# Patient Record
Sex: FEMALE | Race: BLACK OR AFRICAN AMERICAN | NOT HISPANIC OR LATINO | URBAN - METROPOLITAN AREA
[De-identification: names, ages, dates, MRNs, and addresses within clinical notes are randomized per-mention and may not be internally consistent; named-entity substitution may affect disease eponyms.]

---

## 2017-04-12 ENCOUNTER — INPATIENT (INPATIENT)
Facility: HOSPITAL | Age: 37
LOS: 5 days | Discharge: HOME | DRG: 310 | End: 2017-04-18
Attending: HOSPITALIST | Admitting: HOSPITALIST
Payer: MEDICAID

## 2017-04-12 VITALS
TEMPERATURE: 98 F | WEIGHT: 235.01 LBS | HEART RATE: 68 BPM | SYSTOLIC BLOOD PRESSURE: 108 MMHG | DIASTOLIC BLOOD PRESSURE: 63 MMHG | HEIGHT: 66 IN | OXYGEN SATURATION: 98 %

## 2017-04-12 LAB
ALBUMIN SERPL ELPH-MCNC: 3.5 G/DL — SIGNIFICANT CHANGE UP (ref 3.3–5)
ALP SERPL-CCNC: 90 U/L — SIGNIFICANT CHANGE UP (ref 30–120)
ALT FLD-CCNC: 36 U/L DA — SIGNIFICANT CHANGE UP (ref 10–60)
ANION GAP SERPL CALC-SCNC: 17 MMOL/L — SIGNIFICANT CHANGE UP (ref 5–17)
AST SERPL-CCNC: 27 U/L — SIGNIFICANT CHANGE UP (ref 10–40)
BASOPHILS # BLD AUTO: 0.1 K/UL — SIGNIFICANT CHANGE UP (ref 0–0.2)
BASOPHILS NFR BLD AUTO: 0.9 % — SIGNIFICANT CHANGE UP (ref 0–2)
BILIRUB SERPL-MCNC: 0.3 MG/DL — SIGNIFICANT CHANGE UP (ref 0.2–1.2)
BUN SERPL-MCNC: 14 MG/DL — SIGNIFICANT CHANGE UP (ref 7–23)
CALCIUM SERPL-MCNC: 9 MG/DL — SIGNIFICANT CHANGE UP (ref 8.4–10.5)
CHLORIDE SERPL-SCNC: 106 MMOL/L — SIGNIFICANT CHANGE UP (ref 96–108)
CO2 SERPL-SCNC: 20 MMOL/L — LOW (ref 22–31)
CREAT SERPL-MCNC: 0.83 MG/DL — SIGNIFICANT CHANGE UP (ref 0.5–1.3)
EOSINOPHIL # BLD AUTO: 0 K/UL — SIGNIFICANT CHANGE UP (ref 0–0.5)
EOSINOPHIL NFR BLD AUTO: 0 % — SIGNIFICANT CHANGE UP (ref 0–6)
GLUCOSE SERPL-MCNC: 87 MG/DL — SIGNIFICANT CHANGE UP (ref 70–99)
HCG SERPL-ACNC: <2 MIU/ML — SIGNIFICANT CHANGE UP
HCT VFR BLD CALC: 31 % — LOW (ref 34.5–45)
HGB BLD-MCNC: 8.3 G/DL — LOW (ref 11.5–15.5)
LYMPHOCYTES # BLD AUTO: 2.5 K/UL — SIGNIFICANT CHANGE UP (ref 1–3.3)
LYMPHOCYTES # BLD AUTO: 37.8 % — SIGNIFICANT CHANGE UP (ref 13–44)
MCHC RBC-ENTMCNC: 15.4 PG — LOW (ref 27–34)
MCHC RBC-ENTMCNC: 26.6 GM/DL — LOW (ref 32–36)
MCV RBC AUTO: 57.8 FL — LOW (ref 80–100)
MONOCYTES # BLD AUTO: 0.5 K/UL — SIGNIFICANT CHANGE UP (ref 0–0.9)
MONOCYTES NFR BLD AUTO: 8.3 % — SIGNIFICANT CHANGE UP (ref 2–14)
NEUTROPHILS # BLD AUTO: 3.5 K/UL — SIGNIFICANT CHANGE UP (ref 1.8–7.4)
NEUTROPHILS NFR BLD AUTO: 53 % — SIGNIFICANT CHANGE UP (ref 43–77)
NT-PROBNP SERPL-SCNC: 1949 PG/ML — HIGH (ref 0–125)
PLATELET # BLD AUTO: 337 K/UL — SIGNIFICANT CHANGE UP (ref 150–400)
POTASSIUM SERPL-MCNC: 3.3 MMOL/L — LOW (ref 3.5–5.3)
POTASSIUM SERPL-SCNC: 3.3 MMOL/L — LOW (ref 3.5–5.3)
PROT SERPL-MCNC: 7.5 G/DL — SIGNIFICANT CHANGE UP (ref 6–8.3)
RBC # BLD: 5.36 M/UL — HIGH (ref 3.8–5.2)
RBC # FLD: 18.1 % — HIGH (ref 10.3–14.5)
SODIUM SERPL-SCNC: 143 MMOL/L — SIGNIFICANT CHANGE UP (ref 135–145)
TROPONIN I SERPL-MCNC: 0 NG/ML — LOW (ref 0.02–0.06)
WBC # BLD: 6.5 K/UL — SIGNIFICANT CHANGE UP (ref 3.8–10.5)
WBC # FLD AUTO: 6.5 K/UL — SIGNIFICANT CHANGE UP (ref 3.8–10.5)

## 2017-04-12 PROCEDURE — 71010: CPT | Mod: 26

## 2017-04-12 RX ORDER — METOPROLOL TARTRATE 50 MG
5 TABLET ORAL
Qty: 0 | Refills: 0 | Status: COMPLETED | OUTPATIENT
Start: 2017-04-12 | End: 2017-04-13

## 2017-04-12 RX ORDER — SODIUM CHLORIDE 9 MG/ML
1000 INJECTION INTRAMUSCULAR; INTRAVENOUS; SUBCUTANEOUS ONCE
Qty: 0 | Refills: 0 | Status: COMPLETED | OUTPATIENT
Start: 2017-04-12 | End: 2017-04-12

## 2017-04-12 RX ORDER — SODIUM CHLORIDE 9 MG/ML
3 INJECTION INTRAMUSCULAR; INTRAVENOUS; SUBCUTANEOUS ONCE
Qty: 0 | Refills: 0 | Status: COMPLETED | OUTPATIENT
Start: 2017-04-12 | End: 2017-04-12

## 2017-04-12 RX ORDER — SODIUM CHLORIDE 9 MG/ML
1000 INJECTION INTRAMUSCULAR; INTRAVENOUS; SUBCUTANEOUS
Qty: 0 | Refills: 0 | Status: DISCONTINUED | OUTPATIENT
Start: 2017-04-12 | End: 2017-04-14

## 2017-04-12 RX ADMIN — SODIUM CHLORIDE 3 MILLILITER(S): 9 INJECTION INTRAMUSCULAR; INTRAVENOUS; SUBCUTANEOUS at 22:03

## 2017-04-12 RX ADMIN — SODIUM CHLORIDE 1000 MILLILITER(S): 9 INJECTION INTRAMUSCULAR; INTRAVENOUS; SUBCUTANEOUS at 22:04

## 2017-04-12 RX ADMIN — SODIUM CHLORIDE 125 MILLILITER(S): 9 INJECTION INTRAMUSCULAR; INTRAVENOUS; SUBCUTANEOUS at 22:04

## 2017-04-12 NOTE — ED PROVIDER NOTE - ATTENDING CONTRIBUTION TO CARE
I, Jose Phillips DO,  performed the initial face to face bedside interview with this patient regarding history of present illness, review of symptoms and relevant past medical, social and family history.  I completed an independent physical examination.  I was the initial provider who evaluated this patient.   The history, relevant review of systems, past medical and surgical history, medical decision making, and physical examination was documented by the scribe in my presence and I attest to the accuracy of the documentation.

## 2017-04-12 NOTE — ED PROVIDER NOTE - NS ED MD SCRIBE ATTENDING SCRIBE SECTIONS
PAST MEDICAL/SURGICAL/SOCIAL HISTORY/HIV/DISPOSITION/REVIEW OF SYSTEMS/VITAL SIGNS( Pullset)/HISTORY OF PRESENT ILLNESS

## 2017-04-12 NOTE — ED ADULT NURSE NOTE - CHPI ED SYMPTOMS NEG
no shortness of breath/no nausea/no vomiting/no diaphoresis/no back pain/no chills/no dizziness/no fever

## 2017-04-12 NOTE — ED PROVIDER NOTE - OBJECTIVE STATEMENT
37 y/o F pt with PMHx of hyperthyroidism and A-Fib presents to the ED c/o palpitations and near syncope today. Pt reports that the palpitations onset at 11:00AM Pt reports she has not been taking her prescribed Xarelto and began taking diet pills. Pt states she is currently on Metoprolol, takes it 2x a day, 50 mg in total and Cardizem, if needed. Denies any other complaints at this time. 35 y/o F pt with PMHx of hyperthyroidism and A-Fib presents to the ED c/o palpitations and near syncope today. Pt reports that the palpitations onset at 11:00AM Pt reports she has not been taking her prescribed Xarelto and began taking diet pills, in addition to the stress of managin a divorce. Pt states she is currently on Metoprolol, takes it 2x a day, 50 mg in total and Cardizem, if needed (but ran out of cardizem. Denies any other complaints at this time but knows that her heart is racing.  Denies CP, SOB, n/v/weakness or numbness.

## 2017-04-12 NOTE — ED PROVIDER NOTE - MEDICAL DECISION MAKING DETAILS
36 y o f with afib, poorly controlled, 36 y o f with afib, poorly controlled, presents with rapid heart rate and near syncope.  A: uncontrolled afib, likely worsened by stress of life and diet pills that likely have a stimulant component.  Plan: labs, ekg, xray, rate control, re-eval

## 2017-04-12 NOTE — ED PROVIDER NOTE - PROGRESS NOTE DETAILS
Pt without improvement after 2 doses of lopressor, improved after cardizem and given PO cardizem with decent HR control Pt stable, HR stable with intermit tachy, will continue to re-eval Pt stable, but with Afib with RVR, and not controlled, discussed case with Dr. Chairez, who understands the pts case and will admit for rate control and medication titration.

## 2017-04-12 NOTE — ED PROVIDER NOTE - PMH
Asthma    Atrial fibrillation    History of anemia    History of Hyperthyroidism Asthma    Atrial fibrillation    Depression, unspecified depression type    History of anemia    History of Hyperthyroidism

## 2017-04-13 DIAGNOSIS — I48.91 UNSPECIFIED ATRIAL FIBRILLATION: ICD-10-CM

## 2017-04-13 DIAGNOSIS — E05.90 THYROTOXICOSIS, UNSPECIFIED WITHOUT THYROTOXIC CRISIS OR STORM: ICD-10-CM

## 2017-04-13 DIAGNOSIS — I48.0 PAROXYSMAL ATRIAL FIBRILLATION: ICD-10-CM

## 2017-04-13 DIAGNOSIS — F32.9 MAJOR DEPRESSIVE DISORDER, SINGLE EPISODE, UNSPECIFIED: ICD-10-CM

## 2017-04-13 DIAGNOSIS — J45.909 UNSPECIFIED ASTHMA, UNCOMPLICATED: ICD-10-CM

## 2017-04-13 DIAGNOSIS — I05.9 RHEUMATIC MITRAL VALVE DISEASE, UNSPECIFIED: ICD-10-CM

## 2017-04-13 LAB
ALBUMIN SERPL ELPH-MCNC: 2.9 G/DL — LOW (ref 3.3–5)
ALP SERPL-CCNC: 73 U/L — SIGNIFICANT CHANGE UP (ref 30–120)
ALT FLD-CCNC: 24 U/L DA — SIGNIFICANT CHANGE UP (ref 10–60)
ANION GAP SERPL CALC-SCNC: 9 MMOL/L — SIGNIFICANT CHANGE UP (ref 5–17)
APTT BLD: 39.6 SEC — HIGH (ref 27.5–37.4)
AST SERPL-CCNC: 25 U/L — SIGNIFICANT CHANGE UP (ref 10–40)
BASOPHILS # BLD AUTO: 0.1 K/UL — SIGNIFICANT CHANGE UP (ref 0–0.2)
BASOPHILS NFR BLD AUTO: 0.9 % — SIGNIFICANT CHANGE UP (ref 0–2)
BILIRUB SERPL-MCNC: 0.4 MG/DL — SIGNIFICANT CHANGE UP (ref 0.2–1.2)
BUN SERPL-MCNC: 14 MG/DL — SIGNIFICANT CHANGE UP (ref 7–23)
CALCIUM SERPL-MCNC: 8 MG/DL — LOW (ref 8.4–10.5)
CHLORIDE SERPL-SCNC: 110 MMOL/L — HIGH (ref 96–108)
CO2 SERPL-SCNC: 23 MMOL/L — SIGNIFICANT CHANGE UP (ref 22–31)
CREAT SERPL-MCNC: 0.66 MG/DL — SIGNIFICANT CHANGE UP (ref 0.5–1.3)
EOSINOPHIL # BLD AUTO: 0 K/UL — SIGNIFICANT CHANGE UP (ref 0–0.5)
EOSINOPHIL NFR BLD AUTO: 0 % — SIGNIFICANT CHANGE UP (ref 0–6)
GLUCOSE SERPL-MCNC: 93 MG/DL — SIGNIFICANT CHANGE UP (ref 70–99)
HCT VFR BLD CALC: 27 % — LOW (ref 34.5–45)
HGB BLD-MCNC: 7.5 G/DL — LOW (ref 11.5–15.5)
INR BLD: 1.47 RATIO — HIGH (ref 0.88–1.16)
LYMPHOCYTES # BLD AUTO: 2.2 K/UL — SIGNIFICANT CHANGE UP (ref 1–3.3)
LYMPHOCYTES # BLD AUTO: 34.6 % — SIGNIFICANT CHANGE UP (ref 13–44)
MAGNESIUM SERPL-MCNC: 1.5 MG/DL — LOW (ref 1.6–2.6)
MCHC RBC-ENTMCNC: 15.8 PG — LOW (ref 27–34)
MCHC RBC-ENTMCNC: 27.9 GM/DL — LOW (ref 32–36)
MCV RBC AUTO: 56.4 FL — LOW (ref 80–100)
MONOCYTES # BLD AUTO: 0.5 K/UL — SIGNIFICANT CHANGE UP (ref 0–0.9)
MONOCYTES NFR BLD AUTO: 7.5 % — SIGNIFICANT CHANGE UP (ref 2–14)
NEUTROPHILS # BLD AUTO: 3.5 K/UL — SIGNIFICANT CHANGE UP (ref 1.8–7.4)
NEUTROPHILS NFR BLD AUTO: 57 % — SIGNIFICANT CHANGE UP (ref 43–77)
PHOSPHATE SERPL-MCNC: 2.8 MG/DL — SIGNIFICANT CHANGE UP (ref 2.5–4.5)
PLATELET # BLD AUTO: 246 K/UL — SIGNIFICANT CHANGE UP (ref 150–400)
POTASSIUM SERPL-MCNC: 3.2 MMOL/L — LOW (ref 3.5–5.3)
POTASSIUM SERPL-SCNC: 3.2 MMOL/L — LOW (ref 3.5–5.3)
PROT SERPL-MCNC: 6.3 G/DL — SIGNIFICANT CHANGE UP (ref 6–8.3)
PROTHROM AB SERPL-ACNC: 16.1 SEC — HIGH (ref 9.8–12.7)
RBC # BLD: 4.78 M/UL — SIGNIFICANT CHANGE UP (ref 3.8–5.2)
RBC # FLD: 18.3 % — HIGH (ref 10.3–14.5)
SODIUM SERPL-SCNC: 142 MMOL/L — SIGNIFICANT CHANGE UP (ref 135–145)
TROPONIN I SERPL-MCNC: 0 NG/ML — LOW (ref 0.02–0.06)
TSH SERPL-MCNC: <0.01 UIU/ML — LOW (ref 0.27–4.2)
WBC # BLD: 6.2 K/UL — SIGNIFICANT CHANGE UP (ref 3.8–10.5)
WBC # FLD AUTO: 6.2 K/UL — SIGNIFICANT CHANGE UP (ref 3.8–10.5)

## 2017-04-13 PROCEDURE — 12345: CPT | Mod: NC

## 2017-04-13 PROCEDURE — 99222 1ST HOSP IP/OBS MODERATE 55: CPT | Mod: AI

## 2017-04-13 PROCEDURE — 99285 EMERGENCY DEPT VISIT HI MDM: CPT

## 2017-04-13 PROCEDURE — 93010 ELECTROCARDIOGRAM REPORT: CPT

## 2017-04-13 PROCEDURE — 99223 1ST HOSP IP/OBS HIGH 75: CPT

## 2017-04-13 RX ORDER — POTASSIUM CHLORIDE 20 MEQ
40 PACKET (EA) ORAL EVERY 4 HOURS
Qty: 0 | Refills: 0 | Status: COMPLETED | OUTPATIENT
Start: 2017-04-13 | End: 2017-04-13

## 2017-04-13 RX ORDER — METOPROLOL TARTRATE 50 MG
25 TABLET ORAL EVERY 6 HOURS
Qty: 0 | Refills: 0 | Status: DISCONTINUED | OUTPATIENT
Start: 2017-04-13 | End: 2017-04-14

## 2017-04-13 RX ORDER — IPRATROPIUM BROMIDE 0.2 MG/ML
500 SOLUTION, NON-ORAL INHALATION ONCE
Qty: 0 | Refills: 0 | Status: COMPLETED | OUTPATIENT
Start: 2017-04-13 | End: 2017-04-13

## 2017-04-13 RX ORDER — POTASSIUM CHLORIDE 20 MEQ
40 PACKET (EA) ORAL ONCE
Qty: 0 | Refills: 0 | Status: COMPLETED | OUTPATIENT
Start: 2017-04-13 | End: 2017-04-13

## 2017-04-13 RX ORDER — LEVALBUTEROL 1.25 MG/.5ML
1.25 SOLUTION, CONCENTRATE RESPIRATORY (INHALATION) EVERY 4 HOURS
Qty: 0 | Refills: 0 | Status: DISCONTINUED | OUTPATIENT
Start: 2017-04-13 | End: 2017-04-18

## 2017-04-13 RX ORDER — METOPROLOL TARTRATE 50 MG
2.5 TABLET ORAL ONCE
Qty: 0 | Refills: 0 | Status: COMPLETED | OUTPATIENT
Start: 2017-04-13 | End: 2017-04-13

## 2017-04-13 RX ORDER — METOPROLOL TARTRATE 50 MG
75 TABLET ORAL
Qty: 0 | Refills: 0 | Status: DISCONTINUED | OUTPATIENT
Start: 2017-04-13 | End: 2017-04-13

## 2017-04-13 RX ORDER — RIVAROXABAN 15 MG-20MG
20 KIT ORAL DAILY
Qty: 0 | Refills: 0 | Status: DISCONTINUED | OUTPATIENT
Start: 2017-04-13 | End: 2017-04-18

## 2017-04-13 RX ORDER — POTASSIUM CHLORIDE 20 MEQ
40 PACKET (EA) ORAL ONCE
Qty: 0 | Refills: 0 | Status: DISCONTINUED | OUTPATIENT
Start: 2017-04-13 | End: 2017-04-13

## 2017-04-13 RX ORDER — NICOTINE POLACRILEX 2 MG
1 GUM BUCCAL DAILY
Qty: 0 | Refills: 0 | Status: DISCONTINUED | OUTPATIENT
Start: 2017-04-13 | End: 2017-04-18

## 2017-04-13 RX ORDER — METOPROLOL TARTRATE 50 MG
5 TABLET ORAL ONCE
Qty: 0 | Refills: 0 | Status: COMPLETED | OUTPATIENT
Start: 2017-04-13 | End: 2017-04-13

## 2017-04-13 RX ORDER — TIOTROPIUM BROMIDE 18 UG/1
1 CAPSULE ORAL; RESPIRATORY (INHALATION) DAILY
Qty: 0 | Refills: 0 | Status: DISCONTINUED | OUTPATIENT
Start: 2017-04-13 | End: 2017-04-18

## 2017-04-13 RX ADMIN — SODIUM CHLORIDE 125 MILLILITER(S): 9 INJECTION INTRAMUSCULAR; INTRAVENOUS; SUBCUTANEOUS at 00:55

## 2017-04-13 RX ADMIN — Medication 5 MILLIGRAM(S): at 18:49

## 2017-04-13 RX ADMIN — Medication 1 MILLIGRAM(S): at 03:25

## 2017-04-13 RX ADMIN — Medication 1 MILLIGRAM(S): at 00:55

## 2017-04-13 RX ADMIN — Medication 25 MILLIGRAM(S): at 17:15

## 2017-04-13 RX ADMIN — Medication 40 MILLIEQUIVALENT(S): at 06:10

## 2017-04-13 RX ADMIN — Medication 500 MICROGRAM(S): at 12:02

## 2017-04-13 RX ADMIN — Medication 5 MILLIGRAM(S): at 00:32

## 2017-04-13 RX ADMIN — Medication 25 MILLIGRAM(S): at 11:36

## 2017-04-13 RX ADMIN — Medication 500 MICROGRAM(S): at 20:20

## 2017-04-13 RX ADMIN — Medication 0.5 MILLIGRAM(S): at 21:01

## 2017-04-13 RX ADMIN — Medication 40 MILLIEQUIVALENT(S): at 09:28

## 2017-04-13 RX ADMIN — TIOTROPIUM BROMIDE 1 CAPSULE(S): 18 CAPSULE ORAL; RESPIRATORY (INHALATION) at 22:05

## 2017-04-13 RX ADMIN — Medication 1 MILLIGRAM(S): at 06:12

## 2017-04-13 RX ADMIN — Medication 2.5 MILLIGRAM(S): at 08:45

## 2017-04-13 RX ADMIN — Medication 1 PATCH: at 19:00

## 2017-04-13 RX ADMIN — Medication 1 MILLIGRAM(S): at 19:01

## 2017-04-13 RX ADMIN — Medication 75 MILLIGRAM(S): at 06:10

## 2017-04-13 RX ADMIN — Medication 40 MILLIEQUIVALENT(S): at 13:33

## 2017-04-13 RX ADMIN — Medication 1 MILLIGRAM(S): at 13:38

## 2017-04-13 RX ADMIN — Medication 5 MILLIGRAM(S): at 03:24

## 2017-04-13 RX ADMIN — RIVAROXABAN 20 MILLIGRAM(S): KIT at 11:36

## 2017-04-13 RX ADMIN — Medication 5 MILLIGRAM(S): at 03:21

## 2017-04-13 NOTE — H&P ADULT - PROBLEM SELECTOR PLAN 1
- admit to telemetry  - continue with cardiac enzymes  - cardiology consult  - continue outpatient regiment of metoprolol 75mg TID  - continue with diltiazem IV 10mg q6h PRN HR >110s, eventual conversion to PO once stable dose is determined  - continue with xarelo

## 2017-04-13 NOTE — H&P ADULT - PMH
Asthma    Atrial fibrillation    Depression, unspecified depression type    History of anemia    History of Hyperthyroidism

## 2017-04-13 NOTE — ED ADULT NURSE REASSESSMENT NOTE - NS ED NURSE REASSESS COMMENT FT1
Patient resting quietly on stretcher.  Patient remains in afib with a mod vent response, 100 to 125 per minute.  BP stable.

## 2017-04-13 NOTE — H&P ADULT - ASSESSMENT
36F with afib (with hx of hospitalizations for RVR) on xarelto, hyperthyroidism (c/b hx of thyroid storms), asthma, HTN, and depression who presents heart palpitations for 2 days, consistent with afib with RVR.

## 2017-04-13 NOTE — CONSULT NOTE ADULT - PROBLEM SELECTOR RECOMMENDATION 3
MVP with mod-sev MR on echo in 2013; repeat imaging (ideally when in sinus rhythm or when HR is slower)

## 2017-04-13 NOTE — H&P ADULT - NSHPLABSRESULTS_GEN_ALL_CORE
LABS:                        8.3<L>  6.5   )-----------( 337      ( 12 Apr 2017 21:18 )             31.0<L>    12 Apr 2017 21:18    143    |  106    |  14     ----------------------------<  87     3.3<L>   |  20<L>  |  0.83       Ca    9.0        12 Apr 2017 21:18    TPro  7.5    /  Alb  3.5    /  TBili  0.3    /  DBili  x      /  AST  27     /  ALT  36     /  AlkPhos  90     12 Apr 2017 21:18    LIVER FUNCTIONS - ( 12 Apr 2017 21:18 )  Alb: 3.5 g/dL / Pro: 7.5 g/dL / ALK PHOS: 90 U/L / ALT: 36 U/L DA / AST: 27 U/L / GGT: x    EKG - afib with RVR ()

## 2017-04-13 NOTE — CONSULT NOTE ADULT - PROBLEM SELECTOR RECOMMENDATION 9
Recurrence of atrial fibrillation associated with symptoms and rapid ventricular response in the setting of nonadherence to recommended/prescribed medications and likely contributed to by hyperthyroidism - will try to control rate with metoprolol; continue Xarelto.

## 2017-04-13 NOTE — H&P ADULT - HISTORY OF PRESENT ILLNESS
36F with afib (with hx of hospitalizations for RVR) on xarelto, hyperthyroidism (c/b hx of thyroid storms), asthma, HTN, and depression who presents heart palpitations for 2 days.  States it feels very similar to her previous afib with RVR episodes.  Also complained of some chest tightness today though has resolved already.  Associated with diaphoresis and some light-headedness with activity.  Patient reports not taking any of her meds for the past 6 months because of stress (going through a divorce) and depression (no active SI).  However patient has been taking diet pills recently.  Denies any fevers, nausea/vomiting, recent travel, or diarrhea.  In the ED, patient was found to be in afib with RVR.  Multiple attempts to control her HR with metoprolol IV and diltiazem IV was only able to lower her HR to 110s but her HR would then rebound to the high 130s.  Patient, however, remained asymptomatic.

## 2017-04-13 NOTE — PROGRESS NOTE ADULT - SUBJECTIVE AND OBJECTIVE BOX
Patient is a 36y old  Female who presents with a chief complaint of palpitations (13 Apr 2017 03:06)        HPI:  36F with afib (with hx of hospitalizations for RVR) on xarelto, hyperthyroidism (c/b hx of thyroid storms), asthma, HTN, and depression who presents heart palpitations for 2 days.  States it feels very similar to her previous afib with RVR episodes.  Also complained of some chest tightness today though has resolved already.  Associated with diaphoresis and some light-headedness with activity.  Patient reports not taking any of her meds for the past 6 months because of stress (going through a divorce) and depression (no active SI).  However patient has been taking diet pills recently.  Denies any fevers, nausea/vomiting, recent travel, or diarrhea.  In the ED, patient was found to be in afib with RVR.  Multiple attempts to control her HR with metoprolol IV and diltiazem IV was only able to lower her HR to 110s but her HR would then rebound to the high 130s.  Patient, however, remained asymptomatic. (13 Apr 2017 03:06)      SUBJECTIVE & OBJECTIVE: Pt seen and examined at bedside.     PHYSICAL EXAM:  T(C): 36.8, Max: 36.8 (04-13 @ 05:28)  HR: 120 (68 - 124)  BP: 93/58 (93/58 - 110/69)  RR: 18 (18 - 21)  SpO2: 97% (97% - 98%)  Wt(kg): -- Height (cm): 167.6 (04-12 @ 20:07)  Weight (kg): 106.6 (04-12 @ 20:07)  BMI (kg/m2): 37.9 (04-12 @ 20:07)  BSA (m2): 2.14 (04-12 @ 20:07)  GENERAL: NAD, well-groomed, well-developed  HEAD:  Atraumatic, Normocephalic  EYES: EOMI, PERRLA, conjunctiva and sclera clear  ENMT: Moist mucous membranes  NECK: Supple, No JVD  NERVOUS SYSTEM:  Alert & Oriented X3, Motor Strength 5/5 B/L upper and lower extremities; DTRs 2+ intact and symmetric  CHEST/LUNG: Clear to auscultation bilaterally; No rales, rhonchi, wheezing, or rubs  HEART: Regular rate and rhythm; No murmurs, rubs, or gallops  ABDOMEN: Soft, Nontender, Nondistended; Bowel sounds present  EXTREMITIES:  2+ Peripheral Pulses, No clubbing, cyanosis, or edema        MEDICATIONS  (STANDING):  sodium chloride 0.9%. 1000milliLiter(s) IV Continuous <Continuous>  rivaroxaban 20milliGRAM(s) Oral daily  metoprolol 25milliGRAM(s) Oral every 6 hours  potassium chloride    Tablet ER 40milliEquivalent(s) Oral every 4 hours  ipratropium  for Nebulization. 500MICROGram(s) Nebulizer once  methimazole 10milliGRAM(s) Oral three times a day    MEDICATIONS  (PRN):  LORazepam     Tablet 1milliGRAM(s) Oral three times a day PRN Anxiety      LABS:                        7.5    6.2   )-----------( 246      ( 13 Apr 2017 06:32 )             27.0     04-13    142  |  110<H>  |  14  ----------------------------<  93  3.2<L>   |  23  |  0.66    Ca    8.0<L>      13 Apr 2017 06:32  Phos  2.8     04-13  Mg     1.5     04-13    TPro  6.3  /  Alb  2.9<L>  /  TBili  0.4  /  DBili  x   /  AST  25  /  ALT  24  /  AlkPhos  73  04-13    PT/INR - ( 13 Apr 2017 06:32 )   PT: 16.1 sec;   INR: 1.47 ratio         PTT - ( 13 Apr 2017 06:32 )  PTT:39.6 sec    Magnesium, Serum: 1.5 mg/dL (04-13 @ 06:32)    CAPILLARY BLOOD GLUCOSE      CAPILLARY BLOOD GLUCOSE    CAPILLARY BLOOD GLUCOSE      CARDIAC MARKERS ( 13 Apr 2017 06:32 )  .001 ng/mL / x     / x     / x     / x      CARDIAC MARKERS ( 12 Apr 2017 21:18 )  .001 ng/mL / x     / x     / x     / x            RECENT CULTURES:      RADIOLOGY & ADDITIONAL TESTS:                        DVT/GI ppx  Discussed with pt @ bedside

## 2017-04-13 NOTE — H&P ADULT - NSHPPHYSICALEXAM_GEN_ALL_CORE
PHYSICAL EXAM:  Vital Signs Last 24 Hrs  T(C): 36.6, Max: 36.6 (04-12 @ 20:07)  T(F): 97.9, Max: 97.9 (04-12 @ 20:07)  HR: 100 (68 - 124)  BP: 99/65 (99/65 - 110/69)  BP(mean): --  RR: 18 (18 - 21)  SpO2: 98% (98% - 98%)    GENERAL: obese female in NAD, well-groomed, well-developed  HEAD:  atraumatic, normocephalic  EYES: EOMI, conjunctiva and sclera clear  ENMT: No tonsillar erythema, exudates, or enlargement; moist mucous membranes, good dentition, No lesions  NECK: supple, no JVD  NERVOUS SYSTEM:  alert & oriented X3, good concentration; motor Strength 5/5 B/L upper and lower extremities  CHEST/LUNG: clear to auscultation bilaterally; No rales, rhonchi, wheezing, or rubs  HEART: irregular irregular; no murmurs, rubs, or gallops  ABDOMEN: soft, nontender, nondistended; bowel sounds present  EXTREMITIES:  2+ peripheral pulses, no clubbing, cyanosis, or edema

## 2017-04-13 NOTE — H&P ADULT - NSHPSOCIALHISTORY_GEN_ALL_CORE
- current every day smoker (1-1.5 ppd for the past 14 years)  - occasional etoh  - denies illegal drug use

## 2017-04-13 NOTE — CONSULT NOTE ADULT - SUBJECTIVE AND OBJECTIVE BOX
REQUESTING PHYSICIAN: Dr. Chairez    REASON FOR CONSULT: Atrial fibrillation    CHIEF COMPLAINT: Palpitations and dyspnea on exertion    HPI:  36 year old woman with a history or paroxysmal atrial fibrillation (normally in sinus rhythm, last recurrence of AF was ~ 6 months ago), MVP with MR, hyperthyroidism, mild asthma, and depression/anxiety who presented to the ER with complaints of palpitations x 2 days associated with dyspnea on exertion.  Symptoms are similar to previous episodes of AF; unable to identify exacerbating or alleviating factors.  She describes recent increase in psychosocial stressors and admits to not taking her medications x approximately 6 months.  Denies: angina; + lightheadedness and near-syncope.    PAST MEDICAL & SURGICAL HISTORY:  Depression, unspecified depression type  History of anemia  Asthma  History of Hyperthyroidism  Atrial fibrillation  S/P  Section: ,,    SOCIAL HISTORY:   Alcohol: Denied  Smoking: Nonsmoker  Drug Use: Denied  Marital Status:     FAMILY HISTORY: FAMILY HISTORY:  Family history of stomach cancer (Grandparent)  Family history of diabetes mellitus (Sibling, Grandparent)    MEDICATIONS  (STANDING):  sodium chloride 0.9%. 1000milliLiter(s) IV Continuous <Continuous>  rivaroxaban 20milliGRAM(s) Oral daily  metoprolol Injectable 2.5milliGRAM(s) IV Push once  metoprolol 25milliGRAM(s) Oral every 6 hours    MEDICATIONS  (PRN):  LORazepam     Tablet 1milliGRAM(s) Oral three times a day PRN Anxiety    Allergies: Motrin (Hives)    REVIEW OF SYSTEMS:  CONSTITUTIONAL: No weakness, fevers or chills  EYES/ENT: No visual changes;  No vertigo or throat pain   NECK: No pain or stiffness  RESPIRATORY: No cough, wheezing, hemoptysis; + shortness of breath with exertion  CARDIOVASCULAR: No chest pain; + palpitations, + lightheadedness  GASTROINTESTINAL: No abdominal pain. No nausea, vomiting, or hematemesis; No diarrhea or constipation. No melena or hematochezia.  GENITOURINARY: No dysuria, frequency or hematuria  NEUROLOGICAL: No numbness or weakness  SKIN: No itching or rash  All other review of systems is negative unless indicated above    VITAL SIGNS:   Vital Signs Last 24 Hrs  T(C): 36.8, Max: 36.8 ( @ 05:28)  T(F): 98.3, Max: 98.3 (04-13 @ 05:28)  HR: 120 (68 - 124)  BP: 93/58 (93/58 - 110/69)  BP(mean): --  RR: 18 (18 - 21)  SpO2: 97% (97% - 98%)    PHYSICAL EXAM:  Constitutional: NAD, awake and alert, well-developed  Eyes:  EOMI,  Pupils round, No oral cyanosis.  Pulmonary: Non-labored, mild scattered wheeze but non-labored  Cardiovascular: Irregularly irregular, tachycardic  Gastrointestinal: Bowel Sounds present, abdomen is soft, nontender.   Lymph: No cervical lymphadenopathy.  Neurological: Alert, no focal deficits  Skin: No rashes.  Psych:  Mood & affect appropriate    LABS:                        7.5    6.2   )-----------( 246      ( 2017 06:32 )             27.0                   142    |  110    |  14     ----------------------------<  93     3.2     |  23     |  0.66     Ca    8.0        2017 06:32    TPro  6.3    /  Alb  2.9    /  TBili  0.4    /  DBili  x      /  AST  25     /  ALT  24     /  AlkPhos  73     2017 06:32  PT/INR - ( 2017 06:32 )   PT: 16.1 sec;   INR: 1.47 ratio       PTT - ( 2017 06:32 )  PTT:39.6 sec  CARDIAC MARKERS ( 2017 06:32 )  .001 ng/mL / x     / x     / x     / x      CARDIAC MARKERS ( 2017 21:18 )  .001 ng/mL / x     / x     / x     / x        - @ 21:18  Pro Bnp 1949    RADIOLOGY:  CXR: No sign of lobar consolidation vascular   congestion or effusion. Heart size within normal limits. Hilar regions,   mediastinal contours, osseous structures intact.    Echo (2013):  1. Technically difficult study.  2. Endocardium not well visualized.  Grossly normal left ventricular size   and overall systolic function. LVEF estimated at 55%.  3. The right ventricle appears mildly enlarged with normal function.  4. Biatrial enlargement.  5. Prolapse of the anterior mitral leaflet with moderate to severe mitral   regurgitation.  6. Mild to moderate pulmonic insufficiency.  7. Moderate tricuspid regurgitation.  8. Moderate pulmonary hypertension.    ECG: AF with RVR

## 2017-04-13 NOTE — H&P ADULT - FAMILY HISTORY
Sibling  Still living? Unknown  Family history of diabetes mellitus, Age at diagnosis: Age Unknown     Grandparent  Still living? Unknown  Family history of diabetes mellitus, Age at diagnosis: Age Unknown  Family history of stomach cancer, Age at diagnosis: Age Unknown

## 2017-04-13 NOTE — CONSULT NOTE ADULT - PROBLEM SELECTOR RECOMMENDATION 2
H/o hyperthroidism but nonadherent with methimazole; await TSH; consider endocrinology consultation if TSH is suppressed.

## 2017-04-13 NOTE — PROGRESS NOTE ADULT - ASSESSMENT
36F with afib (with hx of hospitalizations for RVR) on xarelto, hyperthyroidism (c/b hx of thyroid storms), asthma, HTN, and depression who presents heart palpitations for 2 days, pt admits not taking any of her medication for past 6 months, being admitted for afib c rvr likely from medication non-compliance

## 2017-04-13 NOTE — PATIENT PROFILE ADULT. - TEACHING/LEARNING LEARNING PREFERENCES OTHER
written material/computer/internet/audio/group instruction/skill demonstration/video/individual instruction

## 2017-04-14 ENCOUNTER — TRANSCRIPTION ENCOUNTER (OUTPATIENT)
Age: 37
End: 2017-04-14

## 2017-04-14 DIAGNOSIS — Z86.2 PERSONAL HISTORY OF DISEASES OF THE BLOOD AND BLOOD-FORMING ORGANS AND CERTAIN DISORDERS INVOLVING THE IMMUNE MECHANISM: ICD-10-CM

## 2017-04-14 LAB
ALBUMIN SERPL ELPH-MCNC: 2.8 G/DL — LOW (ref 3.3–5)
ALP SERPL-CCNC: 71 U/L — SIGNIFICANT CHANGE UP (ref 30–120)
ALT FLD-CCNC: 25 U/L DA — SIGNIFICANT CHANGE UP (ref 10–60)
ANION GAP SERPL CALC-SCNC: 7 MMOL/L — SIGNIFICANT CHANGE UP (ref 5–17)
AST SERPL-CCNC: 17 U/L — SIGNIFICANT CHANGE UP (ref 10–40)
BILIRUB SERPL-MCNC: 0.3 MG/DL — SIGNIFICANT CHANGE UP (ref 0.2–1.2)
BUN SERPL-MCNC: 16 MG/DL — SIGNIFICANT CHANGE UP (ref 7–23)
CALCIUM SERPL-MCNC: 8.3 MG/DL — LOW (ref 8.4–10.5)
CHLORIDE SERPL-SCNC: 111 MMOL/L — HIGH (ref 96–108)
CO2 SERPL-SCNC: 24 MMOL/L — SIGNIFICANT CHANGE UP (ref 22–31)
CREAT SERPL-MCNC: 0.64 MG/DL — SIGNIFICANT CHANGE UP (ref 0.5–1.3)
FERRITIN SERPL-MCNC: 5.8 NG/ML — LOW (ref 15–150)
FOLATE SERPL-MCNC: 8.6 NG/ML — SIGNIFICANT CHANGE UP (ref 4.8–24.2)
GLUCOSE SERPL-MCNC: 92 MG/DL — SIGNIFICANT CHANGE UP (ref 70–99)
HCT VFR BLD CALC: 26.7 % — LOW (ref 34.5–45)
HGB BLD-MCNC: 7.3 G/DL — LOW (ref 11.5–15.5)
IRON SATN MFR SERPL: 3 % — LOW (ref 14–50)
IRON SATN MFR SERPL: 8 UG/DL — LOW (ref 30–160)
MAGNESIUM SERPL-MCNC: 1.6 MG/DL — SIGNIFICANT CHANGE UP (ref 1.6–2.6)
MCHC RBC-ENTMCNC: 15.4 PG — LOW (ref 27–34)
MCHC RBC-ENTMCNC: 27.2 GM/DL — LOW (ref 32–36)
MCV RBC AUTO: 56.7 FL — LOW (ref 80–100)
PLATELET # BLD AUTO: 228 K/UL — SIGNIFICANT CHANGE UP (ref 150–400)
POTASSIUM SERPL-MCNC: 4.3 MMOL/L — SIGNIFICANT CHANGE UP (ref 3.5–5.3)
POTASSIUM SERPL-SCNC: 4.3 MMOL/L — SIGNIFICANT CHANGE UP (ref 3.5–5.3)
PROT SERPL-MCNC: 5.6 G/DL — LOW (ref 6–8.3)
RBC # BLD: 4.71 M/UL — SIGNIFICANT CHANGE UP (ref 3.8–5.2)
RBC # FLD: 18 % — HIGH (ref 10.3–14.5)
SODIUM SERPL-SCNC: 142 MMOL/L — SIGNIFICANT CHANGE UP (ref 135–145)
T3 SERPL-MCNC: 123 NG/DL — SIGNIFICANT CHANGE UP (ref 80–200)
T4 AB SER-ACNC: 9.4 UG/DL — SIGNIFICANT CHANGE UP (ref 4.6–12)
TIBC SERPL-MCNC: 294 UG/DL — SIGNIFICANT CHANGE UP (ref 220–430)
UIBC SERPL-MCNC: 286 UG/DL — SIGNIFICANT CHANGE UP (ref 110–370)
VIT B12 SERPL-MCNC: 825 PG/ML — SIGNIFICANT CHANGE UP (ref 243–894)
WBC # BLD: 5.6 K/UL — SIGNIFICANT CHANGE UP (ref 3.8–10.5)
WBC # FLD AUTO: 5.6 K/UL — SIGNIFICANT CHANGE UP (ref 3.8–10.5)

## 2017-04-14 PROCEDURE — 99233 SBSQ HOSP IP/OBS HIGH 50: CPT

## 2017-04-14 RX ORDER — DIGOXIN 250 MCG
0.25 TABLET ORAL DAILY
Qty: 0 | Refills: 0 | Status: DISCONTINUED | OUTPATIENT
Start: 2017-04-15 | End: 2017-04-18

## 2017-04-14 RX ORDER — SODIUM CHLORIDE 9 MG/ML
500 INJECTION INTRAMUSCULAR; INTRAVENOUS; SUBCUTANEOUS ONCE
Qty: 0 | Refills: 0 | Status: COMPLETED | OUTPATIENT
Start: 2017-04-14 | End: 2017-04-14

## 2017-04-14 RX ORDER — DIGOXIN 250 MCG
0.25 TABLET ORAL ONCE
Qty: 0 | Refills: 0 | Status: COMPLETED | OUTPATIENT
Start: 2017-04-14 | End: 2017-04-14

## 2017-04-14 RX ORDER — MAGNESIUM SULFATE 500 MG/ML
2 VIAL (ML) INJECTION ONCE
Qty: 0 | Refills: 0 | Status: COMPLETED | OUTPATIENT
Start: 2017-04-14 | End: 2017-04-14

## 2017-04-14 RX ORDER — LACTOBACILLUS ACIDOPHILUS 100MM CELL
1 CAPSULE ORAL DAILY
Qty: 0 | Refills: 0 | Status: DISCONTINUED | OUTPATIENT
Start: 2017-04-14 | End: 2017-04-18

## 2017-04-14 RX ORDER — DIGOXIN 250 MCG
0.25 TABLET ORAL DAILY
Qty: 0 | Refills: 0 | Status: COMPLETED | OUTPATIENT
Start: 2017-04-14 | End: 2017-04-14

## 2017-04-14 RX ORDER — SERTRALINE 25 MG/1
50 TABLET, FILM COATED ORAL DAILY
Qty: 0 | Refills: 0 | Status: DISCONTINUED | OUTPATIENT
Start: 2017-04-14 | End: 2017-04-18

## 2017-04-14 RX ORDER — METOPROLOL TARTRATE 50 MG
25 TABLET ORAL EVERY 6 HOURS
Qty: 0 | Refills: 0 | Status: DISCONTINUED | OUTPATIENT
Start: 2017-04-14 | End: 2017-04-17

## 2017-04-14 RX ADMIN — RIVAROXABAN 20 MILLIGRAM(S): KIT at 12:45

## 2017-04-14 RX ADMIN — Medication 0.25 MILLIGRAM(S): at 20:45

## 2017-04-14 RX ADMIN — Medication 1 MILLIGRAM(S): at 20:49

## 2017-04-14 RX ADMIN — LEVALBUTEROL 1.25 MILLIGRAM(S): 1.25 SOLUTION, CONCENTRATE RESPIRATORY (INHALATION) at 02:42

## 2017-04-14 RX ADMIN — SERTRALINE 50 MILLIGRAM(S): 25 TABLET, FILM COATED ORAL at 12:46

## 2017-04-14 RX ADMIN — Medication 300 MILLIGRAM(S): at 14:18

## 2017-04-14 RX ADMIN — TIOTROPIUM BROMIDE 1 CAPSULE(S): 18 CAPSULE ORAL; RESPIRATORY (INHALATION) at 06:11

## 2017-04-14 RX ADMIN — Medication 50 GRAM(S): at 12:44

## 2017-04-14 RX ADMIN — Medication 25 MILLIGRAM(S): at 12:45

## 2017-04-14 RX ADMIN — Medication 25 MILLIGRAM(S): at 10:30

## 2017-04-14 RX ADMIN — Medication 1 TABLET(S): at 22:29

## 2017-04-14 RX ADMIN — Medication 1 MILLIGRAM(S): at 12:47

## 2017-04-14 RX ADMIN — Medication 25 MILLIGRAM(S): at 17:04

## 2017-04-14 RX ADMIN — Medication 300 MILLIGRAM(S): at 22:02

## 2017-04-14 RX ADMIN — Medication 1 PATCH: at 12:45

## 2017-04-14 RX ADMIN — SODIUM CHLORIDE 500 MILLILITER(S): 9 INJECTION INTRAMUSCULAR; INTRAVENOUS; SUBCUTANEOUS at 02:12

## 2017-04-14 RX ADMIN — Medication 1 MILLIGRAM(S): at 02:26

## 2017-04-14 RX ADMIN — Medication 1 PATCH: at 12:00

## 2017-04-14 RX ADMIN — LEVALBUTEROL 1.25 MILLIGRAM(S): 1.25 SOLUTION, CONCENTRATE RESPIRATORY (INHALATION) at 22:44

## 2017-04-14 RX ADMIN — Medication 0.25 MILLIGRAM(S): at 04:02

## 2017-04-14 RX ADMIN — Medication 0.25 MILLIGRAM(S): at 06:19

## 2017-04-14 NOTE — DISCHARGE NOTE ADULT - MEDICATION SUMMARY - MEDICATIONS TO TAKE
I will START or STAY ON the medications listed below when I get home from the hospital:    digoxin 250 mcg (0.25 mg) oral tablet  -- 1 tab(s) by mouth once a day  -- Indication: For Atrial fibrillation    diltiaZEM 30 mg oral tablet  -- 1 tab(s) by mouth every 8 hours  -- Indication: For Atrial fibrillation    rivaroxaban 20 mg oral tablet  -- 1 tab(s) by mouth once a day  -- Indication: For Atrial fibrillation    LORazepam 0.5 mg oral tablet  -- 1 tab(s) by mouth 3 times a day, As Needed MDD:3 - for anxiety  -- Caution federal law prohibits the transfer of this drug to any person other  than the person for whom it was prescribed.  Do not take this drug if you are pregnant.  May cause drowsiness.  Alcohol may intensify this effect.  Use care when operating dangerous machinery.    -- Indication: For Depression, unspecified depression type    sertraline 50 mg oral tablet  -- 1 tab(s) by mouth once a day  -- Indication: For Depression, unspecified depression type    methIMAzole 10 mg oral tablet  -- 1 tab(s) by mouth 3 times a day  -- Indication: For Hyperthyroidism    potassium iodide 1 g/mL oral solution  -- 0.3 milliliter(s) by mouth 3 times a day  -- Indication: For Hyperthyroidism    metoprolol tartrate 25 mg oral tablet  -- 1 tab(s) by mouth every 8 hours  -- Indication: For Atrial fibrillation    tiotropium 18 mcg inhalation capsule  -- 1 cap(s) inhaled once a day  -- Indication: For Uncomplicated asthma, unspecified asthma severity    levalbuterol 1.25 mg/3 mL inhalation solution  -- 3 milliliter(s) inhaled every 4 hours, As needed, shortness of breath or wheezing -for bronchospasm  -- Indication: For Uncomplicated asthma, unspecified asthma severity    ferrous sulfate 325 mg (65 mg elemental iron) oral tablet  -- 1 tab(s) by mouth 3 times a day  -- Indication: For History of anemia    lactobacillus acidophilus oral capsule  -- 1 tab(s) by mouth 3 times a day  -- Indication: For Proph    pantoprazole 40 mg oral delayed release tablet  -- 1 tab(s) by mouth once a day (before a meal)  -- Indication: For gerd    nicotine 14 mg/24 hr transdermal film, extended release  -- 1 patch by transdermal patch once a day  -- Indication: For tob usage

## 2017-04-14 NOTE — CONSULT NOTE ADULT - SUBJECTIVE AND OBJECTIVE BOX
36yFemale admitted to med/surg with following history:  HPI:  36F with afib (with hx of hospitalizations for RVR) on xarelto, hyperthyroidism (c/b hx of thyroid storms), asthma, HTN, and depression who presents heart palpitations for 2 days.  States it feels very similar to her previous afib with RVR episodes.  Also complained of some chest tightness today though has resolved already.  Associated with diaphoresis and some light-headedness with activity.  Patient reports not taking any of her meds for the past 6 months because of stress (going through a divorce) and depression (no active SI).  However patient has been taking diet pills recently.  Denies any fevers, nausea/vomiting, recent travel, or diarrhea.  In the ED, patient was found to be in afib with RVR.  Multiple attempts to control her HR with metoprolol IV and diltiazem IV was only able to lower her HR to 110s but her HR would then rebound to the high 130s.  Patient, however, remained asymptomatic. (2017 03:06)      Psych HPI: Patient reports that she stopped her medications 6 months ago, as "she was more frustrated and wanted higher level of care." Currently is compliant with treatment, denies symptoms of acute psychosis, erika or depression    Past Psych Hx: No hospitalizations, started treatment at [facility] with NP and a therapist    PAST MEDICAL & SURGICAL HISTORY:  Depression, unspecified depression type  History of anemia  Asthma  History of Hyperthyroidism  Atrial fibrillation  S/P  Section: ,,      Allergies    Motrin (Hives)    Intolerances      MEDICATIONS  (STANDING):  rivaroxaban 20milliGRAM(s) Oral daily  methimazole 10milliGRAM(s) Oral three times a day  nicotine -  14 mG/24Hr(s) Patch 1patch Transdermal daily  tiotropium 18 MICROgram(s) Capsule 1Capsule(s) Inhalation daily  sertraline 50milliGRAM(s) Oral daily  metoprolol 25milliGRAM(s) Oral every 6 hours  potassium iodide Solution 300milliGRAM(s) Oral three times a day    MEDICATIONS  (PRN):  LORazepam     Tablet 1milliGRAM(s) Oral three times a day PRN Anxiety  levalbuterol Inhalation 1.25milliGRAM(s) Inhalation every 4 hours PRN shortness of breath or wheezing      SH: Lives in [place] with her biological mother and her three biological children. She has not worked for the last two years.  Sub Abuse Hx: Smokes 1/2 PPD, Drinks occasionally, Denies drugs    FPH: Sister has bipolar disorder    ROS: Psych: See HPI.  All other systems negative.                          7.3    5.6   )-----------( 228      ( 2017 07:11 )             26.7   2017 07:11    142    |  111    |  16     ----------------------------<  92     4.3     |  24     |  0.64     Ca    8.3        2017 07:11  Phos  2.8       2017 06:32  Mg     1.6       2017 10:57    TPro  5.6    /  Alb  2.8    /  TBili  0.3    /  DBili  x      /  AST  17     /  ALT  25     /  AlkPhos  71     2017 07:11    TSH:     Utox:  Imaging:  Other Tests:    Old Records reviewed: Unavailable    Collateral: Spoke to patient's mother and aunt, who are present at bedside    EXAM:  Vital Signs Last 24 Hrs  T(C): 36.7, Max: 36.9 ( @ 05:29)  T(F): 98, Max: 98.5 ( @ 05:29)  HR: 61 (61 - 140)  BP: 106/44 (81/60 - 111/66)  BP(mean): --  RR: 18 (17 - 18)  SpO2: 99% (97% - 100%)  Gen Appearance: Obese, in good control  Gait/Station/Muscle Tone: WNL  Abnl Movements: Absent  Speech: Normoproductive, relevant  TP; WNL  Associations: WNL  TC: WNL  Mood: Fair  Affect: Congruent  Consciousness/orientation: WNL  Memory:   Recent: Intact  Remote: Intact  Attention/Concentration: WNL  Language: WNL  Fund of Knowledge: WNL  Insight: Fair  Judgment: Fair    Suicide Risk Assessment: At this time patient is compliant with treatment, is future-oriented, has good family support and therapeutic relationship    IMP: 37 y/o female struggling with stressors  DX: MDD recurrent sever in remission    REC: Continue Zoloft 50 mg po daily, patient is psychiatrically stable for discharge  Observation Status:  Additional Work-up:  Medication Recs:  Follow-up: cheri will follow-up at [facility]

## 2017-04-14 NOTE — PROGRESS NOTE ADULT - SUBJECTIVE AND OBJECTIVE BOX
· Provider Specialty	Cardiology	    Referral/Consultation:   Initial Consult:  · Requested by Name:	Dr. Chairez	  · Date/Time:	2017 08:05	  · Reason for Referral/Consultation:	Atrial fibrillation	      · Subjective and Objective: 	    REQUESTING PHYSICIAN: Dr. Chairez    REASON FOR CONSULT: Atrial fibrillation    CHIEF COMPLAINT: Palpitations and dyspnea on exertion    HPI:  36 year old woman with a history or paroxysmal atrial fibrillation (normally in sinus rhythm, last recurrence of AF was ~ 6 months ago), MVP with MR, hyperthyroidism, mild asthma, and depression/anxiety who presented to the ER with complaints of palpitations x 2 days associated with dyspnea on exertion.  Symptoms are similar to previous episodes of AF; unable to identify exacerbating or alleviating factors.  She describes recent increase in psychosocial stressors and admits to not taking her medications x approximately 6 months.  Denies: angina; + lightheadedness and near-syncope.    17 - pt. denies chest pain. She describes palpitations which are most notable with ambulation. Mild lightheadedness with ambulation. WEN.     PAST MEDICAL & SURGICAL HISTORY:  Depression, unspecified depression type  History of anemia  Asthma  History of Hyperthyroidism  Atrial fibrillation  S/P  Section: ,,      Allergies    Motrin (Hives)    SOCIAL HISTORY:  SOCIAL HISTORY:   Alcohol: Denied  Smoking: Nonsmoker  Drug Use: Denied  Marital Status:     FAMILY HISTORY:  Family history of stomach cancer (Grandparent)  Family history of diabetes mellitus (Sibling, Grandparent)  No pertinent family history in first degree relatives      MEDICATIONS:    MEDICATIONS  (STANDING):  sodium chloride 0.9%. 1000milliLiter(s) IV Continuous <Continuous>  rivaroxaban 20milliGRAM(s) Oral daily  methimazole 10milliGRAM(s) Oral three times a day  nicotine -  14 mG/24Hr(s) Patch 1patch Transdermal daily  tiotropium 18 MICROgram(s) Capsule 1Capsule(s) Inhalation daily  sertraline 50milliGRAM(s) Oral daily  potassium iodide Solution 0.3milliGRAM(s) Oral four times a day  metoprolol 25milliGRAM(s) Oral every 6 hours    MEDICATIONS  (PRN):  LORazepam     Tablet 1milliGRAM(s) Oral three times a day PRN Anxiety  levalbuterol Inhalation 1.25milliGRAM(s) Inhalation every 4 hours PRN shortness of breath or wheezing      REVIEW OF SYSTEMS:    As above.       Vital Signs Last 24 Hrs  T(C): 36.9, Max: 36.9 ( @ 14:17)  T(F): 98.5, Max: 98.5 ( @ 05:29)  HR: 130 (70 - 140)  BP: 87/64 (81/60 - 139/55)  BP(mean): --  RR: 18 (17 - 18)  SpO2: 99% (97% - 99%)    I&O's Summary    I & Os for current day (as of 2017 10:17)  =============================================  IN: 1480 ml / OUT: 2 ml / NET: 1478 ml      PHYSICAL EXAM:    Constitutional: NAD, awake and alert, obese.   HEENT: NC/AT.   No oral cyanosis.  Neck:  supple,  No JVD  Respiratory: Breath sounds are clear bilaterally, No wheezing, rales or rhonchi  Cardiovascular: S1 and S2, tachycardic; irregularly, irregular rhythm.  Gastrointestinal: Bowel Sounds present, soft, nontender. Obese.   Extremities: No peripheral edema. No clubbing or cyanosis.  Neurological: A/O x 3  Musculoskeletal: no calf tenderness.  Skin: No rashes.      LABS: All Labs Reviewed:                        7.3    5.6   )-----------( 228      ( 2017 07:11 )             26.7                         7.5    6.2   )-----------( 246      ( 2017 06:32 )             27.0                         8.3    6.5   )-----------( 337      ( 2017 21:18 )             31.0     2017 07:11    142    |  111    |  16     ----------------------------<  92     4.3     |  24     |  0.64   2017 06:32    142    |  110    |  14     ----------------------------<  93     3.2     |  23     |  0.66   2017 21:18    143    |  106    |  14     ----------------------------<  87     3.3     |  20     |  0.83     Ca    8.3        2017 07:11  Ca    8.0        2017 06:32  Ca    9.0        2017 21:18  Phos  2.8       2017 06:32  Mg     1.5       2017 06:32    TPro  5.6    /  Alb  2.8    /  TBili  0.3    /  DBili  x      /  AST  17     /  ALT  25     /  AlkPhos  71     2017 07:11  TPro  6.3    /  Alb  2.9    /  TBili  0.4    /  DBili  x      /  AST  25     /  ALT  24     /  AlkPhos  73     2017 06:32  TPro  7.5    /  Alb  3.5    /  TBili  0.3    /  DBili  x      /  AST  27     /  ALT  36     /  AlkPhos  90     2017 21:18    PT/INR - ( 2017 06:32 )   PT: 16.1 sec;   INR: 1.47 ratio         PTT - ( 2017 06:32 )  PTT:39.6 sec  CARDIAC MARKERS ( 2017 06:32 )  .001 ng/mL / x     / x     / x     / x      CARDIAC MARKERS ( 2017 21:18 )  .001 ng/mL / x     / x     / x     / x          Blood Culture:    @ 21:18  Pro Bnp 1951     @ 11:35  TSH: <0.01      RADIOLOGY/EKG:      RADIOLOGY:  CXR: No sign of lobar consolidation vascular   congestion or effusion. Heart size within normal limits. Hilar regions,   mediastinal contours, osseous structures intact.    Echo (2013):  1. Technically difficult study.  2. Endocardium not well visualized.  Grossly normal left ventricular size   and overall systolic function. LVEF estimated at 55%.  3. The right ventricle appears mildly enlarged with normal function.  4. Biatrial enlargement.  5. Prolapse of the anterior mitral leaflet with moderate to severe mitral   regurgitation.  6. Mild to moderate pulmonic insufficiency.  7. Moderate tricuspid regurgitation.  8. Moderate pulmonary hypertension.    ECG: AF with RVR        Assessment and Recommendation:   Problem/Recommendation - 1:  Problem: Paroxysmal atrial fibrillation. Recommendation: Recurrence of atrial fibrillation associated with symptoms and rapid ventricular response in the setting of nonadherence to recommended/prescribed medications and likely contributed to by hyperthyroidism - will try to control rate with metoprolol; continue Xarelto. BP tends to be low somewhat limiting medications. She did receive 2 doses of Digoxin  thus far. Will continue Digoxin for now. Mg. level was low yesterday. Will repeat stat and supplement if still low.      Problem/Recommendation - 2:  ·  Problem: Hyperthyroidism.  Recommendation: H/o hyperthroidism but nonadherent with methimazole; TSH is low. Has been seen by endocrinologist.     Problem/Recommendation - 3:  ·  Problem: Mitral valve disorder.  Recommendation: MVP with mod-sev MR on echo in ; repeat imaging (ideally when in sinus rhythm or when HR is slower).     Anemia - continue to monitor CBC. W/U as per medical attending.     D/W hospitalist.

## 2017-04-14 NOTE — DISCHARGE NOTE ADULT - MEDICATION SUMMARY - MEDICATIONS TO STOP TAKING
I will STOP taking the medications listed below when I get home from the hospital:    LORazepam 1 mg oral tablet  -- 1 tab(s) by mouth 2 times a day, As Needed -Anxiety for 3 days only    Synthroid  --  by mouth    methimazole 10 mg oral tablet  -- 3 tab(s) by mouth once a day    Toprol-XL 25 mg oral tablet, extended release  -- 3 tab(s) by mouth 2 times a day    metoprolol 25 mg oral tablet  -- 1 tab(s) by mouth 3 times a day  -- It is very important that you take or use this exactly as directed.  Do not skip doses or discontinue unless directed by your doctor.  May cause drowsiness.  Alcohol may intensify this effect.  Use care when operating dangerous machinery.  Some non-prescription drugs may aggravate your condition.  Read all labels carefully.  If a warning appears, check with your doctor before taking.  Take with food or milk.  This drug may impair the ability to drive or operate machinery.  Use care until you become familiar with its effects.    rivaroxaban 10 mg oral tablet  -- 1 tab(s) by mouth once a day  -- Check with your doctor before becoming pregnant.  It is very important that you take or use this exactly as directed.  Do not skip doses or discontinue unless directed by your doctor.  Obtain medical advice before taking any non-prescription drugs as some may affect the action of this medication.  Take with food.

## 2017-04-14 NOTE — DISCHARGE NOTE ADULT - ADDITIONAL INSTRUCTIONS
Please follow up with your doctor in one week  please call to schedule an appointment with cardiology in 5-6 days  Please stop by Dr. Perlman office today   please come back to the hospital if you develop any heart palpitation, chest pain, Shortness of breath, or any new symptoms or concerns

## 2017-04-14 NOTE — CONSULT NOTE ADULT - SUBJECTIVE AND OBJECTIVE BOX
Patient is a 36y old  Female who presents with a chief complaint of palpitations (2017 03:06)      Reason For Consult:     HPI:  36F with afib (with hx of hospitalizations for RVR) on xarelto, hyperthyroidism (c/b hx of thyroid storms), asthma, HTN, and depression who presents heart palpitations for 2 days.  States it feels very similar to her previous afib with RVR episodes.  Also complained of some chest tightness today though has resolved already.  Associated with diaphoresis and some light-headedness with activity.  Patient reports not taking any of her meds for the past 6 months because of stress (going through a divorce) and depression (no active SI).  However patient has been taking diet pills recently.  Denies any fevers, nausea/vomiting, recent travel, or diarrhea.  In the ED, patient was found to be in afib with RVR.  Multiple attempts to control her HR with metoprolol IV and diltiazem IV was only able to lower her HR to 110s but her HR would then rebound to the high 130s.  Patient, however, remained asymptomatic. (2017 03:06)      PAST MEDICAL & SURGICAL HISTORY:  Depression, unspecified depression type  History of anemia  Asthma  History of Hyperthyroidism  Atrial fibrillation  S/P  Section: ,,      FAMILY HISTORY:  Family history of stomach cancer (Grandparent)  Family history of diabetes mellitus (Sibling, Grandparent)  No pertinent family history in first degree relatives        Social History:    MEDICATIONS  (STANDING):  sodium chloride 0.9%. 1000milliLiter(s) IV Continuous <Continuous>  rivaroxaban 20milliGRAM(s) Oral daily  metoprolol 25milliGRAM(s) Oral every 6 hours  methimazole 10milliGRAM(s) Oral three times a day  nicotine -  14 mG/24Hr(s) Patch 1patch Transdermal daily  tiotropium 18 MICROgram(s) Capsule 1Capsule(s) Inhalation daily    MEDICATIONS  (PRN):  LORazepam     Tablet 1milliGRAM(s) Oral three times a day PRN Anxiety  levalbuterol Inhalation 1.25milliGRAM(s) Inhalation every 4 hours PRN shortness of breath or wheezing        T(C): 36.9, Max: 36.9 (04-13 @ 14:17)  HR: 130 (57 - 140)  BP: 87/64 (81/60 - 139/55)  RR: 18 (17 - 18)  SpO2: 99% (97% - 99%)  Wt(kg): --    PHYSICAL EXAM:  GENERAL: NAD, well-groomed, well-developed  HEAD:  Atraumatic, Normocephalic  NECK: Supple, No JVD, Normal thyroid  CHEST/LUNG: Clear to percussion bilaterally; No rales, rhonchi, wheezing, or rubs  HEART: irreg irreg, tachy;  No murmurs, rubs, or gallops  ABDOMEN: Soft, Nontender, Nondistended; Bowel sounds present  EXTREMITIES:  2+ Peripheral Pulses, No clubbing, cyanosis, or edema  SKIN: No rashes or lesions    CAPILLARY BLOOD GLUCOSE                            7.3    5.6   )-----------( 228      ( 2017 07:11 )             26.7       CMP:   @ 07:11  SGPT 25  Albumin 2.8   Alk Phos 71   Anion Gap 7   SGOT 17   Total Bili 0.3   BUN 16   Calcium Total 8.3   CO2 24   Chloride 111   Creatinine 0.64   eGFR if    eGFR if non    Glucose 92   Potassium 4.3   Protein 5.6   Sodium 142      Thyroid Function Tests:   @ 11:35 TSH <0.01 FreeT4 -- T3 -- Anti TPO -- Anti Thyroglobulin Ab -- TSI --      Diabetes Tests:       Radiology:

## 2017-04-14 NOTE — DISCHARGE NOTE ADULT - PATIENT PORTAL LINK FT
“You can access the FollowHealth Patient Portal, offered by [facility], by registering with the following website: [email or web address]”

## 2017-04-14 NOTE — CONSULT NOTE ADULT - PROBLEM SELECTOR RECOMMENDATION 9
cont methimazole 30mg/day  cont beta blockade  start sski 5 gtts q6hrs  recommend radioiodine ablation while on methimazole when biochemically euthyroid.

## 2017-04-14 NOTE — DISCHARGE NOTE ADULT - CARE PROVIDERS DIRECT ADDRESSES
[email or web address],DirectAddress_Unknown,DirectAddress_Unknown,DirectAddress_Unknown

## 2017-04-14 NOTE — DISCHARGE NOTE ADULT - NS AS DC STROKE ED MATERIALS
Risk Factors for Stroke/Call 911 for Stroke/Atrial Fibrillation is a risk factor for Strokes/Stroke Education Booklet/Need for Followup After Discharge/Stroke Warning Signs and Symptoms/Prescribed Medications Atrial Fibrillation is a risk factor for Strokes

## 2017-04-14 NOTE — DISCHARGE NOTE ADULT - CARE PROVIDER_API CALL
Palla, Venugopal R (MD), Cardiovascular Disease; Internal Medicine  [address] [place] [id number]  Phone: [phone]  Fax: [phone]    Perlman, Craig D (), Medicine  [address] [place] [address] [address]  [place] [address]  Phone: [phone]  Fax: [phone]    Carlos Rivera), Gastroenterology  [address] [facility] [address]  [place] [address]  Phone: [phone]  Fax: [phone]

## 2017-04-14 NOTE — PROGRESS NOTE ADULT - SUBJECTIVE AND OBJECTIVE BOX
Patient is a 36y old  Female who presents with a chief complaint of palpitations (14 Apr 2017 09:15)        HPI:  36F with afib (with hx of hospitalizations for RVR) on xarelto, hyperthyroidism (c/b hx of thyroid storms), asthma, HTN, and depression who presents heart palpitations for 2 days.  States it feels very similar to her previous afib with RVR episodes.  Also complained of some chest tightness today though has resolved already.  Associated with diaphoresis and some light-headedness with activity.  Patient reports not taking any of her meds for the past 6 months because of stress (going through a divorce) and depression (no active SI).  However patient has been taking diet pills recently.  Denies any fevers, nausea/vomiting, recent travel, or diarrhea.  In the ED, patient was found to be in afib with RVR.  Multiple attempts to control her HR with metoprolol IV and diltiazem IV was only able to lower her HR to 110s but her HR would then rebound to the high 130s.  Patient, however, remained asymptomatic. (13 Apr 2017 03:06)      SUBJECTIVE & OBJECTIVE: Pt seen and examined at bedside.     PHYSICAL EXAM:  T(C): 36.8, Max: 36.9 (04-13 @ 14:17)  HR: 132 (70 - 140)  BP: 110/58 (81/60 - 139/55)  RR: 18 (17 - 18)  SpO2: 100% (97% - 100%)  Wt(kg): --   GENERAL: NAD, well-groomed, well-developed  HEAD:  Atraumatic, Normocephalic  EYES: EOMI, PERRLA, conjunctiva and sclera clear  ENMT: Moist mucous membranes  NECK: Supple, No JVD  NERVOUS SYSTEM:  Alert & Oriented X3, Motor Strength 5/5 B/L upper and lower extremities; DTRs 2+ intact and symmetric  CHEST/LUNG: Clear to auscultation bilaterally; No rales, rhonchi, wheezing, or rubs  HEART: Regular rate and rhythm; No murmurs, rubs, or gallops  ABDOMEN: Soft, Nontender, Nondistended; Bowel sounds present  EXTREMITIES:  2+ Peripheral Pulses, No clubbing, cyanosis, or edema        MEDICATIONS  (STANDING):  sodium chloride 0.9%. 1000milliLiter(s) IV Continuous <Continuous>  rivaroxaban 20milliGRAM(s) Oral daily  methimazole 10milliGRAM(s) Oral three times a day  nicotine -  14 mG/24Hr(s) Patch 1patch Transdermal daily  tiotropium 18 MICROgram(s) Capsule 1Capsule(s) Inhalation daily  sertraline 50milliGRAM(s) Oral daily  potassium iodide Solution 0.3milliGRAM(s) Oral four times a day  metoprolol 25milliGRAM(s) Oral every 6 hours    MEDICATIONS  (PRN):  LORazepam     Tablet 1milliGRAM(s) Oral three times a day PRN Anxiety  levalbuterol Inhalation 1.25milliGRAM(s) Inhalation every 4 hours PRN shortness of breath or wheezing      LABS:                        7.3    5.6   )-----------( 228      ( 14 Apr 2017 07:11 )             26.7     04-14    142  |  111<H>  |  16  ----------------------------<  92  4.3   |  24  |  0.64    Ca    8.3<L>      14 Apr 2017 07:11  Phos  2.8     04-13  Mg     1.5     04-13    TPro  5.6<L>  /  Alb  2.8<L>  /  TBili  0.3  /  DBili  x   /  AST  17  /  ALT  25  /  AlkPhos  71  04-14    PT/INR - ( 13 Apr 2017 06:32 )   PT: 16.1 sec;   INR: 1.47 ratio         PTT - ( 13 Apr 2017 06:32 )  PTT:39.6 sec      CAPILLARY BLOOD GLUCOSE      CAPILLARY BLOOD GLUCOSE    CAPILLARY BLOOD GLUCOSE      CARDIAC MARKERS ( 13 Apr 2017 06:32 )  .001 ng/mL / x     / x     / x     / x      CARDIAC MARKERS ( 12 Apr 2017 21:18 )  .001 ng/mL / x     / x     / x     / x            RECENT CULTURES:      RADIOLOGY & ADDITIONAL TESTS:                        DVT/GI ppx  Discussed with pt @ bedside

## 2017-04-14 NOTE — DISCHARGE NOTE ADULT - PLAN OF CARE
continue with metoprolol, Cardizem, and Xarelto cardiac diet continue with iron supplement continue with metazolone , and iodine therapy for 5 days

## 2017-04-14 NOTE — PROVIDER CONTACT NOTE (OTHER) - SITUATION
HR- 140-145  B/P 96/50  Patient received earlier Cardizem 10 mgs IVP/Ativan .5 mg IVP for anxiety.Had Xopenex inhalation for exp. wheezes

## 2017-04-14 NOTE — DISCHARGE NOTE ADULT - CARE PLAN
Principal Discharge DX:	Atrial fibrillation, unspecified type  Goal:	continue with metoprolol, Cardizem, and Xarelto  Instructions for follow-up, activity and diet:	cardiac diet  Secondary Diagnosis:	History of anemia  Goal:	continue with iron supplement  Secondary Diagnosis:	History of hyperthyroidism  Goal:	continue with metazolone , and iodine therapy for 5 days

## 2017-04-15 LAB
ALBUMIN SERPL ELPH-MCNC: 2.8 G/DL — LOW (ref 3.3–5)
ALP SERPL-CCNC: 63 U/L — SIGNIFICANT CHANGE UP (ref 30–120)
ALT FLD-CCNC: 19 U/L DA — SIGNIFICANT CHANGE UP (ref 10–60)
ANION GAP SERPL CALC-SCNC: 10 MMOL/L — SIGNIFICANT CHANGE UP (ref 5–17)
AST SERPL-CCNC: 16 U/L — SIGNIFICANT CHANGE UP (ref 10–40)
BILIRUB SERPL-MCNC: 0.3 MG/DL — SIGNIFICANT CHANGE UP (ref 0.2–1.2)
BUN SERPL-MCNC: 12 MG/DL — SIGNIFICANT CHANGE UP (ref 7–23)
CALCIUM SERPL-MCNC: 8.7 MG/DL — SIGNIFICANT CHANGE UP (ref 8.4–10.5)
CHLORIDE SERPL-SCNC: 110 MMOL/L — HIGH (ref 96–108)
CO2 SERPL-SCNC: 23 MMOL/L — SIGNIFICANT CHANGE UP (ref 22–31)
CREAT SERPL-MCNC: 0.62 MG/DL — SIGNIFICANT CHANGE UP (ref 0.5–1.3)
GLUCOSE SERPL-MCNC: 91 MG/DL — SIGNIFICANT CHANGE UP (ref 70–99)
HCT VFR BLD CALC: 26.2 % — LOW (ref 34.5–45)
HGB BLD-MCNC: 7.1 G/DL — LOW (ref 11.5–15.5)
MAGNESIUM SERPL-MCNC: 1.9 MG/DL — SIGNIFICANT CHANGE UP (ref 1.6–2.6)
MCHC RBC-ENTMCNC: 15.3 PG — LOW (ref 27–34)
MCHC RBC-ENTMCNC: 27 GM/DL — LOW (ref 32–36)
MCV RBC AUTO: 56.8 FL — LOW (ref 80–100)
OB PNL STL: NEGATIVE — SIGNIFICANT CHANGE UP
PLATELET # BLD AUTO: 232 K/UL — SIGNIFICANT CHANGE UP (ref 150–400)
POTASSIUM SERPL-MCNC: 4 MMOL/L — SIGNIFICANT CHANGE UP (ref 3.5–5.3)
POTASSIUM SERPL-SCNC: 4 MMOL/L — SIGNIFICANT CHANGE UP (ref 3.5–5.3)
PROT SERPL-MCNC: 6.3 G/DL — SIGNIFICANT CHANGE UP (ref 6–8.3)
RBC # BLD: 4.62 M/UL — SIGNIFICANT CHANGE UP (ref 3.8–5.2)
RBC # FLD: 18.3 % — HIGH (ref 10.3–14.5)
SODIUM SERPL-SCNC: 143 MMOL/L — SIGNIFICANT CHANGE UP (ref 135–145)
WBC # BLD: 6.2 K/UL — SIGNIFICANT CHANGE UP (ref 3.8–10.5)
WBC # FLD AUTO: 6.2 K/UL — SIGNIFICANT CHANGE UP (ref 3.8–10.5)

## 2017-04-15 PROCEDURE — 99233 SBSQ HOSP IP/OBS HIGH 50: CPT

## 2017-04-15 RX ORDER — DOCUSATE SODIUM 100 MG
100 CAPSULE ORAL
Qty: 0 | Refills: 0 | Status: DISCONTINUED | OUTPATIENT
Start: 2017-04-15 | End: 2017-04-18

## 2017-04-15 RX ORDER — FERROUS SULFATE 325(65) MG
325 TABLET ORAL DAILY
Qty: 0 | Refills: 0 | Status: DISCONTINUED | OUTPATIENT
Start: 2017-04-15 | End: 2017-04-18

## 2017-04-15 RX ADMIN — Medication 1 MILLIGRAM(S): at 11:28

## 2017-04-15 RX ADMIN — RIVAROXABAN 20 MILLIGRAM(S): KIT at 11:23

## 2017-04-15 RX ADMIN — Medication 25 MILLIGRAM(S): at 23:45

## 2017-04-15 RX ADMIN — SERTRALINE 50 MILLIGRAM(S): 25 TABLET, FILM COATED ORAL at 11:23

## 2017-04-15 RX ADMIN — Medication 25 MILLIGRAM(S): at 18:34

## 2017-04-15 RX ADMIN — Medication 100 MILLIGRAM(S): at 21:25

## 2017-04-15 RX ADMIN — Medication 325 MILLIGRAM(S): at 11:23

## 2017-04-15 RX ADMIN — Medication 25 MILLIGRAM(S): at 05:25

## 2017-04-15 RX ADMIN — Medication 0.25 MILLIGRAM(S): at 06:25

## 2017-04-15 RX ADMIN — Medication 25 MILLIGRAM(S): at 11:23

## 2017-04-15 RX ADMIN — Medication 300 MILLIGRAM(S): at 05:27

## 2017-04-15 RX ADMIN — Medication 1 PATCH: at 11:23

## 2017-04-15 RX ADMIN — Medication 1 MILLIGRAM(S): at 00:14

## 2017-04-15 RX ADMIN — Medication 25 MILLIGRAM(S): at 00:14

## 2017-04-15 RX ADMIN — Medication 300 MILLIGRAM(S): at 21:24

## 2017-04-15 RX ADMIN — Medication 300 MILLIGRAM(S): at 14:41

## 2017-04-15 RX ADMIN — Medication 1 TABLET(S): at 11:23

## 2017-04-15 RX ADMIN — Medication 1 PATCH: at 11:29

## 2017-04-15 RX ADMIN — Medication 1 MILLIGRAM(S): at 21:24

## 2017-04-15 RX ADMIN — TIOTROPIUM BROMIDE 1 CAPSULE(S): 18 CAPSULE ORAL; RESPIRATORY (INHALATION) at 05:26

## 2017-04-15 NOTE — PROGRESS NOTE ADULT - SUBJECTIVE AND OBJECTIVE BOX
Patient is a 36y old  Female who presents with a chief complaint of palpitations (14 Apr 2017 09:15)        HPI:  36F with afib (with hx of hospitalizations for RVR) on xarelto, hyperthyroidism (c/b hx of thyroid storms), asthma, HTN, and depression who presents heart palpitations for 2 days.  States it feels very similar to her previous afib with RVR episodes.  Also complained of some chest tightness today though has resolved already.  Associated with diaphoresis and some light-headedness with activity.  Patient reports not taking any of her meds for the past 6 months because of stress (going through a divorce) and depression (no active SI).  However patient has been taking diet pills recently.  Denies any fevers, nausea/vomiting, recent travel, or diarrhea.  In the ED, patient was found to be in afib with RVR.  Multiple attempts to control her HR with metoprolol IV and diltiazem IV was only able to lower her HR to 110s but her HR would then rebound to the high 130s.  Patient, however, remained asymptomatic. (13 Apr 2017 03:06)      SUBJECTIVE & OBJECTIVE: Pt seen and examined at bedside.     PHYSICAL EXAM:  T(C): 36.9, Max: 36.9 (04-15 @ 01:30)  HR: 94 (61 - 107)  BP: 104/74 (104/74 - 147/91)  RR: 18 (18 - 18)  SpO2: 92% (92% - 953%)  Wt(kg): --   GENERAL: NAD, well-groomed, well-developed  HEAD:  Atraumatic, Normocephalic  EYES: EOMI, PERRLA, conjunctiva and sclera clear  ENMT: Moist mucous membranes  NECK: Supple, No JVD  NERVOUS SYSTEM:  Alert & Oriented X3, Motor Strength 5/5 B/L upper and lower extremities; DTRs 2+ intact and symmetric  CHEST/LUNG: Clear to auscultation bilaterally; No rales, rhonchi, wheezing, or rubs  HEART: Regular rate and rhythm; No murmurs, rubs, or gallops  ABDOMEN: Soft, Nontender, Nondistended; Bowel sounds present  EXTREMITIES:  2+ Peripheral Pulses, No clubbing, cyanosis, or edema        MEDICATIONS  (STANDING):  rivaroxaban 20milliGRAM(s) Oral daily  methimazole 10milliGRAM(s) Oral three times a day  nicotine -  14 mG/24Hr(s) Patch 1patch Transdermal daily  tiotropium 18 MICROgram(s) Capsule 1Capsule(s) Inhalation daily  sertraline 50milliGRAM(s) Oral daily  metoprolol 25milliGRAM(s) Oral every 6 hours  digoxin     Tablet 0.25milliGRAM(s) Oral daily  potassium iodide Solution 300milliGRAM(s) Oral three times a day  lactobacillus acidophilus 1Tablet(s) Oral daily  ferrous    sulfate 325milliGRAM(s) Oral daily    MEDICATIONS  (PRN):  LORazepam     Tablet 1milliGRAM(s) Oral three times a day PRN Anxiety  levalbuterol Inhalation 1.25milliGRAM(s) Inhalation every 4 hours PRN shortness of breath or wheezing      LABS:                        7.1    6.2   )-----------( 232      ( 15 Apr 2017 06:25 )             26.2     04-15    143  |  110<H>  |  12  ----------------------------<  91  4.0   |  23  |  0.62    Ca    8.7      15 Apr 2017 06:25  Mg     1.9     04-15    TPro  6.3  /  Alb  2.8<L>  /  TBili  0.3  /  DBili  x   /  AST  16  /  ALT  19  /  AlkPhos  63  04-15        Magnesium, Serum: 1.9 mg/dL (04-15 @ 06:25)  Magnesium, Serum: 1.6 mg/dL (04-14 @ 10:57)    CAPILLARY BLOOD GLUCOSE      CAPILLARY BLOOD GLUCOSE    CAPILLARY BLOOD GLUCOSE            RECENT CULTURES:      RADIOLOGY & ADDITIONAL TESTS:                        DVT/GI ppx  Discussed with pt @ bedside

## 2017-04-15 NOTE — CONSULT NOTE ADULT - SUBJECTIVE AND OBJECTIVE BOX
The patient is a 36 year-old with atrial fibrillation on xarelto, hyperthyroidism, asthma, HTN, and depression admitted with atrial fibrillation with RVR, chest tightness, diaphoresis and some light-headedness with activity. She has been non-compliant with medications, follow up appointments, and physician recommendations. She previously refused endoscopic evaluation of iron deficiency anemia. She reports heavy menses but hasn't seen her GYN in some time. She has no overt GI bleeding. She has vague abdominal complaints which she relates to attributes to "poisoning" from her .     PAST MEDICAL & SURGICAL HISTORY:  Depression, unspecified depression type  History of anemia  Asthma  History of Hyperthyroidism  Atrial fibrillation  S/P  Section: ,,    REVIEW OF SYSTEMS:    CONSTITUTIONAL: No fever, chills, fatigue, or extreme weight loss  EYES: No icterus or visual changes  ENMT:  No vertigo or sore throat  RESPIRATORY: No cough, wheezing, chills or hemoptysis; No shortness of breath  CARDIOVASCULAR: See HPI  GASTROINTESTINAL: See HPI  GENITOURINARY: No dysuria, frequency, hematuria, or incontinence  NEUROLOGICAL: No headaches, memory loss, loss of strength, numbness, or tremors  SKIN: No jaundice or rashes  ENDOCRINE: No diaphoresis, heat/cold intolerance  MUSCULOSKELETAL: No joint pain or swelling; No muscle, back, or extremity pain    MEDICATIONS  (STANDING):  rivaroxaban 20milliGRAM(s) Oral daily  methimazole 10milliGRAM(s) Oral three times a day  nicotine -  14 mG/24Hr(s) Patch 1patch Transdermal daily  tiotropium 18 MICROgram(s) Capsule 1Capsule(s) Inhalation daily  sertraline 50milliGRAM(s) Oral daily  metoprolol 25milliGRAM(s) Oral every 6 hours  digoxin     Tablet 0.25milliGRAM(s) Oral daily  potassium iodide Solution 300milliGRAM(s) Oral three times a day  lactobacillus acidophilus 1Tablet(s) Oral daily  ferrous    sulfate 325milliGRAM(s) Oral daily    MEDICATIONS  (PRN):  LORazepam     Tablet 1milliGRAM(s) Oral three times a day PRN Anxiety  levalbuterol Inhalation 1.25milliGRAM(s) Inhalation every 4 hours PRN shortness of breath or wheezing    Allergies: Motrin (Hives)    SOCIAL HISTORY:    FAMILY HISTORY:  Family history of stomach cancer (Grandparent)  Family history of diabetes mellitus (Sibling, Grandparent)  No pertinent family history in first degree relatives    Vital Signs Last 24 Hrs  T(C): 36.5, Max: 36.9 (04-15 @ 01:30)  T(F): 97.7, Max: 98.4 (04-15 @ 01:30)  HR: 104 (61 - 107)  BP: 121/80 (104/74 - 147/91)  BP(mean): --  RR: 16 (16 - 18)  SpO2: 99% (92% - 953%)    PHYSICAL EXAM:    GENERAL: Comfortable, NAD  HEAD:  Atraumatic, Normocephalic  EYES: Anicteric sclera  ENMT: Moist mucous membranes, Good dentition, No thrush  CHEST/LUNG: Clear to auscultation bilaterally  HEART: Regular rate and rhythm; No murmurs  ABDOMEN: Obese, Soft, Non-tender, Non-distended; Normoactive bowel sounds  EXTREMITIES:  No edema  SKIN: No jaundice  NERVOUS SYSTEM:  Alert, No asterixis    LABS:                        7.1    6.2   )-----------( 232      ( 15 Apr 2017 06:25 )             26.2     04-15    143  |  110<H>  |  12  ----------------------------<  91  4.0   |  23  |  0.62    Ca    8.7      15 Apr 2017 06:25  Mg     1.9     04-15    TPro  6.3  /  Alb  2.8<L>  /  TBili  0.3  /  DBili  x   /  AST  16  /  ALT  19  /  AlkPhos  63  04-15

## 2017-04-15 NOTE — CONSULT NOTE ADULT - ASSESSMENT
IMPRESSIONS:   Severe anemia  Iron deficiency  Heavy menses  R/O GI bleed  History of atrial fibrillation on anti-coagulation    RECOMMENDATIONS:  Primary team requesting endoscopic evaluation  Check FOBT   Start iron supplementation  GYN evaluation

## 2017-04-15 NOTE — PROGRESS NOTE ADULT - SUBJECTIVE AND OBJECTIVE BOX
Progress Note:   · Provider Specialty	Cardiology	      · Subjective and Objective: 	  · Provider Specialty	Cardiology	    Referral/Consultation:   Initial Consult:  · Requested by Name:	Dr. Chairez	  · Date/Time:	2017 08:05	  · Reason for Referral/Consultation:	Atrial fibrillation	      · Subjective and Objective: 	    REQUESTING PHYSICIAN: Dr. Chairez    REASON FOR CONSULT: Atrial fibrillation    CHIEF COMPLAINT: Palpitations and dyspnea on exertion    HPI:  36 year old woman with a history or paroxysmal atrial fibrillation (normally in sinus rhythm, last recurrence of AF was ~ 6 months ago), MVP with MR, hyperthyroidism, mild asthma, and depression/anxiety who presented to the ER with complaints of palpitations x 2 days associated with dyspnea on exertion.  Symptoms are similar to previous episodes of AF; unable to identify exacerbating or alleviating factors.  She describes recent increase in psychosocial stressors and admits to not taking her medications x approximately 6 months.  Denies: angina; + lightheadedness and near-syncope.    17 - pt. denies chest pain. She describes palpitations which are most notable with ambulation. Mild lightheadedness with ambulation. WEN.     04/15/17 - No chest pain. Breathing more comfortably. Less palpitations. No lightheadedness. HR and BP improved.         PAST MEDICAL & SURGICAL HISTORY:  Depression, unspecified depression type  History of anemia  Asthma  History of Hyperthyroidism  Atrial fibrillation  S/P  Section: ,,      Allergies    Motrin (Hives)    SOCIAL HISTORY:   Alcohol: Denied  Smoking: Nonsmoker  Drug Use: Denied  Marital Status:     FAMILY HISTORY:  Family history of stomach cancer (Grandparent)  Family history of diabetes mellitus (Sibling, Grandparent)  No pertinent family history in first degree relatives      MEDICATIONS:    MEDICATIONS  (STANDING):  rivaroxaban 20milliGRAM(s) Oral daily  methimazole 10milliGRAM(s) Oral three times a day  nicotine -  14 mG/24Hr(s) Patch 1patch Transdermal daily  tiotropium 18 MICROgram(s) Capsule 1Capsule(s) Inhalation daily  sertraline 50milliGRAM(s) Oral daily  metoprolol 25milliGRAM(s) Oral every 6 hours  digoxin     Tablet 0.25milliGRAM(s) Oral daily  potassium iodide Solution 300milliGRAM(s) Oral three times a day  lactobacillus acidophilus 1Tablet(s) Oral daily    MEDICATIONS  (PRN):  LORazepam     Tablet 1milliGRAM(s) Oral three times a day PRN Anxiety  levalbuterol Inhalation 1.25milliGRAM(s) Inhalation every 4 hours PRN shortness of breath or wheezing      REVIEW OF SYSTEMS:    as above.     Vital Signs Last 24 Hrs  T(C): 36.9, Max: 36.9 (04-15 @ 01:30)  T(F): 98.4, Max: 98.4 (04-15 @ 01:30)  HR: 94 (61 - 132)  BP: 104/74 (104/74 - 147/91)  BP(mean): --  RR: 18 (18 - 18)  SpO2: 92% (92% - 953%)    I&O's Summary    I & Os for current day (as of 15 Apr 2017 07:19)  =============================================  IN: 0 ml / OUT: 300 ml / NET: -300 ml      PHYSICAL EXAM:    Constitutional: NAD, awake and alert, obese.   HEENT: NC/AT.   No oral cyanosis.  Neck:  supple,  No JVD  Respiratory: Minimal expiratory wheeze.   Cardiovascular: Irregularly ,irregular rhythm.   S1 and S2.   Gastrointestinal: Bowel Sounds present, soft, nontender. Obese.   Extremities: No peripheral edema. No clubbing or cyanosis.  Neurological: A/O x 3  Musculoskeletal: no calf tenderness.  Skin: No rashes.        LABS: All Labs Reviewed:                        7.1    6.2   )-----------( 232      ( 15 Apr 2017 06:25 )             26.2                         7.3    5.6   )-----------( 228      ( 2017 07:11 )             26.7                         7.5    6.2   )-----------( 246      ( 2017 06:32 )             27.0     15 Apr 2017 06:25    143    |  110    |  12     ----------------------------<  91     4.0     |  23     |  0.62   2017 07:11    142    |  111    |  16     ----------------------------<  92     4.3     |  24     |  0.64   2017 06:32    142    |  110    |  14     ----------------------------<  93     3.2     |  23     |  0.66     Ca    8.7        15 Apr 2017 06:25  Ca    8.3        2017 07:11  Ca    8.0        2017 06:32  Phos  2.8       2017 06:32  Mg     1.6       2017 10:57  Mg     1.5       2017 06:32    TPro  6.3    /  Alb  2.8    /  TBili  0.3    /  DBili  x      /  AST  16     /  ALT  19     /  AlkPhos  63     15 Apr 2017 06:25  TPro  5.6    /  Alb  2.8    /  TBili  0.3    /  DBili  x      /  AST  17     /  ALT  25     /  AlkPhos  71     2017 07:11  TPro  6.3    /  Alb  2.9    /  TBili  0.4    /  DBili  x      /  AST  25     /  ALT  24     /  AlkPhos  73     2017 06:32          Blood Culture:    @ 21:18  Pro Bnp 1951- @ 11:35  TSH: <0.01    Rhythm - atrial fibrillation -  with improved VR ( presently in 90s).     Radiology:    EXAM:  CHEST (SINGLE AP PA)                            PROCEDURE DATE:  2017        INTERPRETATION:  Clinical information: Chest pain    Comparison exam dated 2014.    AP study.    Cardiac monitor leads present. No sign of lobar consolidation vascular   congestion or effusion. Heart size within normal limits. Hilar regions,   mediastinal contours, osseous structures intact.    Due to radiographic technique and patient's body habitus, exam is   limited. Recommend follow-up exam, if possible PA and lateral views.    IMPRESSION:    See above report, follow-up recommendation.      SUMMARY:  1. Technically difficult study.  2. Endocardium not well visualized.  Grossly normal left ventricular size   and overall systolic function. LVEF estimated at 55%.  3. The right ventricle appears mildly enlarged with normal function.  4. Biatrial enlargement.  5. Prolapse of the anterior mitral leaflet with moderate to severe mitral   regurgitation.  6. Mild to moderate pulmonic insufficiency.  7. Moderate tricuspid regurgitation.  8. Moderate pulmonary hypertension.       CASSIE BEAN       Assessment and Recommendation:   Problem/Recommendation - 1:  Problem: Paroxysmal atrial fibrillation. Recommendation: Recurrence of atrial fibrillation associated with symptoms and rapid ventricular response in the setting of nonadherence to recommended/prescribed medications and contributed to by hyperthyroidism - rate is improving with  metoprolol and digoxin; continue Xarelto. BP not as low as it has been, continue to monitor.  Mg. level was low yesterday and repleted. Will continue present medical regimen. Monitor BMP,Mg.     Problem/Recommendation - 2:  ·  Problem: Hyperthyroidism.  Recommendation: H/o hyperthroidism but nonadherent with methimazole; TSH is low. Is being followed  by endocrinologist.     Problem/Recommendation - 3:  ·  Problem: Mitral valve disorder.  Recommendation: MVP with mod-sev MR on echo in 2013; repeat imaging (ideally when in sinus rhythm or when HR is slower).     Anemia - continue to monitor CBC. W/U as per medical attending.     Asthma - mild wheeze this am without respiratory difficulty. Rx. as per medical attending. Consider pulmonary consultation. If persists may have to decrease beta blocker.             RADIOLOGY/EKG:

## 2017-04-16 LAB
ANION GAP SERPL CALC-SCNC: 6 MMOL/L — SIGNIFICANT CHANGE UP (ref 5–17)
BUN SERPL-MCNC: 11 MG/DL — SIGNIFICANT CHANGE UP (ref 7–23)
CALCIUM SERPL-MCNC: 8.6 MG/DL — SIGNIFICANT CHANGE UP (ref 8.4–10.5)
CHLORIDE SERPL-SCNC: 106 MMOL/L — SIGNIFICANT CHANGE UP (ref 96–108)
CO2 SERPL-SCNC: 25 MMOL/L — SIGNIFICANT CHANGE UP (ref 22–31)
CREAT SERPL-MCNC: 0.72 MG/DL — SIGNIFICANT CHANGE UP (ref 0.5–1.3)
DIGOXIN SERPL-MCNC: 1.1 NG/ML — SIGNIFICANT CHANGE UP (ref 0.8–2)
GLUCOSE SERPL-MCNC: 125 MG/DL — HIGH (ref 70–99)
HCT VFR BLD CALC: 31.6 % — LOW (ref 34.5–45)
HGB BLD-MCNC: 8.5 G/DL — LOW (ref 11.5–15.5)
MAGNESIUM SERPL-MCNC: 1.7 MG/DL — SIGNIFICANT CHANGE UP (ref 1.6–2.6)
MCHC RBC-ENTMCNC: 14.9 PG — LOW (ref 27–34)
MCHC RBC-ENTMCNC: 26.8 GM/DL — LOW (ref 32–36)
MCV RBC AUTO: 55.7 FL — LOW (ref 80–100)
PLATELET # BLD AUTO: 303 K/UL — SIGNIFICANT CHANGE UP (ref 150–400)
POTASSIUM SERPL-MCNC: 3.8 MMOL/L — SIGNIFICANT CHANGE UP (ref 3.5–5.3)
POTASSIUM SERPL-SCNC: 3.8 MMOL/L — SIGNIFICANT CHANGE UP (ref 3.5–5.3)
RBC # BLD: 5.67 M/UL — HIGH (ref 3.8–5.2)
RBC # FLD: 18 % — HIGH (ref 10.3–14.5)
SODIUM SERPL-SCNC: 137 MMOL/L — SIGNIFICANT CHANGE UP (ref 135–145)
WBC # BLD: 5.6 K/UL — SIGNIFICANT CHANGE UP (ref 3.8–10.5)
WBC # FLD AUTO: 5.6 K/UL — SIGNIFICANT CHANGE UP (ref 3.8–10.5)

## 2017-04-16 PROCEDURE — 99233 SBSQ HOSP IP/OBS HIGH 50: CPT

## 2017-04-16 RX ORDER — IRON SUCROSE 20 MG/ML
100 INJECTION, SOLUTION INTRAVENOUS ONCE
Qty: 0 | Refills: 0 | Status: COMPLETED | OUTPATIENT
Start: 2017-04-16 | End: 2017-04-16

## 2017-04-16 RX ORDER — SODIUM FERRIC GLUCONAT/SUCROSE 62.5MG/5ML
125 AMPUL (ML) INTRAVENOUS ONCE
Qty: 0 | Refills: 0 | Status: DISCONTINUED | OUTPATIENT
Start: 2017-04-16 | End: 2017-04-16

## 2017-04-16 RX ORDER — IRON SUCROSE 20 MG/ML
100 INJECTION, SOLUTION INTRAVENOUS ONCE
Qty: 0 | Refills: 0 | Status: DISCONTINUED | OUTPATIENT
Start: 2017-04-16 | End: 2017-04-16

## 2017-04-16 RX ORDER — SODIUM CHLORIDE 9 MG/ML
500 INJECTION INTRAMUSCULAR; INTRAVENOUS; SUBCUTANEOUS ONCE
Qty: 0 | Refills: 0 | Status: COMPLETED | OUTPATIENT
Start: 2017-04-16 | End: 2017-04-16

## 2017-04-16 RX ADMIN — Medication 25 MILLIGRAM(S): at 11:21

## 2017-04-16 RX ADMIN — Medication 300 MILLIGRAM(S): at 21:21

## 2017-04-16 RX ADMIN — IRON SUCROSE 100 MILLIGRAM(S): 20 INJECTION, SOLUTION INTRAVENOUS at 17:31

## 2017-04-16 RX ADMIN — Medication 100 MILLIGRAM(S): at 17:32

## 2017-04-16 RX ADMIN — Medication 300 MILLIGRAM(S): at 05:43

## 2017-04-16 RX ADMIN — RIVAROXABAN 20 MILLIGRAM(S): KIT at 11:22

## 2017-04-16 RX ADMIN — Medication 1 PATCH: at 11:26

## 2017-04-16 RX ADMIN — SODIUM CHLORIDE 500 MILLILITER(S): 9 INJECTION INTRAMUSCULAR; INTRAVENOUS; SUBCUTANEOUS at 23:57

## 2017-04-16 RX ADMIN — Medication 100 MILLIGRAM(S): at 05:45

## 2017-04-16 RX ADMIN — Medication 25 MILLIGRAM(S): at 17:32

## 2017-04-16 RX ADMIN — Medication 1 TABLET(S): at 11:21

## 2017-04-16 RX ADMIN — Medication 325 MILLIGRAM(S): at 11:21

## 2017-04-16 RX ADMIN — TIOTROPIUM BROMIDE 1 CAPSULE(S): 18 CAPSULE ORAL; RESPIRATORY (INHALATION) at 06:32

## 2017-04-16 RX ADMIN — Medication 300 MILLIGRAM(S): at 13:48

## 2017-04-16 RX ADMIN — Medication 1 MILLIGRAM(S): at 21:21

## 2017-04-16 RX ADMIN — Medication 0.25 MILLIGRAM(S): at 05:43

## 2017-04-16 RX ADMIN — Medication 25 MILLIGRAM(S): at 05:43

## 2017-04-16 RX ADMIN — Medication 1 MILLIGRAM(S): at 11:26

## 2017-04-16 RX ADMIN — Medication 1 PATCH: at 11:21

## 2017-04-16 RX ADMIN — SERTRALINE 50 MILLIGRAM(S): 25 TABLET, FILM COATED ORAL at 11:22

## 2017-04-16 NOTE — PROGRESS NOTE ADULT - SUBJECTIVE AND OBJECTIVE BOX
Patient is a 36y old  Female who presents with a chief complaint of palpitations (2017 09:15)      Reason For Consult:     HPI:  36F with afib (with hx of hospitalizations for RVR) on xarelto, hyperthyroidism (c/b hx of thyroid storms), asthma, HTN, and depression who presents heart palpitations for 2 days.  States it feels very similar to her previous afib with RVR episodes.  Also complained of some chest tightness today though has resolved already.  Associated with diaphoresis and some light-headedness with activity.  Patient reports not taking any of her meds for the past 6 months because of stress (going through a divorce) and depression (no active SI).  However patient has been taking diet pills recently.  Denies any fevers, nausea/vomiting, recent travel, or diarrhea.  In the ED, patient was found to be in afib with RVR.  Multiple attempts to control her HR with metoprolol IV and diltiazem IV was only able to lower her HR to 110s but her HR would then rebound to the high 130s.  Patient, however, remained asymptomatic. (2017 03:06)      PAST MEDICAL & SURGICAL HISTORY:  Depression, unspecified depression type  History of anemia  Asthma  History of Hyperthyroidism  Atrial fibrillation  S/P  Section: ,,      FAMILY HISTORY:  Family history of stomach cancer (Grandparent)  Family history of diabetes mellitus (Sibling, Grandparent)  No pertinent family history in first degree relatives        Social History:    MEDICATIONS  (STANDING):  rivaroxaban 20milliGRAM(s) Oral daily  methimazole 10milliGRAM(s) Oral three times a day  nicotine -  14 mG/24Hr(s) Patch 1patch Transdermal daily  tiotropium 18 MICROgram(s) Capsule 1Capsule(s) Inhalation daily  sertraline 50milliGRAM(s) Oral daily  metoprolol 25milliGRAM(s) Oral every 6 hours  digoxin     Tablet 0.25milliGRAM(s) Oral daily  potassium iodide Solution 300milliGRAM(s) Oral three times a day  lactobacillus acidophilus 1Tablet(s) Oral daily  ferrous    sulfate 325milliGRAM(s) Oral daily  docusate sodium 100milliGRAM(s) Oral two times a day    MEDICATIONS  (PRN):  LORazepam     Tablet 1milliGRAM(s) Oral three times a day PRN Anxiety  levalbuterol Inhalation 1.25milliGRAM(s) Inhalation every 4 hours PRN shortness of breath or wheezing        T(C): 36.9, Max: 36.9 ( @ 05:34)  HR: 102 (80 - 103)  BP: 96/65 (96/60 - 113/71)  RR: 18 (17 - 18)  SpO2: 96% (96% - 98%)  Wt(kg): --    PHYSICAL EXAM:  GENERAL: NAD, well-groomed, well-developed  HEAD:  Atraumatic, Normocephalic  NECK: Supple, No JVD, Normal thyroid  CHEST/LUNG: Clear to percussion bilaterally; No rales, rhonchi, wheezing, or rubs  HEART: Regular rate and rhythm; No murmurs, rubs, or gallops  ABDOMEN: Soft, Nontender, Nondistended; Bowel sounds present  EXTREMITIES:  2+ Peripheral Pulses, No clubbing, cyanosis, or edema  SKIN: No rashes or lesions    CAPILLARY BLOOD GLUCOSE                            8.5    5.6   )-----------( 303      ( 2017 08:35 )             31.6       CMP:   @ 08:35  SGPT --  Albumin --   Alk Phos --   Anion Gap 6   SGOT --   Total Bili --   BUN 11   Calcium Total 8.6   CO2 25   Chloride 106   Creatinine 0.72   eGFR if    eGFR if non    Glucose 125   Potassium 3.8   Protein --   Sodium 137      Thyroid Function Tests:      Diabetes Tests:       Radiology:

## 2017-04-16 NOTE — PROGRESS NOTE ADULT - SUBJECTIVE AND OBJECTIVE BOX
Progress Note:   · Provider Specialty	Cardiology	      · Subjective and Objective: 	  · Provider Specialty	Cardiology	    Referral/Consultation:   Initial Consult:  · Requested by Name:	Dr. Chairez	  · Date/Time:	2017 08:05	  · Reason for Referral/Consultation:	Atrial fibrillation	      · Subjective and Objective: 	    REQUESTING PHYSICIAN: Dr. Chairez    REASON FOR CONSULT: Atrial fibrillation    CHIEF COMPLAINT: Palpitations and dyspnea on exertion    HPI:  36 year old woman with a history or paroxysmal atrial fibrillation (normally in sinus rhythm, last recurrence of AF was ~ 6 months ago), MVP with MR, hyperthyroidism, mild asthma, and depression/anxiety who presented to the ER with complaints of palpitations x 2 days associated with dyspnea on exertion.  Symptoms are similar to previous episodes of AF; unable to identify exacerbating or alleviating factors.  She describes recent increase in psychosocial stressors and admits to not taking her medications x approximately 6 months.  Denies: angina; + lightheadedness and near-syncope.    17 - pt. denies chest pain. She describes palpitations which are most notable with ambulation. Mild lightheadedness with ambulation. WEN.     04/15/17 - No chest pain. Breathing more comfortably. Less palpitations. No lightheadedness. HR and BP improved.     17 - Feels improved. Still with palpitations when ambulating. Less WEN. No chest pain. Denies lightheadedness.       PAST MEDICAL & SURGICAL HISTORY:  Depression, unspecified depression type  History of anemia  Asthma  History of Hyperthyroidism  Atrial fibrillation  S/P  Section: ,,      Allergies    Motrin (Hives)      SOCIAL HISTORY  :Alcohol: Denied  Smoking: Nonsmoker  Drug Use: Denied  Marital Status:       FAMILY HISTORY:  Family history of stomach cancer (Grandparent)  Family history of diabetes mellitus (Sibling, Grandparent)  No pertinent family history in first degree relatives      MEDICATIONS:    MEDICATIONS  (STANDING):  rivaroxaban 20milliGRAM(s) Oral daily  methimazole 10milliGRAM(s) Oral three times a day  nicotine -  14 mG/24Hr(s) Patch 1patch Transdermal daily  tiotropium 18 MICROgram(s) Capsule 1Capsule(s) Inhalation daily  sertraline 50milliGRAM(s) Oral daily  metoprolol 25milliGRAM(s) Oral every 6 hours  digoxin     Tablet 0.25milliGRAM(s) Oral daily  potassium iodide Solution 300milliGRAM(s) Oral three times a day  lactobacillus acidophilus 1Tablet(s) Oral daily  ferrous    sulfate 325milliGRAM(s) Oral daily  docusate sodium 100milliGRAM(s) Oral two times a day    MEDICATIONS  (PRN):  LORazepam     Tablet 1milliGRAM(s) Oral three times a day PRN Anxiety  levalbuterol Inhalation 1.25milliGRAM(s) Inhalation every 4 hours PRN shortness of breath or wheezing      REVIEW OF SYSTEMS:    as above.     Vital Signs Last 24 Hrs  T(C): 36.9, Max: 36.9 ( @ 05:34)  T(F): 98.4, Max: 98.4 ( @ 05:34)  HR: 103 (80 - 104)  BP: 96/63 (96/60 - 121/80)  BP(mean): --  RR: 18 (16 - 18)  SpO2: 97% (97% - 99%)    I&O's Summary    I & Os for current day (as of 2017 07:13)  =============================================  IN: 300 ml / OUT: 301 ml / NET: -1 ml      PHYSICAL EXAM:  Constitutional: NAD, awake and alert, obese.   HEENT: NC/AT.   No oral cyanosis.  Neck:  supple,  No JVD  Respiratory: Minimal expiratory wheeze.   Cardiovascular: Irregularly ,irregular rhythm.   S1 and S2.   Gastrointestinal: Bowel Sounds present, soft, nontender. Obese.   Extremities: No peripheral edema. No clubbing or cyanosis.  Neurological: A/O x 3  Musculoskeletal: no calf tenderness.  Skin: No rashes.      LABS: All Labs Reviewed:                        7.1    6.2   )-----------( 232      ( 15 Apr 2017 06:25 )             26.2                         7.3    5.6   )-----------( 228      ( 2017 07:11 )             26.7     15 Apr 2017 06:25    143    |  110    |  12     ----------------------------<  91     4.0     |  23     |  0.62   2017 07:11    142    |  111    |  16     ----------------------------<  92     4.3     |  24     |  0.64     Ca    8.7        15 Apr 2017 06:25  Ca    8.3        2017 07:11  Mg     1.9       15 Apr 2017 06:25  Mg     1.6       2017 10:57    TPro  6.3    /  Alb  2.8    /  TBili  0.3    /  DBili  x      /  AST  16     /  ALT  19     /  AlkPhos  63     15 Apr 2017 06:25  TPro  5.6    /  Alb  2.8    /  TBili  0.3    /  DBili  x      /  AST  17     /  ALT  25     /  AlkPhos  71     2017 07:11      04-13 @ 11:35  TSH: <0.01     No new labs from today available.     Rhythm - atrial fibrillation still with episodes of rapid VR to 150s.  ( presently controlled).     RADIOLOGY/EKG:    IMPRESSION:    See above report, follow-up recommendation.      SUMMARY: Echocardiogram -     1. Technically difficult study.  2. Endocardium not well visualized.  Grossly normal left ventricular size   and overall systolic function. LVEF estimated at 55%.  3. The right ventricle appears mildly enlarged with normal function.  4. Biatrial enlargement.  5. Prolapse of the anterior mitral leaflet with moderate to severe mitral   regurgitation.  6. Mild to moderate pulmonic insufficiency.  7. Moderate tricuspid regurgitation.  8. Moderate pulmonary hypertension.       CASSIE BEAN       Assessment and Recommendation:   Problem/Recommendation - 1:  Problem: Paroxysmal atrial fibrillation. Recommendation: Recurrence of atrial fibrillation associated with symptoms and rapid ventricular response in the setting of nonadherence to recommended/prescribed medications and contributed to by hyperthyroidism - rate is improving with  metoprolol and digoxin; continue Xarelto. BP not as low as it has been, continue to monitor.   Will continue present medical regimen. Monitor BMP,Mg. Dig level requested. Control of VR will be difficult until hyperthyroid state is controlled. Pt. is anemic which also can contribute to increased VR.     Problem/Recommendation - 2:  ·  Problem: Hyperthyroidism.  Recommendation: H/o hyperthroidism but nonadherent with methimazole;  Now being treated by endocrine.      Problem/Recommendation - 3:  ·  Problem: Mitral valve disorder.  Recommendation: MVP with mod-sev MR on echo in ; repeat imaging (ideally when in sinus rhythm or when HR is slower).     Anemia - continue to monitor CBC. W/U as per medical attending.     Asthma - mild wheeze this am  ( but less than yesterday) without respiratory difficulty. Rx. as per medical attending. Consider pulmonary consultation. If persists may have to decrease beta blocker.

## 2017-04-16 NOTE — PROGRESS NOTE ADULT - SUBJECTIVE AND OBJECTIVE BOX
Patient is a 36y old  Female who presents with a chief complaint of palpitations (14 Apr 2017 09:15)        HPI:  36F with afib (with hx of hospitalizations for RVR) on xarelto, hyperthyroidism (c/b hx of thyroid storms), asthma, HTN, and depression who presents heart palpitations for 2 days.  States it feels very similar to her previous afib with RVR episodes.  Also complained of some chest tightness today though has resolved already.  Associated with diaphoresis and some light-headedness with activity.  Patient reports not taking any of her meds for the past 6 months because of stress (going through a divorce) and depression (no active SI).  However patient has been taking diet pills recently.  Denies any fevers, nausea/vomiting, recent travel, or diarrhea.  In the ED, patient was found to be in afib with RVR.  Multiple attempts to control her HR with metoprolol IV and diltiazem IV was only able to lower her HR to 110s but her HR would then rebound to the high 130s.  Patient, however, remained asymptomatic. (13 Apr 2017 03:06)      SUBJECTIVE & OBJECTIVE: Pt seen and examined at bedside.     PHYSICAL EXAM:  T(C): 36.9, Max: 36.9 (04-16 @ 05:34)  HR: 102 (80 - 103)  BP: 96/65 (96/60 - 113/71)  RR: 18 (17 - 18)  SpO2: 96% (96% - 98%)  Wt(kg): --   GENERAL: NAD, well-groomed, well-developed  HEAD:  Atraumatic, Normocephalic  EYES: EOMI, PERRLA, conjunctiva and sclera clear  ENMT: Moist mucous membranes  NECK: Supple, No JVD  NERVOUS SYSTEM:  Alert & Oriented X3, Motor Strength 5/5 B/L upper and lower extremities; DTRs 2+ intact and symmetric  CHEST/LUNG: Clear to auscultation bilaterally; No rales, rhonchi, wheezing, or rubs  HEART: Regular rate and rhythm; No murmurs, rubs, or gallops  ABDOMEN: Soft, Nontender, Nondistended; Bowel sounds present  EXTREMITIES:  2+ Peripheral Pulses, No clubbing, cyanosis, or edema        MEDICATIONS  (STANDING):  rivaroxaban 20milliGRAM(s) Oral daily  methimazole 10milliGRAM(s) Oral three times a day  nicotine -  14 mG/24Hr(s) Patch 1patch Transdermal daily  tiotropium 18 MICROgram(s) Capsule 1Capsule(s) Inhalation daily  sertraline 50milliGRAM(s) Oral daily  metoprolol 25milliGRAM(s) Oral every 6 hours  digoxin     Tablet 0.25milliGRAM(s) Oral daily  potassium iodide Solution 300milliGRAM(s) Oral three times a day  lactobacillus acidophilus 1Tablet(s) Oral daily  ferrous    sulfate 325milliGRAM(s) Oral daily  docusate sodium 100milliGRAM(s) Oral two times a day    MEDICATIONS  (PRN):  LORazepam     Tablet 1milliGRAM(s) Oral three times a day PRN Anxiety  levalbuterol Inhalation 1.25milliGRAM(s) Inhalation every 4 hours PRN shortness of breath or wheezing      LABS:                        8.5    5.6   )-----------( 303      ( 16 Apr 2017 08:35 )             31.6     04-16    137  |  106  |  11  ----------------------------<  125<H>  3.8   |  25  |  0.72    Ca    8.6      16 Apr 2017 08:35  Mg     1.7     04-16    TPro  6.3  /  Alb  2.8<L>  /  TBili  0.3  /  DBili  x   /  AST  16  /  ALT  19  /  AlkPhos  63  04-15        Magnesium, Serum: 1.7 mg/dL (04-16 @ 08:35)    CAPILLARY BLOOD GLUCOSE      CAPILLARY BLOOD GLUCOSE    CAPILLARY BLOOD GLUCOSE            RECENT CULTURES:      RADIOLOGY & ADDITIONAL TESTS:                        DVT/GI ppx  Discussed with pt @ bedside

## 2017-04-16 NOTE — PROGRESS NOTE ADULT - SUBJECTIVE AND OBJECTIVE BOX
Reports palpitations. States she never had gastric surgery   FOBT was reportedly negative.     MEDICATIONS  (STANDING):  rivaroxaban 20milliGRAM(s) Oral daily  methimazole 10milliGRAM(s) Oral three times a day  nicotine -  14 mG/24Hr(s) Patch 1patch Transdermal daily  tiotropium 18 MICROgram(s) Capsule 1Capsule(s) Inhalation daily  sertraline 50milliGRAM(s) Oral daily  metoprolol 25milliGRAM(s) Oral every 6 hours  digoxin     Tablet 0.25milliGRAM(s) Oral daily  potassium iodide Solution 300milliGRAM(s) Oral three times a day  lactobacillus acidophilus 1Tablet(s) Oral daily  ferrous    sulfate 325milliGRAM(s) Oral daily  docusate sodium 100milliGRAM(s) Oral two times a day    MEDICATIONS  (PRN):  LORazepam     Tablet 1milliGRAM(s) Oral three times a day PRN Anxiety  levalbuterol Inhalation 1.25milliGRAM(s) Inhalation every 4 hours PRN shortness of breath or wheezing      Vital Signs Last 24 Hrs  T(C): 36.9, Max: 36.9 (04-16 @ 05:34)  T(F): 98.4, Max: 98.4 (04-16 @ 05:34)  HR: 102 (80 - 103)  BP: 96/65 (96/60 - 113/71)  BP(mean): --  RR: 18 (17 - 18)  SpO2: 96% (96% - 98%)    GENERAL: Comfortable, NAD  HEAD:  Atraumatic, Normocephalic  EYES: Anicteric sclera  ABDOMEN: Soft, Non-tender, Non-distended; Normoactive bowel sounds  EXTREMITIES:  No edema  SKIN: No jaundice    LABS:                        8.5    5.6   )-----------( 303      ( 16 Apr 2017 08:35 )             31.6     04-16    137  |  106  |  11  ----------------------------<  125<H>  3.8   |  25  |  0.72    Ca    8.6      16 Apr 2017 08:35  Mg     1.7     04-16    TPro  6.3  /  Alb  2.8<L>  /  TBili  0.3  /  DBili  x   /  AST  16  /  ALT  19  /  AlkPhos  63  04-15          RADIOLOGY & ADDITIONAL TESTS:    IMPRESSIONS:   Severe anemia with Iron deficiency  Heavy menses  No overt GI bleed - FOBT negative  History of atrial fibrillation on anti-coagulation    RECOMMENDATIONS:  Primary team requesting endoscopic evaluation  Start IV Fe supplementation  GYN evaluation

## 2017-04-17 PROCEDURE — 99232 SBSQ HOSP IP/OBS MODERATE 35: CPT

## 2017-04-17 PROCEDURE — 99233 SBSQ HOSP IP/OBS HIGH 50: CPT

## 2017-04-17 RX ORDER — METOPROLOL TARTRATE 50 MG
25 TABLET ORAL EVERY 8 HOURS
Qty: 0 | Refills: 0 | Status: DISCONTINUED | OUTPATIENT
Start: 2017-04-17 | End: 2017-04-18

## 2017-04-17 RX ORDER — PANTOPRAZOLE SODIUM 20 MG/1
40 TABLET, DELAYED RELEASE ORAL
Qty: 0 | Refills: 0 | Status: DISCONTINUED | OUTPATIENT
Start: 2017-04-17 | End: 2017-04-18

## 2017-04-17 RX ORDER — MAGNESIUM SULFATE 500 MG/ML
1 VIAL (ML) INJECTION ONCE
Qty: 0 | Refills: 0 | Status: COMPLETED | OUTPATIENT
Start: 2017-04-17 | End: 2017-04-17

## 2017-04-17 RX ADMIN — Medication 100 GRAM(S): at 11:36

## 2017-04-17 RX ADMIN — Medication 1 PATCH: at 11:33

## 2017-04-17 RX ADMIN — Medication 25 MILLIGRAM(S): at 05:59

## 2017-04-17 RX ADMIN — Medication 325 MILLIGRAM(S): at 11:36

## 2017-04-17 RX ADMIN — Medication 1 MILLIGRAM(S): at 13:29

## 2017-04-17 RX ADMIN — TIOTROPIUM BROMIDE 1 CAPSULE(S): 18 CAPSULE ORAL; RESPIRATORY (INHALATION) at 06:30

## 2017-04-17 RX ADMIN — Medication 1 TABLET(S): at 12:07

## 2017-04-17 RX ADMIN — Medication 100 MILLIGRAM(S): at 17:22

## 2017-04-17 RX ADMIN — RIVAROXABAN 20 MILLIGRAM(S): KIT at 11:36

## 2017-04-17 RX ADMIN — Medication 1 PATCH: at 11:36

## 2017-04-17 RX ADMIN — Medication 300 MILLIGRAM(S): at 21:27

## 2017-04-17 RX ADMIN — Medication 300 MILLIGRAM(S): at 13:29

## 2017-04-17 RX ADMIN — Medication 1 MILLIGRAM(S): at 20:42

## 2017-04-17 RX ADMIN — Medication 0.25 MILLIGRAM(S): at 05:59

## 2017-04-17 RX ADMIN — Medication 25 MILLIGRAM(S): at 18:34

## 2017-04-17 RX ADMIN — Medication 300 MILLIGRAM(S): at 05:59

## 2017-04-17 RX ADMIN — SERTRALINE 50 MILLIGRAM(S): 25 TABLET, FILM COATED ORAL at 11:37

## 2017-04-17 RX ADMIN — Medication 1 MILLIGRAM(S): at 01:26

## 2017-04-17 RX ADMIN — Medication 100 MILLIGRAM(S): at 05:59

## 2017-04-17 NOTE — PROGRESS NOTE ADULT - SUBJECTIVE AND OBJECTIVE BOX
INTERVAL HPI/OVERNIGHT EVENTS: no significant events overnight reported  feeling well  denies any gi bleeding, no abdominal pain  tolerated breakfast        MEDICATIONS  (STANDING):  rivaroxaban 20milliGRAM(s) Oral daily  methimazole 10milliGRAM(s) Oral three times a day  nicotine -  14 mG/24Hr(s) Patch 1patch Transdermal daily  tiotropium 18 MICROgram(s) Capsule 1Capsule(s) Inhalation daily  sertraline 50milliGRAM(s) Oral daily  metoprolol 25milliGRAM(s) Oral every 6 hours  digoxin     Tablet 0.25milliGRAM(s) Oral daily  potassium iodide Solution 300milliGRAM(s) Oral three times a day  lactobacillus acidophilus 1Tablet(s) Oral daily  ferrous    sulfate 325milliGRAM(s) Oral daily  docusate sodium 100milliGRAM(s) Oral two times a day    MEDICATIONS  (PRN):  LORazepam     Tablet 1milliGRAM(s) Oral three times a day PRN Anxiety  levalbuterol Inhalation 1.25milliGRAM(s) Inhalation every 4 hours PRN shortness of breath or wheezing            Vital Signs Last 24 Hrs  T(C): 36.9, Max: 36.9 (04-16 @ 14:29)  T(F): 98.4, Max: 98.5 (04-16 @ 17:57)  HR: 108 (80 - 112)  BP: 101/69 (78/50 - 113/74)  BP(mean): --  RR: 18 (18 - 19)  SpO2: 92% (92% - 96%)    Physical exam:      abd soft, non tender      LABS:                        8.5    5.6   )-----------( 303      ( 16 Apr 2017 08:35 )             31.6     04-16    137  |  106  |  11  ----------------------------<  125<H>  3.8   |  25  |  0.72    Ca    8.6      16 Apr 2017 08:35  Mg     1.7     04-16            RADIOLOGY & ADDITIONAL TESTS:

## 2017-04-17 NOTE — PROGRESS NOTE ADULT - SUBJECTIVE AND OBJECTIVE BOX
Patient is a 36y old  Female who presents with a chief complaint of palpitations (14 Apr 2017 09:15)  graves dz with resultant hyperthyroidism      INTERVAL HPI/OVERNIGHT EVENTS:    MEDICATIONS  (STANDING):  rivaroxaban 20milliGRAM(s) Oral daily  methimazole 10milliGRAM(s) Oral three times a day  nicotine -  14 mG/24Hr(s) Patch 1patch Transdermal daily  tiotropium 18 MICROgram(s) Capsule 1Capsule(s) Inhalation daily  sertraline 50milliGRAM(s) Oral daily  digoxin     Tablet 0.25milliGRAM(s) Oral daily  potassium iodide Solution 300milliGRAM(s) Oral three times a day  lactobacillus acidophilus 1Tablet(s) Oral daily  ferrous    sulfate 325milliGRAM(s) Oral daily  docusate sodium 100milliGRAM(s) Oral two times a day  pantoprazole    Tablet 40milliGRAM(s) Oral before breakfast  metoprolol 25milliGRAM(s) Oral every 8 hours  diltiazem    Tablet 30milliGRAM(s) Oral every 8 hours    MEDICATIONS  (PRN):  LORazepam     Tablet 1milliGRAM(s) Oral three times a day PRN Anxiety  levalbuterol Inhalation 1.25milliGRAM(s) Inhalation every 4 hours PRN shortness of breath or wheezing      Allergies    Motrin (Hives)    Intolerances          Vital Signs Last 24 Hrs  T(C): 36.8, Max: 36.9 (04-16 @ 17:57)  T(F): 98.3, Max: 98.5 (04-16 @ 17:57)  HR: 69 (69 - 112)  BP: 107/70 (78/50 - 110/78)  BP(mean): --  RR: 18 (11 - 19)  SpO2: 98% (92% - 98%)    General: WN/WD NAD  Respiratory: CTA B/L  CV: RRR, S1S2, no murmurs, rubs or gallops  Abdominal: Soft, NT, ND +BS, Last BM  Extremities: No edema, + peripheral pulses pos tremor of outstretched hands    LABS:                        8.5    5.6   )-----------( 303      ( 16 Apr 2017 08:35 )             31.6     04-16    137  |  106  |  11  ----------------------------<  125<H>  3.8   |  25  |  0.72    Ca    8.6      16 Apr 2017 08:35  Mg     1.7     04-16      CAPILLARY BLOOD GLUCOSE          RADIOLOGY & ADDITIONAL TESTS:

## 2017-04-17 NOTE — PROGRESS NOTE ADULT - SUBJECTIVE AND OBJECTIVE BOX
Feels better.  Denies any CP, SOB, cough, Dizziness, weakness, numbness, fever, chills, abdominal pain, N/V/D.  No blood per stool   HR has been ranging from 90 to 110      Constitutional: No fever, fatigue or weight loss.  Skin: No rash.  Eyes: No recent vision problems or eye pain.  ENT: No congestion, ear pain, or sore throat.  Endocrine: No thyroid problems.  Cardiovascular: No chest pain or palpation.  Respiratory: No cough, shortness of breath, congestion, or wheezing.  Gastrointestinal: No abdominal pain, nausea, vomiting, or diarrhea.  Genitourinary: No dysuria.  Musculoskeletal: No joint swelling.  Neurologic: No headache.      T 98.4  P 108  /69  RR 18  SPO2 92 RA      PHYSICAL EXAM-  GENERAL: NAD, well-developed  HEAD:  Atraumatic, Normocephalic  EYES: EOMI, PERRLA, conjunctiva and sclera clear  NECK: Supple, No JVD, Normal thyroid  NERVOUS SYSTEM:  Alert & Oriented X3, Motor Strength 5/5 B/L upper and lower extremities; DTRs 2+ intact and symmetric  CHEST/LUNG: Clear to percussion bilaterally; No rales, rhonchi, wheezing, or rubs  HEART: Irregular HR.  Tachycardia.  No LE edema  ABDOMEN: Soft, Nontender, Nondistended; Bowel sounds present  EXTREMITIES:  2+ Peripheral Pulses, No clubbing, cyanosis, or edema  SKIN: No rashes or lesions                                8.5    5.6   )-----------( 303      ( 16 Apr 2017 08:35 )             31.6     04-16    137  |  106  |  11  ----------------------------<  125<H>  3.8   |  25  |  0.72    Ca    8.6      16 Apr 2017 08:35  Mg     1.7     04-16                MEDICATIONS  (STANDING):  rivaroxaban 20milliGRAM(s) Oral daily  methimazole 10milliGRAM(s) Oral three times a day  nicotine -  14 mG/24Hr(s) Patch 1patch Transdermal daily  tiotropium 18 MICROgram(s) Capsule 1Capsule(s) Inhalation daily  sertraline 50milliGRAM(s) Oral daily  metoprolol 25milliGRAM(s) Oral every 6 hours  digoxin     Tablet 0.25milliGRAM(s) Oral daily  potassium iodide Solution 300milliGRAM(s) Oral three times a day  lactobacillus acidophilus 1Tablet(s) Oral daily  ferrous    sulfate 325milliGRAM(s) Oral daily  docusate sodium 100milliGRAM(s) Oral two times a day    MEDICATIONS  (PRN):  LORazepam     Tablet 1milliGRAM(s) Oral three times a day PRN Anxiety  levalbuterol Inhalation 1.25milliGRAM(s) Inhalation every 4 hours PRN shortness of breath or wheezing

## 2017-04-17 NOTE — PROGRESS NOTE ADULT - ASSESSMENT
Patient is 37 yo female with hx of Depression, anemia, Asthma, Hyperthyroidism, Atrial fibrillation, non-compliance with medications and S/P  Section X3 presenting with

## 2017-04-17 NOTE — PROGRESS NOTE ADULT - ASSESSMENT
anemia w/o overt gi bleeding    iron deficiency  NO plan for inpatient endoscopic evaluation  can f/u as outpt for EGD  iron supplimentation

## 2017-04-17 NOTE — PROGRESS NOTE ADULT - SUBJECTIVE AND OBJECTIVE BOX
REQUESTING PHYSICIAN: Dr. Chairez    REASON FOR CONSULT: Atrial fibrillation    CHIEF COMPLAINT: Palpitations and dyspnea on exertion    HPI:  36 year old woman with a history or paroxysmal atrial fibrillation (normally in sinus rhythm, last recurrence of AF was ~ 6 months ago), MVP with MR, hyperthyroidism, mild asthma, and depression/anxiety who presented to the ER with complaints of palpitations x 2 days associated with dyspnea on exertion.  Symptoms are similar to previous episodes of AF; unable to identify exacerbating or alleviating factors.  She describes recent increase in psychosocial stressors and admits to not taking her medications x approximately 6 months.  Denies: angina; + lightheadedness and near-syncope.    17 - pt. denies chest pain. She describes palpitations which are most notable with ambulation. Mild lightheadedness with ambulation. WEN.     04/15/17 - No chest pain. Breathing more comfortably. Less palpitations. No lightheadedness. HR and BP improved.     17 - Feels improved. Still with palpitations when ambulating. Less WEN. No chest pain. Denies lightheadedness.     17:  Feels better without chest pain or SOB.  Ambulating well.   minimal palpitations      PAST MEDICAL & SURGICAL HISTORY:  Depression, unspecified depression type  History of anemia  Asthma  History of Hyperthyroidism  Atrial fibrillation  S/P  Section: ,,    Allergies  Motrin (Hives)    SOCIAL HISTORY  Alcohol: Denied  Smoking: Nonsmoker  Drug Use: Denied  Marital Status:     FAMILY HISTORY:  Family history of stomach cancer (Grandparent)  Family history of diabetes mellitus (Sibling, Grandparent)  No pertinent family history in first degree relatives    MEDICATIONS  (STANDING):  rivaroxaban 20milliGRAM(s) Oral daily  methimazole 10milliGRAM(s) Oral three times a day  nicotine -  14 mG/24Hr(s) Patch 1patch Transdermal daily  tiotropium 18 MICROgram(s) Capsule 1Capsule(s) Inhalation daily  sertraline 50milliGRAM(s) Oral daily  metoprolol 25milliGRAM(s) Oral every 6 hours  digoxin     Tablet 0.25milliGRAM(s) Oral daily  potassium iodide Solution 300milliGRAM(s) Oral three times a day  lactobacillus acidophilus 1Tablet(s) Oral daily  ferrous    sulfate 325milliGRAM(s) Oral daily  docusate sodium 100milliGRAM(s) Oral two times a day  pantoprazole    Tablet 40milliGRAM(s) Oral before breakfast    MEDICATIONS  (PRN):  LORazepam     Tablet 1milliGRAM(s) Oral three times a day PRN Anxiety  levalbuterol Inhalation 1.25milliGRAM(s) Inhalation every 4 hours PRN shortness of breath or wheezing      Vital Signs Last 24 Hrs  T(C): 36.9, Max: 36.9 (-16 @ 17:57)  T(F): 98.4, Max: 98.5 (04-16 @ 17:57)  HR: 98 (80 - 112)  BP: 88/59 (78/50 - 110/78)  BP(mean): --  RR: 11 (11 - 19)  SpO2: 92% (92% - 95%)    I&O's Detail  I & Os for 24h ending 2017 07:00  =============================================  IN:    Oral Fluid: 460 ml    Total IN: 460 ml  ---------------------------------------------  OUT:    Voided: 400 ml    Total OUT: 400 ml  ---------------------------------------------  Total NET: 60 ml    I & Os for current day (as of 2017 17:09)  =============================================  IN:    Oral Fluid: 420 ml    Total IN: 420 ml  ---------------------------------------------  OUT:    Total OUT: 0 ml  ---------------------------------------------  Total NET: 420 ml        PHYSICAL EXAM:  Constitutional: NAD, awake and alert, obese.   HEENT: NC/AT.   No oral cyanosis.  Neck:  supple,  No JVD  Respiratory: BCTAP  Cardiovascular: Irregularly ,irregular rhythm.   S1 and S2.   Gastrointestinal: Bowel Sounds present, soft, nontender. Obese.   Extremities: No peripheral edema. No clubbing or cyanosis.  Neurological: A/O x 3  Musculoskeletal: no calf tenderness.  Skin: No rashes.      LABS: All Labs Reviewed:              @ 11:35    TSH: <0.01                          8.5    5.6   )-----------( 303      ( 2017 08:35 )             31.6         137  |  106  |  11  ----------------------------<  125<H>  3.8   |  25  |  0.72    Ca    8.6      2017 08:35  Mg     1.7         SUMMARY: Echocardiogram -     1. Technically difficult study.  2. Endocardium not well visualized.  Grossly normal left ventricular size   and overall systolic function. LVEF estimated at 55%.  3. The right ventricle appears mildly enlarged with normal function.  4. Biatrial enlargement.  5. Prolapse of the anterior mitral leaflet with moderate to severe mitral   regurgitation.  6. Mild to moderate pulmonic insufficiency.  7. Moderate tricuspid regurgitation.  8. Moderate pulmonary hypertension.

## 2017-04-18 VITALS
DIASTOLIC BLOOD PRESSURE: 70 MMHG | RESPIRATION RATE: 19 BRPM | TEMPERATURE: 98 F | SYSTOLIC BLOOD PRESSURE: 126 MMHG | HEART RATE: 94 BPM

## 2017-04-18 LAB
ANION GAP SERPL CALC-SCNC: 8 MMOL/L — SIGNIFICANT CHANGE UP (ref 5–17)
BUN SERPL-MCNC: 12 MG/DL — SIGNIFICANT CHANGE UP (ref 7–23)
CALCIUM SERPL-MCNC: 8.7 MG/DL — SIGNIFICANT CHANGE UP (ref 8.4–10.5)
CHLORIDE SERPL-SCNC: 107 MMOL/L — SIGNIFICANT CHANGE UP (ref 96–108)
CO2 SERPL-SCNC: 26 MMOL/L — SIGNIFICANT CHANGE UP (ref 22–31)
CREAT SERPL-MCNC: 0.6 MG/DL — SIGNIFICANT CHANGE UP (ref 0.5–1.3)
GLUCOSE SERPL-MCNC: 86 MG/DL — SIGNIFICANT CHANGE UP (ref 70–99)
HCT VFR BLD CALC: 28.9 % — LOW (ref 34.5–45)
HGB BLD-MCNC: 8 G/DL — LOW (ref 11.5–15.5)
MAGNESIUM SERPL-MCNC: 1.9 MG/DL — SIGNIFICANT CHANGE UP (ref 1.6–2.6)
MCHC RBC-ENTMCNC: 15.7 PG — LOW (ref 27–34)
MCHC RBC-ENTMCNC: 27.8 GM/DL — LOW (ref 32–36)
MCV RBC AUTO: 56.4 FL — LOW (ref 80–100)
PHOSPHATE SERPL-MCNC: 4.4 MG/DL — SIGNIFICANT CHANGE UP (ref 2.5–4.5)
PLATELET # BLD AUTO: 245 K/UL — SIGNIFICANT CHANGE UP (ref 150–400)
POTASSIUM SERPL-MCNC: 4.2 MMOL/L — SIGNIFICANT CHANGE UP (ref 3.5–5.3)
POTASSIUM SERPL-SCNC: 4.2 MMOL/L — SIGNIFICANT CHANGE UP (ref 3.5–5.3)
RBC # BLD: 5.13 M/UL — SIGNIFICANT CHANGE UP (ref 3.8–5.2)
RBC # FLD: 18.1 % — HIGH (ref 10.3–14.5)
SODIUM SERPL-SCNC: 141 MMOL/L — SIGNIFICANT CHANGE UP (ref 135–145)
WBC # BLD: 7.6 K/UL — SIGNIFICANT CHANGE UP (ref 3.8–10.5)
WBC # FLD AUTO: 7.6 K/UL — SIGNIFICANT CHANGE UP (ref 3.8–10.5)

## 2017-04-18 PROCEDURE — 84436 ASSAY OF TOTAL THYROXINE: CPT

## 2017-04-18 PROCEDURE — 83880 ASSAY OF NATRIURETIC PEPTIDE: CPT

## 2017-04-18 PROCEDURE — 93005 ELECTROCARDIOGRAM TRACING: CPT

## 2017-04-18 PROCEDURE — 85027 COMPLETE CBC AUTOMATED: CPT

## 2017-04-18 PROCEDURE — 99239 HOSP IP/OBS DSCHRG MGMT >30: CPT

## 2017-04-18 PROCEDURE — 84480 ASSAY TRIIODOTHYRONINE (T3): CPT

## 2017-04-18 PROCEDURE — 84100 ASSAY OF PHOSPHORUS: CPT

## 2017-04-18 PROCEDURE — 94640 AIRWAY INHALATION TREATMENT: CPT

## 2017-04-18 PROCEDURE — 80048 BASIC METABOLIC PNL TOTAL CA: CPT

## 2017-04-18 PROCEDURE — 82607 VITAMIN B-12: CPT

## 2017-04-18 PROCEDURE — 85610 PROTHROMBIN TIME: CPT

## 2017-04-18 PROCEDURE — 84443 ASSAY THYROID STIM HORMONE: CPT

## 2017-04-18 PROCEDURE — 80053 COMPREHEN METABOLIC PANEL: CPT

## 2017-04-18 PROCEDURE — 82746 ASSAY OF FOLIC ACID SERUM: CPT

## 2017-04-18 PROCEDURE — 94760 N-INVAS EAR/PLS OXIMETRY 1: CPT

## 2017-04-18 PROCEDURE — 80162 ASSAY OF DIGOXIN TOTAL: CPT

## 2017-04-18 PROCEDURE — 82272 OCCULT BLD FECES 1-3 TESTS: CPT

## 2017-04-18 PROCEDURE — 82728 ASSAY OF FERRITIN: CPT

## 2017-04-18 PROCEDURE — 83735 ASSAY OF MAGNESIUM: CPT

## 2017-04-18 PROCEDURE — 84484 ASSAY OF TROPONIN QUANT: CPT

## 2017-04-18 PROCEDURE — 99233 SBSQ HOSP IP/OBS HIGH 50: CPT

## 2017-04-18 PROCEDURE — 85730 THROMBOPLASTIN TIME PARTIAL: CPT

## 2017-04-18 PROCEDURE — 71045 X-RAY EXAM CHEST 1 VIEW: CPT

## 2017-04-18 PROCEDURE — 99285 EMERGENCY DEPT VISIT HI MDM: CPT | Mod: 25

## 2017-04-18 PROCEDURE — 84702 CHORIONIC GONADOTROPIN TEST: CPT

## 2017-04-18 PROCEDURE — 83550 IRON BINDING TEST: CPT

## 2017-04-18 RX ORDER — TIOTROPIUM BROMIDE 18 UG/1
1 CAPSULE ORAL; RESPIRATORY (INHALATION)
Qty: 1 | Refills: 0 | OUTPATIENT
Start: 2017-04-18

## 2017-04-18 RX ORDER — FERROUS SULFATE 325(65) MG
1 TABLET ORAL
Qty: 90 | Refills: 0 | OUTPATIENT
Start: 2017-04-18 | End: 2017-05-18

## 2017-04-18 RX ORDER — METHIMAZOLE 10 MG/1
1 TABLET ORAL
Qty: 90 | Refills: 0
Start: 2017-04-18 | End: 2017-05-18

## 2017-04-18 RX ORDER — RIVAROXABAN 15 MG-20MG
1 KIT ORAL
Qty: 30 | Refills: 0 | OUTPATIENT
Start: 2017-04-18

## 2017-04-18 RX ORDER — LEVALBUTEROL 1.25 MG/.5ML
3 SOLUTION, CONCENTRATE RESPIRATORY (INHALATION)
Qty: 1 | Refills: 0
Start: 2017-04-18 | End: 2017-05-18

## 2017-04-18 RX ORDER — PANTOPRAZOLE SODIUM 20 MG/1
1 TABLET, DELAYED RELEASE ORAL
Qty: 30 | Refills: 0
Start: 2017-04-18 | End: 2017-05-18

## 2017-04-18 RX ORDER — NICOTINE POLACRILEX 2 MG
1 GUM BUCCAL
Qty: 0 | Refills: 0 | COMMUNITY
Start: 2017-04-18

## 2017-04-18 RX ORDER — METOPROLOL TARTRATE 50 MG
1 TABLET ORAL
Qty: 90 | Refills: 0
Start: 2017-04-18 | End: 2017-05-18

## 2017-04-18 RX ORDER — SERTRALINE 25 MG/1
1 TABLET, FILM COATED ORAL
Qty: 0 | Refills: 0 | COMMUNITY
Start: 2017-04-18

## 2017-04-18 RX ORDER — LACTOBACILLUS ACIDOPHILUS 100MM CELL
1 CAPSULE ORAL
Qty: 0 | Refills: 0 | COMMUNITY
Start: 2017-04-18

## 2017-04-18 RX ORDER — DIGOXIN 250 MCG
1 TABLET ORAL
Qty: 30 | Refills: 0 | OUTPATIENT
Start: 2017-04-18 | End: 2017-05-18

## 2017-04-18 RX ADMIN — Medication 25 MILLIGRAM(S): at 06:06

## 2017-04-18 RX ADMIN — Medication 1 PATCH: at 11:21

## 2017-04-18 RX ADMIN — Medication 300 MILLIGRAM(S): at 06:06

## 2017-04-18 RX ADMIN — PANTOPRAZOLE SODIUM 40 MILLIGRAM(S): 20 TABLET, DELAYED RELEASE ORAL at 06:06

## 2017-04-18 RX ADMIN — Medication 1 PATCH: at 11:16

## 2017-04-18 RX ADMIN — Medication 100 MILLIGRAM(S): at 06:06

## 2017-04-18 RX ADMIN — Medication 1 TABLET(S): at 11:21

## 2017-04-18 RX ADMIN — TIOTROPIUM BROMIDE 1 CAPSULE(S): 18 CAPSULE ORAL; RESPIRATORY (INHALATION) at 06:36

## 2017-04-18 RX ADMIN — Medication 325 MILLIGRAM(S): at 11:21

## 2017-04-18 RX ADMIN — RIVAROXABAN 20 MILLIGRAM(S): KIT at 11:21

## 2017-04-18 RX ADMIN — SERTRALINE 50 MILLIGRAM(S): 25 TABLET, FILM COATED ORAL at 11:21

## 2017-04-18 RX ADMIN — Medication 0.25 MILLIGRAM(S): at 06:07

## 2017-04-18 NOTE — PROGRESS NOTE ADULT - PROBLEM SELECTOR PLAN 4
s/p gastric banding 4 years ago,given stopped taking all her medication,   pt admits a year ago having postive guaic but declined at that time  had repeat guaic was negative, started on iron sulfate HH 8.5, bleeding likely gynocological pt to follow us outpt for further eval
s/p gastric banding 4 years ago,given stopped taking all her medication,   pt admits a year ago having postive guaic but declined at that time for EGD/colonsocpy, given the anemia profile, pt has DESMOND, will repaet guaic and GI eval, curretnly pt is not on her menstrual period, if HH drops below 7 would need to   guaic,
s/p gastric banding 4 years ago.  Stool occult negative.  H/H stable.  Iron def anemia.  Continue with PPI, and Iron supplement.  Outpatient EGD as per GI  H/H stable
s/p gastric banding 4 years ago.  Stool occult negative.  H/H stable.  Iron def anemia.  Continue with PPI, and Iron supplement.  Outpatient EGD as per GI  H/H flory
menorrhagia  advised to increase iron supplement , monitor her hemoglobin level
s/p gastric banding 4 years ago,given stopped taking all her medication, contriubting to her vit b12/folate levels  will check levels  anemia profile  ferritin  guaic, hold prbc as pt is not symptomatic, and no abvious soruce of bleeding

## 2017-04-18 NOTE — PROGRESS NOTE ADULT - PROBLEM SELECTOR PROBLEM 1
Paroxysmal atrial fibrillation
Atrial fibrillation with rapid ventricular response
Hyperthyroidism
Hyperthyroidism
Paroxysmal atrial fibrillation
Atrial fibrillation with rapid ventricular response

## 2017-04-18 NOTE — PROGRESS NOTE ADULT - PROBLEM SELECTOR PROBLEM 4
History of anemia

## 2017-04-18 NOTE — PROGRESS NOTE ADULT - ASSESSMENT
Patient is 35 yo female with hx of Depression, anemia, Asthma, Hyperthyroidism, Atrial fibrillation, non-compliance with medications and S/P  Section X3 presenting with

## 2017-04-18 NOTE — PROGRESS NOTE ADULT - PROBLEM SELECTOR PLAN 1
Rate improving  on metoprolol,cardizem  and Digoxin. Continue NOAC for AC.her heart rate will improve more with improvement of her underlying hyperthyroidism ,   Once thyroid issues improved will need readdressing of rhythm issue.    Patient was explained repeatedly to be adherent to medication , follow up with physician ,to prevent further deterioration .  patient is ok to be discharged
Rate suboptimally controlled still on metoprolol and Digoxin.  will lower bb and add low dose diltazem.  If BP stable and HR improved can likely dc in am.  Continue NOAC for AC.Once thyroid issues improved will need readdressing of rhythm issue.
cont methimazole 10mg 3x/day  cont beta blockade   cont sski for additional 5 days  plan is for definitive tx w/radioidine while on anti-thyroid agents
cont methimazole 30mg/day  cont beta blockade  cont sski for an additional 5 days  would benefit from definitive tx with radioiodine. however, would be unsafe to hold methimazole for transient time. to recommend definitive tx with acosta while on methimazole
likely secondary to hyperthyroid  from non-compliance,  TSH 0.1,   Afib rate better controlled   on lopressor q8h, Xarelto  cardio eval appreciated
likely secondary to hyperthyroid  from non-compliance,  TSH 0.1,   Afib rate better controlled   on lopressor q8h, Xarelto  cardio eval appreciated
likely secondary to hyperthyroid  from non-compliance,  TSH 0.1, awaiting t3/t4   Afib with HR 130s  on lopressor q8h, Xarelto  cardio eval appreciated
likely secondary to hyperthyroid.  Continue with Digoxin, metoprolol, Cardizem,  and Xarelto.  Cardiology to follow up.
from non-compliance,   Afib with HR 130s  on lopressor q8h, xarelto  cardio eval appriecated
likely secondary to hyperthyroid.  Continue with Digoxin, metoprolol, and Xarelto.  Cardiology to follow up.

## 2017-04-18 NOTE — PROGRESS NOTE ADULT - SUBJECTIVE AND OBJECTIVE BOX
Patient reports that she feels better.  Offers no complaints.  Palpitation resolved.  Denies any headache, dizziness, chest pain, SOB, palpitation, abdm pain, N/V/D, numbness or weakness      Constitutional: No fever, fatigue or weight loss.  Skin: No rash.  Eyes: No recent vision problems or eye pain.  ENT: No congestion, ear pain, or sore throat.  Endocrine: No thyroid problems.  Cardiovascular: No chest pain or palpation.  Respiratory: No cough, shortness of breath, congestion, or wheezing.  Gastrointestinal: No abdominal pain, nausea, vomiting, or diarrhea.  Genitourinary: No dysuria.  Musculoskeletal: No joint swelling.  Neurologic: No headache.      T 98.3 P 94  BP 97/65  RR 18  SPO2 99 on RA      PHYSICAL EXAM-  GENERAL: NAD, well-groomed, well-developed  HEAD:  Atraumatic, Normocephalic  EYES: EOMI, PERRLA, conjunctiva and sclera clear  NECK: Supple, No JVD, Normal thyroid  NERVOUS SYSTEM:  Alert & Oriented X3, Motor Strength 5/5 B/L upper and lower extremities; DTRs 2+ intact and symmetric  CHEST/LUNG: Clear to percussion bilaterally; No rales, rhonchi, wheezing, or rubs  HEART: Irregular Rhythm  No murmur or gallop  ABDOMEN: Soft, Nontender, Nondistended; Bowel sounds present  EXTREMITIES:  2+ Peripheral Pulses, No clubbing, cyanosis, or edema  SKIN: No rashes or lesions                              8.0    7.6   )-----------( 245      ( 18 Apr 2017 07:01 )             28.9     04-18    141  |  107  |  12  ----------------------------<  86  4.2   |  26  |  0.60    Ca    8.7      18 Apr 2017 07:01  Phos  4.4     04-18  Mg     1.9     04-18              MEDICATIONS  (STANDING):  rivaroxaban 20milliGRAM(s) Oral daily  methimazole 10milliGRAM(s) Oral three times a day  nicotine -  14 mG/24Hr(s) Patch 1patch Transdermal daily  tiotropium 18 MICROgram(s) Capsule 1Capsule(s) Inhalation daily  sertraline 50milliGRAM(s) Oral daily  digoxin     Tablet 0.25milliGRAM(s) Oral daily  potassium iodide Solution 300milliGRAM(s) Oral three times a day  lactobacillus acidophilus 1Tablet(s) Oral daily  ferrous    sulfate 325milliGRAM(s) Oral daily  docusate sodium 100milliGRAM(s) Oral two times a day  pantoprazole    Tablet 40milliGRAM(s) Oral before breakfast  metoprolol 25milliGRAM(s) Oral every 8 hours  diltiazem    Tablet 30milliGRAM(s) Oral every 8 hours    MEDICATIONS  (PRN):  LORazepam     Tablet 1milliGRAM(s) Oral three times a day PRN Anxiety  levalbuterol Inhalation 1.25milliGRAM(s) Inhalation every 4 hours PRN shortness of breath or wheezing

## 2017-04-18 NOTE — PROGRESS NOTE ADULT - PROBLEM SELECTOR PLAN 2
Currently being managed by endocrine but likely contributing to difficulty in controlling atrial fibrillation.
Continue with methimazole, and iodine therapy as per Endocrinology.
Currently being managed by endocrine but likely contributing to difficulty in controlling atrial fibrillation.
stopped taking methimazole, pt could be in hyperthyroidism causing afib  TSH stat  resume methimazole  endo eval recommenidng iodiine q6h
stopped taking methimazole, pt could be in hyperthyroidism causing afib  resume methimazole  endo eval recommenidng iodiine q6h
stopped taking methimazole, pt could be in hyperthyroidism causing afib  resume methimazole  endo eval recommenidng iodiine q6h
Continue with methimazole, and iodine therapy as per Endocrinology.
stopped taking methimazole, pt could be in hyperthyroidism causing afib  TSH stat  resume methimazole  endo eval

## 2017-04-18 NOTE — PROGRESS NOTE ADULT - ATTENDING COMMENTS
Plan of care was discussed with patient in great details, All questions were answered to their satisfaction.  Seems to understand, and in agreement
Plan of care was discussed with patient in great details, All questions were answered to their satisfaction.  Seems to understand, and in agreement

## 2017-04-18 NOTE — PROGRESS NOTE ADULT - PROBLEM SELECTOR PROBLEM 3
Mitral valve disorder
Uncomplicated asthma, unspecified asthma severity
Mitral valve disorder
Uncomplicated asthma, unspecified asthma severity
Uncomplicated asthma, unspecified asthma severity

## 2017-04-18 NOTE — PROGRESS NOTE ADULT - SUBJECTIVE AND OBJECTIVE BOX
REQUESTING PHYSICIAN: Dr. Chairez    REASON FOR CONSULT: Atrial fibrillation    CHIEF COMPLAINT: Palpitations and dyspnea on exertion    HPI:  36 year old woman with a history or paroxysmal atrial fibrillation (normally in sinus rhythm, last recurrence of AF was ~ 6 months ago), MVP with MR, hyperthyroidism, mild asthma, and depression/anxiety who presented to the ER with complaints of palpitations x 2 days associated with dyspnea on exertion.  Symptoms are similar to previous episodes of AF; unable to identify exacerbating or alleviating factors.  She describes recent increase in psychosocial stressors and admits to not taking her medications x approximately 6 months.  Denies: angina; + lightheadedness and near-syncope.    17 - pt. denies chest pain. She describes palpitations which are most notable with ambulation. Mild lightheadedness with ambulation. WEN.     04/15/17 - No chest pain. Breathing more comfortably. Less palpitations. No lightheadedness. HR and BP improved.     17 - Feels improved. Still with palpitations when ambulating. Less WEN. No chest pain. Denies lightheadedness.     17:  Feels better without chest pain or SOB.  Ambulating well.   minimal palpitations    2017  Patient wants to go home , feeling fine , less palpitation , patient has afib with mild rvr episodes with activity , patient is very anxious to leave the hospital . low normal bp range       PAST MEDICAL & SURGICAL HISTORY:  Depression, unspecified depression type  History of anemia  Asthma  History of Hyperthyroidism  Atrial fibrillation  S/P  Section: ,,    Allergies  Motrin (Hives)    SOCIAL HISTORY  Alcohol: Denied  Smoking: Nonsmoker  Drug Use: Denied  Marital Status:     FAMILY HISTORY:  Family history of stomach cancer (Grandparent)  Family history of diabetes mellitus (Sibling, Grandparent)  No pertinent family history in first degree relatives                MEDICATIONS  (STANDING):  rivaroxaban 20milliGRAM(s) Oral daily  methimazole 10milliGRAM(s) Oral three times a day  nicotine -  14 mG/24Hr(s) Patch 1patch Transdermal daily  tiotropium 18 MICROgram(s) Capsule 1Capsule(s) Inhalation daily  sertraline 50milliGRAM(s) Oral daily  digoxin     Tablet 0.25milliGRAM(s) Oral daily  potassium iodide Solution 300milliGRAM(s) Oral three times a day  lactobacillus acidophilus 1Tablet(s) Oral daily  ferrous    sulfate 325milliGRAM(s) Oral daily  docusate sodium 100milliGRAM(s) Oral two times a day  pantoprazole    Tablet 40milliGRAM(s) Oral before breakfast  metoprolol 25milliGRAM(s) Oral every 8 hours  diltiazem    Tablet 30milliGRAM(s) Oral every 8 hours    MEDICATIONS  (PRN):  LORazepam     Tablet 1milliGRAM(s) Oral three times a day PRN Anxiety  levalbuterol Inhalation 1.25milliGRAM(s) Inhalation every 4 hours PRN shortness of breath or wheezing      Vital Signs Last 24 Hrs  T(C): 36.8, Max: 36.9 (-17 @ 11:55)  T(F): 98.3, Max: 98.4 (04-17 @ 11:55)  HR: 94 (69 - 112)  BP: 97/65 (88/59 - 109/70)  BP(mean): --  RR: 18 (11 - 18)  SpO2: 99% (98% - 100%)    I&O's Summary  I & Os for 24h ending 2017 07:00  =============================================  IN: 920 ml / OUT: 0 ml / NET: 920 ml    I & Os for current day (as of 2017 09:47)  =============================================  IN: 440 ml / OUT: 0 ml / NET: 440 ml      PHYSICAL EXAM:  Constitutional: NAD, awake and alert, obese.   HEENT: NC/AT.   No oral cyanosis.  Neck:  supple,  No JVD  Respiratory: BCTAP  Cardiovascular: Irregularly ,irregular rhythm.   S1 and S2.   Gastrointestinal: Bowel Sounds present, soft, nontender. Obese.   Extremities: No peripheral edema. No clubbing or cyanosis.  Neurological: A/O x 3  Musculoskeletal: no calf tenderness.  Skin: No rashes.      LABS:                       8.0    7.6   )-----------( 245      ( 2017 07:01 )             28.9     04-18    141  |  107  |  12  ----------------------------<  86  4.2   |  26  |  0.60    Ca    8.7      2017 07:01  Phos  4.4     04-18  Mg     1.9     -18              Echo     1. Technically difficult study.  2. Endocardium not well visualized.  Grossly normal left ventricular size   and overall systolic function. LVEF estimated at 55%.  3. The right ventricle appears mildly enlarged with normal function.  4. Biatrial enlargement.  5. Prolapse of the anterior mitral leaflet with moderate to severe mitral   regurgitation.  6. Mild to moderate pulmonic insufficiency.  7. Moderate tricuspid regurgitation.  8. Moderate pulmonary hypertension.

## 2017-04-18 NOTE — PROGRESS NOTE ADULT - PROBLEM SELECTOR PROBLEM 2
Hyperthyroidism

## 2017-04-18 NOTE — PROGRESS NOTE ADULT - PROBLEM SELECTOR PLAN 3
3+ on Echo (MVP with MR). Once thyroid issue under control will need re-evaluation of valve to see if intervention (Repair) is indicated at this time.
3+ on Echo (MVP with MR). Once thyroid issue under control will need re-evaluation of valve to see if intervention (Repair) is indicated at this time.
Stable.  Continue with Xopenex PRN.  Pulmonary consult as per cardiology's request
hold albuterol as pt in afib, will only start on atrovent prn
Stable.  Continue with Xopenex PRN.  Pulmonary consult as per cardiology's request
hold albuterol as pt in afib, will only start on atrovent prn

## 2017-04-19 RX ORDER — SERTRALINE 25 MG/1
1 TABLET, FILM COATED ORAL
Qty: 30 | Refills: 0
Start: 2017-04-19 | End: 2017-05-19

## 2017-04-20 ENCOUNTER — EMERGENCY (EMERGENCY)
Facility: HOSPITAL | Age: 37
LOS: 1 days | End: 2017-04-20
Attending: EMERGENCY MEDICINE | Admitting: EMERGENCY MEDICINE
Payer: MEDICAID

## 2017-04-20 VITALS
OXYGEN SATURATION: 96 % | HEART RATE: 79 BPM | WEIGHT: 229.94 LBS | SYSTOLIC BLOOD PRESSURE: 130 MMHG | DIASTOLIC BLOOD PRESSURE: 53 MMHG | HEIGHT: 66 IN | RESPIRATION RATE: 15 BRPM | TEMPERATURE: 98 F

## 2017-04-20 PROCEDURE — 99283 EMERGENCY DEPT VISIT LOW MDM: CPT

## 2017-04-20 PROCEDURE — 93010 ELECTROCARDIOGRAM REPORT: CPT

## 2017-04-20 PROCEDURE — 93005 ELECTROCARDIOGRAM TRACING: CPT

## 2017-04-20 PROCEDURE — 99284 EMERGENCY DEPT VISIT MOD MDM: CPT

## 2017-04-21 NOTE — ED ADULT NURSE REASSESSMENT NOTE - NS ED NURSE REASSESS COMMENT FT1
Pt states she feels fine now and wants to go home. Pt without any symptoms, breathing comfortably. Pt states she needs to have a BM and thinks she may have gas pain.

## 2017-04-21 NOTE — ED PROVIDER NOTE - MEDICAL DECISION MAKING DETAILS
Atypical pain in low risk patient. Further workup not indicated in ED, and patient does not want any testing. Patient is seeing her cardiologist tomorrow

## 2017-04-21 NOTE — ED ADULT NURSE NOTE - OBJECTIVE STATEMENT
Pt describes 4 brief episodes of cramping across the top of her chest, no dizziness, no diaphoresis, or palpitations. Pt thinks it was a gas pain as it went to her stomach and up to her shoulder.

## 2017-04-21 NOTE — ED PROVIDER NOTE - OBJECTIVE STATEMENT
Patient was doing crafts at her table, and felt a "cramp" across her chest that lasted a couple of seconds. Felt it a total of four times and became concerned. Feels fine now, and wants to go home, thinking it was likely "just a panic attack".     Patient was d/c'ed from here 2 days ago after an admission for a.fib

## 2017-12-01 NOTE — DISCHARGE NOTE ADULT - HOSPITAL COURSE
1 pair
Patient is 37 yo female with hx of Depression, anemia, Asthma, Hyperthyroidism, Atrial fibrillation, non-compliance with medications and S/P  Section X3 presenting with     1.  Atrial fibrillation with rapid ventricular response.  likely secondary to hyperthyroid.  Continue with Digoxin, metoprolol, Cardizem,  and Xarelto.  Outpatient follow up with Cardiology.       2.  Hyperthyroidism.  Continue with methimazole, and iodine therapy as per Endocrinology.  Outpatient follow up with endocrinology     3.  asthma, Stable.  Continue with Xopenex PRN.      4.  History of anemia.  s/p gastric banding 4 years ago.  Stool occult negative.  H/H stable.  Iron def anemia.  Continue with PPI, and Iron supplement.  Outpatient EGD as per GI  H/H stable.     Hospital course, and discharge planning was discussed with patient in great details.  All questions were answered.  Seems to understand and in agreement.

## 2017-12-28 ENCOUNTER — EMERGENCY (EMERGENCY)
Facility: HOSPITAL | Age: 37
LOS: 1 days | Discharge: HOME | End: 2017-12-28
Attending: EMERGENCY MEDICINE | Admitting: EMERGENCY MEDICINE
Payer: MEDICAID

## 2017-12-28 VITALS
DIASTOLIC BLOOD PRESSURE: 87 MMHG | WEIGHT: 250 LBS | OXYGEN SATURATION: 100 % | TEMPERATURE: 99 F | HEART RATE: 83 BPM | RESPIRATION RATE: 16 BRPM | SYSTOLIC BLOOD PRESSURE: 128 MMHG | HEIGHT: 65 IN

## 2017-12-28 LAB
ALBUMIN SERPL ELPH-MCNC: 3.3 G/DL — SIGNIFICANT CHANGE UP (ref 3.3–5)
ALP SERPL-CCNC: 99 U/L — SIGNIFICANT CHANGE UP (ref 30–120)
ALT FLD-CCNC: 36 U/L DA — SIGNIFICANT CHANGE UP (ref 10–60)
ANION GAP SERPL CALC-SCNC: 10 MMOL/L — SIGNIFICANT CHANGE UP (ref 5–17)
APPEARANCE UR: ABNORMAL
AST SERPL-CCNC: 61 U/L — HIGH (ref 10–40)
BASOPHILS # BLD AUTO: 0 K/UL — SIGNIFICANT CHANGE UP (ref 0–0.2)
BASOPHILS NFR BLD AUTO: 0.5 % — SIGNIFICANT CHANGE UP (ref 0–2)
BILIRUB SERPL-MCNC: 0.4 MG/DL — SIGNIFICANT CHANGE UP (ref 0.2–1.2)
BILIRUB UR-MCNC: NEGATIVE — SIGNIFICANT CHANGE UP
BUN SERPL-MCNC: 12 MG/DL — SIGNIFICANT CHANGE UP (ref 7–23)
CALCIUM SERPL-MCNC: 8.7 MG/DL — SIGNIFICANT CHANGE UP (ref 8.4–10.5)
CHLORIDE SERPL-SCNC: 104 MMOL/L — SIGNIFICANT CHANGE UP (ref 96–108)
CO2 SERPL-SCNC: 21 MMOL/L — LOW (ref 22–31)
COLOR SPEC: YELLOW — SIGNIFICANT CHANGE UP
CREAT SERPL-MCNC: 0.68 MG/DL — SIGNIFICANT CHANGE UP (ref 0.5–1.3)
DIFF PNL FLD: ABNORMAL
EOSINOPHIL # BLD AUTO: 0 K/UL — SIGNIFICANT CHANGE UP (ref 0–0.5)
EOSINOPHIL NFR BLD AUTO: 0 % — SIGNIFICANT CHANGE UP (ref 0–6)
GLUCOSE SERPL-MCNC: 108 MG/DL — HIGH (ref 70–99)
GLUCOSE UR QL: NEGATIVE MG/DL — SIGNIFICANT CHANGE UP
HCG UR QL: NEGATIVE — SIGNIFICANT CHANGE UP
HCT VFR BLD CALC: 32.2 % — LOW (ref 34.5–45)
HGB BLD-MCNC: 8.8 G/DL — LOW (ref 11.5–15.5)
HIV 1+2 AB+HIV1 P24 AG SERPL QL IA: SIGNIFICANT CHANGE UP
KETONES UR-MCNC: NEGATIVE — SIGNIFICANT CHANGE UP
LEUKOCYTE ESTERASE UR-ACNC: ABNORMAL
MCHC RBC-ENTMCNC: 16.8 PG — LOW (ref 27–34)
MCHC RBC-ENTMCNC: 27.4 GM/DL — LOW (ref 32–36)
MCV RBC AUTO: 61.2 FL — LOW (ref 80–100)
NEUTROPHILS # BLD AUTO: 2 K/UL — SIGNIFICANT CHANGE UP (ref 1.8–7.4)
NEUTROPHILS NFR BLD AUTO: 62 % — SIGNIFICANT CHANGE UP (ref 43–77)
NITRITE UR-MCNC: NEGATIVE — SIGNIFICANT CHANGE UP
PH UR: 5 — SIGNIFICANT CHANGE UP (ref 5–8)
PLATELET # BLD AUTO: 124 K/UL — LOW (ref 150–400)
POTASSIUM SERPL-MCNC: 4 MMOL/L — SIGNIFICANT CHANGE UP (ref 3.5–5.3)
POTASSIUM SERPL-SCNC: 4 MMOL/L — SIGNIFICANT CHANGE UP (ref 3.5–5.3)
PROT SERPL-MCNC: 7.6 G/DL — SIGNIFICANT CHANGE UP (ref 6–8.3)
PROT UR-MCNC: 30 MG/DL
RBC # BLD: 5.27 M/UL — HIGH (ref 3.8–5.2)
RBC # FLD: 18 % — HIGH (ref 10.3–14.5)
SODIUM SERPL-SCNC: 135 MMOL/L — SIGNIFICANT CHANGE UP (ref 135–145)
SP GR SPEC: 1.02 — SIGNIFICANT CHANGE UP (ref 1.01–1.02)
UROBILINOGEN FLD QL: 1 MG/DL
WBC # BLD: 3.2 K/UL — LOW (ref 3.8–10.5)
WBC # FLD AUTO: 3.2 K/UL — LOW (ref 3.8–10.5)

## 2017-12-28 PROCEDURE — 81025 URINE PREGNANCY TEST: CPT

## 2017-12-28 PROCEDURE — 85027 COMPLETE CBC AUTOMATED: CPT

## 2017-12-28 PROCEDURE — 99284 EMERGENCY DEPT VISIT MOD MDM: CPT | Mod: 25

## 2017-12-28 PROCEDURE — 74176 CT ABD & PELVIS W/O CONTRAST: CPT

## 2017-12-28 PROCEDURE — 80053 COMPREHEN METABOLIC PANEL: CPT

## 2017-12-28 PROCEDURE — 87389 HIV-1 AG W/HIV-1&-2 AB AG IA: CPT

## 2017-12-28 PROCEDURE — 99284 EMERGENCY DEPT VISIT MOD MDM: CPT

## 2017-12-28 PROCEDURE — 74176 CT ABD & PELVIS W/O CONTRAST: CPT | Mod: 26

## 2017-12-28 PROCEDURE — 87086 URINE CULTURE/COLONY COUNT: CPT

## 2017-12-28 PROCEDURE — 81001 URINALYSIS AUTO W/SCOPE: CPT

## 2017-12-28 RX ORDER — ACETAMINOPHEN 500 MG
650 TABLET ORAL ONCE
Qty: 0 | Refills: 0 | Status: COMPLETED | OUTPATIENT
Start: 2017-12-28 | End: 2017-12-28

## 2017-12-28 RX ORDER — SODIUM CHLORIDE 9 MG/ML
1000 INJECTION INTRAMUSCULAR; INTRAVENOUS; SUBCUTANEOUS ONCE
Qty: 0 | Refills: 0 | Status: COMPLETED | OUTPATIENT
Start: 2017-12-28 | End: 2017-12-28

## 2017-12-28 RX ORDER — NITROFURANTOIN MACROCRYSTAL 50 MG
1 CAPSULE ORAL
Qty: 20 | Refills: 0 | OUTPATIENT
Start: 2017-12-28 | End: 2018-01-06

## 2017-12-28 RX ORDER — METHOCARBAMOL 500 MG/1
1 TABLET, FILM COATED ORAL
Qty: 15 | Refills: 0 | OUTPATIENT
Start: 2017-12-28 | End: 2018-01-01

## 2017-12-28 RX ORDER — ACETAMINOPHEN 500 MG
2 TABLET ORAL
Qty: 40 | Refills: 0 | OUTPATIENT
Start: 2017-12-28 | End: 2018-01-01

## 2017-12-28 RX ORDER — METHOCARBAMOL 500 MG/1
1500 TABLET, FILM COATED ORAL ONCE
Qty: 0 | Refills: 0 | Status: COMPLETED | OUTPATIENT
Start: 2017-12-28 | End: 2017-12-28

## 2017-12-28 RX ADMIN — SODIUM CHLORIDE 1000 MILLILITER(S): 9 INJECTION INTRAMUSCULAR; INTRAVENOUS; SUBCUTANEOUS at 11:34

## 2017-12-28 RX ADMIN — METHOCARBAMOL 1500 MILLIGRAM(S): 500 TABLET, FILM COATED ORAL at 12:28

## 2017-12-28 RX ADMIN — Medication 650 MILLIGRAM(S): at 12:29

## 2017-12-28 NOTE — ED ADULT NURSE NOTE - OBJECTIVE STATEMENT
Assumed pt care @ 1110. Pt received sitting on stretcher in NAD. Pt AOx3 C/O 10/10 b/l flank pain x 1 month but worse the last few days radiating to the pelvic region. Pain is worse upon inspiration. Pt reports some nausea and diarrhea since this episode began. Pt has some dysuria. Pt requesting HIV tests. Lungs CTA, RR even unlabored. Abd soft non tender, + bowel sounds x 4quads. Denies hematuria. Skin warm, dry, color appropriate for age and race.

## 2017-12-28 NOTE — ED PROVIDER NOTE - ATTENDING CONTRIBUTION TO CARE
pt with hx hypothyroid, Afib on xarelto, c/o 1 month constant b/l flank pain sometimes radiates towards pelvis. + nausea, dysuria, occ diarrhea. No fevers, chills, ha, dizziness, vomiting, cp, sob, abd pain, frequency, hematuria, leg pain, weakness, numbness. Pt declining pain meds at this time.  wd, wn female, nad, s1s2 rrr, lungs cta b/l, abd soft, nontender, non distended, no rebound or guarding, mild right low back tenderness, ext nt, full rom, no motor or sensory deficit

## 2017-12-28 NOTE — ED PROVIDER NOTE - PROGRESS NOTE DETAILS
PA note; pt seen at bedside with attending. agree with HPI and PE. will follow up plan as per attending Pt stable and improving. labs and ct reviewed. results reviewed with pt including abnormal results. will treat back pain with Robaxin and tylenol and uti with Macrobid. pt advised to follow up with pmd and given a copy of lab results. All questions answered and concerns addressed. pt verbalized understanding and agreement with plan and dx. pt advised to follow up with PMD. pt advised to return to ed for worsening symptoms including fever, cp, sob. will dc.

## 2017-12-28 NOTE — ED PROVIDER NOTE - OBJECTIVE STATEMENT
Pt is a 36 y/o F, with PMHx of hypothyroidism and Afib (on xarelto), presenting to the ED with c/o BL flank pain, onset 1 month ago. Pt reports the pain has been constant over this time and has been progressively worsening. Pain radiates to her lower pelvic area. Associated dysuria, nausea, and diarrhea. Denies fever, hematuria, vomiting, LE pain, and saddle anesthesia. No hx of similar sxs. Pt has been taking tylenol without significant relief in pain. Pt is a 38 y/o F, with PMHx of hypothyroidism and Afib (on xarelto), presenting to the ED with c/o BL flank pain, onset 1 month ago. Pt reports the pain has been constant over this time and has been progressively worsening. Pain radiates to her lower pelvic area. Associated dysuria, nausea, and diarrhea. Denies fever, hematuria, vomiting, LE pain, and saddle anesthesia. No hx of similar sxs. Pt has been taking tylenol without significant relief in pain.  pmd - [facility]

## 2017-12-29 LAB
CULTURE RESULTS: NO GROWTH — SIGNIFICANT CHANGE UP
SPECIMEN SOURCE: SIGNIFICANT CHANGE UP

## 2018-03-05 NOTE — H&P ADULT - PROBLEM SELECTOR PLAN 3
- patient on seroquel and zoloft, need to find dosages of her outpatient medication, however patient states she is no longer depressed and is not expressing any SI  - for now, give ativan for anxiety No

## 2018-03-15 ENCOUNTER — INPATIENT (INPATIENT)
Facility: HOSPITAL | Age: 38
LOS: 0 days | Discharge: HOME | DRG: 812 | End: 2018-03-16
Attending: INTERNAL MEDICINE | Admitting: INTERNAL MEDICINE
Payer: MEDICAID

## 2018-03-15 VITALS
SYSTOLIC BLOOD PRESSURE: 123 MMHG | TEMPERATURE: 98 F | WEIGHT: 244.93 LBS | HEIGHT: 66 IN | DIASTOLIC BLOOD PRESSURE: 91 MMHG | HEART RATE: 84 BPM | RESPIRATION RATE: 16 BRPM | OXYGEN SATURATION: 94 %

## 2018-03-15 DIAGNOSIS — F32.9 MAJOR DEPRESSIVE DISORDER, SINGLE EPISODE, UNSPECIFIED: ICD-10-CM

## 2018-03-15 DIAGNOSIS — D64.9 ANEMIA, UNSPECIFIED: ICD-10-CM

## 2018-03-15 DIAGNOSIS — Z29.9 ENCOUNTER FOR PROPHYLACTIC MEASURES, UNSPECIFIED: ICD-10-CM

## 2018-03-15 DIAGNOSIS — J45.909 UNSPECIFIED ASTHMA, UNCOMPLICATED: ICD-10-CM

## 2018-03-15 DIAGNOSIS — I48.0 PAROXYSMAL ATRIAL FIBRILLATION: ICD-10-CM

## 2018-03-15 DIAGNOSIS — E05.90 THYROTOXICOSIS, UNSPECIFIED WITHOUT THYROTOXIC CRISIS OR STORM: ICD-10-CM

## 2018-03-15 LAB
ALBUMIN SERPL ELPH-MCNC: 3.3 G/DL — SIGNIFICANT CHANGE UP (ref 3.3–5)
ALP SERPL-CCNC: 76 U/L — SIGNIFICANT CHANGE UP (ref 30–120)
ALT FLD-CCNC: 19 U/L DA — SIGNIFICANT CHANGE UP (ref 10–60)
ANION GAP SERPL CALC-SCNC: 14 MMOL/L — SIGNIFICANT CHANGE UP (ref 5–17)
APTT BLD: 30.7 SEC — SIGNIFICANT CHANGE UP (ref 27.5–37.4)
AST SERPL-CCNC: 24 U/L — SIGNIFICANT CHANGE UP (ref 10–40)
BASOPHILS # BLD AUTO: 0.1 K/UL — SIGNIFICANT CHANGE UP (ref 0–0.2)
BASOPHILS NFR BLD AUTO: 1.4 % — SIGNIFICANT CHANGE UP (ref 0–2)
BILIRUB SERPL-MCNC: 0.2 MG/DL — SIGNIFICANT CHANGE UP (ref 0.2–1.2)
BUN SERPL-MCNC: 14 MG/DL — SIGNIFICANT CHANGE UP (ref 7–23)
CALCIUM SERPL-MCNC: 8.2 MG/DL — LOW (ref 8.4–10.5)
CHLORIDE SERPL-SCNC: 106 MMOL/L — SIGNIFICANT CHANGE UP (ref 96–108)
CK MB BLD-MCNC: 0.6 % — SIGNIFICANT CHANGE UP (ref 0–3.5)
CK MB CFR SERPL CALC: 0.4 NG/ML — SIGNIFICANT CHANGE UP (ref 0–3.6)
CK SERPL-CCNC: 67 U/L — SIGNIFICANT CHANGE UP (ref 26–192)
CO2 SERPL-SCNC: 21 MMOL/L — LOW (ref 22–31)
CREAT SERPL-MCNC: 0.55 MG/DL — SIGNIFICANT CHANGE UP (ref 0.5–1.3)
D DIMER BLD IA.RAPID-MCNC: <150 NG/ML DDU — SIGNIFICANT CHANGE UP
EOSINOPHIL # BLD AUTO: 0 K/UL — SIGNIFICANT CHANGE UP (ref 0–0.5)
EOSINOPHIL NFR BLD AUTO: 0 % — SIGNIFICANT CHANGE UP (ref 0–6)
FERRITIN SERPL-MCNC: 5 NG/ML — LOW (ref 15–150)
GLUCOSE SERPL-MCNC: 88 MG/DL — SIGNIFICANT CHANGE UP (ref 70–99)
HCT VFR BLD CALC: 24.2 % — LOW (ref 34.5–45)
HCT VFR BLD CALC: 29.3 % — LOW (ref 34.5–45)
HCT VFR BLD CALC: 29.3 % — LOW (ref 34.5–45)
HGB BLD-MCNC: 6.6 G/DL — CRITICAL LOW (ref 11.5–15.5)
HGB BLD-MCNC: 8.4 G/DL — LOW (ref 11.5–15.5)
HGB BLD-MCNC: 8.5 G/DL — LOW (ref 11.5–15.5)
INR BLD: 1.02 RATIO — SIGNIFICANT CHANGE UP (ref 0.88–1.16)
IRON SATN MFR SERPL: 13 UG/DL — LOW (ref 30–160)
IRON SATN MFR SERPL: 3 % — LOW (ref 14–50)
LYMPHOCYTES # BLD AUTO: 1.7 K/UL — SIGNIFICANT CHANGE UP (ref 1–3.3)
LYMPHOCYTES # BLD AUTO: 33.9 % — SIGNIFICANT CHANGE UP (ref 13–44)
MCHC RBC-ENTMCNC: 16 PG — LOW (ref 27–34)
MCHC RBC-ENTMCNC: 27.3 GM/DL — LOW (ref 32–36)
MCV RBC AUTO: 58.8 FL — LOW (ref 80–100)
MONOCYTES # BLD AUTO: 0.4 K/UL — SIGNIFICANT CHANGE UP (ref 0–0.9)
MONOCYTES NFR BLD AUTO: 8.3 % — SIGNIFICANT CHANGE UP (ref 2–14)
NEUTROPHILS # BLD AUTO: 2.9 K/UL — SIGNIFICANT CHANGE UP (ref 1.8–7.4)
NEUTROPHILS NFR BLD AUTO: 56.4 % — SIGNIFICANT CHANGE UP (ref 43–77)
NT-PROBNP SERPL-SCNC: 55 PG/ML — SIGNIFICANT CHANGE UP (ref 0–125)
OB PNL STL: NEGATIVE — SIGNIFICANT CHANGE UP
PLATELET # BLD AUTO: 205 K/UL — SIGNIFICANT CHANGE UP (ref 150–400)
POTASSIUM SERPL-MCNC: 3.4 MMOL/L — LOW (ref 3.5–5.3)
POTASSIUM SERPL-SCNC: 3.4 MMOL/L — LOW (ref 3.5–5.3)
PROT SERPL-MCNC: 7.1 G/DL — SIGNIFICANT CHANGE UP (ref 6–8.3)
PROTHROM AB SERPL-ACNC: 11.1 SEC — SIGNIFICANT CHANGE UP (ref 9.8–12.7)
RBC # BLD: 4.12 M/UL — SIGNIFICANT CHANGE UP (ref 3.8–5.2)
RBC # FLD: 20.8 % — HIGH (ref 10.3–14.5)
SODIUM SERPL-SCNC: 141 MMOL/L — SIGNIFICANT CHANGE UP (ref 135–145)
T3 SERPL-MCNC: 100 NG/DL — SIGNIFICANT CHANGE UP (ref 80–200)
T4 AB SER-ACNC: 5 UG/DL — SIGNIFICANT CHANGE UP (ref 4.6–12)
TIBC SERPL-MCNC: 425 UG/DL — SIGNIFICANT CHANGE UP (ref 220–430)
TROPONIN I SERPL-MCNC: 0 NG/ML — LOW (ref 0.02–0.06)
TSH SERPL-MCNC: 2.09 UIU/ML — SIGNIFICANT CHANGE UP (ref 0.27–4.2)
UIBC SERPL-MCNC: 412 UG/DL — HIGH (ref 110–370)
WBC # BLD: 5.1 K/UL — SIGNIFICANT CHANGE UP (ref 3.8–10.5)
WBC # FLD AUTO: 5.1 K/UL — SIGNIFICANT CHANGE UP (ref 3.8–10.5)

## 2018-03-15 PROCEDURE — 99285 EMERGENCY DEPT VISIT HI MDM: CPT

## 2018-03-15 PROCEDURE — 93010 ELECTROCARDIOGRAM REPORT: CPT

## 2018-03-15 RX ORDER — ACETAMINOPHEN 500 MG
650 TABLET ORAL EVERY 6 HOURS
Qty: 0 | Refills: 0 | Status: DISCONTINUED | OUTPATIENT
Start: 2018-03-15 | End: 2018-03-16

## 2018-03-15 RX ORDER — METOPROLOL TARTRATE 50 MG
25 TABLET ORAL EVERY 8 HOURS
Qty: 0 | Refills: 0 | Status: DISCONTINUED | OUTPATIENT
Start: 2018-03-15 | End: 2018-03-16

## 2018-03-15 RX ORDER — FERROUS SULFATE 325(65) MG
325 TABLET ORAL THREE TIMES A DAY
Qty: 0 | Refills: 0 | Status: DISCONTINUED | OUTPATIENT
Start: 2018-03-15 | End: 2018-03-16

## 2018-03-15 RX ORDER — ALBUTEROL 90 UG/1
2 AEROSOL, METERED ORAL EVERY 6 HOURS
Qty: 0 | Refills: 0 | Status: DISCONTINUED | OUTPATIENT
Start: 2018-03-15 | End: 2018-03-16

## 2018-03-15 RX ORDER — LACTOBACILLUS ACIDOPHILUS 100MM CELL
1 CAPSULE ORAL
Qty: 0 | Refills: 0 | Status: DISCONTINUED | OUTPATIENT
Start: 2018-03-15 | End: 2018-03-16

## 2018-03-15 RX ORDER — DIPHENHYDRAMINE HCL 50 MG
50 CAPSULE ORAL ONCE
Qty: 0 | Refills: 0 | Status: COMPLETED | OUTPATIENT
Start: 2018-03-15 | End: 2018-03-15

## 2018-03-15 RX ORDER — SODIUM CHLORIDE 9 MG/ML
3 INJECTION INTRAMUSCULAR; INTRAVENOUS; SUBCUTANEOUS ONCE
Qty: 0 | Refills: 0 | Status: COMPLETED | OUTPATIENT
Start: 2018-03-15 | End: 2018-03-15

## 2018-03-15 RX ORDER — IRON SUCROSE 20 MG/ML
100 INJECTION, SOLUTION INTRAVENOUS EVERY 24 HOURS
Qty: 0 | Refills: 0 | Status: DISCONTINUED | OUTPATIENT
Start: 2018-03-15 | End: 2018-03-15

## 2018-03-15 RX ORDER — ASPIRIN/CALCIUM CARB/MAGNESIUM 324 MG
325 TABLET ORAL ONCE
Qty: 0 | Refills: 0 | Status: DISCONTINUED | OUTPATIENT
Start: 2018-03-15 | End: 2018-03-15

## 2018-03-15 RX ORDER — IRON SUCROSE 20 MG/ML
100 INJECTION, SOLUTION INTRAVENOUS EVERY 24 HOURS
Qty: 0 | Refills: 0 | Status: COMPLETED | OUTPATIENT
Start: 2018-03-15 | End: 2018-03-16

## 2018-03-15 RX ORDER — SERTRALINE 25 MG/1
50 TABLET, FILM COATED ORAL DAILY
Qty: 0 | Refills: 0 | Status: DISCONTINUED | OUTPATIENT
Start: 2018-03-15 | End: 2018-03-16

## 2018-03-15 RX ORDER — PANTOPRAZOLE SODIUM 20 MG/1
40 TABLET, DELAYED RELEASE ORAL
Qty: 0 | Refills: 0 | Status: DISCONTINUED | OUTPATIENT
Start: 2018-03-15 | End: 2018-03-16

## 2018-03-15 RX ADMIN — Medication 0.5 MILLIGRAM(S): at 13:42

## 2018-03-15 RX ADMIN — SODIUM CHLORIDE 3 MILLILITER(S): 9 INJECTION INTRAMUSCULAR; INTRAVENOUS; SUBCUTANEOUS at 02:44

## 2018-03-15 RX ADMIN — Medication 0.5 MILLIGRAM(S): at 20:36

## 2018-03-15 RX ADMIN — Medication 1 TABLET(S): at 17:42

## 2018-03-15 RX ADMIN — IRON SUCROSE 100 MILLIGRAM(S): 20 INJECTION, SOLUTION INTRAVENOUS at 13:42

## 2018-03-15 RX ADMIN — Medication 25 MILLIGRAM(S): at 13:46

## 2018-03-15 RX ADMIN — Medication 0.5 MILLIGRAM(S): at 04:58

## 2018-03-15 RX ADMIN — Medication 1 TABLET(S): at 13:34

## 2018-03-15 RX ADMIN — Medication 25 MILLIGRAM(S): at 21:24

## 2018-03-15 RX ADMIN — Medication 325 MILLIGRAM(S): at 21:24

## 2018-03-15 RX ADMIN — SERTRALINE 50 MILLIGRAM(S): 25 TABLET, FILM COATED ORAL at 13:34

## 2018-03-15 RX ADMIN — Medication 50 MILLIGRAM(S): at 07:20

## 2018-03-15 RX ADMIN — Medication 325 MILLIGRAM(S): at 13:34

## 2018-03-15 RX ADMIN — PANTOPRAZOLE SODIUM 40 MILLIGRAM(S): 20 TABLET, DELAYED RELEASE ORAL at 13:34

## 2018-03-15 NOTE — H&P ADULT - PROBLEM SELECTOR PLAN 2
Now in NSR  Continue metoprolol, diltiazem, digoxin  Cardio consult Now in NSR  Continue metoprolol  Continue diltiazem 30mg q8h prn palpitations or HR > 100  Hold digoxin  Cardio consult

## 2018-03-15 NOTE — ED ADULT TRIAGE NOTE - CHIEF COMPLAINT QUOTE
'I am having chest pain for 2 days, my lips started tingling, have little bit of shortness of breath, it feels tight when I breathe'.

## 2018-03-15 NOTE — ED PROVIDER NOTE - NOTES
discussed case with Dr. Hernandez - states as pt not actively bleeding, can be seen in office after discharge - no acute hormonal therapy recommended - give patient office number [phone] - 4 different offices for f/u - Dr. Hernandez is in the [place] office, but pt can be seen by any of her partners as well if another location is more convenient

## 2018-03-15 NOTE — PATIENT PROFILE ADULT. - AS SC BRADEN MOISTURE
Dr. Min Banegas, would you please address this, Dom Chong Andrew's patient and she isn't in the office today. Thank you! What do I need to address ? (4) rarely moist

## 2018-03-15 NOTE — ED PROVIDER NOTE - OBJECTIVE STATEMENT
37 y.o. F c/o chest discomfort/pressure x 1.5 days 37 y.o. F c/o chest discomfort/pressure x 1.5 days, WEN x 1 day (stairs in house, never had this before), has h/o afib 37 y.o. F c/o chest discomfort/pressure x 1.5 days, WEN x 1 day (stairs in house, never had this before), has h/o afib, on xarelto, but ran out of xarelto about 10d ago, found a new cardiologist, but appt not until next week, [facility] urged to go to ED for further evaluation, pt went home to see if felt better, did not. Also notes increased lethargy and chills for a while. No fever, no cough, no URI symptoms. Usually can feel when she's in afib, hasn't felt it in a long time.

## 2018-03-15 NOTE — CONSULT NOTE ADULT - ASSESSMENT
Fe deficiency anemia secondary to menometrorrhagia with symptomatic pica. Improved symptoms with transfusion.    Was seen in cardiology evaluation with recommendations of holding xarelto and to have followup cardiac evaluation  Needs fe stores repleted    Recommendations:    1.  to start venofer 100mg IV today and tomorrow  2.  if otherwise stable to be evaluated for discharge for outpatient hematologic followup for venofer ( has not tolerated PO fe with constipation in the past)  3.  will require outpatient gyn followup  4.  futher heme recommendations pending above  5.  discussed with Dr. Carey, patient  and patient's mother
The patient is a 37 year old female with a history of paroxysmal AF, iron deficiency anemia, hyperthyroidism who presents with chest pain and shortness of breath likely due to symptomatic anemia.    Plan:  - Acute MI ruled out with one set of cardiac enzymes (given duration of symptoms)  - ECG with no evidence of ischemia or infarction  - Remain off of rivaroxaban for the next 1-2 weeks. The patient's [id number] score is 0; however use of anticoagulation in the setting of hyperthyroidism is controversial, with guidelines suggesting using [id number] score to determine need vs. older literature suggesting using anticoagulation due to increased hypercoagulability. She will follow-up with her cardiologist next week regarding further discussions about resuming anticoagulation.  - Continue metoprolol tartrate 25 mg bid  - Discontinue digoxin  - Continue diltiazem prn  - Transfuse and re-check hemoglobin

## 2018-03-15 NOTE — CONSULT NOTE ADULT - SUBJECTIVE AND OBJECTIVE BOX
History of Present Illness: The patient is a 37 year old female with a history of paroxysmal AF, iron deficiency anemia who presents with chest pain. The pain was left-sided, pressure, non-radiating, worse with exertion. There was also some dyspnea on exertion. No sustained palpitations or dizziness. She has been off of her rivaroxaban for the past 10-15 days. She just finished her menses, which are heavy at times.    Past Medical/Surgical History:  Paroxysmal AF, DESMOND    Medications:  Home Medications:  lactobacillus acidophilus oral capsule: 1 tab(s) orally 3 times a day (15 Mar 2018 01:10)  Xarelto 10 mg oral tablet: 1 tab(s) orally once a day (15 Mar 2018 01:10)      Family History: Non-contributory family history of premature cardiovascular atherosclerotic disease    Social History: No tobacco, alcohol or drug use    Review of Systems:  General: No fevers, chills, weight loss or gain  Skin: No rashes, color changes  Cardiovascular: No chest pain, orthopnea  Respiratory: No shortness of breath, cough  Gastrointestinal: No nausea, abdominal pain  Genitourinary: No incontinence, pain with urination  Musculoskeletal: No pain, swelling, decreased range of motion  Neurological: No headache, weakness  Psychiatric: No depression, anxiety  Endocrine: No weight loss or gain, increased thirst  All other systems are comprehensively negative.    Physical Exam:  Vitals:        Vital Signs Last 24 Hrs  T(C): 36.9 (15 Mar 2018 08:10), Max: 36.9 (15 Mar 2018 07:11)  T(F): 98.4 (15 Mar 2018 08:10), Max: 98.4 (15 Mar 2018 07:11)  HR: 72 (15 Mar 2018 08:10) (72 - 84)  BP: 124/65 (15 Mar 2018 08:10) (114/75 - 128/71)  BP(mean): --  RR: 14 (15 Mar 2018 08:10) (14 - 16)  SpO2: 100% (15 Mar 2018 08:10) (94% - 100%)  General: NAD  HEENT: MMM  Neck: No JVD, no carotid bruit  Lungs: CTAB  CV: RRR, nl S1/S2, no M/R/G  Abdomen: S/NT/ND, +BS  Extremities: No LE edema, no cyanosis  Neuro: AAOx3, non-focal  Skin: No rash    Labs:                        6.6    5.1   )-----------( 205      ( 15 Mar 2018 01:49 )             24.2     03-15    141  |  106  |  14  ----------------------------<  88  3.4<L>   |  21<L>  |  0.55    Ca    8.2<L>      15 Mar 2018 01:49    TPro  7.1  /  Alb  3.3  /  TBili  0.2  /  DBili  x   /  AST  24  /  ALT  19  /  AlkPhos  76  03-15    CARDIAC MARKERS ( 15 Mar 2018 01:49 )  .000 ng/mL / x     / 67 U/L / x     / 0.4 ng/mL      PT/INR - ( 15 Mar 2018 01:49 )   PT: 11.1 sec;   INR: 1.02 ratio         PTT - ( 15 Mar 2018 01:49 )  PTT:30.7 sec    ECG: History of Present Illness: The patient is a 37 year old female with a history of paroxysmal AF, iron deficiency anemia who presents with chest pain. The pain was left-sided, pressure, non-radiating, worse with exertion. There was also some dyspnea on exertion. No sustained palpitations or dizziness. She has been off of her rivaroxaban for the past 10-15 days. She just finished her menses, which are heavy at times.    Past Medical/Surgical History:  Paroxysmal AF, DESMOND    Medications:  Home Medications:  lactobacillus acidophilus oral capsule: 1 tab(s) orally 3 times a day (15 Mar 2018 01:10)  Xarelto 10 mg oral tablet: 1 tab(s) orally once a day (15 Mar 2018 01:10)      Family History: Non-contributory family history of premature cardiovascular atherosclerotic disease    Social History: No tobacco, alcohol or drug use    Review of Systems:  General: No fevers, chills, weight loss or gain  Skin: No rashes, color changes  Cardiovascular: (+) chest pain, no orthopnea  Respiratory: (+) shortness of breath, no cough  Gastrointestinal: No nausea, abdominal pain  Genitourinary: No incontinence, pain with urination  Musculoskeletal: No pain, swelling, decreased range of motion  Neurological: No headache, weakness  Psychiatric: No depression, anxiety  Endocrine: No weight loss or gain, increased thirst  All other systems are comprehensively negative.    Physical Exam:  Vitals:        Vital Signs Last 24 Hrs  T(C): 36.9 (15 Mar 2018 08:10), Max: 36.9 (15 Mar 2018 07:11)  T(F): 98.4 (15 Mar 2018 08:10), Max: 98.4 (15 Mar 2018 07:11)  HR: 72 (15 Mar 2018 08:10) (72 - 84)  BP: 124/65 (15 Mar 2018 08:10) (114/75 - 128/71)  BP(mean): --  RR: 14 (15 Mar 2018 08:10) (14 - 16)  SpO2: 100% (15 Mar 2018 08:10) (94% - 100%)  General: NAD  HEENT: MMM  Neck: No JVD, no carotid bruit  Lungs: CTAB  CV: RRR, nl S1/S2, no M/R/G  Abdomen: S/NT/ND, +BS  Extremities: No LE edema, no cyanosis  Neuro: AAOx3, non-focal  Skin: No rash    Labs:                        6.6    5.1   )-----------( 205      ( 15 Mar 2018 01:49 )             24.2     03-15    141  |  106  |  14  ----------------------------<  88  3.4<L>   |  21<L>  |  0.55    Ca    8.2<L>      15 Mar 2018 01:49    TPro  7.1  /  Alb  3.3  /  TBili  0.2  /  DBili  x   /  AST  24  /  ALT  19  /  AlkPhos  76  03-15    CARDIAC MARKERS ( 15 Mar 2018 01:49 )  .000 ng/mL / x     / 67 U/L / x     / 0.4 ng/mL      PT/INR - ( 15 Mar 2018 01:49 )   PT: 11.1 sec;   INR: 1.02 ratio         PTT - ( 15 Mar 2018 01:49 )  PTT:30.7 sec    ECG: NSR, normal axis, early repol

## 2018-03-15 NOTE — H&P ADULT - PROBLEM SELECTOR PLAN 1
Admit  Transfuse 2 units PRBC  Check post-transfusion PRBC  Iron studies  Check stool OB  Hold xarelto for now  Hematology consult  Further work-up/management pending clinical course.

## 2018-03-15 NOTE — ED PROVIDER NOTE - MEDICAL DECISION MAKING DETAILS
37 y.o. F with h/o Afib on xarelto, out of xarelto, c/o 2d CP and WEN - iv, labs, ekg, cxr, reassess

## 2018-03-15 NOTE — CONSULT NOTE ADULT - PROBLEM SELECTOR RECOMMENDATION 9
fe deficiency anemia with ferritin 5  replete iron stores with venofer   outpatient gyn evaluation of menometrorhagia and heme evaluation to continue venofer

## 2018-03-15 NOTE — ED PROVIDER NOTE - MUSCULOSKELETAL, MLM
Spine appears normal, range of motion is not limited, no muscle or joint tenderness normal tone, FROM

## 2018-03-15 NOTE — H&P ADULT - HISTORY OF PRESENT ILLNESS
37 y.o. F with PMH of AF, hyperthyroid, iron deficiency anemia (doesn't take iron b/c it constipates here), heavy menses (LMP 1 week ago), asthma and depression who came in c/o chest discomfort/pressure x 1.5 days, WEN x 1 day (stairs in house, never had this before), has h/o afib, on xarelto, but ran out of xarelto about 10d ago, found a new cardiologist, but appt not until next week, [facility], urged to go to ED for further evaluation, pt went home to see if felt better, did not. Also notes increased lethargy and chills for a while. No fever, no cough, no URI symptoms. Usually can feel when she's in afib, hasn't felt it in a long time.

## 2018-03-15 NOTE — CONSULT NOTE ADULT - SUBJECTIVE AND OBJECTIVE BOX
Patient is a 37y old  Female who presents with a chief complaint of CP/SOB/fatigue (15 Mar 2018 08:58)      HPI:  37 y.o. F with PMH of AF, hyperthyroid, iron deficiency anemia (doesn't take iron b/c it constipates here), heavy menses (LMP 1 week ago), asthma and depression who came in c/o chest discomfort/pressure x 1.5 days, WEN x 1 day (stairs in house, never had this before), has h/o afib, on xarelto, but ran out of xarelto about 10d ago, found a new cardiologist, but appt not until next week[facility] urged to go to ED for further evaluation, pt went home to see if felt better, did not. Also notes increased lethargy and chills for a while. No fever, no cough, no URI symptoms. Usually can feel when she's in afib, hasn't felt it in a long time. (15 Mar 2018 08:58)       ROS: + pica symptoms for ice.  xarelto held by cardiology on admission   Negative except for:    PAST MEDICAL & SURGICAL HISTORY:  Depression, unspecified depression type  History of anemia  Asthma  History of Hyperthyroidism  Atrial fibrillation  S/P  Section: ,,      SOCIAL HISTORY:    FAMILY HISTORY:  Family history of stomach cancer (Grandparent)  Family history of diabetes mellitus (Sibling, Grandparent)      MEDICATIONS  (STANDING):  ferrous    sulfate 325 milliGRAM(s) Oral three times a day  iron sucrose Injectable 100 milliGRAM(s) IV Push every 24 hours  lactobacillus acidophilus 1 Tablet(s) Oral three times a day with meals  methimazole 10 milliGRAM(s) Oral three times a day  metoprolol     tartrate 25 milliGRAM(s) Oral every 8 hours  pantoprazole    Tablet 40 milliGRAM(s) Oral before breakfast  sertraline 50 milliGRAM(s) Oral daily    MEDICATIONS  (PRN):  acetaminophen   Tablet. 650 milliGRAM(s) Oral every 6 hours PRN Mild Pain (1 - 3)  ALBUTerol    90 MICROgram(s) HFA Inhaler 2 Puff(s) Inhalation every 6 hours PRN Shortness of Breath and/or Wheezing  diltiazem    Tablet 30 milliGRAM(s) Oral every 8 hours PRN HR > 100 or palpitations  LORazepam     Tablet 0.5 milliGRAM(s) Oral three times a day PRN Anxiety      Allergies    Motrin (Hives)    Intolerances        Vital Signs Last 24 Hrs  T(C): 36.8 (15 Mar 2018 20:13), Max: 36.9 (15 Mar 2018 07:11)  T(F): 98.3 (15 Mar 2018 20:13), Max: 98.4 (15 Mar 2018 07:11)  HR: 69 (15 Mar 2018 20:13) (69 - 84)  BP: 108/72 (15 Mar 2018 20:13) (108/72 - 128/71)  BP(mean): --  RR: 15 (15 Mar 2018 20:13) (14 - 16)  SpO2: 100% (15 Mar 2018 20:13) (94% - 100%)    PHYSICAL EXAM  General: adult in NAD  HEENT: clear oropharynx, anicteric sclera, pink conjunctivae  Neck: supple  CV: normal S1S2 with no murmur rubs or gallops  Lungs: clear to auscultation, no wheezes, no rhales  Abdomen: soft non-tender non-distended, no hepato/splenomegaly  Ext: no clubbing cyanosis or edema  Skin: no rashes and no petichiae  Neuro: alert and oriented X3 no focal deficits      LABS:    CBC Full  -  ( 15 Mar 2018 17:50 )  WBC Count : x  Hemoglobin : 8.5 g/dL  Hematocrit : 29.3 %  Platelet Count - Automated : x  Mean Cell Volume : x  Mean Cell Hemoglobin : x  Mean Cell Hemoglobin Concentration : x  Auto Neutrophil # : x  Auto Lymphocyte # : x  Auto Monocyte # : x  Auto Eosinophil # : x  Auto Basophil # : x  Auto Neutrophil % : x  Auto Lymphocyte % : x  Auto Monocyte % : x  Auto Eosinophil % : x  Auto Basophil % : x    03-15    141  |  106  |  14  ----------------------------<  88  3.4<L>   |  21<L>  |  0.55    Ca    8.2<L>      15 Mar 2018 01:49    TPro  7.1  /  Alb  3.3  /  TBili  0.2  /  DBili  x   /  AST  24  /  ALT  19  /  AlkPhos  76  03-15    PT/INR - ( 15 Mar 2018 01:49 )   PT: 11.1 sec;   INR: 1.02 ratio         PTT - ( 15 Mar 2018 01:49 )  PTT:30.7 sec  Iron - Total Binding Capacity.: 425 ug/dL (03-15 @ 09:23)  Ferritin, Serum: 5 ng/mL (03-15 @ 09:23)          BLOOD SMEAR INTERPRETATION:  WBC:  normal number and diff  RBC:  microcytic, hypochromic with mild anis poik    RADIOLOGY & ADDITIONAL STUDIES:

## 2018-03-15 NOTE — ED PROVIDER NOTE - PROGRESS NOTE DETAILS
discussed symptomatic anemia - pt states has very heavy menses, last was 2 weeks ago, is known to have DESMOND, but states doctors never told her why, also has been on xarelto - supposed to take iron, but doesn't like to b/c constipates her, though does take colace as well for that

## 2018-03-16 ENCOUNTER — TRANSCRIPTION ENCOUNTER (OUTPATIENT)
Age: 38
End: 2018-03-16

## 2018-03-16 VITALS
TEMPERATURE: 98 F | HEART RATE: 70 BPM | RESPIRATION RATE: 18 BRPM | DIASTOLIC BLOOD PRESSURE: 83 MMHG | SYSTOLIC BLOOD PRESSURE: 130 MMHG | OXYGEN SATURATION: 100 %

## 2018-03-16 LAB
ANION GAP SERPL CALC-SCNC: 7 MMOL/L — SIGNIFICANT CHANGE UP (ref 5–17)
BUN SERPL-MCNC: 8 MG/DL — SIGNIFICANT CHANGE UP (ref 7–23)
CALCIUM SERPL-MCNC: 8.3 MG/DL — LOW (ref 8.4–10.5)
CHLORIDE SERPL-SCNC: 108 MMOL/L — SIGNIFICANT CHANGE UP (ref 96–108)
CO2 SERPL-SCNC: 25 MMOL/L — SIGNIFICANT CHANGE UP (ref 22–31)
CREAT SERPL-MCNC: 0.57 MG/DL — SIGNIFICANT CHANGE UP (ref 0.5–1.3)
FOLATE SERPL-MCNC: 6.9 NG/ML — SIGNIFICANT CHANGE UP (ref 4.8–24.2)
GLUCOSE SERPL-MCNC: 89 MG/DL — SIGNIFICANT CHANGE UP (ref 70–99)
HCT VFR BLD CALC: 31 % — LOW (ref 34.5–45)
HGB BLD-MCNC: 8.9 G/DL — LOW (ref 11.5–15.5)
MAGNESIUM SERPL-MCNC: 2.1 MG/DL — SIGNIFICANT CHANGE UP (ref 1.6–2.6)
MCHC RBC-ENTMCNC: 18.2 PG — LOW (ref 27–34)
MCHC RBC-ENTMCNC: 28.8 GM/DL — LOW (ref 32–36)
MCV RBC AUTO: 63.4 FL — LOW (ref 80–100)
PLATELET # BLD AUTO: 161 K/UL — SIGNIFICANT CHANGE UP (ref 150–400)
POTASSIUM SERPL-MCNC: 3.9 MMOL/L — SIGNIFICANT CHANGE UP (ref 3.5–5.3)
POTASSIUM SERPL-SCNC: 3.9 MMOL/L — SIGNIFICANT CHANGE UP (ref 3.5–5.3)
RBC # BLD: 4.89 M/UL — SIGNIFICANT CHANGE UP (ref 3.8–5.2)
RBC # FLD: 24.5 % — HIGH (ref 10.3–14.5)
SODIUM SERPL-SCNC: 140 MMOL/L — SIGNIFICANT CHANGE UP (ref 135–145)
VIT B12 SERPL-MCNC: 963 PG/ML — SIGNIFICANT CHANGE UP (ref 232–1245)
WBC # BLD: 4.6 K/UL — SIGNIFICANT CHANGE UP (ref 3.8–10.5)
WBC # FLD AUTO: 4.6 K/UL — SIGNIFICANT CHANGE UP (ref 3.8–10.5)

## 2018-03-16 RX ORDER — PREGABALIN 225 MG/1
1 CAPSULE ORAL
Qty: 0 | Refills: 0 | DISCHARGE
Start: 2018-03-16

## 2018-03-16 RX ORDER — RIVAROXABAN 15 MG-20MG
1 KIT ORAL
Qty: 0 | Refills: 0 | COMMUNITY

## 2018-03-16 RX ORDER — PREGABALIN 225 MG/1
1000 CAPSULE ORAL DAILY
Qty: 0 | Refills: 0 | Status: DISCONTINUED | OUTPATIENT
Start: 2018-03-16 | End: 2018-03-16

## 2018-03-16 RX ORDER — FOLIC ACID 0.8 MG
1 TABLET ORAL DAILY
Qty: 0 | Refills: 0 | Status: DISCONTINUED | OUTPATIENT
Start: 2018-03-16 | End: 2018-03-16

## 2018-03-16 RX ORDER — FOLIC ACID 0.8 MG
1 TABLET ORAL
Qty: 0 | Refills: 0 | DISCHARGE
Start: 2018-03-16

## 2018-03-16 RX ORDER — DILTIAZEM HCL 120 MG
1 CAPSULE, EXT RELEASE 24 HR ORAL
Qty: 0 | Refills: 0 | DISCHARGE
Start: 2018-03-16

## 2018-03-16 RX ADMIN — PANTOPRAZOLE SODIUM 40 MILLIGRAM(S): 20 TABLET, DELAYED RELEASE ORAL at 06:24

## 2018-03-16 RX ADMIN — Medication 325 MILLIGRAM(S): at 06:24

## 2018-03-16 RX ADMIN — Medication 1 MILLIGRAM(S): at 11:07

## 2018-03-16 RX ADMIN — IRON SUCROSE 100 MILLIGRAM(S): 20 INJECTION, SOLUTION INTRAVENOUS at 11:07

## 2018-03-16 RX ADMIN — PREGABALIN 1000 MICROGRAM(S): 225 CAPSULE ORAL at 11:07

## 2018-03-16 RX ADMIN — Medication 1 TABLET(S): at 10:23

## 2018-03-16 NOTE — PROGRESS NOTE ADULT - SUBJECTIVE AND OBJECTIVE BOX
Chief Complaint: Chest pain, anemia    Interval Events: Overall improved. No complaints.    Review of Systems:  General: No fevers, chills, weight loss or gain  Skin: No rashes, color changes  Cardiovascular: No chest pain, orthopnea  Respiratory: No shortness of breath, cough  Gastrointestinal: No nausea, abdominal pain  Genitourinary: No incontinence, pain with urination  Musculoskeletal: No pain, swelling, decreased range of motion  Neurological: No headache, weakness  Psychiatric: No depression, anxiety  Endocrine: No weight loss or gain, increased thirst  All other systems are comprehensively negative.    Physical Exam:  Vitals:        Vital Signs Last 24 Hrs  T(C): 36.8 (16 Mar 2018 05:07), Max: 36.9 (16 Mar 2018 00:07)  T(F): 98.2 (16 Mar 2018 05:07), Max: 98.5 (16 Mar 2018 00:07)  HR: 68 (16 Mar 2018 05:07) (67 - 72)  BP: 102/69 (16 Mar 2018 05:07) (102/64 - 127/74)  BP(mean): --  RR: 16 (16 Mar 2018 05:07) (14 - 16)  SpO2: 100% (16 Mar 2018 05:07) (95% - 100%)  General: NAD  HEENT: MMM  Neck: No JVD, no carotid bruit  Lungs: CTAB  CV: RRR, nl S1/S2, no M/R/G  Abdomen: S/NT/ND, +BS  Extremities: No LE edema, no cyanosis  Neuro: AAOx3, non-focal  Skin: No rash    Labs:                        8.9    4.6   )-----------( 161      ( 16 Mar 2018 08:05 )             31.0     03-16    140  |  108  |  8   ----------------------------<  89  3.9   |  25  |  0.57    Ca    8.3<L>      16 Mar 2018 08:05  Mg     2.1     03-16    TPro  7.1  /  Alb  3.3  /  TBili  0.2  /  DBili  x   /  AST  24  /  ALT  19  /  AlkPhos  76  03-15    CARDIAC MARKERS ( 15 Mar 2018 01:49 )  .000 ng/mL / x     / 67 U/L / x     / 0.4 ng/mL      PT/INR - ( 15 Mar 2018 01:49 )   PT: 11.1 sec;   INR: 1.02 ratio         PTT - ( 15 Mar 2018 01:49 )  PTT:30.7 sec

## 2018-03-16 NOTE — DISCHARGE NOTE ADULT - MEDICATION SUMMARY - MEDICATIONS TO TAKE
I will START or STAY ON the medications listed below when I get home from the hospital:    Tylenol 325 mg oral capsule  -- 2 cap(s) by mouth every 6 hours, As Needed for pain  -- Indication: For Pain    dilTIAZem 30 mg oral tablet  -- 1 tab(s) by mouth every 8 hours, As needed, HR > 100 or palpitations  -- Indication: For Paroxysmal atrial fibrillation    LORazepam 0.5 mg oral tablet  -- 1 tab(s) by mouth 3 times a day, As Needed MDD:3 - for anxiety  -- Caution federal law prohibits the transfer of this drug to any person other  than the person for whom it was prescribed.  Do not take this drug if you are pregnant.  May cause drowsiness.  Alcohol may intensify this effect.  Use care when operating dangerous machinery.    -- Indication: For Anxiety    sertraline 50 mg oral tablet  -- 1 tab(s) by mouth once a day  -- Indication: For Depression, unspecified depression type    methIMAzole 10 mg oral tablet  -- 1 tab(s) by mouth 3 times a day  -- Indication: For Hyperthyroidism    metoprolol tartrate 25 mg oral tablet  -- 1 tab(s) by mouth every 8 hours  -- Indication: For Paroxysmal atrial fibrillation    levalbuterol 1.25 mg/3 mL inhalation solution  -- 3 milliliter(s) inhaled every 4 hours, As needed, shortness of breath or wheezing -for bronchospasm  -- Indication: For Asthma, unspecified asthma severity, unspecified whether complicated, unspecified whether persistent    lactobacillus acidophilus oral capsule  -- 1 tab(s) by mouth 3 times a day  -- Indication: For Prophylactic measure    pantoprazole 40 mg oral delayed release tablet  -- 1 tab(s) by mouth once a day (before a meal)  -- Indication: For reflux    folic acid 1 mg oral tablet  -- 1 tab(s) by mouth once a day  -- Indication: For Anemia    cyanocobalamin 1000 mcg oral tablet  -- 1 tab(s) by mouth once a day  -- Indication: For Anemia

## 2018-03-16 NOTE — DISCHARGE NOTE ADULT - CARE PROVIDER_API CALL
Cohn Smart), Hematology; Internal Medicine; Medical Oncology  [facility]  [address] [place] [address]  Phone: [phone]  Fax: [phone]

## 2018-03-16 NOTE — PROGRESS NOTE ADULT - ASSESSMENT
The patient is a 37 year old female with a history of paroxysmal AF, iron deficiency anemia, hyperthyroidism who presents with chest pain and shortness of breath likely due to symptomatic anemia.    Plan:  - Acute MI ruled out with one set of cardiac enzymes (given duration of symptoms)  - ECG with no evidence of ischemia or infarction  - Remain off of rivaroxaban for the next 1-2 weeks. The patient's [id number] score is 0; however use of anticoagulation in the setting of hyperthyroidism is controversial, with guidelines suggesting using [id number] score to determine need vs. older literature suggesting using anticoagulation due to increased hypercoagulability. She will follow-up with her cardiologist next week regarding further discussions about resuming anticoagulation.  - Continue metoprolol tartrate 25 mg bid  - Discontinue digoxin  - Continue diltiazem prn  - Hemoglobin improved  - Hematology and cardiology follow-up. Discharge planning.

## 2018-03-16 NOTE — PROGRESS NOTE ADULT - SUBJECTIVE AND OBJECTIVE BOX
INTERVAL HX:   Feels better. No CP, no SOB.   Anxious to leave hospital.   States will followup as recommended    Chart reviewed and events noted;     MEDICATIONS  (STANDING):  cyanocobalamin 1000 MICROGram(s) Oral daily  ferrous    sulfate 325 milliGRAM(s) Oral three times a day  folic acid 1 milliGRAM(s) Oral daily  iron sucrose Injectable 100 milliGRAM(s) IV Push every 24 hours  lactobacillus acidophilus 1 Tablet(s) Oral three times a day with meals  methimazole 10 milliGRAM(s) Oral three times a day  metoprolol     tartrate 25 milliGRAM(s) Oral every 8 hours  pantoprazole    Tablet 40 milliGRAM(s) Oral before breakfast  sertraline 50 milliGRAM(s) Oral daily    MEDICATIONS  (PRN):  acetaminophen   Tablet. 650 milliGRAM(s) Oral every 6 hours PRN Mild Pain (1 - 3)  ALBUTerol    90 MICROgram(s) HFA Inhaler 2 Puff(s) Inhalation every 6 hours PRN Shortness of Breath and/or Wheezing  diltiazem    Tablet 30 milliGRAM(s) Oral every 8 hours PRN HR > 100 or palpitations  LORazepam     Tablet 0.5 milliGRAM(s) Oral three times a day PRN Anxiety      Vital Signs Last 24 Hrs  T(C): 36.8 (16 Mar 2018 05:07), Max: 36.9 (16 Mar 2018 00:07)  T(F): 98.2 (16 Mar 2018 05:07), Max: 98.5 (16 Mar 2018 00:07)  HR: 68 (16 Mar 2018 05:07) (67 - 72)  BP: 102/69 (16 Mar 2018 05:07) (102/64 - 127/74)  BP(mean): --  RR: 16 (16 Mar 2018 05:07) (14 - 16)  SpO2: 100% (16 Mar 2018 05:07) (95% - 100%)    PHYSICAL EXAM  General: WN WD adult in NAD  HEENT: clear oropharynx, anicteric sclera, pink conjunctivae  Neck: supple  CV: normal S1S2, RRR, no murmur, no rubs, no gallops  Lungs: clear to auscultation BL, no wheezes, no rales, no rhonchi  Abdomen: soft, non-tender, non-distended, no hepatosplenomegaly  Ext: no clubbing, no cyanosis, no edema  Skin: no rashes, no petechiae  Neuro: alert and oriented X3, no focal deficits      LABS:                        8.5    x     )-----------( x        ( 15 Mar 2018 17:50 )             29.3     03-16    140  |  108  |  8   ----------------------------<  89  3.9   |  25  |  0.57    Ca    8.3<L>      16 Mar 2018 08:05  Mg     2.1     03-16    TPro  7.1  /  Alb  3.3  /  TBili  0.2  /  DBili  x   /  AST  24  /  ALT  19  /  AlkPhos  76  03-15    PT/INR - ( 15 Mar 2018 01:49 )   PT: 11.1 sec;   INR: 1.02 ratio         PTT - ( 15 Mar 2018 01:49 )  PTT:30.7 sec      RADIOLOGY & ADDITIONAL STUDIES:

## 2018-03-16 NOTE — PROGRESS NOTE ADULT - ASSESSMENT
Fe deficiency anemia secondary to menometrorrhagia with symptomatic pica. Improved symptoms with transfusion.    Was seen by cardiology with recommendations of holding xarelto and to have followup cardiac evaluation  Needs fe stores repleted  Started IV venofer, tolerated OK    Recommendations:    1.  to continue Venofer 100mg IV today and tomorrow. Check B12, Folate. Start Empiric PO Folic acid and B12, will followup levels outpt.   2.  if otherwise stable to be evaluated for discharge for outpatient hematologic followup for venofer ( has not tolerated PO fe with constipation in the past). Diet counseling performed.   3.  will require outpatient gyn followup  4.  further heme recommendations pending above

## 2018-03-16 NOTE — DISCHARGE NOTE ADULT - PLAN OF CARE
. Follow-up with hematologist within 1 week for iv iron.  Also f/u with gyn regarding heavy menses. Continue current medications as instructed above.  Follow-up with your cardiologist within 1 week. Continue current medications  Follow-up with your primary care doctor within 1 week.

## 2018-03-16 NOTE — DISCHARGE NOTE ADULT - PATIENT PORTAL LINK FT
You can access the [email or web address], offered by [facility], by registering with the following website: [email or web address]

## 2018-03-16 NOTE — DISCHARGE NOTE ADULT - CARE PLAN
Principal Discharge DX:	Symptomatic anemia  Goal:	.  Assessment and plan of treatment:	Follow-up with hematologist within 1 week for iv iron.  Also f/u with gyn regarding heavy menses.  Secondary Diagnosis:	Paroxysmal atrial fibrillation  Assessment and plan of treatment:	Continue current medications as instructed above.  Follow-up with your cardiologist within 1 week.  Secondary Diagnosis:	Hyperthyroidism  Assessment and plan of treatment:	Continue current medications  Follow-up with your primary care doctor within 1 week.

## 2018-03-16 NOTE — DISCHARGE NOTE ADULT - HOSPITAL COURSE
37 y.o. F with PMH of AF, hyperthyroid, iron deficiency anemia (doesn't take iron b/c it constipates here), heavy menses (LMP 1 week ago), asthma and depression who came in c/o chest discomfort/pressure x 1.5 days, WEN x 1 day (stairs in house, never had this before), has h/o afib, on xarelto, but ran out of xarelto about 10d ago, found a new cardiologist, but appt not until next week, seen in [facility] earlier yesterday, urged to go to ED for further evaluation, pt went home to see if felt better, did not. Also notes increased lethargy and chills for a while. No fever, no cough, no URI symptoms. Usually can feel when she's in afib, hasn't felt it in a long time.    Patient came with hg 6.6, OB negative. Transfused 2 unit prbc and venofer 100mg ivp x 2.  Profoundly iron deficient.  Seen by heme -- o/p f/u and iv venofer.  Seen by cardio -- no need for xarelto and asa at this time. Patient in NSR. Will f/u with her cardiologist next week for further management.  Needs to see gyn.    >30 minutes spent on discharge

## 2018-03-16 NOTE — DISCHARGE NOTE ADULT - MEDICATION SUMMARY - MEDICATIONS TO STOP TAKING
I will STOP taking the medications listed below when I get home from the hospital:    digoxin 250 mcg (0.25 mg) oral tablet  -- 1 tab(s) by mouth once a day    Xarelto 10 mg oral tablet  -- 1 tab(s) by mouth once a day

## 2018-03-21 LAB
HOMOCYSTEINE LEVEL: 11 UMOL/L — HIGH
METHYLMALONIC ACID LEVEL: 56 NMOL/L — LOW (ref 87–318)

## 2018-05-23 PROCEDURE — 83735 ASSAY OF MAGNESIUM: CPT

## 2018-05-23 PROCEDURE — 84484 ASSAY OF TROPONIN QUANT: CPT

## 2018-05-23 PROCEDURE — 82553 CREATINE MB FRACTION: CPT

## 2018-05-23 PROCEDURE — 85014 HEMATOCRIT: CPT

## 2018-05-23 PROCEDURE — 86923 COMPATIBILITY TEST ELECTRIC: CPT

## 2018-05-23 PROCEDURE — 99285 EMERGENCY DEPT VISIT HI MDM: CPT | Mod: 25

## 2018-05-23 PROCEDURE — 36430 TRANSFUSION BLD/BLD COMPNT: CPT

## 2018-05-23 PROCEDURE — P9016: CPT

## 2018-05-23 PROCEDURE — 85610 PROTHROMBIN TIME: CPT

## 2018-05-23 PROCEDURE — 83880 ASSAY OF NATRIURETIC PEPTIDE: CPT

## 2018-05-23 PROCEDURE — 82550 ASSAY OF CK (CPK): CPT

## 2018-05-23 PROCEDURE — 83090 ASSAY OF HOMOCYSTEINE: CPT

## 2018-05-23 PROCEDURE — 80053 COMPREHEN METABOLIC PANEL: CPT

## 2018-05-23 PROCEDURE — 86850 RBC ANTIBODY SCREEN: CPT

## 2018-05-23 PROCEDURE — 93005 ELECTROCARDIOGRAM TRACING: CPT

## 2018-05-23 PROCEDURE — 86900 BLOOD TYPING SEROLOGIC ABO: CPT

## 2018-05-23 PROCEDURE — 84443 ASSAY THYROID STIM HORMONE: CPT

## 2018-05-23 PROCEDURE — 82272 OCCULT BLD FECES 1-3 TESTS: CPT

## 2018-05-23 PROCEDURE — 85379 FIBRIN DEGRADATION QUANT: CPT

## 2018-05-23 PROCEDURE — 82746 ASSAY OF FOLIC ACID SERUM: CPT

## 2018-05-23 PROCEDURE — 80048 BASIC METABOLIC PNL TOTAL CA: CPT

## 2018-05-23 PROCEDURE — 82728 ASSAY OF FERRITIN: CPT

## 2018-05-23 PROCEDURE — 84436 ASSAY OF TOTAL THYROXINE: CPT

## 2018-05-23 PROCEDURE — 83550 IRON BINDING TEST: CPT

## 2018-05-23 PROCEDURE — 84480 ASSAY TRIIODOTHYRONINE (T3): CPT

## 2018-05-23 PROCEDURE — 85018 HEMOGLOBIN: CPT

## 2018-05-23 PROCEDURE — 85027 COMPLETE CBC AUTOMATED: CPT

## 2018-05-23 PROCEDURE — 85730 THROMBOPLASTIN TIME PARTIAL: CPT

## 2018-05-23 PROCEDURE — 82607 VITAMIN B-12: CPT

## 2018-05-23 PROCEDURE — 86901 BLOOD TYPING SEROLOGIC RH(D): CPT

## 2018-05-23 PROCEDURE — 83921 ORGANIC ACID SINGLE QUANT: CPT

## 2018-06-20 ENCOUNTER — OUTPATIENT (OUTPATIENT)
Dept: OUTPATIENT SERVICES | Facility: HOSPITAL | Age: 38
LOS: 1 days | Discharge: HOME | End: 2018-06-20

## 2018-06-20 DIAGNOSIS — D50.9 IRON DEFICIENCY ANEMIA, UNSPECIFIED: ICD-10-CM

## 2018-06-20 PROBLEM — D64.9 ANEMIA: Status: ACTIVE | Noted: 2018-06-20

## 2018-06-21 ENCOUNTER — RESULT REVIEW (OUTPATIENT)
Age: 38
End: 2018-06-21

## 2018-06-21 ENCOUNTER — APPOINTMENT (OUTPATIENT)
Dept: HEMATOLOGY ONCOLOGY | Facility: CLINIC | Age: 38
End: 2018-06-21

## 2018-06-21 VITALS
BODY MASS INDEX: 41.25 KG/M2 | OXYGEN SATURATION: 100 % | HEART RATE: 92 BPM | SYSTOLIC BLOOD PRESSURE: 133 MMHG | TEMPERATURE: 97.7 F | DIASTOLIC BLOOD PRESSURE: 83 MMHG | RESPIRATION RATE: 16 BRPM | HEIGHT: 66.46 IN | WEIGHT: 259.7 LBS

## 2018-06-21 DIAGNOSIS — Z80.0 FAMILY HISTORY OF MALIGNANT NEOPLASM OF DIGESTIVE ORGANS: ICD-10-CM

## 2018-06-21 DIAGNOSIS — Z78.9 OTHER SPECIFIED HEALTH STATUS: ICD-10-CM

## 2018-06-21 DIAGNOSIS — F17.200 NICOTINE DEPENDENCE, UNSPECIFIED, UNCOMPLICATED: ICD-10-CM

## 2018-06-21 DIAGNOSIS — I48.91 UNSPECIFIED ATRIAL FIBRILLATION: ICD-10-CM

## 2018-06-21 DIAGNOSIS — D64.9 ANEMIA, UNSPECIFIED: ICD-10-CM

## 2018-06-21 DIAGNOSIS — Z82.49 FAMILY HISTORY OF ISCHEMIC HEART DISEASE AND OTHER DISEASES OF THE CIRCULATORY SYSTEM: ICD-10-CM

## 2018-06-21 DIAGNOSIS — F32.9 MAJOR DEPRESSIVE DISORDER, SINGLE EPISODE, UNSPECIFIED: ICD-10-CM

## 2018-06-21 DIAGNOSIS — D50.9 IRON DEFICIENCY ANEMIA, UNSPECIFIED: ICD-10-CM

## 2018-06-21 LAB
ALBUMIN SERPL ELPH-MCNC: 3.9 G/DL — SIGNIFICANT CHANGE UP (ref 3.3–5)
ALP SERPL-CCNC: 85 U/L — SIGNIFICANT CHANGE UP (ref 40–120)
ALT FLD-CCNC: 17 U/L — SIGNIFICANT CHANGE UP (ref 10–45)
ANION GAP SERPL CALC-SCNC: 14 MMOL/L — SIGNIFICANT CHANGE UP (ref 5–17)
AST SERPL-CCNC: 21 U/L — SIGNIFICANT CHANGE UP (ref 10–40)
BILIRUB SERPL-MCNC: 0.5 MG/DL — SIGNIFICANT CHANGE UP (ref 0.2–1.2)
BUN SERPL-MCNC: 11 MG/DL — SIGNIFICANT CHANGE UP (ref 7–23)
CALCIUM SERPL-MCNC: 9 MG/DL — SIGNIFICANT CHANGE UP (ref 8.4–10.5)
CHLORIDE SERPL-SCNC: 107 MMOL/L — SIGNIFICANT CHANGE UP (ref 96–108)
CO2 SERPL-SCNC: 24 MMOL/L — SIGNIFICANT CHANGE UP (ref 22–31)
CREAT SERPL-MCNC: 0.62 MG/DL — SIGNIFICANT CHANGE UP (ref 0.5–1.3)
FERRITIN SERPL-MCNC: 11 NG/ML — LOW (ref 15–150)
GLUCOSE SERPL-MCNC: 88 MG/DL — SIGNIFICANT CHANGE UP (ref 70–99)
HCT VFR BLD CALC: 34.2 % — LOW (ref 34.5–45)
HGB BLD-MCNC: 11.4 G/DL — LOW (ref 11.5–15.5)
IRON SATN MFR SERPL: 32 UG/DL — SIGNIFICANT CHANGE UP (ref 30–160)
IRON SATN MFR SERPL: 9 % — LOW (ref 14–50)
MCHC RBC-ENTMCNC: 24.3 PG — LOW (ref 27–34)
MCHC RBC-ENTMCNC: 33.3 G/DL — SIGNIFICANT CHANGE UP (ref 32–36)
MCV RBC AUTO: 73.1 FL — LOW (ref 80–100)
PLATELET # BLD AUTO: 219 K/UL — SIGNIFICANT CHANGE UP (ref 150–400)
POTASSIUM SERPL-MCNC: 4.1 MMOL/L — SIGNIFICANT CHANGE UP (ref 3.5–5.3)
POTASSIUM SERPL-SCNC: 4.1 MMOL/L — SIGNIFICANT CHANGE UP (ref 3.5–5.3)
PROT SERPL-MCNC: 6.7 G/DL — SIGNIFICANT CHANGE UP (ref 6–8.3)
RBC # BLD: 4.68 M/UL — SIGNIFICANT CHANGE UP (ref 3.8–5.2)
RBC # FLD: 16.9 % — HIGH (ref 10.3–14.5)
RETICS #: 71.2 K/UL — SIGNIFICANT CHANGE UP (ref 25–125)
RETICS/RBC NFR: 1.5 % — SIGNIFICANT CHANGE UP (ref 0.5–2.5)
SODIUM SERPL-SCNC: 145 MMOL/L — SIGNIFICANT CHANGE UP (ref 135–145)
TIBC SERPL-MCNC: 371 UG/DL — SIGNIFICANT CHANGE UP (ref 220–430)
UIBC SERPL-MCNC: 339 UG/DL — SIGNIFICANT CHANGE UP (ref 110–370)
WBC # BLD: 5.3 K/UL — SIGNIFICANT CHANGE UP (ref 3.8–10.5)
WBC # FLD AUTO: 5.3 K/UL — SIGNIFICANT CHANGE UP (ref 3.8–10.5)

## 2018-07-13 ENCOUNTER — APPOINTMENT (OUTPATIENT)
Dept: GASTROENTEROLOGY | Facility: CLINIC | Age: 38
End: 2018-07-13

## 2018-07-19 ENCOUNTER — APPOINTMENT (OUTPATIENT)
Dept: HEMATOLOGY ONCOLOGY | Facility: CLINIC | Age: 38
End: 2018-07-19

## 2018-07-30 PROBLEM — Z80.0 FAMILY HISTORY OF MALIGNANT NEOPLASM OF COLON: Status: ACTIVE | Noted: 2018-07-30

## 2018-07-30 PROBLEM — F17.200 CURRENT EVERY DAY SMOKER: Status: ACTIVE | Noted: 2018-07-30

## 2018-07-30 PROBLEM — F32.9 DEPRESSION, UNSPECIFIED DEPRESSION TYPE: Status: ACTIVE | Noted: 2018-07-30

## 2018-07-30 PROBLEM — Z78.9 SOCIAL ALCOHOL USE: Status: ACTIVE | Noted: 2018-07-30

## 2018-07-30 PROBLEM — Z82.49 FAMILY HISTORY OF HYPERTENSION: Status: ACTIVE | Noted: 2018-07-30

## 2018-07-30 PROBLEM — D50.9 IRON DEFICIENCY ANEMIA: Status: ACTIVE | Noted: 2018-06-21

## 2018-07-30 RX ORDER — METOPROLOL TARTRATE 50 MG/1
50 TABLET, FILM COATED ORAL
Refills: 0 | Status: ACTIVE | COMMUNITY

## 2018-07-30 RX ORDER — DIAZEPAM 5 MG/1
5 TABLET ORAL
Refills: 0 | Status: ACTIVE | COMMUNITY

## 2018-07-30 RX ORDER — CHLORHEXIDINE GLUCONATE 4 %
325 (65 FE) LIQUID (ML) TOPICAL
Qty: 30 | Refills: 3 | Status: ACTIVE | COMMUNITY

## 2018-07-30 RX ORDER — TRAZODONE HYDROCHLORIDE 50 MG/1
50 TABLET ORAL
Refills: 0 | Status: ACTIVE | COMMUNITY

## 2018-07-30 RX ORDER — SERTRALINE HYDROCHLORIDE 25 MG/1
25 TABLET, FILM COATED ORAL
Refills: 0 | Status: ACTIVE | COMMUNITY

## 2018-07-30 RX ORDER — DILTIAZEM HYDROCHLORIDE 30 MG/1
30 TABLET, COATED ORAL
Refills: 0 | Status: ACTIVE | COMMUNITY

## 2018-07-30 RX ORDER — DOCUSATE SODIUM 100 MG/1
100 CAPSULE ORAL
Refills: 0 | Status: ACTIVE | COMMUNITY

## 2018-07-30 RX ORDER — METHIMAZOLE 10 MG/1
10 TABLET ORAL 3 TIMES DAILY
Refills: 0 | Status: ACTIVE | COMMUNITY

## 2018-08-02 ENCOUNTER — OUTPATIENT (OUTPATIENT)
Dept: OUTPATIENT SERVICES | Facility: HOSPITAL | Age: 38
LOS: 1 days | Discharge: HOME | End: 2018-08-02

## 2018-08-02 ENCOUNTER — APPOINTMENT (OUTPATIENT)
Dept: HEMATOLOGY ONCOLOGY | Facility: CLINIC | Age: 38
End: 2018-08-02

## 2018-08-02 DIAGNOSIS — D50.9 IRON DEFICIENCY ANEMIA, UNSPECIFIED: ICD-10-CM

## 2018-08-03 PROBLEM — F32.9 MAJOR DEPRESSIVE DISORDER, SINGLE EPISODE, UNSPECIFIED: Chronic | Status: ACTIVE | Noted: 2017-04-13

## 2018-08-10 ENCOUNTER — APPOINTMENT (OUTPATIENT)
Dept: ENDOCRINOLOGY | Facility: CLINIC | Age: 38
End: 2018-08-10

## 2018-08-12 ENCOUNTER — EMERGENCY (EMERGENCY)
Facility: HOSPITAL | Age: 38
LOS: 1 days | Discharge: HOME | End: 2018-08-12
Attending: EMERGENCY MEDICINE | Admitting: EMERGENCY MEDICINE
Payer: MEDICAID

## 2018-08-12 VITALS
HEART RATE: 86 BPM | HEIGHT: 65 IN | WEIGHT: 250 LBS | DIASTOLIC BLOOD PRESSURE: 82 MMHG | TEMPERATURE: 98 F | RESPIRATION RATE: 18 BRPM | SYSTOLIC BLOOD PRESSURE: 132 MMHG | OXYGEN SATURATION: 99 %

## 2018-08-12 VITALS
HEART RATE: 83 BPM | TEMPERATURE: 98 F | SYSTOLIC BLOOD PRESSURE: 125 MMHG | RESPIRATION RATE: 18 BRPM | OXYGEN SATURATION: 99 % | DIASTOLIC BLOOD PRESSURE: 82 MMHG

## 2018-08-12 LAB
ALBUMIN SERPL ELPH-MCNC: 3.1 G/DL — LOW (ref 3.3–5)
ALP SERPL-CCNC: 67 U/L — SIGNIFICANT CHANGE UP (ref 30–120)
ALT FLD-CCNC: 43 U/L DA — SIGNIFICANT CHANGE UP (ref 10–60)
ANION GAP SERPL CALC-SCNC: 7 MMOL/L — SIGNIFICANT CHANGE UP (ref 5–17)
AST SERPL-CCNC: 39 U/L — SIGNIFICANT CHANGE UP (ref 10–40)
BASOPHILS # BLD AUTO: 0.02 K/UL — SIGNIFICANT CHANGE UP (ref 0–0.2)
BASOPHILS NFR BLD AUTO: 0.7 % — SIGNIFICANT CHANGE UP (ref 0–2)
BILIRUB SERPL-MCNC: 0.6 MG/DL — SIGNIFICANT CHANGE UP (ref 0.2–1.2)
BUN SERPL-MCNC: 7 MG/DL — SIGNIFICANT CHANGE UP (ref 7–23)
CALCIUM SERPL-MCNC: 8.6 MG/DL — SIGNIFICANT CHANGE UP (ref 8.4–10.5)
CHLORIDE SERPL-SCNC: 105 MMOL/L — SIGNIFICANT CHANGE UP (ref 96–108)
CO2 SERPL-SCNC: 27 MMOL/L — SIGNIFICANT CHANGE UP (ref 22–31)
CREAT SERPL-MCNC: 0.64 MG/DL — SIGNIFICANT CHANGE UP (ref 0.5–1.3)
EOSINOPHIL # BLD AUTO: 0 K/UL — SIGNIFICANT CHANGE UP (ref 0–0.5)
EOSINOPHIL NFR BLD AUTO: 0 % — SIGNIFICANT CHANGE UP (ref 0–6)
GLUCOSE SERPL-MCNC: 94 MG/DL — SIGNIFICANT CHANGE UP (ref 70–99)
HCT VFR BLD CALC: 32.1 % — LOW (ref 34.5–45)
HGB BLD-MCNC: 9.8 G/DL — LOW (ref 11.5–15.5)
IMM GRANULOCYTES NFR BLD AUTO: 0.3 % — SIGNIFICANT CHANGE UP (ref 0–1.5)
LYMPHOCYTES # BLD AUTO: 0.63 K/UL — LOW (ref 1–3.3)
LYMPHOCYTES # BLD AUTO: 20.9 % — SIGNIFICANT CHANGE UP (ref 13–44)
MCHC RBC-ENTMCNC: 21.3 PG — LOW (ref 27–34)
MCHC RBC-ENTMCNC: 30.5 GM/DL — LOW (ref 32–36)
MCV RBC AUTO: 69.8 FL — LOW (ref 80–100)
MONOCYTES # BLD AUTO: 0.36 K/UL — SIGNIFICANT CHANGE UP (ref 0–0.9)
MONOCYTES NFR BLD AUTO: 11.9 % — SIGNIFICANT CHANGE UP (ref 2–14)
NEUTROPHILS # BLD AUTO: 2 K/UL — SIGNIFICANT CHANGE UP (ref 1.8–7.4)
NEUTROPHILS NFR BLD AUTO: 66.2 % — SIGNIFICANT CHANGE UP (ref 43–77)
PLATELET # BLD AUTO: 230 K/UL — SIGNIFICANT CHANGE UP (ref 150–400)
POTASSIUM SERPL-MCNC: 3.5 MMOL/L — SIGNIFICANT CHANGE UP (ref 3.5–5.3)
POTASSIUM SERPL-SCNC: 3.5 MMOL/L — SIGNIFICANT CHANGE UP (ref 3.5–5.3)
PROT SERPL-MCNC: 6.4 G/DL — SIGNIFICANT CHANGE UP (ref 6–8.3)
RBC # BLD: 4.6 M/UL — SIGNIFICANT CHANGE UP (ref 3.8–5.2)
RBC # FLD: 18.2 % — HIGH (ref 10.3–14.5)
SODIUM SERPL-SCNC: 139 MMOL/L — SIGNIFICANT CHANGE UP (ref 135–145)
TROPONIN I SERPL-MCNC: 0 NG/ML — LOW (ref 0.02–0.06)
WBC # BLD: 3.02 K/UL — LOW (ref 3.8–10.5)
WBC # FLD AUTO: 3.02 K/UL — LOW (ref 3.8–10.5)

## 2018-08-12 PROCEDURE — 71046 X-RAY EXAM CHEST 2 VIEWS: CPT | Mod: 26

## 2018-08-12 PROCEDURE — 93010 ELECTROCARDIOGRAM REPORT: CPT

## 2018-08-12 PROCEDURE — 85027 COMPLETE CBC AUTOMATED: CPT

## 2018-08-12 PROCEDURE — 99284 EMERGENCY DEPT VISIT MOD MDM: CPT | Mod: 25

## 2018-08-12 PROCEDURE — 80053 COMPREHEN METABOLIC PANEL: CPT

## 2018-08-12 PROCEDURE — 71046 X-RAY EXAM CHEST 2 VIEWS: CPT

## 2018-08-12 PROCEDURE — 93005 ELECTROCARDIOGRAM TRACING: CPT

## 2018-08-12 PROCEDURE — 99284 EMERGENCY DEPT VISIT MOD MDM: CPT

## 2018-08-12 PROCEDURE — 84484 ASSAY OF TROPONIN QUANT: CPT

## 2018-08-12 RX ORDER — DOCUSATE SODIUM 100 MG
1 CAPSULE ORAL
Qty: 10 | Refills: 0
Start: 2018-08-12 | End: 2018-08-21

## 2018-08-12 RX ORDER — FERROUS SULFATE 325(65) MG
1 TABLET ORAL
Qty: 7 | Refills: 0
Start: 2018-08-12 | End: 2018-08-18

## 2018-08-12 RX ORDER — SODIUM CHLORIDE 9 MG/ML
3 INJECTION INTRAMUSCULAR; INTRAVENOUS; SUBCUTANEOUS ONCE
Qty: 0 | Refills: 0 | Status: COMPLETED | OUTPATIENT
Start: 2018-08-12 | End: 2018-08-12

## 2018-08-12 RX ADMIN — SODIUM CHLORIDE 3 MILLILITER(S): 9 INJECTION INTRAMUSCULAR; INTRAVENOUS; SUBCUTANEOUS at 15:10

## 2018-08-12 NOTE — ED PROVIDER NOTE - ENMT, MLM
Airway patent, Nasal mucosa clear. Mouth with normal mucosa. Throat has no vesicles, no oropharyngeal exudates and uvula is midline.  + pallor of gums and mucous membrane in eyes. Airway patent, Nasal mucosa clear. Mouth with normal mucosa. Throat has no vesicles, no oropharyngeal exudates and uvula is midline.  + pallor of gums and conjunctiva.

## 2018-08-12 NOTE — ED ADULT NURSE NOTE - NSIMPLEMENTINTERV_GEN_ALL_ED
Implemented All Universal Safety Interventions:  [place] to call system. Call bell, personal items and telephone within reach. Instruct patient to call for assistance. Room bathroom lighting operational. Non-slip footwear when patient is off stretcher. Physically safe environment: no spills, clutter or unnecessary equipment. Stretcher in lowest position, wheels locked, appropriate side rails in place.

## 2018-08-12 NOTE — ED ADULT NURSE REASSESSMENT NOTE - NS ED NURSE REASSESS COMMENT FT1
IV access was established, specimens sent to lab, placed on continuous cardiac monitoring, awaiting results and disposition, nursing care ongoing.

## 2018-08-12 NOTE — ED PROVIDER NOTE - OBJECTIVE STATEMENT
36 yo female with h/o hyperthyroidism, a-fib and anemia, c/o cramping chest pain, night sweats, that began yesterday. Each episode lasting 15 minutes, coming and going. + smoker, no hormone replacements. Denies family history of cardiac illness. Similar episode 3 months ago, was anemia and required blood transfusion. PCP is Dr. Celaya, no cardiologist. LMP 8/4 and ended 2 days ago. As per patient heavy menses.

## 2018-08-12 NOTE — ED ADULT NURSE NOTE - OBJECTIVE STATEMENT
Patient presents to ED with complaints of "chest cramping" that began last night. As per patient " I was asleep and the waves of cramping woke me up." Patient denies any SOB, feelings of palpitations, dizziness or extremity edema. Patient is non-diaphoretic, skin is normal for ethnicity, no JVD or extremity edema is noted.

## 2018-08-12 NOTE — ED PROVIDER NOTE - ATTENDING CONTRIBUTION TO CARE
Pt seen and examined and d/w PA.  agree with a and p.  pt is a 38 yo female hx afib on xarelto due to hyperthroid, smoker, pt denies other cardiac risk factors. pt with intermittent chest crampy pain every 15 minutes or so, lasting 3-4 minutes.  mild sob,  pt denies leg pain or swelling. no current cp.  pt states prior anemia due to heavy menses.  exam with vss, nad, lungs cta, cor: rrr no mgr, abd, soft nt.  ekg wnl ,  check labs, cxr, ce x 2, dc for op fu in  neg Pt seen and examined and d/w PA.  agree with a and p.  pt is a 38 yo female with hx afib, with intermmitent chest "cramps" over past day.  exam wnl, ekg no acute dz/  check labs, trop x 2.  pt signed out ama after trop x 1 despite r and b being discussed.  exam with vss, lungs cta, cor rrr no mgr, no c/c/e, 2+ pulses.

## 2018-08-12 NOTE — ED ADULT NURSE REASSESSMENT NOTE - NS ED NURSE REASSESS COMMENT FT1
Patient requested to sign out JENNIFER MCKEON notified of patient's request, awaiting disposition, nursing care ongoing.

## 2018-08-12 NOTE — ED PROVIDER NOTE - CHPI ED SYMPTOMS NEG
no cough/no shortness of breath/no vomiting/no chills/no fever/no syncope/no back pain/no nausea/no dizziness

## 2018-08-31 ENCOUNTER — APPOINTMENT (OUTPATIENT)
Dept: ENDOCRINOLOGY | Facility: CLINIC | Age: 38
End: 2018-08-31
Payer: MEDICAID

## 2018-08-31 VITALS
HEART RATE: 119 BPM | OXYGEN SATURATION: 98 % | DIASTOLIC BLOOD PRESSURE: 55 MMHG | HEIGHT: 66.46 IN | SYSTOLIC BLOOD PRESSURE: 110 MMHG | WEIGHT: 256 LBS | BODY MASS INDEX: 40.66 KG/M2

## 2018-08-31 DIAGNOSIS — E05.90 THYROTOXICOSIS, UNSPECIFIED W/OUT THYROTOXIC CRISIS OR STORM: ICD-10-CM

## 2018-08-31 PROCEDURE — 99205 OFFICE O/P NEW HI 60 MIN: CPT

## 2018-08-31 RX ORDER — METHIMAZOLE 10 MG/1
10 TABLET ORAL
Qty: 90 | Refills: 2 | Status: ACTIVE | COMMUNITY
Start: 2018-08-31 | End: 1900-01-01

## 2018-09-01 PROBLEM — E05.90 HYPERTHYROIDISM: Status: ACTIVE | Noted: 2018-07-30

## 2018-09-04 LAB
T3 SERPL-MCNC: 152 NG/DL
T3FREE SERPL-MCNC: 3.97 PG/ML
T4 FREE SERPL-MCNC: 1.6 NG/DL
T4 SERPL-MCNC: 8.5 UG/DL
TSH RECEPTOR AB: 10.51 IU/L
TSH SERPL-ACNC: 1.7 UIU/ML

## 2018-09-05 LAB — TSI ACT/NOR SER: 0.84 IU/L

## 2018-12-07 ENCOUNTER — APPOINTMENT (OUTPATIENT)
Dept: ENDOCRINOLOGY | Facility: CLINIC | Age: 38
End: 2018-12-07

## 2019-08-02 NOTE — H&P ADULT - NSHPPOADEEPVENOUSTHROMB_GEN_A_CORE
Contact Information: If you have any questions, concerns, pended studies, tests and/or procedures, or emergencies regarding your inpatient behavioral health visit  Please contact [facility] behavioral health unit [phone] and ask to speak to a , nurse or physician  A contact is available 24 hours/ 7 days a week at this number  Summary of Procedures Performed During your Stay:  Below is a list of major procedures performed during your hospital stay and a summary of results:  - No major procedures performed  Pending Studies (From admission, onward)    None        If studies are pending at discharge, follow up with your PCP and/or referring provider 
no

## 2020-03-19 ENCOUNTER — INPATIENT (INPATIENT)
Facility: HOSPITAL | Age: 40
LOS: 7 days | Discharge: HOME | DRG: 194 | End: 2020-03-27
Attending: HOSPITALIST | Admitting: HOSPITALIST
Payer: MEDICAID

## 2020-03-19 VITALS
SYSTOLIC BLOOD PRESSURE: 128 MMHG | TEMPERATURE: 99 F | HEART RATE: 112 BPM | DIASTOLIC BLOOD PRESSURE: 74 MMHG | WEIGHT: 240.08 LBS | RESPIRATION RATE: 16 BRPM | HEIGHT: 65 IN | OXYGEN SATURATION: 98 %

## 2020-03-19 DIAGNOSIS — Z92.89 PERSONAL HISTORY OF OTHER MEDICAL TREATMENT: Chronic | ICD-10-CM

## 2020-03-19 DIAGNOSIS — R09.89 OTHER SPECIFIED SYMPTOMS AND SIGNS INVOLVING THE CIRCULATORY AND RESPIRATORY SYSTEMS: ICD-10-CM

## 2020-03-19 DIAGNOSIS — R55 SYNCOPE AND COLLAPSE: ICD-10-CM

## 2020-03-19 LAB
ALBUMIN SERPL ELPH-MCNC: 3.2 G/DL — LOW (ref 3.3–5)
ALP SERPL-CCNC: 87 U/L — SIGNIFICANT CHANGE UP (ref 30–120)
ALT FLD-CCNC: 22 U/L DA — SIGNIFICANT CHANGE UP (ref 10–60)
ANION GAP SERPL CALC-SCNC: 10 MMOL/L — SIGNIFICANT CHANGE UP (ref 5–17)
APTT BLD: 36.4 SEC — SIGNIFICANT CHANGE UP (ref 28.5–37)
AST SERPL-CCNC: 39 U/L — SIGNIFICANT CHANGE UP (ref 10–40)
BASOPHILS # BLD AUTO: 0 K/UL — SIGNIFICANT CHANGE UP (ref 0–0.2)
BASOPHILS NFR BLD AUTO: 0 % — SIGNIFICANT CHANGE UP (ref 0–2)
BILIRUB SERPL-MCNC: 1 MG/DL — SIGNIFICANT CHANGE UP (ref 0.2–1.2)
BUN SERPL-MCNC: 8 MG/DL — SIGNIFICANT CHANGE UP (ref 7–23)
CALCIUM SERPL-MCNC: 8.5 MG/DL — SIGNIFICANT CHANGE UP (ref 8.4–10.5)
CHLORIDE SERPL-SCNC: 99 MMOL/L — SIGNIFICANT CHANGE UP (ref 96–108)
CO2 SERPL-SCNC: 24 MMOL/L — SIGNIFICANT CHANGE UP (ref 22–31)
CREAT SERPL-MCNC: 0.86 MG/DL — SIGNIFICANT CHANGE UP (ref 0.5–1.3)
D DIMER BLD IA.RAPID-MCNC: 623 NG/ML DDU — HIGH
EOSINOPHIL # BLD AUTO: 0 K/UL — SIGNIFICANT CHANGE UP (ref 0–0.5)
EOSINOPHIL NFR BLD AUTO: 0 % — SIGNIFICANT CHANGE UP (ref 0–6)
FLU A RESULT: SIGNIFICANT CHANGE UP
FLU A RESULT: SIGNIFICANT CHANGE UP
FLUAV AG NPH QL: SIGNIFICANT CHANGE UP
FLUBV AG NPH QL: SIGNIFICANT CHANGE UP
GLUCOSE SERPL-MCNC: 108 MG/DL — HIGH (ref 70–99)
HCG SERPL-ACNC: <1 MIU/ML — SIGNIFICANT CHANGE UP
HCT VFR BLD CALC: 25.6 % — LOW (ref 34.5–45)
HGB BLD-MCNC: 6.6 G/DL — CRITICAL LOW (ref 11.5–15.5)
INR BLD: 1.13 RATIO — SIGNIFICANT CHANGE UP (ref 0.88–1.16)
LACTATE SERPL-SCNC: 1.3 MMOL/L — SIGNIFICANT CHANGE UP (ref 0.7–2)
LYMPHOCYTES # BLD AUTO: 0.38 K/UL — LOW (ref 1–3.3)
LYMPHOCYTES # BLD AUTO: 7 % — LOW (ref 13–44)
MCHC RBC-ENTMCNC: 15.4 PG — LOW (ref 27–34)
MCHC RBC-ENTMCNC: 25.8 GM/DL — LOW (ref 32–36)
MCV RBC AUTO: 59.7 FL — LOW (ref 80–100)
MONOCYTES # BLD AUTO: 0.43 K/UL — SIGNIFICANT CHANGE UP (ref 0–0.9)
MONOCYTES NFR BLD AUTO: 8 % — SIGNIFICANT CHANGE UP (ref 2–14)
NEUTROPHILS # BLD AUTO: 4.6 K/UL — SIGNIFICANT CHANGE UP (ref 1.8–7.4)
NEUTROPHILS NFR BLD AUTO: 84 % — HIGH (ref 43–77)
NRBC # BLD: SIGNIFICANT CHANGE UP /100 WBCS (ref 0–0)
PLATELET # BLD AUTO: 191 K/UL — SIGNIFICANT CHANGE UP (ref 150–400)
POTASSIUM SERPL-MCNC: 3.3 MMOL/L — LOW (ref 3.5–5.3)
POTASSIUM SERPL-SCNC: 3.3 MMOL/L — LOW (ref 3.5–5.3)
PROT SERPL-MCNC: 7.5 G/DL — SIGNIFICANT CHANGE UP (ref 6–8.3)
PROTHROM AB SERPL-ACNC: 12.6 SEC — SIGNIFICANT CHANGE UP (ref 10–12.9)
RBC # BLD: 4.29 M/UL — SIGNIFICANT CHANGE UP (ref 3.8–5.2)
RBC # FLD: 24.2 % — HIGH (ref 10.3–14.5)
RSV RESULT: SIGNIFICANT CHANGE UP
RSV RNA RESP QL NAA+PROBE: SIGNIFICANT CHANGE UP
SODIUM SERPL-SCNC: 133 MMOL/L — LOW (ref 135–145)
WBC # BLD: 5.41 K/UL — SIGNIFICANT CHANGE UP (ref 3.8–10.5)
WBC # FLD AUTO: 5.41 K/UL — SIGNIFICANT CHANGE UP (ref 3.8–10.5)

## 2020-03-19 PROCEDURE — 99285 EMERGENCY DEPT VISIT HI MDM: CPT

## 2020-03-19 PROCEDURE — 99223 1ST HOSP IP/OBS HIGH 75: CPT

## 2020-03-19 PROCEDURE — 70450 CT HEAD/BRAIN W/O DYE: CPT | Mod: 26

## 2020-03-19 PROCEDURE — 71045 X-RAY EXAM CHEST 1 VIEW: CPT | Mod: 26

## 2020-03-19 PROCEDURE — 71275 CT ANGIOGRAPHY CHEST: CPT | Mod: 26

## 2020-03-19 PROCEDURE — 93010 ELECTROCARDIOGRAM REPORT: CPT

## 2020-03-19 RX ORDER — ACETAMINOPHEN 500 MG
650 TABLET ORAL ONCE
Refills: 0 | Status: COMPLETED | OUTPATIENT
Start: 2020-03-19 | End: 2020-03-19

## 2020-03-19 RX ORDER — ALBUTEROL 90 UG/1
1 AEROSOL, METERED ORAL ONCE
Refills: 0 | Status: COMPLETED | OUTPATIENT
Start: 2020-03-19 | End: 2020-03-19

## 2020-03-19 RX ORDER — AZITHROMYCIN 500 MG/1
500 TABLET, FILM COATED ORAL ONCE
Refills: 0 | Status: COMPLETED | OUTPATIENT
Start: 2020-03-19 | End: 2020-03-19

## 2020-03-19 RX ORDER — SODIUM CHLORIDE 9 MG/ML
3400 INJECTION INTRAMUSCULAR; INTRAVENOUS; SUBCUTANEOUS ONCE
Refills: 0 | Status: COMPLETED | OUTPATIENT
Start: 2020-03-19 | End: 2020-03-19

## 2020-03-19 RX ORDER — RIVAROXABAN 15 MG-20MG
1 KIT ORAL
Qty: 0 | Refills: 0 | DISCHARGE

## 2020-03-19 RX ORDER — METOPROLOL TARTRATE 50 MG
50 TABLET ORAL
Refills: 0 | Status: DISCONTINUED | OUTPATIENT
Start: 2020-03-19 | End: 2020-03-27

## 2020-03-19 RX ORDER — POTASSIUM CHLORIDE 20 MEQ
40 PACKET (EA) ORAL ONCE
Refills: 0 | Status: COMPLETED | OUTPATIENT
Start: 2020-03-19 | End: 2020-03-19

## 2020-03-19 RX ORDER — CEFTRIAXONE 500 MG/1
1000 INJECTION, POWDER, FOR SOLUTION INTRAMUSCULAR; INTRAVENOUS ONCE
Refills: 0 | Status: COMPLETED | OUTPATIENT
Start: 2020-03-19 | End: 2020-03-19

## 2020-03-19 RX ORDER — CEFTRIAXONE 500 MG/1
1000 INJECTION, POWDER, FOR SOLUTION INTRAMUSCULAR; INTRAVENOUS EVERY 24 HOURS
Refills: 0 | Status: DISCONTINUED | OUTPATIENT
Start: 2020-03-20 | End: 2020-03-21

## 2020-03-19 RX ORDER — AZITHROMYCIN 500 MG/1
500 TABLET, FILM COATED ORAL EVERY 24 HOURS
Refills: 0 | Status: DISCONTINUED | OUTPATIENT
Start: 2020-03-20 | End: 2020-03-21

## 2020-03-19 RX ADMIN — Medication 650 MILLIGRAM(S): at 16:00

## 2020-03-19 RX ADMIN — Medication 650 MILLIGRAM(S): at 15:59

## 2020-03-19 RX ADMIN — CEFTRIAXONE 100 MILLIGRAM(S): 500 INJECTION, POWDER, FOR SOLUTION INTRAMUSCULAR; INTRAVENOUS at 21:10

## 2020-03-19 RX ADMIN — ALBUTEROL 1 PUFF(S): 90 AEROSOL, METERED ORAL at 15:55

## 2020-03-19 RX ADMIN — Medication 40 MILLIEQUIVALENT(S): at 23:41

## 2020-03-19 RX ADMIN — SODIUM CHLORIDE 3400 MILLILITER(S): 9 INJECTION INTRAMUSCULAR; INTRAVENOUS; SUBCUTANEOUS at 15:55

## 2020-03-19 RX ADMIN — AZITHROMYCIN 255 MILLIGRAM(S): 500 TABLET, FILM COATED ORAL at 21:48

## 2020-03-19 NOTE — H&P ADULT - NSHPREVIEWOFSYSTEMS_GEN_ALL_CORE
REVIEW OF SYSTEMS:  CONSTITUTIONAL:    no fever or weight loss or fatigue  EYES:    no eye pain or visual disturbances or discharge  ENMT:     no difficulty hearing or tinnitus or vertigo, no sinus or throat pain  NECK:    no pain or stiffness  RESPIRATORY:    +cough, +WEN/SOB  CARDIOVASCULAR:    no chest pain or palpitations or dizziness or leg swelling  GASTROINTESTINAL:    +chronic diarrhea (not worse), no abdominal or epigastric pain, no nausea or vomiting or hematemesis, no diarrhea or constipation. no melena or hematochezia.  GENITOURINARY:    +on her period, no dysuria or frequency or hematuria or incontinence  NEUROLOGICAL:    no headaches or memory loss or loss of strength or numbness or tremors  SKIN:    no itching or burning or rashes or lesions   LYMPH NODES:    no enlarged glands  ENDOCRINE:    no heat or cold intolerance, no hair loss, no polydipsia or polyuria  MUSCULOSKELETAL:    no joint pain or swelling, no muscle or back or extremity pain  PSYCHIATRIC:    no depression or anxiety or mood swings or difficulty sleeping  HEME/LYMPH:    no easy bruising or bleeding gums  ALLERGY AND IMMUNOLOGIC:    no hives or eczema

## 2020-03-19 NOTE — CONSULT NOTE ADULT - SUBJECTIVE AND OBJECTIVE BOX
History of Present Illness: The patient is a 39 year old female with a history of anemia, paroxysmal atrial fibrillation, hyperthyroidism who presents with shortness of breath and cough for one week.    Past Medical/Surgical History:  Anemia, paroxysmal atrial fibrillation, hyperthyroidism    Medications:  Home Medications:  cyanocobalamin 1000 mcg oral tablet: 1 tab(s) orally once a day (12 Aug 2018 14:51)  dilTIAZem 30 mg oral tablet: 1 tab(s) orally every 8 hours, As needed, HR &gt; 100 or palpitations (12 Aug 2018 14:51)  folic acid 1 mg oral tablet: 1 tab(s) orally once a day (12 Aug 2018 14:51)  Xarelto 10 mg oral tablet: 1 tab(s) orally once a day (12 Aug 2018 14:51)      Family History: Non-contributory family history of premature cardiovascular atherosclerotic disease    Social History: No tobacco, alcohol or drug use    Review of Systems:  General: No fevers, chills, weight loss or gain  Skin: No rashes, color changes  Cardiovascular: No chest pain, orthopnea  Respiratory: No shortness of breath, cough  Gastrointestinal: No nausea, abdominal pain  Genitourinary: No incontinence, pain with urination  Musculoskeletal: No pain, swelling, decreased range of motion  Neurological: No headache, weakness  Psychiatric: No depression, anxiety  Endocrine: No weight loss or gain, increased thirst  All other systems are comprehensively negative.    Physical Exam:  Vitals:        Vital Signs Last 24 Hrs  T(C): 37.1 (19 Mar 2020 14:47), Max: 37.1 (19 Mar 2020 14:47)  T(F): 98.8 (19 Mar 2020 14:47), Max: 98.8 (19 Mar 2020 14:47)  HR: 95 (19 Mar 2020 17:16) (95 - 112)  BP: 119/70 (19 Mar 2020 17:16) (110/70 - 128/74)  BP(mean): --  RR: 18 (19 Mar 2020 17:16) (16 - 18)  SpO2: 96% (19 Mar 2020 17:16) (96% - 99%)  General: NAD  HEENT: MMM  Neck: No JVD, no carotid bruit  Lungs: CTAB  CV: RRR, nl S1/S2, no M/R/G  Abdomen: S/NT/ND, +BS  Extremities: No LE edema, no cyanosis  Neuro: AAOx3, non-focal  Skin: No rash    Labs:                        6.6    5.41  )-----------( 191      ( 19 Mar 2020 15:44 )             25.6     03-19    133<L>  |  99  |  8   ----------------------------<  108<H>  3.3<L>   |  24  |  0.86    Ca    8.5      19 Mar 2020 15:44    TPro  7.5  /  Alb  3.2<L>  /  TBili  1.0  /  DBili  x   /  AST  39  /  ALT  22  /  AlkPhos  87  03-19        PT/INR - ( 19 Mar 2020 15:44 )   PT: 12.6 sec;   INR: 1.13 ratio         PTT - ( 19 Mar 2020 15:44 )  PTT:36.4 sec    ECG: History of Present Illness: The patient is a 39 year old female with a history of anemia, paroxysmal atrial fibrillation, hyperthyroidism who presents with shortness of breath and cough for one week. She has had a cough productive of clear sputum for one week associated with a runny nose and shortness of breath. She had an episode of syncope/fall today.    Past Medical/Surgical History:  Anemia, paroxysmal atrial fibrillation, hyperthyroidism    Medications:  Home Medications:  Ativan:  (19 Mar 2020 19:28)  Lopressor 50 mg oral tablet: 1 tab(s) orally 2 times a day (19 Mar 2020 19:28)  traZODone 50 mg oral tablet: 1 tab(s) orally 2 times a day (19 Mar 2020 19:28)  Xarelto 2.5 mg oral tablet: 1 tab(s) orally 2 times a day (19 Mar 2020 19:28)  Zoloft 100 mg oral tablet: 1 tab(s) orally once a day (19 Mar 2020 19:28)        Family History: Non-contributory family history of premature cardiovascular atherosclerotic disease    Social History: No tobacco, alcohol or drug use    Review of Systems:  General: No fevers, chills, weight loss or gain  Skin: No rashes, color changes  Cardiovascular: No chest pain, orthopnea  Respiratory: No shortness of breath, cough  Gastrointestinal: No nausea, abdominal pain  Genitourinary: No incontinence, pain with urination  Musculoskeletal: No pain, swelling, decreased range of motion  Neurological: No headache, weakness  Psychiatric: No depression, anxiety  Endocrine: No weight loss or gain, increased thirst  All other systems are comprehensively negative.    Physical Exam:  Vitals:        Vital Signs Last 24 Hrs  T(C): 37.1 (19 Mar 2020 14:47), Max: 37.1 (19 Mar 2020 14:47)  T(F): 98.8 (19 Mar 2020 14:47), Max: 98.8 (19 Mar 2020 14:47)  HR: 95 (19 Mar 2020 17:16) (95 - 112)  BP: 119/70 (19 Mar 2020 17:16) (110/70 - 128/74)  BP(mean): --  RR: 18 (19 Mar 2020 17:16) (16 - 18)  SpO2: 96% (19 Mar 2020 17:16) (96% - 99%)  General: NAD  HEENT: MMM  Neck: No JVD, no carotid bruit  Lungs: CTAB  CV: RRR, nl S1/S2, no M/R/G  Abdomen: S/NT/ND, +BS  Extremities: No LE edema, no cyanosis  Neuro: AAOx3, non-focal  Skin: No rash    Labs:                        6.6    5.41  )-----------( 191      ( 19 Mar 2020 15:44 )             25.6     03-19    133<L>  |  99  |  8   ----------------------------<  108<H>  3.3<L>   |  24  |  0.86    Ca    8.5      19 Mar 2020 15:44    TPro  7.5  /  Alb  3.2<L>  /  TBili  1.0  /  DBili  x   /  AST  39  /  ALT  22  /  AlkPhos  87  03-19        PT/INR - ( 19 Mar 2020 15:44 )   PT: 12.6 sec;   INR: 1.13 ratio         PTT - ( 19 Mar 2020 15:44 )  PTT:36.4 sec    ECG: Pending

## 2020-03-19 NOTE — ED PROVIDER NOTE - PROGRESS NOTE DETAILS
Annie CARMONA for ED attending, Dr. Lomeli : 40 y/o female states her children were exposed to covid 19 at school, have been on home isolation. She developed low grade fevers and SOB, weakness, flu like sx past 2 days. Felt weak and nearly fainted this morning. Denies cough. +diarrhea. Hx of afib on xarelto, hypothyroid. PCP: Kt.  PE: temp 98.8, , HEENT clear, heart regular rhythm, rapid, lungs clear, abd soft, skin warm and dry, legs no edema  plan - sepsis work up, r/o covid, flu, RVP, CXR Discussed with hospitalist Romeo, accepting admission, advised to admit to Dr. Chairez. Requesting Consult from Cardio Jose De Oliveira. Discussed with Dr. de oliveira, notes he will consult.

## 2020-03-19 NOTE — ED PROVIDER NOTE - CLINICAL SUMMARY MEDICAL DECISION MAKING FREE TEXT BOX
c/o SOB and cough. No syncopal episode 2 hours ago. Temp 99.7F. tachycardia noted. Plan includes labs, IVF, sepsis work up, CXR, ct head r/o intracranial bleed, RVP, and Flu. Covid-19 testing sent.

## 2020-03-19 NOTE — ED ADULT NURSE NOTE - OBJECTIVE STATEMENT
"I fainted this morning twice & I have diarrhea & SOB & feel weak", children on covid quarentine,s/p exposure at school for 1 week now, no symptoms yet

## 2020-03-19 NOTE — H&P ADULT - NSHPLABSRESULTS_GEN_ALL_CORE
LABS:                        6.6<LL>  5.41  )-----------( 191      ( 19 Mar 2020 15:44 )             25.6<L>    133<L>  |  99     |  8      ----------------------------<  108<H>    19 Mar 2020 15:44  3.3<L>   |  24     |  0.86         Ca 8.5           19 Mar 2020 15:44        TPro  7.5    /  Alb  3.2<L>  /  TBili  1.0    /  DBili  x      /  AST  39     /  ALT  22     /  AlkPhos  87     19 Mar 2020 15:44    PT/INR - ( 19 Mar 2020 15:44 )   PT: 12.6 ;   INR: 1.13          PTT - ( 19 Mar 2020 15:44 )  PTT:36.4     EKG:   NSR with prolonged QT  Radiology:  < from: CT Angio Chest w/ IV Cont (03.19.20 @ 17:26) >    IMPRESSION:     Suboptimal pulmonary arterial enhancement, nondiagnostic study for evaluation of pulmonary emboli.  Alternative study such as nuclear medicine ventilation/perfusion scan may be obtained if clinically indicated.  Bilateral nodular groundglass lung opacities, likely infectious.    < end of copied text >

## 2020-03-19 NOTE — H&P ADULT - NSICDXPASTMEDICALHX_GEN_ALL_CORE_FT
PAST MEDICAL HISTORY:  Asthma     Atrial fibrillation     Depression, unspecified depression type     History of anemia     History of Hyperthyroidism     Smoker

## 2020-03-19 NOTE — H&P ADULT - NSHPPHYSICALEXAM_GEN_ALL_CORE
PHYSICAL EXAM:  Vital Signs Last 24 Hrs  T(C): 36.7 (19 Mar 2020 18:40), Max: 37.1 (19 Mar 2020 14:47)  T(F): 98 (19 Mar 2020 18:40), Max: 98.8 (19 Mar 2020 14:47)  HR: 88 (19 Mar 2020 20:02) (87 - 112)  BP: 122/71 (19 Mar 2020 20:02) (110/70 - 128/80)  BP(mean): --  RR: 18 (19 Mar 2020 20:02) (16 - 18)  SpO2: 97% (19 Mar 2020 20:02) (96% - 99%)    GENERAL:     overweight female in NAD  HEAD:     atraumatic, normocephalic  EYES:     EOMI, conjunctiva and sclera clear  ENMT:     no tonsillar erythema or exudates or enlargement, no oral lesions, moist mucous membranes, good dentition  NECK:     supple, no JVD  RESPIRATORY:     diffuse exp wheezing, no gross cracklesr  CARDIOVASCULAR:     regular rate and rhythm, no murmurs or rubs or gallops, 2+ peripheral pulses  GASTROINTESTINAL:     soft, nontender, nondistended, no hepatosplenomegaly palpated, bowel sounds present  EXTREMITIES:     no clubbing or cyanosis or edema  MUSCULOSKELETAL:     no joint pain or swelling or deformities  NERVOUS SYSTEM:     motor strength intact with 5/5 B/L upper and lower extremities, no gross sensory deficits  SKIN:     no rashes or lesions  PSYCH:     appropriate, alert and orientated x3, good concentration

## 2020-03-19 NOTE — H&P ADULT - ASSESSMENT
39F with hyperthyroidism on methimazole, afib on xarelto who presents with SOB/WEN and syncope.       Syncope  - admitted to telemetry  - likely anemia related 2/2 heavy period on xarelto  - s/p 1u pRBC transfusion  - f/u post-transfusion CBC  - cardiology consulted  - TTE    SOB/WEN  - could be COVID and is being rule'd out -> continue contact and airborne precautions  - f/u RVP  - ID consult  - will continue azithromycin and ceftriaxone for now   - f/u blood cultures  - inh as needed, no nebs    Hyperthyroidism  - cont with methimazole    Afib  - holding xarelto 2/2 current bleeding  - continue metoprolol     Preventive measures  IMPROVE VTE Individual Risk Assessment          RISK                                                          Points  [  ] Previous VTE                                                 3  [  ] Thrombophilia                                              2  [  ] Lower limb paralysis                                    2        (unable to hold up >15 seconds)    [  ] Current Cancer                                             2         (within 6 months)  [  ] Immobilization > 24 hrs                              1  [  ] ICU/CCU stay > 24 hours                            1  [  ] Age > 60                                                        1    IMPROVE VTE Score 0    - currently bleeding, hold AC for now 39F with hyperthyroidism on methimazole, afib on xarelto who presents with SOB/WEN and syncope.       Syncope  - admitted to telemetry  - likely anemia related 2/2 heavy period on xarelto  - s/p 1u pRBC transfusion  - f/u post-transfusion CBC  - cardiology consulted  - TTE    SOB/WEN  - could be COVID and is being rule'd out -> continue contact and airborne precautions  - f/u RVP  - ID consult  - will continue azithromycin and ceftriaxone for now   - f/u blood cultures  - inh as needed, no nebs    Prolonged QT  - hold sertraline   - monitor    Hyperthyroidism  - cont with methimazole    Afib  - holding xarelto 2/2 current bleeding  - continue metoprolol     Preventive measures  IMPROVE VTE Individual Risk Assessment          RISK                                                          Points  [  ] Previous VTE                                                 3  [  ] Thrombophilia                                              2  [  ] Lower limb paralysis                                    2        (unable to hold up >15 seconds)    [  ] Current Cancer                                             2         (within 6 months)  [  ] Immobilization > 24 hrs                              1  [  ] ICU/CCU stay > 24 hours                            1  [  ] Age > 60                                                        1    IMPROVE VTE Score 0    - currently bleeding, hold AC for now

## 2020-03-19 NOTE — H&P ADULT - [id number]_GEN_ALL_CORE_FT
FAMILY HISTORY:  Sibling  Still living? Unknown  Family history of diabetes mellitus, Age at diagnosis: Age Unknown    Grandparent  Still living? Unknown  Family history of diabetes mellitus, Age at diagnosis: Age Unknown  Family history of stomach cancer, Age at diagnosis: Age Unknown

## 2020-03-19 NOTE — ED PROVIDER NOTE - OBJECTIVE STATEMENT
38 y/o female with PMHx Atrial Fibrillation (on Xarelto) and Hypothyroidism presents today c/o SOB and cough x 1 week. pt reports she has been self-isolating with her children who were sent home from school for 2 weeks. pt reports children asymptomatic but positive case in children school, uncertain of conctact as they did not disclose who was infected. pt reports she fainted this morning after coughing. pt reports SOB from coughing excessive and pain with deep breathing. Admits to being compliant with Xarelto. pt reports clear sputum production, runny nose, and lower lip abrasion from fall. pt denies headache, LE edema, calf pain, fever, chest pain, palpitations, recent travel/surgery, or any other complaints.

## 2020-03-19 NOTE — ED PROVIDER NOTE - CARE PLAN
Principal Discharge DX:	Syncope  Secondary Diagnosis:	Anemia  Secondary Diagnosis:	Ground glass opacity present on imaging of lung

## 2020-03-19 NOTE — ED PROVIDER NOTE - PHYSICAL EXAMINATION
Constitutional: Awake, Alert, non-toxic. NAD. Well appearing, well nourished.   HEAD: Normocephalic, atraumatic.   EYES: PERRL, EOM intact, conjunctiva and sclera are clear bilaterally. No raccoon eyes.   ENT: No moore sign. No rhinorrhea, normal pharynx, patent, no tonsillar exudate or enlargement, mucous membranes pink/moist, no erythema, no drooling or stridor.   NECK: Supple, non-tender  BACK: No midline or paraspinal TTP of cervical/thoracic/lumbar spine, FROM. No ecchymosis or hematomas.   CARDIOVASCULAR: Normal S1, S2; regular rate and rhythm.  RESPIRATORY: (+) B/L prominent wheezing, mild rhonchi bilaterally, speaking in full sentences, no accessory muscle use.   ABDOMEN: Soft; non-tender, non-distended. Normal bowel sounds x 4. no palpable masses, no bruits, no CVA TTP. No guarding.   EXTREMITIES: Full passive and active ROM in all extremities; non-tender to palpation; distal pulses palpable and symmetric, no edema, no crepitus or step off, negative Bita sign.   SKIN: Warm, dry; good skin turgor, no apparent lesions or rashes, no ecchymosis, brisk capillary refill.  NEURO: A&O x3. Sensory and motor functions are grossly intact. Speech is normal. Appearance and judgement seem appropriate for gender and age. No neurological deficits. Neurovascular sensation intact motor function 5/5 of upper and lower extremities, CN II-XII grossly intact, no ataxia, absent pronator drift, intact cerebellar function. Speech clear, without articulation or word-finding difficulties.

## 2020-03-19 NOTE — ED ADULT NURSE NOTE - PMH
Asthma    Atrial fibrillation    Depression, unspecified depression type    History of anemia    History of Hyperthyroidism Asthma    Atrial fibrillation    Depression, unspecified depression type    H/O blood transfusion reaction    History of anemia    History of Hyperthyroidism    Smoker Asthma    Atrial fibrillation    Depression, unspecified depression type    History of anemia    History of Hyperthyroidism    Smoker

## 2020-03-20 DIAGNOSIS — J45.909 UNSPECIFIED ASTHMA, UNCOMPLICATED: ICD-10-CM

## 2020-03-20 DIAGNOSIS — B34.2 CORONAVIRUS INFECTION, UNSPECIFIED: ICD-10-CM

## 2020-03-20 DIAGNOSIS — D64.9 ANEMIA, UNSPECIFIED: ICD-10-CM

## 2020-03-20 DIAGNOSIS — J12.9 VIRAL PNEUMONIA, UNSPECIFIED: ICD-10-CM

## 2020-03-20 DIAGNOSIS — R55 SYNCOPE AND COLLAPSE: ICD-10-CM

## 2020-03-20 LAB
ALBUMIN SERPL ELPH-MCNC: 2.7 G/DL — LOW (ref 3.3–5)
ALP SERPL-CCNC: 74 U/L — SIGNIFICANT CHANGE UP (ref 30–120)
ALT FLD-CCNC: 16 U/L DA — SIGNIFICANT CHANGE UP (ref 10–60)
ANION GAP SERPL CALC-SCNC: 8 MMOL/L — SIGNIFICANT CHANGE UP (ref 5–17)
APTT BLD: 33.8 SEC — SIGNIFICANT CHANGE UP (ref 28.5–37)
AST SERPL-CCNC: 31 U/L — SIGNIFICANT CHANGE UP (ref 10–40)
BASOPHILS # BLD AUTO: 0.01 K/UL — SIGNIFICANT CHANGE UP (ref 0–0.2)
BASOPHILS NFR BLD AUTO: 0.2 % — SIGNIFICANT CHANGE UP (ref 0–2)
BILIRUB SERPL-MCNC: 0.6 MG/DL — SIGNIFICANT CHANGE UP (ref 0.2–1.2)
BUN SERPL-MCNC: 7 MG/DL — SIGNIFICANT CHANGE UP (ref 7–23)
CALCIUM SERPL-MCNC: 8.1 MG/DL — LOW (ref 8.4–10.5)
CHLORIDE SERPL-SCNC: 105 MMOL/L — SIGNIFICANT CHANGE UP (ref 96–108)
CK SERPL-CCNC: 47 U/L — SIGNIFICANT CHANGE UP (ref 26–192)
CO2 SERPL-SCNC: 24 MMOL/L — SIGNIFICANT CHANGE UP (ref 22–31)
CREAT SERPL-MCNC: 0.71 MG/DL — SIGNIFICANT CHANGE UP (ref 0.5–1.3)
EOSINOPHIL # BLD AUTO: 0 K/UL — SIGNIFICANT CHANGE UP (ref 0–0.5)
EOSINOPHIL NFR BLD AUTO: 0 % — SIGNIFICANT CHANGE UP (ref 0–6)
ERYTHROCYTE [SEDIMENTATION RATE] IN BLOOD: 15 MM/HR — SIGNIFICANT CHANGE UP (ref 0–20)
FERRITIN SERPL-MCNC: 20 NG/ML — SIGNIFICANT CHANGE UP (ref 15–150)
GLUCOSE SERPL-MCNC: 91 MG/DL — SIGNIFICANT CHANGE UP (ref 70–99)
GRAM STN FLD: SIGNIFICANT CHANGE UP
HCT VFR BLD CALC: 25 % — LOW (ref 34.5–45)
HCT VFR BLD CALC: 25.2 % — LOW (ref 34.5–45)
HGB BLD-MCNC: 6.9 G/DL — CRITICAL LOW (ref 11.5–15.5)
HGB BLD-MCNC: 7 G/DL — CRITICAL LOW (ref 11.5–15.5)
IMM GRANULOCYTES NFR BLD AUTO: 0.5 % — SIGNIFICANT CHANGE UP (ref 0–1.5)
INR BLD: 1.13 RATIO — SIGNIFICANT CHANGE UP (ref 0.88–1.16)
LYMPHOCYTES # BLD AUTO: 0.47 K/UL — LOW (ref 1–3.3)
LYMPHOCYTES # BLD AUTO: 10.8 % — LOW (ref 13–44)
MAGNESIUM SERPL-MCNC: 1.5 MG/DL — LOW (ref 1.6–2.6)
MCHC RBC-ENTMCNC: 17.1 PG — LOW (ref 27–34)
MCHC RBC-ENTMCNC: 17.2 PG — LOW (ref 27–34)
MCHC RBC-ENTMCNC: 27.6 GM/DL — LOW (ref 32–36)
MCHC RBC-ENTMCNC: 27.8 GM/DL — LOW (ref 32–36)
MCV RBC AUTO: 61.6 FL — LOW (ref 80–100)
MCV RBC AUTO: 62.2 FL — LOW (ref 80–100)
MONOCYTES # BLD AUTO: 0.6 K/UL — SIGNIFICANT CHANGE UP (ref 0–0.9)
MONOCYTES NFR BLD AUTO: 13.8 % — SIGNIFICANT CHANGE UP (ref 2–14)
NEUTROPHILS # BLD AUTO: 3.25 K/UL — SIGNIFICANT CHANGE UP (ref 1.8–7.4)
NEUTROPHILS NFR BLD AUTO: 74.7 % — SIGNIFICANT CHANGE UP (ref 43–77)
NRBC # BLD: 0 /100 WBCS — SIGNIFICANT CHANGE UP (ref 0–0)
NRBC # BLD: 0 /100 WBCS — SIGNIFICANT CHANGE UP (ref 0–0)
PHOSPHATE SERPL-MCNC: 2.8 MG/DL — SIGNIFICANT CHANGE UP (ref 2.5–4.5)
PLATELET # BLD AUTO: 147 K/UL — LOW (ref 150–400)
PLATELET # BLD AUTO: 213 K/UL — SIGNIFICANT CHANGE UP (ref 150–400)
POTASSIUM SERPL-MCNC: 3.1 MMOL/L — LOW (ref 3.5–5.3)
POTASSIUM SERPL-SCNC: 3.1 MMOL/L — LOW (ref 3.5–5.3)
PROCALCITONIN SERPL-MCNC: 0.06 NG/ML — SIGNIFICANT CHANGE UP (ref 0.02–0.1)
PROT SERPL-MCNC: 6.4 G/DL — SIGNIFICANT CHANGE UP (ref 6–8.3)
PROTHROM AB SERPL-ACNC: 12.6 SEC — SIGNIFICANT CHANGE UP (ref 10–12.9)
RBC # BLD: 4.02 M/UL — SIGNIFICANT CHANGE UP (ref 3.8–5.2)
RBC # BLD: 4.09 M/UL — SIGNIFICANT CHANGE UP (ref 3.8–5.2)
RBC # FLD: 26.8 % — HIGH (ref 10.3–14.5)
RBC # FLD: 27 % — HIGH (ref 10.3–14.5)
SODIUM SERPL-SCNC: 137 MMOL/L — SIGNIFICANT CHANGE UP (ref 135–145)
SPECIMEN SOURCE: SIGNIFICANT CHANGE UP
TSH SERPL-MCNC: 2.25 UIU/ML — SIGNIFICANT CHANGE UP (ref 0.27–4.2)
WBC # BLD: 4.35 K/UL — SIGNIFICANT CHANGE UP (ref 3.8–10.5)
WBC # BLD: 4.44 K/UL — SIGNIFICANT CHANGE UP (ref 3.8–10.5)
WBC # FLD AUTO: 4.35 K/UL — SIGNIFICANT CHANGE UP (ref 3.8–10.5)
WBC # FLD AUTO: 4.44 K/UL — SIGNIFICANT CHANGE UP (ref 3.8–10.5)

## 2020-03-20 PROCEDURE — 99233 SBSQ HOSP IP/OBS HIGH 50: CPT

## 2020-03-20 RX ORDER — ASCORBIC ACID 60 MG
500 TABLET,CHEWABLE ORAL DAILY
Refills: 0 | Status: DISCONTINUED | OUTPATIENT
Start: 2020-03-20 | End: 2020-03-27

## 2020-03-20 RX ORDER — POTASSIUM CHLORIDE 20 MEQ
20 PACKET (EA) ORAL ONCE
Refills: 0 | Status: COMPLETED | OUTPATIENT
Start: 2020-03-20 | End: 2020-03-20

## 2020-03-20 RX ORDER — HYDROXYCHLOROQUINE SULFATE 200 MG
200 TABLET ORAL
Refills: 0 | Status: DISCONTINUED | OUTPATIENT
Start: 2020-03-20 | End: 2020-03-20

## 2020-03-20 RX ORDER — BUDESONIDE AND FORMOTEROL FUMARATE DIHYDRATE 160; 4.5 UG/1; UG/1
2 AEROSOL RESPIRATORY (INHALATION)
Refills: 0 | Status: DISCONTINUED | OUTPATIENT
Start: 2020-03-20 | End: 2020-03-22

## 2020-03-20 RX ORDER — ACETAMINOPHEN 500 MG
650 TABLET ORAL EVERY 6 HOURS
Refills: 0 | Status: DISCONTINUED | OUTPATIENT
Start: 2020-03-20 | End: 2020-03-27

## 2020-03-20 RX ORDER — TIOTROPIUM BROMIDE AND OLODATEROL 3.124; 2.736 UG/1; UG/1
2 SPRAY, METERED RESPIRATORY (INHALATION) DAILY
Refills: 0 | Status: DISCONTINUED | OUTPATIENT
Start: 2020-03-20 | End: 2020-03-20

## 2020-03-20 RX ORDER — HYDROXYCHLOROQUINE SULFATE 200 MG
200 TABLET ORAL EVERY 12 HOURS
Refills: 0 | Status: COMPLETED | OUTPATIENT
Start: 2020-03-21 | End: 2020-03-24

## 2020-03-20 RX ORDER — POTASSIUM CHLORIDE 20 MEQ
20 PACKET (EA) ORAL ONCE
Refills: 0 | Status: DISCONTINUED | OUTPATIENT
Start: 2020-03-20 | End: 2020-03-20

## 2020-03-20 RX ORDER — HYDROXYCHLOROQUINE SULFATE 200 MG
400 TABLET ORAL EVERY 12 HOURS
Refills: 0 | Status: COMPLETED | OUTPATIENT
Start: 2020-03-20 | End: 2020-03-20

## 2020-03-20 RX ORDER — ACETAMINOPHEN 500 MG
650 TABLET ORAL EVERY 6 HOURS
Refills: 0 | Status: DISCONTINUED | OUTPATIENT
Start: 2020-03-20 | End: 2020-03-20

## 2020-03-20 RX ADMIN — Medication 650 MILLIGRAM(S): at 06:34

## 2020-03-20 RX ADMIN — BUDESONIDE AND FORMOTEROL FUMARATE DIHYDRATE 2 PUFF(S): 160; 4.5 AEROSOL RESPIRATORY (INHALATION) at 18:22

## 2020-03-20 RX ADMIN — Medication 650 MILLIGRAM(S): at 00:47

## 2020-03-20 RX ADMIN — Medication 650 MILLIGRAM(S): at 07:05

## 2020-03-20 RX ADMIN — Medication 200 MILLIGRAM(S): at 00:47

## 2020-03-20 RX ADMIN — Medication 650 MILLIGRAM(S): at 21:18

## 2020-03-20 RX ADMIN — Medication 650 MILLIGRAM(S): at 11:58

## 2020-03-20 RX ADMIN — Medication 1 MILLIGRAM(S): at 18:18

## 2020-03-20 RX ADMIN — Medication 400 MILLIGRAM(S): at 20:34

## 2020-03-20 RX ADMIN — Medication 50 MILLIGRAM(S): at 06:34

## 2020-03-20 RX ADMIN — Medication 400 MILLIGRAM(S): at 09:40

## 2020-03-20 RX ADMIN — Medication 10 MILLIGRAM(S): at 13:23

## 2020-03-20 RX ADMIN — Medication 20 MILLIEQUIVALENT(S): at 09:56

## 2020-03-20 RX ADMIN — Medication 650 MILLIGRAM(S): at 01:20

## 2020-03-20 RX ADMIN — Medication 20 MILLIEQUIVALENT(S): at 12:17

## 2020-03-20 RX ADMIN — Medication 50 MILLIGRAM(S): at 18:22

## 2020-03-20 RX ADMIN — Medication 650 MILLIGRAM(S): at 20:35

## 2020-03-20 RX ADMIN — AZITHROMYCIN 255 MILLIGRAM(S): 500 TABLET, FILM COATED ORAL at 21:18

## 2020-03-20 RX ADMIN — Medication 1 MILLIGRAM(S): at 00:47

## 2020-03-20 RX ADMIN — Medication 200 MILLIGRAM(S): at 09:24

## 2020-03-20 RX ADMIN — Medication 1 MILLIGRAM(S): at 23:51

## 2020-03-20 RX ADMIN — CEFTRIAXONE 100 MILLIGRAM(S): 500 INJECTION, POWDER, FOR SOLUTION INTRAMUSCULAR; INTRAVENOUS at 20:34

## 2020-03-20 RX ADMIN — Medication 650 MILLIGRAM(S): at 12:50

## 2020-03-20 RX ADMIN — Medication 500 MILLIGRAM(S): at 13:22

## 2020-03-20 NOTE — PROGRESS NOTE ADULT - SUBJECTIVE AND OBJECTIVE BOX
Chief Complaint: Cough, shortness of breath    Interval Events: No events overnight.    Review of Systems:  General: No fevers, chills, weight loss or gain  Skin: No rashes, color changes  Cardiovascular: No chest pain, orthopnea  Respiratory: No shortness of breath, cough  Gastrointestinal: No nausea, abdominal pain  Genitourinary: No incontinence, pain with urination  Musculoskeletal: No pain, swelling, decreased range of motion  Neurological: No headache, weakness  Psychiatric: No depression, anxiety  Endocrine: No weight loss or gain, increased thirst  All other systems are comprehensively negative.    Physical Exam:  Vitals:        Vital Signs Last 24 Hrs  T(C): 37.4 (20 Mar 2020 08:44), Max: 37.9 (20 Mar 2020 00:45)  T(F): 99.3 (20 Mar 2020 08:44), Max: 100.2 (20 Mar 2020 00:45)  HR: 75 (20 Mar 2020 08:44) (75 - 112)  BP: 110/62 (20 Mar 2020 08:44) (105/61 - 140/87)  BP(mean): --  RR: 16 (20 Mar 2020 08:44) (16 - 18)  SpO2: 94% (20 Mar 2020 08:44) (93% - 99%)  General: NAD  HEENT: MMM  Neck: No JVD, no carotid bruit  Lungs: CTAB  CV: RRR, nl S1/S2, no M/R/G  Abdomen: S/NT/ND, +BS  Extremities: No LE edema, no cyanosis  Neuro: AAOx3, non-focal  Skin: No rash    Labs:                        6.9    4.35  )-----------( 147      ( 20 Mar 2020 07:15 )             25.0     03-20    137  |  105  |  7   ----------------------------<  91  3.1<L>   |  24  |  0.71    Ca    8.1<L>      20 Mar 2020 07:15  Phos  2.8     03-20  Mg     1.5     03-20    TPro  6.4  /  Alb  2.7<L>  /  TBili  0.6  /  DBili  x   /  AST  31  /  ALT  16  /  AlkPhos  74  03-20        PT/INR - ( 19 Mar 2020 15:44 )   PT: 12.6 sec;   INR: 1.13 ratio         PTT - ( 19 Mar 2020 15:44 )  PTT:36.4 sec    Telemetry: Sinus rhythm, intermittently tachycardic

## 2020-03-20 NOTE — CONSULT NOTE ADULT - SUBJECTIVE AND OBJECTIVE BOX
HPI:  39F with hyperthyroidism on methimazole, afib on xarelto presented with syncopal episode SOB and cough. No n/v/d CP abd pain.  Patient denies any known sick contacts but her children are home from school with self-isolation ([facility]).  Reports having a COVID 19 positive case but unsure of whom.  No recent travel and patient was compliant with Xarelto (though patient said she stops it when she is on her period).  Currently is on her period (started two days ago) and experiences heavy flow.  In the ED, patient was found to be anemic with hgb of 6.6 and is being transfused 1u of pRBC.  CT chest was done that showed suboptimal pulmonary arterial enhancement, nondiagnostic study for PE and also found to have bilateral nodular groundglass lung opacities.  Was started on ceftriaxone and azithromycin empirically and is also being admitted for COVID rule out.     Infectious Disease consult was called to evaluate pt.    Past Medical & Surgical Hx:  PAST MEDICAL & SURGICAL HISTORY:  Smoker  Depression, unspecified depression type  History of anemia  Asthma  History of Hyperthyroidism  Atrial fibrillation  History of blood transfusion  S/P  Section: ,,      Social History--  EtOH: denies   Tobacco: denies   Drug Use: denies    FAMILY HISTORY:  Family history of stomach cancer (Grandparent)  Family history of diabetes mellitus (Sibling, Grandparent)      Allergies  Motrin (Hives)    Intolerances  NONE      Home Medications:  Ativan:  (19 Mar 2020 19:28)  Lopressor 50 mg oral tablet: 1 tab(s) orally 2 times a day (19 Mar 2020 19:28)  traZODone 50 mg oral tablet: 1 tab(s) orally 2 times a day (19 Mar 2020 19:28)  Xarelto 2.5 mg oral tablet: 1 tab(s) orally 2 times a day (19 Mar 2020 19:28)  Zoloft 100 mg oral tablet: 1 tab(s) orally once a day (19 Mar 2020 19:28)    Current Inpatient Medications :    ANTIBIOTICS:   azithromycin  IVPB 500 milliGRAM(s) IV Intermittent every 24 hours  cefTRIAXone   IVPB 1000 milliGRAM(s) IV Intermittent every 24 hours  hydroxychloroquine 400 milliGRAM(s) Oral every 12 hours      OTHER RELEVANT MEDICATIONS :  acetaminophen   Tablet .. 650 milliGRAM(s) Oral every 6 hours PRN  ascorbic acid 500 milliGRAM(s) Oral daily  budesonide 160 MICROgram(s)/formoterol 4.5 MICROgram(s) Inhaler 2 Puff(s) Inhalation two times a day  guaiFENesin   Syrup  (Sugar-Free) 200 milliGRAM(s) Oral every 6 hours PRN  LORazepam     Tablet 1 milliGRAM(s) Oral three times a day PRN  methimazole 10 milliGRAM(s) Oral three times a day  metoprolol tartrate 50 milliGRAM(s) Oral two times a day  predniSONE   Tablet 10 milliGRAM(s) Oral daily      ROS:  CONSTITUTIONAL:  Negative fever or chills  EYES:  Negative  blurry vision or double vision  CARDIOVASCULAR:  Negative for chest pain or palpitations  RESPIRATORY:  Negative for cough, wheezing, or SOB   GASTROINTESTINAL:  Negative for nausea, vomiting, diarrhea, constipation, or abdominal pain  GENITOURINARY:  Negative frequency, urgency , dysuria or hematuria   NEUROLOGIC:  No headache, confusion, dizziness, lightheadedness  All other systems were reviewed and are negative      Physical Exam:  Vital Signs Last 24 Hrs  T(C): 37.1 (20 Mar 2020 11:59), Max: 37.9 (20 Mar 2020 00:45)  T(F): 98.8 (20 Mar 2020 11:59), Max: 100.2 (20 Mar 2020 00:45)  HR: 81 (20 Mar 2020 11:59) (75 - 109)  BP: 122/72 (20 Mar 2020 11:59) (105/61 - 140/87)  RR: 16 (20 Mar 2020 11:59) (16 - 18)  SpO2: 95% (20 Mar 2020 11:59) (93% - 99%)      General: no acute distress  Eyes: sclera anicteric, pupils equal and reactive to light  ENMT: buccal mucosa moist, pharynx not injected  Neck: supple, trachea midline  Lungs: Decreased no wheeze/rhonchi  Cardiovascular: regular rate and rhythm, S1 S2  Abdomen: soft, nontender, no organomegaly present, bowel sounds normal  Neurological:  alert and oriented x3, Cranial Nerves II-XII grossly intact  Skin:no increased ecchymosis/petechiae/purpura  Lymph Nodes: no palpable cervical/supraclavicular lymph nodes enlargements  Extremities: no cyanosis/clubbing/edema    Labs:                         7.0    4.44  )-----------( 213      ( 20 Mar 2020 12:53 )             25.2   -    137  |  105  |  7   ----------------------------<  91  3.1<L>   |  24  |  0.71    Ca    8.1<L>      20 Mar 2020 07:15  Phos  2.8       Mg     1.5         TPro  6.4  /  Alb  2.7<L>  /  TBili  0.6  /  DBili  x   /  AST  31  /  ALT  16  /  AlkPhos  74        RECENT CULTURES:  pending    RADIOLOGY & ADDITIONAL STUDIES:    EXAM:  CT ANGIO CHEST (W)AW IC                                  PROCEDURE DATE:  2020          INTERPRETATION:  CLINICAL INFORMATION: Shortness of breath    COMPARISON: None.    PROCEDURE:   CT Angiography of the Chest.  90 ml of Omnipaque 350 was injected intravenously. 10 ml were discarded.  Sagittal and coronal reformats were performed as well as 3D (MIP) reconstructions.      FINDINGS:    LUNGS AND AIRWAYS: Patent central airways.  Bilateral nodular groundglass opacities.    PLEURA: No pleural effusion.    MEDIASTINUM AND VIOLET: Mild adenopathy. Calcified lymph nodes.    VESSELS: No thoracic aortic aneurysm or dissection. Suboptimal pulmonary arterial enhancement. Nondiagnostic for evaluation of pulmonary emboli.    HEART: Heart size is normal. No pericardial effusion.    CHEST WALL AND LOWER NECK: Within normal limits.    VISUALIZED UPPER ABDOMEN: Hepatic and splenic calcified granulomas.    BONES: Degenerative changes.    IMPRESSION:     Suboptimal pulmonary arterial enhancement, nondiagnostic study for evaluation of pulmonary emboli.  Alternative study such as nuclear medicine ventilation/perfusion scan may be obtained if clinically indicated.  Bilateral nodular groundglass lung opacities, likely infectious.      Assessment :   39F with hyperthyroidism on methimazole, afib on xarelto presented with syncopal episode SOB and cough. Febrile. Rule out COVID 19. Anemia sec menses.      Plan :   Cont Rocephin and Zithromax pending COVID 19 PCR  Trend temps and cbc  Fu cultures  Fu COVID 19 PCR    Continue with present regiment .  Approptiate use of antibiotics and adverse effects reviewed.      I have discussed the above plan of care with patient/family in detail. They expressed understanding of the treatment plan . Risks, benefits and alternatives discussed in detail. I have asked if they have any questions or concerns and appropriately addressed them to the best of my ability .      > 45 minutes spent in direct patient care reviewing  the notes, lab data/ imaging , discussion with multidisciplinary team. All questions were addressed and answered to the best of my capacity .    Thank you for allowing me to participate in the care of your patient .      Luis Alberto Farley MD  Infectious Disease  [phone]

## 2020-03-20 NOTE — PROGRESS NOTE ADULT - ASSESSMENT
The patient is a 39 year old female with a history of anemia, paroxysmal atrial fibrillation, hyperthyroidism who presents with shortness of breath and cough in the setting of anemia and likely COVID-19.    Plan:  - Remain off of rivaroxaban. Patient seen by me in the past; unclear why she she been on anticoagulation given low thromboembolic risk with [id number] of 0 and previously stable TFTs. She can discuss further with her cardiologist as outpatient.  - Monitor hemoglobin  - Continue metoprolol tartrate 50 mg bid  - CTA chest findings suspicious for COVID-19  - Being ruled out for COVID-19

## 2020-03-20 NOTE — PROGRESS NOTE ADULT - SUBJECTIVE AND OBJECTIVE BOX
Patient is a 39y old  Female who presents with a chief complaint of SOB, syncope (20 Mar 2020 07:57)      INTERVAL HPI/OVERNIGHT EVENTS:  Pt is seen and examined.  feels better. Denies any dizziness, chest pain, sob.  She is refusing for 2nd PRBC transfusion. wants to have it done tommorrow.    Pain Location & Control:     MEDICATIONS  (STANDING):  ascorbic acid 500 milliGRAM(s) Oral daily  azithromycin  IVPB 500 milliGRAM(s) IV Intermittent every 24 hours  cefTRIAXone   IVPB 1000 milliGRAM(s) IV Intermittent every 24 hours  hydroxychloroquine 400 milliGRAM(s) Oral every 12 hours  methimazole 10 milliGRAM(s) Oral three times a day  metoprolol tartrate 50 milliGRAM(s) Oral two times a day  potassium chloride    Tablet ER 20 milliEquivalent(s) Oral once  predniSONE   Tablet 10 milliGRAM(s) Oral daily  tiotropium 2.5 MICROgram(s)/olodaterol 2.5 MICROgram(s) (STIOLTO) Inhaler 2 Puff(s) Inhalation daily    MEDICATIONS  (PRN):  acetaminophen   Tablet .. 650 milliGRAM(s) Oral every 6 hours PRN Temp greater or equal to 38C (100.4F), Mild Pain (1 - 3)  guaiFENesin   Syrup  (Sugar-Free) 200 milliGRAM(s) Oral every 6 hours PRN Cough  LORazepam     Tablet 1 milliGRAM(s) Oral three times a day PRN Anxiety      Allergies    Motrin (Hives)    Intolerances      Vital Signs Last 24 Hrs  T(C): 37.4 (20 Mar 2020 08:44), Max: 37.9 (20 Mar 2020 00:45)  T(F): 99.3 (20 Mar 2020 08:44), Max: 100.2 (20 Mar 2020 00:45)  HR: 75 (20 Mar 2020 08:44) (75 - 112)  BP: 110/62 (20 Mar 2020 08:44) (105/61 - 140/87)  BP(mean): --  RR: 16 (20 Mar 2020 08:44) (16 - 18)  SpO2: 94% (20 Mar 2020 08:44) (93% - 99%)        LABS:                        6.9    4.35  )-----------( 147      ( 20 Mar 2020 07:15 )             25.0     20 Mar 2020 07:15    137    |  105    |  7      ----------------------------<  91     3.1     |  24     |  0.71     Ca    8.1        20 Mar 2020 07:15  Phos  2.8       20 Mar 2020 07:15  Mg     1.5       20 Mar 2020 07:15    TPro  6.4    /  Alb  2.7    /  TBili  0.6    /  DBili  x      /  AST  31     /  ALT  16     /  AlkPhos  74     20 Mar 2020 07:15    PT/INR - ( 20 Mar 2020 09:43 )   PT: 12.6 sec;   INR: 1.13 ratio         PTT - ( 20 Mar 2020 09:43 )  PTT:33.8 sec    CAPILLARY BLOOD GLUCOSE        CARDIAC MARKERS ( 20 Mar 2020 09:24 )  x     / x     / 47 U/L / x     / x          Cultures    Lactate, Blood: 1.3 mmol/L (03-19 @ 15:44)        RADIOLOGY & ADDITIONAL TESTS:    Imaging Personally Reviewed:  [ ] YES  [ ] NO    Consultant(s) Notes Reviewed:  [ ] YES  [ ] NO    Care Discussed with Consultants/Other Providers [ ] YES  [ ] NO

## 2020-03-20 NOTE — PROGRESS NOTE ADULT - ASSESSMENT
39F with hyperthyroidism on methimazole, afib on xarelto who presents with SOB/WEN and syncope.       Syncope  - admitted to telemetry  - likely anemia related 2/2 heavy period on xarelto  - s/p 1u pRBC transfusion  - still low H/H, refusing for 2nd U of PRBC. She wants to have it tommorrow.  Understand risk and bebefit of PRBC transfusion.  - As per cardiology-cardiology -   - Remain off of rivaroxaban. Patient seen by me in the past; unclear why she she been on anticoagulation given low thromboembolic risk with [id number] of 0 and previously stable TFTs. She can discuss further with her cardiologist as outpatient.  - Monitor hemoglobin  - Continue metoprolol tartrate 50 mg bid  - TTE    SOB/WEN  - could be COVID and is being rule'd out -> continue contact and airborne precautions  - f/u RVP  - ID consult  - will continue azithromycin and ceftriaxone for now   - f/u blood cultures  - inh as needed, no nebs    Prolonged QT  - hold sertraline   - monitor    Hyperthyroidism  - cont with methimazole    Afib  - holding xarelto 2/2 current bleeding  - continue metoprolol     Preventive measures  IMPROVE VTE Individual Risk Assessment          RISK                                                          Points  [  ] Previous VTE                                                 3  [  ] Thrombophilia                                              2  [  ] Lower limb paralysis                                    2        (unable to hold up >15 seconds)    [  ] Current Cancer                                             2         (within 6 months)  [  ] Immobilization > 24 hrs                              1  [  ] ICU/CCU stay > 24 hours                            1  [  ] Age > 60                                                        1    IMPROVE VTE Score 0    - currently bleeding, hold AC for now    Plan of care was discuss with patient, all questions were answered, seems understand and in agreement.

## 2020-03-20 NOTE — CONSULT NOTE ADULT - SUBJECTIVE AND OBJECTIVE BOX
PULMONARY/CRITICAL CARE        Patient is a 39y old  Female who presents with a chief complaint of SOB, syncope (19 Mar 2020 20:01)    BRIEF HOSPITAL COURSE: ***39F with hyperthyroidism on methimazole, afib on xarelto who presents with SOB/WEN and syncope.  Symptoms started about a week ago with SOB.  Worse with exertion.  Associated with a cough with clear sputum.  Had no fevers.  Became progressively worse over the week.  Today, patient actually syncopized this morning and injured her lip.  No chest pain.  Patient denies any known sick contacts but her children are home from school with self-isolation ([facility]).  Reports having a positive case but unsure of whom.  No recent travel and patient was compliant with Xarelto (though patient said she stops it when she is on her period).  Currently is on her period (started two days ago) and experiences heavy flow.  In the ED, patient was found to be anemic with hgb of 6.6 and is being transfused 1u of pRBC.  CT chest was done that showed suboptimal pulmonary arterial enhancement, nondiagnostic study for PE and also found to have bilateral nodular groundglass lung opacities.  Was started on ceftriaxone and azithromycin empirically and is also being admitted for COVID rule out.    Wheezing. Was using albuterol only.  No recent travel.    Events last 24 hours: ***    PAST MEDICAL & SURGICAL HISTORY:  Smoker  Depression, unspecified depression type  History of anemia  Asthma  History of Hyperthyroidism  Atrial fibrillation  History of blood transfusion  S/P  Section: ,,    Allergies    Motrin (Hives)    Intolerances      FAMILY HISTORY/ social: Smokes 1/2 ppd for 15 yrs, no etoh  Family history of stomach cancer (Grandparent)  Family history of diabetes mellitus (Sibling, Grandparent)      Review of Systems:  CONSTITUTIONAL: No fever, chills, or fatigue  EYES: No eye pain, visual disturbances, or discharge  ENMT:  No difficulty hearing, tinnitus, vertigo; No sinus or throat pain  NECK: No pain or stiffness  RESPIRATORY: has nonprod cough, wheezing, chills no  hemoptysis; has shortness of breath  CARDIOVASCULAR: No chest pain, palpitations, dizziness, or leg swelling  GASTROINTESTINAL: No abdominal or epigastric pain. No nausea, vomiting, or hematemesis; No diarrhea or constipation. No melena or hematochezia.  GENITOURINARY: No dysuria, frequency, hematuria, or incontinence  NEUROLOGICAL: No headaches, memory loss, loss of strength, numbness, or tremors  SKIN: No itching, burning, rashes, or lesions   MUSCULOSKELETAL: No joint pain or swelling; No muscle, back, or extremity pain  PSYCHIATRIC: No depression, anxiety, mood swings, or difficulty sleeping      Medications:  azithromycin  IVPB 500 milliGRAM(s) IV Intermittent every 24 hours  cefTRIAXone   IVPB 1000 milliGRAM(s) IV Intermittent every 24 hours  hydroxychloroquine 200 milliGRAM(s) Oral two times a day    metoprolol tartrate 50 milliGRAM(s) Oral two times a day    guaiFENesin   Syrup  (Sugar-Free) 200 milliGRAM(s) Oral every 6 hours PRN  tiotropium 2.5 MICROgram(s)/olodaterol 2.5 MICROgram(s) (STIOLTO) Inhaler 2 Puff(s) Inhalation daily    acetaminophen   Tablet .. 650 milliGRAM(s) Oral every 6 hours PRN  LORazepam     Tablet 1 milliGRAM(s) Oral three times a day PRN            methimazole 10 milliGRAM(s) Oral three times a day    ascorbic acid 500 milliGRAM(s) Oral daily  potassium chloride    Tablet ER 20 milliEquivalent(s) Oral once                ICU Vital Signs Last 24 Hrs  T(C): 37.8 (20 Mar 2020 06:20), Max: 37.9 (20 Mar 2020 00:45)  T(F): 100.1 (20 Mar 2020 06:20), Max: 100.2 (20 Mar 2020 00:45)  HR: 97 (20 Mar 2020 06:20) (80 - 112)  BP: 113/57 (20 Mar 2020 06:20) (105/61 - 140/87)  BP(mean): --  ABP: --  ABP(mean): --  RR: 16 (20 Mar 2020 04:00) (16 - 18)  SpO2: 93% (20 Mar 2020 04:00) (93% - 99%)    Vital Signs Last 24 Hrs  T(C): 37.8 (20 Mar 2020 06:20), Max: 37.9 (20 Mar 2020 00:45)  T(F): 100.1 (20 Mar 2020 06:20), Max: 100.2 (20 Mar 2020 00:45)  HR: 97 (20 Mar 2020 06:20) (80 - 112)  BP: 113/57 (20 Mar 2020 06:20) (105/61 - 140/87)  BP(mean): --  RR: 16 (20 Mar 2020 04:00) (16 - 18)  SpO2: 93% (20 Mar 2020 04:00) (93% - 99%)        I&O's Detail        LABS:                        6.6    5.41  )-----------( 191      ( 19 Mar 2020 15:44 )             25.6     03-20    137  |  105  |  7   ----------------------------<  91  3.1<L>   |  24  |  0.71    Ca    8.1<L>      20 Mar 2020 07:15    TPro  6.4  /  Alb  2.7<L>  /  TBili  0.6  /  DBili  x   /  AST  31  /  ALT  16  /  AlkPhos  74  03-20          CAPILLARY BLOOD GLUCOSE        PT/INR - ( 19 Mar 2020 15:44 )   PT: 12.6 sec;   INR: 1.13 ratio         PTT - ( 19 Mar 2020 15:44 )  PTT:36.4 sec    CULTURES:      Physical Examination:    General: mild sob wd female    HEENT: Pupils equal, reactive to light.  Symmetric.    PULM: coarse wheezing, rhonchi bilat no rales.   CVS: Regular rate and rhythm, no murmurs, rubs, or gallops    ABD: Soft, nondistended, nontender, normoactive bowel sounds, no masses    EXT: No edema, nontender    SKIN: Warm and well perfused, no rashes noted.    NEURO: Alert, oriented, interactive, nonfocal    RADIOLOGY: ***< from: CT Angio Chest w/ IV Cont (20 @ 17:26) >  EXAM:  CT ANGIO CHEST (W)AW IC                                  PROCEDURE DATE:  2020          INTERPRETATION:  CLINICAL INFORMATION: Shortness of breath    COMPARISON: None.    PROCEDURE:   CT Angiography of the Chest.  90 ml of Omnipaque 350 was injected intravenously. 10 ml were discarded.  Sagittal and coronal reformats were performed as well as 3D (MIP) reconstructions.      FINDINGS:    LUNGS AND AIRWAYS: Patent central airways.  Bilateral nodular groundglass opacities.    PLEURA: No pleural effusion.    MEDIASTINUM AND VIOLET: Mild adenopathy. Calcified lymph nodes.    VESSELS: No thoracic aortic aneurysm or dissection. Suboptimal pulmonary arterial enhancement. Nondiagnostic for evaluation of pulmonary emboli.    HEART: Heart size is normal. No pericardial effusion.    CHEST WALL AND LOWER NECK: Within normal limits.    VISUALIZED UPPER ABDOMEN: Hepatic and splenic calcified granulomas.    BONES: Degenerative changes.    IMPRESSION:     Suboptimal pulmonary arterial enhancement, nondiagnostic study for evaluation of pulmonary emboli.  Alternative study such as nuclear medicine ventilation/perfusion scan may be obtained if clinically indicated.  Bilateral nodular groundglass lung opacities, likely infectious.                    < end of copied text >      CRITICAL CARE TIME SPENT: ***

## 2020-03-21 LAB
ANION GAP SERPL CALC-SCNC: 12 MMOL/L — SIGNIFICANT CHANGE UP (ref 5–17)
BUN SERPL-MCNC: 6 MG/DL — LOW (ref 7–23)
CALCIUM SERPL-MCNC: 8.5 MG/DL — SIGNIFICANT CHANGE UP (ref 8.4–10.5)
CHLORIDE SERPL-SCNC: 106 MMOL/L — SIGNIFICANT CHANGE UP (ref 96–108)
CO2 SERPL-SCNC: 22 MMOL/L — SIGNIFICANT CHANGE UP (ref 22–31)
CREAT SERPL-MCNC: 0.71 MG/DL — SIGNIFICANT CHANGE UP (ref 0.5–1.3)
CRP SERPL-MCNC: 6.41 MG/DL — HIGH (ref 0–0.4)
GLUCOSE SERPL-MCNC: 115 MG/DL — HIGH (ref 70–99)
HCT VFR BLD CALC: 28 % — LOW (ref 34.5–45)
HGB BLD-MCNC: 7.5 G/DL — LOW (ref 11.5–15.5)
LDH SERPL L TO P-CCNC: 439 U/L — HIGH (ref 50–242)
MCHC RBC-ENTMCNC: 16.9 PG — LOW (ref 27–34)
MCHC RBC-ENTMCNC: 26.8 GM/DL — LOW (ref 32–36)
MCV RBC AUTO: 63.1 FL — LOW (ref 80–100)
NRBC # BLD: 0 /100 WBCS — SIGNIFICANT CHANGE UP (ref 0–0)
PLATELET # BLD AUTO: 197 K/UL — SIGNIFICANT CHANGE UP (ref 150–400)
POTASSIUM SERPL-MCNC: 3.6 MMOL/L — SIGNIFICANT CHANGE UP (ref 3.5–5.3)
POTASSIUM SERPL-SCNC: 3.6 MMOL/L — SIGNIFICANT CHANGE UP (ref 3.5–5.3)
RBC # BLD: 4.44 M/UL — SIGNIFICANT CHANGE UP (ref 3.8–5.2)
RBC # FLD: 27 % — HIGH (ref 10.3–14.5)
SARS-COV-2 RNA SPEC QL NAA+PROBE: DETECTED
SODIUM SERPL-SCNC: 140 MMOL/L — SIGNIFICANT CHANGE UP (ref 135–145)
WBC # BLD: 4.24 K/UL — SIGNIFICANT CHANGE UP (ref 3.8–10.5)
WBC # FLD AUTO: 4.24 K/UL — SIGNIFICANT CHANGE UP (ref 3.8–10.5)

## 2020-03-21 PROCEDURE — 71045 X-RAY EXAM CHEST 1 VIEW: CPT | Mod: 26

## 2020-03-21 PROCEDURE — 99233 SBSQ HOSP IP/OBS HIGH 50: CPT

## 2020-03-21 RX ORDER — FERROUS SULFATE 325(65) MG
325 TABLET ORAL DAILY
Refills: 0 | Status: DISCONTINUED | OUTPATIENT
Start: 2020-03-21 | End: 2020-03-27

## 2020-03-21 RX ORDER — TRAMADOL HYDROCHLORIDE 50 MG/1
50 TABLET ORAL ONCE
Refills: 0 | Status: DISCONTINUED | OUTPATIENT
Start: 2020-03-21 | End: 2020-03-21

## 2020-03-21 RX ORDER — MAGNESIUM SULFATE 500 MG/ML
2 VIAL (ML) INJECTION ONCE
Refills: 0 | Status: COMPLETED | OUTPATIENT
Start: 2020-03-21 | End: 2020-03-21

## 2020-03-21 RX ORDER — MAGNESIUM OXIDE 400 MG ORAL TABLET 241.3 MG
400 TABLET ORAL
Refills: 0 | Status: DISCONTINUED | OUTPATIENT
Start: 2020-03-21 | End: 2020-03-27

## 2020-03-21 RX ADMIN — Medication 200 MILLIGRAM(S): at 21:49

## 2020-03-21 RX ADMIN — Medication 500 MILLIGRAM(S): at 14:04

## 2020-03-21 RX ADMIN — Medication 325 MILLIGRAM(S): at 14:05

## 2020-03-21 RX ADMIN — Medication 50 MILLIGRAM(S): at 18:44

## 2020-03-21 RX ADMIN — BUDESONIDE AND FORMOTEROL FUMARATE DIHYDRATE 2 PUFF(S): 160; 4.5 AEROSOL RESPIRATORY (INHALATION) at 19:23

## 2020-03-21 RX ADMIN — Medication 200 MILLIGRAM(S): at 09:37

## 2020-03-21 RX ADMIN — MAGNESIUM OXIDE 400 MG ORAL TABLET 400 MILLIGRAM(S): 241.3 TABLET ORAL at 18:43

## 2020-03-21 RX ADMIN — Medication 650 MILLIGRAM(S): at 06:47

## 2020-03-21 RX ADMIN — TRAMADOL HYDROCHLORIDE 50 MILLIGRAM(S): 50 TABLET ORAL at 21:15

## 2020-03-21 RX ADMIN — BUDESONIDE AND FORMOTEROL FUMARATE DIHYDRATE 2 PUFF(S): 160; 4.5 AEROSOL RESPIRATORY (INHALATION) at 06:46

## 2020-03-21 RX ADMIN — TRAMADOL HYDROCHLORIDE 50 MILLIGRAM(S): 50 TABLET ORAL at 20:34

## 2020-03-21 RX ADMIN — Medication 50 MILLIGRAM(S): at 06:47

## 2020-03-21 RX ADMIN — Medication 200 MILLIGRAM(S): at 06:46

## 2020-03-21 RX ADMIN — Medication 650 MILLIGRAM(S): at 23:58

## 2020-03-21 RX ADMIN — Medication 650 MILLIGRAM(S): at 07:07

## 2020-03-21 RX ADMIN — Medication 50 GRAM(S): at 18:36

## 2020-03-21 RX ADMIN — Medication 10 MILLIGRAM(S): at 06:47

## 2020-03-21 RX ADMIN — Medication 1 MILLIGRAM(S): at 23:59

## 2020-03-21 RX ADMIN — Medication 200 MILLIGRAM(S): at 21:50

## 2020-03-21 NOTE — PROGRESS NOTE ADULT - ASSESSMENT
The patient is a 39 year old female with a history of anemia, paroxysmal atrial fibrillation, hyperthyroidism who presents with shortness of breath and cough in the setting of anemia and likely COVID-19.    Plan:  - Remain off of rivaroxaban. Patient seen by me in the past; unclear why she she been on anticoagulation given low thromboembolic risk with [id number] of 0 and previously stable TFTs. She can discuss further with her cardiologist as outpatient.  - Monitor hemoglobin  - Continue metoprolol tartrate 50 mg bid  - CTA chest findings suspicious for COVID-19  - COVID-19 pending

## 2020-03-21 NOTE — CHART NOTE - NSCHARTNOTEFT_GEN_A_CORE
Patient lost IV access and is refusing IV placement.  Will switch ceftriaxone and azithromycin to PO levaquin.

## 2020-03-21 NOTE — PROGRESS NOTE ADULT - SUBJECTIVE AND OBJECTIVE BOX
ROBBIE MCKNIGHT is a 39yFemale , patient examined and chart reviewed.     INTERVAL HPI/ OVERNIGHT EVENTS:   Feeling better. Afebrile.  No events.    PAST MEDICAL & SURGICAL HISTORY:  Smoker  Depression, unspecified depression type  History of anemia  Asthma  History of Hyperthyroidism  Atrial fibrillation  History of blood transfusion  S/P  Section: ,,      For details regarding the patient's social history, family history, and other miscellaneous elements, please refer the initial infectious diseases consultation and/or the admitting history and physical examination for this admission.    ROS:  CONSTITUTIONAL:  Negative fever or chills, feels well, good appetite  EYES:  Negative  blurry vision or double vision  CARDIOVASCULAR:  Negative for chest pain or palpitations  RESPIRATORY:  Negative for cough, wheezing, or SOB   GASTROINTESTINAL:  Negative for nausea, vomiting, diarrhea, constipation, or abdominal pain  GENITOURINARY:  Negative frequency, urgency or dysuria  NEUROLOGIC:  No headache, confusion, dizziness, lightheadedness  All other systems were reviewed and are negative     Motrin (Hives)      Current inpatient medications :    ANTIBIOTICS/RELEVANT:  hydroxychloroquine 200 milliGRAM(s) Oral every 12 hours  levoFLOXacin  Tablet 750 milliGRAM(s) Oral every 24 hours      acetaminophen   Tablet .. 650 milliGRAM(s) Oral every 6 hours PRN  ascorbic acid 500 milliGRAM(s) Oral daily  budesonide 160 MICROgram(s)/formoterol 4.5 MICROgram(s) Inhaler 2 Puff(s) Inhalation two times a day  ferrous    sulfate 325 milliGRAM(s) Oral daily  guaiFENesin   Syrup  (Sugar-Free) 200 milliGRAM(s) Oral every 6 hours PRN  LORazepam     Tablet 1 milliGRAM(s) Oral three times a day PRN  magnesium oxide 400 milliGRAM(s) Oral three times a day with meals  methimazole 10 milliGRAM(s) Oral three times a day  metoprolol tartrate 50 milliGRAM(s) Oral two times a day  predniSONE   Tablet 10 milliGRAM(s) Oral daily      Objective:    T(C): 36.8 (20 @ 19:09), Max: 37.9 (20 @ 06:30)  HR: 92 (20 @ 19:09) (70 - 92)  BP: 125/82 (20 @ 19:09) (112/66 - 125/86)  RR: 18 (20 @ 19:09) (15 - 18)  SpO2: 94% (20 @ 19:09) (94% - 98%)      Physical Exam:  General: well developed well nourished, in no acute distress  Eyes: sclera anicteric, pupils equal and reactive to light  ENMT: buccal mucosa moist, pharynx not injected  Neck: supple, trachea midline  Lungs: Decreased no wheeze/rhonchi  Cardiovascular: regular rate and rhythm, S1 S2  Abdomen: soft, nontender, no organomegaly present, bowel sounds normal  Neurological: alert and oriented x3, Cranial Nerves II-XII grossly intact  Skin: no increased ecchymosis/petechiae/purpura  Lymph Nodes: no palpable cervical/supraclavicular lymph nodes enlargements  Extremities: no cyanosis/clubbing/edema        LABS:                          7.5    4.24  )-----------( 197      ( 21 Mar 2020 12:04 )             28.0       03-21    140  |  106  |  6<L>  ----------------------------<  115<H>  3.6   |  22  |  0.71    Ca    8.5      21 Mar 2020 12:04  Phos  2.8     03-20  Mg     1.5     03-20    TPro  6.4  /  Alb  2.7<L>  /  TBili  0.6  /  DBili  x   /  AST  31  /  ALT  16  /  AlkPhos  74  03-20      PT/INR - ( 20 Mar 2020 09:43 )   PT: 12.6 sec;   INR: 1.13 ratio         PTT - ( 20 Mar 2020 09:43 )  PTT:33.8 sec    MICROBIOLOGY:    Culture - Sputum (collected 20 Mar 2020 21:11)  Source: .Sputum Sputum  Gram Stain (20 Mar 2020 22:56):    No polymorphonuclear leukocytes per low power field    Few Squamous epithelial cells per low power field    Few Gram Negative Rods per oil power field    Few Gram positive cocci in pairs per oil power field  Preliminary Report (21 Mar 2020 17:55):    Normal Respiratory Juju present    Culture - Blood (collected 19 Mar 2020 22:06)  Source: .Blood Blood-Peripheral  Preliminary Report (20 Mar 2020 23:01):    No growth to date.    Culture - Blood (collected 19 Mar 2020 22:06)  Source: .Blood Blood-Peripheral  Preliminary Report (20 Mar 2020 23:01):    No growth to date.      RADIOLOGY & ADDITIONAL STUDIES:  EXAM:  CT ANGIO CHEST (W)AW IC                                  PROCEDURE DATE:  2020          INTERPRETATION:  CLINICAL INFORMATION: Shortness of breath    COMPARISON: None.    PROCEDURE:   CT Angiography of the Chest.  90 ml of Omnipaque 350 was injected intravenously. 10 ml were discarded.  Sagittal and coronal reformats were performed as well as 3D (MIP) reconstructions.      FINDINGS:    LUNGS AND AIRWAYS: Patent central airways.  Bilateral nodular groundglass opacities.    PLEURA: No pleural effusion.    MEDIASTINUM AND VIOLET: Mild adenopathy. Calcified lymph nodes.    VESSELS: No thoracic aortic aneurysm or dissection. Suboptimal pulmonary arterial enhancement. Nondiagnostic for evaluation of pulmonary emboli.    HEART: Heart size is normal. No pericardial effusion.    CHEST WALL AND LOWER NECK: Within normal limits.    VISUALIZED UPPER ABDOMEN: Hepatic and splenic calcified granulomas.    BONES: Degenerative changes.    IMPRESSION:     Suboptimal pulmonary arterial enhancement, nondiagnostic study for evaluation of pulmonary emboli.  Alternative study such as nuclear medicine ventilation/perfusion scan may be obtained if clinically indicated.  Bilateral nodular groundglass lung opacities, likely infectious.      Assessment :   39F with hyperthyroidism on methimazole, afib on xarelto admitted with teresa pneumonia sec COVID 19. Anemia sec menses.      Plan :   Dc Levaquin  Cont Hydroxycloroquine  Trend temps and cbc with dif  LDH, CRP ferritin      COVID-19 is a viral illness and as expected and as we are seeing presents as a viral illness with full spectrum of presentations. First week of illness generally less severe followed by critical period where patients start to improve or decompensate and require intubation (7-10 days post symptom onset) and felt to be due to a maladaptive immune response in the setting of decreased viral titers. Clinical course ranging from 2-8 weeks. Do recommend continued strict isolation to minimize risk to staff, avoid aerosolizing procedures, avoidance of steroids which are associated with worse outcomes including inhaled steroids, and with no compelling evidence to date do not recommend antiviral therapies outside of established protocols or controlled trials would support focus on supportive care and avoid interventions with no demonstrated efficacy and unclear adverse effect profile in context of COVID-19      D/w Hospitalist    Continue with present regiment.  Appropriate use of antibiotics and adverse effects reviewed.      I have discussed the above plan of care with patient/ family in detail. They expressed understanding of the  treatment plan . Risks, benefits and alternatives discussed in detail. I have asked if they have any questions or concerns and appropriately addressed them to the best of my ability .    > 35 minutes were spent in direct patient care reviewing notes, medications ,labs data/ imaging , discussion with multidisciplinary team.    Thank you for allowing me to participate in care of your patient .    Luis Alberto Farley MD  Infectious Disease  [phone] ROBBIE MCKNIGHT is a 39yFemale , patient examined and chart reviewed.     INTERVAL HPI/ OVERNIGHT EVENTS:   Feeling better. Afebrile.  No events. COVID 9 PCR +.    PAST MEDICAL & SURGICAL HISTORY:  Smoker  Depression, unspecified depression type  History of anemia  Asthma  History of Hyperthyroidism  Atrial fibrillation  History of blood transfusion  S/P  Section: ,,      For details regarding the patient's social history, family history, and other miscellaneous elements, please refer the initial infectious diseases consultation and/or the admitting history and physical examination for this admission.    ROS:  CONSTITUTIONAL:  Negative fever or chills, feels well, good appetite  EYES:  Negative  blurry vision or double vision  CARDIOVASCULAR:  Negative for chest pain or palpitations  RESPIRATORY:  Negative for cough, wheezing, or SOB   GASTROINTESTINAL:  Negative for nausea, vomiting, diarrhea, constipation, or abdominal pain  GENITOURINARY:  Negative frequency, urgency or dysuria  NEUROLOGIC:  No headache, confusion, dizziness, lightheadedness  All other systems were reviewed and are negative     Motrin (Hives)      Current inpatient medications :    ANTIBIOTICS/RELEVANT:  hydroxychloroquine 200 milliGRAM(s) Oral every 12 hours  levoFLOXacin  Tablet 750 milliGRAM(s) Oral every 24 hours      acetaminophen   Tablet .. 650 milliGRAM(s) Oral every 6 hours PRN  ascorbic acid 500 milliGRAM(s) Oral daily  budesonide 160 MICROgram(s)/formoterol 4.5 MICROgram(s) Inhaler 2 Puff(s) Inhalation two times a day  ferrous    sulfate 325 milliGRAM(s) Oral daily  guaiFENesin   Syrup  (Sugar-Free) 200 milliGRAM(s) Oral every 6 hours PRN  LORazepam     Tablet 1 milliGRAM(s) Oral three times a day PRN  magnesium oxide 400 milliGRAM(s) Oral three times a day with meals  methimazole 10 milliGRAM(s) Oral three times a day  metoprolol tartrate 50 milliGRAM(s) Oral two times a day  predniSONE   Tablet 10 milliGRAM(s) Oral daily      Objective:    T(C): 36.8 (20 @ 19:09), Max: 37.9 (20 @ 06:30)  HR: 92 (20 @ 19:09) (70 - 92)  BP: 125/82 (20 @ 19:09) (112/66 - 125/86)  RR: 18 (20 @ 19:09) (15 - 18)  SpO2: 94% (20 @ 19:09) (94% - 98%)      Physical Exam:  General: well developed well nourished, in no acute distress  Eyes: sclera anicteric, pupils equal and reactive to light  ENMT: buccal mucosa moist, pharynx not injected  Neck: supple, trachea midline  Lungs: Decreased no wheeze/rhonchi  Cardiovascular: regular rate and rhythm, S1 S2  Abdomen: soft, nontender, no organomegaly present, bowel sounds normal  Neurological: alert and oriented x3, Cranial Nerves II-XII grossly intact  Skin: no increased ecchymosis/petechiae/purpura  Lymph Nodes: no palpable cervical/supraclavicular lymph nodes enlargements  Extremities: no cyanosis/clubbing/edema        LABS:                          7.5    4.24  )-----------( 197      ( 21 Mar 2020 12:04 )             28.0       03-21    140  |  106  |  6<L>  ----------------------------<  115<H>  3.6   |  22  |  0.71    Ca    8.5      21 Mar 2020 12:04  Phos  2.8     03-20  Mg     1.5     03-20    TPro  6.4  /  Alb  2.7<L>  /  TBili  0.6  /  DBili  x   /  AST  31  /  ALT  16  /  AlkPhos  74  03-20      PT/INR - ( 20 Mar 2020 09:43 )   PT: 12.6 sec;   INR: 1.13 ratio         PTT - ( 20 Mar 2020 09:43 )  PTT:33.8 sec    MICROBIOLOGY:  COVID-19 PCR . (20 @ 23:21)    COVID-19 PCR: Detected: LDT - Laboratory Developed Test All “detected” results on this new test  are considered presumptively positive results, are clinically actionable,  and specimens will be forwarded to [facility] for confirmation testing.  Another report (corrected report) will only be issued if discordant  results occur.  This test has been validated by [employer] to be accurate;  though it has not been FDA cleared/approved by the usual pathway.  As with all laboratory tests, results should be correlated with clinical  findings.      Culture - Sputum (collected 20 Mar 2020 21:11)  Source: .Sputum Sputum  Gram Stain (20 Mar 2020 22:56):    No polymorphonuclear leukocytes per low power field    Few Squamous epithelial cells per low power field    Few Gram Negative Rods per oil power field    Few Gram positive cocci in pairs per oil power field  Preliminary Report (21 Mar 2020 17:55):    Normal Respiratory Juju present    Culture - Blood (collected 19 Mar 2020 22:06)  Source: .Blood Blood-Peripheral  Preliminary Report (20 Mar 2020 23:01):    No growth to date.    Culture - Blood (collected 19 Mar 2020 22:06)  Source: .Blood Blood-Peripheral  Preliminary Report (20 Mar 2020 23:01):    No growth to date.      RADIOLOGY & ADDITIONAL STUDIES:  EXAM:  CT ANGIO CHEST (W)AW IC                                  PROCEDURE DATE:  2020          INTERPRETATION:  CLINICAL INFORMATION: Shortness of breath    COMPARISON: None.    PROCEDURE:   CT Angiography of the Chest.  90 ml of Omnipaque 350 was injected intravenously. 10 ml were discarded.  Sagittal and coronal reformats were performed as well as 3D (MIP) reconstructions.      FINDINGS:    LUNGS AND AIRWAYS: Patent central airways.  Bilateral nodular groundglass opacities.    PLEURA: No pleural effusion.    MEDIASTINUM AND VIOLET: Mild adenopathy. Calcified lymph nodes.    VESSELS: No thoracic aortic aneurysm or dissection. Suboptimal pulmonary arterial enhancement. Nondiagnostic for evaluation of pulmonary emboli.    HEART: Heart size is normal. No pericardial effusion.    CHEST WALL AND LOWER NECK: Within normal limits.    VISUALIZED UPPER ABDOMEN: Hepatic and splenic calcified granulomas.    BONES: Degenerative changes.    IMPRESSION:     Suboptimal pulmonary arterial enhancement, nondiagnostic study for evaluation of pulmonary emboli.  Alternative study such as nuclear medicine ventilation/perfusion scan may be obtained if clinically indicated.  Bilateral nodular groundglass lung opacities, likely infectious.      Assessment :   39F with hyperthyroidism on methimazole, afib on xarelto admitted with teresa pneumonia sec COVID 19. Anemia sec menses.      Plan :   Dc Levaquin  Cont Hydroxycloroquine  Trend temps and cbc with dif  LDH, CRP ferritin      COVID-19 is a viral illness and as expected and as we are seeing presents as a viral illness with full spectrum of presentations. First week of illness generally less severe followed by critical period where patients start to improve or decompensate and require intubation (7-10 days post symptom onset) and felt to be due to a maladaptive immune response in the setting of decreased viral titers. Clinical course ranging from 2-8 weeks. Do recommend continued strict isolation to minimize risk to staff, avoid aerosolizing procedures, avoidance of steroids which are associated with worse outcomes including inhaled steroids, and with no compelling evidence to date do not recommend antiviral therapies outside of established protocols or controlled trials would support focus on supportive care and avoid interventions with no demonstrated efficacy and unclear adverse effect profile in context of COVID-19      D/w Hospitalist    Continue with present regiment.  Appropriate use of antibiotics and adverse effects reviewed.      I have discussed the above plan of care with patient/ family in detail. They expressed understanding of the  treatment plan . Risks, benefits and alternatives discussed in detail. I have asked if they have any questions or concerns and appropriately addressed them to the best of my ability .    > 35 minutes were spent in direct patient care reviewing notes, medications ,labs data/ imaging , discussion with multidisciplinary team.    Thank you for allowing me to participate in care of your patient .    Luis Alberto Farley MD  Infectious Disease  [phone]

## 2020-03-21 NOTE — PROGRESS NOTE ADULT - SUBJECTIVE AND OBJECTIVE BOX
Patient is a 39y old  Female who presents with a chief complaint of SOB, syncope (21 Mar 2020 09:17)      INTERVAL HPI/OVERNIGHT EVENTS:  Pt is seen and examined.  feels ok.   No new complaints today.      Pain Location & Control:     MEDICATIONS  (STANDING):  ascorbic acid 500 milliGRAM(s) Oral daily  azithromycin  IVPB 500 milliGRAM(s) IV Intermittent every 24 hours  budesonide 160 MICROgram(s)/formoterol 4.5 MICROgram(s) Inhaler 2 Puff(s) Inhalation two times a day  cefTRIAXone   IVPB 1000 milliGRAM(s) IV Intermittent every 24 hours  ferrous    sulfate 325 milliGRAM(s) Oral daily  hydroxychloroquine 200 milliGRAM(s) Oral every 12 hours  methimazole 10 milliGRAM(s) Oral three times a day  metoprolol tartrate 50 milliGRAM(s) Oral two times a day  predniSONE   Tablet 10 milliGRAM(s) Oral daily    MEDICATIONS  (PRN):  acetaminophen   Tablet .. 650 milliGRAM(s) Oral every 6 hours PRN Temp greater or equal to 38C (100.4F), Mild Pain (1 - 3)  guaiFENesin   Syrup  (Sugar-Free) 200 milliGRAM(s) Oral every 6 hours PRN Cough  LORazepam     Tablet 1 milliGRAM(s) Oral three times a day PRN Anxiety      Allergies    Motrin (Hives)    Intolerances        Vital Signs Last 24 Hrs  T(C): 37.4 (21 Mar 2020 08:42), Max: 37.9 (21 Mar 2020 06:30)  T(F): 99.4 (21 Mar 2020 08:42), Max: 100.2 (21 Mar 2020 06:30)  HR: 70 (21 Mar 2020 06:30) (70 - 86)  BP: 116/60 (21 Mar 2020 06:30) (116/60 - 130/72)  BP(mean): --  RR: 16 (21 Mar 2020 08:42) (15 - 16)  SpO2: 97% (21 Mar 2020 08:42) (95% - 97%)        LABS:                        7.0    4.44  )-----------( 213      ( 20 Mar 2020 12:53 )             25.2       Ca    8.1        20 Mar 2020 07:15      PT/INR - ( 20 Mar 2020 09:43 )   PT: 12.6 sec;   INR: 1.13 ratio         PTT - ( 20 Mar 2020 09:43 )  PTT:33.8 sec    CAPILLARY BLOOD GLUCOSE        CARDIAC MARKERS ( 20 Mar 2020 09:24 )  x     / x     / 47 U/L / x     / x          Cultures  Culture Results:   No growth to date. (03-19 @ 22:06)  Culture Results:   No growth to date. (03-19 @ 22:06)        Culture - Sputum (collected 03-20-20 @ 21:11)  Source: .Sputum Sputum  Gram Stain (03-20-20 @ 22:56):    No polymorphonuclear leukocytes per low power field    Few Squamous epithelial cells per low power field    Few Gram Negative Rods per oil power field    Few Gram positive cocci in pairs per oil power field    Culture - Blood (collected 03-19-20 @ 22:06)  Source: .Blood Blood-Peripheral  Preliminary Report (03-20-20 @ 23:01):    No growth to date.    Culture - Blood (collected 03-19-20 @ 22:06)  Source: .Blood Blood-Peripheral  Preliminary Report (03-20-20 @ 23:01):    No growth to date.        RADIOLOGY & ADDITIONAL TESTS:    Imaging Personally Reviewed:  [ ] YES  [ ] NO    Consultant(s) Notes Reviewed:  [ ] YES  [ ] NO    Care Discussed with Consultants/Other Providers [ ] YES  [ ] NO Patient is a 39y old  Female who presents with a chief complaint of SOB, syncope (21 Mar 2020 09:17)      INTERVAL HPI/OVERNIGHT EVENTS:  Pt is seen and examined.  feels ok.   No new complaints today.      Pain Location & Control:     MEDICATIONS  (STANDING):  ascorbic acid 500 milliGRAM(s) Oral daily  azithromycin  IVPB 500 milliGRAM(s) IV Intermittent every 24 hours  budesonide 160 MICROgram(s)/formoterol 4.5 MICROgram(s) Inhaler 2 Puff(s) Inhalation two times a day  cefTRIAXone   IVPB 1000 milliGRAM(s) IV Intermittent every 24 hours  ferrous    sulfate 325 milliGRAM(s) Oral daily  hydroxychloroquine 200 milliGRAM(s) Oral every 12 hours  methimazole 10 milliGRAM(s) Oral three times a day  metoprolol tartrate 50 milliGRAM(s) Oral two times a day  predniSONE   Tablet 10 milliGRAM(s) Oral daily    MEDICATIONS  (PRN):  acetaminophen   Tablet .. 650 milliGRAM(s) Oral every 6 hours PRN Temp greater or equal to 38C (100.4F), Mild Pain (1 - 3)  guaiFENesin   Syrup  (Sugar-Free) 200 milliGRAM(s) Oral every 6 hours PRN Cough  LORazepam     Tablet 1 milliGRAM(s) Oral three times a day PRN Anxiety      Allergies    Motrin (Hives)    Intolerances        Vital Signs Last 24 Hrs  T(C): 37.4 (21 Mar 2020 08:42), Max: 37.9 (21 Mar 2020 06:30)  T(F): 99.4 (21 Mar 2020 08:42), Max: 100.2 (21 Mar 2020 06:30)  HR: 70 (21 Mar 2020 06:30) (70 - 86)  BP: 116/60 (21 Mar 2020 06:30) (116/60 - 130/72)  BP(mean): --  RR: 16 (21 Mar 2020 08:42) (15 - 16)  SpO2: 97% (21 Mar 2020 08:42) (95% - 97%)        LABS:                        7.0    4.44  )-----------( 213      ( 20 Mar 2020 12:53 )             25.2       Ca    8.1        20 Mar 2020 07:15      PT/INR - ( 20 Mar 2020 09:43 )   PT: 12.6 sec;   INR: 1.13 ratio         PTT - ( 20 Mar 2020 09:43 )  PTT:33.8 sec    CAPILLARY BLOOD GLUCOSE        CARDIAC MARKERS ( 20 Mar 2020 09:24 )  x     / x     / 47 U/L / x     / x          Cultures  Culture Results:   No growth to date. (03-19 @ 22:06)  Culture Results:   No growth to date. (03-19 @ 22:06)        Culture - Sputum (collected 03-20-20 @ 21:11)  Source: .Sputum Sputum  Gram Stain (03-20-20 @ 22:56):    No polymorphonuclear leukocytes per low power field    Few Squamous epithelial cells per low power field    Few Gram Negative Rods per oil power field    Few Gram positive cocci in pairs per oil power field    Culture - Blood (collected 03-19-20 @ 22:06)  Source: .Blood Blood-Peripheral  Preliminary Report (03-20-20 @ 23:01):    No growth to date.    Culture - Blood (collected 03-19-20 @ 22:06)  Source: .Blood Blood-Peripheral  Preliminary Report (03-20-20 @ 23:01):    No growth to date.        RADIOLOGY & ADDITIONAL TESTS:    Imaging Personally Reviewed:  [ ] YES  [ ] NO    Consultant(s) Notes Reviewed:  [ ] YES  [ ] NO    Care Discussed with Consultants/Other Providers [x ] YES  [ ] NO

## 2020-03-21 NOTE — CHART NOTE - NSCHARTNOTEFT_GEN_A_CORE
Pt's IV line is leaking.  She is refusing to have the nurse put in a new one.  IV magnesium isn't being given and IV rocephin isn't being given either.  Pt also wants to leave AMA however we are ruling out COVID 19.     I'll order Magnesium PO for now.  Pt refusing IV rocephin.

## 2020-03-21 NOTE — PROGRESS NOTE ADULT - SUBJECTIVE AND OBJECTIVE BOX
Chief Complaint: Cough, shortness of breath    Interval Events: No events overnight.    Review of Systems:  General: No fevers, chills, weight loss or gain  Skin: No rashes, color changes  Cardiovascular: No chest pain, orthopnea  Respiratory: No shortness of breath, cough  Gastrointestinal: No nausea, abdominal pain  Genitourinary: No incontinence, pain with urination  Musculoskeletal: No pain, swelling, decreased range of motion  Neurological: No headache, weakness  Psychiatric: No depression, anxiety  Endocrine: No weight loss or gain, increased thirst  All other systems are comprehensively negative.    Physical Exam:  Vital Signs Last 24 Hrs  T(C): 37.4 (21 Mar 2020 08:42), Max: 37.9 (21 Mar 2020 06:30)  T(F): 99.4 (21 Mar 2020 08:42), Max: 100.2 (21 Mar 2020 06:30)  HR: 70 (21 Mar 2020 06:30) (70 - 86)  BP: 116/60 (21 Mar 2020 06:30) (116/60 - 130/72)  BP(mean): --  RR: 16 (21 Mar 2020 08:42) (15 - 16)  SpO2: 97% (21 Mar 2020 08:42) (95% - 97%)  General: NAD  HEENT: MMM  Neck: No JVD, no carotid bruit  Lungs: CTAB  CV: RRR, nl S1/S2, no M/R/G  Abdomen: S/NT/ND, +BS  Extremities: No LE edema, no cyanosis  Neuro: AAOx3, non-focal  Skin: No rash    Labs:             03-20    137  |  105  |  7   ----------------------------<  91  3.1<L>   |  24  |  0.71    Ca    8.1<L>      20 Mar 2020 07:15  Phos  2.8     03-20  Mg     1.5     03-20    TPro  6.4  /  Alb  2.7<L>  /  TBili  0.6  /  DBili  x   /  AST  31  /  ALT  16  /  AlkPhos  74  03-20                        7.0    4.44  )-----------( 213      ( 20 Mar 2020 12:53 )             25.2       Telemetry: Sinus rhythm, intermittently tachycardic

## 2020-03-21 NOTE — PROGRESS NOTE ADULT - SUBJECTIVE AND OBJECTIVE BOX
PULMONARY/CRITICAL CARE      Pt feels somewhat better. Still some wheeze. Low grade temps . Sats ok.   Patient is a 39y old  Female who presents with a chief complaint of SOB, syncope (19 Mar 2020 20:01)    BRIEF HOSPITAL COURSE: ***39F with hyperthyroidism on methimazole, afib on xarelto who presents with SOB/WEN and syncope.  Symptoms started about a week ago with SOB.  Worse with exertion.  Associated with a cough with clear sputum.  Had no fevers.  Became progressively worse over the week.  Today, patient actually syncopized this morning and injured her lip.  No chest pain.  Patient denies any known sick contacts but her children are home from school with self-isolation ([facility]).  Reports having a positive case but unsure of whom.  No recent travel and patient was compliant with Xarelto (though patient said she stops it when she is on her period).  Currently is on her period (started two days ago) and experiences heavy flow.  In the ED, patient was found to be anemic with hgb of 6.6 and is being transfused 1u of pRBC.  CT chest was done that showed suboptimal pulmonary arterial enhancement, nondiagnostic study for PE and also found to have bilateral nodular groundglass lung opacities.  Was started on ceftriaxone and azithromycin empirically and is also being admitted for COVID rule out.    Wheezing. Was using albuterol only.  No recent travel.    Events last 24 hours: ***    PAST MEDICAL & SURGICAL HISTORY:  Smoker  Depression, unspecified depression type  History of anemia  Asthma  History of Hyperthyroidism  Atrial fibrillation  History of blood transfusion  S/P  Section: ,,    Allergies    Motrin (Hives)    Intolerances      FAMILY HISTORY/ social: Smokes 1/2 ppd for 15 yrs, no etoh  Family history of stomach cancer (Grandparent)  Family history of diabetes mellitus (Sibling, Grandparent)          Medications:  azithromycin  IVPB 500 milliGRAM(s) IV Intermittent every 24 hours  cefTRIAXone   IVPB 1000 milliGRAM(s) IV Intermittent every 24 hours  hydroxychloroquine 200 milliGRAM(s) Oral two times a day    metoprolol tartrate 50 milliGRAM(s) Oral two times a day    guaiFENesin   Syrup  (Sugar-Free) 200 milliGRAM(s) Oral every 6 hours PRN  tiotropium 2.5 MICROgram(s)/olodaterol 2.5 MICROgram(s) (STIOLTO) Inhaler 2 Puff(s) Inhalation daily    acetaminophen   Tablet .. 650 milliGRAM(s) Oral every 6 hours PRN  LORazepam     Tablet 1 milliGRAM(s) Oral three times a day PRN            methimazole 10 milliGRAM(s) Oral three times a day    ascorbic acid 500 milliGRAM(s) Oral daily  potassium chloride    Tablet ER 20 milliEquivalent(s) Oral once                ICU Vital Signs Last 24 Hrs  T(C): 37.8 (20 Mar 2020 06:20), Max: 37.9 (20 Mar 2020 00:45)  T(F): 100.1 (20 Mar 2020 06:20), Max: 100.2 (20 Mar 2020 00:45)  HR: 97 (20 Mar 2020 06:20) (80 - 112)  BP: 113/57 (20 Mar 2020 06:20) (105/61 - 140/87)  BP(mean): --  ABP: --  ABP(mean): --  RR: 16 (20 Mar 2020 04:00) (16 - 18)  SpO2: 93% (20 Mar 2020 04:00) (93% - 99%)    Vital Signs Last 24 Hrs  T(C): 37.8 (20 Mar 2020 06:20), Max: 37.9 (20 Mar 2020 00:45)  T(F): 100.1 (20 Mar 2020 06:20), Max: 100.2 (20 Mar 2020 00:45)  HR: 97 (20 Mar 2020 06:20) (80 - 112)  BP: 113/57 (20 Mar 2020 06:20) (105/61 - 140/87)  BP(mean): --  RR: 16 (20 Mar 2020 04:00) (16 - 18)  SpO2: 93% (20 Mar 2020 04:00) (93% - 99%)        I&O's Detail        LABS:                        6.6    5.41  )-----------( 191      ( 19 Mar 2020 15:44 )             25.6     03-20    137  |  105  |  7   ----------------------------<  91  3.1<L>   |  24  |  0.71    Ca    8.1<L>      20 Mar 2020 07:15    TPro  6.4  /  Alb  2.7<L>  /  TBili  0.6  /  DBili  x   /  AST  31  /  ALT  16  /  AlkPhos  74  -20          CAPILLARY BLOOD GLUCOSE        PT/INR - ( 19 Mar 2020 15:44 )   PT: 12.6 sec;   INR: 1.13 ratio         PTT - ( 19 Mar 2020 15:44 )  PTT:36.4 sec    CULTURES:      Physical Examination:    General: mild sob wd female    HEENT: Pupils equal, reactive to light.  Symmetric.    PULM: decreased wheezing, rhonchi bilat no rales.     CVS: Regular rate and rhythm, no murmurs, rubs, or gallops    ABD: Soft, nondistended, nontender, normoactive bowel sounds, no masses    EXT: No edema, nontender    SKIN: Warm and well perfused, no rashes noted.    NEURO: Alert, oriented, interactive, nonfocal    RADIOLOGY: ***< from: CT Angio Chest w/ IV Cont (20 @ 17:26) >  EXAM:  CT ANGIO CHEST (W)AW IC                                  PROCEDURE DATE:  2020          INTERPRETATION:  CLINICAL INFORMATION: Shortness of breath    COMPARISON: None.    PROCEDURE:   CT Angiography of the Chest.  90 ml of Omnipaque 350 was injected intravenously. 10 ml were discarded.  Sagittal and coronal reformats were performed as well as 3D (MIP) reconstructions.      FINDINGS:    LUNGS AND AIRWAYS: Patent central airways.  Bilateral nodular groundglass opacities.    PLEURA: No pleural effusion.    MEDIASTINUM AND VIOLET: Mild adenopathy. Calcified lymph nodes.    VESSELS: No thoracic aortic aneurysm or dissection. Suboptimal pulmonary arterial enhancement. Nondiagnostic for evaluation of pulmonary emboli.    HEART: Heart size is normal. No pericardial effusion.    CHEST WALL AND LOWER NECK: Within normal limits.    VISUALIZED UPPER ABDOMEN: Hepatic and splenic calcified granulomas.    BONES: Degenerative changes.    IMPRESSION:     Suboptimal pulmonary arterial enhancement, nondiagnostic study for evaluation of pulmonary emboli.  Alternative study such as nuclear medicine ventilation/perfusion scan may be obtained if clinically indicated.  Bilateral nodular groundglass lung opacities, likely infectious.                    < end of copied text >    CXR bilat infiltrates

## 2020-03-21 NOTE — PROGRESS NOTE ADULT - ASSESSMENT
39F with hyperthyroidism on methimazole, afib on xarelto who presents with SOB/WEN and syncope.       Syncope  - admitted to telemetry  - likely anemia related 2/2 heavy period on xarelto  - s/p 1u pRBC transfusion  - still low H/H, refusing for 2nd U of PRBC. She wants to have it tommorrow.  Understand risk and bebefit of PRBC transfusion.  - As per cardiology-cardiology -   - Remain off of rivaroxaban. Patient seen by me in the past; unclear why she she been on anticoagulation given low thromboembolic risk with [id number] of 0 and previously stable TFTs. She can discuss further with her cardiologist as outpatient.  - Monitor hemoglobin  - Continue metoprolol tartrate 50 mg bid  - TTE    SOB/WEN  - could be COVID and is being rule'd out -> continue contact and airborne precautions  - f/u RVP  - ID consult  - will continue azithromycin and ceftriaxone for now   - f/u blood cultures  - inh as needed, no nebs    Prolonged QT  - hold sertraline   - monitor    Hyperthyroidism  - cont with methimazole    Afib  - holding xarelto 2/2 current bleeding  - continue metoprolol     Preventive measures  IMPROVE VTE Individual Risk Assessment          RISK                                                          Points  [  ] Previous VTE                                                 3  [  ] Thrombophilia                                              2  [  ] Lower limb paralysis                                    2        (unable to hold up >15 seconds)    [  ] Current Cancer                                             2         (within 6 months)  [  ] Immobilization > 24 hrs                              1  [  ] ICU/CCU stay > 24 hours                            1  [  ] Age > 60                                                        1    IMPROVE VTE Score 0    - currently bleeding, hold AC for now    Plan of care was discuss with patient, all questions were answered, seems understand and in agreement. 39F with hyperthyroidism on methimazole, afib on xarelto who presents with SOB/WEN and syncope.       Syncope  - admitted to telemetry  - likely anemia related 2/2 heavy period on xarelto  - s/p 1u pRBC transfusion  - refused  for 2nd U of PRBC.   Understand risk and bebefit of PRBC transfusion yesterday.  will f/u today's cbc and bmp.    - As per cardiology-cardiology -   - Remain off of rivaroxaban. Patient seen by me in the past; unclear why  she been on anticoagulation given low thromboembolic risk with [id number] of 0 and previously stable TFTs. She can discuss further with her cardiologist as outpatient.  - Monitor hemoglobin  - Continue metoprolol tartrate 50 mg bid  - TTE    SOB/WEN  - could be COVID and is being rule'd out -> continue contact and airborne precautions  - f/u RVP  - ID consult  - will continue azithromycin and ceftriaxone for now   - f/u blood cultures  - inh as needed, no nebs    Prolonged QT  - hold sertraline   - monitor    Hyperthyroidism  - cont with methimazole    Afib  - holding xarelto 2/2 current bleeding  - continue metoprolol     Preventive measures  IMPROVE VTE Individual Risk Assessment          RISK                                                          Points  [  ] Previous VTE                                                 3  [  ] Thrombophilia                                              2  [  ] Lower limb paralysis                                    2        (unable to hold up >15 seconds)    [  ] Current Cancer                                             2         (within 6 months)  [  ] Immobilization > 24 hrs                              1  [  ] ICU/CCU stay > 24 hours                            1  [  ] Age > 60                                                        1    IMPROVE VTE Score 0    - currently bleeding, hold AC for now    Plan of care was discuss with patient, all questions were answered, seems understand and in agreement.

## 2020-03-21 NOTE — PROGRESS NOTE ADULT - ASSESSMENT
Pt with syncope, anemia, severe wheezing, CT chest suggestive of COVID pneumonia.  Clinically looks better but CXR shows bilat infil.  Appears clinically stable but some wheezing--underlying asthma.

## 2020-03-22 ENCOUNTER — TRANSCRIPTION ENCOUNTER (OUTPATIENT)
Age: 40
End: 2020-03-22

## 2020-03-22 LAB
CRP SERPL-MCNC: 4.63 MG/DL — HIGH (ref 0–0.4)
CULTURE RESULTS: SIGNIFICANT CHANGE UP
SPECIMEN SOURCE: SIGNIFICANT CHANGE UP

## 2020-03-22 PROCEDURE — 99233 SBSQ HOSP IP/OBS HIGH 50: CPT

## 2020-03-22 PROCEDURE — 71045 X-RAY EXAM CHEST 1 VIEW: CPT | Mod: 26

## 2020-03-22 RX ORDER — SERTRALINE 25 MG/1
100 TABLET, FILM COATED ORAL DAILY
Refills: 0 | Status: DISCONTINUED | OUTPATIENT
Start: 2020-03-22 | End: 2020-03-27

## 2020-03-22 RX ORDER — ACETAMINOPHEN 500 MG
2 TABLET ORAL
Qty: 0 | Refills: 0 | DISCHARGE
Start: 2020-03-22

## 2020-03-22 RX ORDER — TRAZODONE HCL 50 MG
50 TABLET ORAL
Refills: 0 | Status: DISCONTINUED | OUTPATIENT
Start: 2020-03-22 | End: 2020-03-27

## 2020-03-22 RX ORDER — ASCORBIC ACID 60 MG
1 TABLET,CHEWABLE ORAL
Qty: 0 | Refills: 0 | DISCHARGE
Start: 2020-03-22

## 2020-03-22 RX ORDER — CEFUROXIME AXETIL 250 MG
500 TABLET ORAL EVERY 12 HOURS
Refills: 0 | Status: DISCONTINUED | OUTPATIENT
Start: 2020-03-22 | End: 2020-03-22

## 2020-03-22 RX ORDER — AZITHROMYCIN 500 MG/1
500 TABLET, FILM COATED ORAL DAILY
Refills: 0 | Status: DISCONTINUED | OUTPATIENT
Start: 2020-03-22 | End: 2020-03-22

## 2020-03-22 RX ORDER — MAGNESIUM OXIDE 400 MG ORAL TABLET 241.3 MG
1 TABLET ORAL
Qty: 0 | Refills: 0 | DISCHARGE
Start: 2020-03-22

## 2020-03-22 RX ORDER — BUDESONIDE AND FORMOTEROL FUMARATE DIHYDRATE 160; 4.5 UG/1; UG/1
2 AEROSOL RESPIRATORY (INHALATION)
Refills: 0 | Status: DISCONTINUED | OUTPATIENT
Start: 2020-03-22 | End: 2020-03-27

## 2020-03-22 RX ORDER — BUDESONIDE AND FORMOTEROL FUMARATE DIHYDRATE 160; 4.5 UG/1; UG/1
0 AEROSOL RESPIRATORY (INHALATION)
Qty: 0 | Refills: 0 | DISCHARGE
Start: 2020-03-22

## 2020-03-22 RX ORDER — FERROUS SULFATE 325(65) MG
1 TABLET ORAL
Qty: 30 | Refills: 0
Start: 2020-03-22 | End: 2020-04-20

## 2020-03-22 RX ADMIN — Medication 10 MILLIGRAM(S): at 06:28

## 2020-03-22 RX ADMIN — Medication 325 MILLIGRAM(S): at 12:10

## 2020-03-22 RX ADMIN — Medication 650 MILLIGRAM(S): at 07:43

## 2020-03-22 RX ADMIN — Medication 650 MILLIGRAM(S): at 00:36

## 2020-03-22 RX ADMIN — Medication 50 MILLIGRAM(S): at 19:02

## 2020-03-22 RX ADMIN — Medication 650 MILLIGRAM(S): at 21:45

## 2020-03-22 RX ADMIN — MAGNESIUM OXIDE 400 MG ORAL TABLET 400 MILLIGRAM(S): 241.3 TABLET ORAL at 12:10

## 2020-03-22 RX ADMIN — Medication 650 MILLIGRAM(S): at 06:28

## 2020-03-22 RX ADMIN — Medication 500 MILLIGRAM(S): at 12:09

## 2020-03-22 RX ADMIN — Medication 650 MILLIGRAM(S): at 21:13

## 2020-03-22 RX ADMIN — Medication 200 MILLIGRAM(S): at 21:20

## 2020-03-22 RX ADMIN — Medication 50 MILLIGRAM(S): at 18:59

## 2020-03-22 RX ADMIN — BUDESONIDE AND FORMOTEROL FUMARATE DIHYDRATE 2 PUFF(S): 160; 4.5 AEROSOL RESPIRATORY (INHALATION) at 06:29

## 2020-03-22 RX ADMIN — Medication 200 MILLIGRAM(S): at 06:27

## 2020-03-22 RX ADMIN — Medication 200 MILLIGRAM(S): at 10:18

## 2020-03-22 RX ADMIN — Medication 200 MILLIGRAM(S): at 21:13

## 2020-03-22 RX ADMIN — Medication 1 MILLIGRAM(S): at 18:57

## 2020-03-22 RX ADMIN — MAGNESIUM OXIDE 400 MG ORAL TABLET 400 MILLIGRAM(S): 241.3 TABLET ORAL at 19:04

## 2020-03-22 RX ADMIN — Medication 50 MILLIGRAM(S): at 06:29

## 2020-03-22 RX ADMIN — MAGNESIUM OXIDE 400 MG ORAL TABLET 400 MILLIGRAM(S): 241.3 TABLET ORAL at 10:19

## 2020-03-22 RX ADMIN — BUDESONIDE AND FORMOTEROL FUMARATE DIHYDRATE 2 PUFF(S): 160; 4.5 AEROSOL RESPIRATORY (INHALATION) at 18:40

## 2020-03-22 NOTE — PROGRESS NOTE ADULT - SUBJECTIVE AND OBJECTIVE BOX
The patient is a 73y Female complaining of arm pain/injury. Patient is a 39y old  Female who presents with a chief complaint of SOB, syncope (22 Mar 2020 09:28)      INTERVAL HPI/OVERNIGHT EVENTS:  Pt is seen and examined.  feels better.    Pain Location & Control:     MEDICATIONS  (STANDING):  ascorbic acid 500 milliGRAM(s) Oral daily  budesonide 160 MICROgram(s)/formoterol 4.5 MICROgram(s) Inhaler 2 Puff(s) Inhalation two times a day  ferrous    sulfate 325 milliGRAM(s) Oral daily  hydroxychloroquine 200 milliGRAM(s) Oral every 12 hours  magnesium oxide 400 milliGRAM(s) Oral three times a day with meals  methimazole 10 milliGRAM(s) Oral three times a day  metoprolol tartrate 50 milliGRAM(s) Oral two times a day    MEDICATIONS  (PRN):  acetaminophen   Tablet .. 650 milliGRAM(s) Oral every 6 hours PRN Temp greater or equal to 38C (100.4F), Mild Pain (1 - 3)  guaiFENesin   Syrup  (Sugar-Free) 200 milliGRAM(s) Oral every 6 hours PRN Cough  LORazepam     Tablet 1 milliGRAM(s) Oral three times a day PRN Anxiety      Allergies    Motrin (Hives)    Intolerances      Vital Signs Last 24 Hrs  T(C): 37 (22 Mar 2020 08:59), Max: 37.9 (22 Mar 2020 00:00)  T(F): 98.6 (22 Mar 2020 08:59), Max: 100.3 (22 Mar 2020 00:00)  HR: 71 (22 Mar 2020 08:59) (71 - 92)  BP: 109/74 (22 Mar 2020 08:59) (109/74 - 125/86)  BP(mean): --  RR: 18 (22 Mar 2020 08:59) (17 - 18)  SpO2: 92% (22 Mar 2020 08:59) (91% - 95%)        LABS:      Ca    8.5        21 Mar 2020 12:04    CAPILLARY BLOOD GLUCOSE      Cultures  Culture Results:   Normal Respiratory Juuj present (03-20 @ 21:11)  Culture Results:   No growth to date. (03-19 @ 22:06)  Culture Results:   No growth to date. (03-19 @ 22:06)        Culture - Sputum (collected 03-20-20 @ 21:11)  Source: .Sputum Sputum  Gram Stain (03-20-20 @ 22:56):    No polymorphonuclear leukocytes per low power field    Few Squamous epithelial cells per low power field    Few Gram Negative Rods per oil power field    Few Gram positive cocci in pairs per oil power field  Preliminary Report (03-21-20 @ 17:55):    Normal Respiratory Juju present    Culture - Blood (collected 03-19-20 @ 22:06)  Source: .Blood Blood-Peripheral  Preliminary Report (03-20-20 @ 23:01):    No growth to date.    Culture - Blood (collected 03-19-20 @ 22:06)  Source: .Blood Blood-Peripheral  Preliminary Report (03-20-20 @ 23:01):    No growth to date.        RADIOLOGY & ADDITIONAL TESTS:    Imaging Personally Reviewed:  [ ] YES  [ ] NO    Consultant(s) Notes Reviewed:  [ ] YES  [ ] NO    Care Discussed with Consultants/Other Providers [x ] YES  [ ] NO

## 2020-03-22 NOTE — PROGRESS NOTE ADULT - ASSESSMENT
Pt with syncope, anemia, severe wheezing, CT chest suggestive of COVID pneumonia. Clinically has been stable.   Can transition to po antibiotics.   Clinically looks better but CXR shows bilat infil.  Appears clinically stable but some wheezing--underlying asthma.   Can continue low dose prednisone and symbicort.

## 2020-03-22 NOTE — DISCHARGE NOTE PROVIDER - HOSPITAL COURSE
39F with hyperthyroidism on methimazole, afib on xarelto who presents with SOB/WEN and syncope.           Syncope        - likely anemia related 2/2 heavy period on xarelto    - s/p 1u pRBC transfusion    h/h low but stable and patient is asymptomatics.        - As per cardiology-cardiology -     - Remain off of rivaroxaban. Patient seen by me in the past; unclear why  she been on anticoagulation given low thromboembolic risk with [id number] of 0 and previously stable TFTs. She can discuss further with her cardiologist as outpatient.    - Monitor hemoglobin    - Continue metoprolol tartrate 50 mg bid            SOB/WEN    - +COVID  -> continue contact and airborne precautions    -d/c antibiotics.            Prolonged QT    - hold sertraline     - monitor        Hyperthyroidism    - cont with methimazole        Afib    - holding xarelto 2/2 current bleeding    - continue metoprolol 39F with hyperthyroidism on methimazole, afib on xarelto who presents with SOB/WEN and syncope.   she was covid + and was treate din icu an dthen downgraded to medicine        SOB/WEN    - +COVID  -> continue contact and airborne precautions    -d/c antibiotics.    dc home as per pulm and id            Prolonged QT    -hold sertraliine and follow with out pt psych and cardio        Hyperthyroidism    - cont with methimazole        Afib    - holding xarelto 2/2 current bleeding    - continue metoprolol     hold xarelto till out pt follow up with cardio ad primary    d/w dr garcia

## 2020-03-22 NOTE — PROGRESS NOTE ADULT - SUBJECTIVE AND OBJECTIVE BOX
PULMONARY/CRITICAL CARE  Pt improved, Less sob, wheeze, cough. Ambulated in room . Low grade temp.      Sats ok.   Patient is a 39y old  Female who presents with a chief complaint of SOB, syncope (19 Mar 2020 20:01)    BRIEF HOSPITAL COURSE: ***39F with hyperthyroidism on methimazole, afib on xarelto who presents with SOB/WEN and syncope.  Symptoms started about a week ago with SOB.  Worse with exertion.  Associated with a cough with clear sputum.  Had no fevers.  Became progressively worse over the week.  Today, patient actually syncopized this morning and injured her lip.  No chest pain.  Patient denies any known sick contacts but her children are home from school with self-isolation ([facility]).  Reports having a positive case but unsure of whom.  No recent travel and patient was compliant with Xarelto (though patient said she stops it when she is on her period).  Currently is on her period (started two days ago) and experiences heavy flow.  In the ED, patient was found to be anemic with hgb of 6.6 and is being transfused 1u of pRBC.  CT chest was done that showed suboptimal pulmonary arterial enhancement, nondiagnostic study for PE and also found to have bilateral nodular groundglass lung opacities.  Was started on ceftriaxone and azithromycin empirically and is also being admitted for COVID rule out.    Wheezing. Was using albuterol only.  No recent travel.    Events last 24 hours: ***    PAST MEDICAL & SURGICAL HISTORY:  Smoker  Depression, unspecified depression type  History of anemia  Asthma  History of Hyperthyroidism  Atrial fibrillation  History of blood transfusion  S/P  Section: ,,    Allergies    Motrin (Hives)    Intolerances      FAMILY HISTORY/ social: Smokes 1/2 ppd for 15 yrs, no etoh  Family history of stomach cancer (Grandparent)  Family history of diabetes mellitus (Sibling, Grandparent)          Medications:  azithromycin  IVPB 500 milliGRAM(s) IV Intermittent every 24 hours  cefTRIAXone   IVPB 1000 milliGRAM(s) IV Intermittent every 24 hours  hydroxychloroquine 200 milliGRAM(s) Oral two times a day    metoprolol tartrate 50 milliGRAM(s) Oral two times a day    guaiFENesin   Syrup  (Sugar-Free) 200 milliGRAM(s) Oral every 6 hours PRN  tiotropium 2.5 MICROgram(s)/olodaterol 2.5 MICROgram(s) (STIOLTO) Inhaler 2 Puff(s) Inhalation daily    acetaminophen   Tablet .. 650 milliGRAM(s) Oral every 6 hours PRN  LORazepam     Tablet 1 milliGRAM(s) Oral three times a day PRN            methimazole 10 milliGRAM(s) Oral three times a day    ascorbic acid 500 milliGRAM(s) Oral daily  potassium chloride    Tablet ER 20 milliEquivalent(s) Oral once                ICU Vital Signs Last 24 Hrs  T(C): 37.8 (20 Mar 2020 06:20), Max: 37.9 (20 Mar 2020 00:45)  T(F): 100.1 (20 Mar 2020 06:20), Max: 100.2 (20 Mar 2020 00:45)  HR: 97 (20 Mar 2020 06:20) (80 - 112)  BP: 113/57 (20 Mar 2020 06:20) (105/61 - 140/87)  BP(mean): --  ABP: --  ABP(mean): --  RR: 16 (20 Mar 2020 04:00) (16 - 18)  SpO2: 93% (20 Mar 2020 04:00) (93% - 99%)    Vital Signs Last 24 Hrs  T(C): 37.8 (20 Mar 2020 06:20), Max: 37.9 (20 Mar 2020 00:45)  T(F): 100.1 (20 Mar 2020 06:20), Max: 100.2 (20 Mar 2020 00:45)  HR: 97 (20 Mar 2020 06:20) (80 - 112)  BP: 113/57 (20 Mar 2020 06:20) (105/61 - 140/87)  BP(mean): --  RR: 16 (20 Mar 2020 04:00) (16 - 18)  SpO2: 93% (20 Mar 2020 04:00) (93% - 99%)        I&O's Detail        LABS:                        6.6    5.41  )-----------( 191      ( 19 Mar 2020 15:44 )             25.6     03-20    137  |  105  |  7   ----------------------------<  91  3.1<L>   |  24  |  0.71    Ca    8.1<L>      20 Mar 2020 07:15    TPro  6.4  /  Alb  2.7<L>  /  TBili  0.6  /  DBili  x   /  AST  31  /  ALT  16  /  AlkPhos  74  03-20          CAPILLARY BLOOD GLUCOSE        PT/INR - ( 19 Mar 2020 15:44 )   PT: 12.6 sec;   INR: 1.13 ratio         PTT - ( 19 Mar 2020 15:44 )  PTT:36.4 sec    CULTURES:      Physical Examination:    General: mild sob wd female    HEENT: Pupils equal, reactive to light.  Symmetric.    PULM: decreased wheezing, rhonchi bilat no rales.     CVS: Regular rate and rhythm, no murmurs, rubs, or gallops    ABD: Soft, nondistended, nontender, normoactive bowel sounds, no masses    EXT: No edema, nontender    SKIN: Warm and well perfused, no rashes noted.    NEURO: Alert, oriented, interactive, nonfocal    RADIOLOGY: ***< from: CT Angio Chest w/ IV Cont (20 @ 17:26) >  EXAM:  CT ANGIO CHEST (W)AW IC                                  PROCEDURE DATE:  2020          INTERPRETATION:  CLINICAL INFORMATION: Shortness of breath    COMPARISON: None.    PROCEDURE:   CT Angiography of the Chest.  90 ml of Omnipaque 350 was injected intravenously. 10 ml were discarded.  Sagittal and coronal reformats were performed as well as 3D (MIP) reconstructions.      FINDINGS:    LUNGS AND AIRWAYS: Patent central airways.  Bilateral nodular groundglass opacities.    PLEURA: No pleural effusion.    MEDIASTINUM AND VIOLET: Mild adenopathy. Calcified lymph nodes.    VESSELS: No thoracic aortic aneurysm or dissection. Suboptimal pulmonary arterial enhancement. Nondiagnostic for evaluation of pulmonary emboli.    HEART: Heart size is normal. No pericardial effusion.    CHEST WALL AND LOWER NECK: Within normal limits.    VISUALIZED UPPER ABDOMEN: Hepatic and splenic calcified granulomas.    BONES: Degenerative changes.    IMPRESSION:     Suboptimal pulmonary arterial enhancement, nondiagnostic study for evaluation of pulmonary emboli.  Alternative study such as nuclear medicine ventilation/perfusion scan may be obtained if clinically indicated.  Bilateral nodular groundglass lung opacities, likely infectious.                    < end of copied text >    CXR bilat infiltrates stable

## 2020-03-22 NOTE — DISCHARGE NOTE PROVIDER - NSDCMRMEDTOKEN_GEN_ALL_CORE_FT
acetaminophen 325 mg oral tablet: 2 tab(s) orally every 6 hours, As needed, Temp greater or equal to 38C (100.4F), Mild Pain (1 - 3)  ascorbic acid 500 mg oral tablet: 1 tab(s) orally once a day  Ativan:   budesonide-formoterol 160 mcg-4.5 mcg/inh inhalation aerosol:  inhaled   ferrous sulfate 325 mg (65 mg elemental iron) oral tablet: 1 tab(s) orally once a day   guaiFENesin 100 mg/5 mL oral liquid: 10 milliliter(s) orally every 6 hours, As needed, Cough  Lopressor 50 mg oral tablet: 1 tab(s) orally 2 times a day  magnesium oxide 400 mg (241.3 mg elemental magnesium) oral tablet: 1 tab(s) orally 3 times a day (with meals)  methIMAzole 10 mg oral tablet: 1 tab(s) orally 3 times a day  traZODone 50 mg oral tablet: 1 tab(s) orally 2 times a day  Xarelto 2.5 mg oral tablet: 1 tab(s) orally 2 times a day  Zoloft 100 mg oral tablet: 1 tab(s) orally once a day

## 2020-03-22 NOTE — PROGRESS NOTE ADULT - ASSESSMENT
39F with hyperthyroidism on methimazole, afib on xarelto who presents with SOB/WEN and syncope.       Syncope    - likely anemia related 2/2 heavy period on xarelto  - s/p 1u pRBC transfusion  - refused  for 2nd U of PRBC.   Understand risk and bebefit of PRBC transfusion yesterday.  will f/u today's cbc.    - As per cardiology-cardiology -   - Remain off of rivaroxaban. Patient seen by me in the past; unclear why  she been on anticoagulation given low thromboembolic risk with [id number] of 0 and previously stable TFTs. She can discuss further with her cardiologist as outpatient.  - Monitor hemoglobin  - Continue metoprolol tartrate 50 mg bid  - TTE    SOB/WEN  - +COVID  -> continue contact and airborne precautions  - ID consult  -d/c antibiotics.  - wants to continue low dose of steroids.  - inh as needed, no nebs    Prolonged QT  - hold sertraline   - monitor    Hyperthyroidism  - cont with methimazole    Afib  - holding xarelto 2/2 current bleeding  - continue metoprolol     Preventive measures  IMPROVE VTE Individual Risk Assessment          RISK                                                          Points  [  ] Previous VTE                                                 3  [  ] Thrombophilia                                              2  [  ] Lower limb paralysis                                    2        (unable to hold up >15 seconds)    [  ] Current Cancer                                             2         (within 6 months)  [  ] Immobilization > 24 hrs                              1  [  ] ICU/CCU stay > 24 hours                            1  [  ] Age > 60                                                        1    IMPROVE VTE Score 0    - currently bleeding, hold AC for now    Plan of care was discuss with patient, all questions were answered, seems understand and in agreement.  will d/w cardiology regarding sertralin.    Discharge plan in am if remains stable.

## 2020-03-22 NOTE — PROGRESS NOTE ADULT - SUBJECTIVE AND OBJECTIVE BOX
Chief Complaint: Cough, shortness of breath    Interval Events: Lost IV access and wanted to leave AMA.    Review of Systems:  General: No fevers, chills, weight loss or gain  Skin: No rashes, color changes  Cardiovascular: No chest pain, orthopnea  Respiratory: No shortness of breath, cough  Gastrointestinal: No nausea, abdominal pain  Genitourinary: No incontinence, pain with urination  Musculoskeletal: No pain, swelling, decreased range of motion  Neurological: No headache, weakness  Psychiatric: No depression, anxiety  Endocrine: No weight loss or gain, increased thirst  All other systems are comprehensively negative.    Physical Exam:  Vital Signs Last 24 Hrs  T(C): 37 (22 Mar 2020 08:59), Max: 37.9 (22 Mar 2020 00:00)  T(F): 98.6 (22 Mar 2020 08:59), Max: 100.3 (22 Mar 2020 00:00)  HR: 71 (22 Mar 2020 08:59) (71 - 92)  BP: 109/74 (22 Mar 2020 08:59) (109/74 - 125/86)  BP(mean): --  RR: 18 (22 Mar 2020 08:59) (16 - 18)  SpO2: 92% (22 Mar 2020 08:59) (91% - 98%)  General: NAD  HEENT: MMM  Neck: No JVD, no carotid bruit  Lungs: CTAB  CV: RRR, nl S1/S2, no M/R/G  Abdomen: S/NT/ND, +BS  Extremities: No LE edema, no cyanosis  Neuro: AAOx3, non-focal  Skin: No rash    Labs:             03-21    140  |  106  |  6<L>  ----------------------------<  115<H>  3.6   |  22  |  0.71    Ca    8.5      21 Mar 2020 12:04                          7.5    4.24  )-----------( 197      ( 21 Mar 2020 12:04 )             28.0       Telemetry: Sinus rhythm, intermittently tachycardic

## 2020-03-22 NOTE — PROGRESS NOTE ADULT - SUBJECTIVE AND OBJECTIVE BOX
ROBBIE MCKNIGHT is a 39yFemale , patient examined and chart reviewed.     INTERVAL HPI/ OVERNIGHT EVENTS:   Feeling better. Low grade temps Tmax 100.3..  No events.    PAST MEDICAL & SURGICAL HISTORY:  Smoker  Depression, unspecified depression type  History of anemia  Asthma  History of Hyperthyroidism  Atrial fibrillation  History of blood transfusion  S/P  Section: ,,      For details regarding the patient's social history, family history, and other miscellaneous elements, please refer the initial infectious diseases consultation and/or the admitting history and physical examination for this admission.    ROS:  CONSTITUTIONAL:  Negative fever or chills, feels well, good appetite  EYES:  Negative  blurry vision or double vision  CARDIOVASCULAR:  Negative for chest pain or palpitations  RESPIRATORY:  Negative for cough, wheezing, or SOB   GASTROINTESTINAL:  Negative for nausea, vomiting, diarrhea, constipation, or abdominal pain  GENITOURINARY:  Negative frequency, urgency or dysuria  NEUROLOGIC:  No headache, confusion, dizziness, lightheadedness  All other systems were reviewed and are negative     ALLERGIES:  Motrin (Hives)      Current inpatient medications :    ANTIBIOTICS/RELEVANT:  hydroxychloroquine 200 milliGRAM(s) Oral every 12 hours  levoFLOXacin  Tablet 750 milliGRAM(s) Oral every 24 hours    MEDICATIONS  (STANDING):  ascorbic acid 500 milliGRAM(s) Oral daily  budesonide 160 MICROgram(s)/formoterol 4.5 MICROgram(s) Inhaler 2 Puff(s) Inhalation two times a day  ferrous    sulfate 325 milliGRAM(s) Oral daily  magnesium oxide 400 milliGRAM(s) Oral three times a day with meals  methimazole 10 milliGRAM(s) Oral three times a day  metoprolol tartrate 50 milliGRAM(s) Oral two times a day  sertraline 100 milliGRAM(s) Oral daily  traZODone 50 milliGRAM(s) Oral two times a day    MEDICATIONS  (PRN):  acetaminophen   Tablet .. 650 milliGRAM(s) Oral every 6 hours PRN Temp greater or equal to 38C (100.4F), Mild Pain (1 - 3)  guaiFENesin   Syrup  (Sugar-Free) 200 milliGRAM(s) Oral every 6 hours PRN Cough  LORazepam     Tablet 1 milliGRAM(s) Oral three times a day PRN Anxiety      Objective:  Vital Signs Last 24 Hrs  T(C): 36.9 (22 Mar 2020 20:30), Max: 37.9 (22 Mar 2020 00:00)  T(F): 98.5 (22 Mar 2020 20:30), Max: 100.3 (22 Mar 2020 00:00)  HR: 98 (22 Mar 2020 20:30) (71 - 98)  BP: 129/83 (22 Mar 2020 20:30) (109/74 - 129/83)  RR: 16 (22 Mar 2020 20:30) (16 - 18)  SpO2: 94% (22 Mar 2020 20:30) (91% - 95%)    Physical Exam:  General:  no acute distress  Eyes: sclera anicteric, pupils equal and reactive to light  ENMT: buccal mucosa moist, pharynx not injected  Neck: supple, trachea midline  Lungs: Decreased no wheeze/rhonchi  Cardiovascular: regular rate and rhythm, S1 S2  Abdomen: soft, nontender, no organomegaly present, bowel sounds normal  Neurological: alert and oriented x3, Cranial Nerves II-XII grossly intact  Skin: no increased ecchymosis/petechiae/purpura  Lymph Nodes: no palpable cervical/supraclavicular lymph nodes enlargements  Extremities: no cyanosis/clubbing/edema      LABS:                        7.5    4.24  )-----------( 197      ( 21 Mar 2020 12:04 )             28.0   03-21    140  |  106  |  6<L>  ----------------------------<  115<H>  3.6   |  22  |  0.71    Ca    8.5      21 Mar 2020 12:04      MICROBIOLOGY:  COVID-19 PCR . (20 @ 23:21)    COVID-19 PCR: Detected: LDT - Laboratory Developed Test All “detected” results on this new test  are considered presumptively positive results, are clinically actionable,  and specimens will be forwarded to CDC for confirmation testing.  Another report (corrected report) will only be issued if discordant  results occur.  This test has been validated by [employer] to be accurate;  though it has not been FDA cleared/approved by the usual pathway.  As with all laboratory tests, results should be correlated with clinical  findings.      Culture - Sputum (collected 20 Mar 2020 21:11)  Source: .Sputum Sputum  Gram Stain (20 Mar 2020 22:56):    No polymorphonuclear leukocytes per low power field    Few Squamous epithelial cells per low power field    Few Gram Negative Rods per oil power field    Few Gram positive cocci in pairs per oil power field  Preliminary Report (21 Mar 2020 17:55):    Normal Respiratory Juju present    Culture - Blood (collected 19 Mar 2020 22:06)  Source: .Blood Blood-Peripheral  Preliminary Report (20 Mar 2020 23:01):    No growth to date.    Culture - Blood (collected 19 Mar 2020 22:06)  Source: .Blood Blood-Peripheral  Preliminary Report (20 Mar 2020 23:01):    No growth to date.      RADIOLOGY & ADDITIONAL STUDIES:  EXAM:  CT ANGIO CHEST (W)AW IC                                  PROCEDURE DATE:  2020          INTERPRETATION:  CLINICAL INFORMATION: Shortness of breath    COMPARISON: None.    PROCEDURE:   CT Angiography of the Chest.  90 ml of Omnipaque 350 was injected intravenously. 10 ml were discarded.  Sagittal and coronal reformats were performed as well as 3D (MIP) reconstructions.      FINDINGS:    LUNGS AND AIRWAYS: Patent central airways.  Bilateral nodular groundglass opacities.    PLEURA: No pleural effusion.    MEDIASTINUM AND VIOLET: Mild adenopathy. Calcified lymph nodes.    VESSELS: No thoracic aortic aneurysm or dissection. Suboptimal pulmonary arterial enhancement. Nondiagnostic for evaluation of pulmonary emboli.    HEART: Heart size is normal. No pericardial effusion.    CHEST WALL AND LOWER NECK: Within normal limits.    VISUALIZED UPPER ABDOMEN: Hepatic and splenic calcified granulomas.    BONES: Degenerative changes.    IMPRESSION:     Suboptimal pulmonary arterial enhancement, nondiagnostic study for evaluation of pulmonary emboli.  Alternative study such as nuclear medicine ventilation/perfusion scan may be obtained if clinically indicated.  Bilateral nodular groundglass lung opacities, likely infectious.      Assessment :   39F with hyperthyroidism on methimazole, afib on xarelto admitted with teresa pneumonia sec COVID 19. Anemia sec menses. Asthma exacerbation stable.    Plan :   Cont Hydroxycloroquine  Trend temps and cbc with dif  LDH, CRP ferritin  If remains stable can dc home to self quarantine x 7 days    COVID-19 is a viral illness and as expected and as we are seeing presents as a viral illness with full spectrum of presentations. First week of illness generally less severe followed by critical period where patients start to improve or decompensate and require intubation (7-10 days post symptom onset) and felt to be due to a maladaptive immune response in the setting of decreased viral titers. Clinical course ranging from 2-8 weeks. Do recommend continued strict isolation to minimize risk to staff, avoid aerosolizing procedures, avoidance of steroids which are associated with worse outcomes including inhaled steroids, and with no compelling evidence to date do not recommend antiviral therapies outside of established protocols or controlled trials would support focus on supportive care and avoid interventions with no demonstrated efficacy and unclear adverse effect profile in context of COVID-19    D/w Hospitalist    Continue with present regiment.  Appropriate use of antibiotics and adverse effects reviewed.      I have discussed the above plan of care with patient/ family in detail. They expressed understanding of the  treatment plan . Risks, benefits and alternatives discussed in detail. I have asked if they have any questions or concerns and appropriately addressed them to the best of my ability .    > 35 minutes were spent in direct patient care reviewing notes, medications ,labs data/ imaging , discussion with multidisciplinary team.    Thank you for allowing me to participate in care of your patient .    Luis Alberto Farley MD  Infectious Disease  [phone]

## 2020-03-22 NOTE — DISCHARGE NOTE PROVIDER - NSDCCPCAREPLAN_GEN_ALL_CORE_FT
PRINCIPAL DISCHARGE DIAGNOSIS  Diagnosis: Syncope  Assessment and Plan of Treatment:       SECONDARY DISCHARGE DIAGNOSES  Diagnosis: Ground glass opacity present on imaging of lung  Assessment and Plan of Treatment: +COVID 19, pneumonia    Diagnosis: Asthma  Assessment and Plan of Treatment: bronchodilaters and low dose of prednisone.    Diagnosis: Coronavirus infection  Assessment and Plan of Treatment: chloroquine for 4 days.  zithromax    Diagnosis: Anemia  Assessment and Plan of Treatment: stable, asymptomatics.  ferrous sulfate.  s/p 1U of PRBC transfusion.

## 2020-03-22 NOTE — PROGRESS NOTE ADULT - ASSESSMENT
The patient is a 39 year old female with a history of anemia, paroxysmal atrial fibrillation, hyperthyroidism who presents with shortness of breath and cough in the setting of anemia and likely COVID-19.    Plan:  - Remain off of rivaroxaban. Patient seen by me in the past; unclear why she she been on anticoagulation given low thromboembolic risk with [id number] of 0 and previously stable TFTs. She can discuss further with her cardiologist as outpatient.  - Monitor hemoglobin  - Continue metoprolol tartrate 50 mg bid  - COVID-19 positive  - On room air  - Discharge planning and self-isolation

## 2020-03-23 LAB
CRP SERPL-MCNC: 5.36 MG/DL — HIGH (ref 0–0.4)
D DIMER BLD IA.RAPID-MCNC: 867 NG/ML DDU — HIGH
HCT VFR BLD CALC: 26.9 % — LOW (ref 34.5–45)
HGB BLD-MCNC: 7.4 G/DL — LOW (ref 11.5–15.5)
LDH SERPL L TO P-CCNC: 271 U/L — HIGH (ref 50–242)
MAGNESIUM SERPL-MCNC: 1.7 MG/DL — SIGNIFICANT CHANGE UP (ref 1.6–2.6)
MCHC RBC-ENTMCNC: 17 PG — LOW (ref 27–34)
MCHC RBC-ENTMCNC: 27.5 GM/DL — LOW (ref 32–36)
MCV RBC AUTO: 61.8 FL — LOW (ref 80–100)
NRBC # BLD: 0 /100 WBCS — SIGNIFICANT CHANGE UP (ref 0–0)
PLATELET # BLD AUTO: 228 K/UL — SIGNIFICANT CHANGE UP (ref 150–400)
RBC # BLD: 4.35 M/UL — SIGNIFICANT CHANGE UP (ref 3.8–5.2)
RBC # FLD: 27.6 % — HIGH (ref 10.3–14.5)
WBC # BLD: 4.86 K/UL — SIGNIFICANT CHANGE UP (ref 3.8–10.5)
WBC # FLD AUTO: 4.86 K/UL — SIGNIFICANT CHANGE UP (ref 3.8–10.5)

## 2020-03-23 PROCEDURE — 99233 SBSQ HOSP IP/OBS HIGH 50: CPT

## 2020-03-23 PROCEDURE — 71045 X-RAY EXAM CHEST 1 VIEW: CPT | Mod: 26

## 2020-03-23 RX ORDER — AZITHROMYCIN 500 MG/1
250 TABLET, FILM COATED ORAL DAILY
Refills: 0 | Status: DISCONTINUED | OUTPATIENT
Start: 2020-03-23 | End: 2020-03-27

## 2020-03-23 RX ADMIN — Medication 50 MILLIGRAM(S): at 20:23

## 2020-03-23 RX ADMIN — MAGNESIUM OXIDE 400 MG ORAL TABLET 400 MILLIGRAM(S): 241.3 TABLET ORAL at 17:53

## 2020-03-23 RX ADMIN — Medication 1 MILLIGRAM(S): at 22:03

## 2020-03-23 RX ADMIN — Medication 325 MILLIGRAM(S): at 15:15

## 2020-03-23 RX ADMIN — Medication 200 MILLIGRAM(S): at 20:08

## 2020-03-23 RX ADMIN — Medication 650 MILLIGRAM(S): at 20:23

## 2020-03-23 RX ADMIN — BUDESONIDE AND FORMOTEROL FUMARATE DIHYDRATE 2 PUFF(S): 160; 4.5 AEROSOL RESPIRATORY (INHALATION) at 22:03

## 2020-03-23 RX ADMIN — MAGNESIUM OXIDE 400 MG ORAL TABLET 400 MILLIGRAM(S): 241.3 TABLET ORAL at 12:16

## 2020-03-23 RX ADMIN — MAGNESIUM OXIDE 400 MG ORAL TABLET 400 MILLIGRAM(S): 241.3 TABLET ORAL at 08:23

## 2020-03-23 RX ADMIN — Medication 650 MILLIGRAM(S): at 06:10

## 2020-03-23 RX ADMIN — Medication 650 MILLIGRAM(S): at 05:34

## 2020-03-23 RX ADMIN — Medication 500 MILLIGRAM(S): at 08:24

## 2020-03-23 RX ADMIN — BUDESONIDE AND FORMOTEROL FUMARATE DIHYDRATE 2 PUFF(S): 160; 4.5 AEROSOL RESPIRATORY (INHALATION) at 08:24

## 2020-03-23 RX ADMIN — Medication 200 MILLIGRAM(S): at 08:23

## 2020-03-23 RX ADMIN — Medication 10 MILLIGRAM(S): at 09:29

## 2020-03-23 RX ADMIN — Medication 200 MILLIGRAM(S): at 22:03

## 2020-03-23 RX ADMIN — AZITHROMYCIN 250 MILLIGRAM(S): 500 TABLET, FILM COATED ORAL at 12:15

## 2020-03-23 RX ADMIN — Medication 50 MILLIGRAM(S): at 08:24

## 2020-03-23 RX ADMIN — Medication 650 MILLIGRAM(S): at 21:20

## 2020-03-23 RX ADMIN — Medication 50 MILLIGRAM(S): at 17:53

## 2020-03-23 RX ADMIN — Medication 50 MILLIGRAM(S): at 05:40

## 2020-03-23 NOTE — PROGRESS NOTE ADULT - ASSESSMENT
40 y/o F w/ a PMHx of hyperthyroidism and a fib (on Xarelto) admitted w/ 38 y/o F w/ a PMHx of hyperthyroidism and a fib (on Xarelto) admitted w/ B/L PNA 2/2 COVID-19 infection. Course complicated by acute blood loss anemia 2/2 menstruation requiring transfusion of 1u PRBCs.    PLAN:  Neuro: CT head on admission negative for acute intracranial event s/p syncopal episode. Continue w/ trazadone and zoloft.   Cardiac: Hx of a fib, will continue to hold Xarelto in setting of anemia (Hgb 7.4). Continue w/ Lopressor 50mg BID. TTE revealing of normal LV function. Maintain MAP > 65. Cardiology following.  Pulm: Reported desaturation event on 2 L NC today on medicine floor (SpO2 84%), transferred to SPCU for further management. Nasal cannula increased to 3 L, will continue to titrate to maintain SpO2 > 90%. Low threshold for intubation should respiratory status deteriorate given known COVID-19 (+). Continue w/ Symbicort. CT chest on admission revealing of B/L nodular ground-glass infiltrates.  GI: Regular diet.  Renal: BUN/Cr within normal limits. Monitor electrolytes, maintain K > 4 and Mg >2. Monitor urine output, strict I and Os.  ID: Febrile (  Endo: TSH within normal limits, continue w/ methimazole.  Heme/Onc: Mechanical DVT prophylaxis w/ SCDs, will continue to hold Xarelto in setting of anemia (Hgb 7.4). S/p 1u PRBCs on 3/19, continue to trend Hgb and will transfuse for Hgb < 7. 38 y/o F w/ a PMHx of hyperthyroidism and a fib (on Xarelto) admitted w/ B/L PNA 2/2 COVID-19 infection. Course complicated by acute blood loss anemia 2/2 menstruation requiring transfusion of 1u PRBCs.    PLAN:  Neuro: CT head on admission negative for acute intracranial event s/p syncopal episode. Continue w/ trazadone and zoloft.   Cardiac: Hx of a fib, will continue to hold Xarelto in setting of anemia (Hgb 7.4). Continue w/ Lopressor 50mg BID. TTE revealing of normal LV function. Maintain MAP > 65. Cardiology following.  Pulm: Reported desaturation event on 2 L NC today on medicine floor (SpO2 84%), transferred to SPCU for further management. Nasal cannula increased to 3 L, will continue to titrate to maintain SpO2 > 90%. Low threshold for intubation should respiratory status deteriorate given known COVID-19 (+). Continue w/ Symbicort. CT chest on admission revealing of B/L nodular ground-glass infiltrates.  GI: Regular diet.  Renal: BUN/Cr within normal limits. Monitor electrolytes, maintain K > 4 and Mg >2. Monitor urine output, strict I and Os.  ID: Febrile (101.2 degrees at 9 AM), WBC count within normal limits, lymphopenia (0.47 on 3/19). Will continue to trend. COVID-19 (+). Trend LDH, CRP, ferritin, and D dimer. Continue w/ hydroxychloroquine and Zithromax. RSV/ flu (-), blood cultures w/o growth. Pending full RVP. ID following.  Endo: TSH within normal limits, continue w/ methimazole.  Heme/Onc: Mechanical DVT prophylaxis w/ SCDs, will continue to hold Xarelto in setting of anemia (Hgb 7.4). S/p 1u PRBCs on 3/19, continue to trend Hgb and will transfuse for Hgb < 7.    Dispo: Will remain in SPCU overnight. Full code.

## 2020-03-23 NOTE — PROGRESS NOTE ADULT - ASSESSMENT
Pt with syncope, anemia, severe wheezing, CT chest suggestive of COVID pneumonia. Clinically has been stable, but spiked temp . Now on plaquenil.     Clinically looks better but CXR shows bilat infil. which has been stable.   Some wheezing--underlying asthma.   Can continue low dose prednisone and symbicort.

## 2020-03-23 NOTE — PROGRESS NOTE ADULT - SUBJECTIVE AND OBJECTIVE BOX
Patient is a 38 y/o female who presents with a chief complaint of SOB, syncope (23 Mar 2020 18:58)    BRIEF HOSPITAL COURSE: 38 y/o F w/ a PMHx of hyperthyroidism and a fib (on Xarelto) who presented to [facility] on 3/19 c/o increasing SOB x 1 wk, worse w/ exertion and s/p syncopal episode at home w/ injured lip. Pt denied hx of recent travel and known sick contacts, but has school-aged children who are currently home from school for self-quarantine ([place]). Pt reports positive case within school district, but is unsure of whom. In the ED, pt found to be anemic (Hgb 6.6) and received 1u PRBCs. Pt's Hgb improved to 6.9, reportedly denied receiving second unit. Of note, pt is currently on her period and reported that she does not take Xarelto while on her period, but is otherwise compliant. Labs also significant for lymphopenia (0.38), hypokalemia (K 3.3), and hyponatremia (Na 131). CTA revealing of B/L nodular ground-glass lung opacities. CT head negative for acute intracranial event. RSV/flu negative. Pt was admitted for R/O COVID-19.    Events last 24 hours: Pt w/ reported desaturation event on 2 L nasal cannula (SpO2 84%), transferred to SPCU and nasal cannula increased to 3 L    PAST MEDICAL & SURGICAL HISTORY:  Smoker  Depression, unspecified depression type  History of anemia  Asthma  History of Hyperthyroidism  Atrial fibrillation  History of blood transfusion  S/P  Section: ,,    Review of Systems:  CONSTITUTIONAL: No fever, chills, or fatigue.  EYES: No eye pain, visual disturbances, or discharge.  ENMT:  No difficulty hearing, tinnitus, or vertigo. No sinus or throat pain.  NECK: No pain or stiffness.  RESPIRATORY: (+) cough. No shortness of breath or wheezing.  CARDIOVASCULAR: No chest pain, palpitations, dizziness, or leg swelling.  GASTROINTESTINAL: No abdominal or epigastric pain. No nausea, vomiting, diarrhea, or constipation. No hematemesis, melena, or hematochezia.  GENITOURINARY: No dysuria, increased frequency, hematuria, or incontinence.  NEUROLOGICAL: No headaches, memory loss, loss of strength, numbness, or tremors.  SKIN: No itching, burning, rashes, or lesions.  MUSCULOSKELETAL: No joint pain or swelling. No muscle, back, or extremity pain.  PSYCHIATRIC: No depression, anxiety, mood swings, or difficulty sleeping.    Medications:  azithromycin   Tablet 250 milliGRAM(s) Oral daily  hydroxychloroquine 200 milliGRAM(s) Oral every 12 hours  metoprolol tartrate 50 milliGRAM(s) Oral two times a day  budesonide 160 MICROgram(s)/formoterol 4.5 MICROgram(s) Inhaler 2 Puff(s) Inhalation two times a day  guaiFENesin   Syrup  (Sugar-Free) 200 milliGRAM(s) Oral every 6 hours PRN  acetaminophen   Tablet .. 650 milliGRAM(s) Oral every 6 hours PRN  LORazepam     Tablet 1 milliGRAM(s) Oral three times a day PRN  sertraline 100 milliGRAM(s) Oral daily  traZODone 50 milliGRAM(s) Oral two times a day  methimazole 10 milliGRAM(s) Oral three times a day  ascorbic acid 500 milliGRAM(s) Oral daily  ferrous    sulfate 325 milliGRAM(s) Oral daily  magnesium oxide 400 milliGRAM(s) Oral three times a day with meals    ICU Vital Signs Last 24 Hrs  T(C): 37.5 (23 Mar 2020 20:30), Max: 38.5 (23 Mar 2020 05:30)  T(F): 99.5 (23 Mar 2020 20:30), Max: 101.3 (23 Mar 2020 05:30)  HR: 84 (23 Mar 2020 20:30) (82 - 110)  BP: 126/77 (23 Mar 2020 20:30) (102/57 - 126/77)  BP(mean): 92 (23 Mar 2020 20:30) (68 - 93)  ABP: --  ABP(mean): --  RR: 20 (23 Mar 2020 20:30) (18 - 31)  SpO2: 96% (23 Mar 2020 20:30) (88% - 100%)    I&O's Detail    23 Mar 2020 07:01  -  23 Mar 2020 21:18  --------------------------------------------------------  IN:    Oral Fluid: 380 mL  Total IN: 380 mL    OUT:  Total OUT: 0 mL    Total NET: 380 mL    LABS:                        7.4    4.86  )-----------( 228      ( 23 Mar 2020 07:25 )             26.9     Mg     1.7         CAPILLARY BLOOD GLUCOSE    CULTURES:  Culture Results:   Normal Respiratory Juju present ( @ 21:11)  Culture Results:   No growth to date. ( @ 22:06)  Culture Results:   No growth to date. ( @ 22:06)    Physical Examination:    VITALS AT TIME OF EXAM:  HR:  BP:  RR:  SPO2:    General: Well appearing, lying in bed in NAD.      HEENT: Pupils equal, reactive to light. Symmetric. No scleral icterus or injection.    PULM: Clear to auscultation B/L. No wheezes, rales, or rhonchi apprecaited. No significant sputum production or increased respiratory effort.    NECK: Supple, no lymphadenopathy, trachea midline.    CVS: Regular rate and rhythm, no murmurs appreciated, +s1/s2.    ABD: Soft, nondistended, nontender, normoactive bowel sounds.    EXT: No edema, nontender.    SKIN: Warm and well perfused, no rashes noted.    NEURO: Alert, oriented, interactive, nonfocal.    RADIOLOGY:  < from: Xray Chest 1 View- PORTABLE-Routine (20 @ 06:41) >  EXAM:  XR CHEST PORTABLE ROUTINE 1V                          PROCEDURE DATE:  2020      INTERPRETATION:  AP chest on 2020 at 6:13 AM. Patient being followed for infiltration.    Heart suggest normal size.    Moderate infiltration in the mid lower left lung field and mild infiltration right base again seen.    Chest is similar to .    IMPRESSION: Unchanged bilateral infiltrates.    GALI ACUNA M.D., ATTENDING RADIOLOGIST  This document has been electronically signed. Mar 23 2020  9:52AM    < end of copied text > Patient is a 38 y/o female who presents with a chief complaint of SOB, syncope (23 Mar 2020 18:58)    BRIEF HOSPITAL COURSE: 38 y/o F w/ a PMHx of hyperthyroidism and a fib (on Xarelto) who presented to [facility] on 3/19 c/o increasing SOB x 1 wk, worse w/ exertion and s/p syncopal episode at home w/ injured lip. Pt denied hx of recent travel and known sick contacts, but has school-aged children who are currently home from school for self-quarantine ([place]). Pt reports positive case within school district, but is unsure of whom. In the ED, pt found to be anemic (Hgb 6.6) and received 1u PRBCs. Pt's Hgb improved to 6.9, reportedly denied receiving second unit. Of note, pt is currently on her period and reported that she does not take Xarelto while on her period, but is otherwise compliant. Labs also significant for lymphopenia (0.38), hypokalemia (K 3.3), and hyponatremia (Na 131). CTA revealing of B/L nodular ground-glass lung opacities. CT head negative for acute intracranial event. RSV/flu negative. Pt was admitted for R/O COVID-19,, confirmed COVID-19 (+) on 3/21.    Events last 24 hours: Pt w/ reported desaturation event on 2 L nasal cannula (SpO2 84%), transferred to SPCU and nasal cannula increased to 3 L. Pt seen and examined at the bedside, currently does not have any complaints. Tmax today 101.2 degrees.     PAST MEDICAL & SURGICAL HISTORY:  Smoker  Depression, unspecified depression type  History of anemia  Asthma  History of Hyperthyroidism  Atrial fibrillation  History of blood transfusion  S/P  Section: ,,    Review of Systems:  CONSTITUTIONAL: No fever, chills, or fatigue.  EYES: No eye pain, visual disturbances, or discharge.  ENMT:  No difficulty hearing, tinnitus, or vertigo. No sinus or throat pain.  NECK: No pain or stiffness.  RESPIRATORY: (+) cough. No shortness of breath or wheezing.  CARDIOVASCULAR: No chest pain, palpitations, dizziness, or leg swelling.  GASTROINTESTINAL: No abdominal or epigastric pain. No nausea, vomiting, diarrhea, or constipation. No hematemesis, melena, or hematochezia.  GENITOURINARY: No dysuria, increased frequency, hematuria, or incontinence.  NEUROLOGICAL: No headaches, memory loss, loss of strength, numbness, or tremors.  SKIN: No itching, burning, rashes, or lesions.  MUSCULOSKELETAL: No joint pain or swelling. No muscle, back, or extremity pain.  PSYCHIATRIC: No depression, anxiety, mood swings, or difficulty sleeping.    Medications:  azithromycin   Tablet 250 milliGRAM(s) Oral daily  hydroxychloroquine 200 milliGRAM(s) Oral every 12 hours  metoprolol tartrate 50 milliGRAM(s) Oral two times a day  budesonide 160 MICROgram(s)/formoterol 4.5 MICROgram(s) Inhaler 2 Puff(s) Inhalation two times a day  guaiFENesin   Syrup  (Sugar-Free) 200 milliGRAM(s) Oral every 6 hours PRN  acetaminophen   Tablet .. 650 milliGRAM(s) Oral every 6 hours PRN  LORazepam     Tablet 1 milliGRAM(s) Oral three times a day PRN  sertraline 100 milliGRAM(s) Oral daily  traZODone 50 milliGRAM(s) Oral two times a day  methimazole 10 milliGRAM(s) Oral three times a day  ascorbic acid 500 milliGRAM(s) Oral daily  ferrous    sulfate 325 milliGRAM(s) Oral daily  magnesium oxide 400 milliGRAM(s) Oral three times a day with meals    ICU Vital Signs Last 24 Hrs  T(C): 37.5 (23 Mar 2020 20:30), Max: 38.5 (23 Mar 2020 05:30)  T(F): 99.5 (23 Mar 2020 20:30), Max: 101.3 (23 Mar 2020 05:30)  HR: 84 (23 Mar 2020 20:30) (82 - 110)  BP: 126/77 (23 Mar 2020 20:30) (102/57 - 126/77)  BP(mean): 92 (23 Mar 2020 20:30) (68 - 93)  ABP: --  ABP(mean): --  RR: 20 (23 Mar 2020 20:30) (18 - 31)  SpO2: 96% (23 Mar 2020 20:30) (88% - 100%)    I&O's Detail    23 Mar 2020 07:01  -  23 Mar 2020 21:18  --------------------------------------------------------  IN:    Oral Fluid: 380 mL  Total IN: 380 mL    OUT:  Total OUT: 0 mL    Total NET: 380 mL    LABS:                        7.4    4.86  )-----------( 228      ( 23 Mar 2020 07:25 )             26.9     Mg     1.7         CAPILLARY BLOOD GLUCOSE    CULTURES:  Culture Results:   Normal Respiratory Juju present ( @ 21:11)  Culture Results:   No growth to date. ( @ 22:06)  Culture Results:   No growth to date. ( @ 22:06)    Physical Examination:    VITALS AT TIME OF EXAM:  HR: 77	  BP: 126/77  RR: 28  SPO2: 95% on 3 L NC    General: Well appearing, lying in bed in NAD.      HEENT: Pupils equal, reactive to light. Symmetric. No scleral icterus or injection.    PULM: Clear to auscultation B/L. No wheezes, rales, or rhonchi apprecaited. No significant sputum production or increased respiratory effort.    NECK: Supple, no lymphadenopathy, trachea midline.    CVS: Regular rate and rhythm, no murmurs appreciated, +s1/s2.    ABD: Soft, nondistended, nontender, normoactive bowel sounds.    EXT: No edema, nontender.    SKIN: Warm and well perfused, no rashes noted.    NEURO: Alert, oriented, interactive, nonfocal.    RADIOLOGY:  < from: Xray Chest 1 View- PORTABLE-Routine (20 @ 06:41) >  EXAM:  XR CHEST PORTABLE ROUTINE 1V                          PROCEDURE DATE:  2020      INTERPRETATION:  AP chest on 2020 at 6:13 AM. Patient being followed for infiltration.    Heart suggest normal size.    Moderate infiltration in the mid lower left lung field and mild infiltration right base again seen.    Chest is similar to .    IMPRESSION: Unchanged bilateral infiltrates.    GALI ACUNA M.D., ATTENDING RADIOLOGIST  This document has been electronically signed. Mar 23 2020  9:52AM    < end of copied text > Patient is a 40 y/o female who presents with a chief complaint of SOB, syncope (23 Mar 2020 18:58)    BRIEF HOSPITAL COURSE: 40 y/o F w/ a PMHx of hyperthyroidism and a fib (on Xarelto) who presented to [facility] on 3/19 c/o increasing SOB x 1 wk, worse w/ exertion and s/p syncopal episode at home w/ injured lip. Pt denied hx of recent travel and known sick contacts, but has school-aged children who are currently home from school for self-quarantine ([place]). Pt reports positive case within school district, but is unsure of whom. In the ED, pt found to be anemic (Hgb 6.6) and received 1u PRBCs. Pt's Hgb improved to 6.9, reportedly denied receiving second unit. Of note, pt is currently on her period and reported that she does not take Xarelto while on her period, but is otherwise compliant. Labs also significant for lymphopenia (0.38), hypokalemia (K 3.3), and hyponatremia (Na 131). CTA revealing of B/L nodular ground-glass lung opacities. CT head negative for acute intracranial event. RSV/flu negative. Pt was admitted for R/O COVID-19,, confirmed COVID-19 (+) on 3/21.    Events last 24 hours: Pt w/ reported desaturation event on 2 L nasal cannula (SpO2 84%), transferred to SPCU and nasal cannula increased to 3 L. Pt seen and examined at the bedside, currently does not have any complaints. Tmax today 101.2 degrees.     PAST MEDICAL & SURGICAL HISTORY:  Smoker  Depression, unspecified depression type  History of anemia  Asthma  History of Hyperthyroidism  Atrial fibrillation  History of blood transfusion  S/P  Section: ,,    Review of Systems:  CONSTITUTIONAL: No fever, chills, or fatigue.  EYES: No eye pain, visual disturbances, or discharge.  ENMT:  No difficulty hearing, tinnitus, or vertigo. No sinus or throat pain.  NECK: No pain or stiffness.  RESPIRATORY: (+) cough. No shortness of breath or wheezing.  CARDIOVASCULAR: No chest pain, palpitations, dizziness, or leg swelling.  GASTROINTESTINAL: No abdominal or epigastric pain. No nausea, vomiting, diarrhea, or constipation. No hematemesis, melena, or hematochezia.  GENITOURINARY: No dysuria, increased frequency, hematuria, or incontinence.  NEUROLOGICAL: No headaches, memory loss, loss of strength, numbness, or tremors.  SKIN: No itching, burning, rashes, or lesions.  MUSCULOSKELETAL: No joint pain or swelling. No muscle, back, or extremity pain.  PSYCHIATRIC: No depression, anxiety, mood swings, or difficulty sleeping.    Medications:  azithromycin   Tablet 250 milliGRAM(s) Oral daily  hydroxychloroquine 200 milliGRAM(s) Oral every 12 hours  metoprolol tartrate 50 milliGRAM(s) Oral two times a day  budesonide 160 MICROgram(s)/formoterol 4.5 MICROgram(s) Inhaler 2 Puff(s) Inhalation two times a day  guaiFENesin   Syrup  (Sugar-Free) 200 milliGRAM(s) Oral every 6 hours PRN  acetaminophen   Tablet .. 650 milliGRAM(s) Oral every 6 hours PRN  LORazepam     Tablet 1 milliGRAM(s) Oral three times a day PRN  sertraline 100 milliGRAM(s) Oral daily  traZODone 50 milliGRAM(s) Oral two times a day  methimazole 10 milliGRAM(s) Oral three times a day  ascorbic acid 500 milliGRAM(s) Oral daily  ferrous    sulfate 325 milliGRAM(s) Oral daily  magnesium oxide 400 milliGRAM(s) Oral three times a day with meals    ICU Vital Signs Last 24 Hrs  T(C): 37.5 (23 Mar 2020 20:30), Max: 38.5 (23 Mar 2020 05:30)  T(F): 99.5 (23 Mar 2020 20:30), Max: 101.3 (23 Mar 2020 05:30)  HR: 84 (23 Mar 2020 20:30) (82 - 110)  BP: 126/77 (23 Mar 2020 20:30) (102/57 - 126/77)  BP(mean): 92 (23 Mar 2020 20:30) (68 - 93)  ABP: --  ABP(mean): --  RR: 20 (23 Mar 2020 20:30) (18 - 31)  SpO2: 96% (23 Mar 2020 20:30) (88% - 100%)    I&O's Detail    23 Mar 2020 07:01  -  23 Mar 2020 21:18  --------------------------------------------------------  IN:    Oral Fluid: 380 mL  Total IN: 380 mL    OUT:  Total OUT: 0 mL    Total NET: 380 mL    LABS:                        7.4    4.86  )-----------( 228      ( 23 Mar 2020 07:25 )             26.9     Mg     1.7         CAPILLARY BLOOD GLUCOSE    CULTURES:  Culture Results:   Normal Respiratory Juju present ( @ 21:11)  Culture Results:   No growth to date. ( @ 22:06)  Culture Results:   No growth to date. ( @ 22:06)    Physical Examination:    VITALS AT TIME OF EXAM:  HR: 77	  BP: 126/77  RR: 28  SPO2: 95% on 3 L NC    General: Middle-aged female sitting up in bed, appears comfortable.     HEENT: Pupils equal, reactive to light. Symmetric. No scleral icterus or injection.    PULM: Diffuse rhonchi B/L, mild wheeze, no increased expiratory effort.    NECK: Supple, no lymphadenopathy, trachea midline.    CVS: Regular rate and rhythm, no murmurs appreciated, +s1/s2.    ABD: Soft, nondistended, nontender, normoactive bowel sounds.    EXT: No edema, nontender.    SKIN: Warm and well perfused, no rashes noted.    NEURO: Alert, oriented, interactive, nonfocal.    RADIOLOGY:  < from: Xray Chest 1 View- PORTABLE-Routine (20 @ 06:41) >  EXAM:  XR CHEST PORTABLE ROUTINE 1V                          PROCEDURE DATE:  2020      INTERPRETATION:  AP chest on 2020 at 6:13 AM. Patient being followed for infiltration.    Heart suggest normal size.    Moderate infiltration in the mid lower left lung field and mild infiltration right base again seen.    Chest is similar to .    IMPRESSION: Unchanged bilateral infiltrates.    GALI ACUNA M.D., ATTENDING RADIOLOGIST  This document has been electronically signed. Mar 23 2020  9:52AM    < end of copied text >

## 2020-03-23 NOTE — PROGRESS NOTE ADULT - ASSESSMENT
39F with hyperthyroidism on methimazole, afib on xarelto who presents with SOB/WEN and syncope.         Hypoxia//sob/COVID+  started on oxygen.  transfer to SPCU.    Syncope    - likely anemia related 2/2 heavy period on xarelto  - s/p 1u pRBC transfusion  - refused  for 2nd U of PRBC.   Understand risk and bebefit of PRBC transfusion yesterday.  will f/u today's cbc.    - As per cardiology-cardiology -   - Remain off of rivaroxaban. Patient seen by me in the past; unclear why  she been on anticoagulation given low thromboembolic risk with [id number] of 0 and previously stable TFTs. She can discuss further with her cardiologist as outpatient.  - Monitor hemoglobin  - Continue metoprolol tartrate 50 mg bid  - TTE    SOB/WEN  - +COVID  -> continue contact and airborne precautions  - ID consult  -d/c antibiotics.  - wants to continue low dose of steroids.  - inh as needed, no nebs    Prolonged QT  - hold sertraline   - monitor    Hyperthyroidism  - cont with methimazole    Afib  - holding xarelto 2/2 current bleeding  - continue metoprolol     Preventive measures  IMPROVE VTE Individual Risk Assessment          RISK                                                          Points  [  ] Previous VTE                                                 3  [  ] Thrombophilia                                              2  [  ] Lower limb paralysis                                    2        (unable to hold up >15 seconds)    [  ] Current Cancer                                             2         (within 6 months)  [  ] Immobilization > 24 hrs                              1  [  ] ICU/CCU stay > 24 hours                            1  [  ] Age > 60                                                        1    IMPROVE VTE Score 0    - currently bleeding, hold AC for now    Plan of care was discuss with patient, all questions were answered, seems understand and in agreement. 39F with hyperthyroidism on methimazole, afib on xarelto who presents with SOB/WEN and syncope.         Hypoxia//sob/COVID+  started on oxygen.  transfer to SPCU.    Syncope    - likely anemia related 2/2 heavy period on xarelto  - s/p 1u pRBC transfusion  - refused  for 2nd U of PRBC.   Understand risk and bebefit of PRBC transfusion yesterday.  will f/u today's cbc.    - As per cardiology-cardiology -   - Remain off of rivaroxaban. Patient seen by me in the past; unclear why  she been on anticoagulation given low thromboembolic risk with [id number] of 0 and previously stable TFTs. She can discuss further with her cardiologist as outpatient.  - Monitor hemoglobin  - Continue metoprolol tartrate 50 mg bid  - TTE    SOB/WEN  - +COVID  -> continue contact and airborne precautions  -zithromax and chloroquin.  - inh as needed, no nebs        Prolonged QT  resume   zoloft and trazadone.   discuss with cardiology.    Hyperthyroidism  - cont with methimazole    Afib  - holding xarelto 2/2 current bleeding  - continue metoprolol     Preventive measures  IMPROVE VTE Individual Risk Assessment          RISK                                                          Points  [  ] Previous VTE                                                 3  [  ] Thrombophilia                                              2  [  ] Lower limb paralysis                                    2        (unable to hold up >15 seconds)    [  ] Current Cancer                                             2         (within 6 months)  [  ] Immobilization > 24 hrs                              1  [  ] ICU/CCU stay > 24 hours                            1  [  ] Age > 60                                                        1    IMPROVE VTE Score 0    - currently bleeding, hold AC for now    Plan of care was discuss with patient, all questions were answered, seems understand and in agreement.

## 2020-03-23 NOTE — PROGRESS NOTE ADULT - SUBJECTIVE AND OBJECTIVE BOX
PULMONARY/CRITICAL CARE  Feels somewhat worse, spiked temp, mild sob, wheeze, cough. Ambulated in room .      Sats ok.   Patient is a 39y old  Female who presents with a chief complaint of SOB, syncope (19 Mar 2020 20:01)    BRIEF HOSPITAL COURSE: ***39F with hyperthyroidism on methimazole, afib on xarelto who presents with SOB/WEN and syncope.  Symptoms started about a week ago with SOB.  Worse with exertion.  Associated with a cough with clear sputum.  Had no fevers.  Became progressively worse over the week.  Today, patient actually syncopized this morning and injured her lip.  No chest pain.  Patient denies any known sick contacts but her children are home from school with self-isolation ([facility]).  Reports having a positive case but unsure of whom.  No recent travel and patient was compliant with Xarelto (though patient said she stops it when she is on her period).  Currently is on her period (started two days ago) and experiences heavy flow.  In the ED, patient was found to be anemic with hgb of 6.6 and is being transfused 1u of pRBC.  CT chest was done that showed suboptimal pulmonary arterial enhancement, nondiagnostic study for PE and also found to have bilateral nodular groundglass lung opacities.  Was started on ceftriaxone and azithromycin empirically and is also being admitted for COVID rule out.    Wheezing. Was using albuterol only.  No recent travel.    Events last 24 hours: ***    PAST MEDICAL & SURGICAL HISTORY:  Smoker  Depression, unspecified depression type  History of anemia  Asthma  History of Hyperthyroidism  Atrial fibrillation  History of blood transfusion  S/P  Section: ,,    Allergies    Motrin (Hives)    Intolerances      FAMILY HISTORY/ social: Smokes 1/2 ppd for 15 yrs, no etoh  Family history of stomach cancer (Grandparent)  Family history of diabetes mellitus (Sibling, Grandparent)          Medications:  azithromycin  IVPB 500 milliGRAM(s) IV Intermittent every 24 hours  cefTRIAXone   IVPB 1000 milliGRAM(s) IV Intermittent every 24 hours  hydroxychloroquine 200 milliGRAM(s) Oral two times a day    metoprolol tartrate 50 milliGRAM(s) Oral two times a day    guaiFENesin   Syrup  (Sugar-Free) 200 milliGRAM(s) Oral every 6 hours PRN  tiotropium 2.5 MICROgram(s)/olodaterol 2.5 MICROgram(s) (STIOLTO) Inhaler 2 Puff(s) Inhalation daily    acetaminophen   Tablet .. 650 milliGRAM(s) Oral every 6 hours PRN  LORazepam     Tablet 1 milliGRAM(s) Oral three times a day PRN            methimazole 10 milliGRAM(s) Oral three times a day    ascorbic acid 500 milliGRAM(s) Oral daily  potassium chloride    Tablet ER 20 milliEquivalent(s) Oral once                ICU Vital Signs Last 24 Hrs  T(C): 37.8 (20 Mar 2020 06:20), Max: 37.9 (20 Mar 2020 00:45)  T(F): 100.1 (20 Mar 2020 06:20), Max: 100.2 (20 Mar 2020 00:45)  HR: 97 (20 Mar 2020 06:20) (80 - 112)  BP: 113/57 (20 Mar 2020 06:20) (105/61 - 140/87)  BP(mean): --  ABP: --  ABP(mean): --  RR: 16 (20 Mar 2020 04:00) (16 - 18)  SpO2: 93% (20 Mar 2020 04:00) (93% - 99%)    Vital Signs Last 24 Hrs  T(C): 37.8 (20 Mar 2020 06:20), Max: 37.9 (20 Mar 2020 00:45)  T(F): 100.1 (20 Mar 2020 06:20), Max: 100.2 (20 Mar 2020 00:45)  HR: 97 (20 Mar 2020 06:20) (80 - 112)  BP: 113/57 (20 Mar 2020 06:20) (105/61 - 140/87)  BP(mean): --  RR: 16 (20 Mar 2020 04:00) (16 - 18)  SpO2: 93% (20 Mar 2020 04:00) (93% - 99%)        I&O's Detail        LABS:                        6.6    5.41  )-----------( 191      ( 19 Mar 2020 15:44 )             25.6     03-20    137  |  105  |  7   ----------------------------<  91  3.1<L>   |  24  |  0.71    Ca    8.1<L>      20 Mar 2020 07:15    TPro  6.4  /  Alb  2.7<L>  /  TBili  0.6  /  DBili  x   /  AST  31  /  ALT  16  /  AlkPhos  74  03-20          CAPILLARY BLOOD GLUCOSE        PT/INR - ( 19 Mar 2020 15:44 )   PT: 12.6 sec;   INR: 1.13 ratio         PTT - ( 19 Mar 2020 15:44 )  PTT:36.4 sec    CULTURES:      Physical Examination:    General: mild sob wd female    HEENT: Pupils equal, reactive to light.  Symmetric.    PULM: decreased wheezing, rhonchi bilat no rales.     CVS: Regular rate and rhythm, no murmurs, rubs, or gallops    ABD: Soft, nondistended, nontender, normoactive bowel sounds, no masses    EXT: No edema, nontender    SKIN: Warm and well perfused, no rashes noted.    NEURO: Alert, oriented, interactive, nonfocal    RADIOLOGY: ***< from: CT Angio Chest w/ IV Cont (20 @ 17:26) >  EXAM:  CT ANGIO CHEST (W)AW IC                                  PROCEDURE DATE:  2020          INTERPRETATION:  CLINICAL INFORMATION: Shortness of breath    COMPARISON: None.    PROCEDURE:   CT Angiography of the Chest.  90 ml of Omnipaque 350 was injected intravenously. 10 ml were discarded.  Sagittal and coronal reformats were performed as well as 3D (MIP) reconstructions.      FINDINGS:    LUNGS AND AIRWAYS: Patent central airways.  Bilateral nodular groundglass opacities.    PLEURA: No pleural effusion.    MEDIASTINUM AND VIOLET: Mild adenopathy. Calcified lymph nodes.    VESSELS: No thoracic aortic aneurysm or dissection. Suboptimal pulmonary arterial enhancement. Nondiagnostic for evaluation of pulmonary emboli.    HEART: Heart size is normal. No pericardial effusion.    CHEST WALL AND LOWER NECK: Within normal limits.    VISUALIZED UPPER ABDOMEN: Hepatic and splenic calcified granulomas.    BONES: Degenerative changes.    IMPRESSION:     Suboptimal pulmonary arterial enhancement, nondiagnostic study for evaluation of pulmonary emboli.  Alternative study such as nuclear medicine ventilation/perfusion scan may be obtained if clinically indicated.  Bilateral nodular groundglass lung opacities, likely infectious.                    < end of copied text >    CXR bilat infiltrates stable

## 2020-03-23 NOTE — PROGRESS NOTE ADULT - SUBJECTIVE AND OBJECTIVE BOX
Chief Complaint: Cough, shortness of breath    Interval Events: No events overnight.    Review of Systems:  General: No fevers, chills, weight loss or gain  Skin: No rashes, color changes  Cardiovascular: No chest pain, orthopnea  Respiratory: No shortness of breath, cough  Gastrointestinal: No nausea, abdominal pain  Genitourinary: No incontinence, pain with urination  Musculoskeletal: No pain, swelling, decreased range of motion  Neurological: No headache, weakness  Psychiatric: No depression, anxiety  Endocrine: No weight loss or gain, increased thirst  All other systems are comprehensively negative.    Physical Exam:  Vital Signs Last 24 Hrs  T(C): 37.6 (23 Mar 2020 08:33), Max: 38.5 (23 Mar 2020 05:30)  T(F): 99.7 (23 Mar 2020 08:33), Max: 101.3 (23 Mar 2020 05:30)  HR: 110 (23 Mar 2020 05:30) (80 - 110)  BP: 124/71 (23 Mar 2020 05:30) (112/62 - 129/83)  BP(mean): --  RR: 20 (23 Mar 2020 05:30) (16 - 20)  SpO2: 96% (23 Mar 2020 00:39) (93% - 96%)  General: NAD  HEENT: MMM  Neck: No JVD, no carotid bruit  Lungs: CTAB  CV: RRR, nl S1/S2, no M/R/G  Abdomen: S/NT/ND, +BS  Extremities: No LE edema, no cyanosis  Neuro: AAOx3, non-focal  Skin: No rash    Labs:             03-21    140  |  106  |  6<L>  ----------------------------<  115<H>  3.6   |  22  |  0.71    Ca    8.5      21 Mar 2020 12:04  Mg     1.7     03-23                          7.4    4.86  )-----------( 228      ( 23 Mar 2020 07:25 )             26.9     Telemetry: Sinus rhythm, intermittently tachycardic

## 2020-03-23 NOTE — PROGRESS NOTE ADULT - SUBJECTIVE AND OBJECTIVE BOX
ROBBIE MCKNIGHT is a 39yFemale , patient examined and chart reviewed.     INTERVAL HPI/ OVERNIGHT EVENTS:   Feeling better. Had fever to101.3 this am.  Upgraded to SPCU sec hypoxia. Now on NC.   No distress.    PAST MEDICAL & SURGICAL HISTORY:  Smoker  Depression, unspecified depression type  History of anemia  Asthma  History of Hyperthyroidism  Atrial fibrillation  History of blood transfusion  S/P  Section: ,,      For details regarding the patient's social history, family history, and other miscellaneous elements, please refer the initial infectious diseases consultation and/or the admitting history and physical examination for this admission.    ROS:  CONSTITUTIONAL: + fever or chills,   EYES:  Negative  blurry vision or double vision  CARDIOVASCULAR:  Negative for chest pain or palpitations  RESPIRATORY:  Negative for cough, wheezing, or SOB   GASTROINTESTINAL:  Negative for nausea, vomiting, diarrhea, constipation, or abdominal pain  GENITOURINARY:  Negative frequency, urgency or dysuria  NEUROLOGIC:  No headache, confusion, dizziness, lightheadedness  All other systems were reviewed and are negative     ALLERGIES:  Motrin (Hives)      Current inpatient medications :    ANTIBIOTICS/RELEVANT:  hydroxychloroquine 200 milliGRAM(s) Oral every 12 hours  azithromycin   Tablet 250 milliGRAM(s) Oral daily    MEDICATIONS  (STANDING):  ascorbic acid 500 milliGRAM(s) Oral daily  budesonide 160 MICROgram(s)/formoterol 4.5 MICROgram(s) Inhaler 2 Puff(s) Inhalation two times a day  ferrous    sulfate 325 milliGRAM(s) Oral daily  magnesium oxide 400 milliGRAM(s) Oral three times a day with meals  methimazole 10 milliGRAM(s) Oral three times a day  metoprolol tartrate 50 milliGRAM(s) Oral two times a day  sertraline 100 milliGRAM(s) Oral daily  traZODone 50 milliGRAM(s) Oral two times a day    MEDICATIONS  (PRN):  acetaminophen   Tablet .. 650 milliGRAM(s) Oral every 6 hours PRN Temp greater or equal to 38C (100.4F), Mild Pain (1 - 3)  guaiFENesin   Syrup  (Sugar-Free) 200 milliGRAM(s) Oral every 6 hours PRN Cough  LORazepam     Tablet 1 milliGRAM(s) Oral three times a day PRN Anxiety      Objective:  ICU Vital Signs Last 24 Hrs  T(C): 37.5 (23 Mar 2020 20:30), Max: 38.5 (23 Mar 2020 05:30)  T(F): 99.5 (23 Mar 2020 20:30), Max: 101.3 (23 Mar 2020 05:30)  HR: 77 (23 Mar 2020 22:00) (77 - 110)  BP: 126/77 (23 Mar 2020 20:30) (102/57 - 126/77)  BP(mean): 92 (23 Mar 2020 20:30) (68 - 93)  RR: 29 (23 Mar 2020 22:00) (18 - 31)  SpO2: 91% (23 Mar 2020 22:00) (88% - 100%)      Physical Exam:  General:  no acute distress  Eyes: sclera anicteric, pupils equal and reactive to light  ENMT: buccal mucosa moist, pharynx not injected  Neck: supple, trachea midline  Lungs: Decreased no wheeze/rhonchi  Cardiovascular: regular rate and rhythm, S1 S2  Abdomen: soft, nontender, no organomegaly present, bowel sounds normal  Neurological: alert and oriented x3, Cranial Nerves II-XII grossly intact  Skin: no increased ecchymosis/petechiae/purpura  Lymph Nodes: no palpable cervical/supraclavicular lymph nodes enlargements  Extremities: no cyanosis/clubbing/edema      LABS:                                 7.4    4.86  )-----------( 228      ( 23 Mar 2020 07:25 )             26.9     Mg     1.7           MICROBIOLOGY:  COVID-19 PCR . (20 @ 23:21)    COVID-19 PCR: Detected: LDT - Laboratory Developed Test All “detected” results on this new test  are considered presumptively positive results, are clinically actionable,  and specimens will be forwarded to CDC for confirmation testing.  Another report (corrected report) will only be issued if discordant  results occur.  This test has been validated by [employer] to be accurate;  though it has not been FDA cleared/approved by the usual pathway.  As with all laboratory tests, results should be correlated with clinical  findings.      Culture - Sputum (collected 20 Mar 2020 21:11)  Source: .Sputum Sputum  Gram Stain (20 Mar 2020 22:56):    No polymorphonuclear leukocytes per low power field    Few Squamous epithelial cells per low power field    Few Gram Negative Rods per oil power field    Few Gram positive cocci in pairs per oil power field  Preliminary Report (21 Mar 2020 17:55):    Normal Respiratory Juju present    Culture - Blood (collected 19 Mar 2020 22:06)  Source: .Blood Blood-Peripheral  Preliminary Report (20 Mar 2020 23:01):    No growth to date.    Culture - Blood (collected 19 Mar 2020 22:06)  Source: .Blood Blood-Peripheral  Preliminary Report (20 Mar 2020 23:01):    No growth to date.      RADIOLOGY & ADDITIONAL STUDIES:    EXAM:  XR CHEST PORTABLE ROUTINE 1V                                  PROCEDURE DATE:  2020          INTERPRETATION:  AP chest on 2020 at 6:13 AM. Patient being followed for infiltration.    Heart suggest normal size.    Moderate infiltration in the mid lower left lung field and mild infiltration right base again seen.    Chest is similar to .    IMPRESSION: Unchanged bilateral infiltrates.    EXAM:  CT ANGIO CHEST (W)AW IC                            PROCEDURE DATE:  2020          INTERPRETATION:  CLINICAL INFORMATION: Shortness of breath    COMPARISON: None.    PROCEDURE:   CT Angiography of the Chest.  90 ml of Omnipaque 350 was injected intravenously. 10 ml were discarded.  Sagittal and coronal reformats were performed as well as 3D (MIP) reconstructions.      FINDINGS:    LUNGS AND AIRWAYS: Patent central airways.  Bilateral nodular groundglass opacities.    PLEURA: No pleural effusion.    MEDIASTINUM AND VIOLET: Mild adenopathy. Calcified lymph nodes.    VESSELS: No thoracic aortic aneurysm or dissection. Suboptimal pulmonary arterial enhancement. Nondiagnostic for evaluation of pulmonary emboli.    HEART: Heart size is normal. No pericardial effusion.    CHEST WALL AND LOWER NECK: Within normal limits.    VISUALIZED UPPER ABDOMEN: Hepatic and splenic calcified granulomas.    BONES: Degenerative changes.    IMPRESSION:     Suboptimal pulmonary arterial enhancement, nondiagnostic study for evaluation of pulmonary emboli.  Alternative study such as nuclear medicine ventilation/perfusion scan may be obtained if clinically indicated.  Bilateral nodular groundglass lung opacities, likely infectious.      Assessment :   39F with hyperthyroidism on methimazole, afib on xarelto admitted with teresa pneumonia sec COVID 19. Anemia sec menses. Asthma exacerbation stable. Febrile Tmax 101.3  with hypoxia today Overall stable.    Plan :   Cont Hydroxycloroquine/zithromax x 5 days  Trend temps and cbc with diff  LDH, CRP ferritin  If remains stable can dc home to self quarantine x 7 days    COVID-19 is a viral illness and as expected and as we are seeing presents as a viral illness with full spectrum of presentations. First week of illness generally less severe followed by critical period where patients start to improve or decompensate and require intubation (7-10 days post symptom onset) and felt to be due to a maladaptive immune response in the setting of decreased viral titers. Clinical course ranging from 2-8 weeks. Do recommend continued strict isolation to minimize risk to staff, avoid aerosolizing procedures, avoidance of steroids which are associated with worse outcomes including inhaled steroids, and with no compelling evidence to date do not recommend antiviral therapies outside of established protocols or controlled trials would support focus on supportive care and avoid interventions with no demonstrated efficacy and unclear adverse effect profile in context of COVID-19    D/w Hospitalist    Continue with present regiment.  Appropriate use of antibiotics and adverse effects reviewed.      I have discussed the above plan of care with patient/ family in detail. They expressed understanding of the  treatment plan . Risks, benefits and alternatives discussed in detail. I have asked if they have any questions or concerns and appropriately addressed them to the best of my ability .    > 35 minutes were spent in direct patient care reviewing notes, medications ,labs data/ imaging , discussion with multidisciplinary team.    Thank you for allowing me to participate in care of your patient .    Luis Alberto Farley MD  Infectious Disease  [phone]

## 2020-03-23 NOTE — PROGRESS NOTE ADULT - ASSESSMENT
The patient is a 39 year old female with a history of anemia, paroxysmal atrial fibrillation, hyperthyroidism who presents with shortness of breath and cough in the setting of anemia and likely COVID-19.    Plan:  - Remain off of rivaroxaban. Patient seen by me in the past; unclear why she she been on anticoagulation given low thromboembolic risk with [id number] of 0 and previously stable TFTs. She can discuss further with her cardiologist as outpatient.  - Monitor hemoglobin  - Continue metoprolol tartrate 50 mg bid  - COVID-19 positive  - Discharge planning The patient is a 39 year old female with a history of anemia, paroxysmal atrial fibrillation, hyperthyroidism who presents with shortness of breath and cough in the setting of anemia and likely COVID-19.    Plan:  - Remain off of rivaroxaban. Patient seen by me in the past; unclear why she she been on anticoagulation given low thromboembolic risk with [id number] of 0 and previously stable TFTs. She can discuss further with her cardiologist as outpatient.  - Monitor hemoglobin  - Continue metoprolol tartrate 50 mg bid  - COVID-19 positive  - More hypoxic today on NC

## 2020-03-24 LAB
ALBUMIN SERPL ELPH-MCNC: 2.8 G/DL — LOW (ref 3.3–5)
ALP SERPL-CCNC: 69 U/L — SIGNIFICANT CHANGE UP (ref 30–120)
ALT FLD-CCNC: 12 U/L DA — SIGNIFICANT CHANGE UP (ref 10–60)
ANION GAP SERPL CALC-SCNC: 9 MMOL/L — SIGNIFICANT CHANGE UP (ref 5–17)
AST SERPL-CCNC: 17 U/L — SIGNIFICANT CHANGE UP (ref 10–40)
BASOPHILS # BLD AUTO: 0.02 K/UL — SIGNIFICANT CHANGE UP (ref 0–0.2)
BASOPHILS NFR BLD AUTO: 0.4 % — SIGNIFICANT CHANGE UP (ref 0–2)
BILIRUB SERPL-MCNC: 0.5 MG/DL — SIGNIFICANT CHANGE UP (ref 0.2–1.2)
BUN SERPL-MCNC: 6 MG/DL — LOW (ref 7–23)
CALCIUM SERPL-MCNC: 8.6 MG/DL — SIGNIFICANT CHANGE UP (ref 8.4–10.5)
CHLORIDE SERPL-SCNC: 104 MMOL/L — SIGNIFICANT CHANGE UP (ref 96–108)
CO2 SERPL-SCNC: 24 MMOL/L — SIGNIFICANT CHANGE UP (ref 22–31)
CREAT SERPL-MCNC: 0.66 MG/DL — SIGNIFICANT CHANGE UP (ref 0.5–1.3)
CRP SERPL-MCNC: 7.19 MG/DL — HIGH (ref 0–0.4)
CRP SERPL-MCNC: 7.28 MG/DL — HIGH (ref 0–0.4)
CULTURE RESULTS: SIGNIFICANT CHANGE UP
CULTURE RESULTS: SIGNIFICANT CHANGE UP
D DIMER BLD IA.RAPID-MCNC: 861 NG/ML DDU — HIGH
EOSINOPHIL # BLD AUTO: 0 K/UL — SIGNIFICANT CHANGE UP (ref 0–0.5)
EOSINOPHIL NFR BLD AUTO: 0 % — SIGNIFICANT CHANGE UP (ref 0–6)
FERRITIN SERPL-MCNC: 28 NG/ML — SIGNIFICANT CHANGE UP (ref 15–150)
GLUCOSE SERPL-MCNC: 91 MG/DL — SIGNIFICANT CHANGE UP (ref 70–99)
HCT VFR BLD CALC: 28.3 % — LOW (ref 34.5–45)
HGB BLD-MCNC: 7.6 G/DL — LOW (ref 11.5–15.5)
IMM GRANULOCYTES NFR BLD AUTO: 0.4 % — SIGNIFICANT CHANGE UP (ref 0–1.5)
LDH SERPL L TO P-CCNC: 281 U/L — HIGH (ref 50–242)
LYMPHOCYTES # BLD AUTO: 0.46 K/UL — LOW (ref 1–3.3)
LYMPHOCYTES # BLD AUTO: 9.1 % — LOW (ref 13–44)
MAGNESIUM SERPL-MCNC: 1.8 MG/DL — SIGNIFICANT CHANGE UP (ref 1.6–2.6)
MCHC RBC-ENTMCNC: 16.7 PG — LOW (ref 27–34)
MCHC RBC-ENTMCNC: 26.9 GM/DL — LOW (ref 32–36)
MCV RBC AUTO: 62.3 FL — LOW (ref 80–100)
MONOCYTES # BLD AUTO: 0.49 K/UL — SIGNIFICANT CHANGE UP (ref 0–0.9)
MONOCYTES NFR BLD AUTO: 9.7 % — SIGNIFICANT CHANGE UP (ref 2–14)
NEUTROPHILS # BLD AUTO: 4.08 K/UL — SIGNIFICANT CHANGE UP (ref 1.8–7.4)
NEUTROPHILS NFR BLD AUTO: 80.4 % — HIGH (ref 43–77)
NRBC # BLD: 0 /100 WBCS — SIGNIFICANT CHANGE UP (ref 0–0)
PHOSPHATE SERPL-MCNC: 4.1 MG/DL — SIGNIFICANT CHANGE UP (ref 2.5–4.5)
PLATELET # BLD AUTO: 238 K/UL — SIGNIFICANT CHANGE UP (ref 150–400)
POTASSIUM SERPL-MCNC: 3.3 MMOL/L — LOW (ref 3.5–5.3)
POTASSIUM SERPL-SCNC: 3.3 MMOL/L — LOW (ref 3.5–5.3)
PROT SERPL-MCNC: 7.2 G/DL — SIGNIFICANT CHANGE UP (ref 6–8.3)
RBC # BLD: 4.54 M/UL — SIGNIFICANT CHANGE UP (ref 3.8–5.2)
RBC # FLD: 27.9 % — HIGH (ref 10.3–14.5)
SODIUM SERPL-SCNC: 137 MMOL/L — SIGNIFICANT CHANGE UP (ref 135–145)
SPECIMEN SOURCE: SIGNIFICANT CHANGE UP
SPECIMEN SOURCE: SIGNIFICANT CHANGE UP
WBC # BLD: 5.07 K/UL — SIGNIFICANT CHANGE UP (ref 3.8–10.5)
WBC # FLD AUTO: 5.07 K/UL — SIGNIFICANT CHANGE UP (ref 3.8–10.5)

## 2020-03-24 PROCEDURE — 71045 X-RAY EXAM CHEST 1 VIEW: CPT | Mod: 26

## 2020-03-24 PROCEDURE — 99233 SBSQ HOSP IP/OBS HIGH 50: CPT

## 2020-03-24 RX ORDER — POTASSIUM CHLORIDE 20 MEQ
40 PACKET (EA) ORAL ONCE
Refills: 0 | Status: COMPLETED | OUTPATIENT
Start: 2020-03-24 | End: 2020-03-24

## 2020-03-24 RX ORDER — ENOXAPARIN SODIUM 100 MG/ML
30 INJECTION SUBCUTANEOUS DAILY
Refills: 0 | Status: DISCONTINUED | OUTPATIENT
Start: 2020-03-24 | End: 2020-03-27

## 2020-03-24 RX ORDER — TRAMADOL HYDROCHLORIDE 50 MG/1
50 TABLET ORAL ONCE
Refills: 0 | Status: DISCONTINUED | OUTPATIENT
Start: 2020-03-24 | End: 2020-03-24

## 2020-03-24 RX ORDER — LACTOBACILLUS ACIDOPHILUS 100MM CELL
1 CAPSULE ORAL THREE TIMES A DAY
Refills: 0 | Status: DISCONTINUED | OUTPATIENT
Start: 2020-03-24 | End: 2020-03-27

## 2020-03-24 RX ADMIN — Medication 1 TABLET(S): at 17:27

## 2020-03-24 RX ADMIN — TRAMADOL HYDROCHLORIDE 50 MILLIGRAM(S): 50 TABLET ORAL at 22:29

## 2020-03-24 RX ADMIN — BUDESONIDE AND FORMOTEROL FUMARATE DIHYDRATE 2 PUFF(S): 160; 4.5 AEROSOL RESPIRATORY (INHALATION) at 06:53

## 2020-03-24 RX ADMIN — Medication 325 MILLIGRAM(S): at 13:00

## 2020-03-24 RX ADMIN — Medication 40 MILLIEQUIVALENT(S): at 12:59

## 2020-03-24 RX ADMIN — SERTRALINE 100 MILLIGRAM(S): 25 TABLET, FILM COATED ORAL at 13:00

## 2020-03-24 RX ADMIN — Medication 50 MILLIGRAM(S): at 17:25

## 2020-03-24 RX ADMIN — TRAMADOL HYDROCHLORIDE 50 MILLIGRAM(S): 50 TABLET ORAL at 21:50

## 2020-03-24 RX ADMIN — MAGNESIUM OXIDE 400 MG ORAL TABLET 400 MILLIGRAM(S): 241.3 TABLET ORAL at 13:00

## 2020-03-24 RX ADMIN — Medication 200 MILLIGRAM(S): at 21:51

## 2020-03-24 RX ADMIN — Medication 50 MILLIGRAM(S): at 06:14

## 2020-03-24 RX ADMIN — BUDESONIDE AND FORMOTEROL FUMARATE DIHYDRATE 2 PUFF(S): 160; 4.5 AEROSOL RESPIRATORY (INHALATION) at 18:55

## 2020-03-24 RX ADMIN — Medication 500 MILLIGRAM(S): at 13:00

## 2020-03-24 RX ADMIN — MAGNESIUM OXIDE 400 MG ORAL TABLET 400 MILLIGRAM(S): 241.3 TABLET ORAL at 17:25

## 2020-03-24 RX ADMIN — AZITHROMYCIN 250 MILLIGRAM(S): 500 TABLET, FILM COATED ORAL at 12:59

## 2020-03-24 RX ADMIN — MAGNESIUM OXIDE 400 MG ORAL TABLET 400 MILLIGRAM(S): 241.3 TABLET ORAL at 08:55

## 2020-03-24 RX ADMIN — Medication 200 MILLIGRAM(S): at 08:55

## 2020-03-24 NOTE — CONSULT NOTE ADULT - ASSESSMENT
38 yo woman with microcytic anemia due to heavy menstrual bleeding in setting of anticoagulation with Xarelto for A-fib (which is caused by hyperthyroidism); currently admitted with respiratory symptoms and fever  and tested + COVID-19    - iron deficiency anemia given heavy menstrual bleeding  - started on oral iron; okay to given IV iron for 3 days to expedite hematopoesis  - continue antibiotics and supportive measures for viral infection  - would transfuse to maintain Hg >7   - instructed patient to follow up in office upon discharge and clearing of COVID-19  - case discussed with Dr. Nette Martinez; please call with any additional questions
Pt with syncope, anemia, severe wheezing, CT chest suggestive of COVID pneumonia.  Appears clinically stable but alot of wheezing--underlying asthma.
The patient is a 39 year old female with a history of anemia, paroxysmal atrial fibrillation, hyperthyroidism who presents with shortness of breath and cough in the setting of anemia and likely COVID-19.    Plan:  - Remain off of rivaroxaban. Patient seen by me in the past; unclear why she she been on anticoagulation given low thromboembolic risk with [id number] of 0 and previously stable TFTs. She can discuss further with her cardiologist as outpatient.  - Received transfusion  - Continue metoprolol tartrate 50 mg bid  - CTA chest findings suspicious for COVID-19  - Being ruled out for COVID-19  - No further cardiac work-up indicated at this time

## 2020-03-24 NOTE — PROGRESS NOTE ADULT - SUBJECTIVE AND OBJECTIVE BOX
Patient is a 39y old  Female who presents with a chief complaint of SOB, syncope (24 Mar 2020 08:14)      INTERVAL HPI/OVERNIGHT EVENTS:  Pt is seen and examined.  feeling better. on 3l oxygen.    Pain Location & Control:     MEDICATIONS  (STANDING):  ascorbic acid 500 milliGRAM(s) Oral daily  azithromycin   Tablet 250 milliGRAM(s) Oral daily  budesonide 160 MICROgram(s)/formoterol 4.5 MICROgram(s) Inhaler 2 Puff(s) Inhalation two times a day  enoxaparin Injectable 30 milliGRAM(s) SubCutaneous daily  ferrous    sulfate 325 milliGRAM(s) Oral daily  hydroxychloroquine 200 milliGRAM(s) Oral every 12 hours  magnesium oxide 400 milliGRAM(s) Oral three times a day with meals  methimazole 10 milliGRAM(s) Oral three times a day  metoprolol tartrate 50 milliGRAM(s) Oral two times a day  potassium chloride    Tablet ER 40 milliEquivalent(s) Oral once  sertraline 100 milliGRAM(s) Oral daily  traZODone 50 milliGRAM(s) Oral two times a day    MEDICATIONS  (PRN):  acetaminophen   Tablet .. 650 milliGRAM(s) Oral every 6 hours PRN Temp greater or equal to 38C (100.4F), Mild Pain (1 - 3)  guaiFENesin   Syrup  (Sugar-Free) 200 milliGRAM(s) Oral every 6 hours PRN Cough  LORazepam     Tablet 1 milliGRAM(s) Oral three times a day PRN Anxiety      Allergies    Motrin (Hives)    Intolerances      Vital Signs Last 24 Hrs  T(C): 37.6 (24 Mar 2020 08:40), Max: 37.7 (24 Mar 2020 06:00)  T(F): 99.7 (24 Mar 2020 08:40), Max: 99.9 (24 Mar 2020 06:00)  HR: 76 (24 Mar 2020 10:00) (74 - 91)  BP: 110/70 (24 Mar 2020 10:00) (102/57 - 130/73)  BP(mean): 82 (24 Mar 2020 08:40) (68 - 93)  RR: 24 (24 Mar 2020 10:00) (20 - 31)  SpO2: 97% (24 Mar 2020 10:00) (91% - 100%)        LABS:                        7.6    5.07  )-----------( 238      ( 24 Mar 2020 06:45 )             28.3     24 Mar 2020 06:45    137    |  104    |  6      ----------------------------<  91     3.3     |  24     |  0.66     Ca    8.6        24 Mar 2020 06:45  Phos  4.1       24 Mar 2020 06:45  Mg     1.8       24 Mar 2020 06:45    TPro  7.2    /  Alb  2.8    /  TBili  0.5    /  DBili  x      /  AST  17     /  ALT  12     /  AlkPhos  69     24 Mar 2020 06:45        CAPILLARY BLOOD GLUCOSE            Cultures  Culture Results:   Normal Respiratory Juju present (03-20 @ 21:11)  Culture Results:   No growth to date. (03-19 @ 22:06)  Culture Results:   No growth to date. (03-19 @ 22:06)        Culture - Sputum (collected 03-20-20 @ 21:11)  Source: .Sputum Sputum  Gram Stain (03-20-20 @ 22:56):    No polymorphonuclear leukocytes per low power field    Few Squamous epithelial cells per low power field    Few Gram Negative Rods per oil power field    Few Gram positive cocci in pairs per oil power field  Final Report (03-22-20 @ 19:41):    Normal Respiratory Juju present    Culture - Blood (collected 03-19-20 @ 22:06)  Source: .Blood Blood-Peripheral  Preliminary Report (03-20-20 @ 23:01):    No growth to date.    Culture - Blood (collected 03-19-20 @ 22:06)  Source: .Blood Blood-Peripheral  Preliminary Report (03-20-20 @ 23:01):    No growth to date.        RADIOLOGY & ADDITIONAL TESTS:    Imaging Personally Reviewed:  [ ] YES  [ ] NO    Consultant(s) Notes Reviewed:  [ ] YES  [ ] NO    Care Discussed with Consultants/Other Providers [ x] YES  [ ] NO

## 2020-03-24 NOTE — CONSULT NOTE ADULT - SUBJECTIVE AND OBJECTIVE BOX
Patient is a 39y old  Female who presents with a chief complaint of SOB, syncope (24 Mar 2020 12:04)      HPI:  39F with hyperthyroidism on methimazole, afib/arrhythmia as a result of thyroid dysfunction, on xarelto (but holds when she has menstrual period); presented on 3/19/20 with SOB/WEN and syncope.  She had been ill for about 1 week prior as well and ultimately diagnosed with COVID+. On day of admission she had syncopal episode and injured her lip. In ER found tto have Hg 6.6 and reports that anemia has been a issue for her in the past due to heavy menstrual periods. She has in fact been instructed by her doctor to hold Xarelto on days of her menstrual periods due to heavy bleeding.     Asked to evaluate for anemia    ROS:  Negative except for: fatigue; cough improving; heavy menstrual bleeding; fevers (resolving)    PAST MEDICAL & SURGICAL HISTORY:  Smoker  Depression, unspecified depression type  History of anemia  Asthma  History of Hyperthyroidism  Atrial fibrillation  History of blood transfusion  S/P  Section: ,,      SOCIAL HISTORY:  works in [employer]  denies active tobacco/ETOH/IVDA    FAMILY HISTORY:  Family history of stomach cancer (Grandparent)  Family history of diabetes mellitus (Sibling, Grandparent)      MEDICATIONS  (STANDING):  ascorbic acid 500 milliGRAM(s) Oral daily  azithromycin   Tablet 250 milliGRAM(s) Oral daily  budesonide 160 MICROgram(s)/formoterol 4.5 MICROgram(s) Inhaler 2 Puff(s) Inhalation two times a day  enoxaparin Injectable 30 milliGRAM(s) SubCutaneous daily  ferrous    sulfate 325 milliGRAM(s) Oral daily  hydroxychloroquine 200 milliGRAM(s) Oral every 12 hours  magnesium oxide 400 milliGRAM(s) Oral three times a day with meals  methimazole 10 milliGRAM(s) Oral three times a day  metoprolol tartrate 50 milliGRAM(s) Oral two times a day  potassium chloride    Tablet ER 40 milliEquivalent(s) Oral once  sertraline 100 milliGRAM(s) Oral daily  traZODone 50 milliGRAM(s) Oral two times a day    MEDICATIONS  (PRN):  acetaminophen   Tablet .. 650 milliGRAM(s) Oral every 6 hours PRN Temp greater or equal to 38C (100.4F), Mild Pain (1 - 3)  guaiFENesin   Syrup  (Sugar-Free) 200 milliGRAM(s) Oral every 6 hours PRN Cough  LORazepam     Tablet 1 milliGRAM(s) Oral three times a day PRN Anxiety      Allergies    Motrin (Hives)    Vital Signs Last 24 Hrs  T(C): 37.6 (24 Mar 2020 08:40), Max: 37.7 (24 Mar 2020 06:00)  T(F): 99.7 (24 Mar 2020 08:40), Max: 99.9 (24 Mar 2020 06:00)  HR: 76 (24 Mar 2020 10:00) (74 - 91)  BP: 110/70 (24 Mar 2020 10:00) (102/57 - 130/73)  BP(mean): 82 (24 Mar 2020 08:40) (68 - 92)  RR: 24 (24 Mar 2020 10:00) (20 - 31)  SpO2: 97% (24 Mar 2020 10:00) (91% - 100%)    PHYSICAL EXAM  General: adult AA woman in NAD  HEENT: clear oropharynx, anicteric sclera, pink conjunctivae  Neck: supple  CV: normal S1S2 with no murmur rubs or gallops  Lungs: clear to auscultation, no wheezes, no rhales  Abdomen:  obese soft non-tender non-distended, no hepato/splenomegaly  Ext: no clubbing cyanosis or edema  Skin: no rashes and no petichiae  Neuro: alert and oriented X3 no focal deficits      LABS:    CBC Full  -  ( 24 Mar 2020 06:45 )  WBC Count : 5.07 K/uL  RBC Count : 4.54 M/uL  Hemoglobin : 7.6 g/dL  Hematocrit : 28.3 %  Platelet Count - Automated : 238 K/uL  Mean Cell Volume : 62.3 fl  Mean Cell Hemoglobin : 16.7 pg  Mean Cell Hemoglobin Concentration : 26.9 gm/dL  Auto Neutrophil # : 4.08 K/uL  Auto Lymphocyte # : 0.46 K/uL  Auto Monocyte # : 0.49 K/uL  Auto Eosinophil # : 0.00 K/uL  Auto Basophil # : 0.02 K/uL  Auto Neutrophil % : 80.4 %  Auto Lymphocyte % : 9.1 %  Auto Monocyte % : 9.7 %  Auto Eosinophil % : 0.0 %  Auto Basophil % : 0.4 %    Hemoglobin: 7.6 g/dL ( @ 06:45)  Hemoglobin: 7.4 g/dL ( @ 07:25)  Hemoglobin: 7.5 g/dL ( @ 12:04)  Hemoglobin: 7.0 g/dL ( @ 12:53)  Hemoglobin: 6.9 g/dL ( @ 07:15)        137  |  104  |  6<L>  ----------------------------<  91  3.3<L>   |  24  |  0.66    Ca    8.6      24 Mar 2020 06:45  Phos  4.1       Mg     1.8         TPro  7.2  /  Alb  2.8<L>  /  TBili  0.5  /  DBili  x   /  AST  17  /  ALT  12  /  AlkPhos  69      Ferritin, Serum: 28 ng/mL (20 @ 12:21)    CRP 5.36    RADIOLOGY :  < from: Xray Chest 1 View- PORTABLE-Routine (20 @ 07:01) >  Technique: Frontal view of the chest.    Comparison: Prior chest x-ray examination from 3/23/2020.    Findings: Patchy bilateral interstitial opacities appears slightly worsened when compared with the prior x-ray examination. And the heart size is unremarkable. The visualized osseous structures are unremarkable.    IMPRESSION: Interval mild worsening of interstitial opacities bilaterally.    < end of copied text >

## 2020-03-24 NOTE — PROGRESS NOTE ADULT - ASSESSMENT
The patient is a 39 year old female with a history of anemia, paroxysmal atrial fibrillation, hyperthyroidism who presents with shortness of breath and cough in the setting of anemia and likely COVID-19.    Plan:  - Remain off of rivaroxaban. Patient seen by me in the past; unclear why she she been on anticoagulation given low thromboembolic risk with [id number] of 0 and previously stable TFTs. She can discuss further with her cardiologist as outpatient.  - Monitor hemoglobin  - Continue metoprolol tartrate 50 mg bid  - COVID-19 positive  - No complaints of shortness of breath today. On NC.

## 2020-03-24 NOTE — PROGRESS NOTE ADULT - SUBJECTIVE AND OBJECTIVE BOX
Patient is a 40 y/o female who presents with a chief complaint of SOB, syncope (24 Mar 2020 18:57)    BRIEF HOSPITAL COURSE: 40 y/o F w/ a PMHx of hyperthyroidism and a fib (on Xarelto) who presented to [facility] on 3/19 c/o increasing SOB x 1 wk, worse w/ exertion and s/p syncopal episode at home w/ injured lip. 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 Pt reports positive case within school district, but is unsure of whom. In the ED, pt found to be anemic (Hgb 6.6) and received 1u PRBCs. Pt's Hgb improved to 6.9, reportedly denied receiving second unit. Of note, pt is currently on her period and reported that she does not take Xarelto while on her period, but is otherwise compliant. Labs also significant for lymphopenia (0.38), hypokalemia (K 3.3), and hyponatremia (Na 131). CTA revealing of B/L nodular ground-glass lung opacities. CT head negative for acute intracranial event. RSV/flu negative. Pt was admitted for R/O COVID-19,, confirmed COVID-19 (+) on 3/21. Pt w/ reported desaturation event on 2 L nasal cannula (SpO2 84%), transferred to [facility] on 3/23.    Events last 24 hours: Pt seen and examined at the bedside, currently w/ complaint of rib pain from harsh coughing. Stable SpO2 on 4 L nasal cannula (90-95%). Tmax today 100.2 degrees. CXR revealing of mild worsening of interstitial opacities B/L.    PAST MEDICAL & SURGICAL HISTORY:  Smoker  Depression, unspecified depression type  History of anemia  Asthma  History of Hyperthyroidism  Atrial fibrillation  History of blood transfusion  S/P  Section: ,,    Review of Systems:  CONSTITUTIONAL: No fever, chills, or fatigue.  EYES: No eye pain, visual disturbances, or discharge.  ENMT:  No difficulty hearing, tinnitus, or vertigo. No sinus or throat pain.  NECK: No pain or stiffness.  RESPIRATORY: (+) SOB, (+) cough.  CARDIOVASCULAR: No chest pain, palpitations, dizziness, or leg swelling.  GASTROINTESTINAL: No abdominal or epigastric pain. No nausea, vomiting, diarrhea, or constipation. No hematemesis, melena, or hematochezia.  GENITOURINARY: No dysuria, increased frequency, hematuria, or incontinence.  NEUROLOGICAL: No headaches, memory loss, loss of strength, numbness, or tremors.  SKIN: No itching, burning, rashes, or lesions.  MUSCULOSKELETAL: (+) rib pain. No joint pain or swelling. No muscle, back, or extremity pain.  PSYCHIATRIC: No depression, anxiety, mood swings, or difficulty sleeping.    Medications:  azithromycin   Tablet 250 milliGRAM(s) Oral daily  hydroxychloroquine 200 milliGRAM(s) Oral every 12 hours  metoprolol tartrate 50 milliGRAM(s) Oral two times a day  budesonide 160 MICROgram(s)/formoterol 4.5 MICROgram(s) Inhaler 2 Puff(s) Inhalation two times a day  guaiFENesin   Syrup  (Sugar-Free) 200 milliGRAM(s) Oral every 6 hours PRN  acetaminophen   Tablet .. 650 milliGRAM(s) Oral every 6 hours PRN  LORazepam     Tablet 1 milliGRAM(s) Oral three times a day PRN  sertraline 100 milliGRAM(s) Oral daily  traZODone 50 milliGRAM(s) Oral two times a day  enoxaparin Injectable 30 milliGRAM(s) SubCutaneous daily  methimazole 10 milliGRAM(s) Oral three times a day  ascorbic acid 500 milliGRAM(s) Oral daily  ferrous    sulfate 325 milliGRAM(s) Oral daily  magnesium oxide 400 milliGRAM(s) Oral three times a day with meals  lactobacillus acidophilus 1 Tablet(s) Oral three times a day    ICU Vital Signs Last 24 Hrs  T(C): 37.9 (24 Mar 2020 20:30), Max: 37.9 (24 Mar 2020 20:30)  T(F): 100.2 (24 Mar 2020 20:30), Max: 100.2 (24 Mar 2020 20:30)  HR: 82 (24 Mar 2020 20:00) (74 - 108)  BP: 121/77 (24 Mar 2020 20:00) (103/74 - 130/73)  BP(mean): 90 (24 Mar 2020 20:00) (82 - 94)  ABP: --  ABP(mean): --  RR: 25 (24 Mar 2020 20:00) (22 - 29)  SpO2: 97% (24 Mar 2020 20:00) (91% - 98%)    I&O's Detail    23 Mar 2020 07:01  -  24 Mar 2020 07:00  --------------------------------------------------------  IN:    Oral Fluid: 380 mL  Total IN: 380 mL    OUT:  Total OUT: 0 mL    Total NET: 380 mL    24 Mar 2020 07:01  -  24 Mar 2020 20:53  --------------------------------------------------------  IN:    Oral Fluid: 600 mL  Total IN: 600 mL    OUT:  Total OUT: 0 mL    Total NET: 600 mL    LABS:                        7.6    5.07  )-----------( 238      ( 24 Mar 2020 06:45 )             28.3     03    137  |  104  |  6<L>  ----------------------------<  91  3.3<L>   |  24  |  0.66    Ca    8.6      24 Mar 2020 06:45  Phos  4.1       Mg     1.8         TPro  7.2  /  Alb  2.8<L>  /  TBili  0.5  /  DBili  x   /  AST  17  /  ALT  12  /  AlkPhos  69  -    CAPILLARY BLOOD GLUCOSE    CULTURES:  Culture Results:   Normal Respiratory Juju present ( @ 21:11)  Culture Results:   No growth to date. ( @ 22:06)  Culture Results:   No growth to date. ( @ 22:06)    Physical Examination:    VITALS AT TIME OF EXAM:  HR: 83  BP: 121/77  RR: 29  SPO2: 93% on 4 L NC    General: Middle-aged female, sitting up in bed, appears comfortable.      HEENT: Pupils equal, reactive to light. Symmetric. No scleral icterus or injection.    PULM: Diffuse rhonchi B/L, no increased respiratory effort.    NECK: Supple, no lymphadenopathy, trachea midline.    CVS: Regular rate and rhythm, no murmurs appreciated, +s1/s2.    ABD: Soft, nondistended, nontender, normoactive bowel sounds.    EXT: No edema, nontender.    SKIN: Warm and well perfused, no rashes noted.    NEURO: Alert, oriented, interactive, nonfocal.    RADIOLOGY:  < from: Xray Chest 1 View- PORTABLE-Routine (20 @ 07:01) >  EXAM:  XR CHEST PORTABLE ROUTINE 1V                          PROCEDURE DATE:  2020      INTERPRETATION:  Clinical information: Shortness of breath. Pneumonia.    Technique: Frontal view of the chest.    Comparison: Prior chest x-ray examination from 3/23/2020.    Findings: Patchy bilateral interstitial opacities appears slightly worsened when compared with the prior x-ray examination. And the heart size is unremarkable. The visualized osseous structures are unremarkable.    IMPRESSION: Interval mild worsening of interstitial opacities bilaterally.    ALFREDO LAU M.D., ATTENDING RADIOLOGIST  This document has been electronically signed. Mar 24 2020  9:35AM    < end of copied text >

## 2020-03-24 NOTE — PROGRESS NOTE ADULT - SUBJECTIVE AND OBJECTIVE BOX
ROBBIE MCKNIGHT is a 39yFemale , patient examined and chart reviewed.     INTERVAL HPI/ OVERNIGHT EVENTS:   Feeling better. Low grade temps. NC 3L 97% sats.  No distress.    PAST MEDICAL & SURGICAL HISTORY:  Smoker  Depression, unspecified depression type  History of anemia  Asthma  History of Hyperthyroidism  Atrial fibrillation  History of blood transfusion  S/P  Section: ,,      For details regarding the patient's social history, family history, and other miscellaneous elements, please refer the initial infectious diseases consultation and/or the admitting history and physical examination for this admission.    ROS:  CONSTITUTIONAL: + fever or chills,   EYES:  Negative  blurry vision or double vision  CARDIOVASCULAR:  Negative for chest pain or palpitations  RESPIRATORY:  Negative for cough, wheezing,+ SOB   GASTROINTESTINAL:  Negative for nausea, vomiting, diarrhea, constipation, or abdominal pain  GENITOURINARY:  Negative frequency, urgency or dysuria  NEUROLOGIC:  No headache, confusion, dizziness, lightheadedness  All other systems were reviewed and are negative     ALLERGIES:  Motrin (Hives)      Current inpatient medications :    ANTIBIOTICS/RELEVANT:  hydroxychloroquine 200 milliGRAM(s) Oral every 12 hours  azithromycin   Tablet 250 milliGRAM(s) Oral daily    MEDICATIONS  (STANDING):  ascorbic acid 500 milliGRAM(s) Oral daily  budesonide 160 MICROgram(s)/formoterol 4.5 MICROgram(s) Inhaler 2 Puff(s) Inhalation two times a day  enoxaparin Injectable 30 milliGRAM(s) SubCutaneous daily  ferrous    sulfate 325 milliGRAM(s) Oral daily  lactobacillus acidophilus 1 Tablet(s) Oral three times a day  magnesium oxide 400 milliGRAM(s) Oral three times a day with meals  methimazole 10 milliGRAM(s) Oral three times a day  metoprolol tartrate 50 milliGRAM(s) Oral two times a day  sertraline 100 milliGRAM(s) Oral daily  traZODone 50 milliGRAM(s) Oral two times a day    MEDICATIONS  (PRN):  acetaminophen   Tablet .. 650 milliGRAM(s) Oral every 6 hours PRN Temp greater or equal to 38C (100.4F), Mild Pain (1 - 3)  guaiFENesin   Syrup  (Sugar-Free) 200 milliGRAM(s) Oral every 6 hours PRN Cough  LORazepam     Tablet 1 milliGRAM(s) Oral three times a day PRN Anxiety      Objective:  ICU Vital Signs Last 24 Hrs  T(C): 37.9 (24 Mar 2020 20:30), Max: 37.9 (24 Mar 2020 20:30)  T(F): 100.2 (24 Mar 2020 20:30), Max: 100.2 (24 Mar 2020 20:30)  HR: 82 (24 Mar 2020 20:00) (74 - 108)  BP: 121/77 (24 Mar 2020 20:00) (103/74 - 130/73)  BP(mean): 90 (24 Mar 2020 20:00) (82 - 94)  RR: 25 (24 Mar 2020 20:00) (22 - 29)  SpO2: 97% (24 Mar 2020 20:00) (92% - 98%)      Physical Exam:  General:  no acute distress  Eyes: sclera anicteric, pupils equal and reactive to light  ENMT: buccal mucosa moist, pharynx not injected  Neck: supple, trachea midline  Lungs: Decreased no wheeze/rhonchi  Cardiovascular: regular rate and rhythm, S1 S2  Abdomen: soft, nontender, no organomegaly present, bowel sounds normal  Neurological: alert and oriented x3, Cranial Nerves II-XII grossly intact  Skin: no increased ecchymosis/petechiae/purpura  Lymph Nodes: no palpable cervical/supraclavicular lymph nodes enlargements  Extremities: no cyanosis/clubbing/edema      LABS:                        7.6    5.07  )-----------( 238      ( 24 Mar 2020 06:45 )             28.3   -    137  |  104  |  6<L>  ----------------------------<  91  3.3<L>   |  24  |  0.66    Ca    8.6      24 Mar 2020 06:45  Phos  4.1     -  Mg     1.8     -24    TPro  7.2  /  Alb  2.8<L>  /  TBili  0.5  /  DBili  x   /  AST  17  /  ALT  12  /  AlkPhos  69  -    MICROBIOLOGY:  COVID-19 PCR . (20 @ 23:21)    COVID-19 PCR: Detected: LDT - Laboratory Developed Test All “detected” results on this new test  are considered presumptively positive results, are clinically actionable,  and specimens will be forwarded to [facility] for confirmation testing.  Another report (corrected report) will only be issued if discordant  results occur.  This test has been validated by [employer] to be accurate;  though it has not been FDA cleared/approved by the usual pathway.  As with all laboratory tests, results should be correlated with clinical  findings.      Culture - Sputum (collected 20 Mar 2020 21:11)  Source: .Sputum Sputum  Gram Stain (20 Mar 2020 22:56):    No polymorphonuclear leukocytes per low power field    Few Squamous epithelial cells per low power field    Few Gram Negative Rods per oil power field    Few Gram positive cocci in pairs per oil power field  Preliminary Report (21 Mar 2020 17:55):    Normal Respiratory Juju present    Culture - Blood (collected 19 Mar 2020 22:06)  Source: .Blood Blood-Peripheral  Preliminary Report (20 Mar 2020 23:01):    No growth to date.    Culture - Blood (collected 19 Mar 2020 22:06)  Source: .Blood Blood-Peripheral  Preliminary Report (20 Mar 2020 23:01):    No growth to date.      RADIOLOGY & ADDITIONAL STUDIES:    EXAM:  XR CHEST PORTABLE ROUTINE 1V                                  PROCEDURE DATE:  2020          INTERPRETATION:  AP chest on 2020 at 6:13 AM. Patient being followed for infiltration.    Heart suggest normal size.    Moderate infiltration in the mid lower left lung field and mild infiltration right base again seen.    Chest is similar to .    IMPRESSION: Unchanged bilateral infiltrates.    EXAM:  CT ANGIO CHEST (W)AW IC                            PROCEDURE DATE:  2020          INTERPRETATION:  CLINICAL INFORMATION: Shortness of breath    COMPARISON: None.    PROCEDURE:   CT Angiography of the Chest.  90 ml of Omnipaque 350 was injected intravenously. 10 ml were discarded.  Sagittal and coronal reformats were performed as well as 3D (MIP) reconstructions.      FINDINGS:    LUNGS AND AIRWAYS: Patent central airways.  Bilateral nodular groundglass opacities.    PLEURA: No pleural effusion.    MEDIASTINUM AND VIOLET: Mild adenopathy. Calcified lymph nodes.    VESSELS: No thoracic aortic aneurysm or dissection. Suboptimal pulmonary arterial enhancement. Nondiagnostic for evaluation of pulmonary emboli.    HEART: Heart size is normal. No pericardial effusion.    CHEST WALL AND LOWER NECK: Within normal limits.    VISUALIZED UPPER ABDOMEN: Hepatic and splenic calcified granulomas.    BONES: Degenerative changes.    IMPRESSION:     Suboptimal pulmonary arterial enhancement, nondiagnostic study for evaluation of pulmonary emboli.  Alternative study such as nuclear medicine ventilation/perfusion scan may be obtained if clinically indicated.  Bilateral nodular groundglass lung opacities, likely infectious.      Assessment :   39F with hyperthyroidism on methimazole, afib on xarelto admitted with teresa pneumonia sec COVID 19. Anemia sec menses. Asthma exacerbation stable. Fevers better with hypoxiz on 3L NC. Overall stable    Plan :   Supportive care  Completed Hydroxycloroquine x 5 days  On Zithromax per pulm  Trend temps and cbc with diff  LDH, CRP ferritin    COVID-19 is a viral illness and as expected and as we are seeing presents as a viral illness with full spectrum of presentations. First week of illness generally less severe followed by critical period where patients start to improve or decompensate and require intubation (7-10 days post symptom onset) and felt to be due to a maladaptive immune response in the setting of decreased viral titers. Clinical course ranging from 2-8 weeks. Do recommend continued strict isolation to minimize risk to staff, avoid aerosolizing procedures, avoidance of steroids which are associated with worse outcomes including inhaled steroids, and with no compelling evidence to date do not recommend antiviral therapies outside of established protocols or controlled trials would support focus on supportive care and avoid interventions with no demonstrated efficacy and unclear adverse effect profile in context of COVID-19    D/w Hospitalist    Continue with present regiment.  Appropriate use of antibiotics and adverse effects reviewed.      I have discussed the above plan of care with patient/ family in detail. They expressed understanding of the  treatment plan . Risks, benefits and alternatives discussed in detail. I have asked if they have any questions or concerns and appropriately addressed them to the best of my ability .    > 35 minutes were spent in direct patient care reviewing notes, medications ,labs data/ imaging , discussion with multidisciplinary team.    Thank you for allowing me to participate in care of your patient .    Luis Alberto Farley MD  Infectious Disease  [phone]

## 2020-03-24 NOTE — PROGRESS NOTE ADULT - ASSESSMENT
Pt with syncope, anemia, severe wheezing, CT chest suggestive of COVID pneumonia.   Transferred to [facility], but has not deteriorated.  . Now on plaquenil and zithromax.     CXR shows bilat infil. which has been stable.   Some wheezing--underlying asthma.   Can continue low dose prednisone and symbicort.

## 2020-03-24 NOTE — PROGRESS NOTE ADULT - ASSESSMENT
39F with hyperthyroidism on methimazole, afib on xarelto who presents with SOB/WEN and syncope.         Hypoxia//sob/COVID+  started on oxygen.  may transfer to [address] [place] [address].    Syncope    - likely anemia related 2/2 heavy period on xarelto  - s/p 1u pRBC transfusion  - refused  for 2nd U of PRBC.   Understand risk and bebefit of PRBC transfusion yesterday.  will f/u today's cbc.    - As per cardiology-cardiology -   - Remain off of rivaroxaban. Patient seen by me in the past; unclear why  she been on anticoagulation given low thromboembolic risk with [id number] of 0 and previously stable TFTs. She can discuss further with her cardiologist as outpatient.  - Monitor hemoglobin  - Continue metoprolol tartrate 50 mg bid  - TTE    SOB/WEN  - +COVID  -> continue contact and airborne precautions  - ID consult  -zithromax.  - inh as needed, no nebs    Prolonged QT  resume   zoloft and trazadone.   discuss with cardiology.    Hyperthyroidism  - cont with methimazole    Afib  - holding xarelto 2/2 current bleeding  - continue metoprolol     Preventive measures  IMPROVE VTE Individual Risk Assessment          RISK                                                          Points  [  ] Previous VTE                                                 3  [  ] Thrombophilia                                              2  [  ] Lower limb paralysis                                    2        (unable to hold up >15 seconds)    [  ] Current Cancer                                             2         (within 6 months)  [  ] Immobilization > 24 hrs                              1  [  ] ICU/CCU stay > 24 hours                            1  [  ] Age > 60                                                        1    IMPROVE VTE Score 0    - currently bleeding, hold AC for now    Plan of care was discuss with patient, all questions were answered, seems understand and in agreement.

## 2020-03-24 NOTE — PROGRESS NOTE ADULT - RESPIRATORY
Breath Sounds equal & clear to percussion & auscultation, no accessory muscle use
Breath Sounds equal & clear to percussion & auscultation, no accessory muscle use
detailed exam
Breath Sounds equal & clear to percussion & auscultation, no accessory muscle use
Breath Sounds equal & clear to percussion & auscultation, no accessory muscle use

## 2020-03-24 NOTE — PROGRESS NOTE ADULT - ASSESSMENT
40 y/o F w/ a PMHx of hyperthyroidism and a fib (on Xarelto) admitted w/ B/L PNA 2/2 COVID-19 infection. Course complicated by iron deficiency anemia 2/2 heavy menstrual bleeding requiring transfusion of 1u PRBCs.    PLAN:  Neuro: CT head on admission negative for acute intracranial event s/p syncopal episode. Continue w/ trazadone and zoloft.   Cardiac: Hx of a fib, will continue to hold Xarelto in setting of anemia (Hgb 7.6). Continue w/ Lopressor 50mg BID. TTE revealing of normal LV function. Maintain MAP > 65. Cardiology following.  Pulm: Reported desaturation event on 2 L NC on medicine floor (SpO2 84%) on 3/23, transferred to [facility] for further management. SpO2 currently stable on 4 L nasal cannula, will continue to titrate to maintain SpO2 > 90%. Low threshold for intubation should respiratory status deteriorate given known COVID-19 (+). Continue w/ Symbicort. CT chest on admission revealing of B/L nodular ground-glass infiltrates. Repeat CXR performed this morning revealing of mild worsening of interstitial opacities B/L.  GI: Regular diet.  Renal: BUN/Cr within normal limits. Hypokalemic on AM labs (K 3.3), received 40mEqs KCl. Continue to monitor electrolytes, maintain K > 4 and Mg >2. Monitor urine output, strict I and Os.  ID: Intermittently febrile, WBC count within normal limits, persistent lymphopenia (0.46). Will continue to trend. COVID-19 (+). Trend LDH, CRP, ferritin, and D dimer. Continue w/ hydroxychloroquine and Zithromax. RSV/ flu (-), blood cultures w/o growth. Pending full RVP. ID following.  Endo: TSH within normal limits, continue w/ methimazole.  Heme/Onc: Mechanical DVT prophylaxis w/ SCDs, will continue to hold Xarelto in setting of anemia (Hgb 7.6). S/p 1u PRBCs on 3/19, continue to trend Hgb and will transfuse for Hgb < 7. Evaluated by heme/onc today who recommended beginning oral iron.    Dispo: Will remain in [facility] overnight. Full code. 40 y/o F w/ a PMHx of hyperthyroidism and a fib (on Xarelto) admitted w/ B/L PNA 2/2 COVID-19 infection. Course complicated by iron deficiency anemia 2/2 heavy menstrual bleeding requiring transfusion of 1u PRBCs.    PLAN:  Neuro: CT head on admission negative for acute intracranial event s/p syncopal episode. Continue w/ trazadone and zoloft.   Cardiac: Hx of a fib, will continue to hold Xarelto in setting of anemia (Hgb 7.6). Continue w/ Lopressor 50mg BID. TTE revealing of normal LV function. Maintain MAP > 65. Cardiology following.  Pulm: Reported desaturation event on 2 L NC on medicine floor (SpO2 84%) on 3/23, transferred to [facility] for further management. SpO2 currently stable on 4 L nasal cannula, will continue to titrate to maintain SpO2 > 90%. Low threshold for intubation should respiratory status deteriorate given known COVID-19 (+). Continue w/ Symbicort. CT chest on admission revealing of B/L nodular ground-glass infiltrates. Repeat CXR performed this morning revealing of mild worsening of interstitial opacities B/L.  GI: Regular diet.  Renal: BUN/Cr within normal limits. Hypokalemic on AM labs (K 3.3), received 40mEqs KCl. Continue to monitor electrolytes, maintain K > 4 and Mg >2. Monitor urine output, strict I and Os.  ID: Intermittently febrile, WBC count within normal limits, persistent lymphopenia (0.46). Will continue to trend. COVID-19 (+). Trend LDH, CRP, ferritin, and D dimer. Continue w/ hydroxychloroquine and Zithromax. RSV/ flu (-), blood cultures w/o growth. Pending full RVP. ID following.  Endo: TSH within normal limits, continue w/ methimazole.  Heme/Onc: Chemical DVT prophylaxis w/ Lovenox started this morning, mechanical DVT prophylaxis w/ SCDs. S/p 1u PRBCs on 3/19, continue to trend Hgb and will transfuse for Hgb < 7. Evaluated by heme/onc today who recommended beginning oral iron.    Dispo: Will remain in [facility] overnight. Full code.

## 2020-03-24 NOTE — PROGRESS NOTE ADULT - SUBJECTIVE AND OBJECTIVE BOX
PULMONARY/CRITICAL CARE  Events noted. Transferred to [facility]. Somewhat improved.   Feels somewhat better, less temp, mild sob, wheeze, cough. Ambulated in room .      Sats ok.   Patient is a 39y old  Female who presents with a chief complaint of SOB, syncope (19 Mar 2020 20:01)    BRIEF HOSPITAL COURSE: ***39F with hyperthyroidism on methimazole, afib on xarelto who presents with SOB/WEN and syncope.  Symptoms started about a week ago with SOB.  Worse with exertion.  Associated with a cough with clear sputum.  Had no fevers.  Became progressively worse over the week.  Today, patient actually syncopized this morning and injured her lip.  No chest pain.  Patient denies any known sick contacts but her children are home from school with self-isolation ([facility]).  Reports having a positive case but unsure of whom.  No recent travel and patient was compliant with Xarelto (though patient said she stops it when she is on her period).  Currently is on her period (started two days ago) and experiences heavy flow.  In the ED, patient was found to be anemic with hgb of 6.6 and is being transfused 1u of pRBC.  CT chest was done that showed suboptimal pulmonary arterial enhancement, nondiagnostic study for PE and also found to have bilateral nodular groundglass lung opacities.  Was started on ceftriaxone and azithromycin empirically and is also being admitted for COVID rule out.    Wheezing. Was using albuterol only.  No recent travel.    Events last 24 hours: ***    PAST MEDICAL & SURGICAL HISTORY:  Smoker  Depression, unspecified depression type  History of anemia  Asthma  History of Hyperthyroidism  Atrial fibrillation  History of blood transfusion  S/P  Section: ,,    Allergies    Motrin (Hives)    Intolerances      FAMILY HISTORY/ social: Smokes 1/2 ppd for 15 yrs, no etoh  Family history of stomach cancer (Grandparent)  Family history of diabetes mellitus (Sibling, Grandparent)          Medications:  azithromycin  IVPB 500 milliGRAM(s) IV Intermittent every 24 hours  cefTRIAXone   IVPB 1000 milliGRAM(s) IV Intermittent every 24 hours  hydroxychloroquine 200 milliGRAM(s) Oral two times a day    metoprolol tartrate 50 milliGRAM(s) Oral two times a day    guaiFENesin   Syrup  (Sugar-Free) 200 milliGRAM(s) Oral every 6 hours PRN  tiotropium 2.5 MICROgram(s)/olodaterol 2.5 MICROgram(s) (STIOLTO) Inhaler 2 Puff(s) Inhalation daily    acetaminophen   Tablet .. 650 milliGRAM(s) Oral every 6 hours PRN  LORazepam     Tablet 1 milliGRAM(s) Oral three times a day PRN            methimazole 10 milliGRAM(s) Oral three times a day    ascorbic acid 500 milliGRAM(s) Oral daily  potassium chloride    Tablet ER 20 milliEquivalent(s) Oral once                ICU Vital Signs Last 24 Hrs  T(C): 37.8 (20 Mar 2020 06:20), Max: 37.9 (20 Mar 2020 00:45)  T(F): 100.1 (20 Mar 2020 06:20), Max: 100.2 (20 Mar 2020 00:45)  HR: 97 (20 Mar 2020 06:20) (80 - 112)  BP: 113/57 (20 Mar 2020 06:20) (105/61 - 140/87)  BP(mean): --  ABP: --  ABP(mean): --  RR: 16 (20 Mar 2020 04:00) (16 - 18)  SpO2: 93% (20 Mar 2020 04:00) (93% - 99%)    Vital Signs Last 24 Hrs  T(C): 37.8 (20 Mar 2020 06:20), Max: 37.9 (20 Mar 2020 00:45)  T(F): 100.1 (20 Mar 2020 06:20), Max: 100.2 (20 Mar 2020 00:45)  HR: 97 (20 Mar 2020 06:20) (80 - 112)  BP: 113/57 (20 Mar 2020 06:20) (105/61 - 140/87)  BP(mean): --  RR: 16 (20 Mar 2020 04:00) (16 - 18)  SpO2: 93% (20 Mar 2020 04:00) (93% - 99%)        I&O's Detail        LABS:                        6.6    5.41  )-----------( 191      ( 19 Mar 2020 15:44 )             25.6     03-20    137  |  105  |  7   ----------------------------<  91  3.1<L>   |  24  |  0.71    Ca    8.1<L>      20 Mar 2020 07:15    TPro  6.4  /  Alb  2.7<L>  /  TBili  0.6  /  DBili  x   /  AST  31  /  ALT  16  /  AlkPhos  74  03-20          CAPILLARY BLOOD GLUCOSE        PT/INR - ( 19 Mar 2020 15:44 )   PT: 12.6 sec;   INR: 1.13 ratio         PTT - ( 19 Mar 2020 15:44 )  PTT:36.4 sec    CULTURES:      Physical Examination:    General: mild sob wd female    HEENT: Pupils equal, reactive to light.  Symmetric.    PULM: decreased wheezing, rhonchi bilat no rales.     CVS: Regular rate and rhythm, no murmurs, rubs, or gallops    ABD: Soft, nondistended, nontender, normoactive bowel sounds, no masses    EXT: No edema, nontender    SKIN: Warm and well perfused, no rashes noted.    NEURO: Alert, oriented, interactive, nonfocal    RADIOLOGY: ***< from: CT Angio Chest w/ IV Cont (20 @ 17:26) >  EXAM:  CT ANGIO CHEST (W)AW IC                                  PROCEDURE DATE:  2020          INTERPRETATION:  CLINICAL INFORMATION: Shortness of breath    COMPARISON: None.    PROCEDURE:   CT Angiography of the Chest.  90 ml of Omnipaque 350 was injected intravenously. 10 ml were discarded.  Sagittal and coronal reformats were performed as well as 3D (MIP) reconstructions.      FINDINGS:    LUNGS AND AIRWAYS: Patent central airways.  Bilateral nodular groundglass opacities.    PLEURA: No pleural effusion.    MEDIASTINUM AND VIOLET: Mild adenopathy. Calcified lymph nodes.    VESSELS: No thoracic aortic aneurysm or dissection. Suboptimal pulmonary arterial enhancement. Nondiagnostic for evaluation of pulmonary emboli.    HEART: Heart size is normal. No pericardial effusion.    CHEST WALL AND LOWER NECK: Within normal limits.    VISUALIZED UPPER ABDOMEN: Hepatic and splenic calcified granulomas.    BONES: Degenerative changes.    IMPRESSION:     Suboptimal pulmonary arterial enhancement, nondiagnostic study for evaluation of pulmonary emboli.  Alternative study such as nuclear medicine ventilation/perfusion scan may be obtained if clinically indicated.  Bilateral nodular groundglass lung opacities, likely infectious.                    < end of copied text >    CXR bilat infiltrates stable

## 2020-03-24 NOTE — PROGRESS NOTE ADULT - SUBJECTIVE AND OBJECTIVE BOX
Chief Complaint: Cough, shortness of breath    Interval Events: No events overnight.    Review of Systems:  General: No fevers, chills, weight loss or gain  Skin: No rashes, color changes  Cardiovascular: No chest pain, orthopnea  Respiratory: No shortness of breath, cough  Gastrointestinal: No nausea, abdominal pain  Genitourinary: No incontinence, pain with urination  Musculoskeletal: No pain, swelling, decreased range of motion  Neurological: No headache, weakness  Psychiatric: No depression, anxiety  Endocrine: No weight loss or gain, increased thirst  All other systems are comprehensively negative.    Physical Exam:  Vital Signs Last 24 Hrs  T(C): 37.6 (24 Mar 2020 08:40), Max: 37.9 (23 Mar 2020 11:19)  T(F): 99.7 (24 Mar 2020 08:40), Max: 100.3 (23 Mar 2020 11:19)  HR: 76 (24 Mar 2020 08:40) (74 - 91)  BP: 103/74 (24 Mar 2020 08:40) (102/57 - 130/73)  BP(mean): 82 (24 Mar 2020 08:40) (68 - 93)  RR: 29 (24 Mar 2020 08:40) (20 - 31)  SpO2: 95% (24 Mar 2020 08:40) (91% - 100%)  General: NAD  HEENT: MMM  Neck: No JVD, no carotid bruit  Lungs: CTAB  CV: RRR, nl S1/S2, no M/R/G  Abdomen: S/NT/ND, +BS  Extremities: No LE edema, no cyanosis  Neuro: AAOx3, non-focal  Skin: No rash    Labs:             03-24    137  |  104  |  6<L>  ----------------------------<  91  3.3<L>   |  24  |  0.66    Ca    8.6      24 Mar 2020 06:45  Phos  4.1     03-24  Mg     1.8     03-24    TPro  7.2  /  Alb  2.8<L>  /  TBili  0.5  /  DBili  x   /  AST  17  /  ALT  12  /  AlkPhos  69  03-24                        7.6    5.07  )-----------( 238      ( 24 Mar 2020 06:45 )             28.3       Telemetry: Sinus rhythm

## 2020-03-25 PROCEDURE — 99232 SBSQ HOSP IP/OBS MODERATE 35: CPT

## 2020-03-25 PROCEDURE — 71045 X-RAY EXAM CHEST 1 VIEW: CPT | Mod: 26

## 2020-03-25 RX ORDER — TRAMADOL HYDROCHLORIDE 50 MG/1
50 TABLET ORAL EVERY 6 HOURS
Refills: 0 | Status: DISCONTINUED | OUTPATIENT
Start: 2020-03-25 | End: 2020-03-27

## 2020-03-25 RX ORDER — POTASSIUM CHLORIDE 20 MEQ
20 PACKET (EA) ORAL
Refills: 0 | Status: COMPLETED | OUTPATIENT
Start: 2020-03-25 | End: 2020-03-25

## 2020-03-25 RX ADMIN — Medication 500 MILLIGRAM(S): at 11:39

## 2020-03-25 RX ADMIN — Medication 1 TABLET(S): at 11:41

## 2020-03-25 RX ADMIN — BUDESONIDE AND FORMOTEROL FUMARATE DIHYDRATE 2 PUFF(S): 160; 4.5 AEROSOL RESPIRATORY (INHALATION) at 19:25

## 2020-03-25 RX ADMIN — Medication 325 MILLIGRAM(S): at 11:39

## 2020-03-25 RX ADMIN — Medication 200 MILLIGRAM(S): at 17:24

## 2020-03-25 RX ADMIN — Medication 20 MILLIEQUIVALENT(S): at 11:39

## 2020-03-25 RX ADMIN — Medication 50 MILLIGRAM(S): at 06:04

## 2020-03-25 RX ADMIN — BUDESONIDE AND FORMOTEROL FUMARATE DIHYDRATE 2 PUFF(S): 160; 4.5 AEROSOL RESPIRATORY (INHALATION) at 06:38

## 2020-03-25 RX ADMIN — AZITHROMYCIN 250 MILLIGRAM(S): 500 TABLET, FILM COATED ORAL at 11:39

## 2020-03-25 RX ADMIN — TRAMADOL HYDROCHLORIDE 50 MILLIGRAM(S): 50 TABLET ORAL at 06:37

## 2020-03-25 RX ADMIN — TRAMADOL HYDROCHLORIDE 50 MILLIGRAM(S): 50 TABLET ORAL at 20:15

## 2020-03-25 RX ADMIN — MAGNESIUM OXIDE 400 MG ORAL TABLET 400 MILLIGRAM(S): 241.3 TABLET ORAL at 09:04

## 2020-03-25 RX ADMIN — TRAMADOL HYDROCHLORIDE 50 MILLIGRAM(S): 50 TABLET ORAL at 19:36

## 2020-03-25 RX ADMIN — TRAMADOL HYDROCHLORIDE 50 MILLIGRAM(S): 50 TABLET ORAL at 06:43

## 2020-03-25 RX ADMIN — Medication 1 TABLET(S): at 21:27

## 2020-03-25 RX ADMIN — MAGNESIUM OXIDE 400 MG ORAL TABLET 400 MILLIGRAM(S): 241.3 TABLET ORAL at 11:38

## 2020-03-25 RX ADMIN — Medication 50 MILLIGRAM(S): at 17:24

## 2020-03-25 RX ADMIN — Medication 1 MILLIGRAM(S): at 06:36

## 2020-03-25 RX ADMIN — MAGNESIUM OXIDE 400 MG ORAL TABLET 400 MILLIGRAM(S): 241.3 TABLET ORAL at 17:24

## 2020-03-25 RX ADMIN — Medication 1 TABLET(S): at 06:04

## 2020-03-25 RX ADMIN — Medication 20 MILLIEQUIVALENT(S): at 09:04

## 2020-03-25 NOTE — PROGRESS NOTE ADULT - SUBJECTIVE AND OBJECTIVE BOX
Chief Complaint: Cough, shortness of breath    Interval Events: No events overnight.    Review of Systems:  General: No fevers, chills, weight loss or gain  Skin: No rashes, color changes  Cardiovascular: No chest pain, orthopnea  Respiratory: No shortness of breath, cough  Gastrointestinal: No nausea, abdominal pain  Genitourinary: No incontinence, pain with urination  Musculoskeletal: No pain, swelling, decreased range of motion  Neurological: No headache, weakness  Psychiatric: No depression, anxiety  Endocrine: No weight loss or gain, increased thirst  All other systems are comprehensively negative.    Physical Exam:  Vital Signs Last 24 Hrs  T(C): 37.1 (25 Mar 2020 06:00), Max: 37.9 (24 Mar 2020 20:30)  T(F): 98.8 (25 Mar 2020 06:00), Max: 100.2 (24 Mar 2020 20:30)  HR: 77 (25 Mar 2020 08:00) (76 - 108)  BP: 106/65 (25 Mar 2020 08:00) (106/65 - 121/77)  BP(mean): 78 (25 Mar 2020 08:00) (78 - 94)  RR: 30 (25 Mar 2020 08:00) (22 - 30)  SpO2: 90% (25 Mar 2020 08:00) (90% - 97%)  General: NAD  HEENT: MMM  Neck: No JVD, no carotid bruit  Lungs: CTAB  CV: RRR, nl S1/S2, no M/R/G  Abdomen: S/NT/ND, +BS  Extremities: No LE edema, no cyanosis  Neuro: AAOx3, non-focal  Skin: No rash    Labs:             03-24    137  |  104  |  6<L>  ----------------------------<  91  3.3<L>   |  24  |  0.66    Ca    8.6      24 Mar 2020 06:45  Phos  4.1     03-24  Mg     1.8     03-24    TPro  7.2  /  Alb  2.8<L>  /  TBili  0.5  /  DBili  x   /  AST  17  /  ALT  12  /  AlkPhos  69  03-24                        7.6    5.07  )-----------( 238      ( 24 Mar 2020 06:45 )             28.3       Telemetry: Sinus rhythm

## 2020-03-25 NOTE — DIETITIAN INITIAL EVALUATION ADULT. - PERTINENT MEDS FT
tylenol, robitussin, ativan, lopressor, lovenox, zithromax, KCl, ultram, lactobacillus acidophilus, FeSO4, tapazole, desyrel, zoloft, MgO2, Vit C

## 2020-03-25 NOTE — PROGRESS NOTE ADULT - ASSESSMENT
The patient is a 39 year old female with a history of anemia, paroxysmal atrial fibrillation, hyperthyroidism who presents with shortness of breath and cough in the setting of anemia and likely COVID-19.    Plan:  - Remain off of rivaroxaban. Patient seen by me in the past; unclear why she she been on anticoagulation given low thromboembolic risk with [id number] of 0 and previously stable TFTs. She can discuss further with her cardiologist as outpatient.  - Monitor hemoglobin  - Continue metoprolol tartrate 50 mg bid  - COVID-19 positive  - Remains on NC, mildly hypoxic at times

## 2020-03-25 NOTE — PROGRESS NOTE ADULT - SUBJECTIVE AND OBJECTIVE BOX
ROBBIE MCKNIGHT is a 39yFemale , patient examined and chart reviewed.     INTERVAL HPI/ OVERNIGHT EVENTS:   Feeling better. Low grade temps last night. NC 3L 97% sats.  No distress.    PAST MEDICAL & SURGICAL HISTORY:  Smoker  Depression, unspecified depression type  History of anemia  Asthma  History of Hyperthyroidism  Atrial fibrillation  History of blood transfusion  S/P  Section: ,,    For details regarding the patient's social history, family history, and other miscellaneous elements, please refer the initial infectious diseases consultation and/or the admitting history and physical examination for this admission.    ROS:  CONSTITUTIONAL: no fever or chills,   EYES:  Negative  blurry vision or double vision  CARDIOVASCULAR:  Negative for chest pain or palpitations  RESPIRATORY:  Negative for cough, wheezing,+ SOB   GASTROINTESTINAL:  Negative for nausea, vomiting, diarrhea, constipation, or abdominal pain  GENITOURINARY:  Negative frequency, urgency or dysuria  NEUROLOGIC:  No headache, confusion, dizziness, lightheadedness  All other systems were reviewed and are negative     ALLERGIES:  Motrin (Hives)    Current inpatient medications :    ANTIBIOTICS/RELEVANT:  azithromycin   Tablet 250 milliGRAM(s) Oral daily    MEDICATIONS  (STANDING):  ascorbic acid 500 milliGRAM(s) Oral daily  budesonide 160 MICROgram(s)/formoterol 4.5 MICROgram(s) Inhaler 2 Puff(s) Inhalation two times a day  enoxaparin Injectable 30 milliGRAM(s) SubCutaneous daily  ferrous    sulfate 325 milliGRAM(s) Oral daily  lactobacillus acidophilus 1 Tablet(s) Oral three times a day  magnesium oxide 400 milliGRAM(s) Oral three times a day with meals  methimazole 10 milliGRAM(s) Oral three times a day  metoprolol tartrate 50 milliGRAM(s) Oral two times a day  sertraline 100 milliGRAM(s) Oral daily  traZODone 50 milliGRAM(s) Oral two times a day    MEDICATIONS  (PRN):  acetaminophen   Tablet .. 650 milliGRAM(s) Oral every 6 hours PRN Temp greater or equal to 38C (100.4F), Mild Pain (1 - 3)  guaiFENesin   Syrup  (Sugar-Free) 200 milliGRAM(s) Oral every 6 hours PRN Cough  LORazepam     Tablet 1 milliGRAM(s) Oral three times a day PRN Anxiety  traMADol 50 milliGRAM(s) Oral every 6 hours PRN Moderate Pain (4 - 6)        Objective:  Vital Signs Last 24 Hrs  T(C): 37.1 (25 Mar 2020 12:00), Max: 37.9 (24 Mar 2020 20:30)  T(F): 98.8 (25 Mar 2020 12:00), Max: 100.2 (24 Mar 2020 20:30)  HR: 90 (25 Mar 2020 12:00) (77 - 108)  BP: 104/68 (25 Mar 2020 12:00) (102/61 - 121/77)  BP(mean): 74 (25 Mar 2020 10:00) (74 - 94)  RR: 24 (25 Mar 2020 12:00) (24 - 30)  SpO2: 89% (25 Mar 2020 12:00) (89% - 97%)    Physical Exam:  General:  no acute distress  Eyes: sclera anicteric, pupils equal and reactive to light  ENMT: buccal mucosa moist, pharynx not injected  Neck: supple, trachea midline  Lungs: Decreased no wheeze/rhonchi  Cardiovascular: regular rate and rhythm, S1 S2  Abdomen: soft, nontender, no organomegaly present, bowel sounds normal  Neurological: alert and oriented x3, Cranial Nerves II-XII grossly intact  Skin: no increased ecchymosis/petechiae/purpura  Lymph Nodes: no palpable cervical/supraclavicular lymph nodes enlargements  Extremities: no cyanosis/clubbing/edema      LABS:                        7.6    5.07  )-----------( 238      ( 24 Mar 2020 06:45 )             28.3   -    137  |  104  |  6<L>  ----------------------------<  91  3.3<L>   |  24  |  0.66    Ca    8.6      24 Mar 2020 06:45  Phos  4.1     -  Mg     1.8         TPro  7.2  /  Alb  2.8<L>  /  TBili  0.5  /  DBili  x   /  AST  17  /  ALT  12  /  AlkPhos  69      MICROBIOLOGY:  COVID-19 PCR . (20 @ 23:21)    COVID-19 PCR: Detected: LDT - Laboratory Developed Test All “detected” results on this new test  are considered presumptively positive results, are clinically actionable,  and specimens will be forwarded to [facility] for confirmation testing.  Another report (corrected report) will only be issued if discordant  results occur.  This test has been validated by [employer] to be accurate;  though it has not been FDA cleared/approved by the usual pathway.  As with all laboratory tests, results should be correlated with clinical  findings.      Culture - Sputum (collected 20 Mar 2020 21:11)  Source: .Sputum Sputum  Gram Stain (20 Mar 2020 22:56):    No polymorphonuclear leukocytes per low power field    Few Squamous epithelial cells per low power field    Few Gram Negative Rods per oil power field    Few Gram positive cocci in pairs per oil power field  Preliminary Report (21 Mar 2020 17:55):    Normal Respiratory Juju present    Culture - Blood (collected 19 Mar 2020 22:06)  Source: .Blood Blood-Peripheral  Preliminary Report (20 Mar 2020 23:01):    No growth to date.    Culture - Blood (collected 19 Mar 2020 22:06)  Source: .Blood Blood-Peripheral  Preliminary Report (20 Mar 2020 23:01):    No growth to date.      RADIOLOGY & ADDITIONAL STUDIES:    EXAM:  XR CHEST PORTABLE ROUTINE 1V                                  PROCEDURE DATE:  2020          INTERPRETATION:  AP chest on 2020 at 6:13 AM. Patient being followed for infiltration.    Heart suggest normal size.    Moderate infiltration in the mid lower left lung field and mild infiltration right base again seen.    Chest is similar to .    IMPRESSION: Unchanged bilateral infiltrates.    EXAM:  CT ANGIO CHEST (W)AW IC                            PROCEDURE DATE:  2020          INTERPRETATION:  CLINICAL INFORMATION: Shortness of breath    COMPARISON: None.    PROCEDURE:   CT Angiography of the Chest.  90 ml of Omnipaque 350 was injected intravenously. 10 ml were discarded.  Sagittal and coronal reformats were performed as well as 3D (MIP) reconstructions.      FINDINGS:    LUNGS AND AIRWAYS: Patent central airways.  Bilateral nodular groundglass opacities.    PLEURA: No pleural effusion.    MEDIASTINUM AND VIOLET: Mild adenopathy. Calcified lymph nodes.    VESSELS: No thoracic aortic aneurysm or dissection. Suboptimal pulmonary arterial enhancement. Nondiagnostic for evaluation of pulmonary emboli.    HEART: Heart size is normal. No pericardial effusion.    CHEST WALL AND LOWER NECK: Within normal limits.    VISUALIZED UPPER ABDOMEN: Hepatic and splenic calcified granulomas.    BONES: Degenerative changes.    IMPRESSION:     Suboptimal pulmonary arterial enhancement, nondiagnostic study for evaluation of pulmonary emboli.  Alternative study such as nuclear medicine ventilation/perfusion scan may be obtained if clinically indicated.  Bilateral nodular groundglass lung opacities, likely infectious.      Assessment :   39F with hyperthyroidism on methimazole, afib on xarelto admitted with teresa pneumonia sec COVID 19. Anemia sec menses. Asthma exacerbation stable. Fevers better. Respiratory status stable at 3L NC. Overall stable    Plan :   Supportive care  Completed Hydroxycloroquine x 5 days  On Zithromax per pulm- consider stopping  Trend temps and cbc with diff  LDH, CRP ferritin    COVID-19 is a viral illness and as expected and as we are seeing presents as a viral illness with full spectrum of presentations. First week of illness generally less severe followed by critical period where patients start to improve or decompensate and require intubation (7-10 days post symptom onset) and felt to be due to a maladaptive immune response in the setting of decreased viral titers. Clinical course ranging from 2-8 weeks. Do recommend continued strict isolation to minimize risk to staff, avoid aerosolizing procedures, avoidance of steroids which are associated with worse outcomes including inhaled steroids, and with no compelling evidence to date do not recommend antiviral therapies outside of established protocols or controlled trials would support focus on supportive care and avoid interventions with no demonstrated efficacy and unclear adverse effect profile in context of COVID-19    D/w Hospitalist    Continue with present regiment.  Appropriate use of antibiotics and adverse effects reviewed.      I have discussed the above plan of care with patient/ family in detail. They expressed understanding of the  treatment plan . Risks, benefits and alternatives discussed in detail. I have asked if they have any questions or concerns and appropriately addressed them to the best of my ability .    > 35 minutes were spent in direct patient care reviewing notes, medications ,labs data/ imaging , discussion with multidisciplinary team.    Thank you for allowing me to participate in care of your patient .    Luis Alberto Farley MD  Infectious Disease  [phone] ROBBIE MCKNIGHT is a 39yFemale , patient examined and chart reviewed.     INTERVAL HPI/ OVERNIGHT EVENTS:   Feeling better. Low grade temps last night. NC 3L 97% sats.  No distress.    PAST MEDICAL & SURGICAL HISTORY:  Smoker  Depression, unspecified depression type  History of anemia  Asthma  History of Hyperthyroidism  Atrial fibrillation  History of blood transfusion  S/P  Section: ,,    For details regarding the patient's social history, family history, and other miscellaneous elements, please refer the initial infectious diseases consultation and/or the admitting history and physical examination for this admission.    ROS:  CONSTITUTIONAL: no fever or chills,   EYES:  Negative  blurry vision or double vision  CARDIOVASCULAR:  Negative for chest pain or palpitations  RESPIRATORY:  Negative for cough, wheezing,+ SOB   GASTROINTESTINAL:  Negative for nausea, vomiting, diarrhea, constipation, or abdominal pain  GENITOURINARY:  Negative frequency, urgency or dysuria  NEUROLOGIC:  No headache, confusion, dizziness, lightheadedness  All other systems were reviewed and are negative     ALLERGIES:  Motrin (Hives)    Current inpatient medications :    ANTIBIOTICS/RELEVANT:  azithromycin   Tablet 250 milliGRAM(s) Oral daily    MEDICATIONS  (STANDING):  ascorbic acid 500 milliGRAM(s) Oral daily  budesonide 160 MICROgram(s)/formoterol 4.5 MICROgram(s) Inhaler 2 Puff(s) Inhalation two times a day  enoxaparin Injectable 30 milliGRAM(s) SubCutaneous daily  ferrous    sulfate 325 milliGRAM(s) Oral daily  lactobacillus acidophilus 1 Tablet(s) Oral three times a day  magnesium oxide 400 milliGRAM(s) Oral three times a day with meals  methimazole 10 milliGRAM(s) Oral three times a day  metoprolol tartrate 50 milliGRAM(s) Oral two times a day  sertraline 100 milliGRAM(s) Oral daily  traZODone 50 milliGRAM(s) Oral two times a day    MEDICATIONS  (PRN):  acetaminophen   Tablet .. 650 milliGRAM(s) Oral every 6 hours PRN Temp greater or equal to 38C (100.4F), Mild Pain (1 - 3)  guaiFENesin   Syrup  (Sugar-Free) 200 milliGRAM(s) Oral every 6 hours PRN Cough  LORazepam     Tablet 1 milliGRAM(s) Oral three times a day PRN Anxiety  traMADol 50 milliGRAM(s) Oral every 6 hours PRN Moderate Pain (4 - 6)        Objective:  Vital Signs Last 24 Hrs  T(C): 37.1 (25 Mar 2020 12:00), Max: 37.9 (24 Mar 2020 20:30)  T(F): 98.8 (25 Mar 2020 12:00), Max: 100.2 (24 Mar 2020 20:30)  HR: 90 (25 Mar 2020 12:00) (77 - 108)  BP: 104/68 (25 Mar 2020 12:00) (102/61 - 121/77)  BP(mean): 74 (25 Mar 2020 10:00) (74 - 94)  RR: 24 (25 Mar 2020 12:00) (24 - 30)  SpO2: 89% (25 Mar 2020 12:00) (89% - 97%)    Physical Exam:  General:  no acute distress  Eyes: sclera anicteric, pupils equal and reactive to light  ENMT: buccal mucosa moist, pharynx not injected  Neck: supple, trachea midline  Lungs: Decreased no wheeze/rhonchi  Cardiovascular: regular rate and rhythm, S1 S2  Abdomen: soft, nontender, no organomegaly present, bowel sounds normal  Neurological: alert and oriented x3, Cranial Nerves II-XII grossly intact  Skin: no increased ecchymosis/petechiae/purpura  Lymph Nodes: no palpable cervical/supraclavicular lymph nodes enlargements  Extremities: no cyanosis/clubbing/edema      LABS:                        7.6    5.07  )-----------( 238      ( 24 Mar 2020 06:45 )             28.3   -    137  |  104  |  6<L>  ----------------------------<  91  3.3<L>   |  24  |  0.66    Ca    8.6      24 Mar 2020 06:45  Phos  4.1     -  Mg     1.8         TPro  7.2  /  Alb  2.8<L>  /  TBili  0.5  /  DBili  x   /  AST  17  /  ALT  12  /  AlkPhos  69      MICROBIOLOGY:  COVID-19 PCR . (20 @ 23:21)    COVID-19 PCR: Detected: LDT - Laboratory Developed Test All “detected” results on this new test  are considered presumptively positive results, are clinically actionable,  and specimens will be forwarded to [facility] for confirmation testing.  Another report (corrected report) will only be issued if discordant  results occur.  This test has been validated by [employer] to be accurate;  though it has not been FDA cleared/approved by the usual pathway.  As with all laboratory tests, results should be correlated with clinical  findings.      Culture - Sputum (collected 20 Mar 2020 21:11)  Source: .Sputum Sputum  Gram Stain (20 Mar 2020 22:56):    No polymorphonuclear leukocytes per low power field    Few Squamous epithelial cells per low power field    Few Gram Negative Rods per oil power field    Few Gram positive cocci in pairs per oil power field  Preliminary Report (21 Mar 2020 17:55):    Normal Respiratory Juju present    Culture - Blood (collected 19 Mar 2020 22:06)  Source: .Blood Blood-Peripheral  Preliminary Report (20 Mar 2020 23:01):    No growth to date.    Culture - Blood (collected 19 Mar 2020 22:06)  Source: .Blood Blood-Peripheral  Preliminary Report (20 Mar 2020 23:01):    No growth to date.      RADIOLOGY & ADDITIONAL STUDIES:    EXAM:  XR CHEST PORTABLE ROUTINE 1V                                  PROCEDURE DATE:  2020          INTERPRETATION:  AP chest on 2020 at 6:13 AM. Patient being followed for infiltration.    Heart suggest normal size.    Moderate infiltration in the mid lower left lung field and mild infiltration right base again seen.    Chest is similar to .    IMPRESSION: Unchanged bilateral infiltrates.    EXAM:  CT ANGIO CHEST (W)AW IC                            PROCEDURE DATE:  2020          INTERPRETATION:  CLINICAL INFORMATION: Shortness of breath    COMPARISON: None.    PROCEDURE:   CT Angiography of the Chest.  90 ml of Omnipaque 350 was injected intravenously. 10 ml were discarded.  Sagittal and coronal reformats were performed as well as 3D (MIP) reconstructions.      FINDINGS:    LUNGS AND AIRWAYS: Patent central airways.  Bilateral nodular groundglass opacities.    PLEURA: No pleural effusion.    MEDIASTINUM AND VIOLET: Mild adenopathy. Calcified lymph nodes.    VESSELS: No thoracic aortic aneurysm or dissection. Suboptimal pulmonary arterial enhancement. Nondiagnostic for evaluation of pulmonary emboli.    HEART: Heart size is normal. No pericardial effusion.    CHEST WALL AND LOWER NECK: Within normal limits.    VISUALIZED UPPER ABDOMEN: Hepatic and splenic calcified granulomas.    BONES: Degenerative changes.    IMPRESSION:     Suboptimal pulmonary arterial enhancement, nondiagnostic study for evaluation of pulmonary emboli.  Alternative study such as nuclear medicine ventilation/perfusion scan may be obtained if clinically indicated.  Bilateral nodular groundglass lung opacities, likely infectious.      Assessment :   39F with hyperthyroidism on methimazole, afib on xarelto admitted with teresa pneumonia sec COVID 19. Anemia sec menses. Asthma exacerbation stable. Fevers better. Respiratory status stable at 3L NC. Overall stable    Plan :   Supportive care  Completed Hydroxycloroquine x 5 days  On Zithromax per pulm- consider stopping  Trend temps and cbc with diff  LDH, CRP ferritin  If able to come off 02 can dc home and self quarantine    COVID-19 is a viral illness and as expected and as we are seeing presents as a viral illness with full spectrum of presentations. First week of illness generally less severe followed by critical period where patients start to improve or decompensate and require intubation (7-10 days post symptom onset) and felt to be due to a maladaptive immune response in the setting of decreased viral titers. Clinical course ranging from 2-8 weeks. Do recommend continued strict isolation to minimize risk to staff, avoid aerosolizing procedures, avoidance of steroids which are associated with worse outcomes including inhaled steroids, and with no compelling evidence to date do not recommend antiviral therapies outside of established protocols or controlled trials would support focus on supportive care and avoid interventions with no demonstrated efficacy and unclear adverse effect profile in context of COVID-19    D/w Hospitalist    Continue with present regiment.  Appropriate use of antibiotics and adverse effects reviewed.      I have discussed the above plan of care with patient/ family in detail. They expressed understanding of the  treatment plan . Risks, benefits and alternatives discussed in detail. I have asked if they have any questions or concerns and appropriately addressed them to the best of my ability .    > 35 minutes were spent in direct patient care reviewing notes, medications ,labs data/ imaging , discussion with multidisciplinary team.    Thank you for allowing me to participate in care of your patient .    Luis Alberto Farley MD  Infectious Disease  [phone]

## 2020-03-25 NOTE — PROGRESS NOTE ADULT - ASSESSMENT
Pt with syncope, anemia, severe wheezing, CT chest suggestive of COVID pneumonia.   Has not deteriorated.  . Now on  zithromax.   Completed 5 days of plaquenil.   Can transfer to floor if stable.     CXR shows bilat infil. which has been stable.   Some wheezing--underlying asthma.   Can continue symbicort.

## 2020-03-25 NOTE — DIETITIAN INITIAL EVALUATION ADULT. - OTHER INFO
40 yo female seen per policy for LOS Day #7; patient reports good appetite (75-90%) and thus PO intake of Regular diet; due to +COVID19, spoke with patient via phone and updated her preferences; NKFA and she makes her needs known; transferred from [facility] to [facility] due to desat on 2L O2, but feeling better; patient is s/p transfusion x 1 for anemia, which she claims is related to her menses; reviewed dietary sources of iron with patient to aid with H/H (especially during menses), which she verbalized understanding; wanted to leave AMA, but MD convinced her to stay for safety purposes; patient reports UBW ~245#, but she is actively trying to lose weight; wt discrepancy noted as admit wt ~239# and (weight 3/23 ~227#); patient reports that bed scale weight likely incorrect as she weighs herself routinely; at this time, patient appears nutritionally stable; RD will remain available; patient is awaiting transfer back to [facility] when bed available

## 2020-03-25 NOTE — PROGRESS NOTE ADULT - ASSESSMENT
39F with hyperthyroidism on methimazole, afib on xarelto who presents with SOB/WEN and syncope.         Hypoxia//sob/COVID+  started on oxygen.  on hydorxycholoriquine and azithromycin    Syncope  - likely anemia related 2/2 heavy period on xarelto  - s/p 1u pRBC transfusion  - refused  for 2nd U of PRBC.   Understand risk and bebefit of PRBC transfusion yesterday.  will f/u cbc tomorrow    - As per cardiology-cardiology -   - Remain off of rivaroxaban. Patient seen by me in the past; unclear why  she been on anticoagulation given low thromboembolic risk with [id number] of 0 and previously stable TFTs. She can discuss further with her cardiologist as outpatient.  - Monitor hemoglobin  - Continue metoprolol tartrate 50 mg bid  - TTE    SOB/WEN  - +COVID  -> continue contact and airborne precautions  - ID consult  -zithromax.  - inh as needed, no nebs    Prolonged QT  resume   zoloft and trazadone.   cardio followinf    Hyperthyroidism  - cont with methimazole    Afib  - holding xarelto 2/2 current bleeding  - continue metoprolol     Preventive measures  IMPROVE VTE Individual Risk Assessment          RISK                                                          Points  [  ] Previous VTE                                                 3  [  ] Thrombophilia                                              2  [  ] Lower limb paralysis                                    2        (unable to hold up >15 seconds)    [  ] Current Cancer                                             2         (within 6 months)  [  ] Immobilization > 24 hrs                              1  [  ] ICU/CCU stay > 24 hours                            1  [  ] Age > 60                                                        1    IMPROVE VTE Score 0    - currently bleeding, hold AC for now    Plan of care was discussed

## 2020-03-25 NOTE — PROGRESS NOTE ADULT - SUBJECTIVE AND OBJECTIVE BOX
PULMONARY/CRITICAL CARE  Less sob, cough. Some chest wall discomfort from cough. Somewhat improved.   Low grade temps  Mild sob, wheeze, cough. Ambulated in room .   Completed hydroxycholorquine.    Sats ok.   Patient is a 39y old  Female who presents with a chief complaint of SOB, syncope (19 Mar 2020 20:01)    BRIEF HOSPITAL COURSE: ***39F with hyperthyroidism on methimazole, afib on xarelto who presents with SOB/WEN and syncope.  Symptoms started about a week ago with SOB.  Worse with exertion.  Associated with a cough with clear sputum.  Had no fevers.  Became progressively worse over the week.  Today, patient actually syncopized this morning and injured her lip.  No chest pain.  Patient denies any known sick contacts but her children are home from school with self-isolation ([facility]).  Reports having a positive case but unsure of whom.  No recent travel and patient was compliant with Xarelto (though patient said she stops it when she is on her period).  Currently is on her period (started two days ago) and experiences heavy flow.  In the ED, patient was found to be anemic with hgb of 6.6 and is being transfused 1u of pRBC.  CT chest was done that showed suboptimal pulmonary arterial enhancement, nondiagnostic study for PE and also found to have bilateral nodular groundglass lung opacities.  Was started on ceftriaxone and azithromycin empirically and is also being admitted for COVID rule out.    Wheezing. Was using albuterol only.  No recent travel.    Events last 24 hours: ***    PAST MEDICAL & SURGICAL HISTORY:  Smoker  Depression, unspecified depression type  History of anemia  Asthma  History of Hyperthyroidism  Atrial fibrillation  History of blood transfusion  S/P  Section: ,,    Allergies    Motrin (Hives)    Intolerances      FAMILY HISTORY/ social: Smokes 1/2 ppd for 15 yrs, no etoh  Family history of stomach cancer (Grandparent)  Family history of diabetes mellitus (Sibling, Grandparent)          Medications:  azithromycin  IVPB 500 milliGRAM(s) IV Intermittent every 24 hours  cefTRIAXone   IVPB 1000 milliGRAM(s) IV Intermittent every 24 hours  hydroxychloroquine 200 milliGRAM(s) Oral two times a day    metoprolol tartrate 50 milliGRAM(s) Oral two times a day    guaiFENesin   Syrup  (Sugar-Free) 200 milliGRAM(s) Oral every 6 hours PRN  tiotropium 2.5 MICROgram(s)/olodaterol 2.5 MICROgram(s) (STIOLTO) Inhaler 2 Puff(s) Inhalation daily    acetaminophen   Tablet .. 650 milliGRAM(s) Oral every 6 hours PRN  LORazepam     Tablet 1 milliGRAM(s) Oral three times a day PRN            methimazole 10 milliGRAM(s) Oral three times a day    ascorbic acid 500 milliGRAM(s) Oral daily  potassium chloride    Tablet ER 20 milliEquivalent(s) Oral once                ICU Vital Signs Last 24 Hrs  T(C): 37.8 (20 Mar 2020 06:20), Max: 37.9 (20 Mar 2020 00:45)  T(F): 100.1 (20 Mar 2020 06:20), Max: 100.2 (20 Mar 2020 00:45)  HR: 97 (20 Mar 2020 06:20) (80 - 112)  BP: 113/57 (20 Mar 2020 06:20) (105/61 - 140/87)  BP(mean): --  ABP: --  ABP(mean): --  RR: 16 (20 Mar 2020 04:00) (16 - 18)  SpO2: 93% (20 Mar 2020 04:00) (93% - 99%)    Vital Signs Last 24 Hrs  T(C): 37.8 (20 Mar 2020 06:20), Max: 37.9 (20 Mar 2020 00:45)  T(F): 100.1 (20 Mar 2020 06:20), Max: 100.2 (20 Mar 2020 00:45)  HR: 97 (20 Mar 2020 06:20) (80 - 112)  BP: 113/57 (20 Mar 2020 06:20) (105/61 - 140/87)  BP(mean): --  RR: 16 (20 Mar 2020 04:00) (16 - 18)  SpO2: 93% (20 Mar 2020 04:00) (93% - 99%)        I&O's Detail        LABS:                        6.6    5.41  )-----------( 191      ( 19 Mar 2020 15:44 )             25.6     03-20    137  |  105  |  7   ----------------------------<  91  3.1<L>   |  24  |  0.71    Ca    8.1<L>      20 Mar 2020 07:15    TPro  6.4  /  Alb  2.7<L>  /  TBili  0.6  /  DBili  x   /  AST  31  /  ALT  16  /  AlkPhos  74  03-20          CAPILLARY BLOOD GLUCOSE        PT/INR - ( 19 Mar 2020 15:44 )   PT: 12.6 sec;   INR: 1.13 ratio         PTT - ( 19 Mar 2020 15:44 )  PTT:36.4 sec    CULTURES:      Physical Examination:    General: mild sob wd female    HEENT: Pupils equal, reactive to light.  Symmetric.    PULM: decreased wheezing, rhonchi bilat no rales.     CVS: Regular rate and rhythm, no murmurs, rubs, or gallops    ABD: Soft, nondistended, nontender, normoactive bowel sounds, no masses    EXT: No edema, nontender    SKIN: Warm and well perfused, no rashes noted.    NEURO: Alert, oriented, interactive, nonfocal    RADIOLOGY: ***< from: CT Angio Chest w/ IV Cont (20 @ 17:26) >  EXAM:  CT ANGIO CHEST (W)AW IC                                  PROCEDURE DATE:  2020          INTERPRETATION:  CLINICAL INFORMATION: Shortness of breath    COMPARISON: None.    PROCEDURE:   CT Angiography of the Chest.  90 ml of Omnipaque 350 was injected intravenously. 10 ml were discarded.  Sagittal and coronal reformats were performed as well as 3D (MIP) reconstructions.      FINDINGS:    LUNGS AND AIRWAYS: Patent central airways.  Bilateral nodular groundglass opacities.    PLEURA: No pleural effusion.    MEDIASTINUM AND VIOLET: Mild adenopathy. Calcified lymph nodes.    VESSELS: No thoracic aortic aneurysm or dissection. Suboptimal pulmonary arterial enhancement. Nondiagnostic for evaluation of pulmonary emboli.    HEART: Heart size is normal. No pericardial effusion.    CHEST WALL AND LOWER NECK: Within normal limits.    VISUALIZED UPPER ABDOMEN: Hepatic and splenic calcified granulomas.    BONES: Degenerative changes.    IMPRESSION:     Suboptimal pulmonary arterial enhancement, nondiagnostic study for evaluation of pulmonary emboli.  Alternative study such as nuclear medicine ventilation/perfusion scan may be obtained if clinically indicated.  Bilateral nodular groundglass lung opacities, likely infectious.                    < end of copied text >    CXR bilat infiltrates stable

## 2020-03-25 NOTE — PROGRESS NOTE ADULT - SUBJECTIVE AND OBJECTIVE BOX
Patient is a 39y old  Female who presents with a chief complaint of SOB, syncope (25 Mar 2020 07:25)      SUBJECTIVE / OVERNIGHT EVENTS:pt evaluated, no distress        Vital Signs Last 24 Hrs  T(C): 37.1 (25 Mar 2020 08:15), Max: 37.9 (24 Mar 2020 20:30)  T(F): 98.7 (25 Mar 2020 08:15), Max: 100.2 (24 Mar 2020 20:30)  HR: 80 (25 Mar 2020 10:00) (77 - 108)  BP: 102/61 (25 Mar 2020 10:00) (102/61 - 121/77)  BP(mean): 74 (25 Mar 2020 10:00) (74 - 94)  RR: 26 (25 Mar 2020 10:00) (22 - 30)  SpO2: 89% (25 Mar 2020 10:00) (89% - 97%)  I&O's Summary    24 Mar 2020 07:01  -  25 Mar 2020 07:00  --------------------------------------------------------  IN: 1050 mL / OUT: 900 mL / NET: 150 mL        PHYSICAL EXAM:  GENERAL: NAD, Comfortable    LABS:                        7.6    6.83  )-----------( 280      ( 25 Mar 2020 09:30 )             28.2     03-25    133<L>  |  102  |  8   ----------------------------<  116<H>  3.6   |  24  |  0.66    Ca    8.5      25 Mar 2020 09:29  Phos  3.9     03-25  Mg     2.0     03-25    TPro  6.9  /  Alb  2.6<L>  /  TBili  0.5  /  DBili  x   /  AST  17  /  ALT  <10<L>  /  AlkPhos  71  03-25      CAPILLARY BLOOD GLUCOSE                RADIOLOGY & ADDITIONAL TESTS:    Imaging Personally Reviewed:  [x] YES  [ ] NO    Consultant(s) Notes Reviewed:  [x] YES  [ ] NO      MEDICATIONS  (STANDING):  ascorbic acid 500 milliGRAM(s) Oral daily  azithromycin   Tablet 250 milliGRAM(s) Oral daily  budesonide 160 MICROgram(s)/formoterol 4.5 MICROgram(s) Inhaler 2 Puff(s) Inhalation two times a day  enoxaparin Injectable 30 milliGRAM(s) SubCutaneous daily  ferrous    sulfate 325 milliGRAM(s) Oral daily  lactobacillus acidophilus 1 Tablet(s) Oral three times a day  magnesium oxide 400 milliGRAM(s) Oral three times a day with meals  methimazole 10 milliGRAM(s) Oral three times a day  metoprolol tartrate 50 milliGRAM(s) Oral two times a day  sertraline 100 milliGRAM(s) Oral daily  traZODone 50 milliGRAM(s) Oral two times a day    MEDICATIONS  (PRN):  acetaminophen   Tablet .. 650 milliGRAM(s) Oral every 6 hours PRN Temp greater or equal to 38C (100.4F), Mild Pain (1 - 3)  guaiFENesin   Syrup  (Sugar-Free) 200 milliGRAM(s) Oral every 6 hours PRN Cough  LORazepam     Tablet 1 milliGRAM(s) Oral three times a day PRN Anxiety  traMADol 50 milliGRAM(s) Oral every 6 hours PRN Moderate Pain (4 - 6)      Care Discussed with Consultants/Other Providers [x] YES  [ ] NO    HEALTH ISSUES - PROBLEM Dx:  Asthma: Asthma  Anemia: Anemia  Syncope: Syncope  Pneumonia, viral: Pneumonia, viral  Coronavirus infection: Coronavirus infection  Suspected pulmonary embolism

## 2020-03-26 LAB
BASOPHILS # BLD AUTO: 0.03 K/UL — SIGNIFICANT CHANGE UP (ref 0–0.2)
BASOPHILS NFR BLD AUTO: 0.5 % — SIGNIFICANT CHANGE UP (ref 0–2)
EOSINOPHIL # BLD AUTO: 0 K/UL — SIGNIFICANT CHANGE UP (ref 0–0.5)
EOSINOPHIL NFR BLD AUTO: 0 % — SIGNIFICANT CHANGE UP (ref 0–6)
HCT VFR BLD CALC: 30.3 % — LOW (ref 34.5–45)
HGB BLD-MCNC: 8 G/DL — LOW (ref 11.5–15.5)
IMM GRANULOCYTES NFR BLD AUTO: 0.5 % — SIGNIFICANT CHANGE UP (ref 0–1.5)
LYMPHOCYTES # BLD AUTO: 0.78 K/UL — LOW (ref 1–3.3)
LYMPHOCYTES # BLD AUTO: 12.6 % — LOW (ref 13–44)
MCHC RBC-ENTMCNC: 17 PG — LOW (ref 27–34)
MCHC RBC-ENTMCNC: 26.4 GM/DL — LOW (ref 32–36)
MCV RBC AUTO: 64.5 FL — LOW (ref 80–100)
MONOCYTES # BLD AUTO: 0.76 K/UL — SIGNIFICANT CHANGE UP (ref 0–0.9)
MONOCYTES NFR BLD AUTO: 12.2 % — SIGNIFICANT CHANGE UP (ref 2–14)
NEUTROPHILS # BLD AUTO: 4.61 K/UL — SIGNIFICANT CHANGE UP (ref 1.8–7.4)
NEUTROPHILS NFR BLD AUTO: 74.2 % — SIGNIFICANT CHANGE UP (ref 43–77)
NRBC # BLD: 0 /100 WBCS — SIGNIFICANT CHANGE UP (ref 0–0)
PLATELET # BLD AUTO: 302 K/UL — SIGNIFICANT CHANGE UP (ref 150–400)
RBC # BLD: 4.7 M/UL — SIGNIFICANT CHANGE UP (ref 3.8–5.2)
RBC # FLD: 28.4 % — HIGH (ref 10.3–14.5)
WBC # BLD: 6.21 K/UL — SIGNIFICANT CHANGE UP (ref 3.8–10.5)
WBC # FLD AUTO: 6.21 K/UL — SIGNIFICANT CHANGE UP (ref 3.8–10.5)

## 2020-03-26 PROCEDURE — 71045 X-RAY EXAM CHEST 1 VIEW: CPT | Mod: 26

## 2020-03-26 PROCEDURE — 99233 SBSQ HOSP IP/OBS HIGH 50: CPT

## 2020-03-26 RX ADMIN — BUDESONIDE AND FORMOTEROL FUMARATE DIHYDRATE 2 PUFF(S): 160; 4.5 AEROSOL RESPIRATORY (INHALATION) at 21:20

## 2020-03-26 RX ADMIN — Medication 50 MILLIGRAM(S): at 18:12

## 2020-03-26 RX ADMIN — Medication 325 MILLIGRAM(S): at 12:05

## 2020-03-26 RX ADMIN — TRAMADOL HYDROCHLORIDE 50 MILLIGRAM(S): 50 TABLET ORAL at 16:41

## 2020-03-26 RX ADMIN — Medication 50 MILLIGRAM(S): at 05:24

## 2020-03-26 RX ADMIN — Medication 1 MILLIGRAM(S): at 21:21

## 2020-03-26 RX ADMIN — Medication 1 TABLET(S): at 16:46

## 2020-03-26 RX ADMIN — Medication 1 TABLET(S): at 21:21

## 2020-03-26 RX ADMIN — MAGNESIUM OXIDE 400 MG ORAL TABLET 400 MILLIGRAM(S): 241.3 TABLET ORAL at 12:05

## 2020-03-26 RX ADMIN — ENOXAPARIN SODIUM 30 MILLIGRAM(S): 100 INJECTION SUBCUTANEOUS at 12:04

## 2020-03-26 RX ADMIN — MAGNESIUM OXIDE 400 MG ORAL TABLET 400 MILLIGRAM(S): 241.3 TABLET ORAL at 16:45

## 2020-03-26 RX ADMIN — SERTRALINE 100 MILLIGRAM(S): 25 TABLET, FILM COATED ORAL at 12:04

## 2020-03-26 RX ADMIN — Medication 500 MILLIGRAM(S): at 12:05

## 2020-03-26 RX ADMIN — AZITHROMYCIN 250 MILLIGRAM(S): 500 TABLET, FILM COATED ORAL at 12:04

## 2020-03-26 RX ADMIN — BUDESONIDE AND FORMOTEROL FUMARATE DIHYDRATE 2 PUFF(S): 160; 4.5 AEROSOL RESPIRATORY (INHALATION) at 06:17

## 2020-03-26 RX ADMIN — TRAMADOL HYDROCHLORIDE 50 MILLIGRAM(S): 50 TABLET ORAL at 17:11

## 2020-03-26 RX ADMIN — Medication 200 MILLIGRAM(S): at 21:21

## 2020-03-26 RX ADMIN — Medication 1 TABLET(S): at 05:24

## 2020-03-26 NOTE — PROGRESS NOTE ADULT - ASSESSMENT
39F with hyperthyroidism on methimazole, afib on xarelto who presents with SOB/WEN and syncope.         Hypoxia//sob/COVID+  started on oxygen.  on hydorxycholoriquine and azithromycin  WILL TRY TO WEAN OFF AND DC HOME    Syncope  - likely anemia related 2/2 heavy period on xarelto  - s/p 1u pRBC transfusion  - refused  for 2nd U of PRBC.   Understand risk and bebefit of PRBC transfusion yesterday.  will f/u cbc tomorrow    Afib with anemia  - As per cardiology-   - Remain off of rivaroxaban. Patient seen by me in the past; unclear why  she been on anticoagulation given low thromboembolic risk with [id number] of 0 and previously stable TFTs. She can discuss further with her cardiologist as outpatient.  - Monitor hemoglobin  - Continue metoprolol tartrate 50 mg bid  - TTE    SOB/WEN  - +COVID  -> continue contact and airborne precautions  - ID consult  -zithromax.  - inh as needed, no nebs    Prolonged QT  resume   zoloft and trazadone.   cardio followinf    Hyperthyroidism  - cont with methimazole    Preventive measures  IMPROVE VTE Individual Risk Assessment          RISK                                                          Points  [  ] Previous VTE                                                 3  [  ] Thrombophilia                                              2  [  ] Lower limb paralysis                                    2        (unable to hold up >15 seconds)    [  ] Current Cancer                                             2         (within 6 months)  [  ] Immobilization > 24 hrs                              1  [  ] ICU/CCU stay > 24 hours                            1  [  ] Age > 60                                                        1    IMPROVE VTE Score 0    - currently bleeding, hold AC for now    Plan of care was discussed

## 2020-03-26 NOTE — PROGRESS NOTE ADULT - SUBJECTIVE AND OBJECTIVE BOX
Patient is a 39y old  Female who presents with a chief complaint of SOB, syncope (25 Mar 2020 14:28)      SUBJECTIVE / OVERNIGHT EVENTS: seen and evaluated, pendign H and H  will wean off from oxygebn to dc home        Vital Signs Last 24 Hrs  T(C): 37.1 (26 Mar 2020 00:08), Max: 37.1 (25 Mar 2020 08:15)  T(F): 98.8 (26 Mar 2020 00:08), Max: 98.8 (25 Mar 2020 12:00)  HR: 92 (26 Mar 2020 05:15) (77 - 93)  BP: 107/72 (26 Mar 2020 05:15) (100/72 - 107/72)  BP(mean): 74 (25 Mar 2020 10:00) (74 - 78)  RR: 24 (26 Mar 2020 00:08) (24 - 30)  SpO2: 94% (26 Mar 2020 00:08) (89% - 94%)  I&O's Summary      PHYSICAL EXAM:  GENERAL: NAD, Comfortable  HEAD:  Atraumatic, Normocephalic  EYES: EOMI, PERRLA, conjunctiva and sclera clear  NECK: Supple, No JVD  CHEST/LUNG: dec bs at abses  HEART: Regular rate and rhythm; No murmurs, rubs, or gallops  ABDOMEN: Soft, Nontender, Nondistended; Bowel sounds present    LABS:                        7.6    6.83  )-----------( 280      ( 25 Mar 2020 09:30 )             28.2     03-25    133<L>  |  102  |  8   ----------------------------<  116<H>  3.6   |  24  |  0.66    Ca    8.5      25 Mar 2020 09:29  Phos  3.9     03-25  Mg     2.0     03-25    TPro  6.9  /  Alb  2.6<L>  /  TBili  0.5  /  DBili  x   /  AST  17  /  ALT  <10<L>  /  AlkPhos  71  03-25      CAPILLARY BLOOD GLUCOSE                RADIOLOGY & ADDITIONAL TESTS:    Imaging Personally Reviewed:  [x] YES  [ ] NO    Consultant(s) Notes Reviewed:  [x] YES  [ ] NO      MEDICATIONS  (STANDING):  ascorbic acid 500 milliGRAM(s) Oral daily  azithromycin   Tablet 250 milliGRAM(s) Oral daily  budesonide 160 MICROgram(s)/formoterol 4.5 MICROgram(s) Inhaler 2 Puff(s) Inhalation two times a day  enoxaparin Injectable 30 milliGRAM(s) SubCutaneous daily  ferrous    sulfate 325 milliGRAM(s) Oral daily  lactobacillus acidophilus 1 Tablet(s) Oral three times a day  magnesium oxide 400 milliGRAM(s) Oral three times a day with meals  methimazole 10 milliGRAM(s) Oral three times a day  metoprolol tartrate 50 milliGRAM(s) Oral two times a day  sertraline 100 milliGRAM(s) Oral daily  traZODone 50 milliGRAM(s) Oral two times a day    MEDICATIONS  (PRN):  acetaminophen   Tablet .. 650 milliGRAM(s) Oral every 6 hours PRN Temp greater or equal to 38C (100.4F), Mild Pain (1 - 3)  guaiFENesin   Syrup  (Sugar-Free) 200 milliGRAM(s) Oral every 6 hours PRN Cough  LORazepam     Tablet 1 milliGRAM(s) Oral three times a day PRN Anxiety  traMADol 50 milliGRAM(s) Oral every 6 hours PRN Moderate Pain (4 - 6)      Care Discussed with Consultants/Other Providers [x] YES  [ ] NO    HEALTH ISSUES - PROBLEM Dx:  Asthma: Asthma  Anemia: Anemia  Syncope: Syncope  Pneumonia, viral: Pneumonia, viral  Coronavirus infection: Coronavirus infection  Suspected pulmonary embolism

## 2020-03-26 NOTE — PROGRESS NOTE ADULT - SUBJECTIVE AND OBJECTIVE BOX
Chief Complaint: Cough, shortness of breath    Interval Events: No events overnight.    Review of Systems:  General: No fevers, chills, weight loss or gain  Skin: No rashes, color changes  Cardiovascular: No chest pain, orthopnea  Respiratory: No shortness of breath, cough  Gastrointestinal: No nausea, abdominal pain  Genitourinary: No incontinence, pain with urination  Musculoskeletal: No pain, swelling, decreased range of motion  Neurological: No headache, weakness  Psychiatric: No depression, anxiety  Endocrine: No weight loss or gain, increased thirst  All other systems are comprehensively negative.    Physical Exam:  Vital Signs Last 24 Hrs  T(C): 36.8 (26 Mar 2020 08:49), Max: 37.1 (25 Mar 2020 12:00)  T(F): 98.2 (26 Mar 2020 08:49), Max: 98.8 (25 Mar 2020 12:00)  HR: 77 (26 Mar 2020 08:49) (77 - 93)  BP: 100/57 (26 Mar 2020 08:49) (100/57 - 107/72)  BP(mean): 74 (25 Mar 2020 10:00) (74 - 74)  RR: 22 (26 Mar 2020 08:49) (22 - 26)  SpO2: 88% (26 Mar 2020 08:49) (88% - 94%)  General: NAD  HEENT: MMM  Neck: No JVD, no carotid bruit  Lungs: CTAB  CV: RRR, nl S1/S2, no M/R/G  Abdomen: S/NT/ND, +BS  Extremities: No LE edema, no cyanosis  Neuro: AAOx3, non-focal  Skin: No rash    Labs:             03-25    133<L>  |  102  |  8   ----------------------------<  116<H>  3.6   |  24  |  0.66    Ca    8.5      25 Mar 2020 09:29  Phos  3.9     03-25  Mg     2.0     03-25    TPro  6.9  /  Alb  2.6<L>  /  TBili  0.5  /  DBili  x   /  AST  17  /  ALT  <10<L>  /  AlkPhos  71  03-25                        8.0    6.21  )-----------( 302      ( 26 Mar 2020 06:54 )             30.3       Telemetry: Sinus rhythm

## 2020-03-26 NOTE — PROGRESS NOTE ADULT - ASSESSMENT
Pt with syncope, anemia, severe wheezing, CT chest suggestive of COVID pneumonia.   Overall improved  Now on  zithromax.   Completed 5 days of plaquenil.        CXR shows bilat infil. which has been stable.   Some wheezing--underlying asthma.   Can continue symbicort.

## 2020-03-26 NOTE — PROGRESS NOTE ADULT - SUBJECTIVE AND OBJECTIVE BOX
PULMONARY/CRITICAL CARES  Still borderline sats.   Less sob, cough. Some chest wall discomfort from cough. Somewhat improved.   No  temps  Mild sob, wheeze, cough. Ambulated in room .   Completed hydroxycholorquine.    Sats ok.   Patient is a 39y old  Female who presents with a chief complaint of SOB, syncope (19 Mar 2020 20:01)    BRIEF HOSPITAL COURSE: ***39F with hyperthyroidism on methimazole, afib on xarelto who presents with SOB/WEN and syncope.  Symptoms started about a week ago with SOB.  Worse with exertion.  Associated with a cough with clear sputum.  Had no fevers.  Became progressively worse over the week.  Today, patient actually syncopized this morning and injured her lip.  No chest pain.  Patient denies any known sick contacts but her children are home from school with self-isolation ([facility]).  Reports having a positive case but unsure of whom.  No recent travel and patient was compliant with Xarelto (though patient said she stops it when she is on her period).  Currently is on her period (started two days ago) and experiences heavy flow.  In the ED, patient was found to be anemic with hgb of 6.6 and is being transfused 1u of pRBC.  CT chest was done that showed suboptimal pulmonary arterial enhancement, nondiagnostic study for PE and also found to have bilateral nodular groundglass lung opacities.  Was started on ceftriaxone and azithromycin empirically and is also being admitted for COVID rule out.    Wheezing. Was using albuterol only.  No recent travel.    Events last 24 hours: ***    PAST MEDICAL & SURGICAL HISTORY:  Smoker  Depression, unspecified depression type  History of anemia  Asthma  History of Hyperthyroidism  Atrial fibrillation  History of blood transfusion  S/P  Section: ,,    Allergies    Motrin (Hives)    Intolerances      FAMILY HISTORY/ social: Smokes 1/2 ppd for 15 yrs, no etoh  Family history of stomach cancer (Grandparent)  Family history of diabetes mellitus (Sibling, Grandparent)          Medications:  azithromycin  IVPB 500 milliGRAM(s) IV Intermittent every 24 hours  cefTRIAXone   IVPB 1000 milliGRAM(s) IV Intermittent every 24 hours  hydroxychloroquine 200 milliGRAM(s) Oral two times a day    metoprolol tartrate 50 milliGRAM(s) Oral two times a day    guaiFENesin   Syrup  (Sugar-Free) 200 milliGRAM(s) Oral every 6 hours PRN  tiotropium 2.5 MICROgram(s)/olodaterol 2.5 MICROgram(s) (STIOLTO) Inhaler 2 Puff(s) Inhalation daily    acetaminophen   Tablet .. 650 milliGRAM(s) Oral every 6 hours PRN  LORazepam     Tablet 1 milliGRAM(s) Oral three times a day PRN            methimazole 10 milliGRAM(s) Oral three times a day    ascorbic acid 500 milliGRAM(s) Oral daily  potassium chloride    Tablet ER 20 milliEquivalent(s) Oral once                ICU Vital Signs Last 24 Hrs  T(C): 37.8 (20 Mar 2020 06:20), Max: 37.9 (20 Mar 2020 00:45)  T(F): 100.1 (20 Mar 2020 06:20), Max: 100.2 (20 Mar 2020 00:45)  HR: 97 (20 Mar 2020 06:20) (80 - 112)  BP: 113/57 (20 Mar 2020 06:20) (105/61 - 140/87)  BP(mean): --  ABP: --  ABP(mean): --  RR: 16 (20 Mar 2020 04:00) (16 - 18)  SpO2: 93% (20 Mar 2020 04:00) (93% - 99%)    Vital Signs Last 24 Hrs  T(C): 37.8 (20 Mar 2020 06:20), Max: 37.9 (20 Mar 2020 00:45)  T(F): 100.1 (20 Mar 2020 06:20), Max: 100.2 (20 Mar 2020 00:45)  HR: 97 (20 Mar 2020 06:20) (80 - 112)  BP: 113/57 (20 Mar 2020 06:20) (105/61 - 140/87)  BP(mean): --  RR: 16 (20 Mar 2020 04:00) (16 - 18)  SpO2: 93% (20 Mar 2020 04:00) (93% - 99%)        I&O's Detail        LABS:                        6.6    5.41  )-----------( 191      ( 19 Mar 2020 15:44 )             25.6     03-20    137  |  105  |  7   ----------------------------<  91  3.1<L>   |  24  |  0.71    Ca    8.1<L>      20 Mar 2020 07:15    TPro  6.4  /  Alb  2.7<L>  /  TBili  0.6  /  DBili  x   /  AST  31  /  ALT  16  /  AlkPhos  74  03-20          CAPILLARY BLOOD GLUCOSE        PT/INR - ( 19 Mar 2020 15:44 )   PT: 12.6 sec;   INR: 1.13 ratio         PTT - ( 19 Mar 2020 15:44 )  PTT:36.4 sec    CULTURES:      Physical Examination:    General: mild sob wd female    HEENT: Pupils equal, reactive to light.  Symmetric.    PULM: decreased wheezing, rhonchi bilat no rales.     CVS: Regular rate and rhythm, no murmurs, rubs, or gallops    ABD: Soft, nondistended, nontender, normoactive bowel sounds, no masses    EXT: No edema, nontender    SKIN: Warm and well perfused, no rashes noted.    NEURO: Alert, oriented, interactive, nonfocal    RADIOLOGY: ***< from: CT Angio Chest w/ IV Cont (20 @ 17:26) >  EXAM:  CT ANGIO CHEST (W)AW IC                                  PROCEDURE DATE:  2020          INTERPRETATION:  CLINICAL INFORMATION: Shortness of breath    COMPARISON: None.    PROCEDURE:   CT Angiography of the Chest.  90 ml of Omnipaque 350 was injected intravenously. 10 ml were discarded.  Sagittal and coronal reformats were performed as well as 3D (MIP) reconstructions.      FINDINGS:    LUNGS AND AIRWAYS: Patent central airways.  Bilateral nodular groundglass opacities.    PLEURA: No pleural effusion.    MEDIASTINUM AND VIOLET: Mild adenopathy. Calcified lymph nodes.    VESSELS: No thoracic aortic aneurysm or dissection. Suboptimal pulmonary arterial enhancement. Nondiagnostic for evaluation of pulmonary emboli.    HEART: Heart size is normal. No pericardial effusion.    CHEST WALL AND LOWER NECK: Within normal limits.    VISUALIZED UPPER ABDOMEN: Hepatic and splenic calcified granulomas.    BONES: Degenerative changes.    IMPRESSION:     Suboptimal pulmonary arterial enhancement, nondiagnostic study for evaluation of pulmonary emboli.  Alternative study such as nuclear medicine ventilation/perfusion scan may be obtained if clinically indicated.  Bilateral nodular groundglass lung opacities, likely infectious.                    < end of copied text >    CXR bilat infiltrates stable

## 2020-03-26 NOTE — PROGRESS NOTE ADULT - SUBJECTIVE AND OBJECTIVE BOX
ROBBIE MCKNIGHT is a 39yFemale , patient examined and chart reviewed.     INTERVAL HPI/ OVERNIGHT EVENTS:   Feeling better. No distress.  Feels SOB. On 3L NC.    PAST MEDICAL & SURGICAL HISTORY:  Smoker  Depression, unspecified depression type  History of anemia  Asthma  History of Hyperthyroidism  Atrial fibrillation  History of blood transfusion  S/P  Section: ,,    For details regarding the patient's social history, family history, and other miscellaneous elements, please refer the initial infectious diseases consultation and/or the admitting history and physical examination for this admission.    ROS:  CONSTITUTIONAL: no fever or chills,   EYES:  Negative  blurry vision or double vision  CARDIOVASCULAR:  Negative for chest pain or palpitations  RESPIRATORY:  Negative for cough, wheezing,+ SOB   GASTROINTESTINAL:  Negative for nausea, vomiting, diarrhea, constipation, or abdominal pain  GENITOURINARY:  Negative frequency, urgency or dysuria  NEUROLOGIC:  No headache, confusion, dizziness, lightheadedness  All other systems were reviewed and are negative     ALLERGIES:  Motrin (Hives)    Current inpatient medications :    ANTIBIOTICS/RELEVANT:  azithromycin   Tablet 250 milliGRAM(s) Oral daily    MEDICATIONS  (STANDING):  ascorbic acid 500 milliGRAM(s) Oral daily  budesonide 160 MICROgram(s)/formoterol 4.5 MICROgram(s) Inhaler 2 Puff(s) Inhalation two times a day  enoxaparin Injectable 30 milliGRAM(s) SubCutaneous daily  ferrous    sulfate 325 milliGRAM(s) Oral daily  lactobacillus acidophilus 1 Tablet(s) Oral three times a day  magnesium oxide 400 milliGRAM(s) Oral three times a day with meals  methimazole 10 milliGRAM(s) Oral three times a day  metoprolol tartrate 50 milliGRAM(s) Oral two times a day  sertraline 100 milliGRAM(s) Oral daily  traZODone 50 milliGRAM(s) Oral two times a day    MEDICATIONS  (PRN):  acetaminophen   Tablet .. 650 milliGRAM(s) Oral every 6 hours PRN Temp greater or equal to 38C (100.4F), Mild Pain (1 - 3)  guaiFENesin   Syrup  (Sugar-Free) 200 milliGRAM(s) Oral every 6 hours PRN Cough  LORazepam     Tablet 1 milliGRAM(s) Oral three times a day PRN Anxiety  traMADol 50 milliGRAM(s) Oral every 6 hours PRN Moderate Pain (4 - 6)    Objective:  Vital Signs Last 24 Hrs  T(C): 36.7 (26 Mar 2020 16:52), Max: 37.1 (26 Mar 2020 00:08)  T(F): 98.1 (26 Mar 2020 16:52), Max: 98.8 (26 Mar 2020 00:08)  HR: 91 (26 Mar 2020 16:52) (77 - 93)  BP: 109/77 (26 Mar 2020 16:52) (100/57 - 109/77)  RR: 20 (26 Mar 2020 16:52) (20 - 24)  SpO2: 87% (26 Mar 2020 16:52) (87% - 94%)    Physical Exam:  General:  no acute distress Obese  Eyes: sclera anicteric, pupils equal and reactive to light  ENMT: buccal mucosa moist, pharynx not injected  Neck: supple, trachea midline  Lungs: Decreased no wheeze/rhonchi  Cardiovascular: regular rate and rhythm, S1 S2  Abdomen: soft, nontender, no organomegaly present, bowel sounds normal  Neurological: alert and oriented x3, Cranial Nerves II-XII grossly intact  Skin: no increased ecchymosis/petechiae/purpura  Lymph Nodes: no palpable cervical/supraclavicular lymph nodes enlargements  Extremities: no cyanosis/clubbing/edema      LABS:                  8.0    6.21  )-----------( 302      ( 26 Mar 2020 06:54 )             30.3   03-25    133<L>  |  102  |  8   ----------------------------<  116<H>  3.6   |  24  |  0.66    Ca    8.5      25 Mar 2020 09:29  Phos  3.9     03-  Mg     2.0     -    TPro  6.9  /  Alb  2.6<L>  /  TBili  0.5  /  DBili  x   /  AST  17  /  ALT  <10<L>  /  AlkPhos  71  -    MICROBIOLOGY:  COVID-19 PCR . (20 @ 23:21)    COVID-19 PCR: Detected: LDT - Laboratory Developed Test All “detected” results on this new test  are considered presumptively positive results, are clinically actionable,  and specimens will be forwarded to [facility] for confirmation testing.  Another report (corrected report) will only be issued if discordant  results occur.  This test has been validated by [employer] to be accurate;  though it has not been FDA cleared/approved by the usual pathway.  As with all laboratory tests, results should be correlated with clinical  findings.      Culture - Sputum (collected 20 Mar 2020 21:11)  Source: .Sputum Sputum  Gram Stain (20 Mar 2020 22:56):    No polymorphonuclear leukocytes per low power field    Few Squamous epithelial cells per low power field    Few Gram Negative Rods per oil power field    Few Gram positive cocci in pairs per oil power field  Preliminary Report (21 Mar 2020 17:55):    Normal Respiratory Juju present    Culture - Blood (collected 19 Mar 2020 22:06)  Source: .Blood Blood-Peripheral  Preliminary Report (20 Mar 2020 23:01):    No growth to date.    Culture - Blood (collected 19 Mar 2020 22:06)  Source: .Blood Blood-Peripheral  Preliminary Report (20 Mar 2020 23:01):    No growth to date.      RADIOLOGY & ADDITIONAL STUDIES:  EXAM:  XR CHEST PORTABLE ROUTINE 1V                          PROCEDURE DATE:  2020          INTERPRETATION:  Clinical information: COVID-19 pneumonia.    TECHNIQUE: Frontal view of the chest.    COMPARISON: Prior chest x-ray examination from 3/25/2020.    FINDINGS: Patchy bilateral interstitial opacities appear similar to the prior chest x-ray examination. The heart size is normal. The visualized osseous structures are unremarkable.    Assessment :   39F with hyperthyroidism on methimazole, afib on xarelto admitted with teresa pneumonia sec COVID 19. Anemia sec menses. Asthma exacerbation stable. Fevers better. Respiratory status stable. Still hypoxic. Overall stable    Plan :   Supportive care  Completed Hydroxycloroquine x 5 days  On Zithromax per pulm- consider stopping  Trend temps and cbc with diff  LDH, CRP ferritin  COVID-19 is a viral illness and as expected and as we are seeing presents as a viral illness with full spectrum of presentations. First week of illness generally less severe followed by critical period where patients start to improve or decompensate and require intubation (7-10 days post symptom onset) and felt to be due to a maladaptive immune response in the setting of decreased viral titers. Clinical course ranging from 2-8 weeks. Do recommend continued strict isolation to minimize risk to staff, avoid aerosolizing procedures, avoidance of steroids which are associated with worse outcomes including inhaled steroids, and with no compelling evidence to date do not recommend antiviral therapies outside of established protocols or controlled trials would support focus on supportive care and avoid interventions with no demonstrated efficacy and unclear adverse effect profile in context of COVID-19    D/w Hospitalist    Continue with present regiment.  Appropriate use of antibiotics and adverse effects reviewed.      I have discussed the above plan of care with patient/ family in detail. They expressed understanding of the  treatment plan . Risks, benefits and alternatives discussed in detail. I have asked if they have any questions or concerns and appropriately addressed them to the best of my ability .    > 35 minutes were spent in direct patient care reviewing notes, medications ,labs data/ imaging , discussion with multidisciplinary team.    Thank you for allowing me to participate in care of your patient .    Luis Alberto Farley MD  Infectious Disease  [phone]

## 2020-03-26 NOTE — PROGRESS NOTE ADULT - ASSESSMENT
The patient is a 39 year old female with a history of anemia, paroxysmal atrial fibrillation, hyperthyroidism who presents with shortness of breath and cough in the setting of anemia and likely COVID-19.    Plan:  - Remain off of rivaroxaban. Patient seen by me in the past; unclear why she she been on anticoagulation given low thromboembolic risk with [id number] of 0 and previously stable TFTs. She can discuss further with her cardiologist as outpatient.  - Monitor hemoglobin  - Continue metoprolol tartrate 50 mg bid  - COVID-19 positive  - Continues to need O2 supplementation

## 2020-03-27 ENCOUNTER — TRANSCRIPTION ENCOUNTER (OUTPATIENT)
Age: 40
End: 2020-03-27

## 2020-03-27 VITALS
SYSTOLIC BLOOD PRESSURE: 96 MMHG | DIASTOLIC BLOOD PRESSURE: 65 MMHG | HEART RATE: 73 BPM | TEMPERATURE: 99 F | OXYGEN SATURATION: 94 % | RESPIRATION RATE: 16 BRPM

## 2020-03-27 LAB — CRP SERPL-MCNC: 9.58 MG/DL — HIGH (ref 0–0.4)

## 2020-03-27 PROCEDURE — 87486 CHLMYD PNEUM DNA AMP PROBE: CPT

## 2020-03-27 PROCEDURE — 87040 BLOOD CULTURE FOR BACTERIA: CPT

## 2020-03-27 PROCEDURE — 87070 CULTURE OTHR SPECIMN AEROBIC: CPT

## 2020-03-27 PROCEDURE — 71275 CT ANGIOGRAPHY CHEST: CPT

## 2020-03-27 PROCEDURE — 82550 ASSAY OF CK (CPK): CPT

## 2020-03-27 PROCEDURE — 71045 X-RAY EXAM CHEST 1 VIEW: CPT

## 2020-03-27 PROCEDURE — 85652 RBC SED RATE AUTOMATED: CPT

## 2020-03-27 PROCEDURE — 82728 ASSAY OF FERRITIN: CPT

## 2020-03-27 PROCEDURE — 93005 ELECTROCARDIOGRAM TRACING: CPT

## 2020-03-27 PROCEDURE — 86140 C-REACTIVE PROTEIN: CPT

## 2020-03-27 PROCEDURE — 87633 RESP VIRUS 12-25 TARGETS: CPT

## 2020-03-27 PROCEDURE — 86923 COMPATIBILITY TEST ELECTRIC: CPT

## 2020-03-27 PROCEDURE — 85027 COMPLETE CBC AUTOMATED: CPT

## 2020-03-27 PROCEDURE — 36415 COLL VENOUS BLD VENIPUNCTURE: CPT

## 2020-03-27 PROCEDURE — 80053 COMPREHEN METABOLIC PANEL: CPT

## 2020-03-27 PROCEDURE — 86850 RBC ANTIBODY SCREEN: CPT

## 2020-03-27 PROCEDURE — 80048 BASIC METABOLIC PNL TOTAL CA: CPT

## 2020-03-27 PROCEDURE — 87798 DETECT AGENT NOS DNA AMP: CPT

## 2020-03-27 PROCEDURE — 84145 PROCALCITONIN (PCT): CPT

## 2020-03-27 PROCEDURE — 99285 EMERGENCY DEPT VISIT HI MDM: CPT | Mod: 25

## 2020-03-27 PROCEDURE — 87631 RESP VIRUS 3-5 TARGETS: CPT

## 2020-03-27 PROCEDURE — 94640 AIRWAY INHALATION TREATMENT: CPT

## 2020-03-27 PROCEDURE — 99239 HOSP IP/OBS DSCHRG MGMT >30: CPT

## 2020-03-27 PROCEDURE — 70450 CT HEAD/BRAIN W/O DYE: CPT

## 2020-03-27 PROCEDURE — 84702 CHORIONIC GONADOTROPIN TEST: CPT

## 2020-03-27 PROCEDURE — 83615 LACTATE (LD) (LDH) ENZYME: CPT

## 2020-03-27 PROCEDURE — 71045 X-RAY EXAM CHEST 1 VIEW: CPT | Mod: 26

## 2020-03-27 PROCEDURE — 86901 BLOOD TYPING SEROLOGIC RH(D): CPT

## 2020-03-27 PROCEDURE — 85730 THROMBOPLASTIN TIME PARTIAL: CPT

## 2020-03-27 PROCEDURE — 93306 TTE W/DOPPLER COMPLETE: CPT

## 2020-03-27 PROCEDURE — 87581 M.PNEUMON DNA AMP PROBE: CPT

## 2020-03-27 PROCEDURE — 84100 ASSAY OF PHOSPHORUS: CPT

## 2020-03-27 PROCEDURE — 86900 BLOOD TYPING SEROLOGIC ABO: CPT

## 2020-03-27 PROCEDURE — U0001: CPT

## 2020-03-27 PROCEDURE — 84443 ASSAY THYROID STIM HORMONE: CPT

## 2020-03-27 PROCEDURE — 85379 FIBRIN DEGRADATION QUANT: CPT

## 2020-03-27 PROCEDURE — P9016: CPT

## 2020-03-27 PROCEDURE — 83605 ASSAY OF LACTIC ACID: CPT

## 2020-03-27 PROCEDURE — 85610 PROTHROMBIN TIME: CPT

## 2020-03-27 PROCEDURE — 83735 ASSAY OF MAGNESIUM: CPT

## 2020-03-27 RX ORDER — METOPROLOL TARTRATE 50 MG
1 TABLET ORAL
Qty: 0 | Refills: 0 | DISCHARGE

## 2020-03-27 RX ORDER — METHIMAZOLE 10 MG/1
1 TABLET ORAL
Qty: 90 | Refills: 0
Start: 2020-03-27 | End: 2020-04-25

## 2020-03-27 RX ORDER — METOPROLOL TARTRATE 50 MG
1 TABLET ORAL
Qty: 60 | Refills: 0
Start: 2020-03-27

## 2020-03-27 RX ORDER — RIVAROXABAN 15 MG-20MG
1 KIT ORAL
Qty: 0 | Refills: 0 | DISCHARGE

## 2020-03-27 RX ORDER — TRAZODONE HCL 50 MG
1 TABLET ORAL
Qty: 0 | Refills: 0 | DISCHARGE

## 2020-03-27 RX ORDER — SERTRALINE 25 MG/1
1 TABLET, FILM COATED ORAL
Qty: 0 | Refills: 0 | DISCHARGE

## 2020-03-27 RX ORDER — TRAZODONE HCL 50 MG
1 TABLET ORAL
Qty: 30 | Refills: 0
Start: 2020-03-27

## 2020-03-27 RX ADMIN — BUDESONIDE AND FORMOTEROL FUMARATE DIHYDRATE 2 PUFF(S): 160; 4.5 AEROSOL RESPIRATORY (INHALATION) at 06:30

## 2020-03-27 RX ADMIN — SERTRALINE 100 MILLIGRAM(S): 25 TABLET, FILM COATED ORAL at 13:38

## 2020-03-27 RX ADMIN — MAGNESIUM OXIDE 400 MG ORAL TABLET 400 MILLIGRAM(S): 241.3 TABLET ORAL at 12:59

## 2020-03-27 RX ADMIN — AZITHROMYCIN 250 MILLIGRAM(S): 500 TABLET, FILM COATED ORAL at 13:39

## 2020-03-27 RX ADMIN — MAGNESIUM OXIDE 400 MG ORAL TABLET 400 MILLIGRAM(S): 241.3 TABLET ORAL at 13:40

## 2020-03-27 RX ADMIN — Medication 50 MILLIGRAM(S): at 06:19

## 2020-03-27 RX ADMIN — ENOXAPARIN SODIUM 30 MILLIGRAM(S): 100 INJECTION SUBCUTANEOUS at 13:40

## 2020-03-27 RX ADMIN — Medication 1 TABLET(S): at 06:18

## 2020-03-27 RX ADMIN — Medication 500 MILLIGRAM(S): at 13:38

## 2020-03-27 RX ADMIN — Medication 1 TABLET(S): at 13:39

## 2020-03-27 RX ADMIN — Medication 325 MILLIGRAM(S): at 13:39

## 2020-03-27 NOTE — PROGRESS NOTE ADULT - REASON FOR ADMISSION
SOB, syncope

## 2020-03-27 NOTE — PROGRESS NOTE ADULT - ASSESSMENT
39F with hyperthyroidism on methimazole, afib on xarelto who presents with SOB/WEN and syncope.         Hypoxia//sob/COVID+  started on oxygen.  on hydorxycholoriquine and azithromycin  WILL TRY TO WEAN OFF AND DC HOME 3/28    Syncope  - likely anemia related 2/2 heavy period on xarelto  - s/p 1u pRBC transfusion  - refused  for 2nd U of PRBC.   Understand risk and bebefit of PRBC transfusion yesterday.  will f/u cbc tomorrow    Afib with anemia  - As per cardiology-   - Remain off of rivaroxaban. Patient seen by me in the past; unclear why  she been on anticoagulation given low thromboembolic risk with [id number] of 0 and previously stable TFTs. She can discuss further with her cardiologist as outpatient.  - Monitor hemoglobin  - Continue metoprolol tartrate 50 mg bid  - TTE    SOB/WEN  - +COVID  -> continue contact and airborne precautions  - ID consult  -zithromax.  - inh as needed, no nebs    Prolonged QT  resume   zoloft and trazadone.   cardio followinf    Hyperthyroidism  - cont with methimazole    Preventive measures  IMPROVE VTE Individual Risk Assessment          RISK                                                          Points  [  ] Previous VTE                                                 3  [  ] Thrombophilia                                              2  [  ] Lower limb paralysis                                    2        (unable to hold up >15 seconds)    [  ] Current Cancer                                             2         (within 6 months)  [  ] Immobilization > 24 hrs                              1  [  ] ICU/CCU stay > 24 hours                            1  [  ] Age > 60                                                        1    IMPROVE VTE Score 0    - currently bleeding, hold AC for now    Plan of care was discussed

## 2020-03-27 NOTE — PROGRESS NOTE ADULT - SUBJECTIVE AND OBJECTIVE BOX
Patient is a 39y old  Female who presents with a chief complaint of SOB, syncope (27 Mar 2020 10:59)      SUBJECTIVE / OVERNIGHT EVENTS: pt seen and evaluated, still desating to 87% on room air, will dc in am        Vital Signs Last 24 Hrs  T(C): 37.2 (27 Mar 2020 09:12), Max: 37.2 (27 Mar 2020 09:12)  T(F): 98.9 (27 Mar 2020 09:12), Max: 98.9 (27 Mar 2020 09:12)  HR: 73 (27 Mar 2020 09:12) (73 - 93)  BP: 96/65 (27 Mar 2020 09:12) (96/65 - 109/77)  BP(mean): --  RR: 16 (27 Mar 2020 09:12) (16 - 20)  SpO2: 94% (27 Mar 2020 09:12) (87% - 96%)  I&O's Summary      PHYSICAL EXAM:  GENERAL: NAD, Comfortable  HEAD:  Atraumatic, Normocephalic  EYES: EOMI, PERRLA, conjunctiva and sclera clear  NECK: Supple, No JVD  CHEST/LUNG: Clear to auscultation bilaterally; No wheeze  HEART: Regular rate and rhythm; No murmurs, rubs, or gallops  ABDOMEN: Soft, Nontender, Nondistended; Bowel sounds present  Neuro: AAO x 3, no focal deficit, 5/5 b/l extremities  EXTREMITIES:  2+ Peripheral Pulses, No clubbing, cyanosis, or edema  SKIN: No rashes or lesions    LABS:                        8.0    6.21  )-----------( 302      ( 26 Mar 2020 06:54 )             30.3             CAPILLARY BLOOD GLUCOSE                RADIOLOGY & ADDITIONAL TESTS:    Imaging Personally Reviewed:  [x] YES  [ ] NO    Consultant(s) Notes Reviewed:  [x] YES  [ ] NO      MEDICATIONS  (STANDING):  ascorbic acid 500 milliGRAM(s) Oral daily  azithromycin   Tablet 250 milliGRAM(s) Oral daily  budesonide 160 MICROgram(s)/formoterol 4.5 MICROgram(s) Inhaler 2 Puff(s) Inhalation two times a day  enoxaparin Injectable 30 milliGRAM(s) SubCutaneous daily  ferrous    sulfate 325 milliGRAM(s) Oral daily  lactobacillus acidophilus 1 Tablet(s) Oral three times a day  magnesium oxide 400 milliGRAM(s) Oral three times a day with meals  methimazole 10 milliGRAM(s) Oral three times a day  metoprolol tartrate 50 milliGRAM(s) Oral two times a day  sertraline 100 milliGRAM(s) Oral daily  traZODone 50 milliGRAM(s) Oral two times a day    MEDICATIONS  (PRN):  acetaminophen   Tablet .. 650 milliGRAM(s) Oral every 6 hours PRN Temp greater or equal to 38C (100.4F), Mild Pain (1 - 3)  guaiFENesin   Syrup  (Sugar-Free) 200 milliGRAM(s) Oral every 6 hours PRN Cough  LORazepam     Tablet 1 milliGRAM(s) Oral three times a day PRN Anxiety  traMADol 50 milliGRAM(s) Oral every 6 hours PRN Moderate Pain (4 - 6)      Care Discussed with Consultants/Other Providers [x] YES  [ ] NO    HEALTH ISSUES - PROBLEM Dx:  Asthma: Asthma  Anemia: Anemia  Syncope: Syncope  Pneumonia, viral: Pneumonia, viral  Coronavirus infection: Coronavirus infection  Suspected pulmonary embolism

## 2020-03-27 NOTE — DISCHARGE NOTE NURSING/CASE MANAGEMENT/SOCIAL WORK - PATIENT PORTAL LINK FT
You can access the FollowMyHealth Patient Portal offered by [facility] by registering at the following website: [email or web address]. By joining [facility]’s FollowMyHealth portal, you will also be able to view your health information using other applications (apps) compatible with our system.

## 2020-03-27 NOTE — PROGRESS NOTE ADULT - SUBJECTIVE AND OBJECTIVE BOX
PULMONARY/CRITICAL CARES  Still borderline sats. Hi CRP, but clinically looks better.  Less sob, cough. Somewhat improved.   No  temps  Mild sob, less  wheeze, cough. Ambulated in room .   Completed hydroxycholorquine.     Patient is a 39y old  Female who presents with a chief complaint of SOB, syncope (19 Mar 2020 20:01)    BRIEF HOSPITAL COURSE: ***39F with hyperthyroidism on methimazole, afib on xarelto who presents with SOB/WEN and syncope.  Symptoms started about a week ago with SOB.  Worse with exertion.  Associated with a cough with clear sputum.  Had no fevers.  Became progressively worse over the week.  Today, patient actually syncopized this morning and injured her lip.  No chest pain.  Patient denies any known sick contacts but her children are home from school with self-isolation ([facility]).  Reports having a positive case but unsure of whom.  No recent travel and patient was compliant with Xarelto (though patient said she stops it when she is on her period).  Currently is on her period (started two days ago) and experiences heavy flow.  In the ED, patient was found to be anemic with hgb of 6.6 and is being transfused 1u of pRBC.  CT chest was done that showed suboptimal pulmonary arterial enhancement, nondiagnostic study for PE and also found to have bilateral nodular groundglass lung opacities.  Was started on ceftriaxone and azithromycin empirically and is also being admitted for COVID rule out.    Wheezing. Was using albuterol only.  No recent travel.    Events last 24 hours: ***    PAST MEDICAL & SURGICAL HISTORY:  Smoker  Depression, unspecified depression type  History of anemia  Asthma  History of Hyperthyroidism  Atrial fibrillation  History of blood transfusion  S/P  Section: ,,    Allergies    Motrin (Hives)    Intolerances      FAMILY HISTORY/ social: Smokes 1/2 ppd for 15 yrs, no etoh  Family history of stomach cancer (Grandparent)  Family history of diabetes mellitus (Sibling, Grandparent)          Medications:  azithromycin  IVPB 500 milliGRAM(s) IV Intermittent every 24 hours  cefTRIAXone   IVPB 1000 milliGRAM(s) IV Intermittent every 24 hours  hydroxychloroquine 200 milliGRAM(s) Oral two times a day    metoprolol tartrate 50 milliGRAM(s) Oral two times a day    guaiFENesin   Syrup  (Sugar-Free) 200 milliGRAM(s) Oral every 6 hours PRN  tiotropium 2.5 MICROgram(s)/olodaterol 2.5 MICROgram(s) (STIOLTO) Inhaler 2 Puff(s) Inhalation daily    acetaminophen   Tablet .. 650 milliGRAM(s) Oral every 6 hours PRN  LORazepam     Tablet 1 milliGRAM(s) Oral three times a day PRN            methimazole 10 milliGRAM(s) Oral three times a day    ascorbic acid 500 milliGRAM(s) Oral daily  potassium chloride    Tablet ER 20 milliEquivalent(s) Oral once                ICU Vital Signs Last 24 Hrs  T(C): 37.8 (20 Mar 2020 06:20), Max: 37.9 (20 Mar 2020 00:45)  T(F): 100.1 (20 Mar 2020 06:20), Max: 100.2 (20 Mar 2020 00:45)  HR: 97 (20 Mar 2020 06:20) (80 - 112)  BP: 113/57 (20 Mar 2020 06:20) (105/61 - 140/87)  BP(mean): --  ABP: --  ABP(mean): --  RR: 16 (20 Mar 2020 04:00) (16 - 18)  SpO2: 93% (20 Mar 2020 04:00) (93% - 99%)    Vital Signs Last 24 Hrs  T(C): 37.8 (20 Mar 2020 06:20), Max: 37.9 (20 Mar 2020 00:45)  T(F): 100.1 (20 Mar 2020 06:20), Max: 100.2 (20 Mar 2020 00:45)  HR: 97 (20 Mar 2020 06:20) (80 - 112)  BP: 113/57 (20 Mar 2020 06:20) (105/61 - 140/87)  BP(mean): --  RR: 16 (20 Mar 2020 04:00) (16 - 18)  SpO2: 93% (20 Mar 2020 04:00) (93% - 99%)        I&O's Detail        LABS:                        6.6    5.41  )-----------( 191      ( 19 Mar 2020 15:44 )             25.6     03-20    137  |  105  |  7   ----------------------------<  91  3.1<L>   |  24  |  0.71    Ca    8.1<L>      20 Mar 2020 07:15    TPro  6.4  /  Alb  2.7<L>  /  TBili  0.6  /  DBili  x   /  AST  31  /  ALT  16  /  AlkPhos  74  03-20          CAPILLARY BLOOD GLUCOSE        PT/INR - ( 19 Mar 2020 15:44 )   PT: 12.6 sec;   INR: 1.13 ratio         PTT - ( 19 Mar 2020 15:44 )  PTT:36.4 sec    CULTURES:      Physical Examination:    General: mild sob wd female    HEENT: Pupils equal, reactive to light.  Symmetric.    PULM: decreased wheezing, rhonchi bilat no rales.     CVS: Regular rate and rhythm, no murmurs, rubs, or gallops    ABD: Soft, nondistended, nontender, normoactive bowel sounds, no masses    EXT: No edema, nontender    SKIN: Warm and well perfused, no rashes noted.    NEURO: Alert, oriented, interactive, nonfocal    RADIOLOGY: ***< from: CT Angio Chest w/ IV Cont (20 @ 17:26) >  EXAM:  CT ANGIO CHEST (W)AW IC                                  PROCEDURE DATE:  2020          INTERPRETATION:  CLINICAL INFORMATION: Shortness of breath    COMPARISON: None.    PROCEDURE:   CT Angiography of the Chest.  90 ml of Omnipaque 350 was injected intravenously. 10 ml were discarded.  Sagittal and coronal reformats were performed as well as 3D (MIP) reconstructions.      FINDINGS:    LUNGS AND AIRWAYS: Patent central airways.  Bilateral nodular groundglass opacities.    PLEURA: No pleural effusion.    MEDIASTINUM AND VIOLET: Mild adenopathy. Calcified lymph nodes.    VESSELS: No thoracic aortic aneurysm or dissection. Suboptimal pulmonary arterial enhancement. Nondiagnostic for evaluation of pulmonary emboli.    HEART: Heart size is normal. No pericardial effusion.    CHEST WALL AND LOWER NECK: Within normal limits.    VISUALIZED UPPER ABDOMEN: Hepatic and splenic calcified granulomas.    BONES: Degenerative changes.    IMPRESSION:     Suboptimal pulmonary arterial enhancement, nondiagnostic study for evaluation of pulmonary emboli.  Alternative study such as nuclear medicine ventilation/perfusion scan may be obtained if clinically indicated.  Bilateral nodular groundglass lung opacities, likely infectious.                    < end of copied text >    CXR bilat infiltrates improved

## 2020-03-27 NOTE — PROGRESS NOTE ADULT - SUBJECTIVE AND OBJECTIVE BOX
Chief Complaint: Cough, shortness of breath    Interval Events: No events overnight.    Review of Systems:  General: No fevers, chills, weight loss or gain  Skin: No rashes, color changes  Cardiovascular: No chest pain, orthopnea  Respiratory: No shortness of breath, cough  Gastrointestinal: No nausea, abdominal pain  Genitourinary: No incontinence, pain with urination  Musculoskeletal: No pain, swelling, decreased range of motion  Neurological: No headache, weakness  Psychiatric: No depression, anxiety  Endocrine: No weight loss or gain, increased thirst  All other systems are comprehensively negative.    Physical Exam:  Vital Signs Last 24 Hrs  T(C): 37.2 (27 Mar 2020 09:12), Max: 37.2 (27 Mar 2020 09:12)  T(F): 98.9 (27 Mar 2020 09:12), Max: 98.9 (27 Mar 2020 09:12)  HR: 73 (27 Mar 2020 09:12) (73 - 93)  BP: 96/65 (27 Mar 2020 09:12) (96/65 - 109/77)  BP(mean): --  RR: 16 (27 Mar 2020 09:12) (16 - 20)  SpO2: 94% (27 Mar 2020 09:12) (87% - 96%)  General: NAD  HEENT: MMM  Neck: No JVD, no carotid bruit  Lungs: CTAB  CV: RRR, nl S1/S2, no M/R/G  Abdomen: S/NT/ND, +BS  Extremities: No LE edema, no cyanosis  Neuro: AAOx3, non-focal  Skin: No rash    Labs:                                   8.0    6.21  )-----------( 302      ( 26 Mar 2020 06:54 )             30.3       Telemetry: Sinus rhythm

## 2020-03-27 NOTE — PROGRESS NOTE ADULT - ASSESSMENT
Pt with syncope, anemia, severe wheezing, CT chest suggestive of COVID pneumonia.   Overall improved slowly  Now on  zithromax.   Completed 5 days of plaquenil.        CXR shows bilat infil. which has been stable.   Some wheezing--underlying asthma.   Can continue symbicort.

## 2020-03-27 NOTE — PROGRESS NOTE ADULT - SUBJECTIVE AND OBJECTIVE BOX
ROBBIE MCKNIGHT is a 39yFemale , patient examined and chart reviewed.     INTERVAL HPI/ OVERNIGHT EVENTS:   Feels well. No distress.    PAST MEDICAL & SURGICAL HISTORY:  Smoker  Depression, unspecified depression type  History of anemia  Asthma  History of Hyperthyroidism  Atrial fibrillation  History of blood transfusion  S/P  Section: ,,    For details regarding the patient's social history, family history, and other miscellaneous elements, please refer the initial infectious diseases consultation and/or the admitting history and physical examination for this admission.    ROS:  CONSTITUTIONAL: no fever or chills,   EYES:  Negative  blurry vision or double vision  CARDIOVASCULAR:  Negative for chest pain or palpitations  RESPIRATORY:  Negative for cough, wheezing, SOB   GASTROINTESTINAL:  Negative for nausea, vomiting, diarrhea, constipation, or abdominal pain  GENITOURINARY:  Negative frequency, urgency or dysuria  NEUROLOGIC:  No headache, confusion, dizziness, lightheadedness  All other systems were reviewed and are negative     ALLERGIES:  Motrin (Hives)    Current inpatient medications :    ANTIBIOTICS/RELEVANT:  azithromycin   Tablet 250 milliGRAM(s) Oral daily    MEDICATIONS  (STANDING):  ascorbic acid 500 milliGRAM(s) Oral daily  budesonide 160 MICROgram(s)/formoterol 4.5 MICROgram(s) Inhaler 2 Puff(s) Inhalation two times a day  enoxaparin Injectable 30 milliGRAM(s) SubCutaneous daily  ferrous    sulfate 325 milliGRAM(s) Oral daily  lactobacillus acidophilus 1 Tablet(s) Oral three times a day  magnesium oxide 400 milliGRAM(s) Oral three times a day with meals  methimazole 10 milliGRAM(s) Oral three times a day  metoprolol tartrate 50 milliGRAM(s) Oral two times a day  sertraline 100 milliGRAM(s) Oral daily  traZODone 50 milliGRAM(s) Oral two times a day    MEDICATIONS  (PRN):  acetaminophen   Tablet .. 650 milliGRAM(s) Oral every 6 hours PRN Temp greater or equal to 38C (100.4F), Mild Pain (1 - 3)  guaiFENesin   Syrup  (Sugar-Free) 200 milliGRAM(s) Oral every 6 hours PRN Cough  LORazepam     Tablet 1 milliGRAM(s) Oral three times a day PRN Anxiety  traMADol 50 milliGRAM(s) Oral every 6 hours PRN Moderate Pain (4 - 6)      Objective:  Vital Signs Last 24 Hrs  T(C): 37.2 (27 Mar 2020 09:12), Max: 37.2 (27 Mar 2020 09:12)  T(F): 98.9 (27 Mar 2020 09:12), Max: 98.9 (27 Mar 2020 09:12)  HR: 73 (27 Mar 2020 09:12) (73 - 93)  BP: 96/65 (27 Mar 2020 09:12) (96/65 - 109/77)  RR: 16 (27 Mar 2020 09:12) (16 - 20)  SpO2: 94% (27 Mar 2020 09:12) (87% - 96%)    Physical Exam:  General:  no acute distress Obese  Eyes: sclera anicteric, pupils equal and reactive to light  ENMT: buccal mucosa moist, pharynx not injected  Neck: supple, trachea midline  Lungs: Decreased no wheeze/rhonchi  Cardiovascular: regular rate and rhythm, S1 S2  Abdomen: soft, nontender, no organomegaly present, bowel sounds normal  Neurological: alert and oriented x3, Cranial Nerves II-XII grossly intact  Skin: no increased ecchymosis/petechiae/purpura  Lymph Nodes: no palpable cervical/supraclavicular lymph nodes enlargements  Extremities: no cyanosis/clubbing/edema      LABS:                             8.0    6.21  )-----------( 302      ( 26 Mar 2020 06:54 )             30.3     MICROBIOLOGY:  COVID-19 PCR . (20 @ 23:21)    COVID-19 PCR: Detected: LDT - Laboratory Developed Test All “detected” results on this new test  are considered presumptively positive results, are clinically actionable,  and specimens will be forwarded to CDC for confirmation testing.  Another report (corrected report) will only be issued if discordant  results occur.  This test has been validated by [employer] to be accurate;  though it has not been FDA cleared/approved by the usual pathway.  As with all laboratory tests, results should be correlated with clinical  findings.    Culture - Sputum (collected 20 Mar 2020 21:11)  Source: .Sputum Sputum  Gram Stain (20 Mar 2020 22:56):    No polymorphonuclear leukocytes per low power field    Few Squamous epithelial cells per low power field    Few Gram Negative Rods per oil power field    Few Gram positive cocci in pairs per oil power field  Preliminary Report (21 Mar 2020 17:55):    Normal Respiratory Juju present    Culture - Blood (collected 19 Mar 2020 22:06)  Source: .Blood Blood-Peripheral  Preliminary Report (20 Mar 2020 23:01):    No growth to date.    Culture - Blood (collected 19 Mar 2020 22:06)  Source: .Blood Blood-Peripheral  Preliminary Report (20 Mar 2020 23:01):    No growth to date.      RADIOLOGY & ADDITIONAL STUDIES:  EXAM:  XR CHEST PORTABLE ROUTINE 1V                          PROCEDURE DATE:  2020          INTERPRETATION:  Clinical information: COVID-19 pneumonia.    TECHNIQUE: Frontal view of the chest.    COMPARISON: Prior chest x-ray examination from 3/25/2020.    FINDINGS: Patchy bilateral interstitial opacities appear similar to the prior chest x-ray examination. The heart size is normal. The visualized osseous structures are unremarkable.    Assessment :   39F with hyperthyroidism on methimazole, afib on xarelto admitted with teresa pneumonia sec COVID 19. Anemia sec menses. Asthma exacerbation stable. Fevers better. Respiratory status stable. Still hypoxic. Overall stable    Plan :   Supportive care  Completed Hydroxycloroquine x 5 days  On Zithromax per pulm- consider stopping  Dc home to self quarantine  Fu PMD    COVID-19 is a viral illness and as expected and as we are seeing presents as a viral illness with full spectrum of presentations. First week of illness generally less severe followed by critical period where patients start to improve or decompensate and require intubation (7-10 days post symptom onset) and felt to be due to a maladaptive immune response in the setting of decreased viral titers. Clinical course ranging from 2-8 weeks. Do recommend continued strict isolation to minimize risk to staff, avoid aerosolizing procedures, avoidance of steroids which are associated with worse outcomes including inhaled steroids, and with no compelling evidence to date do not recommend antiviral therapies outside of established protocols or controlled trials would support focus on supportive care and avoid interventions with no demonstrated efficacy and unclear adverse effect profile in context of COVID-19    D/w Hospitalist    Continue with present regiment.  Appropriate use of antibiotics and adverse effects reviewed.      I have discussed the above plan of care with patient/ family in detail. They expressed understanding of the  treatment plan . Risks, benefits and alternatives discussed in detail. I have asked if they have any questions or concerns and appropriately addressed them to the best of my ability .    > 35 minutes were spent in direct patient care reviewing notes, medications ,labs data/ imaging , discussion with multidisciplinary team.    Thank you for allowing me to participate in care of your patient .    Luis Alberto Farley MD  Infectious Disease  [phone]

## 2020-03-27 NOTE — PROGRESS NOTE ADULT - ASSESSMENT
The patient is a 39 year old female with a history of anemia, paroxysmal atrial fibrillation, hyperthyroidism who presents with shortness of breath and cough in the setting of anemia and likely COVID-19.    Plan:  - Remain off of rivaroxaban. Patient seen by me in the past; unclear why she she been on anticoagulation given low thromboembolic risk with [id number] of 0 and previously stable TFTs. She can discuss further with her cardiologist as outpatient.  - Monitor hemoglobin  - Continue metoprolol tartrate 50 mg bid  - COVID-19 positive  - On NC

## 2020-03-28 LAB — RAPID RVP RESULT: SIGNIFICANT CHANGE UP

## 2021-01-01 NOTE — ED ADULT NURSE NOTE - PSH
S/P  Section  ,,2007 Regular rate and rhythm, Heart sounds S1 S2 present, no murmurs, rubs or gallops

## 2021-07-28 NOTE — ED PROVIDER NOTE - FAMILY HISTORY
Patient left message requesting results. She was informed of monitor results. Stress and echo scheduled 8/4/21. Advised we will call when those results are available. Daja Dupree MA    
Sibling  Still living? Unknown  Family history of diabetes mellitus, Age at diagnosis: Age Unknown     Grandparent  Still living? Unknown  Family history of diabetes mellitus, Age at diagnosis: Age Unknown  Family history of stomach cancer, Age at diagnosis: Age Unknown

## 2021-09-03 ENCOUNTER — EMERGENCY (EMERGENCY)
Facility: HOSPITAL | Age: 41
LOS: 1 days | Discharge: HOME | End: 2021-09-03
Attending: EMERGENCY MEDICINE | Admitting: EMERGENCY MEDICINE
Payer: MEDICAID

## 2021-09-03 VITALS
DIASTOLIC BLOOD PRESSURE: 90 MMHG | SYSTOLIC BLOOD PRESSURE: 128 MMHG | TEMPERATURE: 98 F | RESPIRATION RATE: 18 BRPM | HEART RATE: 87 BPM | OXYGEN SATURATION: 100 %

## 2021-09-03 VITALS
DIASTOLIC BLOOD PRESSURE: 74 MMHG | HEART RATE: 101 BPM | SYSTOLIC BLOOD PRESSURE: 120 MMHG | TEMPERATURE: 99 F | OXYGEN SATURATION: 100 % | WEIGHT: 240.08 LBS | RESPIRATION RATE: 16 BRPM | HEIGHT: 65 IN

## 2021-09-03 DIAGNOSIS — Z92.89 PERSONAL HISTORY OF OTHER MEDICAL TREATMENT: Chronic | ICD-10-CM

## 2021-09-03 PROBLEM — F17.200 NICOTINE DEPENDENCE, UNSPECIFIED, UNCOMPLICATED: Chronic | Status: ACTIVE | Noted: 2020-03-19

## 2021-09-03 LAB
ALBUMIN SERPL ELPH-MCNC: 2.9 G/DL — LOW (ref 3.3–5)
ALP SERPL-CCNC: 116 U/L — SIGNIFICANT CHANGE UP (ref 40–120)
ALT FLD-CCNC: 25 U/L — SIGNIFICANT CHANGE UP (ref 12–78)
ANION GAP SERPL CALC-SCNC: 12 MMOL/L — SIGNIFICANT CHANGE UP (ref 5–17)
AST SERPL-CCNC: 62 U/L — HIGH (ref 15–37)
BILIRUB SERPL-MCNC: 0.6 MG/DL — SIGNIFICANT CHANGE UP (ref 0.2–1.2)
BLD GP AB SCN SERPL QL: SIGNIFICANT CHANGE UP
BUN SERPL-MCNC: 7 MG/DL — SIGNIFICANT CHANGE UP (ref 7–23)
CALCIUM SERPL-MCNC: 7.8 MG/DL — LOW (ref 8.5–10.1)
CHLORIDE SERPL-SCNC: 106 MMOL/L — SIGNIFICANT CHANGE UP (ref 96–108)
CO2 SERPL-SCNC: 24 MMOL/L — SIGNIFICANT CHANGE UP (ref 22–31)
CREAT SERPL-MCNC: 0.38 MG/DL — LOW (ref 0.5–1.3)
GLUCOSE SERPL-MCNC: 91 MG/DL — SIGNIFICANT CHANGE UP (ref 70–99)
HCG SERPL-ACNC: 1 MIU/ML — SIGNIFICANT CHANGE UP
HCT VFR BLD CALC: 23.8 % — LOW (ref 34.5–45)
HGB BLD-MCNC: 6.6 G/DL — CRITICAL LOW (ref 11.5–15.5)
MCHC RBC-ENTMCNC: 18.4 PG — LOW (ref 27–34)
MCHC RBC-ENTMCNC: 27.7 GM/DL — LOW (ref 32–36)
MCV RBC AUTO: 66.3 FL — LOW (ref 80–100)
NRBC # BLD: 0 /100 WBCS — SIGNIFICANT CHANGE UP (ref 0–0)
PLATELET # BLD AUTO: 238 K/UL — SIGNIFICANT CHANGE UP (ref 150–400)
POTASSIUM SERPL-MCNC: 3.4 MMOL/L — LOW (ref 3.5–5.3)
POTASSIUM SERPL-SCNC: 3.4 MMOL/L — LOW (ref 3.5–5.3)
PROT SERPL-MCNC: 7.2 G/DL — SIGNIFICANT CHANGE UP (ref 6–8.3)
RBC # BLD: 3.59 M/UL — LOW (ref 3.8–5.2)
RBC # FLD: 23.9 % — HIGH (ref 10.3–14.5)
SODIUM SERPL-SCNC: 142 MMOL/L — SIGNIFICANT CHANGE UP (ref 135–145)
WBC # BLD: 6.01 K/UL — SIGNIFICANT CHANGE UP (ref 3.8–10.5)
WBC # FLD AUTO: 6.01 K/UL — SIGNIFICANT CHANGE UP (ref 3.8–10.5)

## 2021-09-03 PROCEDURE — 76830 TRANSVAGINAL US NON-OB: CPT | Mod: 26

## 2021-09-03 PROCEDURE — 76830 TRANSVAGINAL US NON-OB: CPT

## 2021-09-03 PROCEDURE — 86850 RBC ANTIBODY SCREEN: CPT

## 2021-09-03 PROCEDURE — 80053 COMPREHEN METABOLIC PANEL: CPT

## 2021-09-03 PROCEDURE — 36430 TRANSFUSION BLD/BLD COMPNT: CPT

## 2021-09-03 PROCEDURE — 36415 COLL VENOUS BLD VENIPUNCTURE: CPT

## 2021-09-03 PROCEDURE — 84702 CHORIONIC GONADOTROPIN TEST: CPT

## 2021-09-03 PROCEDURE — 99285 EMERGENCY DEPT VISIT HI MDM: CPT

## 2021-09-03 PROCEDURE — P9016: CPT

## 2021-09-03 PROCEDURE — 86923 COMPATIBILITY TEST ELECTRIC: CPT

## 2021-09-03 PROCEDURE — 86901 BLOOD TYPING SEROLOGIC RH(D): CPT

## 2021-09-03 PROCEDURE — 86900 BLOOD TYPING SEROLOGIC ABO: CPT

## 2021-09-03 PROCEDURE — 99285 EMERGENCY DEPT VISIT HI MDM: CPT | Mod: 25

## 2021-09-03 PROCEDURE — 85027 COMPLETE CBC AUTOMATED: CPT

## 2021-09-03 RX ORDER — OXYCODONE AND ACETAMINOPHEN 5; 325 MG/1; MG/1
1 TABLET ORAL ONCE
Refills: 0 | Status: DISCONTINUED | OUTPATIENT
Start: 2021-09-03 | End: 2021-09-03

## 2021-09-03 RX ORDER — MICONAZOLE NITRATE 2 %
1 CREAM (GRAM) TOPICAL
Qty: 1 | Refills: 0
Start: 2021-09-03 | End: 2021-09-09

## 2021-09-03 RX ORDER — SODIUM CHLORIDE 9 MG/ML
1000 INJECTION INTRAMUSCULAR; INTRAVENOUS; SUBCUTANEOUS ONCE
Refills: 0 | Status: COMPLETED | OUTPATIENT
Start: 2021-09-03 | End: 2021-09-03

## 2021-09-03 RX ADMIN — Medication 1 APPLICATORFUL: at 06:50

## 2021-09-03 RX ADMIN — OXYCODONE AND ACETAMINOPHEN 1 TABLET(S): 5; 325 TABLET ORAL at 06:49

## 2021-09-03 RX ADMIN — SODIUM CHLORIDE 1000 MILLILITER(S): 9 INJECTION INTRAMUSCULAR; INTRAVENOUS; SUBCUTANEOUS at 03:08

## 2021-09-03 NOTE — ED ADULT NURSE NOTE - OBJECTIVE STATEMENT
Pt comes from triage. Pt reports painful vaginal bleeding with clots for the past three weeks. Pt breathing easy, unlabored, no signs of distress. Pt ambulatory with a steady gait.

## 2021-09-03 NOTE — ED PROVIDER NOTE - CLINICAL SUMMARY MEDICAL DECISION MAKING FREE TEXT BOX
acute anemia to chronic bleeding secondary to fibroids low H/H will transfuse Packed RBCs , for vaginitis Rx monostat vaginal cream and GYN referral given

## 2021-09-03 NOTE — ED PROVIDER NOTE - CARE PROVIDER_API CALL
Marek Moya)  Obstetrics and Gynecology  [address] [facility] - [address]  [place] [address]  Phone: [phone]  Fax: [phone]  Follow Up Time: 1-3 Days

## 2021-09-03 NOTE — ED ADULT NURSE REASSESSMENT NOTE - NS ED NURSE REASSESS COMMENT FT1
Pt stable and resting in bed. 1st unit of PRBC infusing and tolerating well. Pt resting comfortably. Nursing care ongoing and safety maintained.

## 2021-09-03 NOTE — ED PROVIDER NOTE - OBJECTIVE STATEMENT
41 y/o female h/o anemia due to vaginal bleeding and fibroids, states that was bleeding for three weeks but now has stopped, also experiencing vaginal pruritis, no discharge no fever, no chills no urinary symptoms.

## 2021-09-03 NOTE — ED ADULT NURSE REASSESSMENT NOTE - NSIMPLEMENTINTERV_GEN_ALL_ED
Implemented All Universal Safety Interventions:  [place] to call system. Call bell, personal items and telephone within reach. Instruct patient to call for assistance. Room bathroom lighting operational. Non-slip footwear when patient is off stretcher. Physically safe environment: no spills, clutter or unnecessary equipment. Stretcher in lowest position, wheels locked, appropriate side rails in place.

## 2021-09-03 NOTE — ED PROVIDER NOTE - PATIENT PORTAL LINK FT
You can access the FollowMyHealth Patient Portal offered by [facility] by registering at the following website: [email or web address]. By joining [facility]’s FollowMyHealth portal, you will also be able to view your health information using other applications (apps) compatible with our system.

## 2021-09-03 NOTE — ED PROVIDER NOTE - CARE PLAN
1 Principal Discharge DX:	Uterine fibroid  Secondary Diagnosis:	Vaginal bleeding  Secondary Diagnosis:	Yeast vaginitis

## 2021-09-03 NOTE — ED PROVIDER NOTE - NSFOLLOWUPINSTRUCTIONS_ED_ALL_ED_FT
Rest  Take IRON SUPPLEMENTATION   Follow-up with your doctor this week  Take VAGINAL MEDICATION as prescribed  Return here if needed          [employer]® CareNotes®     :  [facility]  	                       ANEMIA - AfterCare(R) Instructions(ER/ED)           Anemia    WHAT YOU NEED TO KNOW:    Anemia is a low number of red blood cells or a low amount of hemoglobin in your red blood cells. Hemoglobin is a protein that helps carry oxygen throughout your body. Red blood cells use iron to create hemoglobin. Anemia may develop if your body does not have enough iron. It may also develop if your body does not make enough red blood cells or they die faster than your body can make them.    DISCHARGE INSTRUCTIONS:    Call 911 or have someone call 911 for any of the following:   •You lose consciousness.      •You have severe chest pain.      Return to the emergency department if:   •You have dark or bloody bowel movements.          Contact your healthcare provider if:   •Your symptoms are worse, even after treatment.      •You have questions or concerns about your condition or care.      Medicines:   •Iron or folic acid supplements help increase your red blood cell and hemoglobin levels.      •Vitamin B12 injections may help boost your red blood cell level and decrease your symptoms. Ask your healthcare provider how to inject B12.      •Take your medicine as directed. Contact your healthcare provider if you think your medicine is not helping or if you have side effects. Tell him of her if you are allergic to any medicine. Keep a list of the medicines, vitamins, and herbs you take. Include the amounts, and when and why you take them. Bring the list or the pill bottles to follow-up visits. Carry your medicine list with you in case of an emergency.      Prevent anemia: Eat healthy foods rich in iron and vitamin C. Nuts, meat, dark leafy green vegetables, and beans are high in iron and protein. Vitamin C helps your body absorb iron. Foods rich in vitamin C include oranges and other citrus fruits. Ask your healthcare provider for a list of other foods that are high in iron or vitamin C. Ask if you need to be on a special diet.    Sources of Iron       Sources of Vitamin C         Follow up with your doctor as directed: Write down your questions so you remember to ask them during your visits.        © Copyright [employer] 2021           back to top                          © Copyright [employer] 2021

## 2021-09-08 ENCOUNTER — EMERGENCY (EMERGENCY)
Facility: HOSPITAL | Age: 41
LOS: 1 days | Discharge: HOME | End: 2021-09-08
Attending: EMERGENCY MEDICINE | Admitting: EMERGENCY MEDICINE
Payer: COMMERCIAL

## 2021-09-08 VITALS
SYSTOLIC BLOOD PRESSURE: 135 MMHG | HEIGHT: 65 IN | DIASTOLIC BLOOD PRESSURE: 82 MMHG | RESPIRATION RATE: 16 BRPM | OXYGEN SATURATION: 98 % | TEMPERATURE: 98 F | WEIGHT: 240.08 LBS | HEART RATE: 53 BPM

## 2021-09-08 DIAGNOSIS — Z92.89 PERSONAL HISTORY OF OTHER MEDICAL TREATMENT: Chronic | ICD-10-CM

## 2021-09-08 LAB — HCG UR QL: NEGATIVE — SIGNIFICANT CHANGE UP

## 2021-09-08 PROCEDURE — 99283 EMERGENCY DEPT VISIT LOW MDM: CPT

## 2021-09-08 PROCEDURE — 81025 URINE PREGNANCY TEST: CPT

## 2021-09-08 RX ORDER — GABAPENTIN 400 MG/1
300 CAPSULE ORAL ONCE
Refills: 0 | Status: COMPLETED | OUTPATIENT
Start: 2021-09-08 | End: 2021-09-08

## 2021-09-08 RX ORDER — GABAPENTIN 400 MG/1
1 CAPSULE ORAL
Qty: 20 | Refills: 0
Start: 2021-09-08 | End: 2021-09-17

## 2021-09-08 RX ADMIN — GABAPENTIN 300 MILLIGRAM(S): 400 CAPSULE ORAL at 07:28

## 2021-09-08 NOTE — ED ADULT NURSE NOTE - CAS DISCH ACCOMP BY
Infusion Nursing Note:  [place] presents today for infusion of NS and ABX for continued orthostatic BP HX: AML  Patient seen by provider today: Yes: Val CURRY   present during visit today: Not Applicable.    Note: PT arrived, ambulatory by self, to BMT INFUSION.  Pt noted to be orthostatic in lab.  RN initiated NS bolus per standing order.  Provider paged to come assess pt.  New order received to administer Vanco and Rocephin today as well.  BP rechecked after 500ml of NS infused, and BP/HR improved.  Provider aware and verbal order received to complete the remaining 500ml NS bolus.  Pt denies fevers, chills, or dizziness.  PT also denies nausea or pain.    Intravenous Access:  [facility].  CVC flushed with saline and heparin upon completion of use.     Treatment Conditions:  Results reviewed, labs MET treatment parameters, ok to proceed with treatment.  Benadryl 25mg PO x1 administered prior to infusion of Vancomycin      Post Infusion Assessment:  Patient tolerated infusion of NS x1 liter, IVP Rocephin, and IV Vancomycin without incident.       Discharge Plan:   Patient discharged in stable condition accompanied by: self.  Pt to return tomorrow for similar cares.    Christina Lebron RN                        
Self

## 2021-09-08 NOTE — ED PROVIDER NOTE - PHYSICAL EXAMINATION
Gen:  alert, awake, no acute distress  HEENT:  atraumatic head, airway clear, pupils equal and round  CV:  rrr, nl S1, S2, no m/r/g  Pulm:  lungs CTA b/l  Abd: s/nt/nd, +BS  Ext:  moving all extremities, normal appearance to b/l toes and fingers, no ttp, FROM of toes and fingers without pain  Neuro:  grossly intact, no focal deficits  Skin:  clear, dry, intact  Psych: AOx3, normal affect, no apparent risk to self or others

## 2021-09-08 NOTE — ED PROVIDER NOTE - PATIENT PORTAL LINK FT
You can access the FollowMyHealth Patient Portal offered by [facility] by registering at the following website: [email or web address]. By joining [employer]’s FollowMyHealth portal, you will also be able to view your health information using other applications (apps) compatible with our system.

## 2021-09-08 NOTE — ED PROVIDER NOTE - NSFOLLOWUPINSTRUCTIONS_ED_ALL_ED_FT
-- The cause of your pain is currently unclear but does not appear to be serious.  You must follow up with your primary doctor for a furthe work up.    -- In the meantime I am starting you on Gabapentin which is a medication we use for nerve pain. You got one dose of 300mg.  Tomorrow take 1 pill every 12 yours.  You can increase to 1 pill every 8 hours daily if needed.    -- Return to the ER for worsening or persistent symptoms, and/or ANY NEW OR CONCERNING SYMPTOMS.    -- If you have difficulty following up, please call: [phone] to obtain a [facility] doctor or specialist who takes your insurance in your area.

## 2021-09-08 NOTE — ED PROVIDER NOTE - OBJECTIVE STATEMENT
pt reports sharp shooting pain in b/l toes and finger tips for the last 2 days.  pt states that pain is excruciating, denies any trauma, denies any new medications.  gets periodic blood transfusions for anemia due to heavy menses. no h/o diabetes.  pt states she otherwise feels totally fine.

## 2021-09-08 NOTE — ED PROVIDER NOTE - CLINICAL SUMMARY MEDICAL DECISION MAKING FREE TEXT BOX
unclear etiology of patients pain, will need outpatient work up if the pain continues, will trial gabapentin for improvement.

## 2021-09-08 NOTE — ED ADULT NURSE NOTE - OBJECTIVE STATEMENT
41 y/o female received aox4 ambulatory c/o bilateral feet and hand pain x2 days. denies any trauma, pt states that pain is constant & shooting to feet and fingertips. denies n/v/d/sob/cp.

## 2021-09-14 ENCOUNTER — INPATIENT (INPATIENT)
Facility: HOSPITAL | Age: 41
LOS: 2 days | Discharge: HOME | DRG: 641 | End: 2021-09-17
Attending: HOSPITALIST | Admitting: INTERNAL MEDICINE
Payer: MEDICAID

## 2021-09-14 VITALS
RESPIRATION RATE: 18 BRPM | HEIGHT: 65 IN | HEART RATE: 105 BPM | DIASTOLIC BLOOD PRESSURE: 78 MMHG | WEIGHT: 240.08 LBS | SYSTOLIC BLOOD PRESSURE: 115 MMHG | OXYGEN SATURATION: 97 % | TEMPERATURE: 98 F

## 2021-09-14 DIAGNOSIS — E83.42 HYPOMAGNESEMIA: ICD-10-CM

## 2021-09-14 DIAGNOSIS — F41.9 ANXIETY DISORDER, UNSPECIFIED: ICD-10-CM

## 2021-09-14 DIAGNOSIS — Z92.89 PERSONAL HISTORY OF OTHER MEDICAL TREATMENT: Chronic | ICD-10-CM

## 2021-09-14 DIAGNOSIS — E05.90 THYROTOXICOSIS, UNSPECIFIED WITHOUT THYROTOXIC CRISIS OR STORM: ICD-10-CM

## 2021-09-14 DIAGNOSIS — R20.2 PARESTHESIA OF SKIN: ICD-10-CM

## 2021-09-14 DIAGNOSIS — Z29.9 ENCOUNTER FOR PROPHYLACTIC MEASURES, UNSPECIFIED: ICD-10-CM

## 2021-09-14 DIAGNOSIS — E87.6 HYPOKALEMIA: ICD-10-CM

## 2021-09-14 DIAGNOSIS — F17.200 NICOTINE DEPENDENCE, UNSPECIFIED, UNCOMPLICATED: ICD-10-CM

## 2021-09-14 DIAGNOSIS — I48.0 PAROXYSMAL ATRIAL FIBRILLATION: ICD-10-CM

## 2021-09-14 LAB
ALBUMIN SERPL ELPH-MCNC: 3.5 G/DL — SIGNIFICANT CHANGE UP (ref 3.3–5)
ALP SERPL-CCNC: 106 U/L — SIGNIFICANT CHANGE UP (ref 40–120)
ALT FLD-CCNC: 15 U/L — SIGNIFICANT CHANGE UP (ref 10–45)
ANION GAP SERPL CALC-SCNC: 16 MMOL/L — SIGNIFICANT CHANGE UP (ref 5–17)
ANION GAP SERPL CALC-SCNC: 17 MMOL/L — SIGNIFICANT CHANGE UP (ref 5–17)
AST SERPL-CCNC: 42 U/L — HIGH (ref 10–40)
BASOPHILS # BLD AUTO: 0.14 K/UL — SIGNIFICANT CHANGE UP (ref 0–0.2)
BASOPHILS NFR BLD AUTO: 2.7 % — HIGH (ref 0–2)
BILIRUB SERPL-MCNC: 0.4 MG/DL — SIGNIFICANT CHANGE UP (ref 0.2–1.2)
BUN SERPL-MCNC: 7 MG/DL — SIGNIFICANT CHANGE UP (ref 7–23)
BUN SERPL-MCNC: 9 MG/DL — SIGNIFICANT CHANGE UP (ref 7–23)
CALCIUM SERPL-MCNC: 7.5 MG/DL — LOW (ref 8.4–10.5)
CALCIUM SERPL-MCNC: 7.8 MG/DL — LOW (ref 8.4–10.5)
CHLORIDE SERPL-SCNC: 101 MMOL/L — SIGNIFICANT CHANGE UP (ref 96–108)
CHLORIDE SERPL-SCNC: 105 MMOL/L — SIGNIFICANT CHANGE UP (ref 96–108)
CO2 SERPL-SCNC: 21 MMOL/L — LOW (ref 22–31)
CO2 SERPL-SCNC: 23 MMOL/L — SIGNIFICANT CHANGE UP (ref 22–31)
CREAT SERPL-MCNC: 0.37 MG/DL — LOW (ref 0.5–1.3)
CREAT SERPL-MCNC: 0.4 MG/DL — LOW (ref 0.5–1.3)
EOSINOPHIL # BLD AUTO: 0.28 K/UL — SIGNIFICANT CHANGE UP (ref 0–0.5)
EOSINOPHIL NFR BLD AUTO: 5.4 % — SIGNIFICANT CHANGE UP (ref 0–6)
FOLATE SERPL-MCNC: 2.2 NG/ML — LOW
GLUCOSE SERPL-MCNC: 129 MG/DL — HIGH (ref 70–99)
GLUCOSE SERPL-MCNC: 88 MG/DL — SIGNIFICANT CHANGE UP (ref 70–99)
HCG SERPL-ACNC: <2 MIU/ML — SIGNIFICANT CHANGE UP
HCT VFR BLD CALC: 27.3 % — LOW (ref 34.5–45)
HGB BLD-MCNC: 7.9 G/DL — LOW (ref 11.5–15.5)
IRON SATN MFR SERPL: 14 UG/DL — LOW (ref 30–160)
IRON SATN MFR SERPL: 4 % — LOW (ref 14–50)
LYMPHOCYTES # BLD AUTO: 0.46 K/UL — LOW (ref 1–3.3)
LYMPHOCYTES # BLD AUTO: 8.9 % — LOW (ref 13–44)
MAGNESIUM SERPL-MCNC: 1.5 MG/DL — LOW (ref 1.6–2.6)
MCHC RBC-ENTMCNC: 21 PG — LOW (ref 27–34)
MCHC RBC-ENTMCNC: 28.9 GM/DL — LOW (ref 32–36)
MCV RBC AUTO: 72.6 FL — LOW (ref 80–100)
MONOCYTES # BLD AUTO: 0.32 K/UL — SIGNIFICANT CHANGE UP (ref 0–0.9)
MONOCYTES NFR BLD AUTO: 6.2 % — SIGNIFICANT CHANGE UP (ref 2–14)
NEUTROPHILS # BLD AUTO: 4.01 K/UL — SIGNIFICANT CHANGE UP (ref 1.8–7.4)
NEUTROPHILS NFR BLD AUTO: 76.8 % — SIGNIFICANT CHANGE UP (ref 43–77)
PHOSPHATE SERPL-MCNC: 3 MG/DL — SIGNIFICANT CHANGE UP (ref 2.5–4.5)
PLATELET # BLD AUTO: 259 K/UL — SIGNIFICANT CHANGE UP (ref 150–400)
POTASSIUM SERPL-MCNC: 2.7 MMOL/L — CRITICAL LOW (ref 3.5–5.3)
POTASSIUM SERPL-MCNC: 3.5 MMOL/L — SIGNIFICANT CHANGE UP (ref 3.5–5.3)
POTASSIUM SERPL-SCNC: 2.7 MMOL/L — CRITICAL LOW (ref 3.5–5.3)
POTASSIUM SERPL-SCNC: 3.5 MMOL/L — SIGNIFICANT CHANGE UP (ref 3.5–5.3)
PROT SERPL-MCNC: 6.3 G/DL — SIGNIFICANT CHANGE UP (ref 6–8.3)
RBC # BLD: 3.76 M/UL — LOW (ref 3.8–5.2)
RBC # FLD: 27.4 % — HIGH (ref 10.3–14.5)
SARS-COV-2 RNA SPEC QL NAA+PROBE: SIGNIFICANT CHANGE UP
SODIUM SERPL-SCNC: 141 MMOL/L — SIGNIFICANT CHANGE UP (ref 135–145)
SODIUM SERPL-SCNC: 142 MMOL/L — SIGNIFICANT CHANGE UP (ref 135–145)
TIBC SERPL-MCNC: 374 UG/DL — SIGNIFICANT CHANGE UP (ref 220–430)
UIBC SERPL-MCNC: 360 UG/DL — SIGNIFICANT CHANGE UP (ref 110–370)
VIT B12 SERPL-MCNC: 805 PG/ML — SIGNIFICANT CHANGE UP (ref 232–1245)
WBC # BLD: 5.22 K/UL — SIGNIFICANT CHANGE UP (ref 3.8–10.5)
WBC # FLD AUTO: 5.22 K/UL — SIGNIFICANT CHANGE UP (ref 3.8–10.5)

## 2021-09-14 PROCEDURE — 93971 EXTREMITY STUDY: CPT | Mod: 26,LT

## 2021-09-14 PROCEDURE — 99406 BEHAV CHNG SMOKING 3-10 MIN: CPT

## 2021-09-14 PROCEDURE — 99285 EMERGENCY DEPT VISIT HI MDM: CPT

## 2021-09-14 PROCEDURE — 99223 1ST HOSP IP/OBS HIGH 75: CPT

## 2021-09-14 RX ORDER — POTASSIUM CHLORIDE 20 MEQ
10 PACKET (EA) ORAL
Refills: 0 | Status: COMPLETED | OUTPATIENT
Start: 2021-09-14 | End: 2021-09-14

## 2021-09-14 RX ORDER — MAGNESIUM SULFATE 500 MG/ML
1 VIAL (ML) INJECTION ONCE
Refills: 0 | Status: COMPLETED | OUTPATIENT
Start: 2021-09-14 | End: 2021-09-14

## 2021-09-14 RX ORDER — CALCIUM GLUCONATE 100 MG/ML
1 VIAL (ML) INTRAVENOUS ONCE
Refills: 0 | Status: COMPLETED | OUTPATIENT
Start: 2021-09-14 | End: 2021-09-14

## 2021-09-14 RX ORDER — NICOTINE POLACRILEX 2 MG
1 GUM BUCCAL DAILY
Refills: 0 | Status: DISCONTINUED | OUTPATIENT
Start: 2021-09-14 | End: 2021-09-17

## 2021-09-14 RX ORDER — METOPROLOL TARTRATE 50 MG
50 TABLET ORAL ONCE
Refills: 0 | Status: COMPLETED | OUTPATIENT
Start: 2021-09-14 | End: 2021-09-14

## 2021-09-14 RX ORDER — SERTRALINE 25 MG/1
100 TABLET, FILM COATED ORAL AT BEDTIME
Refills: 0 | Status: DISCONTINUED | OUTPATIENT
Start: 2021-09-14 | End: 2021-09-17

## 2021-09-14 RX ORDER — GABAPENTIN 400 MG/1
300 CAPSULE ORAL THREE TIMES A DAY
Refills: 0 | Status: DISCONTINUED | OUTPATIENT
Start: 2021-09-14 | End: 2021-09-17

## 2021-09-14 RX ORDER — DIAZEPAM 5 MG
2 TABLET ORAL DAILY
Refills: 0 | Status: DISCONTINUED | OUTPATIENT
Start: 2021-09-14 | End: 2021-09-17

## 2021-09-14 RX ORDER — POTASSIUM CHLORIDE 20 MEQ
40 PACKET (EA) ORAL EVERY 4 HOURS
Refills: 0 | Status: COMPLETED | OUTPATIENT
Start: 2021-09-14 | End: 2021-09-14

## 2021-09-14 RX ORDER — POTASSIUM CHLORIDE 20 MEQ
40 PACKET (EA) ORAL ONCE
Refills: 0 | Status: COMPLETED | OUTPATIENT
Start: 2021-09-14 | End: 2021-09-14

## 2021-09-14 RX ORDER — FERROUS SULFATE 325(65) MG
325 TABLET ORAL DAILY
Refills: 0 | Status: DISCONTINUED | OUTPATIENT
Start: 2021-09-14 | End: 2021-09-17

## 2021-09-14 RX ORDER — TRAMADOL HYDROCHLORIDE 50 MG/1
25 TABLET ORAL EVERY 12 HOURS
Refills: 0 | Status: DISCONTINUED | OUTPATIENT
Start: 2021-09-14 | End: 2021-09-17

## 2021-09-14 RX ORDER — GABAPENTIN 400 MG/1
600 CAPSULE ORAL ONCE
Refills: 0 | Status: COMPLETED | OUTPATIENT
Start: 2021-09-14 | End: 2021-09-14

## 2021-09-14 RX ORDER — TRAZODONE HCL 50 MG
50 TABLET ORAL
Refills: 0 | Status: DISCONTINUED | OUTPATIENT
Start: 2021-09-14 | End: 2021-09-17

## 2021-09-14 RX ORDER — METOPROLOL TARTRATE 50 MG
50 TABLET ORAL
Refills: 0 | Status: DISCONTINUED | OUTPATIENT
Start: 2021-09-15 | End: 2021-09-17

## 2021-09-14 RX ORDER — ALBUTEROL 90 UG/1
2 AEROSOL, METERED ORAL EVERY 6 HOURS
Refills: 0 | Status: DISCONTINUED | OUTPATIENT
Start: 2021-09-14 | End: 2021-09-17

## 2021-09-14 RX ORDER — ACETAMINOPHEN 500 MG
650 TABLET ORAL EVERY 6 HOURS
Refills: 0 | Status: DISCONTINUED | OUTPATIENT
Start: 2021-09-14 | End: 2021-09-17

## 2021-09-14 RX ADMIN — Medication 100 MILLIEQUIVALENT(S): at 14:42

## 2021-09-14 RX ADMIN — Medication 650 MILLIGRAM(S): at 22:36

## 2021-09-14 RX ADMIN — GABAPENTIN 600 MILLIGRAM(S): 400 CAPSULE ORAL at 14:12

## 2021-09-14 RX ADMIN — Medication 1 GRAM(S): at 14:35

## 2021-09-14 RX ADMIN — Medication 100 MILLIEQUIVALENT(S): at 15:21

## 2021-09-14 RX ADMIN — Medication 325 MILLIGRAM(S): at 23:03

## 2021-09-14 RX ADMIN — ALBUTEROL 2 PUFF(S): 90 AEROSOL, METERED ORAL at 23:00

## 2021-09-14 RX ADMIN — Medication 50 MILLIGRAM(S): at 16:23

## 2021-09-14 RX ADMIN — Medication 40 MILLIEQUIVALENT(S): at 23:00

## 2021-09-14 RX ADMIN — Medication 100 GRAM(S): at 23:00

## 2021-09-14 RX ADMIN — Medication 40 MILLIEQUIVALENT(S): at 14:42

## 2021-09-14 RX ADMIN — Medication 100 MILLIEQUIVALENT(S): at 14:07

## 2021-09-14 RX ADMIN — Medication 10 MILLIEQUIVALENT(S): at 15:39

## 2021-09-14 RX ADMIN — TRAMADOL HYDROCHLORIDE 25 MILLIGRAM(S): 50 TABLET ORAL at 23:11

## 2021-09-14 RX ADMIN — Medication 10 MILLIEQUIVALENT(S): at 17:48

## 2021-09-14 RX ADMIN — Medication 50 MILLIGRAM(S): at 22:35

## 2021-09-14 RX ADMIN — Medication 1 GRAM(S): at 12:06

## 2021-09-14 RX ADMIN — Medication 100 GRAM(S): at 11:39

## 2021-09-14 RX ADMIN — Medication 40 MILLIEQUIVALENT(S): at 11:38

## 2021-09-14 RX ADMIN — Medication 100 GRAM(S): at 18:32

## 2021-09-14 RX ADMIN — Medication 650 MILLIGRAM(S): at 23:08

## 2021-09-14 RX ADMIN — GABAPENTIN 300 MILLIGRAM(S): 400 CAPSULE ORAL at 22:35

## 2021-09-14 NOTE — H&P ADULT - NSHPPHYSICALEXAM_GEN_ALL_CORE
PHYSICAL EXAM:   GENERAL: Alert. Not confused. No acute distress. Not thin. Not cachectic. Not obese.  HEAD:  Atraumatic. Normocephalic.  EYES: EOMI. PERRLA. Normal conjunctiva/sclera.  ENT: Neck supple. No JVD. Moist oral mucosa. Not edentulous. No thrush.  LYMPH: Normal supraclavicular/cervical lymph nodes.   CARDIAC: No tachy, Not magnolia. Regular rhythm. Not irregularly irregular. S1. S2. No murmur. No rub. No distant heart sounds.  LUNG/CHEST: CTAB. BS equal bilaterally. No wheezes. No rales. No rhonchi.  ABDOMEN: Soft. No tenderness. No distension. No fluid wave. Normal bowel sounds.  BACK: No midline/vertebral tenderness. No flank tenderness.  VASCULAR: +2 b/l radial or ulnar pulses. Palpable DP pulses.  EXTREMITIES:  No clubbing. No cyanosis. No edema. Moving all 4.  NEUROLOGY: A&Ox3. Non-focal exam. Cranial nerves intact. Normal speech. Sensation intact.  PSYCH: Normal behavior. Normal affect.  SKIN: No jaundice. No erythema. No rash/lesion.  Vascular Access:     ICU Vital Signs Last 24 Hrs  T(C): 36.6 (14 Sep 2021 19:30), Max: 36.7 (14 Sep 2021 09:15)  T(F): 97.8 (14 Sep 2021 19:30), Max: 98.1 (14 Sep 2021 09:15)  HR: 94 (14 Sep 2021 19:30) (88 - 111)  BP: 93/61 (14 Sep 2021 19:30) (93/61 - 115/78)  BP(mean): 73 (14 Sep 2021 19:30) (73 - 73)  ABP: --  ABP(mean): --  RR: 20 (14 Sep 2021 19:30) (18 - 20)  SpO2: 95% (14 Sep 2021 19:30) (95% - 99%)      I&O's Summary

## 2021-09-14 NOTE — H&P ADULT - ASSESSMENT
40 yr old woman with a PMH of paroxysmal atrial fibrillation, MVP with MR, hyperthyroidism, mild asthma, and depression/anxiety who presents w/ numbness, tingling sensation in bilateral hands and feet.

## 2021-09-14 NOTE — H&P ADULT - NSHPLABSRESULTS_GEN_ALL_CORE
Personally reviewed labs, imaging and EKG.                          7.9    5.22  )-----------( 259      ( 14 Sep 2021 10:06 )             27.3     141  |  101  |  7   ----------------------------<  88  2.7<LL>   |  23  |  0.37<L>    Ca    7.8<L>      14 Sep 2021 10:06  Mg     1.1     09-14    TPro  6.3  /  Alb  3.5  /  TBili  0.4  /  DBili  x   /  AST  42<H>  /  ALT  15  /  AlkPhos  106  09-14    LIVER FUNCTIONS - ( 14 Sep 2021 10:06 )  Alb: 3.5 g/dL / Pro: 6.3 g/dL / ALK PHOS: 106 U/L / ALT: 15 U/L / AST: 42 U/L / GGT: x           EKG w/ normal sinus rhythm  Duplex LE US: no dvt

## 2021-09-14 NOTE — H&P ADULT - NSHPSOCIALHISTORY_GEN_ALL_CORE
-Current smoker; previously smoked 1-1.5 ppd now at 0.5 ppd; has been smoking since age ~17yr old. Working on quitting, has joined an outside group.   -Occasional etoh/social

## 2021-09-14 NOTE — ED PROVIDER NOTE - NEUROLOGICAL, MLM
Alert and oriented, no focal deficits, no motor or sensory deficits. nl sensation bl le. 2+ dp, pt, radial. LLE w dark discoloration / echhymosis that does not conor to the inner aspect of the L thigh

## 2021-09-14 NOTE — H&P ADULT - NSHPREVIEWOFSYSTEMS_GEN_ALL_CORE
REVIEW OF SYSTEMS:  CONSTITUTIONAL: No weakness. No fevers. No chills. No rigors. No weight loss. No night sweats. No poor appetite.  EYES: No blurry or double vision. No eye pain.  ENT: No hearing difficulty. No vertigo. No dysphagia. No sore throat. No Sinusitis/rhinorrhea.   NECK: No pain. No stiffness/rigidity.  CARDIAC: No chest pain. No palpitations. No lightheadedness. No syncope.  RESPIRATORY: No cough. No SOB. No hemoptysis.  GASTROINTESTINAL: No abdominal pain. No nausea. No vomiting. No hematemesis. No diarrhea. No constipation. No melena. No hematochezia.  GENITOURINARY: No dysuria. No frequency. No hesitancy. No hematuria. No oliguria.  NEUROLOGICAL: See HPI. No focal weakness. No urinary or fecal incontinence. No headache. No unsteady gait.  BACK: No back pain. No flank pain.  EXTREMITIES: No lower extremity edema. Full ROM. No joint pain.  SKIN: No rashes. No itching. No other lesions.  PSYCHIATRIC: No depression. No anxiety. No SI/HI.  ALLERGIC: No lip swelling. No hives.  All other review of systems is negative unless indicated above.  Unless indicated above, unable to assess ROS 2/2

## 2021-09-14 NOTE — H&P ADULT - HISTORY OF PRESENT ILLNESS
40 yr old woman with a PMH of paroxysmal atrial fibrillation, MVP with MR, hyperthyroidism, mild asthma, and depression/anxiety who presents w/ numbness, tingling sensation in bilateral hands and feet. Pt notes new onset of paresthesias about 1.5 weeks ago after receiving a blood transfusion at [place]. Pt describes pain as a tingling/numbness sensation in the tips of her fingers and toes w/ a sharp, shooting sensation in the palm of her hand and foot. Notes arms and legs are not associated. No incidence of restless legs. Pain is new. No associated focal weakness. Pain has been persistent. Pt was started on gabapentin 300 mg bid about a week ago but pain has not improved. Pt notes pain feels excruciating and worsens when she walks on the ground.   No history of DM. Denies new medication. Hx of iron deficiency anemia in setting of heavy menstrual bleeds, not on iron supplements.  Pt denies other complaints including- lightheaded or dizziness, shortness of breath, chest pain, N/V/abd pain.

## 2021-09-14 NOTE — H&P ADULT - PROBLEM SELECTOR PLAN 7
counseled at bedside, currently smokes 0.5 ppd, previously 1-1.5 ppd. Pt part of outpt program to assist w/ smoking cessation. Risks of continued smoking discussed.  Pt amenable to nicotine patch while inpatient  Total time spent 4 min counseling

## 2021-09-14 NOTE — H&P ADULT - PROBLEM SELECTOR PLAN 3
in setting of HypoMg, K 2.7 at admission. per chart review, pt has required Mg and K repletion on mult prior admissions. May have underlying deficiency. Not on supplements as outpt. Med list unremarkable.  - replete K  - repeat BMP and Mg

## 2021-09-14 NOTE — ED PROVIDER NOTE - PROGRESS NOTE DETAILS
Patient signed out to me by the prior attending.  The patient's disposition was discussed with the treating team and agreed upon.  Carlos Goel M.D. (attending) patient with low K, Ca, and Mg will repleat and unsure source/etiology of insult.  Patient endorsed to the medical team at the time of admission. Based on patient's history and physical exam, as well as the results of today's workup, I feel that patient warrants admission to the hospital for further workup/evaluation and continued management. I discussed the findings of today's workup with the patient and addressed the patient's questions and concerns. The patient was agreeable with admission. Our team spoke with the inpatient receiving team who accepted the patient for admission and subsequently took over the patient's care at the time of admission. The receiving team will follow up on pending labs, analgesia, any clinical imaging results, ancillary findings, reassess, and disposition as clinically indicated. Details of patient and plan conveyed to receiving physician team and conveyed back for understanding. There were no questions at this time about the patient's status, disposition, and plan. Patient's care to be taken over by receiving physician team at this time, all decisions regarding the progression of care will be made at their discretion.

## 2021-09-14 NOTE — H&P ADULT - PROBLEM SELECTOR PLAN 1
new onset paresthesias in b/l feet and hands in stocking-glove pattern, no hx of DM  DDx includes iron and folate deficiency (as noted on admission labs), electrolyte deficiencies (Mg and K), rarely associated w/ methimazole adverse effect. VB12 wnl.  - will increased gabapentin to 300 mg TID (from BID), assess for response  - start iron supplements  - consider Neuro eval in am

## 2021-09-14 NOTE — ED ADULT NURSE NOTE - OBJECTIVE STATEMENT
Patient   is  alert  and  oriented  x3.  Color  is  good  and  skin warm to touch.  She  is  c/o  bilateral  hands  and  feet  pain.  Patient  is  currently  taking  Gabapentin  but   has  no  improvement.

## 2021-09-14 NOTE — H&P ADULT - PROBLEM SELECTOR PLAN 4
- Mg 1.1 at admission; as above, has required mag supplement in past, currently not on supplement  - consider standing supplement at discharge  - replete Mg  - repeat Mg level after repletion  - consider starting standing magnesium oxide pending repeat

## 2021-09-14 NOTE — H&P ADULT - PROBLEM SELECTOR PLAN 2
noted to be in NSR on admission EKG; per pt, AF mostly in setting of uncontrolled hyperthyroid  - c/w Lopressor 50 mg BID  - c/w methimazole 10 mg TID

## 2021-09-14 NOTE — ED PROVIDER NOTE - OBJECTIVE STATEMENT
40 female hx below, of note, not on ac. on methimazole / toprol / anx med / zoloft / trazadone / intermittant blood transfusions. notes lower ext and ue w parestheisa / tinging / burning 40 female hx below, of note, not on ac. on methimazole / toprol / anx med / zoloft / trazadone / intermittant blood transfusions. notes lower ext and ue w parestheisa / tinging / burning. stocking like distribution in legs and fingertips in hands. able to walk. no inj or trauma. has been going on 1.5 wks since transfused. not a diabetic.

## 2021-09-14 NOTE — ED PROVIDER NOTE - CLINICAL SUMMARY MEDICAL DECISION MAKING FREE TEXT BOX
will check for dvt. wonder if there is relation to nutrient dif such as iron or b12. perhaps related to med effect. not responding at all to gabapentin.

## 2021-09-15 LAB
ANION GAP SERPL CALC-SCNC: 13 MMOL/L — SIGNIFICANT CHANGE UP (ref 5–17)
ANION GAP SERPL CALC-SCNC: 16 MMOL/L — SIGNIFICANT CHANGE UP (ref 5–17)
BLD GP AB SCN SERPL QL: NEGATIVE — SIGNIFICANT CHANGE UP
BUN SERPL-MCNC: 6 MG/DL — LOW (ref 7–23)
BUN SERPL-MCNC: 7 MG/DL — SIGNIFICANT CHANGE UP (ref 7–23)
CA-I BLD-SCNC: 0.93 MMOL/L — LOW (ref 1.15–1.33)
CALCIUM SERPL-MCNC: 7.4 MG/DL — LOW (ref 8.4–10.5)
CALCIUM SERPL-MCNC: 7.5 MG/DL — LOW (ref 8.4–10.5)
CHLORIDE SERPL-SCNC: 105 MMOL/L — SIGNIFICANT CHANGE UP (ref 96–108)
CHLORIDE SERPL-SCNC: 106 MMOL/L — SIGNIFICANT CHANGE UP (ref 96–108)
CO2 SERPL-SCNC: 22 MMOL/L — SIGNIFICANT CHANGE UP (ref 22–31)
CO2 SERPL-SCNC: 22 MMOL/L — SIGNIFICANT CHANGE UP (ref 22–31)
COVID-19 SPIKE DOMAIN AB INTERP: POSITIVE
COVID-19 SPIKE DOMAIN ANTIBODY RESULT: >250 U/ML — HIGH
CREAT SERPL-MCNC: 0.41 MG/DL — LOW (ref 0.5–1.3)
CREAT SERPL-MCNC: 0.43 MG/DL — LOW (ref 0.5–1.3)
GLUCOSE SERPL-MCNC: 83 MG/DL — SIGNIFICANT CHANGE UP (ref 70–99)
GLUCOSE SERPL-MCNC: 97 MG/DL — SIGNIFICANT CHANGE UP (ref 70–99)
HCT VFR BLD CALC: 27.2 % — LOW (ref 34.5–45)
HCT VFR BLD CALC: 27.5 % — LOW (ref 34.5–45)
HGB BLD-MCNC: 7.7 G/DL — LOW (ref 11.5–15.5)
HGB BLD-MCNC: 7.8 G/DL — LOW (ref 11.5–15.5)
MAGNESIUM SERPL-MCNC: 1.5 MG/DL — LOW (ref 1.6–2.6)
MAGNESIUM SERPL-MCNC: 1.6 MG/DL — SIGNIFICANT CHANGE UP (ref 1.6–2.6)
MCHC RBC-ENTMCNC: 21 PG — LOW (ref 27–34)
MCHC RBC-ENTMCNC: 21.3 PG — LOW (ref 27–34)
MCHC RBC-ENTMCNC: 28.3 GM/DL — LOW (ref 32–36)
MCHC RBC-ENTMCNC: 28.4 GM/DL — LOW (ref 32–36)
MCV RBC AUTO: 74.1 FL — LOW (ref 80–100)
MCV RBC AUTO: 75.1 FL — LOW (ref 80–100)
NRBC # BLD: 0 /100 WBCS — SIGNIFICANT CHANGE UP (ref 0–0)
NRBC # BLD: 0 /100 WBCS — SIGNIFICANT CHANGE UP (ref 0–0)
PLATELET # BLD AUTO: 270 K/UL — SIGNIFICANT CHANGE UP (ref 150–400)
PLATELET # BLD AUTO: 285 K/UL — SIGNIFICANT CHANGE UP (ref 150–400)
POTASSIUM SERPL-MCNC: 3.5 MMOL/L — SIGNIFICANT CHANGE UP (ref 3.5–5.3)
POTASSIUM SERPL-MCNC: 3.7 MMOL/L — SIGNIFICANT CHANGE UP (ref 3.5–5.3)
POTASSIUM SERPL-SCNC: 3.5 MMOL/L — SIGNIFICANT CHANGE UP (ref 3.5–5.3)
POTASSIUM SERPL-SCNC: 3.7 MMOL/L — SIGNIFICANT CHANGE UP (ref 3.5–5.3)
RBC # BLD: 3.66 M/UL — LOW (ref 3.8–5.2)
RBC # BLD: 3.67 M/UL — LOW (ref 3.8–5.2)
RBC # FLD: 27.5 % — HIGH (ref 10.3–14.5)
RBC # FLD: 27.6 % — HIGH (ref 10.3–14.5)
RH IG SCN BLD-IMP: POSITIVE — SIGNIFICANT CHANGE UP
SARS-COV-2 IGG+IGM SERPL QL IA: >250 U/ML — HIGH
SARS-COV-2 IGG+IGM SERPL QL IA: POSITIVE
SODIUM SERPL-SCNC: 141 MMOL/L — SIGNIFICANT CHANGE UP (ref 135–145)
SODIUM SERPL-SCNC: 143 MMOL/L — SIGNIFICANT CHANGE UP (ref 135–145)
T4 FREE SERPL-MCNC: 1.4 NG/DL — SIGNIFICANT CHANGE UP (ref 0.9–1.8)
TSH SERPL-MCNC: 1.26 UIU/ML — SIGNIFICANT CHANGE UP (ref 0.27–4.2)
WBC # BLD: 4.55 K/UL — SIGNIFICANT CHANGE UP (ref 3.8–10.5)
WBC # BLD: 4.81 K/UL — SIGNIFICANT CHANGE UP (ref 3.8–10.5)
WBC # FLD AUTO: 4.55 K/UL — SIGNIFICANT CHANGE UP (ref 3.8–10.5)
WBC # FLD AUTO: 4.81 K/UL — SIGNIFICANT CHANGE UP (ref 3.8–10.5)

## 2021-09-15 PROCEDURE — 99232 SBSQ HOSP IP/OBS MODERATE 35: CPT

## 2021-09-15 RX ORDER — MAGNESIUM SULFATE 500 MG/ML
2 VIAL (ML) INJECTION ONCE
Refills: 0 | Status: COMPLETED | OUTPATIENT
Start: 2021-09-15 | End: 2021-09-15

## 2021-09-15 RX ORDER — OXYCODONE HYDROCHLORIDE 5 MG/1
5 TABLET ORAL ONCE
Refills: 0 | Status: DISCONTINUED | OUTPATIENT
Start: 2021-09-15 | End: 2021-09-15

## 2021-09-15 RX ORDER — CALCIUM GLUCONATE 100 MG/ML
4 VIAL (ML) INTRAVENOUS ONCE
Refills: 0 | Status: DISCONTINUED | OUTPATIENT
Start: 2021-09-15 | End: 2021-09-15

## 2021-09-15 RX ORDER — CALCIUM GLUCONATE 100 MG/ML
2 VIAL (ML) INTRAVENOUS EVERY 4 HOURS
Refills: 0 | Status: COMPLETED | OUTPATIENT
Start: 2021-09-15 | End: 2021-09-15

## 2021-09-15 RX ADMIN — OXYCODONE HYDROCHLORIDE 5 MILLIGRAM(S): 5 TABLET ORAL at 23:30

## 2021-09-15 RX ADMIN — Medication 200 GRAM(S): at 18:00

## 2021-09-15 RX ADMIN — GABAPENTIN 300 MILLIGRAM(S): 400 CAPSULE ORAL at 21:25

## 2021-09-15 RX ADMIN — OXYCODONE HYDROCHLORIDE 5 MILLIGRAM(S): 5 TABLET ORAL at 22:55

## 2021-09-15 RX ADMIN — GABAPENTIN 300 MILLIGRAM(S): 400 CAPSULE ORAL at 17:13

## 2021-09-15 RX ADMIN — Medication 1 PATCH: at 18:09

## 2021-09-15 RX ADMIN — Medication 1 PATCH: at 12:53

## 2021-09-15 RX ADMIN — Medication 50 GRAM(S): at 22:55

## 2021-09-15 RX ADMIN — TRAMADOL HYDROCHLORIDE 25 MILLIGRAM(S): 50 TABLET ORAL at 00:00

## 2021-09-15 RX ADMIN — Medication 50 MILLIGRAM(S): at 17:13

## 2021-09-15 RX ADMIN — Medication 50 MILLIGRAM(S): at 05:06

## 2021-09-15 RX ADMIN — Medication 325 MILLIGRAM(S): at 12:01

## 2021-09-15 RX ADMIN — Medication 50 GRAM(S): at 08:10

## 2021-09-15 RX ADMIN — Medication 200 GRAM(S): at 21:25

## 2021-09-15 RX ADMIN — Medication 650 MILLIGRAM(S): at 05:09

## 2021-09-15 RX ADMIN — Medication 50 MILLIGRAM(S): at 17:12

## 2021-09-15 RX ADMIN — GABAPENTIN 300 MILLIGRAM(S): 400 CAPSULE ORAL at 05:06

## 2021-09-15 RX ADMIN — TRAMADOL HYDROCHLORIDE 25 MILLIGRAM(S): 50 TABLET ORAL at 12:01

## 2021-09-15 NOTE — PROGRESS NOTE ADULT - PROBLEM SELECTOR PLAN 3
in setting of HypoMg, K 2.7 at admission. per chart review, pt has required Mg and K repletion on mult prior admissions. May have underlying deficiency. Not on supplements as outpt. Med list unremarkable.  - replete K  - repeat BMP and Mg Hypocalcemia can be exacerbated by hypomagnesemia. Also explained hypoK at admission.   -replete Mg and K as needed  -consider Mg oxide standing if persistent hypomagnesia  -BMP q12hr

## 2021-09-15 NOTE — PROGRESS NOTE ADULT - PROBLEM SELECTOR PLAN 6
- c/w trazodone 50 mg bid, zoloft 100 mg qhs, and diazepam 2 mg prn qd counseled at bedside, currently smokes 0.5 ppd, previously 1-1.5 ppd. Pt part of outpt program to assist w/ smoking cessation. Risks of continued smoking discussed.  Pt amenable to nicotine patch while inpatient  Total time spent 4 min counseling

## 2021-09-15 NOTE — PROGRESS NOTE ADULT - PROBLEM SELECTOR PLAN 4
- Mg 1.1 at admission; as above, has required mag supplement in past, currently not on supplement  - consider standing supplement at discharge  - replete Mg  - repeat Mg level after repletion  - consider starting standing magnesium oxide pending repeat -c/w methimazole 10 mg TID

## 2021-09-15 NOTE — PATIENT PROFILE ADULT - NSASFALLNEEDSASSIST_GEN_A_NUR
Clinic Care Coordination Contact 11/9/17  Care Team Conversations-SW    CPS report made this day to [facility].    Fifi Lopez \[facility]\""  Care Coordinator Social Work    [facility]  [phone]  11/9/2017 3:58 PM             no

## 2021-09-15 NOTE — PROGRESS NOTE ADULT - PROBLEM SELECTOR PLAN 7
counseled at bedside, currently smokes 0.5 ppd, previously 1-1.5 ppd. Pt part of outpt program to assist w/ smoking cessation. Risks of continued smoking discussed.  Pt amenable to nicotine patch while inpatient  Total time spent 4 min counseling dvt ppx: enoxaparin subq

## 2021-09-15 NOTE — PROGRESS NOTE ADULT - ASSESSMENT
40 yr old woman with a PMH of paroxysmal atrial fibrillation, MVP with MR, hyperthyroidism, mild asthma, and depression/anxiety who presents w/ numbness, tingling sensation in bilateral hands and feet. 40 yr old woman with a PMH of paroxysmal atrial fibrillation, MVP with MR, hyperthyroidism, mild asthma, and depression/anxiety who presents w/ numbness, tingling sensation in bilateral hands and feet, found to have hypocalcemia, low folate, and hypo-Mg.

## 2021-09-15 NOTE — PROGRESS NOTE ADULT - PROBLEM SELECTOR PLAN 1
new onset paresthesias in b/l feet and hands in stocking-glove pattern, no hx of DM  DDx includes iron and folate deficiency (as noted on admission labs), electrolyte deficiencies (Mg and K), rarely associated w/ methimazole adverse effect. VB12 wnl.  - will increased gabapentin to 300 mg TID (from BID), assess for response  - start iron supplements  - consider Neuro eval in am New tingling/ pain after blood transfusion a week and a half ago, found to have hypocalcemia. Also found to have prolonged QT and hypotensive- symptoms of hypocalcemia. Most likely her paresthesia is due to hypocalcemia.   Other DDx includes folate deficiency (as noted on admission labs). Vitamin B12 WNL.   -IV calcium gluconate 4mg over 4 hours  -replete K, and Mg as needed  - start iron supplements  -PTH and vitamin D level  -continue gabapentin  -BMP q12hr

## 2021-09-15 NOTE — PROGRESS NOTE ADULT - SUBJECTIVE AND OBJECTIVE BOX
PROGRESS NOTE:   Authored by Brielle Escobedo MD     Patient is a 40y old  Female who presents with a chief complaint of paresthesias (14 Sep 2021 21:33)      SUBJECTIVE / OVERNIGHT EVENTS:    ADDITIONAL REVIEW OF SYSTEMS:    MEDICATIONS  (STANDING):  calcium gluconate IVPB 4 Gram(s) IV Intermittent once  ferrous    sulfate 325 milliGRAM(s) Oral daily  gabapentin 300 milliGRAM(s) Oral three times a day  methimazole 10 milliGRAM(s) Oral three times a day  metoprolol tartrate 50 milliGRAM(s) Oral two times a day  nicotine -   7 mG/24Hr(s) Patch 1 patch Transdermal daily  sertraline 100 milliGRAM(s) Oral at bedtime  traZODone 50 milliGRAM(s) Oral two times a day    MEDICATIONS  (PRN):  acetaminophen   Tablet .. 650 milliGRAM(s) Oral every 6 hours PRN Temp greater or equal to 38.5C (101.3F), Mild Pain (1 - 3)  ALBUTerol    90 MICROgram(s) HFA Inhaler 2 Puff(s) Inhalation every 6 hours PRN for shortness of breath and/or wheezing  diazepam    Tablet 2 milliGRAM(s) Oral daily PRN anxiety  traMADol 25 milliGRAM(s) Oral every 12 hours PRN Moderate Pain (4 - 6)      CAPILLARY BLOOD GLUCOSE        I&O's Summary      PHYSICAL EXAM:  Vital Signs Last 24 Hrs  T(C): 37.2 (15 Sep 2021 15:22), Max: 37.2 (15 Sep 2021 15:22)  T(F): 98.9 (15 Sep 2021 15:22), Max: 98.9 (15 Sep 2021 15:22)  HR: 89 (15 Sep 2021 15:22) (73 - 111)  BP: 107/71 (15 Sep 2021 15:22) (93/61 - 121/85)  BP(mean): 73 (14 Sep 2021 19:30) (73 - 73)  RR: 18 (15 Sep 2021 15:22) (15 - 20)  SpO2: 97% (15 Sep 2021 15:22) (94% - 99%)    GENERAL: No acute distress, well-developed  HEAD:  Atraumatic, Normocephalic  EYES: EOMI, PERRLA, conjunctiva and sclera clear  NECK: Supple, no lymphadenopathy, no JVD  CHEST/LUNG: CTAB; No wheezes, rales, or rhonchi  HEART: Regular rate and rhythm; No murmurs, rubs, or gallops  ABDOMEN: Soft, non-tender, non-distended; normal bowel sounds, no organomegaly  EXTREMITIES:  2+ peripheral pulses b/l, No clubbing, cyanosis, or edema  NEUROLOGY: A&O x 3, no focal deficits  SKIN: No rashes or lesions    LABS:                        7.7    4.81  )-----------( 270      ( 15 Sep 2021 05:51 )             27.2     09-15    143  |  105  |  7   ----------------------------<  83  3.7   |  22  |  0.41<L>    Ca    7.4<L>      15 Sep 2021 05:51  Phos  3.0     09-14  Mg     1.5     09-15    TPro  6.3  /  Alb  3.5  /  TBili  0.4  /  DBili  x   /  AST  42<H>  /  ALT  15  /  AlkPhos  106  09-14                RADIOLOGY & ADDITIONAL TESTS:  Results Reviewed:   Imaging Personally Reviewed:  Electrocardiogram Personally Reviewed:    COORDINATION OF CARE:  Care Discussed with Consultants/Other Providers [Y/N]:  Prior or Outpatient Records Reviewed [Y/N]:   PROGRESS NOTE:   Authored by Brielle Escobedo MD     Patient is a 40y old  Female who presents with a chief complaint of paresthesias (14 Sep 2021 21:33)      SUBJECTIVE / OVERNIGHT EVENTS:  Patient continued to have pain/ tingling on tips of fingers and toes, radiating to palms and soles. She denied N/v/D, fever, chills.     ADDITIONAL REVIEW OF SYSTEMS:    MEDICATIONS  (STANDING):  calcium gluconate IVPB 4 Gram(s) IV Intermittent once  ferrous    sulfate 325 milliGRAM(s) Oral daily  gabapentin 300 milliGRAM(s) Oral three times a day  methimazole 10 milliGRAM(s) Oral three times a day  metoprolol tartrate 50 milliGRAM(s) Oral two times a day  nicotine -   7 mG/24Hr(s) Patch 1 patch Transdermal daily  sertraline 100 milliGRAM(s) Oral at bedtime  traZODone 50 milliGRAM(s) Oral two times a day    MEDICATIONS  (PRN):  acetaminophen   Tablet .. 650 milliGRAM(s) Oral every 6 hours PRN Temp greater or equal to 38.5C (101.3F), Mild Pain (1 - 3)  ALBUTerol    90 MICROgram(s) HFA Inhaler 2 Puff(s) Inhalation every 6 hours PRN for shortness of breath and/or wheezing  diazepam    Tablet 2 milliGRAM(s) Oral daily PRN anxiety  traMADol 25 milliGRAM(s) Oral every 12 hours PRN Moderate Pain (4 - 6)      CAPILLARY BLOOD GLUCOSE        I&O's Summary      PHYSICAL EXAM:  Vital Signs Last 24 Hrs  T(C): 37.2 (15 Sep 2021 15:22), Max: 37.2 (15 Sep 2021 15:22)  T(F): 98.9 (15 Sep 2021 15:22), Max: 98.9 (15 Sep 2021 15:22)  HR: 89 (15 Sep 2021 15:22) (73 - 111)  BP: 107/71 (15 Sep 2021 15:22) (93/61 - 121/85)  BP(mean): 73 (14 Sep 2021 19:30) (73 - 73)  RR: 18 (15 Sep 2021 15:22) (15 - 20)  SpO2: 97% (15 Sep 2021 15:22) (94% - 99%)    GENERAL: No acute distress, well-developed  HEAD:  Atraumatic, Normocephalic  EYES: EOMI, PERRLA, conjunctiva and sclera clear  NECK: Supple, no lymphadenopathy, no JVD  CHEST/LUNG: CTAB; No wheezes, rales, or rhonchi  HEART: Regular rate and rhythm; No murmurs, rubs, or gallops  ABDOMEN: Soft, non-tender, non-distended; normal bowel sounds, no organomegaly  EXTREMITIES:  2+ peripheral pulses b/l, No clubbing, cyanosis, or edema  NEUROLOGY: A&O x 3, no focal deficits. Intact sensation and pain on hands and feet. Motor strength 5/5 on UEs and LEs. Cranial nerves II-XII: intact  SKIN: No rashes or lesions    LABS:                        7.7    4.81  )-----------( 270      ( 15 Sep 2021 05:51 )             27.2     09-15    143  |  105  |  7   ----------------------------<  83  3.7   |  22  |  0.41<L>    Ca    7.4<L>      15 Sep 2021 05:51  Phos  3.0     09-14  Mg     1.5     09-15    TPro  6.3  /  Alb  3.5  /  TBili  0.4  /  DBili  x   /  AST  42<H>  /  ALT  15  /  AlkPhos  106  09-14                RADIOLOGY & ADDITIONAL TESTS:  Results Reviewed:   Imaging Personally Reviewed:  Electrocardiogram Personally Reviewed:    COORDINATION OF CARE:  Care Discussed with Consultants/Other Providers [Y/N]:  Prior or Outpatient Records Reviewed [Y/N]:

## 2021-09-15 NOTE — PROGRESS NOTE ADULT - PROBLEM SELECTOR PLAN 2
noted to be in NSR on admission EKG; per pt, AF mostly in setting of uncontrolled hyperthyroid  - c/w Lopressor 50 mg BID  - c/w methimazole 10 mg TID NSR on admission EKG; per pt, AF most likely due to uncontrolled hyperthyroidism.   - c/w Lopressor 50 mg BID  - c/w methimazole 10 mg TID

## 2021-09-16 ENCOUNTER — TRANSCRIPTION ENCOUNTER (OUTPATIENT)
Age: 41
End: 2021-09-16

## 2021-09-16 LAB
24R-OH-CALCIDIOL SERPL-MCNC: 9.5 NG/ML — LOW (ref 30–80)
ALBUMIN SERPL ELPH-MCNC: 3.1 G/DL — LOW (ref 3.3–5)
ALP SERPL-CCNC: 96 U/L — SIGNIFICANT CHANGE UP (ref 40–120)
ALT FLD-CCNC: 10 U/L — SIGNIFICANT CHANGE UP (ref 10–45)
ANION GAP SERPL CALC-SCNC: 11 MMOL/L — SIGNIFICANT CHANGE UP (ref 5–17)
ANION GAP SERPL CALC-SCNC: 13 MMOL/L — SIGNIFICANT CHANGE UP (ref 5–17)
AST SERPL-CCNC: 25 U/L — SIGNIFICANT CHANGE UP (ref 10–40)
BASOPHILS # BLD AUTO: 0.04 K/UL — SIGNIFICANT CHANGE UP (ref 0–0.2)
BASOPHILS NFR BLD AUTO: 0.7 % — SIGNIFICANT CHANGE UP (ref 0–2)
BILIRUB SERPL-MCNC: 0.4 MG/DL — SIGNIFICANT CHANGE UP (ref 0.2–1.2)
BUN SERPL-MCNC: 6 MG/DL — LOW (ref 7–23)
BUN SERPL-MCNC: 6 MG/DL — LOW (ref 7–23)
CA-I BLD-SCNC: 1.07 MMOL/L — LOW (ref 1.15–1.33)
CALCIUM SERPL-MCNC: 8 MG/DL — LOW (ref 8.4–10.5)
CALCIUM SERPL-MCNC: 8 MG/DL — LOW (ref 8.4–10.5)
CALCIUM SERPL-MCNC: 8.1 MG/DL — LOW (ref 8.4–10.5)
CHLORIDE SERPL-SCNC: 102 MMOL/L — SIGNIFICANT CHANGE UP (ref 96–108)
CHLORIDE SERPL-SCNC: 104 MMOL/L — SIGNIFICANT CHANGE UP (ref 96–108)
CO2 SERPL-SCNC: 22 MMOL/L — SIGNIFICANT CHANGE UP (ref 22–31)
CO2 SERPL-SCNC: 22 MMOL/L — SIGNIFICANT CHANGE UP (ref 22–31)
CREAT SERPL-MCNC: 0.41 MG/DL — LOW (ref 0.5–1.3)
CREAT SERPL-MCNC: 0.43 MG/DL — LOW (ref 0.5–1.3)
EOSINOPHIL # BLD AUTO: 0.23 K/UL — SIGNIFICANT CHANGE UP (ref 0–0.5)
EOSINOPHIL NFR BLD AUTO: 3.9 % — SIGNIFICANT CHANGE UP (ref 0–6)
GLUCOSE SERPL-MCNC: 100 MG/DL — HIGH (ref 70–99)
GLUCOSE SERPL-MCNC: 100 MG/DL — HIGH (ref 70–99)
HCT VFR BLD CALC: 28 % — LOW (ref 34.5–45)
HGB BLD-MCNC: 7.9 G/DL — LOW (ref 11.5–15.5)
IMM GRANULOCYTES NFR BLD AUTO: 0.5 % — SIGNIFICANT CHANGE UP (ref 0–1.5)
LYMPHOCYTES # BLD AUTO: 0.9 K/UL — LOW (ref 1–3.3)
LYMPHOCYTES # BLD AUTO: 15.2 % — SIGNIFICANT CHANGE UP (ref 13–44)
MAGNESIUM SERPL-MCNC: 1.8 MG/DL — SIGNIFICANT CHANGE UP (ref 1.6–2.6)
MAGNESIUM SERPL-MCNC: 1.9 MG/DL — SIGNIFICANT CHANGE UP (ref 1.6–2.6)
MCHC RBC-ENTMCNC: 20.7 PG — LOW (ref 27–34)
MCHC RBC-ENTMCNC: 28.2 GM/DL — LOW (ref 32–36)
MCV RBC AUTO: 73.5 FL — LOW (ref 80–100)
MONOCYTES # BLD AUTO: 0.4 K/UL — SIGNIFICANT CHANGE UP (ref 0–0.9)
MONOCYTES NFR BLD AUTO: 6.7 % — SIGNIFICANT CHANGE UP (ref 2–14)
NEUTROPHILS # BLD AUTO: 4.33 K/UL — SIGNIFICANT CHANGE UP (ref 1.8–7.4)
NEUTROPHILS NFR BLD AUTO: 73 % — SIGNIFICANT CHANGE UP (ref 43–77)
NRBC # BLD: 0 /100 WBCS — SIGNIFICANT CHANGE UP (ref 0–0)
PHOSPHATE SERPL-MCNC: 3 MG/DL — SIGNIFICANT CHANGE UP (ref 2.5–4.5)
PLATELET # BLD AUTO: 315 K/UL — SIGNIFICANT CHANGE UP (ref 150–400)
POTASSIUM SERPL-MCNC: 3.4 MMOL/L — LOW (ref 3.5–5.3)
POTASSIUM SERPL-MCNC: 3.5 MMOL/L — SIGNIFICANT CHANGE UP (ref 3.5–5.3)
POTASSIUM SERPL-SCNC: 3.4 MMOL/L — LOW (ref 3.5–5.3)
POTASSIUM SERPL-SCNC: 3.5 MMOL/L — SIGNIFICANT CHANGE UP (ref 3.5–5.3)
PROT SERPL-MCNC: 5.8 G/DL — LOW (ref 6–8.3)
PTH-INTACT FLD-MCNC: 39 PG/ML — SIGNIFICANT CHANGE UP (ref 15–65)
RBC # BLD: 3.81 M/UL — SIGNIFICANT CHANGE UP (ref 3.8–5.2)
RBC # FLD: 27.6 % — HIGH (ref 10.3–14.5)
SODIUM SERPL-SCNC: 135 MMOL/L — SIGNIFICANT CHANGE UP (ref 135–145)
SODIUM SERPL-SCNC: 139 MMOL/L — SIGNIFICANT CHANGE UP (ref 135–145)
VIT D25+D1,25 OH+D1,25 PNL SERPL-MCNC: 43.9 PG/ML — SIGNIFICANT CHANGE UP (ref 19.9–79.3)
WBC # BLD: 5.93 K/UL — SIGNIFICANT CHANGE UP (ref 3.8–10.5)
WBC # FLD AUTO: 5.93 K/UL — SIGNIFICANT CHANGE UP (ref 3.8–10.5)

## 2021-09-16 PROCEDURE — 99232 SBSQ HOSP IP/OBS MODERATE 35: CPT | Mod: GC

## 2021-09-16 RX ORDER — OXYCODONE AND ACETAMINOPHEN 5; 325 MG/1; MG/1
1 TABLET ORAL EVERY 6 HOURS
Refills: 0 | Status: DISCONTINUED | OUTPATIENT
Start: 2021-09-16 | End: 2021-09-17

## 2021-09-16 RX ORDER — FOLIC ACID 0.8 MG
1 TABLET ORAL DAILY
Refills: 0 | Status: DISCONTINUED | OUTPATIENT
Start: 2021-09-16 | End: 2021-09-17

## 2021-09-16 RX ORDER — OXYCODONE AND ACETAMINOPHEN 5; 325 MG/1; MG/1
1 TABLET ORAL ONCE
Refills: 0 | Status: DISCONTINUED | OUTPATIENT
Start: 2021-09-16 | End: 2021-09-16

## 2021-09-16 RX ORDER — CALCIUM GLUCONATE 100 MG/ML
2 VIAL (ML) INTRAVENOUS EVERY 4 HOURS
Refills: 0 | Status: COMPLETED | OUTPATIENT
Start: 2021-09-16 | End: 2021-09-16

## 2021-09-16 RX ORDER — POTASSIUM CHLORIDE 20 MEQ
40 PACKET (EA) ORAL ONCE
Refills: 0 | Status: COMPLETED | OUTPATIENT
Start: 2021-09-16 | End: 2021-09-16

## 2021-09-16 RX ORDER — POTASSIUM CHLORIDE 20 MEQ
20 PACKET (EA) ORAL ONCE
Refills: 0 | Status: COMPLETED | OUTPATIENT
Start: 2021-09-16 | End: 2021-09-16

## 2021-09-16 RX ORDER — ENOXAPARIN SODIUM 100 MG/ML
40 INJECTION SUBCUTANEOUS DAILY
Refills: 0 | Status: DISCONTINUED | OUTPATIENT
Start: 2021-09-16 | End: 2021-09-17

## 2021-09-16 RX ORDER — MAGNESIUM SULFATE 500 MG/ML
1 VIAL (ML) INJECTION ONCE
Refills: 0 | Status: COMPLETED | OUTPATIENT
Start: 2021-09-16 | End: 2021-09-16

## 2021-09-16 RX ORDER — CALCIUM GLUCONATE 100 MG/ML
2 VIAL (ML) INTRAVENOUS EVERY 4 HOURS
Refills: 0 | Status: DISCONTINUED | OUTPATIENT
Start: 2021-09-16 | End: 2021-09-16

## 2021-09-16 RX ADMIN — Medication 50 MILLIGRAM(S): at 17:39

## 2021-09-16 RX ADMIN — OXYCODONE AND ACETAMINOPHEN 1 TABLET(S): 5; 325 TABLET ORAL at 14:28

## 2021-09-16 RX ADMIN — GABAPENTIN 300 MILLIGRAM(S): 400 CAPSULE ORAL at 05:22

## 2021-09-16 RX ADMIN — Medication 200 GRAM(S): at 13:12

## 2021-09-16 RX ADMIN — GABAPENTIN 300 MILLIGRAM(S): 400 CAPSULE ORAL at 13:12

## 2021-09-16 RX ADMIN — SERTRALINE 100 MILLIGRAM(S): 25 TABLET, FILM COATED ORAL at 21:21

## 2021-09-16 RX ADMIN — Medication 100 GRAM(S): at 13:12

## 2021-09-16 RX ADMIN — GABAPENTIN 300 MILLIGRAM(S): 400 CAPSULE ORAL at 21:21

## 2021-09-16 RX ADMIN — ENOXAPARIN SODIUM 40 MILLIGRAM(S): 100 INJECTION SUBCUTANEOUS at 13:11

## 2021-09-16 RX ADMIN — OXYCODONE AND ACETAMINOPHEN 1 TABLET(S): 5; 325 TABLET ORAL at 14:58

## 2021-09-16 RX ADMIN — OXYCODONE AND ACETAMINOPHEN 1 TABLET(S): 5; 325 TABLET ORAL at 21:45

## 2021-09-16 RX ADMIN — Medication 1 MILLIGRAM(S): at 17:39

## 2021-09-16 RX ADMIN — Medication 40 MILLIEQUIVALENT(S): at 05:49

## 2021-09-16 RX ADMIN — Medication 50 MILLIGRAM(S): at 05:22

## 2021-09-16 RX ADMIN — Medication 20 MILLIEQUIVALENT(S): at 13:12

## 2021-09-16 RX ADMIN — OXYCODONE AND ACETAMINOPHEN 1 TABLET(S): 5; 325 TABLET ORAL at 05:49

## 2021-09-16 RX ADMIN — Medication 200 GRAM(S): at 18:31

## 2021-09-16 RX ADMIN — OXYCODONE AND ACETAMINOPHEN 1 TABLET(S): 5; 325 TABLET ORAL at 21:24

## 2021-09-16 RX ADMIN — OXYCODONE AND ACETAMINOPHEN 1 TABLET(S): 5; 325 TABLET ORAL at 06:20

## 2021-09-16 RX ADMIN — Medication 325 MILLIGRAM(S): at 12:37

## 2021-09-16 NOTE — PROVIDER CONTACT NOTE (OTHER) - ACTION/TREATMENT ORDERED:
Provider to assess pt at bedside and review chart.
Try labs again at 4am. Will continue to monitor pt.

## 2021-09-16 NOTE — PROVIDER CONTACT NOTE (OTHER) - SITUATION
Pt. is refusing blood draws for lab work.
Pt states that current pain medication isn't working and pt refused bedtime zoloft. She prefers to take it at home.

## 2021-09-16 NOTE — PROGRESS NOTE ADULT - PROBLEM SELECTOR PLAN 1
New tingling/ pain after blood transfusion a week and a half ago, found to have hypocalcemia. Also found to have prolonged QT and hypotensive- symptoms of hypocalcemia. Most likely her paresthesia is due to hypocalcemia.   Other DDx includes folate deficiency (as noted on admission labs). Vitamin B12 WNL.   -IV calcium gluconate 4mg over 4 hours  -replete K, and Mg as needed  - start iron supplements  -PTH and vitamin D level  -continue gabapentin  -BMP q12hr New tingling/ pain after blood transfusion a week and a half ago, found to have hypocalcemia. Also found to have prolonged QT and hypotensive- symptoms of hypocalcemia. Most likely her paresthesia is due to hypocalcemia.   Other DDx includes folate deficiency (as noted on admission labs). Vitamin B12 WNL.   -IV calcium gluconate 2mg over 4 hours x2   -replete K, and Mg as needed  - start iron supplements  -PTH and vitamin D level  -continue gabapentin  -BMP q12hr New tingling/ pain after blood transfusion a week and a half ago, found to have hypocalcemia. Also found to have prolonged QT and hypotensive- symptoms of hypocalcemia. Most likely her paresthesia is due to hypocalcemia.   Other DDx includes folate deficiency (as noted on admission labs). Vitamin B12 WNL.   -IV calcium gluconate 2mg over 4 hours  -replete K, and Mg as needed, keep Mg > 2  - start iron supplements  -PTH and vitamin D level  -continue gabapentin  -BMP q12hr

## 2021-09-16 NOTE — DISCHARGE NOTE PROVIDER - NSDCCPCAREPLAN_GEN_ALL_CORE_FT
PRINCIPAL DISCHARGE DIAGNOSIS  Diagnosis: Paresthesia  Assessment and Plan of Treatment: You were admitted due to tingling/numbness sensation in the tips of her fingers and toes  and found to have low Ca and low Mg level. You were given Calcium IV. Magnesium and potassium were replete. Folate was low, so you were started on supplement. Vitamin B12 level was normal. Vitamin D was low. Your pain was controlled with pain meds. Your symptoms improved and your calcium level was back to normal range. Please follow up with your primary care physician within 1-2 week of discharge from the hospital.

## 2021-09-16 NOTE — PROGRESS NOTE ADULT - PROBLEM SELECTOR PLAN 2
NSR on admission EKG; per pt, AF most likely due to uncontrolled hyperthyroidism.   - c/w Lopressor 50 mg BID  - c/w methimazole 10 mg TID

## 2021-09-16 NOTE — PROGRESS NOTE ADULT - ASSESSMENT
40 yr old woman with a PMH of paroxysmal atrial fibrillation, MVP with MR, hyperthyroidism, mild asthma, and depression/anxiety who presents w/ numbness, tingling sensation in bilateral hands and feet, found to have hypocalcemia, low folate, and hypo-Mg.

## 2021-09-16 NOTE — DISCHARGE NOTE PROVIDER - NSDCMRMEDTOKEN_GEN_ALL_CORE_FT
Albuterol (Eqv-Proventil HFA) 90 mcg/inh inhalation aerosol: 2 puff(s) inhaled every 6 hours, As Needed - for shortness of breath and/or wheezing  diazePAM 2 mg oral tablet: 1 tab(s) orally once a day, As Needed  gabapentin 300 mg oral capsule: 1 cap(s) orally 2 times a day   Lopressor 50 mg oral tablet: 1 tab(s) orally 2 times a day  methIMAzole 10 mg oral tablet: 1 tab(s) orally 3 times a day  traZODone 50 mg oral tablet: 1 tab(s) orally 2 times a day  Zoloft 100 mg oral tablet: 1 tab(s) orally once a day (at bedtime)   acetaminophen 325 mg oral tablet: 2 tab(s) orally every 6 hours, As needed, Temp greater or equal to 38.5C (101.3F), Mild Pain (1 - 3)  Albuterol (Eqv-Proventil HFA) 90 mcg/inh inhalation aerosol: 2 puff(s) inhaled every 6 hours, As Needed - for shortness of breath and/or wheezing  diazePAM 2 mg oral tablet: 1 tab(s) orally once a day, As Needed  ferrous sulfate 325 mg (65 mg elemental iron) oral tablet: 1 tab(s) orally every other day   folic acid 1 mg oral tablet: 1 tab(s) orally once a day  gabapentin 300 mg oral capsule: 1 cap(s) orally 2 times a day   Lopressor 50 mg oral tablet: 1 tab(s) orally 2 times a day  methIMAzole 10 mg oral tablet: 1 tab(s) orally 3 times a day  nicotine 7 mg/24 hr transdermal film, extended release: 1 patch transdermal once a day   oxycodone-acetaminophen 5 mg-325 mg oral tablet: 1 tab(s) orally every 6 hours MDD:20 mg oxycodone  traZODone 50 mg oral tablet: 1 tab(s) orally 2 times a day  Zoloft 100 mg oral tablet: 1 tab(s) orally once a day (at bedtime)

## 2021-09-16 NOTE — PROVIDER CONTACT NOTE (OTHER) - BACKGROUND
Pt admitted W/ paresthesias, hypocalemia, hypomagnesemia and hypocalcemia
PMH afib, MVP with MR, hyperthyroidism, asthma, depression, anxiety. Current admission for numbness/tingling in BL hands and feet, hypocalcemia, hypokalemia, hypomagnesemia.

## 2021-09-16 NOTE — DISCHARGE NOTE PROVIDER - NSFOLLOWUPCLINICS_GEN_ALL_ED_FT
[facility] General Internal Medicine  General Internal Medicine  [address] [id number]  Phone: [phone]  Fax:   Follow Up Time: 1 week

## 2021-09-16 NOTE — DISCHARGE NOTE PROVIDER - HOSPITAL COURSE
40 yr old woman with a PMH of paroxysmal atrial fibrillation, MVP with MR, hyperthyroidism, mild asthma, and depression/anxiety who presents w/ numbness, tingling sensation in bilateral hands and feet. Pt notes new onset of paresthesias about 1.5 weeks ago after receiving a blood transfusion at [facility]. Pt describes pain as a tingling/numbness sensation in the tips of her fingers and toes w/ a sharp, shooting sensation in the palm of her hand and foot. Patient was found to have hypocalcemia, low folate and hypomagnesemia. She was given Calcium IV and replete Mg >2 and K> 4. Folate was low, so started supplement. Vitamin B12 was WNL. PTH was normal. Vitamin D was low. She was given tramadol and percocet for pain control. Patient's symptom improved. On the day of discharge, patient was stable.

## 2021-09-16 NOTE — PROGRESS NOTE ADULT - SUBJECTIVE AND OBJECTIVE BOX
PROGRESS NOTE:   Authored by Brielle Escobedo MD     Patient is a 40y old  Female who presents with a chief complaint of paresthesias (15 Sep 2021 16:17)      SUBJECTIVE / OVERNIGHT EVENTS:    ADDITIONAL REVIEW OF SYSTEMS:    MEDICATIONS  (STANDING):  ferrous    sulfate 325 milliGRAM(s) Oral daily  gabapentin 300 milliGRAM(s) Oral three times a day  methimazole 10 milliGRAM(s) Oral three times a day  metoprolol tartrate 50 milliGRAM(s) Oral two times a day  nicotine -   7 mG/24Hr(s) Patch 1 patch Transdermal daily  sertraline 100 milliGRAM(s) Oral at bedtime  traZODone 50 milliGRAM(s) Oral two times a day    MEDICATIONS  (PRN):  acetaminophen   Tablet .. 650 milliGRAM(s) Oral every 6 hours PRN Temp greater or equal to 38.5C (101.3F), Mild Pain (1 - 3)  ALBUTerol    90 MICROgram(s) HFA Inhaler 2 Puff(s) Inhalation every 6 hours PRN for shortness of breath and/or wheezing  diazepam    Tablet 2 milliGRAM(s) Oral daily PRN anxiety  traMADol 25 milliGRAM(s) Oral every 12 hours PRN Moderate Pain (4 - 6)      CAPILLARY BLOOD GLUCOSE        I&O's Summary      PHYSICAL EXAM:  Vital Signs Last 24 Hrs  T(C): 37.2 (16 Sep 2021 04:00), Max: 37.2 (15 Sep 2021 15:22)  T(F): 99 (16 Sep 2021 04:00), Max: 99 (16 Sep 2021 04:00)  HR: 103 (16 Sep 2021 04:00) (73 - 103)  BP: 117/81 (16 Sep 2021 04:00) (95/65 - 117/81)  BP(mean): --  RR: 16 (16 Sep 2021 04:00) (15 - 18)  SpO2: 96% (16 Sep 2021 04:00) (96% - 99%)    GENERAL: No acute distress, well-developed  HEAD:  Atraumatic, Normocephalic  EYES: EOMI, PERRLA, conjunctiva and sclera clear  NECK: Supple, no lymphadenopathy, no JVD  CHEST/LUNG: CTAB; No wheezes, rales, or rhonchi  HEART: Regular rate and rhythm; No murmurs, rubs, or gallops  ABDOMEN: Soft, non-tender, non-distended; normal bowel sounds, no organomegaly  EXTREMITIES:  2+ peripheral pulses b/l, No clubbing, cyanosis, or edema  NEUROLOGY: A&O x 3, no focal deficits  SKIN: No rashes or lesions    LABS:                        7.9    5.93  )-----------( 315      ( 16 Sep 2021 04:24 )             28.0     09-16    139  |  104  |  6<L>  ----------------------------<  100<H>  3.4<L>   |  22  |  0.41<L>    Ca    8.0<L>      16 Sep 2021 04:28  Phos  3.0     09-16  Mg     1.9     09-16    TPro  5.8<L>  /  Alb  3.1<L>  /  TBili  0.4  /  DBili  x   /  AST  25  /  ALT  10  /  AlkPhos  96  09-16                RADIOLOGY & ADDITIONAL TESTS:  Results Reviewed:   Imaging Personally Reviewed:  Electrocardiogram Personally Reviewed:    COORDINATION OF CARE:  Care Discussed with Consultants/Other Providers [Y/N]:  Prior or Outpatient Records Reviewed [Y/N]:   PROGRESS NOTE:   Authored by Brielle Escobedo MD     Patient is a 40y old  Female who presents with a chief complaint of paresthesias (15 Sep 2021 16:17)      SUBJECTIVE / OVERNIGHT EVENTS:  Patient endorsed pain and tingling improved to 8 today from 10/10 yesterday. Intermittently oral paresthesia. She denied N/V/D, fever, chills.     ADDITIONAL REVIEW OF SYSTEMS:    MEDICATIONS  (STANDING):  ferrous    sulfate 325 milliGRAM(s) Oral daily  gabapentin 300 milliGRAM(s) Oral three times a day  methimazole 10 milliGRAM(s) Oral three times a day  metoprolol tartrate 50 milliGRAM(s) Oral two times a day  nicotine -   7 mG/24Hr(s) Patch 1 patch Transdermal daily  sertraline 100 milliGRAM(s) Oral at bedtime  traZODone 50 milliGRAM(s) Oral two times a day    MEDICATIONS  (PRN):  acetaminophen   Tablet .. 650 milliGRAM(s) Oral every 6 hours PRN Temp greater or equal to 38.5C (101.3F), Mild Pain (1 - 3)  ALBUTerol    90 MICROgram(s) HFA Inhaler 2 Puff(s) Inhalation every 6 hours PRN for shortness of breath and/or wheezing  diazepam    Tablet 2 milliGRAM(s) Oral daily PRN anxiety  traMADol 25 milliGRAM(s) Oral every 12 hours PRN Moderate Pain (4 - 6)      CAPILLARY BLOOD GLUCOSE        I&O's Summary      PHYSICAL EXAM:  Vital Signs Last 24 Hrs  T(C): 37.2 (16 Sep 2021 04:00), Max: 37.2 (15 Sep 2021 15:22)  T(F): 99 (16 Sep 2021 04:00), Max: 99 (16 Sep 2021 04:00)  HR: 103 (16 Sep 2021 04:00) (73 - 103)  BP: 117/81 (16 Sep 2021 04:00) (95/65 - 117/81)  BP(mean): --  RR: 16 (16 Sep 2021 04:00) (15 - 18)  SpO2: 96% (16 Sep 2021 04:00) (96% - 99%)    GENERAL: No acute distress, well-developed  HEAD:  Atraumatic, Normocephalic  EYES: EOMI, PERRLA, conjunctiva and sclera clear  NECK: Supple, no lymphadenopathy, no JVD  CHEST/LUNG: CTAB; No wheezes, rales, or rhonchi  HEART: Regular rate and rhythm; No murmurs, rubs, or gallops  ABDOMEN: Soft, non-tender, non-distended; normal bowel sounds, no organomegaly  EXTREMITIES:  2+ peripheral pulses b/l, No clubbing, cyanosis, or edema  NEUROLOGY: A&O x 3, no focal deficits. Intact sensation and pain on hands and feet. Motor strength 5/5 on UEs and LEs. Cranial nerves II-XII: intact  SKIN: No rashes or lesions    LABS:                        7.9    5.93  )-----------( 315      ( 16 Sep 2021 04:24 )             28.0     09-16    139  |  104  |  6<L>  ----------------------------<  100<H>  3.4<L>   |  22  |  0.41<L>    Ca    8.0<L>      16 Sep 2021 04:28  Phos  3.0     09-16  Mg     1.9     09-16    TPro  5.8<L>  /  Alb  3.1<L>  /  TBili  0.4  /  DBili  x   /  AST  25  /  ALT  10  /  AlkPhos  96  09-16                RADIOLOGY & ADDITIONAL TESTS:  Results Reviewed:   Imaging Personally Reviewed:  Electrocardiogram Personally Reviewed:    COORDINATION OF CARE:  Care Discussed with Consultants/Other Providers [Y/N]:  Prior or Outpatient Records Reviewed [Y/N]:

## 2021-09-16 NOTE — PROGRESS NOTE ADULT - PROBLEM SELECTOR PLAN 3
Hypocalcemia can be exacerbated by hypomagnesemia. Also explained hypoK at admission.   -replete Mg and K as needed  -consider Mg oxide standing if persistent hypomagnesia  -BMP q12hr Hypocalcemia can be exacerbated by hypomagnesemia. Also explained hypoK at admission.   -replete Mg and K as needed  -BMP q12hr Hypocalcemia can be exacerbated by hypomagnesemia. Also explained hypoK at admission.   -replete Mg and K as needed, keep Mg > 2  -BMP q12hr

## 2021-09-17 ENCOUNTER — TRANSCRIPTION ENCOUNTER (OUTPATIENT)
Age: 41
End: 2021-09-17

## 2021-09-17 VITALS
DIASTOLIC BLOOD PRESSURE: 68 MMHG | TEMPERATURE: 98 F | SYSTOLIC BLOOD PRESSURE: 94 MMHG | HEART RATE: 90 BPM | OXYGEN SATURATION: 97 % | RESPIRATION RATE: 18 BRPM

## 2021-09-17 LAB
ANION GAP SERPL CALC-SCNC: 12 MMOL/L — SIGNIFICANT CHANGE UP (ref 5–17)
BASOPHILS # BLD AUTO: 0.04 K/UL — SIGNIFICANT CHANGE UP (ref 0–0.2)
BASOPHILS NFR BLD AUTO: 0.5 % — SIGNIFICANT CHANGE UP (ref 0–2)
BUN SERPL-MCNC: 8 MG/DL — SIGNIFICANT CHANGE UP (ref 7–23)
CA-I BLD-SCNC: 1.15 MMOL/L — SIGNIFICANT CHANGE UP (ref 1.15–1.33)
CALCIUM SERPL-MCNC: 8.6 MG/DL — SIGNIFICANT CHANGE UP (ref 8.4–10.5)
CHLORIDE SERPL-SCNC: 105 MMOL/L — SIGNIFICANT CHANGE UP (ref 96–108)
CO2 SERPL-SCNC: 22 MMOL/L — SIGNIFICANT CHANGE UP (ref 22–31)
CREAT SERPL-MCNC: 0.42 MG/DL — LOW (ref 0.5–1.3)
EOSINOPHIL # BLD AUTO: 0.27 K/UL — SIGNIFICANT CHANGE UP (ref 0–0.5)
EOSINOPHIL NFR BLD AUTO: 3.6 % — SIGNIFICANT CHANGE UP (ref 0–6)
GLUCOSE SERPL-MCNC: 95 MG/DL — SIGNIFICANT CHANGE UP (ref 70–99)
HCT VFR BLD CALC: 27.8 % — LOW (ref 34.5–45)
HGB BLD-MCNC: 7.8 G/DL — LOW (ref 11.5–15.5)
IMM GRANULOCYTES NFR BLD AUTO: 0.5 % — SIGNIFICANT CHANGE UP (ref 0–1.5)
LYMPHOCYTES # BLD AUTO: 0.76 K/UL — LOW (ref 1–3.3)
LYMPHOCYTES # BLD AUTO: 10.2 % — LOW (ref 13–44)
MAGNESIUM SERPL-MCNC: 1.6 MG/DL — SIGNIFICANT CHANGE UP (ref 1.6–2.6)
MCHC RBC-ENTMCNC: 21.1 PG — LOW (ref 27–34)
MCHC RBC-ENTMCNC: 28.1 GM/DL — LOW (ref 32–36)
MCV RBC AUTO: 75.1 FL — LOW (ref 80–100)
MONOCYTES # BLD AUTO: 0.41 K/UL — SIGNIFICANT CHANGE UP (ref 0–0.9)
MONOCYTES NFR BLD AUTO: 5.5 % — SIGNIFICANT CHANGE UP (ref 2–14)
NEUTROPHILS # BLD AUTO: 5.93 K/UL — SIGNIFICANT CHANGE UP (ref 1.8–7.4)
NEUTROPHILS NFR BLD AUTO: 79.7 % — HIGH (ref 43–77)
NRBC # BLD: 0 /100 WBCS — SIGNIFICANT CHANGE UP (ref 0–0)
PHOSPHATE SERPL-MCNC: 3.5 MG/DL — SIGNIFICANT CHANGE UP (ref 2.5–4.5)
PLATELET # BLD AUTO: 346 K/UL — SIGNIFICANT CHANGE UP (ref 150–400)
POTASSIUM SERPL-MCNC: 4 MMOL/L — SIGNIFICANT CHANGE UP (ref 3.5–5.3)
POTASSIUM SERPL-SCNC: 4 MMOL/L — SIGNIFICANT CHANGE UP (ref 3.5–5.3)
RBC # BLD: 3.7 M/UL — LOW (ref 3.8–5.2)
RBC # FLD: 27.9 % — HIGH (ref 10.3–14.5)
SODIUM SERPL-SCNC: 139 MMOL/L — SIGNIFICANT CHANGE UP (ref 135–145)
WBC # BLD: 7.45 K/UL — SIGNIFICANT CHANGE UP (ref 3.8–10.5)
WBC # FLD AUTO: 7.45 K/UL — SIGNIFICANT CHANGE UP (ref 3.8–10.5)

## 2021-09-17 PROCEDURE — 82310 ASSAY OF CALCIUM: CPT

## 2021-09-17 PROCEDURE — 86769 SARS-COV-2 COVID-19 ANTIBODY: CPT

## 2021-09-17 PROCEDURE — 99285 EMERGENCY DEPT VISIT HI MDM: CPT

## 2021-09-17 PROCEDURE — 84439 ASSAY OF FREE THYROXINE: CPT

## 2021-09-17 PROCEDURE — 82525 ASSAY OF COPPER: CPT

## 2021-09-17 PROCEDURE — 83735 ASSAY OF MAGNESIUM: CPT

## 2021-09-17 PROCEDURE — 85025 COMPLETE CBC W/AUTO DIFF WBC: CPT

## 2021-09-17 PROCEDURE — 99232 SBSQ HOSP IP/OBS MODERATE 35: CPT | Mod: GC

## 2021-09-17 PROCEDURE — 82652 VIT D 1 25-DIHYDROXY: CPT

## 2021-09-17 PROCEDURE — U0003: CPT

## 2021-09-17 PROCEDURE — 84100 ASSAY OF PHOSPHORUS: CPT

## 2021-09-17 PROCEDURE — 85027 COMPLETE CBC AUTOMATED: CPT

## 2021-09-17 PROCEDURE — 83970 ASSAY OF PARATHORMONE: CPT

## 2021-09-17 PROCEDURE — U0005: CPT

## 2021-09-17 PROCEDURE — 86901 BLOOD TYPING SEROLOGIC RH(D): CPT

## 2021-09-17 PROCEDURE — 83550 IRON BINDING TEST: CPT

## 2021-09-17 PROCEDURE — 80048 BASIC METABOLIC PNL TOTAL CA: CPT

## 2021-09-17 PROCEDURE — 93971 EXTREMITY STUDY: CPT

## 2021-09-17 PROCEDURE — 80053 COMPREHEN METABOLIC PANEL: CPT

## 2021-09-17 PROCEDURE — 84702 CHORIONIC GONADOTROPIN TEST: CPT

## 2021-09-17 PROCEDURE — 82746 ASSAY OF FOLIC ACID SERUM: CPT

## 2021-09-17 PROCEDURE — 82607 VITAMIN B-12: CPT

## 2021-09-17 PROCEDURE — 86900 BLOOD TYPING SEROLOGIC ABO: CPT

## 2021-09-17 PROCEDURE — 82330 ASSAY OF CALCIUM: CPT

## 2021-09-17 PROCEDURE — 94640 AIRWAY INHALATION TREATMENT: CPT

## 2021-09-17 PROCEDURE — 84443 ASSAY THYROID STIM HORMONE: CPT

## 2021-09-17 PROCEDURE — 86850 RBC ANTIBODY SCREEN: CPT

## 2021-09-17 PROCEDURE — 83540 ASSAY OF IRON: CPT

## 2021-09-17 PROCEDURE — 36415 COLL VENOUS BLD VENIPUNCTURE: CPT

## 2021-09-17 PROCEDURE — 82306 VITAMIN D 25 HYDROXY: CPT

## 2021-09-17 RX ORDER — FERROUS SULFATE 325(65) MG
1 TABLET ORAL
Qty: 15 | Refills: 0
Start: 2021-09-17 | End: 2021-10-16

## 2021-09-17 RX ORDER — ACETAMINOPHEN 500 MG
2 TABLET ORAL
Qty: 112 | Refills: 0
Start: 2021-09-17 | End: 2021-09-30

## 2021-09-17 RX ORDER — FOLIC ACID 0.8 MG
1 TABLET ORAL
Qty: 0 | Refills: 0 | DISCHARGE
Start: 2021-09-17

## 2021-09-17 RX ORDER — NICOTINE POLACRILEX 2 MG
1 GUM BUCCAL
Qty: 14 | Refills: 0
Start: 2021-09-17 | End: 2021-09-30

## 2021-09-17 RX ORDER — MAGNESIUM SULFATE 500 MG/ML
1 VIAL (ML) INJECTION ONCE
Refills: 0 | Status: COMPLETED | OUTPATIENT
Start: 2021-09-17 | End: 2021-09-17

## 2021-09-17 RX ADMIN — OXYCODONE AND ACETAMINOPHEN 1 TABLET(S): 5; 325 TABLET ORAL at 05:50

## 2021-09-17 RX ADMIN — Medication 50 MILLIGRAM(S): at 05:25

## 2021-09-17 RX ADMIN — OXYCODONE AND ACETAMINOPHEN 1 TABLET(S): 5; 325 TABLET ORAL at 05:28

## 2021-09-17 RX ADMIN — GABAPENTIN 300 MILLIGRAM(S): 400 CAPSULE ORAL at 05:24

## 2021-09-17 RX ADMIN — Medication 325 MILLIGRAM(S): at 12:43

## 2021-09-17 RX ADMIN — Medication 50 MILLIGRAM(S): at 05:24

## 2021-09-17 RX ADMIN — Medication 1 MILLIGRAM(S): at 12:44

## 2021-09-17 NOTE — DISCHARGE NOTE NURSING/CASE MANAGEMENT/SOCIAL WORK - PATIENT PORTAL LINK FT
You can access the FollowMyHealth Patient Portal offered by [facility] by registering at the following website: [email or web address]. By joining [employer]’s FollowMyHealth portal, you will also be able to view your health information using other applications (apps) compatible with our system.

## 2021-09-17 NOTE — PROGRESS NOTE ADULT - PROBLEM SELECTOR PLAN 1
New tingling/ pain after blood transfusion a week and a half ago, found to have hypocalcemia. Also found to have prolonged QT and hypotensive- symptoms of hypocalcemia. Most likely her paresthesia is due to hypocalcemia.   Other DDx includes folate deficiency (as noted on admission labs). Vitamin B12 WNL.   -IV calcium gluconate 2mg over 4 hours  -replete K, and Mg as needed, keep Mg > 2  - start iron supplements  -PTH and vitamin D level  -continue gabapentin  -BMP q12hr New tingling/ pain after blood transfusion a week and a half ago, found to have hypocalcemia. Also found to have prolonged QT and hypotensive- symptoms of hypocalcemia. Most likely her paresthesia is due to hypocalcemia.   Other DDx includes folate deficiency (as noted on admission labs). Vitamin B12 WNL. PTH was wnl. Vitamin D was low.   -replete K, and Mg as needed, keep Mg > 2  -continue gabapentin

## 2021-09-17 NOTE — DISCHARGE NOTE NURSING/CASE MANAGEMENT/SOCIAL WORK - NSDCPEFALRISK_GEN_ALL_CORE
For information on Fall & injury Prevention, visit [email or web address]

## 2021-09-17 NOTE — PROGRESS NOTE ADULT - PROBLEM SELECTOR PLAN 3
Hypocalcemia can be exacerbated by hypomagnesemia. Also explained hypoK at admission.   -replete Mg and K as needed, keep Mg > 2  -BMP q12hr Hypocalcemia can be exacerbated by hypomagnesemia. Also explained hypoK at admission.   -replete Mg and K as needed, keep Mg > 2

## 2021-09-17 NOTE — PROGRESS NOTE ADULT - SUBJECTIVE AND OBJECTIVE BOX
PROGRESS NOTE:   Authored by Brielle Escobedo MD     Patient is a 40y old  Female who presents with a chief complaint of paresthesias (16 Sep 2021 15:12)      SUBJECTIVE / OVERNIGHT EVENTS:    ADDITIONAL REVIEW OF SYSTEMS:    MEDICATIONS  (STANDING):  enoxaparin Injectable 40 milliGRAM(s) SubCutaneous daily  ferrous    sulfate 325 milliGRAM(s) Oral daily  folic acid 1 milliGRAM(s) Oral daily  gabapentin 300 milliGRAM(s) Oral three times a day  methimazole 10 milliGRAM(s) Oral three times a day  metoprolol tartrate 50 milliGRAM(s) Oral two times a day  nicotine -   7 mG/24Hr(s) Patch 1 patch Transdermal daily  sertraline 100 milliGRAM(s) Oral at bedtime  traZODone 50 milliGRAM(s) Oral two times a day    MEDICATIONS  (PRN):  acetaminophen   Tablet .. 650 milliGRAM(s) Oral every 6 hours PRN Temp greater or equal to 38.5C (101.3F), Mild Pain (1 - 3)  ALBUTerol    90 MICROgram(s) HFA Inhaler 2 Puff(s) Inhalation every 6 hours PRN for shortness of breath and/or wheezing  diazepam    Tablet 2 milliGRAM(s) Oral daily PRN anxiety  oxycodone    5 mG/acetaminophen 325 mG 1 Tablet(s) Oral every 6 hours PRN Severe Pain (7 - 10)  traMADol 25 milliGRAM(s) Oral every 12 hours PRN Moderate Pain (4 - 6)      CAPILLARY BLOOD GLUCOSE        I&O's Summary    16 Sep 2021 07:01  -  17 Sep 2021 07:00  --------------------------------------------------------  IN: 420 mL / OUT: 0 mL / NET: 420 mL        PHYSICAL EXAM:  Vital Signs Last 24 Hrs  T(C): 37.1 (17 Sep 2021 04:26), Max: 37.3 (16 Sep 2021 12:00)  T(F): 98.7 (17 Sep 2021 04:26), Max: 99.1 (16 Sep 2021 12:00)  HR: 99 (17 Sep 2021 04:26) (81 - 99)  BP: 100/64 (17 Sep 2021 04:26) (98/76 - 101/66)  BP(mean): --  RR: 18 (17 Sep 2021 04:26) (17 - 18)  SpO2: 94% (17 Sep 2021 04:26) (94% - 97%)    GENERAL: No acute distress, well-developed  HEAD:  Atraumatic, Normocephalic  EYES: EOMI, PERRLA, conjunctiva and sclera clear  NECK: Supple, no lymphadenopathy, no JVD  CHEST/LUNG: CTAB; No wheezes, rales, or rhonchi  HEART: Regular rate and rhythm; No murmurs, rubs, or gallops  ABDOMEN: Soft, non-tender, non-distended; normal bowel sounds, no organomegaly  EXTREMITIES:  2+ peripheral pulses b/l, No clubbing, cyanosis, or edema  NEUROLOGY: A&O x 3, no focal deficits  SKIN: No rashes or lesions    LABS:                        7.8    7.45  )-----------( 346      ( 17 Sep 2021 07:07 )             27.8     09-16    139  |  104  |  6<L>  ----------------------------<  100<H>  3.4<L>   |  22  |  0.41<L>    Ca    8.0<L>      16 Sep 2021 04:28  Phos  3.0     09-16  Mg     1.9     09-16    TPro  5.8<L>  /  Alb  3.1<L>  /  TBili  0.4  /  DBili  x   /  AST  25  /  ALT  10  /  AlkPhos  96  09-16                RADIOLOGY & ADDITIONAL TESTS:  Results Reviewed:   Imaging Personally Reviewed:  Electrocardiogram Personally Reviewed:    COORDINATION OF CARE:  Care Discussed with Consultants/Other Providers [Y/N]:  Prior or Outpatient Records Reviewed [Y/N]:   PROGRESS NOTE:   Authored by Brielle Escobedo MD     Patient is a 40y old  Female who presents with a chief complaint of paresthesias (16 Sep 2021 15:12)      SUBJECTIVE / OVERNIGHT EVENTS:  Patient reported pain/tingling improved to 5/10 today. She denied N/V/D, fever, chills, abdominal pain.     ADDITIONAL REVIEW OF SYSTEMS:    MEDICATIONS  (STANDING):  enoxaparin Injectable 40 milliGRAM(s) SubCutaneous daily  ferrous    sulfate 325 milliGRAM(s) Oral daily  folic acid 1 milliGRAM(s) Oral daily  gabapentin 300 milliGRAM(s) Oral three times a day  methimazole 10 milliGRAM(s) Oral three times a day  metoprolol tartrate 50 milliGRAM(s) Oral two times a day  nicotine -   7 mG/24Hr(s) Patch 1 patch Transdermal daily  sertraline 100 milliGRAM(s) Oral at bedtime  traZODone 50 milliGRAM(s) Oral two times a day    MEDICATIONS  (PRN):  acetaminophen   Tablet .. 650 milliGRAM(s) Oral every 6 hours PRN Temp greater or equal to 38.5C (101.3F), Mild Pain (1 - 3)  ALBUTerol    90 MICROgram(s) HFA Inhaler 2 Puff(s) Inhalation every 6 hours PRN for shortness of breath and/or wheezing  diazepam    Tablet 2 milliGRAM(s) Oral daily PRN anxiety  oxycodone    5 mG/acetaminophen 325 mG 1 Tablet(s) Oral every 6 hours PRN Severe Pain (7 - 10)  traMADol 25 milliGRAM(s) Oral every 12 hours PRN Moderate Pain (4 - 6)      CAPILLARY BLOOD GLUCOSE        I&O's Summary    16 Sep 2021 07:01  -  17 Sep 2021 07:00  --------------------------------------------------------  IN: 420 mL / OUT: 0 mL / NET: 420 mL        PHYSICAL EXAM:  Vital Signs Last 24 Hrs  T(C): 37.1 (17 Sep 2021 04:26), Max: 37.3 (16 Sep 2021 12:00)  T(F): 98.7 (17 Sep 2021 04:26), Max: 99.1 (16 Sep 2021 12:00)  HR: 99 (17 Sep 2021 04:26) (81 - 99)  BP: 100/64 (17 Sep 2021 04:26) (98/76 - 101/66)  BP(mean): --  RR: 18 (17 Sep 2021 04:26) (17 - 18)  SpO2: 94% (17 Sep 2021 04:26) (94% - 97%)    GENERAL: No acute distress, well-developed  HEAD:  Atraumatic, Normocephalic  EYES: EOMI, PERRLA, conjunctiva and sclera clear  NECK: Supple, no lymphadenopathy, no JVD  CHEST/LUNG: CTAB; No wheezes, rales, or rhonchi  HEART: Regular rate and rhythm; No murmurs, rubs, or gallops  ABDOMEN: Soft, non-tender, non-distended; normal bowel sounds, no organomegaly  EXTREMITIES:  2+ peripheral pulses b/l, No clubbing, cyanosis, or edema  NEUROLOGY: A&O x 3, no focal deficits  SKIN: No rashes or lesions    LABS:                        7.8    7.45  )-----------( 346      ( 17 Sep 2021 07:07 )             27.8     09-16    139  |  104  |  6<L>  ----------------------------<  100<H>  3.4<L>   |  22  |  0.41<L>    Ca    8.0<L>      16 Sep 2021 04:28  Phos  3.0     09-16  Mg     1.9     09-16    TPro  5.8<L>  /  Alb  3.1<L>  /  TBili  0.4  /  DBili  x   /  AST  25  /  ALT  10  /  AlkPhos  96  09-16                RADIOLOGY & ADDITIONAL TESTS:  Results Reviewed:   Imaging Personally Reviewed:  Electrocardiogram Personally Reviewed:    COORDINATION OF CARE:  Care Discussed with Consultants/Other Providers [Y/N]:  Prior or Outpatient Records Reviewed [Y/N]:

## 2021-09-17 NOTE — PROGRESS NOTE ADULT - ATTENDING COMMENTS
Hypocalcemia resolved. Patient encouraged to keep up a good PO intake of calcium with diet. Pain and paresthesias have improved. Patient to f/u with PCP in 1 week to recheck labs. D/C home today.
#Paresthesias in the setting of hypocalcemia  #Afib   #Hypokalemia and Hypomagnesemia     -Continue with calcium repletion, paresthesias improving but still present  -replete K and Magnesium  -Encourage PO intake   -Continue with gabapentin  -If clincially improved, can likely d/c home on 9/17
Patient seen and examined on 9/15. Continue to replete Ca and correct electrolyte abnormalities.

## 2021-09-22 LAB — COPPER SERPL-MCNC: 156 UG/DL — SIGNIFICANT CHANGE UP (ref 80–158)

## 2021-10-05 ENCOUNTER — INPATIENT (INPATIENT)
Facility: HOSPITAL | Age: 41
LOS: 1 days | Discharge: HOME | DRG: 74 | End: 2021-10-07
Attending: INTERNAL MEDICINE | Admitting: HOSPITALIST
Payer: MEDICAID

## 2021-10-05 VITALS
TEMPERATURE: 98 F | WEIGHT: 235.01 LBS | HEIGHT: 65 IN | HEART RATE: 91 BPM | RESPIRATION RATE: 18 BRPM | DIASTOLIC BLOOD PRESSURE: 84 MMHG | SYSTOLIC BLOOD PRESSURE: 122 MMHG | OXYGEN SATURATION: 97 %

## 2021-10-05 DIAGNOSIS — G62.9 POLYNEUROPATHY, UNSPECIFIED: ICD-10-CM

## 2021-10-05 DIAGNOSIS — I48.0 PAROXYSMAL ATRIAL FIBRILLATION: ICD-10-CM

## 2021-10-05 DIAGNOSIS — E87.8 OTHER DISORDERS OF ELECTROLYTE AND FLUID BALANCE, NOT ELSEWHERE CLASSIFIED: ICD-10-CM

## 2021-10-05 DIAGNOSIS — Z86.39 PERSONAL HISTORY OF OTHER ENDOCRINE, NUTRITIONAL AND METABOLIC DISEASE: ICD-10-CM

## 2021-10-05 DIAGNOSIS — D50.9 IRON DEFICIENCY ANEMIA, UNSPECIFIED: ICD-10-CM

## 2021-10-05 DIAGNOSIS — Z92.89 PERSONAL HISTORY OF OTHER MEDICAL TREATMENT: Chronic | ICD-10-CM

## 2021-10-05 DIAGNOSIS — R20.0 ANESTHESIA OF SKIN: ICD-10-CM

## 2021-10-05 DIAGNOSIS — E55.9 VITAMIN D DEFICIENCY, UNSPECIFIED: ICD-10-CM

## 2021-10-05 DIAGNOSIS — F17.200 NICOTINE DEPENDENCE, UNSPECIFIED, UNCOMPLICATED: ICD-10-CM

## 2021-10-05 LAB
ALBUMIN SERPL ELPH-MCNC: 3.8 G/DL — SIGNIFICANT CHANGE UP (ref 3.3–5)
ALP SERPL-CCNC: 97 U/L — SIGNIFICANT CHANGE UP (ref 40–120)
ALT FLD-CCNC: 13 U/L — SIGNIFICANT CHANGE UP (ref 10–45)
ANION GAP SERPL CALC-SCNC: 21 MMOL/L — HIGH (ref 5–17)
ANISOCYTOSIS BLD QL: SLIGHT — SIGNIFICANT CHANGE UP
AST SERPL-CCNC: 29 U/L — SIGNIFICANT CHANGE UP (ref 10–40)
BASE EXCESS BLDV CALC-SCNC: -4.2 MMOL/L — LOW (ref -2–2)
BASOPHILS # BLD AUTO: 0 K/UL — SIGNIFICANT CHANGE UP (ref 0–0.2)
BASOPHILS NFR BLD AUTO: 0 % — SIGNIFICANT CHANGE UP (ref 0–2)
BILIRUB SERPL-MCNC: 0.3 MG/DL — SIGNIFICANT CHANGE UP (ref 0.2–1.2)
BLD GP AB SCN SERPL QL: NEGATIVE — SIGNIFICANT CHANGE UP
BUN SERPL-MCNC: 8 MG/DL — SIGNIFICANT CHANGE UP (ref 7–23)
CA-I SERPL-SCNC: 1.08 MMOL/L — LOW (ref 1.15–1.33)
CALCIUM SERPL-MCNC: 8.1 MG/DL — LOW (ref 8.4–10.5)
CHLORIDE BLDV-SCNC: 106 MMOL/L — SIGNIFICANT CHANGE UP (ref 96–108)
CHLORIDE SERPL-SCNC: 101 MMOL/L — SIGNIFICANT CHANGE UP (ref 96–108)
CO2 BLDV-SCNC: 23 MMOL/L — SIGNIFICANT CHANGE UP (ref 22–26)
CO2 SERPL-SCNC: 18 MMOL/L — LOW (ref 22–31)
CREAT SERPL-MCNC: 0.5 MG/DL — SIGNIFICANT CHANGE UP (ref 0.5–1.3)
DACRYOCYTES BLD QL SMEAR: SLIGHT — SIGNIFICANT CHANGE UP
ELLIPTOCYTES BLD QL SMEAR: SLIGHT — SIGNIFICANT CHANGE UP
EOSINOPHIL # BLD AUTO: 0.2 K/UL — SIGNIFICANT CHANGE UP (ref 0–0.5)
EOSINOPHIL NFR BLD AUTO: 4.5 % — SIGNIFICANT CHANGE UP (ref 0–6)
ERYTHROCYTE [SEDIMENTATION RATE] IN BLOOD: 59 MM/HR — HIGH (ref 0–15)
GAS PNL BLDV: 139 MMOL/L — SIGNIFICANT CHANGE UP (ref 136–145)
GAS PNL BLDV: SIGNIFICANT CHANGE UP
GAS PNL BLDV: SIGNIFICANT CHANGE UP
GLUCOSE BLDV-MCNC: 61 MG/DL — LOW (ref 70–99)
GLUCOSE SERPL-MCNC: 68 MG/DL — LOW (ref 70–99)
HCG SERPL-ACNC: <2 MIU/ML — SIGNIFICANT CHANGE UP
HCO3 BLDV-SCNC: 22 MMOL/L — SIGNIFICANT CHANGE UP (ref 22–29)
HCT VFR BLD CALC: 28.8 % — LOW (ref 34.5–45)
HCT VFR BLDA CALC: 23 % — LOW (ref 34.5–46.5)
HGB BLD CALC-MCNC: 7.8 G/DL — LOW (ref 11.7–16.1)
HGB BLD-MCNC: 8.1 G/DL — LOW (ref 11.5–15.5)
HIV 1 & 2 AB SERPL IA.RAPID: SIGNIFICANT CHANGE UP
HIV 1+2 AB+HIV1 P24 AG SERPL QL IA: SIGNIFICANT CHANGE UP
HYPOCHROMIA BLD QL: SLIGHT — SIGNIFICANT CHANGE UP
LACTATE BLDV-MCNC: 2.4 MMOL/L — HIGH (ref 0.7–2)
LYMPHOCYTES # BLD AUTO: 0.78 K/UL — LOW (ref 1–3.3)
LYMPHOCYTES # BLD AUTO: 17.8 % — SIGNIFICANT CHANGE UP (ref 13–44)
MACROCYTES BLD QL: SLIGHT — SIGNIFICANT CHANGE UP
MAGNESIUM SERPL-MCNC: 1.3 MG/DL — LOW (ref 1.6–2.6)
MANUAL SMEAR VERIFICATION: SIGNIFICANT CHANGE UP
MCHC RBC-ENTMCNC: 20.8 PG — LOW (ref 27–34)
MCHC RBC-ENTMCNC: 28.1 GM/DL — LOW (ref 32–36)
MCV RBC AUTO: 73.8 FL — LOW (ref 80–100)
MICROCYTES BLD QL: SLIGHT — SIGNIFICANT CHANGE UP
MONOCYTES # BLD AUTO: 0.43 K/UL — SIGNIFICANT CHANGE UP (ref 0–0.9)
MONOCYTES NFR BLD AUTO: 9.8 % — SIGNIFICANT CHANGE UP (ref 2–14)
NEUTROPHILS # BLD AUTO: 2.89 K/UL — SIGNIFICANT CHANGE UP (ref 1.8–7.4)
NEUTROPHILS NFR BLD AUTO: 66.1 % — SIGNIFICANT CHANGE UP (ref 43–77)
OTHER CELLS CSF MANUAL: 6.1 ML/DL — LOW (ref 18–22)
OVALOCYTES BLD QL SMEAR: SLIGHT — SIGNIFICANT CHANGE UP
PCO2 BLDV: 42 MMHG — SIGNIFICANT CHANGE UP (ref 39–42)
PH BLDV: 7.32 — SIGNIFICANT CHANGE UP (ref 7.32–7.43)
PHOSPHATE SERPL-MCNC: 3.4 MG/DL — SIGNIFICANT CHANGE UP (ref 2.5–4.5)
PLAT MORPH BLD: ABNORMAL
PLATELET # BLD AUTO: 158 K/UL — SIGNIFICANT CHANGE UP (ref 150–400)
PO2 BLDV: 38 MMHG — SIGNIFICANT CHANGE UP (ref 25–45)
POIKILOCYTOSIS BLD QL AUTO: SLIGHT — SIGNIFICANT CHANGE UP
POLYCHROMASIA BLD QL SMEAR: SLIGHT — SIGNIFICANT CHANGE UP
POTASSIUM BLDV-SCNC: 3 MMOL/L — LOW (ref 3.5–5.1)
POTASSIUM SERPL-MCNC: 3 MMOL/L — LOW (ref 3.5–5.3)
POTASSIUM SERPL-SCNC: 3 MMOL/L — LOW (ref 3.5–5.3)
PROT SERPL-MCNC: 6.7 G/DL — SIGNIFICANT CHANGE UP (ref 6–8.3)
RBC # BLD: 3.9 M/UL — SIGNIFICANT CHANGE UP (ref 3.8–5.2)
RBC # FLD: 25.9 % — HIGH (ref 10.3–14.5)
RBC BLD AUTO: ABNORMAL
RH IG SCN BLD-IMP: POSITIVE — SIGNIFICANT CHANGE UP
SAO2 % BLDV: 57.5 % — LOW (ref 67–88)
SARS-COV-2 RNA SPEC QL NAA+PROBE: SIGNIFICANT CHANGE UP
SODIUM SERPL-SCNC: 140 MMOL/L — SIGNIFICANT CHANGE UP (ref 135–145)
T PALLIDUM AB TITR SER: NEGATIVE — SIGNIFICANT CHANGE UP
TSH SERPL-MCNC: 3.37 UIU/ML — SIGNIFICANT CHANGE UP (ref 0.27–4.2)
VARIANT LYMPHS # BLD: 1.8 % — SIGNIFICANT CHANGE UP (ref 0–6)
WBC # BLD: 4.37 K/UL — SIGNIFICANT CHANGE UP (ref 3.8–10.5)
WBC # FLD AUTO: 4.37 K/UL — SIGNIFICANT CHANGE UP (ref 3.8–10.5)

## 2021-10-05 PROCEDURE — 93010 ELECTROCARDIOGRAM REPORT: CPT

## 2021-10-05 PROCEDURE — 99406 BEHAV CHNG SMOKING 3-10 MIN: CPT

## 2021-10-05 PROCEDURE — 99285 EMERGENCY DEPT VISIT HI MDM: CPT

## 2021-10-05 PROCEDURE — 99222 1ST HOSP IP/OBS MODERATE 55: CPT

## 2021-10-05 RX ORDER — ERGOCALCIFEROL 1.25 MG/1
50000 CAPSULE ORAL
Refills: 0 | Status: DISCONTINUED | OUTPATIENT
Start: 2021-10-05 | End: 2021-10-07

## 2021-10-05 RX ORDER — MAGNESIUM OXIDE 400 MG ORAL TABLET 241.3 MG
400 TABLET ORAL
Refills: 0 | Status: DISCONTINUED | OUTPATIENT
Start: 2021-10-05 | End: 2021-10-07

## 2021-10-05 RX ORDER — ACETAMINOPHEN 500 MG
650 TABLET ORAL EVERY 6 HOURS
Refills: 0 | Status: DISCONTINUED | OUTPATIENT
Start: 2021-10-05 | End: 2021-10-07

## 2021-10-05 RX ORDER — NICOTINE POLACRILEX 2 MG
1 GUM BUCCAL DAILY
Refills: 0 | Status: DISCONTINUED | OUTPATIENT
Start: 2021-10-05 | End: 2021-10-07

## 2021-10-05 RX ORDER — SERTRALINE 25 MG/1
100 TABLET, FILM COATED ORAL DAILY
Refills: 0 | Status: DISCONTINUED | OUTPATIENT
Start: 2021-10-05 | End: 2021-10-07

## 2021-10-05 RX ORDER — SODIUM CHLORIDE 9 MG/ML
1000 INJECTION INTRAMUSCULAR; INTRAVENOUS; SUBCUTANEOUS ONCE
Refills: 0 | Status: COMPLETED | OUTPATIENT
Start: 2021-10-05 | End: 2021-10-05

## 2021-10-05 RX ORDER — CALCIUM CARBONATE 500(1250)
1 TABLET ORAL DAILY
Refills: 0 | Status: DISCONTINUED | OUTPATIENT
Start: 2021-10-05 | End: 2021-10-07

## 2021-10-05 RX ORDER — CALCIUM GLUCONATE 100 MG/ML
1 VIAL (ML) INTRAVENOUS ONCE
Refills: 0 | Status: COMPLETED | OUTPATIENT
Start: 2021-10-05 | End: 2021-10-05

## 2021-10-05 RX ORDER — OXYCODONE HYDROCHLORIDE 5 MG/1
5 TABLET ORAL ONCE
Refills: 0 | Status: DISCONTINUED | OUTPATIENT
Start: 2021-10-05 | End: 2021-10-05

## 2021-10-05 RX ORDER — GABAPENTIN 400 MG/1
300 CAPSULE ORAL
Refills: 0 | Status: DISCONTINUED | OUTPATIENT
Start: 2021-10-05 | End: 2021-10-07

## 2021-10-05 RX ORDER — POTASSIUM CHLORIDE 20 MEQ
40 PACKET (EA) ORAL ONCE
Refills: 0 | Status: COMPLETED | OUTPATIENT
Start: 2021-10-05 | End: 2021-10-05

## 2021-10-05 RX ORDER — TRAZODONE HCL 50 MG
50 TABLET ORAL
Refills: 0 | Status: DISCONTINUED | OUTPATIENT
Start: 2021-10-05 | End: 2021-10-07

## 2021-10-05 RX ORDER — FOLIC ACID 0.8 MG
1 TABLET ORAL DAILY
Refills: 0 | Status: DISCONTINUED | OUTPATIENT
Start: 2021-10-05 | End: 2021-10-07

## 2021-10-05 RX ORDER — MAGNESIUM SULFATE 500 MG/ML
2 VIAL (ML) INJECTION ONCE
Refills: 0 | Status: COMPLETED | OUTPATIENT
Start: 2021-10-05 | End: 2021-10-05

## 2021-10-05 RX ORDER — DIAZEPAM 5 MG
2 TABLET ORAL AT BEDTIME
Refills: 0 | Status: DISCONTINUED | OUTPATIENT
Start: 2021-10-05 | End: 2021-10-07

## 2021-10-05 RX ORDER — ALBUTEROL 90 UG/1
2 AEROSOL, METERED ORAL EVERY 6 HOURS
Refills: 0 | Status: DISCONTINUED | OUTPATIENT
Start: 2021-10-05 | End: 2021-10-07

## 2021-10-05 RX ORDER — METOPROLOL TARTRATE 50 MG
50 TABLET ORAL
Refills: 0 | Status: DISCONTINUED | OUTPATIENT
Start: 2021-10-05 | End: 2021-10-07

## 2021-10-05 RX ORDER — MAGNESIUM SULFATE 500 MG/ML
1 VIAL (ML) INJECTION ONCE
Refills: 0 | Status: COMPLETED | OUTPATIENT
Start: 2021-10-05 | End: 2021-10-05

## 2021-10-05 RX ORDER — OXYCODONE HYDROCHLORIDE 5 MG/1
5 TABLET ORAL EVERY 8 HOURS
Refills: 0 | Status: DISCONTINUED | OUTPATIENT
Start: 2021-10-05 | End: 2021-10-07

## 2021-10-05 RX ORDER — ENOXAPARIN SODIUM 100 MG/ML
40 INJECTION SUBCUTANEOUS EVERY 12 HOURS
Refills: 0 | Status: DISCONTINUED | OUTPATIENT
Start: 2021-10-05 | End: 2021-10-07

## 2021-10-05 RX ORDER — POTASSIUM CHLORIDE 20 MEQ
20 PACKET (EA) ORAL
Refills: 0 | Status: COMPLETED | OUTPATIENT
Start: 2021-10-05 | End: 2021-10-05

## 2021-10-05 RX ORDER — DIPHENHYDRAMINE HCL 50 MG
50 CAPSULE ORAL EVERY 4 HOURS
Refills: 0 | Status: DISCONTINUED | OUTPATIENT
Start: 2021-10-05 | End: 2021-10-05

## 2021-10-05 RX ORDER — FERROUS SULFATE 325(65) MG
325 TABLET ORAL DAILY
Refills: 0 | Status: DISCONTINUED | OUTPATIENT
Start: 2021-10-05 | End: 2021-10-07

## 2021-10-05 RX ADMIN — Medication 1 GRAM(S): at 14:11

## 2021-10-05 RX ADMIN — MAGNESIUM OXIDE 400 MG ORAL TABLET 400 MILLIGRAM(S): 241.3 TABLET ORAL at 18:16

## 2021-10-05 RX ADMIN — Medication 50 MILLIGRAM(S): at 10:10

## 2021-10-05 RX ADMIN — SODIUM CHLORIDE 1000 MILLILITER(S): 9 INJECTION INTRAMUSCULAR; INTRAVENOUS; SUBCUTANEOUS at 12:54

## 2021-10-05 RX ADMIN — OXYCODONE HYDROCHLORIDE 5 MILLIGRAM(S): 5 TABLET ORAL at 15:45

## 2021-10-05 RX ADMIN — SODIUM CHLORIDE 1000 MILLILITER(S): 9 INJECTION INTRAMUSCULAR; INTRAVENOUS; SUBCUTANEOUS at 10:20

## 2021-10-05 RX ADMIN — Medication 100 GRAM(S): at 12:53

## 2021-10-05 RX ADMIN — ERGOCALCIFEROL 50000 UNIT(S): 1.25 CAPSULE ORAL at 20:47

## 2021-10-05 RX ADMIN — Medication 20 MILLIEQUIVALENT(S): at 15:23

## 2021-10-05 RX ADMIN — Medication 50 GRAM(S): at 15:24

## 2021-10-05 RX ADMIN — Medication 1 TABLET(S): at 15:35

## 2021-10-05 RX ADMIN — ENOXAPARIN SODIUM 40 MILLIGRAM(S): 100 INJECTION SUBCUTANEOUS at 18:15

## 2021-10-05 RX ADMIN — Medication 100 GRAM(S): at 12:16

## 2021-10-05 RX ADMIN — Medication 50 MILLIGRAM(S): at 18:17

## 2021-10-05 RX ADMIN — Medication 1 GRAM(S): at 12:53

## 2021-10-05 RX ADMIN — Medication 20 MILLIEQUIVALENT(S): at 20:47

## 2021-10-05 RX ADMIN — OXYCODONE HYDROCHLORIDE 5 MILLIGRAM(S): 5 TABLET ORAL at 10:20

## 2021-10-05 RX ADMIN — Medication 40 MILLIEQUIVALENT(S): at 12:16

## 2021-10-05 RX ADMIN — Medication 20 MILLIEQUIVALENT(S): at 18:16

## 2021-10-05 RX ADMIN — Medication 50 MILLIGRAM(S): at 20:47

## 2021-10-05 RX ADMIN — OXYCODONE HYDROCHLORIDE 5 MILLIGRAM(S): 5 TABLET ORAL at 11:04

## 2021-10-05 RX ADMIN — GABAPENTIN 300 MILLIGRAM(S): 400 CAPSULE ORAL at 18:16

## 2021-10-05 NOTE — ED PROVIDER NOTE - NS ED ROS FT
Gen: Denies fevers  CV: Denies chest pain  Skin: denies bleeding/bruising/skin darkening of hands/feet  Resp: Denies SOB, cough  GI: Denies nausea, vomiting  Msk: + b/l feet pain  : Denies dysuria  Neuro: Denies LOC

## 2021-10-05 NOTE — ED PROVIDER NOTE - PHYSICAL EXAMINATION
Gen: WDWN, NAD  HEENT: EOMI, no nasal discharge, mucous membranes moist  CV:  2+ radial pulses b/l  Resp: no accessory muscle use, no increased work of breathing  MSK: No open wounds, no bruising, no LE edema. + TTP at tips of b/l feet. + sensation full b/l feet. pulses 2+ DP, PT, b/l LE warm/well perfused.   Neuro: A&Ox4, following commands, moving all four extremities spontaneously  Psych: appropriate mood  derm: no darkening of skin in b/l toes

## 2021-10-05 NOTE — H&P ADULT - NSHPREVIEWOFSYSTEMS_GEN_ALL_CORE
GEN: no night sweats or change in appetite  EYES: no changes in vision or diplopia   ENT: no epistaxis, sinus pain, gingival bleeding, odynophagia or dysphagia  CV: no CP, PND or palpitations  RESP: no cough, wheezing, or hemoptysis  GI: no hematemesis, hematochezia, or melena  : no dysuria, polyuria, or hematuria  MSK: no arthralgias or joint swelling   NEURO: no gross sensory changes, numbness, focal deficits  PSYCH: no depression or changes in concentration  HEME/ONC: no purpura, petechiae or night sweats  SKIN: no pruritus, hair loss or skin lesions  ALL: no photosensitivity, no complaints of anaphylaxis (SOB, throat swelling) GEN: no night sweats; +less appetite   EYES: no changes in vision or diplopia   ENT: no epistaxis, sinus pain, gingival bleeding, odynophagia or dysphagia  CV: no CP, PND or palpitations  RESP: no cough, wheezing, or hemoptysis  GI: no hematemesis, hematochezia, or melena  : no dysuria, polyuria, or hematuria  MSK: no arthralgias or joint swelling   NEURO: no gross sensory changes or focal deficits; +numbness in fingers and toes   PSYCH: no depression or changes in concentration  HEME/ONC: no purpura, petechiae or night sweats  SKIN: no pruritus, hair loss or skin lesions  ALL: no photosensitivity, no complaints of anaphylaxis (SOB, throat swelling)

## 2021-10-05 NOTE — H&P ADULT - HISTORY OF PRESENT ILLNESS
40F with PMHx of paroxysmal atrial fibrillation, hyperthyroidism, and iron deficiency anemia presenting with constant numbness/tingling of tips of fingers and toes. Patient recently admitted for similar issue. She states she never had this issue before, but began to experience it approximately one month prior when she was at [place] and received a blood transfusion. She states pain is constant and slightly alleviated by gabapentin which she ran out three days prior. She denies headache, nausea, vomiting, or dizziness. Patient with no known history of diabetes. On last admission she had electrolyte abnormalities (low mag and calcium) and was repleted and symptoms improved; thus, assumption made that it was due to electrolyte abnormalities. She also tells me paresthesia is exacerbated by cold weather. After discharge patient with constant pain, but now worsening. It does not affect her ability to walk. She states pain is 10/10. In the ED noted to have hypokalemic, hypomagnesmic, and hypocalcemic. Patient given 1 L NS, calcium gluconate, magnesium sulfate. Patient seen by neurology.

## 2021-10-05 NOTE — PATIENT PROFILE ADULT - HAS THE PATIENT USED TOBACCO IN THE PAST 30 DAYS?
S/W patient regarding appt for diversion surgery. Patient referred to GI, not a candidate for surgery. Patient expressed that she would like to see specialist  At [facility]. Will address with PCP and insurance. No appt made to see Dr. Bhagat.    Yes

## 2021-10-05 NOTE — H&P ADULT - PROBLEM SELECTOR PLAN 3
Vitamin 25D level 9 in September    -Start Ergocalciferol 50,000 IU weekly x8 weeks then daily supplementation

## 2021-10-05 NOTE — CONSULT NOTE ADULT - ATTENDING COMMENTS
40F with PMHx of paroxysmal atrial fibrillation (not on xarelto as "thyroid is better", hyperthyroidism, and iron deficiency anemia presenting with constant numbness/tingling of tips of fingers and toes. She states pain is 10/10, sharp, intermittent. Motor 5/5ankle reflexes trace mildly usteady gait      Impression: peripheral neuropathy symptoms started over 4 weeks ago making an aucte GBS less likely    continue with labs above  Can consider MRI LS spine w/ con

## 2021-10-05 NOTE — H&P ADULT - PROBLEM SELECTOR PLAN 7
Performed smoking cessation counseling with patient. Reviewed modalities such as nicotine replacement therapy and pharmacological agents. Also reviewed deleterious effects of smoking such as increased malignancy and infection. 3 minutes spent with patient.

## 2021-10-05 NOTE — H&P ADULT - NSHPPHYSICALEXAM_GEN_ALL_CORE
T(C): 36.5 (10-05-21 @ 09:30), Max: 36.5 (10-05-21 @ 09:30)  HR: 89 (10-05-21 @ 10:21) (89 - 91)  BP: 118/80 (10-05-21 @ 10:21) (118/80 - 122/84)  RR: 16 (10-05-21 @ 10:21) (16 - 18)  SpO2: 100% (10-05-21 @ 10:21) (97% - 100%)    GEN: (fe)male in NAD, appears comfortable, no diaphoresis  EYES: No scleral injection, PERRL, EOMI  ENTM: neck supple & symmetric without tracheal deviation, moist membranes, no gross hearing impairment, thyroid gland not enlarged  CV: +S1/S2, no m/r/g, no abdominal bruit, no LE edema  RESP: breathing comfortably, no respiratory accessory muscle use, CTAB, no w/r/r  GI: normoactive BS, soft, NTND, no rebounding/guarding, no palpable masses  LYMPHATICS: no LAD or tenderness to palpation  NEURO: AOx3, no focal deficits, CNII-XII grossly intact  PSYCH: No SI/HI/AVH, appropriate affect, appropriate insight/judgment   SKIN: no petechiae, ecchymosis or maculopapular rash noted T(C): 36.5 (10-05-21 @ 09:30), Max: 36.5 (10-05-21 @ 09:30)  HR: 89 (10-05-21 @ 10:21) (89 - 91)  BP: 118/80 (10-05-21 @ 10:21) (118/80 - 122/84)  RR: 16 (10-05-21 @ 10:21) (16 - 18)  SpO2: 100% (10-05-21 @ 10:21) (97% - 100%)    GEN: female in NAD, appears comfortable, no diaphoresis  EYES: No scleral injection, PERRL, EOMI  ENTM: neck supple & symmetric without tracheal deviation, moist membranes, no gross hearing impairment, thyroid gland not enlarged  CV: +S1/S2, no m/r/g, no abdominal bruit, no LE edema  RESP: breathing comfortably, no respiratory accessory muscle use, CTAB, no w/r/r  GI: normoactive BS, soft, NTND, no rebounding/guarding, no palpable masses  LYMPHATICS: no LAD or tenderness to palpation  NEURO: AOx3, no focal deficits, CNII-XII grossly intact  PSYCH: No SI/HI/AVH, appropriate affect, appropriate insight/judgment   SKIN: no petechiae, ecchymosis or maculopapular rash noted

## 2021-10-05 NOTE — ED PROVIDER NOTE - OBJECTIVE STATEMENT
41YO F hx of paroxysmal atrial fibrillation, MVP with MR, hyperthyroidism, mild asthma, and depression/anxiety, recent dx of neuropathy, p/w intermittent numbness and pain in b/l feet. pain located at tips of b/l toes, denies swelling, inability to ambulate, skin changes. pt endorses mild pain in b/l hands. denies any skin changes/different skin color. Recently ran out of gabapentin/tramadol. onset weeks prior, was hospitalized and found to have hypocalcemia, low folate and hypomagnesemia. She was given Calcium IV and replete Mg >2 and K> 4. Folate was low, so started supplement. Vitamin B12 was WNL. PTH was normal. Vitamin D was low. Prior to that admission, pt had iron deficiency anemia requiring 2u prbcs while she had active vaginal bleeding.

## 2021-10-05 NOTE — ED PROVIDER NOTE - CLINICAL SUMMARY MEDICAL DECISION MAKING FREE TEXT BOX
39YO F hx of paroxysmal atrial fibrillation, MVP with MR, hyperthyroidism, mild asthma, and depression/anxiety, neuropathy, p/w intermittent numbness and pain in b/l feet, consistent with known diagnosis. VSS, PE: TTP of b/l toes, sensation full, toes warm/well perfused, 2+ DP/PT pulses. suspect paresthesias s/t electrolyte abnormality vs. anemia. PTH and VB12 WNL prior evaluation. plan for basic lab workup, hiv, lead, spep, cpep, esr/crp. 41YO F hx of paroxysmal atrial fibrillation, MVP with MR, hyperthyroidism, mild asthma, and depression/anxiety, neuropathy, p/w intermittent numbness and pain in b/l feet, consistent with known diagnosis. VSS, PE: TTP of b/l toes, sensation full, toes warm/well perfused, 2+ DP/PT pulses. suspect paresthesias s/t electrolyte abnormality vs. anemia. PTH and VB12 WNL prior evaluation. plan for basic lab workup, hiv, lead, spep, cpep, esr/crp.  admission with a neuro cons ZR

## 2021-10-05 NOTE — H&P ADULT - PROBLEM SELECTOR PLAN 1
-Patient with paresthesia concerning for neuropathy. Explained to patient this is typically not worked up as an inpatient. She is being admitted for very minor electrolytes disturbances (which are not life threatening & amenable to oral repletion). Patient understands likely discharge tomorrow    -Neurology consult  -We have sent: HIV, SPEP, ESR, RPR, A1c, lead level, Bence davis protein  -Restart gabapentin  -Pain control

## 2021-10-05 NOTE — H&P ADULT - ASSESSMENT
40F with PMHx of paroxysmal atrial fibrillation, hyperthyroidism, and iron deficiency anemia presenting with constant numbness/tingling of tips of fingers and toes. In the ED noted to have hypokalemic, hypomagnesmic, and hypocalcemic.

## 2021-10-05 NOTE — H&P ADULT - PROBLEM SELECTOR PLAN 4
-Continue with Feosol  -Patient advised that it does not seem she is responding to oral pills, should seek IV iron infusions as outpatient, she understands  -Screening colonoscopy advised

## 2021-10-05 NOTE — ED ADULT NURSE NOTE - OBJECTIVE STATEMENT
40 year old female pt presented to the ED co b/l feet and toe pain with intermittent numbness, pt states she has pain to b/l hands and tips of fingers as well x 3 weeks pt states recent diagnoses of neuropathy pt denies being a diabetic, pt denies any recent trauma, no obvious sign of deformity, pt able to ambulate pt is A&ox3 pt denies any sick contacts, no nausea no vomiting, lung field cta, no discoloration noted to extremities

## 2021-10-05 NOTE — CONSULT NOTE ADULT - SUBJECTIVE AND OBJECTIVE BOX
HPI:  40F with PMHx of paroxysmal atrial fibrillation (not on xarelto as "thyroid is better", hyperthyroidism, and iron deficiency anemia presenting with constant numbness/tingling of tips of fingers and toes. She states pain is 10/10, sharp, intermittent. Patient recently admitted for similar issue. After discharge patient with constant pain, but now worsening.  She states she never had this issue before, but began to experience it approximately one month prior when she was at [place] and received a blood transfusion-was light headed at that time, near syncope. She states pain is constant and slightly alleviated by gabapentin which she ran out three days prior (300 mg BID). She denies headache, nausea, vomiting, or dizziness but admits to some sweating, diarrhea  and palpitations. On last admission she had electrolyte abnormalities (low mag and calcium) and was repleted and symptoms improved; thus, assumption made that it was due to electrolyte abnormalities.  In the ED noted to have hypokalemic, hypomagnesmic, and hypocalcemic. Patient given 1 L NS, calcium gluconate, magnesium sulfate. She does not take her metoprolol, diazepam, zoloft, tramadol or methimazole as prescribed. No hx of strokes, not on AC.    ROS: A 10-system ROS was performed and is negative except for those items noted above and/or in the HPI.    PAST MEDICAL & SURGICAL HISTORY:  Atrial fibrillation    History of Hyperthyroidism    Asthma    History of anemia    Depression, unspecified depression type    Smoker    S/P  Section  ,,    History of blood transfusion      FAMILY HISTORY:  Family history of diabetes mellitus (Sibling, Grandparent)    Family history of stomach cancer (Grandparent)        SOCIAL HISTORY: SOCIAL HISTORY:     Marital Status: (  )   (  ) Single  (  )   (  )      Occupation:      Lives: (  ) alone  (  ) with children   (  ) with spouse  (  ) with parents  (  ) other     Illicit Drug Use: (  ) never used  (  ) other _____     Tobacco Use:  (  ) never smoked  (  ) former smoker  (  ) current smoker  (  ) pack year  (  ) last cigarette date     Alcohol Use:      Sexual History:        MEDICATIONS  Home Medications:  Albuterol (Eqv-Proventil HFA) 90 mcg/inh inhalation aerosol: 2 puff(s) inhaled every 6 hours, As Needed - for shortness of breath and/or wheezing (05 Oct 2021 14:52)  diazePAM 2 mg oral tablet: 1 tab(s) orally once a day, As Needed (05 Oct 2021 14:52)  folic acid 1 mg oral tablet: 1 tab(s) orally once a day (05 Oct 2021 14:52)  Zoloft 100 mg oral tablet: 1 tab(s) orally once a day (at bedtime) (05 Oct 2021 14:52)      MEDICATIONS  (STANDING):  calcium carbonate   1250 mG (OsCal) 1 Tablet(s) Oral daily  enoxaparin Injectable 40 milliGRAM(s) SubCutaneous every 12 hours  ergocalciferol 11269 Unit(s) Oral <User Schedule>  ferrous    sulfate 325 milliGRAM(s) Oral daily  folic acid 1 milliGRAM(s) Oral daily  gabapentin 300 milliGRAM(s) Oral two times a day  magnesium oxide 400 milliGRAM(s) Oral three times a day with meals  methimazole 10 milliGRAM(s) Oral three times a day  metoprolol tartrate 50 milliGRAM(s) Oral two times a day  nicotine -   7 mG/24Hr(s) Patch 1 patch Transdermal daily  potassium chloride    Tablet ER 20 milliEquivalent(s) Oral every 2 hours  sertraline 100 milliGRAM(s) Oral daily  traZODone 50 milliGRAM(s) Oral two times a day    MEDICATIONS  (PRN):  acetaminophen   Tablet .. 650 milliGRAM(s) Oral every 6 hours PRN Temp greater or equal to 38C (100.4F), Mild Pain (1 - 3), Moderate Pain (4 - 6)  ALBUTerol    90 MICROgram(s) HFA Inhaler 2 Puff(s) Inhalation every 6 hours PRN for shortness of breath and/or wheezing  diazepam    Tablet 2 milliGRAM(s) Oral at bedtime PRN anxiety  oxyCODONE    IR 5 milliGRAM(s) Oral every 8 hours PRN Severe Pain (7 - 10)      ALLERGIES/INTOLERANCES:  Allergies  Motrin (Hives)    Intolerances      OBJECTIVE:  VITALS   Vital Signs Last 24 Hrs  T(C): 36.5 (05 Oct 2021 09:30), Max: 36.5 (05 Oct 2021 09:30)  T(F): 97.7 (05 Oct 2021 09:30), Max: 97.7 (05 Oct 2021 09:30)  HR: 89 (05 Oct 2021 10:21) (89 - 91)  BP: 118/80 (05 Oct 2021 10:21) (118/80 - 122/84)  BP(mean): --  RR: 16 (05 Oct 2021 10:21) (16 - 18)  SpO2: 100% (05 Oct 2021 10:21) (97% - 100%)    PHYSICAL EXAM:  Neurological Exam:  Mental Status: Orientated to self, date and place.  Attention intact.  No dysarthria, aphasia or neglect.  Knowledge intact.  Registration intact.  Short and long term memory grossly intact.      Cranial Nerves: PERRL, EOMI, VFF, no nystagmus or diplopia.  CN V1-3 intact to light touch.  No facial asymmetry.  Hearing intact.  Tongue midline.  Sternocleidomastoid and Trapezius intact bilaterally.    Motor:   Tone: normal            Strength:     Upper extremity                              Delt       Bicep    Tricep                                               R         5/5        5/5        5/5       5/5                                            L          5/5        5/5        5/5       5/5  Lower extremity                              HF          KE          KF        DF         PF                                            R        5/5        5/5        5/5       5/5       5/5                                            L         5/5        5/5       5/5       5/5        5/5  Pronator drift: none                 Dysmetria: None to finger-nose-finger or heel-shin-heel     Tremor: No resting, postural or action tremor.  No myoclonus.    Sensation: intact to light touch in all extremities except for decreased sensation in all fingers and toes    Deep Tendon Reflexes: 2+ bilateral biceps, triceps, brachioradialis, knee and ankle  Toes mute.    Gait: unsteady gait (pain limited)      LABORATORY:  CBC                       8.1    4.37  )-----------( 158      ( 05 Oct 2021 11:01 )             28.8     Chem 10-05    140  |  101  |  8   ----------------------------<  68<L>  3.0<L>   |  18<L>  |  0.50    Ca    8.1<L>      05 Oct 2021 10:58  Phos  3.4     10-05  Mg     1.3     10-05    TPro  6.7  /  Alb  3.8  /  TBili  0.3  /  DBili  x   /  AST  29  /  ALT  13  /  AlkPhos  97  10-05    LFTs LIVER FUNCTIONS - ( 05 Oct 2021 10:58 )  Alb: 3.8 g/dL / Pro: 6.7 g/dL / ALK PHOS: 97 U/L / ALT: 13 U/L / AST: 29 U/L / GGT: x           Coagulopathy   Lipid Panel   A1c   Cardiac enzymes     U/A   CSF  Immunological  Other    STUDIES & IMAGING:  Studies (EKG, EEG, EMG, etc):     Radiology (XR, CT, MR, U/S, TTE/LIDYA):

## 2021-10-05 NOTE — CONSULT NOTE ADULT - ASSESSMENT
40F with PMHx of paroxysmal atrial fibrillation (not on xarelto as "thyroid is better", hyperthyroidism, and iron deficiency anemia presenting with constant numbness/tingling of tips of fingers and toes. She states pain is 10/10, sharp, intermittent.  Patient recently admitted for similar issue. After discharge patient with constant pain, but now worsening. Neurological exam was normal except for unsteady gait,  decreased sensation in all fingers and toes.    Impression:  decreased sensation in all fingers and toes may be 2/2 peripheral neuropathy 2/2 possible thyroid abnormalities vs. vitamin deficiencies vs. electrolyte derangements vs. vasculitis vs. other etiology    Recommnedations:  []ACE, Sjogren Ab, CAMRYN, ANCA, copper, ceruloplasmin, LFTs, SPEP, UPEP, Lyme screen, RPR, ESR, CRP, HIV, HSV, TSH, T3/T4, A1c, Vitamin B1, B6, B12, folate, Vit E, methylmalonic acid, homocysteine, RF, celiac panel, Gm1Ab  []ESR, CRP   []Endocrinology consult  []Fall precautions,   []toxic/metabolic/infectious workup per primary team  []Neurology to follow with further recommendations    Case to be discussed with neurology attending Dr. Meza. 40F with PMHx of paroxysmal atrial fibrillation (not on xarelto as "thyroid is better", hyperthyroidism, and iron deficiency anemia presenting with constant numbness/tingling of tips of fingers and toes. She states pain is 10/10, sharp, intermittent.  Patient recently admitted for similar issue. After discharge patient with constant pain, but now worsening. Neurological exam was normal except for unsteady gait,  decreased sensation in all fingers and toes.    Impression:  decreased sensation in all fingers and toes may be 2/2 peripheral neuropathy 2/2 possible thyroid abnormalities vs. vitamin deficiencies vs. electrolyte derangements vs. vasculitis vs. other etiology    Recommnedations:  []MRI C spine w and w/o contrast  []ACE, Sjogren Ab, CAMRYN, ANCA, copper, ceruloplasmin, LFTs, SPEP, UPEP, Lyme screen, RPR, ESR, CRP, HIV, HSV, TSH, T3/T4, A1c, Vitamin B1, B6, B12, folate, Vit E, methylmalonic acid, homocysteine, RF, celiac panel, Gm1Ab  []ESR, CRP   []Endocrinology consult  []Fall precautions,   []toxic/metabolic/infectious workup per primary team  []Neurology to follow with further recommendations    Case to be discussed with neurology attending Dr. Meza.

## 2021-10-05 NOTE — H&P ADULT - PROBLEM SELECTOR PLAN 2
-Hypocalcemia: start Oscal  -Hypomagnesemia: start Mag Oxide  -Hypokalemia: further repletion  -Check Phos level

## 2021-10-06 LAB
A1C WITH ESTIMATED AVERAGE GLUCOSE RESULT: 4.1 % — SIGNIFICANT CHANGE UP (ref 4–5.6)
ANION GAP SERPL CALC-SCNC: 12 MMOL/L — SIGNIFICANT CHANGE UP (ref 5–17)
BUN SERPL-MCNC: 7 MG/DL — SIGNIFICANT CHANGE UP (ref 7–23)
CALCIUM SERPL-MCNC: 7.8 MG/DL — LOW (ref 8.4–10.5)
CERULOPLASMIN SERPL-MCNC: 36 MG/DL — SIGNIFICANT CHANGE UP (ref 16–45)
CHLORIDE SERPL-SCNC: 106 MMOL/L — SIGNIFICANT CHANGE UP (ref 96–108)
CO2 SERPL-SCNC: 21 MMOL/L — LOW (ref 22–31)
COVID-19 SPIKE DOMAIN AB INTERP: POSITIVE
COVID-19 SPIKE DOMAIN ANTIBODY RESULT: >250 U/ML — HIGH
CREAT SERPL-MCNC: 0.44 MG/DL — LOW (ref 0.5–1.3)
CRP SERPL-MCNC: 25 MG/L — HIGH (ref 0–4)
ESTIMATED AVERAGE GLUCOSE: 71 MG/DL — SIGNIFICANT CHANGE UP (ref 68–114)
GLUCOSE SERPL-MCNC: 68 MG/DL — LOW (ref 70–99)
HCT VFR BLD CALC: 27.7 % — LOW (ref 34.5–45)
HCYS SERPL-MCNC: 32.9 UMOL/L — HIGH
HGB BLD-MCNC: 7.8 G/DL — LOW (ref 11.5–15.5)
HSV1 IGG SER-ACNC: 22 INDEX — HIGH
HSV1 IGG SERPL QL IA: POSITIVE
HSV2 IGG FLD-ACNC: 0.12 INDEX — SIGNIFICANT CHANGE UP
HSV2 IGG SERPL QL IA: NEGATIVE — SIGNIFICANT CHANGE UP
LEAD BLD-MCNC: <1 UG/DL — SIGNIFICANT CHANGE UP (ref 0–4)
MAGNESIUM SERPL-MCNC: 1.6 MG/DL — SIGNIFICANT CHANGE UP (ref 1.6–2.6)
MCHC RBC-ENTMCNC: 20.9 PG — LOW (ref 27–34)
MCHC RBC-ENTMCNC: 28.2 GM/DL — LOW (ref 32–36)
MCV RBC AUTO: 74.3 FL — LOW (ref 80–100)
NRBC # BLD: 0 /100 WBCS — SIGNIFICANT CHANGE UP (ref 0–0)
PHOSPHATE SERPL-MCNC: 2.4 MG/DL — LOW (ref 2.5–4.5)
PLATELET # BLD AUTO: 165 K/UL — SIGNIFICANT CHANGE UP (ref 150–400)
POTASSIUM SERPL-MCNC: 3.8 MMOL/L — SIGNIFICANT CHANGE UP (ref 3.5–5.3)
POTASSIUM SERPL-SCNC: 3.8 MMOL/L — SIGNIFICANT CHANGE UP (ref 3.5–5.3)
PROT ?TM UR-MCNC: 11 MG/DL — SIGNIFICANT CHANGE UP (ref 0–12)
RBC # BLD: 3.73 M/UL — LOW (ref 3.8–5.2)
RBC # FLD: 25.2 % — HIGH (ref 10.3–14.5)
SARS-COV-2 IGG+IGM SERPL QL IA: >250 U/ML — HIGH
SARS-COV-2 IGG+IGM SERPL QL IA: POSITIVE
SODIUM SERPL-SCNC: 139 MMOL/L — SIGNIFICANT CHANGE UP (ref 135–145)
T3FREE SERPL-MCNC: 4.36 PG/ML — SIGNIFICANT CHANGE UP (ref 1.8–4.6)
T4 FREE SERPL-MCNC: 1.2 NG/DL — SIGNIFICANT CHANGE UP (ref 0.9–1.8)
WBC # BLD: 3.39 K/UL — LOW (ref 3.8–10.5)
WBC # FLD AUTO: 3.39 K/UL — LOW (ref 3.8–10.5)

## 2021-10-06 PROCEDURE — 99233 SBSQ HOSP IP/OBS HIGH 50: CPT

## 2021-10-06 RX ORDER — SODIUM,POTASSIUM PHOSPHATES 278-250MG
1 POWDER IN PACKET (EA) ORAL
Refills: 0 | Status: DISCONTINUED | OUTPATIENT
Start: 2021-10-06 | End: 2021-10-07

## 2021-10-06 RX ADMIN — GABAPENTIN 300 MILLIGRAM(S): 400 CAPSULE ORAL at 05:40

## 2021-10-06 RX ADMIN — Medication 50 MILLIGRAM(S): at 17:31

## 2021-10-06 RX ADMIN — Medication 50 MILLIGRAM(S): at 05:41

## 2021-10-06 RX ADMIN — OXYCODONE HYDROCHLORIDE 5 MILLIGRAM(S): 5 TABLET ORAL at 00:59

## 2021-10-06 RX ADMIN — Medication 1 PACKET(S): at 17:31

## 2021-10-06 RX ADMIN — Medication 2 MILLIGRAM(S): at 22:10

## 2021-10-06 RX ADMIN — ENOXAPARIN SODIUM 40 MILLIGRAM(S): 100 INJECTION SUBCUTANEOUS at 05:39

## 2021-10-06 RX ADMIN — MAGNESIUM OXIDE 400 MG ORAL TABLET 400 MILLIGRAM(S): 241.3 TABLET ORAL at 12:23

## 2021-10-06 RX ADMIN — OXYCODONE HYDROCHLORIDE 5 MILLIGRAM(S): 5 TABLET ORAL at 01:45

## 2021-10-06 RX ADMIN — Medication 1 MILLIGRAM(S): at 12:24

## 2021-10-06 RX ADMIN — Medication 50 MILLIGRAM(S): at 05:40

## 2021-10-06 RX ADMIN — OXYCODONE HYDROCHLORIDE 5 MILLIGRAM(S): 5 TABLET ORAL at 11:21

## 2021-10-06 RX ADMIN — OXYCODONE HYDROCHLORIDE 5 MILLIGRAM(S): 5 TABLET ORAL at 17:37

## 2021-10-06 RX ADMIN — Medication 325 MILLIGRAM(S): at 12:26

## 2021-10-06 RX ADMIN — ENOXAPARIN SODIUM 40 MILLIGRAM(S): 100 INJECTION SUBCUTANEOUS at 17:40

## 2021-10-06 RX ADMIN — MAGNESIUM OXIDE 400 MG ORAL TABLET 400 MILLIGRAM(S): 241.3 TABLET ORAL at 17:32

## 2021-10-06 RX ADMIN — OXYCODONE HYDROCHLORIDE 5 MILLIGRAM(S): 5 TABLET ORAL at 09:18

## 2021-10-06 RX ADMIN — Medication 1 PACKET(S): at 12:24

## 2021-10-06 RX ADMIN — MAGNESIUM OXIDE 400 MG ORAL TABLET 400 MILLIGRAM(S): 241.3 TABLET ORAL at 09:30

## 2021-10-06 RX ADMIN — Medication 1 TABLET(S): at 12:31

## 2021-10-06 RX ADMIN — SERTRALINE 100 MILLIGRAM(S): 25 TABLET, FILM COATED ORAL at 12:24

## 2021-10-06 RX ADMIN — OXYCODONE HYDROCHLORIDE 5 MILLIGRAM(S): 5 TABLET ORAL at 18:13

## 2021-10-06 RX ADMIN — GABAPENTIN 300 MILLIGRAM(S): 400 CAPSULE ORAL at 17:31

## 2021-10-06 NOTE — PROGRESS NOTE ADULT - SUBJECTIVE AND OBJECTIVE BOX
Patient is a 40y old  Female who presents with a chief complaint of Paresthesia (06 Oct 2021 11:08)      SUBJECTIVE / OVERNIGHT EVENTS:  Pt seen and examined. No acute events overnight. She c/o b/l upper and lower extremity tingling/numbness and pain.    MEDICATIONS  (STANDING):  calcium carbonate   1250 mG (OsCal) 1 Tablet(s) Oral daily  enoxaparin Injectable 40 milliGRAM(s) SubCutaneous every 12 hours  ergocalciferol 27654 Unit(s) Oral <User Schedule>  ferrous    sulfate 325 milliGRAM(s) Oral daily  folic acid 1 milliGRAM(s) Oral daily  gabapentin 300 milliGRAM(s) Oral two times a day  magnesium oxide 400 milliGRAM(s) Oral three times a day with meals  methimazole 10 milliGRAM(s) Oral three times a day  metoprolol tartrate 50 milliGRAM(s) Oral two times a day  nicotine -   7 mG/24Hr(s) Patch 1 patch Transdermal daily  potassium phosphate / sodium phosphate Powder (PHOS-NaK) 1 Packet(s) Oral three times a day before meals  sertraline 100 milliGRAM(s) Oral daily  traZODone 50 milliGRAM(s) Oral two times a day    MEDICATIONS  (PRN):  acetaminophen   Tablet .. 650 milliGRAM(s) Oral every 6 hours PRN Temp greater or equal to 38C (100.4F), Mild Pain (1 - 3), Moderate Pain (4 - 6)  ALBUTerol    90 MICROgram(s) HFA Inhaler 2 Puff(s) Inhalation every 6 hours PRN for shortness of breath and/or wheezing  diazepam    Tablet 2 milliGRAM(s) Oral at bedtime PRN anxiety  oxyCODONE    IR 5 milliGRAM(s) Oral every 8 hours PRN Severe Pain (7 - 10)      Vital Signs Last 24 Hrs  T(C): 36.7 (06 Oct 2021 05:48), Max: 36.7 (06 Oct 2021 05:48)  T(F): 98.1 (06 Oct 2021 05:48), Max: 98.1 (06 Oct 2021 05:48)  HR: 84 (06 Oct 2021 05:48) (84 - 108)  BP: 112/75 (06 Oct 2021 05:48) (103/65 - 115/76)  BP(mean): --  RR: 18 (06 Oct 2021 05:48) (18 - 20)  SpO2: 97% (06 Oct 2021 05:48) (96% - 99%)  CAPILLARY BLOOD GLUCOSE      POCT Blood Glucose.: 70 mg/dL (06 Oct 2021 07:03)    I&O's Summary      PHYSICAL EXAM:  GENERAL: NAD, anicteric, afebrile  HEAD:  Atraumatic, Normocephalic  EYES: conjunctiva and sclera clear  NECK: Supple, No JVD  CHEST/LUNG: Clear to auscultation bilaterally; No wheeze  HEART: Regular rate and rhythm; No murmurs, rubs, or gallops  ABDOMEN: Soft, Nontender, Nondistended; Bowel sounds present  EXTREMITIES:  2+ Peripheral Pulses, No clubbing, cyanosis, or edema  PSYCH: appropriate affect  NEUROLOGY: AAOX3,  non-focal  SKIN: No rashes or lesions    LABS:                        7.8    3.39  )-----------( 165      ( 06 Oct 2021 06:14 )             27.7     10-06    139  |  106  |  7   ----------------------------<  68<L>  3.8   |  21<L>  |  0.44<L>    Ca    7.8<L>      06 Oct 2021 06:15  Phos  2.4     10-06  Mg     1.6     10-06    TPro  6.7  /  Alb  3.8  /  TBili  0.3  /  DBili  x   /  AST  29  /  ALT  13  /  AlkPhos  97  10-05               Patient is a 40y old  Female who presents with a chief complaint of Paresthesia (06 Oct 2021 11:08)      SUBJECTIVE / OVERNIGHT EVENTS:  Pt seen and examined. No acute events overnight. She c/o tingling/numbness and pain in fingers and toes b/l.    MEDICATIONS  (STANDING):  calcium carbonate   1250 mG (OsCal) 1 Tablet(s) Oral daily  enoxaparin Injectable 40 milliGRAM(s) SubCutaneous every 12 hours  ergocalciferol 31179 Unit(s) Oral <User Schedule>  ferrous    sulfate 325 milliGRAM(s) Oral daily  folic acid 1 milliGRAM(s) Oral daily  gabapentin 300 milliGRAM(s) Oral two times a day  magnesium oxide 400 milliGRAM(s) Oral three times a day with meals  methimazole 10 milliGRAM(s) Oral three times a day  metoprolol tartrate 50 milliGRAM(s) Oral two times a day  nicotine -   7 mG/24Hr(s) Patch 1 patch Transdermal daily  potassium phosphate / sodium phosphate Powder (PHOS-NaK) 1 Packet(s) Oral three times a day before meals  sertraline 100 milliGRAM(s) Oral daily  traZODone 50 milliGRAM(s) Oral two times a day    MEDICATIONS  (PRN):  acetaminophen   Tablet .. 650 milliGRAM(s) Oral every 6 hours PRN Temp greater or equal to 38C (100.4F), Mild Pain (1 - 3), Moderate Pain (4 - 6)  ALBUTerol    90 MICROgram(s) HFA Inhaler 2 Puff(s) Inhalation every 6 hours PRN for shortness of breath and/or wheezing  diazepam    Tablet 2 milliGRAM(s) Oral at bedtime PRN anxiety  oxyCODONE    IR 5 milliGRAM(s) Oral every 8 hours PRN Severe Pain (7 - 10)      Vital Signs Last 24 Hrs  T(C): 36.7 (06 Oct 2021 05:48), Max: 36.7 (06 Oct 2021 05:48)  T(F): 98.1 (06 Oct 2021 05:48), Max: 98.1 (06 Oct 2021 05:48)  HR: 84 (06 Oct 2021 05:48) (84 - 108)  BP: 112/75 (06 Oct 2021 05:48) (103/65 - 115/76)  BP(mean): --  RR: 18 (06 Oct 2021 05:48) (18 - 20)  SpO2: 97% (06 Oct 2021 05:48) (96% - 99%)  CAPILLARY BLOOD GLUCOSE      POCT Blood Glucose.: 70 mg/dL (06 Oct 2021 07:03)    I&O's Summary      PHYSICAL EXAM:  GENERAL: NAD, anicteric, afebrile  HEAD:  Atraumatic, Normocephalic  EYES: conjunctiva and sclera clear  NECK: Supple, No JVD  CHEST/LUNG: Clear to auscultation bilaterally; No wheeze  HEART: Regular rate and rhythm; No murmurs, rubs, or gallops  ABDOMEN: Soft, Nontender, Nondistended; Bowel sounds present  EXTREMITIES:  2+ Peripheral Pulses, No clubbing, cyanosis, or edema  PSYCH: appropriate affect  NEUROLOGY: AAOX3,  non-focal  SKIN: No rashes or lesions    LABS:                        7.8    3.39  )-----------( 165      ( 06 Oct 2021 06:14 )             27.7     10-06    139  |  106  |  7   ----------------------------<  68<L>  3.8   |  21<L>  |  0.44<L>    Ca    7.8<L>      06 Oct 2021 06:15  Phos  2.4     10-06  Mg     1.6     10-06    TPro  6.7  /  Alb  3.8  /  TBili  0.3  /  DBili  x   /  AST  29  /  ALT  13  /  AlkPhos  97  10-05

## 2021-10-07 ENCOUNTER — TRANSCRIPTION ENCOUNTER (OUTPATIENT)
Age: 41
End: 2021-10-07

## 2021-10-07 VITALS — TEMPERATURE: 99 F | RESPIRATION RATE: 18 BRPM | HEART RATE: 85 BPM | OXYGEN SATURATION: 97 %

## 2021-10-07 LAB
ACE SERPL-CCNC: 59 U/L — SIGNIFICANT CHANGE UP (ref 14–82)
ANA TITR SER: NEGATIVE — SIGNIFICANT CHANGE UP
ANION GAP SERPL CALC-SCNC: 13 MMOL/L — SIGNIFICANT CHANGE UP (ref 5–17)
AUTO DIFF PNL BLD: NEGATIVE — SIGNIFICANT CHANGE UP
B BURGDOR C6 AB SER-ACNC: ABNORMAL
B BURGDOR IGG+IGM SER-ACNC: 0.94 INDEX — HIGH (ref 0.01–0.89)
BUN SERPL-MCNC: 5 MG/DL — LOW (ref 7–23)
C-ANCA SER-ACNC: NEGATIVE — SIGNIFICANT CHANGE UP
CALCIUM SERPL-MCNC: 8.6 MG/DL — SIGNIFICANT CHANGE UP (ref 8.4–10.5)
CHLORIDE SERPL-SCNC: 103 MMOL/L — SIGNIFICANT CHANGE UP (ref 96–108)
CO2 SERPL-SCNC: 22 MMOL/L — SIGNIFICANT CHANGE UP (ref 22–31)
CREAT SERPL-MCNC: 0.48 MG/DL — LOW (ref 0.5–1.3)
DSDNA AB FLD-ACNC: <0.2 AI — SIGNIFICANT CHANGE UP
ENA SS-A AB FLD IA-ACNC: <0.2 AI — SIGNIFICANT CHANGE UP
GLUCOSE SERPL-MCNC: 102 MG/DL — HIGH (ref 70–99)
HCT VFR BLD CALC: 33 % — LOW (ref 34.5–45)
HGB BLD-MCNC: 9.1 G/DL — LOW (ref 11.5–15.5)
LYME IGG AB: 2.92 INDEX — HIGH (ref 0.01–0.89)
LYME IGG INTERP: POSITIVE
LYME IGM AB: 0.34 INDEX — SIGNIFICANT CHANGE UP (ref 0.01–0.89)
LYME IGM INTERP: NEGATIVE — SIGNIFICANT CHANGE UP
MAGNESIUM SERPL-MCNC: 1.8 MG/DL — SIGNIFICANT CHANGE UP (ref 1.6–2.6)
MCHC RBC-ENTMCNC: 20.9 PG — LOW (ref 27–34)
MCHC RBC-ENTMCNC: 27.6 GM/DL — LOW (ref 32–36)
MCV RBC AUTO: 75.9 FL — LOW (ref 80–100)
NRBC # BLD: 0 /100 WBCS — SIGNIFICANT CHANGE UP (ref 0–0)
P-ANCA SER-ACNC: NEGATIVE — SIGNIFICANT CHANGE UP
PHOSPHATE SERPL-MCNC: 2.4 MG/DL — LOW (ref 2.5–4.5)
PLATELET # BLD AUTO: 222 K/UL — SIGNIFICANT CHANGE UP (ref 150–400)
POTASSIUM SERPL-MCNC: 4.1 MMOL/L — SIGNIFICANT CHANGE UP (ref 3.5–5.3)
POTASSIUM SERPL-SCNC: 4.1 MMOL/L — SIGNIFICANT CHANGE UP (ref 3.5–5.3)
PROT SERPL-MCNC: 5.9 G/DL — LOW (ref 6–8.3)
PROT SERPL-MCNC: 5.9 G/DL — LOW (ref 6–8.3)
RBC # BLD: 4.35 M/UL — SIGNIFICANT CHANGE UP (ref 3.8–5.2)
RBC # FLD: 26.3 % — HIGH (ref 10.3–14.5)
RHEUMATOID FACT SERPL-ACNC: 11 IU/ML — SIGNIFICANT CHANGE UP (ref 0–13)
SODIUM SERPL-SCNC: 138 MMOL/L — SIGNIFICANT CHANGE UP (ref 135–145)
WBC # BLD: 3.85 K/UL — SIGNIFICANT CHANGE UP (ref 3.8–10.5)
WBC # FLD AUTO: 3.85 K/UL — SIGNIFICANT CHANGE UP (ref 3.8–10.5)

## 2021-10-07 PROCEDURE — 96365 THER/PROPH/DIAG IV INF INIT: CPT

## 2021-10-07 PROCEDURE — 83036 HEMOGLOBIN GLYCOSYLATED A1C: CPT

## 2021-10-07 PROCEDURE — 86695 HERPES SIMPLEX TYPE 1 TEST: CPT

## 2021-10-07 PROCEDURE — 86617 LYME DISEASE ANTIBODY: CPT

## 2021-10-07 PROCEDURE — 86235 NUCLEAR ANTIGEN ANTIBODY: CPT

## 2021-10-07 PROCEDURE — 86780 TREPONEMA PALLIDUM: CPT

## 2021-10-07 PROCEDURE — 86231 EMA EACH IG CLASS: CPT

## 2021-10-07 PROCEDURE — 85018 HEMOGLOBIN: CPT

## 2021-10-07 PROCEDURE — 84295 ASSAY OF SERUM SODIUM: CPT

## 2021-10-07 PROCEDURE — 80053 COMPREHEN METABOLIC PANEL: CPT

## 2021-10-07 PROCEDURE — 84132 ASSAY OF SERUM POTASSIUM: CPT

## 2021-10-07 PROCEDURE — 86256 FLUORESCENT ANTIBODY TITER: CPT

## 2021-10-07 PROCEDURE — 86431 RHEUMATOID FACTOR QUANT: CPT

## 2021-10-07 PROCEDURE — 84702 CHORIONIC GONADOTROPIN TEST: CPT

## 2021-10-07 PROCEDURE — 86325 OTHER IMMUNOELECTROPHORESIS: CPT

## 2021-10-07 PROCEDURE — 86334 IMMUNOFIX E-PHORESIS SERUM: CPT

## 2021-10-07 PROCEDURE — 82525 ASSAY OF COPPER: CPT

## 2021-10-07 PROCEDURE — 86900 BLOOD TYPING SEROLOGIC ABO: CPT

## 2021-10-07 PROCEDURE — 86703 HIV-1/HIV-2 1 RESULT ANTBDY: CPT

## 2021-10-07 PROCEDURE — 87389 HIV-1 AG W/HIV-1&-2 AB AG IA: CPT

## 2021-10-07 PROCEDURE — 82390 ASSAY OF CERULOPLASMIN: CPT

## 2021-10-07 PROCEDURE — 84156 ASSAY OF PROTEIN URINE: CPT

## 2021-10-07 PROCEDURE — 86036 ANCA SCREEN EACH ANTIBODY: CPT

## 2021-10-07 PROCEDURE — 83090 ASSAY OF HOMOCYSTEINE: CPT

## 2021-10-07 PROCEDURE — 86258 DGP ANTIBODY EACH IG CLASS: CPT

## 2021-10-07 PROCEDURE — 82947 ASSAY GLUCOSE BLOOD QUANT: CPT

## 2021-10-07 PROCEDURE — 84165 PROTEIN E-PHORESIS SERUM: CPT

## 2021-10-07 PROCEDURE — 80048 BASIC METABOLIC PNL TOTAL CA: CPT

## 2021-10-07 PROCEDURE — 84446 ASSAY OF VITAMIN E: CPT

## 2021-10-07 PROCEDURE — 86038 ANTINUCLEAR ANTIBODIES: CPT

## 2021-10-07 PROCEDURE — 83605 ASSAY OF LACTIC ACID: CPT

## 2021-10-07 PROCEDURE — 86364 TISS TRNSGLTMNASE EA IG CLAS: CPT

## 2021-10-07 PROCEDURE — 86901 BLOOD TYPING SEROLOGIC RH(D): CPT

## 2021-10-07 PROCEDURE — 96367 TX/PROPH/DG ADDL SEQ IV INF: CPT

## 2021-10-07 PROCEDURE — 82330 ASSAY OF CALCIUM: CPT

## 2021-10-07 PROCEDURE — 96372 THER/PROPH/DIAG INJ SC/IM: CPT

## 2021-10-07 PROCEDURE — 84443 ASSAY THYROID STIM HORMONE: CPT

## 2021-10-07 PROCEDURE — 83735 ASSAY OF MAGNESIUM: CPT

## 2021-10-07 PROCEDURE — 99233 SBSQ HOSP IP/OBS HIGH 50: CPT

## 2021-10-07 PROCEDURE — 82803 BLOOD GASES ANY COMBINATION: CPT

## 2021-10-07 PROCEDURE — 84100 ASSAY OF PHOSPHORUS: CPT

## 2021-10-07 PROCEDURE — 85027 COMPLETE CBC AUTOMATED: CPT

## 2021-10-07 PROCEDURE — 82435 ASSAY OF BLOOD CHLORIDE: CPT

## 2021-10-07 PROCEDURE — 83655 ASSAY OF LEAD: CPT

## 2021-10-07 PROCEDURE — 84439 ASSAY OF FREE THYROXINE: CPT

## 2021-10-07 PROCEDURE — U0003: CPT

## 2021-10-07 PROCEDURE — 83516 IMMUNOASSAY NONANTIBODY: CPT

## 2021-10-07 PROCEDURE — 85025 COMPLETE CBC W/AUTO DIFF WBC: CPT

## 2021-10-07 PROCEDURE — 86850 RBC ANTIBODY SCREEN: CPT

## 2021-10-07 PROCEDURE — 86696 HERPES SIMPLEX TYPE 2 TEST: CPT

## 2021-10-07 PROCEDURE — 82164 ANGIOTENSIN I ENZYME TEST: CPT

## 2021-10-07 PROCEDURE — U0005: CPT

## 2021-10-07 PROCEDURE — 82962 GLUCOSE BLOOD TEST: CPT

## 2021-10-07 PROCEDURE — 86769 SARS-COV-2 COVID-19 ANTIBODY: CPT

## 2021-10-07 PROCEDURE — 84155 ASSAY OF PROTEIN SERUM: CPT

## 2021-10-07 PROCEDURE — 85014 HEMATOCRIT: CPT

## 2021-10-07 PROCEDURE — 83921 ORGANIC ACID SINGLE QUANT: CPT

## 2021-10-07 PROCEDURE — 86140 C-REACTIVE PROTEIN: CPT

## 2021-10-07 PROCEDURE — G0378: CPT

## 2021-10-07 PROCEDURE — 85652 RBC SED RATE AUTOMATED: CPT

## 2021-10-07 PROCEDURE — 99285 EMERGENCY DEPT VISIT HI MDM: CPT | Mod: 25

## 2021-10-07 PROCEDURE — 84481 FREE ASSAY (FT-3): CPT

## 2021-10-07 PROCEDURE — 86618 LYME DISEASE ANTIBODY: CPT

## 2021-10-07 RX ORDER — ERGOCALCIFEROL 1.25 MG/1
1 CAPSULE ORAL
Qty: 15 | Refills: 0
Start: 2021-10-07 | End: 2021-11-05

## 2021-10-07 RX ORDER — MAGNESIUM OXIDE 400 MG ORAL TABLET 241.3 MG
1 TABLET ORAL
Qty: 60 | Refills: 0
Start: 2021-10-07 | End: 2021-11-05

## 2021-10-07 RX ORDER — GABAPENTIN 400 MG/1
1 CAPSULE ORAL
Qty: 28 | Refills: 0
Start: 2021-10-07 | End: 2021-10-20

## 2021-10-07 RX ORDER — OXYCODONE HYDROCHLORIDE 5 MG/1
1 TABLET ORAL
Qty: 15 | Refills: 0
Start: 2021-10-07 | End: 2021-10-11

## 2021-10-07 RX ADMIN — OXYCODONE HYDROCHLORIDE 5 MILLIGRAM(S): 5 TABLET ORAL at 07:26

## 2021-10-07 RX ADMIN — Medication 650 MILLIGRAM(S): at 14:25

## 2021-10-07 RX ADMIN — Medication 650 MILLIGRAM(S): at 05:56

## 2021-10-07 RX ADMIN — Medication 650 MILLIGRAM(S): at 14:55

## 2021-10-07 RX ADMIN — SERTRALINE 100 MILLIGRAM(S): 25 TABLET, FILM COATED ORAL at 11:45

## 2021-10-07 RX ADMIN — Medication 1 PACKET(S): at 08:05

## 2021-10-07 RX ADMIN — GABAPENTIN 300 MILLIGRAM(S): 400 CAPSULE ORAL at 05:53

## 2021-10-07 RX ADMIN — Medication 50 MILLIGRAM(S): at 05:53

## 2021-10-07 RX ADMIN — MAGNESIUM OXIDE 400 MG ORAL TABLET 400 MILLIGRAM(S): 241.3 TABLET ORAL at 11:43

## 2021-10-07 RX ADMIN — Medication 650 MILLIGRAM(S): at 07:26

## 2021-10-07 RX ADMIN — Medication 1 MILLIGRAM(S): at 11:44

## 2021-10-07 RX ADMIN — OXYCODONE HYDROCHLORIDE 5 MILLIGRAM(S): 5 TABLET ORAL at 05:56

## 2021-10-07 RX ADMIN — Medication 325 MILLIGRAM(S): at 11:44

## 2021-10-07 RX ADMIN — OXYCODONE HYDROCHLORIDE 5 MILLIGRAM(S): 5 TABLET ORAL at 14:25

## 2021-10-07 RX ADMIN — Medication 50 MILLIGRAM(S): at 05:52

## 2021-10-07 RX ADMIN — Medication 1 PACKET(S): at 11:46

## 2021-10-07 RX ADMIN — MAGNESIUM OXIDE 400 MG ORAL TABLET 400 MILLIGRAM(S): 241.3 TABLET ORAL at 08:05

## 2021-10-07 RX ADMIN — Medication 1 TABLET(S): at 11:44

## 2021-10-07 RX ADMIN — OXYCODONE HYDROCHLORIDE 5 MILLIGRAM(S): 5 TABLET ORAL at 14:55

## 2021-10-07 NOTE — PROGRESS NOTE ADULT - SUBJECTIVE AND OBJECTIVE BOX
[facility] Division of Hospital Medicine  Iliana Sylvester DO  Pager ([facility]ANEL[facility]ABELLA, 7C-8W): [phone]  Other Times:  [phone]    Patient is a 40y old  Female who presents with a chief complaint of Paresthesia (06 Oct 2021 11:08)      SUBJECTIVE / OVERNIGHT EVENTS:    patient seen and examined at bedside.  feels ok. still with joint pain and numbness and tingling.    MEDICATIONS  (STANDING):  calcium carbonate   1250 mG (OsCal) 1 Tablet(s) Oral daily  enoxaparin Injectable 40 milliGRAM(s) SubCutaneous every 12 hours  ergocalciferol 23513 Unit(s) Oral <User Schedule>  ferrous    sulfate 325 milliGRAM(s) Oral daily  folic acid 1 milliGRAM(s) Oral daily  gabapentin 300 milliGRAM(s) Oral two times a day  magnesium oxide 400 milliGRAM(s) Oral three times a day with meals  methimazole 10 milliGRAM(s) Oral three times a day  metoprolol tartrate 50 milliGRAM(s) Oral two times a day  nicotine -   7 mG/24Hr(s) Patch 1 patch Transdermal daily  potassium phosphate / sodium phosphate Powder (PHOS-NaK) 1 Packet(s) Oral three times a day before meals  sertraline 100 milliGRAM(s) Oral daily  traZODone 50 milliGRAM(s) Oral two times a day    MEDICATIONS  (PRN):  acetaminophen   Tablet .. 650 milliGRAM(s) Oral every 6 hours PRN Temp greater or equal to 38C (100.4F), Mild Pain (1 - 3), Moderate Pain (4 - 6)  ALBUTerol    90 MICROgram(s) HFA Inhaler 2 Puff(s) Inhalation every 6 hours PRN for shortness of breath and/or wheezing  diazepam    Tablet 2 milliGRAM(s) Oral at bedtime PRN anxiety  oxyCODONE    IR 5 milliGRAM(s) Oral every 8 hours PRN Severe Pain (7 - 10)      CAPILLARY BLOOD GLUCOSE        I&O's Summary    06 Oct 2021 07:01  -  07 Oct 2021 07:00  --------------------------------------------------------  IN: 118 mL / OUT: 0 mL / NET: 118 mL    07 Oct 2021 07:01  -  07 Oct 2021 11:47  --------------------------------------------------------  IN: 240 mL / OUT: 0 mL / NET: 240 mL        PHYSICAL EXAM:  Vital Signs Last 24 Hrs  T(C): 37.2 (07 Oct 2021 04:37), Max: 37.2 (07 Oct 2021 04:37)  T(F): 99 (07 Oct 2021 04:37), Max: 99 (07 Oct 2021 04:37)  HR: 93 (07 Oct 2021 04:37) (82 - 93)  BP: 139/71 (07 Oct 2021 04:37) (100/62 - 139/71)  BP(mean): --  RR: 17 (07 Oct 2021 04:37) (17 - 18)  SpO2: 94% (07 Oct 2021 04:37) (94% - 98%)    Vital Signs Last 24 Hrs  T(C): 37.2 (07 Oct 2021 04:37), Max: 37.2 (07 Oct 2021 04:37)  T(F): 99 (07 Oct 2021 04:37), Max: 99 (07 Oct 2021 04:37)  HR: 93 (07 Oct 2021 04:37) (82 - 93)  BP: 139/71 (07 Oct 2021 04:37) (100/62 - 139/71)  BP(mean): --  RR: 17 (07 Oct 2021 04:37) (17 - 18)  SpO2: 94% (07 Oct 2021 04:37) (94% - 98%)    CONSTITUTIONAL: NAD, well-developed, well-groomed  EYES: PERRL; conjunctiva and sclera clear  ENMT: Moist oral mucosa, no pharyngeal injection or exudates; normal dentition  NECK: Supple, no palpable masses; no thyromegaly  RESPIRATORY: Normal respiratory effort; lungs are clear to auscultation bilaterally  CARDIOVASCULAR: Regular rate and rhythm, normal S1 and S2, no murmur/rub/gallop; No lower extremity edema; Peripheral pulses are 2+ bilaterally  ABDOMEN: Nontender to palpation, normoactive bowel sounds, no rebound/guarding; No hepatosplenomegaly  MUSCULOSKELETAL:  Normal gait; no clubbing or cyanosis of digits; mild TTP in hand joints  PSYCH: A+O to person, place, and time; affect appropriate  NEUROLOGY: CN 2-12 are intact and symmetric; decrease sensation to light tough b/l LE  SKIN: No rashes; no palpable lesions  LABS:                        9.1    3.85  )-----------( 222      ( 07 Oct 2021 07:12 )             33.0     10-07    138  |  103  |  5<L>  ----------------------------<  102<H>  4.1   |  22  |  0.48<L>    Ca    8.6      07 Oct 2021 07:12  Phos  2.4     10-07  Mg     1.8     10-07                  RADIOLOGY & ADDITIONAL TESTS:  Results Reviewed:   Imaging Personally Reviewed:  Electrocardiogram Personally Reviewed:    COORDINATION OF CARE:  Care Discussed with Consultants/Other Providers [Y/N]:  Prior or Outpatient Records Reviewed [Y/N]:  cesar Edmondson

## 2021-10-07 NOTE — DISCHARGE NOTE PROVIDER - NSFOLLOWUPCLINICS_GEN_ALL_ED_FT
[facility] General Internal Medicine  General Internal Medicine  [address] [id number]  Phone: [phone]  Fax:   Follow Up Time: 1 week

## 2021-10-07 NOTE — PROGRESS NOTE ADULT - PROBLEM SELECTOR PLAN 1
-Patient with paresthesia concerning for neuropathy.  -Neurology consult reccs appreciated  - HIV neg, TSH, T4 wnl, A1C 4.1, CRP 25, ceruloplasmin, folate 2.2, vitamin b 12 wnl,  - will replete folate 1mg daily, could be reason for numbness and tingling  - Lead level < 1, ESR elevated  - obtain MRI C spine and L/ S spine  -c/w Gabapentin 300mg bid, increase as tolerated every 48hrs  -Pain control  - will need opt EMG  - d/w patient after MRIs, most of this w/u can be followed up as an opt
-Patient with paresthesia concerning for neuropathy.  -Neurology consult reccs appreciated  -f/up HIV, SPEP, RPR, A1c, Bence davis protein and other pending labs  - Lead level < 1, ESR elevated  - obtain MRI C spine  -c/w Gabapentin  -Pain control
3

## 2021-10-07 NOTE — PROGRESS NOTE ADULT - PROBLEM SELECTOR PLAN 5
-Continue with Metoprolol  -Off AC given low CHADSVASC socre
-Continue with Metoprolol  -Off AC given low CHADSVASC socre

## 2021-10-07 NOTE — DISCHARGE NOTE NURSING/CASE MANAGEMENT/SOCIAL WORK - PATIENT PORTAL LINK FT
You can access the FollowMyHealth Patient Portal offered by [facility] by registering at the following website: [email or web address]. By joining [employer]’s FollowMyHealth portal, you will also be able to view your health information using other applications (apps) compatible with our system.

## 2021-10-07 NOTE — DISCHARGE NOTE PROVIDER - NSDCCPCAREPLAN_GEN_ALL_CORE_FT
PRINCIPAL DISCHARGE DIAGNOSIS  Diagnosis: Neuropathy  Assessment and Plan of Treatment: You were seen and evaluated by Neurology for possible neuropathy.   - HIV neg, TSH, T4 wnl, A1C 4.1, CRP 25, ceruloplasmin, folate 2.2, vitamin b 12 within normal limit.  Please continue to take folate, which could be reason for numbness and tingling sensation.   Please continue with  Gabapentin 300mg as directed  Please follow up with Neuro as outpatient to complete further workup and management including MRI C spine, L/ S spine and EMG      SECONDARY DISCHARGE DIAGNOSES  Diagnosis: Vitamin D deficiency  Assessment and Plan of Treatment: Continue to take supplement    Diagnosis: Electrolyte imbalance  Assessment and Plan of Treatment: Resolved  Please follow up with your PCP to repeat your blood work to monitor your electrolytes    Diagnosis: Iron deficiency anemia  Assessment and Plan of Treatment: Please continue to take supplement  We encourage to seek IV iron infusions as outpatient  Please have your colonoscopy as a part of Screening workup  Symptoms to report, bleeding, palpitations, fatigue, pale skin, cold skin, dizziness. Take medications as ordered by PCP       PRINCIPAL DISCHARGE DIAGNOSIS  Diagnosis: Neuropathy  Assessment and Plan of Treatment: You were seen and evaluated by Neurology for possible neuropathy.   - HIV neg, TSH, T4 wnl, A1C 4.1, CRP 25, ceruloplasmin, folate 2.2, vitamin b 12 within normal limit.  Please continue to take folate, which could be reason for numbness and tingling sensation.   Please continue with  Gabapentin 300mg as directed  Please follow up with Neuro as outpatient to complete further workup and management including MRI C spine, L/ S spine and EMG      SECONDARY DISCHARGE DIAGNOSES  Diagnosis: Folic acid deficiency  Assessment and Plan of Treatment: - Folate level was 2.2  - Continue Folic acid  - Outpatient follow up      Diagnosis: Iron deficiency anemia  Assessment and Plan of Treatment: Please continue to take supplement  We encourage to seek IV iron infusions as outpatient  Please have your colonoscopy as a part of Screening workup  Symptoms to report, bleeding, palpitations, fatigue, pale skin, cold skin, dizziness. Take medications as ordered by PCP      Diagnosis: Vitamin D deficiency  Assessment and Plan of Treatment: Continue to take supplement    Diagnosis: Electrolyte imbalance  Assessment and Plan of Treatment: Resolved  Please follow up with your PCP to repeat your blood work to monitor your electrolytes

## 2021-10-07 NOTE — PROGRESS NOTE ADULT - PROBLEM SELECTOR PLAN 3
Vitamin 25D level 9 in September  -Started on Ergocalciferol 50,000 IU weekly x8 weeks then daily supplementation
Vitamin 25D level 9 in September  -Started on Ergocalciferol 50,000 IU weekly x8 weeks then daily supplementation

## 2021-10-07 NOTE — PROVIDER CONTACT NOTE (OTHER) - ASSESSMENT
pt a&ox4, pt reports she started to see "some vaginal bleeding"  pt states last menstrual cycle completed over one week ago

## 2021-10-07 NOTE — PROGRESS NOTE ADULT - PROBLEM SELECTOR PLAN 2
-Hypocalcemia: started  on Oscal  -Hypomagnesemia: started  on Mag Oxide  -Hypokalemia: c/w monitor and replete  -Phos 2.4
-Hypocalcemia: started  on Oscal  -Hypomagnesemia: started  on Mag Oxide  -Hypokalemia: c/w monitor and replete  -Phos 2.4  - improved, continue to monitor

## 2021-10-07 NOTE — PROGRESS NOTE ADULT - PROBLEM SELECTOR PLAN 7
Admitting provider performed smoking cessation counseling with patient. Reviewed modalities such as nicotine replacement therapy and pharmacological agents. Also reviewed deleterious effects of smoking .
Admitting provider performed smoking cessation counseling with patient. Reviewed modalities such as nicotine replacement therapy and pharmacological agents. Also reviewed deleterious effects of smoking .

## 2021-10-07 NOTE — PROGRESS NOTE ADULT - PROBLEM SELECTOR PLAN 4
-Continue with Feosol  - should seek IV iron infusions as outpatient  -Screening colonoscopy advised
-Continue with Feosol  - should seek IV iron infusions as outpatient  -Screening colonoscopy advised

## 2021-10-07 NOTE — DISCHARGE NOTE PROVIDER - NSDCMRMEDTOKEN_GEN_ALL_CORE_FT
acetaminophen 325 mg oral tablet: 2 tab(s) orally every 6 hours, As needed, Temp greater or equal to 38.5C (101.3F), Mild Pain (1 - 3)  Albuterol (Eqv-Proventil HFA) 90 mcg/inh inhalation aerosol: 2 puff(s) inhaled every 6 hours, As Needed - for shortness of breath and/or wheezing  diazePAM 2 mg oral tablet: 1 tab(s) orally once a day, As Needed  ferrous sulfate 325 mg (65 mg elemental iron) oral tablet: 1 tab(s) orally every other day   folic acid 1 mg oral tablet: 1 tab(s) orally once a day  gabapentin 300 mg oral capsule: 1 cap(s) orally 2 times a day   Lopressor 50 mg oral tablet: 1 tab(s) orally 2 times a day  methIMAzole 10 mg oral tablet: 1 tab(s) orally 3 times a day  nicotine 7 mg/24 hr transdermal film, extended release: 1 patch transdermal once a day   traZODone 50 mg oral tablet: 1 tab(s) orally 2 times a day  Zoloft 100 mg oral tablet: 1 tab(s) orally once a day (at bedtime)   acetaminophen 325 mg oral tablet: 2 tab(s) orally every 6 hours, As needed, Temp greater or equal to 38.5C (101.3F), Mild Pain (1 - 3)  Albuterol (Eqv-Proventil HFA) 90 mcg/inh inhalation aerosol: 2 puff(s) inhaled every 6 hours, As Needed - for shortness of breath and/or wheezing  diazePAM 2 mg oral tablet: 1 tab(s) orally once a day, As Needed  ergocalciferol 50 mcg (2000 intl units) oral capsule: 1 cap(s) orally every other day (at bedtime)   ferrous sulfate 325 mg (65 mg elemental iron) oral tablet: 1 tab(s) orally every other day   folic acid 1 mg oral tablet: 1 tab(s) orally once a day  gabapentin 300 mg oral capsule: 1 cap(s) orally 2 times a day   Lopressor 50 mg oral tablet: 1 tab(s) orally 2 times a day  magnesium oxide 400 mg oral tablet: 1 tab(s) orally 2 times a day (with meals)   methIMAzole 10 mg oral tablet: 1 tab(s) orally 3 times a day  nicotine 7 mg/24 hr transdermal film, extended release: 1 patch transdermal once a day   oxyCODONE 5 mg oral tablet: 1 tab(s) orally every 8 hours, As needed, Severe Pain (7 - 10) MDD:3 tab  traZODone 50 mg oral tablet: 1 tab(s) orally 2 times a day  Zoloft 100 mg oral tablet: 1 tab(s) orally once a day (at bedtime)

## 2021-10-07 NOTE — PROGRESS NOTE ADULT - SUBJECTIVE AND OBJECTIVE BOX
Neurology Follow up note    Name  ROBBIE MCKNIGHT    HPI:  40F with PMHx of paroxysmal atrial fibrillation, hyperthyroidism, and iron deficiency anemia presenting with constant numbness/tingling of tips of fingers and toes. Patient recently admitted for similar issue. She states she never had this issue before, but began to experience it approximately one month prior when she was at [facility] and received a blood transfusion. She states pain is constant and slightly alleviated by gabapentin which she ran out three days prior. She denies headache, nausea, vomiting, or dizziness. Patient with no known history of diabetes. On last admission she had electrolyte abnormalities (low mag and calcium) and was repleted and symptoms improved; thus, assumption made that it was due to electrolyte abnormalities. She also tells me paresthesia is exacerbated by cold weather. After discharge patient with constant pain, but now worsening. It does not affect her ability to walk. She states pain is 10/10. In the ED noted to have hypokalemic, hypomagnesmic, and hypocalcemic. Patient given 1 L NS, calcium gluconate, magnesium sulfate. Patient seen by neurology. (05 Oct 2021 14:43)      Interval History - Patient seen and examined this am.             Vital Signs Last 24 Hrs  T(C): 37 (07 Oct 2021 12:34), Max: 37.2 (07 Oct 2021 04:37)  T(F): 98.6 (07 Oct 2021 12:34), Max: 99 (07 Oct 2021 04:37)  HR: 85 (07 Oct 2021 12:34) (82 - 93)  BP: 139/71 (07 Oct 2021 04:37) (100/62 - 139/71)  BP(mean): --  RR: 18 (07 Oct 2021 12:34) (17 - 18)  SpO2: 97% (07 Oct 2021 12:34) (94% - 97%)    PHYSICAL EXAM:    Neurological Exam:  Mental Status - Patient is alert, awake, oriented X3. fluent, names, no dysarthria no aphasia Follows commands well and able to answer questions appropriately. Mood and affect  normal    Cranial Nerves - PERRL, EOMI, VFF, V1-V3 intact, no gross facial asymmetry, tongue/uvula midline    Motor Exam -   Right upper 5/5   Left upper 5/5  Right lower 5/5  Left lower 5/5     nml bulk/tone    Sensory    Intact to light touch and pinprick bilaterally    Coord: FTN intact bilaterally     Gait -  mildly unsteady    Reflexes:          2+ UEs 1+ knees trace ankles      Medications  acetaminophen   Tablet .. 650 milliGRAM(s) Oral every 6 hours PRN  ALBUTerol    90 MICROgram(s) HFA Inhaler 2 Puff(s) Inhalation every 6 hours PRN  calcium carbonate   1250 mG (OsCal) 1 Tablet(s) Oral daily  diazepam    Tablet 2 milliGRAM(s) Oral at bedtime PRN  enoxaparin Injectable 40 milliGRAM(s) SubCutaneous every 12 hours  ergocalciferol 17944 Unit(s) Oral <User Schedule>  ferrous    sulfate 325 milliGRAM(s) Oral daily  folic acid 1 milliGRAM(s) Oral daily  gabapentin 300 milliGRAM(s) Oral two times a day  magnesium oxide 400 milliGRAM(s) Oral three times a day with meals  methimazole 10 milliGRAM(s) Oral three times a day  metoprolol tartrate 50 milliGRAM(s) Oral two times a day  nicotine -   7 mG/24Hr(s) Patch 1 patch Transdermal daily  oxyCODONE    IR 5 milliGRAM(s) Oral every 8 hours PRN  potassium phosphate / sodium phosphate Powder (PHOS-NaK) 1 Packet(s) Oral three times a day before meals  sertraline 100 milliGRAM(s) Oral daily  traZODone 50 milliGRAM(s) Oral two times a day      Lab      Radiology

## 2021-10-07 NOTE — PROGRESS NOTE ADULT - ASSESSMENT
40F with PMHx of paroxysmal atrial fibrillation (not on xarelto as "thyroid is better", hyperthyroidism, and iron deficiency anemia presenting with constant numbness/tingling of tips of fingers and toes. intermittent. Motor 5/5 ankle reflexes trace mildly usteady gait      Impression: Neuropathic pain in setting of microcytic anemia folate defiicency    consider Folate supplementation  elevated homocysteine in presence of low folate, repeat labs as outpt   MRI LS spine here or as an outpt  physical therapy
40F with PMHx of paroxysmal atrial fibrillation, hyperthyroidism, and iron deficiency anemia presenting with constant numbness/tingling of tips of fingers and toes. In the ED noted to have hypokalemic, hypomagnesmic, and hypocalcemic.
40F with PMHx of paroxysmal atrial fibrillation, hyperthyroidism, and iron deficiency anemia presenting with constant numbness/tingling of tips of fingers and toes. In the ED noted to have hypokalemic, hypomagnesmic, and hypocalcemic.

## 2021-10-07 NOTE — DISCHARGE NOTE PROVIDER - HOSPITAL COURSE
40F with PMHx of paroxysmal atrial fibrillation, hyperthyroidism, and iron deficiency anemia presenting with constant numbness/tingling of tips of fingers and toes. In the ED noted to have hypokalemic, hypomagnesmic, and hypocalcemic. Patient seen and evaluated by Neuro for paresthesia concerning for neuropathy.  HIV neg, TSH, T4 wnl, A1C 4.1, CRP 25, ceruloplasmin, folate 2.2, vitamin b 12 wnl. Folate noted to be low, which may be a reason for numbness and tingling sensation. Supplement ordered. Lead level < 1, ESR elevated. Gabapentin increased with clinical improvement. Patient to follow up with Neuro as opt for further workup. Electrolytes imbalance resolved with aggressive repletion. Patient remains stable for DC with close follow up with PCP and Neuro as per Dr. Sylvester.

## 2021-10-07 NOTE — DISCHARGE NOTE PROVIDER - CARE PROVIDER_API CALL
Mario Tapia)  Neurology  [address] [facility] [address]  [place] [address]  Phone: [phone]  Fax: [phone]  Follow Up Time: 1 week

## 2021-10-08 LAB
% ALBUMIN: 53.5 % — SIGNIFICANT CHANGE UP
% ALPHA 1: 6.4 % — SIGNIFICANT CHANGE UP
% ALPHA 2: 11 % — SIGNIFICANT CHANGE UP
% BETA: 15.4 % — SIGNIFICANT CHANGE UP
% GAMMA: 13.7 % — SIGNIFICANT CHANGE UP
ALBUMIN SERPL ELPH-MCNC: 3.2 G/DL — LOW (ref 3.6–5.5)
ALBUMIN/GLOB SERPL ELPH: 1.2 RATIO — SIGNIFICANT CHANGE UP
ALPHA1 GLOB SERPL ELPH-MCNC: 0.4 G/DL — SIGNIFICANT CHANGE UP (ref 0.1–0.4)
ALPHA2 GLOB SERPL ELPH-MCNC: 0.6 G/DL — SIGNIFICANT CHANGE UP (ref 0.5–1)
B-GLOBULIN SERPL ELPH-MCNC: 0.9 G/DL — SIGNIFICANT CHANGE UP (ref 0.5–1)
ENDOMYSIUM IGA TITR SER IF: NEGATIVE — SIGNIFICANT CHANGE UP
ENDOMYSIUM IGA TITR SER: SIGNIFICANT CHANGE UP
GAMMA GLOBULIN: 0.8 G/DL — SIGNIFICANT CHANGE UP (ref 0.6–1.6)
GLIADIN PEPTIDE IGA SER-ACNC: 30.4 UNITS — HIGH
GLIADIN PEPTIDE IGA SER-ACNC: POSITIVE
GLIADIN PEPTIDE IGG SER-ACNC: <5 UNITS — SIGNIFICANT CHANGE UP
GLIADIN PEPTIDE IGG SER-ACNC: NEGATIVE — SIGNIFICANT CHANGE UP
LAB BILL ONLY ITEM: SIGNIFICANT CHANGE UP
PROT PATTERN SERPL ELPH-IMP: SIGNIFICANT CHANGE UP
TTG IGA SER-ACNC: <1.2 U/ML — SIGNIFICANT CHANGE UP
TTG IGA SER-ACNC: NEGATIVE — SIGNIFICANT CHANGE UP

## 2021-10-10 LAB
GD1A GANGL IGG+IGM SER IA-ACNC: 7 IV — SIGNIFICANT CHANGE UP (ref 0–50)
GD1B GANGL IGG+IGM SER IA-ACNC: 6 IV — SIGNIFICANT CHANGE UP (ref 0–50)
GM1 ASIALO IGG+IGM SER IA-ACNC: 38 IV — SIGNIFICANT CHANGE UP (ref 0–50)
GM1 GANGL IGG+IGM SER-ACNC: 19 IV — SIGNIFICANT CHANGE UP (ref 0–50)
GM2 GANGL IGG+IGM SER IA-ACNC: 9 IV — SIGNIFICANT CHANGE UP (ref 0–50)
GQ1B GANGL IGG+IGM SER IA-ACNC: 8 IV — SIGNIFICANT CHANGE UP (ref 0–50)

## 2021-10-11 LAB
A-TOCOPHEROL VIT E SERPL-MCNC: 7.9 MG/L — SIGNIFICANT CHANGE UP (ref 7–25.1)
BETA+GAMMA TOCOPHEROL SERPL-MCNC: 1.7 MG/L — SIGNIFICANT CHANGE UP (ref 0.5–5.5)

## 2021-10-12 LAB
M PROTEIN 24H UR ELPH-MRATE: SIGNIFICANT CHANGE UP
METHYLMALONATE SERPL-SCNC: 64 NMOL/L — SIGNIFICANT CHANGE UP (ref 0–378)

## 2021-10-13 LAB — COPPER SERPL-MCNC: 164 UG/DL — HIGH (ref 80–158)

## 2021-10-17 NOTE — ED PROVIDER NOTE - NS ED NOTE AC HIGH RISK COUNTRIES
Patient with microalbuminuria >30mg/g.  Plan to continue ACE.  Informed via patient portal with other lab results with plan to have patient bring in blood pressure log.  No

## 2022-05-01 ENCOUNTER — EMERGENCY (EMERGENCY)
Facility: HOSPITAL | Age: 42
LOS: 1 days | Discharge: HOME | End: 2022-05-01
Attending: EMERGENCY MEDICINE | Admitting: EMERGENCY MEDICINE
Payer: MEDICAID

## 2022-05-01 VITALS
HEIGHT: 65 IN | HEART RATE: 95 BPM | RESPIRATION RATE: 20 BRPM | SYSTOLIC BLOOD PRESSURE: 128 MMHG | DIASTOLIC BLOOD PRESSURE: 84 MMHG | TEMPERATURE: 98 F | WEIGHT: 246.92 LBS | OXYGEN SATURATION: 100 %

## 2022-05-01 VITALS
HEART RATE: 89 BPM | DIASTOLIC BLOOD PRESSURE: 65 MMHG | OXYGEN SATURATION: 95 % | RESPIRATION RATE: 15 BRPM | SYSTOLIC BLOOD PRESSURE: 109 MMHG | TEMPERATURE: 98 F

## 2022-05-01 DIAGNOSIS — Z92.89 PERSONAL HISTORY OF OTHER MEDICAL TREATMENT: Chronic | ICD-10-CM

## 2022-05-01 LAB
ALBUMIN SERPL ELPH-MCNC: 3.6 G/DL — SIGNIFICANT CHANGE UP (ref 3.3–5)
ALP SERPL-CCNC: 118 U/L — SIGNIFICANT CHANGE UP (ref 30–120)
ALT FLD-CCNC: 21 U/L DA — SIGNIFICANT CHANGE UP (ref 10–60)
ANION GAP SERPL CALC-SCNC: 15 MMOL/L — SIGNIFICANT CHANGE UP (ref 5–17)
APTT BLD: 37.1 SEC — HIGH (ref 27.5–35.5)
AST SERPL-CCNC: 81 U/L — HIGH (ref 10–40)
BASOPHILS # BLD AUTO: 0.06 K/UL — SIGNIFICANT CHANGE UP (ref 0–0.2)
BASOPHILS NFR BLD AUTO: 1.5 % — SIGNIFICANT CHANGE UP (ref 0–2)
BILIRUB SERPL-MCNC: 0.6 MG/DL — SIGNIFICANT CHANGE UP (ref 0.2–1.2)
BUN SERPL-MCNC: 8 MG/DL — SIGNIFICANT CHANGE UP (ref 7–23)
CALCIUM SERPL-MCNC: 8.3 MG/DL — LOW (ref 8.4–10.5)
CHLORIDE SERPL-SCNC: 101 MMOL/L — SIGNIFICANT CHANGE UP (ref 96–108)
CO2 SERPL-SCNC: 25 MMOL/L — SIGNIFICANT CHANGE UP (ref 22–31)
CREAT SERPL-MCNC: 0.64 MG/DL — SIGNIFICANT CHANGE UP (ref 0.5–1.3)
EGFR: 112 ML/MIN/1.73M2 — SIGNIFICANT CHANGE UP
EOSINOPHIL # BLD AUTO: 0 K/UL — SIGNIFICANT CHANGE UP (ref 0–0.5)
EOSINOPHIL NFR BLD AUTO: 0 % — SIGNIFICANT CHANGE UP (ref 0–6)
GLUCOSE SERPL-MCNC: 122 MG/DL — HIGH (ref 70–99)
HCG SERPL-ACNC: <1 MIU/ML — SIGNIFICANT CHANGE UP
HCG UR QL: NEGATIVE — SIGNIFICANT CHANGE UP
HCT VFR BLD CALC: 20.8 % — CRITICAL LOW (ref 34.5–45)
HGB BLD-MCNC: 5.6 G/DL — CRITICAL LOW (ref 11.5–15.5)
IMM GRANULOCYTES NFR BLD AUTO: 0 % — SIGNIFICANT CHANGE UP (ref 0–1.5)
INR BLD: 1.08 RATIO — SIGNIFICANT CHANGE UP (ref 0.88–1.16)
LYMPHOCYTES # BLD AUTO: 1.56 K/UL — SIGNIFICANT CHANGE UP (ref 1–3.3)
LYMPHOCYTES # BLD AUTO: 39 % — SIGNIFICANT CHANGE UP (ref 13–44)
MAGNESIUM SERPL-MCNC: 1.3 MG/DL — LOW (ref 1.6–2.6)
MCHC RBC-ENTMCNC: 18.3 PG — LOW (ref 27–34)
MCHC RBC-ENTMCNC: 26.9 GM/DL — LOW (ref 32–36)
MCV RBC AUTO: 68 FL — LOW (ref 80–100)
MONOCYTES # BLD AUTO: 0.45 K/UL — SIGNIFICANT CHANGE UP (ref 0–0.9)
MONOCYTES NFR BLD AUTO: 11.3 % — SIGNIFICANT CHANGE UP (ref 2–14)
NEUTROPHILS # BLD AUTO: 1.93 K/UL — SIGNIFICANT CHANGE UP (ref 1.8–7.4)
NEUTROPHILS NFR BLD AUTO: 48.2 % — SIGNIFICANT CHANGE UP (ref 43–77)
NRBC # BLD: 0 /100 WBCS — SIGNIFICANT CHANGE UP (ref 0–0)
PLATELET # BLD AUTO: 108 K/UL — LOW (ref 150–400)
POTASSIUM SERPL-MCNC: 2.8 MMOL/L — CRITICAL LOW (ref 3.5–5.3)
POTASSIUM SERPL-SCNC: 2.8 MMOL/L — CRITICAL LOW (ref 3.5–5.3)
PROT SERPL-MCNC: 7.4 G/DL — SIGNIFICANT CHANGE UP (ref 6–8.3)
PROTHROM AB SERPL-ACNC: 12.7 SEC — SIGNIFICANT CHANGE UP (ref 10.5–13.4)
RBC # BLD: 3.06 M/UL — LOW (ref 3.8–5.2)
RBC # FLD: 24.2 % — HIGH (ref 10.3–14.5)
SODIUM SERPL-SCNC: 141 MMOL/L — SIGNIFICANT CHANGE UP (ref 135–145)
WBC # BLD: 4 K/UL — SIGNIFICANT CHANGE UP (ref 3.8–10.5)
WBC # FLD AUTO: 4 K/UL — SIGNIFICANT CHANGE UP (ref 3.8–10.5)

## 2022-05-01 PROCEDURE — 85025 COMPLETE CBC W/AUTO DIFF WBC: CPT

## 2022-05-01 PROCEDURE — 85610 PROTHROMBIN TIME: CPT

## 2022-05-01 PROCEDURE — 99285 EMERGENCY DEPT VISIT HI MDM: CPT

## 2022-05-01 PROCEDURE — 86923 COMPATIBILITY TEST ELECTRIC: CPT

## 2022-05-01 PROCEDURE — 86901 BLOOD TYPING SEROLOGIC RH(D): CPT

## 2022-05-01 PROCEDURE — 80053 COMPREHEN METABOLIC PANEL: CPT

## 2022-05-01 PROCEDURE — 36430 TRANSFUSION BLD/BLD COMPNT: CPT

## 2022-05-01 PROCEDURE — 86900 BLOOD TYPING SEROLOGIC ABO: CPT

## 2022-05-01 PROCEDURE — 86850 RBC ANTIBODY SCREEN: CPT

## 2022-05-01 PROCEDURE — 99285 EMERGENCY DEPT VISIT HI MDM: CPT | Mod: 25

## 2022-05-01 PROCEDURE — 96365 THER/PROPH/DIAG IV INF INIT: CPT | Mod: XU

## 2022-05-01 PROCEDURE — 85730 THROMBOPLASTIN TIME PARTIAL: CPT

## 2022-05-01 PROCEDURE — 36415 COLL VENOUS BLD VENIPUNCTURE: CPT

## 2022-05-01 PROCEDURE — 96368 THER/DIAG CONCURRENT INF: CPT

## 2022-05-01 PROCEDURE — 81025 URINE PREGNANCY TEST: CPT

## 2022-05-01 PROCEDURE — 83735 ASSAY OF MAGNESIUM: CPT

## 2022-05-01 PROCEDURE — P9016: CPT

## 2022-05-01 PROCEDURE — 84702 CHORIONIC GONADOTROPIN TEST: CPT

## 2022-05-01 RX ORDER — SODIUM CHLORIDE 9 MG/ML
250 INJECTION INTRAMUSCULAR; INTRAVENOUS; SUBCUTANEOUS
Refills: 0 | Status: DISCONTINUED | OUTPATIENT
Start: 2022-05-01 | End: 2022-05-04

## 2022-05-01 RX ORDER — DIAZEPAM 5 MG
2 TABLET ORAL ONCE
Refills: 0 | Status: DISCONTINUED | OUTPATIENT
Start: 2022-05-01 | End: 2022-05-01

## 2022-05-01 RX ORDER — POTASSIUM CHLORIDE 20 MEQ
10 PACKET (EA) ORAL ONCE
Refills: 0 | Status: COMPLETED | OUTPATIENT
Start: 2022-05-01 | End: 2022-05-01

## 2022-05-01 RX ORDER — TRAZODONE HCL 50 MG
50 TABLET ORAL ONCE
Refills: 0 | Status: COMPLETED | OUTPATIENT
Start: 2022-05-01 | End: 2022-05-01

## 2022-05-01 RX ORDER — POTASSIUM CHLORIDE 20 MEQ
40 PACKET (EA) ORAL ONCE
Refills: 0 | Status: COMPLETED | OUTPATIENT
Start: 2022-05-01 | End: 2022-05-01

## 2022-05-01 RX ORDER — MAGNESIUM SULFATE 500 MG/ML
2 VIAL (ML) INJECTION ONCE
Refills: 0 | Status: COMPLETED | OUTPATIENT
Start: 2022-05-01 | End: 2022-05-01

## 2022-05-01 RX ADMIN — Medication 2 MILLIGRAM(S): at 04:12

## 2022-05-01 RX ADMIN — Medication 25 GRAM(S): at 04:12

## 2022-05-01 RX ADMIN — SODIUM CHLORIDE 100 MILLILITER(S): 9 INJECTION INTRAMUSCULAR; INTRAVENOUS; SUBCUTANEOUS at 04:13

## 2022-05-01 RX ADMIN — Medication 100 MILLIEQUIVALENT(S): at 04:12

## 2022-05-01 RX ADMIN — Medication 10 MILLIEQUIVALENT(S): at 05:15

## 2022-05-01 RX ADMIN — SODIUM CHLORIDE 250 MILLILITER(S): 9 INJECTION INTRAMUSCULAR; INTRAVENOUS; SUBCUTANEOUS at 06:00

## 2022-05-01 RX ADMIN — Medication 2 GRAM(S): at 05:15

## 2022-05-01 RX ADMIN — Medication 50 MILLIGRAM(S): at 04:15

## 2022-05-01 RX ADMIN — Medication 40 MILLIEQUIVALENT(S): at 04:12

## 2022-05-01 NOTE — ED PROVIDER NOTE - PROGRESS NOTE DETAILS
Reevaluated patient at bedside.  Patient feeling improved.  Discussed the results of all diagnostic testing in ED and copies of all reports given.  Patient relates has pmd for f/u, but needs hematologist  An opportunity to ask questions was given.  Discussed the importance of prompt, close medical follow-up.  Patient will return with any changes, concerns or persistent / worsening symptoms.  Understanding of all instructions verbalized.

## 2022-05-01 NOTE — ED PROVIDER NOTE - CLINICAL SUMMARY MEDICAL DECISION MAKING FREE TEXT BOX
pt sent in for low H&H needing transfusion, pt denies symptoms related to anemia - h/o menometrorrhagia, but lmp about 3 months ago prior to previous transfusion, pt known to have DESMOND, and is not taking supplements - will check basic labs, confirm H&H, transfuse as needed, d/w patient the importance of iron supplementation until iron stores are normalized to try to avoid recurrence of significant anemia if pt does not have further menstrual bleeding, also recommend f/u with Heme outpt

## 2022-05-01 NOTE — ED ADULT NURSE NOTE - NSFALLRSKINDICATORS_ED_ALL_ED
Carelink transmission shows normal sensing and pacing function. See interrogation for more details. no

## 2022-05-01 NOTE — ED PROVIDER NOTE - PROVIDER TOKENS
PROVIDER:[TOKEN:[[id number]],FOLLOWUP:[1-3 Days]] PROVIDER:[TOKEN:[[id number]],FOLLOWUP:[1-3 Days]],FREE:[LAST:[your primary doctor],PHONE:[(   )    -],FAX:[(   )    -],FOLLOWUP:[1-3 Days]]

## 2022-05-01 NOTE — ED PROVIDER NOTE - PATIENT PORTAL LINK FT
You can access the FollowMyHealth Patient Portal offered by [facility] by registering at the following website: [email or web address]. By joining [employer]’s FollowMyHealth portal, you will also be able to view your health information using other applications (apps) compatible with our system.

## 2022-05-01 NOTE — ED ADULT NURSE NOTE - OBJECTIVE STATEMENT
pt reports she was told to come to the ED for low blood count. reports she has not had her period for 4 months. denies rectal bleeding

## 2022-05-01 NOTE — ED PROVIDER NOTE - CARE PROVIDER_API CALL
hCon Smart)  Hematology; Internal Medicine; Medical Oncology  [address] [facility] [place], [place] [address]  Phone: [phone]  Fax: [phone]  Follow Up Time: 1-3 Days   Chon Smart)  Hematology; Internal Medicine; Medical Oncology  [address] [facility], [address] [place] [address]  Phone: [phone]  Fax: [phone]  Follow Up Time: 1-3 Days    your primary doctor,   Phone: (   )    -  Fax: (   )    -  Follow Up Time: 1-3 Days

## 2022-05-01 NOTE — ED PROVIDER NOTE - NSFOLLOWUPINSTRUCTIONS_ED_ALL_ED_FT
Iron Deficiency Anemia, Adult      Iron deficiency anemia is a condition in which the concentration of red blood cells or hemoglobin in the blood is below normal because of too little iron. Hemoglobin is a substance in red blood cells that carries oxygen to the body's tissues. When the concentration of red blood cells or hemoglobin is too low, not enough oxygen reaches these tissues.    Iron deficiency anemia is usually long-lasting, and it develops over time. It may or may not cause symptoms. It is a common type of anemia.      What are the causes?    This condition may be caused by:  •Not enough iron in the diet.      •Abnormal absorption in the gut.      •Increased need for iron because of pregnancy or heavy menstrual periods, for females.      •Cancers of the gastrointestinal system, such as colon cancer.      •Blood loss caused by bleeding in the intestine. This may be from a gastrointestinal condition like Crohn's disease.      •Frequent blood draws, such as from blood donation.        What increases the risk?    The following factors may make you more likely to develop this condition:  •Being pregnant.      •Being a teenage girl going through a growth spurt.        What are the signs or symptoms?    Symptoms of this condition may include:  •Pale skin, lips, and nail beds.      •Weakness, dizziness, and getting tired easily.      •Headache.      •Shortness of breath when moving or exercising.      •Cold hands and feet.      •Fast or irregular heartbeat.      •Irritability or rapid breathing. These are more common in severe anemia.      Mild anemia may not cause any symptoms.      How is this diagnosed?    This condition is diagnosed based on:  •Your medical history.      •A physical exam.      •Blood tests.      You may have additional tests to find the underlying cause of your anemia, such as:  •Testing for blood in the stool (fecal occult blood test).      •A procedure to see inside your colon and rectum (colonoscopy).      •A procedure to see inside your esophagus and stomach (endoscopy).      •A test in which cells are removed from bone marrow (bone marrow aspiration) or fluid is removed from the bone marrow to be examined. This is rarely needed.        How is this treated?    This condition is treated by correcting the cause of your iron deficiency. Treatment may involve:  •Adding iron-rich foods to your diet.      •Taking iron supplements. If you are pregnant or breastfeeding, you may need to take extra iron because your normal diet usually does not provide the amount of iron that you need.      •Increasing vitamin C intake. Vitamin C helps your body absorb iron. Your health care provider may recommend that you take iron supplements along with a glass of orange juice or a vitamin C supplement.      •Medicines to make heavy menstrual flow lighter.      •Surgery.      You may need repeat blood tests to determine whether treatment is working. If the treatment does not seem to be working, you may need more tests.      Follow these instructions at home:    Medicines   •Take over-the-counter and prescription medicines only as told by your health care provider. This includes iron supplements and vitamins.  •For the best iron absorption, you should take iron supplements when your stomach is empty. If you cannot tolerate them on an empty stomach, you may need to take them with food.      •Do not drink milk or take antacids at the same time as your iron supplements. Milk and antacids may interfere with iron absorption.      •Iron supplements may turn stool (feces) a darker color and it may appear black.        •If you cannot tolerate taking iron supplements by mouth, talk with your health care provider about taking them through an IV or through an injection into a muscle.        Eating and drinking    •Talk with your health care provider before changing your diet. He or she may recommend that you eat foods that contain a lot of iron, such as:  •Liver.      •Low-fat (lean) beef.      •Breads and cereals that have iron added to them (are fortified).      •Eggs.      •Dried fruit.      •Dark green, leafy vegetables.      •To help your body use the iron from iron-rich foods, eat those foods at the same time as fresh fruits and vegetables that are high in vitamin C. Foods that are high in vitamin C include:  •Oranges.      •Peppers.      •Tomatoes.      •Mangoes.        •Drink enough fluid to keep your urine pale yellow.      Managing constipation     If you are taking an iron supplement, it may cause constipation. To prevent or treat constipation, you may need to:  •Take over-the-counter or prescription medicines.      •Eat foods that are high in fiber, such as beans, whole grains, and fresh fruits and vegetables.      •Limit foods that are high in fat and processed sugars, such as fried or sweet foods.      General instructions    •Return to your normal activities as told by your health care provider. Ask your health care provider what activities are safe for you.      •Practice good hygiene. Anemia can make you more prone to illness and infection.      •Keep all follow-up visits as told by your health care provider. This is important.        Contact a health care provider if you:    •Feel nauseous or you vomit.      •Feel weak.      •Have unexplained sweating.    •Develop symptoms of constipation, such as:  •Having fewer than three bowel movements a week.      •Straining to have a bowel movement.      •Having stools that are hard, dry, or larger than normal.      •Feeling full or bloated.      •Pain in the lower abdomen.      •Not feeling relief after having a bowel movement.          Get help right away if you:    •Faint. If this happens, do not drive yourself to the hospital.      •Have chest pain.    •Have shortness of breath that:  •Is severe.      •Gets worse with physical activity.        •Have an irregular or rapid heartbeat.      •Become light-headed when getting up from a sitting or lying down position.      These symptoms may represent a serious problem that is an emergency. Do not wait to see if the symptoms will go away. Get medical help right away. Call your local emergency services (911 in the [place] Do not drive yourself to the hospital.       Summary    •Iron deficiency anemia is a condition in which the concentration of red blood cells or hemoglobin in the blood is below normal because of too little iron.      •This condition is treated by correcting the cause of your iron deficiency.      •Take over-the-counter and prescription medicines only as told by your health care provider. This includes iron supplements and vitamins.      •To help your body use the iron from iron-rich foods, eat those foods at the same time as fresh fruits and vegetables that are high in vitamin C.      •Get help right away if you have shortness of breath that gets worse with physical activity.      This information is not intended to replace advice given to you by your health care provider. Make sure you discuss any questions you have with your health care provider. Iron Deficiency Anemia, Adult      Iron deficiency anemia is a condition in which the concentration of red blood cells or hemoglobin in the blood is below normal because of too little iron. Hemoglobin is a substance in red blood cells that carries oxygen to the body's tissues. When the concentration of red blood cells or hemoglobin is too low, not enough oxygen reaches these tissues.    Iron deficiency anemia is usually long-lasting, and it develops over time. It may or may not cause symptoms. It is a common type of anemia.      What are the causes?    This condition may be caused by:  •Not enough iron in the diet.      •Abnormal absorption in the gut.      •Increased need for iron because of pregnancy or heavy menstrual periods, for females.      •Cancers of the gastrointestinal system, such as colon cancer.      •Blood loss caused by bleeding in the intestine. This may be from a gastrointestinal condition like Crohn's disease.      •Frequent blood draws, such as from blood donation.        What increases the risk?    The following factors may make you more likely to develop this condition:  •Being pregnant.      •Being a teenage girl going through a growth spurt.        What are the signs or symptoms?    Symptoms of this condition may include:  •Pale skin, lips, and nail beds.      •Weakness, dizziness, and getting tired easily.      •Headache.      •Shortness of breath when moving or exercising.      •Cold hands and feet.      •Fast or irregular heartbeat.      •Irritability or rapid breathing. These are more common in severe anemia.      Mild anemia may not cause any symptoms.      How is this diagnosed?    This condition is diagnosed based on:  •Your medical history.      •A physical exam.      •Blood tests.      You may have additional tests to find the underlying cause of your anemia, such as:  •Testing for blood in the stool (fecal occult blood test).      •A procedure to see inside your colon and rectum (colonoscopy).      •A procedure to see inside your esophagus and stomach (endoscopy).      •A test in which cells are removed from bone marrow (bone marrow aspiration) or fluid is removed from the bone marrow to be examined. This is rarely needed.        How is this treated?    This condition is treated by correcting the cause of your iron deficiency. Treatment may involve:  •Adding iron-rich foods to your diet.      •Taking iron supplements. If you are pregnant or breastfeeding, you may need to take extra iron because your normal diet usually does not provide the amount of iron that you need.      •Increasing vitamin C intake. Vitamin C helps your body absorb iron. Your health care provider may recommend that you take iron supplements along with a glass of orange juice or a vitamin C supplement.      •Medicines to make heavy menstrual flow lighter.      •Surgery.      You may need repeat blood tests to determine whether treatment is working. If the treatment does not seem to be working, you may need more tests.      Follow these instructions at home:    Medicines   •Take over-the-counter and prescription medicines only as told by your health care provider. This includes iron supplements and vitamins.  •For the best iron absorption, you should take iron supplements when your stomach is empty. If you cannot tolerate them on an empty stomach, you may need to take them with food.      •Do not drink milk or take antacids at the same time as your iron supplements. Milk and antacids may interfere with iron absorption.      •Iron supplements may turn stool (feces) a darker color and it may appear black.        •If you cannot tolerate taking iron supplements by mouth, talk with your health care provider about taking them through an IV or through an injection into a muscle.        Eating and drinking    •Talk with your health care provider before changing your diet. He or she may recommend that you eat foods that contain a lot of iron, such as:  •Liver.      •Low-fat (lean) beef.      •Breads and cereals that have iron added to them (are fortified).      •Eggs.      •Dried fruit.      •Dark green, leafy vegetables.      •To help your body use the iron from iron-rich foods, eat those foods at the same time as fresh fruits and vegetables that are high in vitamin C. Foods that are high in vitamin C include:  •Oranges.      •Peppers.      •Tomatoes.      •Mangoes.        •Drink enough fluid to keep your urine pale yellow.      Managing constipation     If you are taking an iron supplement, it may cause constipation. To prevent or treat constipation, you may need to:  •Take over-the-counter or prescription medicines.      •Eat foods that are high in fiber, such as beans, whole grains, and fresh fruits and vegetables.      •Limit foods that are high in fat and processed sugars, such as fried or sweet foods.      General instructions    •Return to your normal activities as told by your health care provider. Ask your health care provider what activities are safe for you.      •Practice good hygiene. Anemia can make you more prone to illness and infection.      •Keep all follow-up visits as told by your health care provider. This is important.        Contact a health care provider if you:    •Feel nauseous or you vomit.      •Feel weak.      •Have unexplained sweating.    •Develop symptoms of constipation, such as:  •Having fewer than three bowel movements a week.      •Straining to have a bowel movement.      •Having stools that are hard, dry, or larger than normal.      •Feeling full or bloated.      •Pain in the lower abdomen.      •Not feeling relief after having a bowel movement.          Get help right away if you:    •Faint. If this happens, do not drive yourself to the hospital.      •Have chest pain.    •Have shortness of breath that:  •Is severe.      •Gets worse with physical activity.        •Have an irregular or rapid heartbeat.      •Become light-headed when getting up from a sitting or lying down position.      These symptoms may represent a serious problem that is an emergency. Do not wait to see if the symptoms will go away. Get medical help right away. Call your local emergency services (911 in the [place] Do not drive yourself to the hospital.       Summary    •Iron deficiency anemia is a condition in which the concentration of red blood cells or hemoglobin in the blood is below normal because of too little iron.      •This condition is treated by correcting the cause of your iron deficiency.      •Take over-the-counter and prescription medicines only as told by your health care provider. This includes iron supplements and vitamins.      •To help your body use the iron from iron-rich foods, eat those foods at the same time as fresh fruits and vegetables that are high in vitamin C.      •Get help right away if you have shortness of breath that gets worse with physical activity.      This information is not intended to replace advice given to you by your health care provider. Make sure you discuss any questions you have with your health care provider.        Hypokalemia    WHAT YOU NEED TO KNOW:    Hypokalemia is a low level of potassium in your blood. Potassium helps control how your muscles, heart, and digestive system work. Hypokalemia occurs when your body loses too much potassium or does not absorb enough from food.     WHILE YOU ARE HERE:    Informed consent is a legal document that explains the tests, treatments, or procedures that you may need. Informed consent means you understand what will be done and can make decisions about what you want. You give your permission when you sign the consent form. You can have someone sign this form for you if you are not able to sign it. You have the right to understand your medical care in words you know. Before you sign the consent form, understand the risks and benefits of what will be done. Make sure all your questions are answered.     Medicine: You will receive potassium to bring your levels back to normal. This may be given as a pill or IV.     Tests:   •Blood tests will be done to monitor your potassium levels. They will also show how well your treatment is working.      •Urine tests may be done to check your kidney function and help monitor your condition.      •Telemetry is continuous monitoring of your heart rhythm. Sticky pads placed on your skin connect to an EKG machine that records your heart rhythm.      Nutrition: You may need to increase potassium in your diet. A dietitian may meet with you to teach you about foods that are high in potassium. Foods that are high in potassium include bananas, oranges, tomatoes, potatoes, and avocado. Florian beans, turkey, salmon, lean beef, yogurt, and milk are also high in potassium.    RISKS:    You can develop hyperkalemia (high levels of potassium) if you receive too much potassium too quickly. This can lead to heart damage.     CARE AGREEMENT:    You have the right to help plan your care. Learn about your health condition and how it may be treated. Discuss treatment options with your healthcare providers to decide what care you want to receive. You always have the right to refuse treatment.

## 2022-05-01 NOTE — ED PROVIDER NOTE - OBJECTIVE STATEMENT
41 y.o. F sent by PCP for blood transfusion - pt has h/o DESMOND related to menometrorrhagia - states last transfusion was about 3 months ago after her last period, states went for routine check up a few days ago and doctor called her 2d ago to advise her to go to the ED for Hb of 5.8 for transfusion, pt was not able to come to the ED right away due to family reasons, this visit

## 2022-05-01 NOTE — ED ADULT NURSE REASSESSMENT NOTE - NS ED NURSE REASSESS COMMENT FT1
second unit of PRBC started without issues, pt denies any complaints during the initial 15 mins of transfusion, cardiac monitoring ongoing, vss, will continue to monitor.
second unit of PRBC completed without issues, pt denies any complaints, no signs of allergic/adverse reactions noted. pt reports feeling improved and wishes to be d/c home.
MD aware of all results. PRBC's unit number [id number] infusing at this time. no transfusion reaction noted at this time. see transfusion administration record for vital signs. will continue to monitor
first unit of PRBC completed successfully, pt denies any complaints, no signs of allergic/adverse reactions noted. cardiac monitoring ongoing, vss, contacted blood bank for 2nd unit of PRBC
pt received aox4 resting on stretcher, PRBC transfusion ongoing via pump, pt on cardiac monitor, vss, denies any complaints at this time, no signs of allergic/adverse reaction noted will continue to monitor.

## 2022-07-01 NOTE — DISCHARGE NOTE PROVIDER - NSDCHC_MEDRECSTATUS_GEN_ALL_CORE
Admission Reconciliation is Completed  Discharge Reconciliation is Not Complete Admission Reconciliation is Completed  Discharge Reconciliation is Completed Female

## 2022-07-13 NOTE — ED ADULT NURSE NOTE - NEURO ASSESSMENT
Tazorac Counseling:  Patient advised that medication is irritating and drying.  Patient may need to apply sparingly and wash off after an hour before eventually leaving it on overnight.  The patient verbalized understanding of the proper use and possible adverse effects of tazorac.  All of the patient's questions and concerns were addressed. WDL

## 2022-07-25 ENCOUNTER — EMERGENCY (EMERGENCY)
Facility: HOSPITAL | Age: 42
LOS: 1 days | Discharge: HOME | End: 2022-07-25
Attending: EMERGENCY MEDICINE | Admitting: EMERGENCY MEDICINE
Payer: MEDICAID

## 2022-07-25 VITALS
SYSTOLIC BLOOD PRESSURE: 112 MMHG | HEART RATE: 98 BPM | RESPIRATION RATE: 18 BRPM | DIASTOLIC BLOOD PRESSURE: 74 MMHG | TEMPERATURE: 98 F | OXYGEN SATURATION: 100 %

## 2022-07-25 VITALS
SYSTOLIC BLOOD PRESSURE: 126 MMHG | WEIGHT: 246.92 LBS | TEMPERATURE: 98 F | HEIGHT: 65 IN | OXYGEN SATURATION: 97 % | HEART RATE: 105 BPM | DIASTOLIC BLOOD PRESSURE: 86 MMHG | RESPIRATION RATE: 20 BRPM

## 2022-07-25 DIAGNOSIS — Z92.89 PERSONAL HISTORY OF OTHER MEDICAL TREATMENT: Chronic | ICD-10-CM

## 2022-07-25 LAB
ALBUMIN SERPL ELPH-MCNC: 2.9 G/DL — LOW (ref 3.3–5)
ALP SERPL-CCNC: 156 U/L — HIGH (ref 30–120)
ALT FLD-CCNC: 20 U/L DA — SIGNIFICANT CHANGE UP (ref 10–60)
ANION GAP SERPL CALC-SCNC: 11 MMOL/L — SIGNIFICANT CHANGE UP (ref 5–17)
AST SERPL-CCNC: 63 U/L — HIGH (ref 10–40)
BASOPHILS # BLD AUTO: 0.04 K/UL — SIGNIFICANT CHANGE UP (ref 0–0.2)
BASOPHILS NFR BLD AUTO: 0.9 % — SIGNIFICANT CHANGE UP (ref 0–2)
BILIRUB SERPL-MCNC: 0.9 MG/DL — SIGNIFICANT CHANGE UP (ref 0.2–1.2)
BUN SERPL-MCNC: 6 MG/DL — LOW (ref 7–23)
CALCIUM SERPL-MCNC: 7.6 MG/DL — LOW (ref 8.4–10.5)
CHLORIDE SERPL-SCNC: 105 MMOL/L — SIGNIFICANT CHANGE UP (ref 96–108)
CK SERPL-CCNC: 90 U/L — SIGNIFICANT CHANGE UP (ref 26–192)
CO2 SERPL-SCNC: 29 MMOL/L — SIGNIFICANT CHANGE UP (ref 22–31)
CREAT SERPL-MCNC: 0.52 MG/DL — SIGNIFICANT CHANGE UP (ref 0.5–1.3)
D DIMER BLD IA.RAPID-MCNC: 472 NG/ML DDU — HIGH
EGFR: 118 ML/MIN/1.73M2 — SIGNIFICANT CHANGE UP
EOSINOPHIL # BLD AUTO: 0 K/UL — SIGNIFICANT CHANGE UP (ref 0–0.5)
EOSINOPHIL NFR BLD AUTO: 0 % — SIGNIFICANT CHANGE UP (ref 0–6)
GLUCOSE SERPL-MCNC: 106 MG/DL — HIGH (ref 70–99)
HCT VFR BLD CALC: 28.4 % — LOW (ref 34.5–45)
HGB BLD-MCNC: 8.3 G/DL — LOW (ref 11.5–15.5)
IMM GRANULOCYTES NFR BLD AUTO: 0.2 % — SIGNIFICANT CHANGE UP (ref 0–1.5)
LYMPHOCYTES # BLD AUTO: 0.67 K/UL — LOW (ref 1–3.3)
LYMPHOCYTES # BLD AUTO: 15 % — SIGNIFICANT CHANGE UP (ref 13–44)
MCHC RBC-ENTMCNC: 23.4 PG — LOW (ref 27–34)
MCHC RBC-ENTMCNC: 29.2 GM/DL — LOW (ref 32–36)
MCV RBC AUTO: 80.2 FL — SIGNIFICANT CHANGE UP (ref 80–100)
MONOCYTES # BLD AUTO: 0.34 K/UL — SIGNIFICANT CHANGE UP (ref 0–0.9)
MONOCYTES NFR BLD AUTO: 7.6 % — SIGNIFICANT CHANGE UP (ref 2–14)
NEUTROPHILS # BLD AUTO: 3.4 K/UL — SIGNIFICANT CHANGE UP (ref 1.8–7.4)
NEUTROPHILS NFR BLD AUTO: 76.3 % — SIGNIFICANT CHANGE UP (ref 43–77)
NRBC # BLD: 0 /100 WBCS — SIGNIFICANT CHANGE UP (ref 0–0)
NT-PROBNP SERPL-SCNC: 309 PG/ML — HIGH (ref 0–125)
PLATELET # BLD AUTO: 194 K/UL — SIGNIFICANT CHANGE UP (ref 150–400)
POTASSIUM SERPL-MCNC: 3.4 MMOL/L — LOW (ref 3.5–5.3)
POTASSIUM SERPL-SCNC: 3.4 MMOL/L — LOW (ref 3.5–5.3)
PROT SERPL-MCNC: 6.3 G/DL — SIGNIFICANT CHANGE UP (ref 6–8.3)
RBC # BLD: 3.54 M/UL — LOW (ref 3.8–5.2)
RBC # FLD: 21.8 % — HIGH (ref 10.3–14.5)
SODIUM SERPL-SCNC: 145 MMOL/L — SIGNIFICANT CHANGE UP (ref 135–145)
TROPONIN I, HIGH SENSITIVITY RESULT: 8.6 NG/L — SIGNIFICANT CHANGE UP
WBC # BLD: 4.46 K/UL — SIGNIFICANT CHANGE UP (ref 3.8–10.5)
WBC # FLD AUTO: 4.46 K/UL — SIGNIFICANT CHANGE UP (ref 3.8–10.5)

## 2022-07-25 PROCEDURE — 71275 CT ANGIOGRAPHY CHEST: CPT | Mod: ME

## 2022-07-25 PROCEDURE — 93010 ELECTROCARDIOGRAM REPORT: CPT

## 2022-07-25 PROCEDURE — 93970 EXTREMITY STUDY: CPT

## 2022-07-25 PROCEDURE — 99285 EMERGENCY DEPT VISIT HI MDM: CPT | Mod: 25

## 2022-07-25 PROCEDURE — 71045 X-RAY EXAM CHEST 1 VIEW: CPT

## 2022-07-25 PROCEDURE — 93005 ELECTROCARDIOGRAM TRACING: CPT

## 2022-07-25 PROCEDURE — 84484 ASSAY OF TROPONIN QUANT: CPT

## 2022-07-25 PROCEDURE — 36000 PLACE NEEDLE IN VEIN: CPT | Mod: XU

## 2022-07-25 PROCEDURE — 80053 COMPREHEN METABOLIC PANEL: CPT

## 2022-07-25 PROCEDURE — 71045 X-RAY EXAM CHEST 1 VIEW: CPT | Mod: 26

## 2022-07-25 PROCEDURE — 71275 CT ANGIOGRAPHY CHEST: CPT | Mod: 26,ME

## 2022-07-25 PROCEDURE — G1004: CPT

## 2022-07-25 PROCEDURE — 83880 ASSAY OF NATRIURETIC PEPTIDE: CPT

## 2022-07-25 PROCEDURE — 85379 FIBRIN DEGRADATION QUANT: CPT

## 2022-07-25 PROCEDURE — 99285 EMERGENCY DEPT VISIT HI MDM: CPT

## 2022-07-25 PROCEDURE — 82550 ASSAY OF CK (CPK): CPT

## 2022-07-25 PROCEDURE — 93970 EXTREMITY STUDY: CPT | Mod: 26

## 2022-07-25 PROCEDURE — 85025 COMPLETE CBC W/AUTO DIFF WBC: CPT

## 2022-07-25 NOTE — ED PROVIDER NOTE - PATIENT PORTAL LINK FT
You can access the FollowMyHealth Patient Portal offered by [facility] by registering at the following website: [email or web address]. By joining [employer]’s FollowMyHealth portal, you will also be able to view your health information using other applications (apps) compatible with our system.

## 2022-07-25 NOTE — ED ADULT NURSE NOTE - OBJECTIVE STATEMENT
Pt came for frequent chest pains and swelling of the bilateral lower extremities . PMH Neuropathy Depression Anxiety

## 2022-07-25 NOTE — ED PROVIDER NOTE - PROGRESS NOTE DETAILS
Dw pt - states feeling much improved. Pt with no acute co at this time. Had at length discussion with patient regarding the nature of her chest pain, history of A. fib/cardiac disease and the possibility of her symptoms being related to her heart.  Patient states she is feeling much improved, does not want to stay in the hospital.  Discussed with patient at length regarding what would need to be done if in the hospital.  Patient states she feels well wants to go home.  Patient will follow-up with her primary care doctor, will also follow-up with cardiology.  Will give patient referral to [facility] cardiology, due to patient's insurance.   Discussed chest pain, Shortness of breath, Dizziness, syncope /  near syncope precautions and instructions. Patient demonstrates understanding that a cardiac cause of the symptoms has not been totally excluded, but at this time there is no evidence to warrant an inpatient workup.  Discussed the importance of continued follow-up with Cardiology as outpatient to complete workup. Dw Dr DIANELYS De Oliveira - states if labs / CTA neg, likley non-cardiac, pt can fu as outpt - as pt has had neg echo / labs over past 2 yrs. Patient continues to be asymptomatic. Results of CT discussed. Patient is low risk for PE and does not want further testing. Knows to return if any worsening. Patient states she has a mammogram scheduled

## 2022-07-25 NOTE — ED PROVIDER NOTE - OBJECTIVE STATEMENT
41-year-old female with a history of atrial fibrillation, not currently on anticoagulation, hypothyroid, anemia presents with having some chest discomfort over the past 2 to 3 days.  Associated with some shortness of breath.  Some worsening symptoms with exertion.  Not positional.  No fevers or chills.  No cough/URI.  No recent COVID exposures.  Patient with history of COVID in the past.  Patient is not vaccinated for COVID.  Patient with some increased lower extremity edema over past 2 to 3 days as well.  No neck or back pain.  No numbness/tingling/focal weakness.  No other acute complaints.  No recent travel or immobilization.

## 2022-07-25 NOTE — ED PROVIDER NOTE - EYES, MLM
Date: 11/22/2024    Patient Name: Bryant Mariscal          To Whom it may concern:    The above patient was seen at [facility] for treatment of a medical condition.    Patient can return to work in following restrictions:    No lifting, pushing, pulling, or carrying greater than 10 lbs with right hand.    Re-eval in 4 weeks.      Sincerely,      Jad Little MD   Hand, Wrist, & Elbow Surgery  [email or web address]  t: [phone]  f: [phone]        
Clear bilaterally, pupils equal, round and reactive to light.

## 2022-07-25 NOTE — ED PROVIDER NOTE - NSFOLLOWUPINSTRUCTIONS_ED_ALL_ED_FT
1)  Follow-up with your Primary Medical Doctor or referred doctor. Call tomorrow for prompt follow-up.  2) Follow-up with Cardiology as discussed. Call tomorrow for prompt follow-up and further workup and evaluation.  3) Return to Emergency room for any worsening or persistent pain, shortness of breath, weakness, fever, abdominal pain, dizziness, passing out, unexplained pain in arms, legs, back, any vomiting, feeling like your heart is racing,  or any other concerning symptoms.  4) See attached instruction sheets for additional information, including information regarding signs and symptoms to look out for, reasons to seek immediate care and other important instructions.

## 2022-07-25 NOTE — ED PROVIDER NOTE - ENMT, MLM
Airway patent, Nasal mucosa clear. Mouth with normal mucosa. Throat has no vesicles, no oropharyngeal exudates and uvula is midline. neck supple. no  meningeal signs.

## 2022-07-25 NOTE — ED PROVIDER NOTE - CARE PROVIDER_API CALL
OSCAR ROLLE  Internal Medicine  [address] [place] [id number]  Phone: [phone]  Fax: [phone]  Follow Up Time:     Ricardo Carreno)  Cardiovascular Disease; Internal Medicine  [address], [place] [id number]  Phone: [phone]  Fax: [phone]  Follow Up Time:

## 2022-08-16 ENCOUNTER — EMERGENCY (EMERGENCY)
Facility: HOSPITAL | Age: 42
LOS: 1 days | Discharge: HOME | End: 2022-08-16
Attending: EMERGENCY MEDICINE | Admitting: EMERGENCY MEDICINE
Payer: MEDICAID

## 2022-08-16 VITALS
SYSTOLIC BLOOD PRESSURE: 128 MMHG | HEART RATE: 100 BPM | HEIGHT: 65 IN | WEIGHT: 250 LBS | OXYGEN SATURATION: 97 % | RESPIRATION RATE: 20 BRPM | TEMPERATURE: 98 F | DIASTOLIC BLOOD PRESSURE: 87 MMHG

## 2022-08-16 VITALS
HEART RATE: 90 BPM | TEMPERATURE: 98 F | SYSTOLIC BLOOD PRESSURE: 122 MMHG | DIASTOLIC BLOOD PRESSURE: 78 MMHG | OXYGEN SATURATION: 98 % | RESPIRATION RATE: 18 BRPM

## 2022-08-16 DIAGNOSIS — Z92.89 PERSONAL HISTORY OF OTHER MEDICAL TREATMENT: Chronic | ICD-10-CM

## 2022-08-16 PROCEDURE — 73610 X-RAY EXAM OF ANKLE: CPT | Mod: 26,LT

## 2022-08-16 PROCEDURE — 73610 X-RAY EXAM OF ANKLE: CPT

## 2022-08-16 PROCEDURE — 99283 EMERGENCY DEPT VISIT LOW MDM: CPT | Mod: 25

## 2022-08-16 PROCEDURE — 99284 EMERGENCY DEPT VISIT MOD MDM: CPT

## 2022-08-16 NOTE — ED PROVIDER NOTE - SKIN, MLM
Skin normal color for race, warm, dry and intact. left lateral lower leg small mobile nodule, possibly lipoma

## 2022-08-16 NOTE — ED PROVIDER NOTE - PATIENT PORTAL LINK FT
You can access the FollowMyHealth Patient Portal offered by [facility] by registering at the following website: [email or web address]. By joining [employer]’s FollowMyHealth portal, you will also be able to view your health information using other applications (apps) compatible with our system.

## 2022-08-16 NOTE — ED PROVIDER NOTE - NEUROLOGICAL, MLM
Alert and oriented, no focal deficits, motor exam limited by left ankle injury, sensation to light touch decreased bilat feet (baseline per pt)

## 2022-08-16 NOTE — ED PROVIDER NOTE - OBJECTIVE STATEMENT
41 y.o. F c/o left ankle pain s/p syncopal episode, pt states she is on her period, very heavy as usual, states this is why she fainted, states she is only here to evaluate her left ankle pain, states she feels like she hit the back of her head, but feels like a bruise, no n/v, no significant HA, no acute focal neuro complaints, pt has peripheral neuropathy, does not feel any different at this time, also noted painless bump on left lower leg after the event tonight

## 2022-08-16 NOTE — ED PROVIDER NOTE - CLINICAL SUMMARY MEDICAL DECISION MAKING FREE TEXT BOX
left ankle pain and dec bearing weight s/p syncope - will xr, declines tylenol, will discuss crutches/cane/splinting after xr; pt had syncopal event, is certain it is related to her menometrorrhagia, does not want any work up done, declines all labs, states she will not accept a blood transfusion, states she does not feel lightheaded at this time. pt only wants eval for ankle.

## 2022-08-16 NOTE — ED ADULT NURSE NOTE - NSFALLRSKHARMRISK_ED_ALL_ED
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   Was diagnosed with HSP at age 3.  At age 8, she was diagnosed with hypertension and kidney disease.  She started hemo dialysis on 2013 via left arm fistula, currently dialyzed on MWF schedule. Last dialyzed on 17. She denies any complications with dialysis. Pt also has h/o uncontrolled HTN. She also follows with Dr. Hallman and Delilah for hypertensive cardiomyopathy. she underwent a right heart catheterization on 12/10/15 that demonstrated normal pulmonary artery pressures. She was  evaluated by Dr. Mazariegos on 3/29/16 and was determined to be a low CV risk for surgery and anesthesia    She has been admitted to [facility] on 1/10/17 as a potential renal transplant has become available. Pt denies any recent infections. Denies sob, cp, fever, chills etc.     Subjective:  S/p kidney transplant   Extubated 1/15  Denies SOB.   UOP better  Cr is down  Dialysis one session on   Given extra Lasix today         Past Medical History   Diagnosis Date   • A-V fistula      left antecubital AV fistula,    • Anemia    • CKD (chronic kidney disease) stage 5, GFR less than 15 ml/min 2012   • CKD (chronic kidney disease), stage V      HD MWF at [facility] -L AVF   • Delayed graft function of kidney transplant due to ATN requiring acute dialysis 2017   • Essential hypertension, benign 2012     since age 8   • Henoch-Schonlein purpura 1990     diagnosed at age 3, causing joint pain and ultimately kidney failure   • Immunosuppression 2017 Basiliximab initiated 17 Tacrolimus initiated 17 Thymoglobulin  induction complicated by acute respiratory failure (pulmonary edema & atelectasis). MMF prednisone taper initiated   • MRSA (methicillin resistant staph aureus) culture positive 4/2013     positive nares culture, not re-tested   • Thyroid condition      hyperparathyroidism   • Uncontrolled hypertension        Past Surgical History   Procedure Laterality Date   • Av fistula placement Left 2013   • Echocardiogram  11-     EF 75%   • Right heart cath  12/10/15       Social History     Social History   • Marital status: Single     Spouse name: N/A   • Number of children: N/A   • Years of education: N/A     Occupational History   • Not on file.     Social History Main Topics   • Smoking status: Never Smoker   • Smokeless tobacco: Never Used   • Alcohol use Yes      Comment: occasionally   • Drug use: No   • Sexual activity: Not on file     Other Topics Concern   • Not on file     Social History Narrative    ** Merged History Encounter **            History reviewed. No pertinent family history.    ALLERGIES:   Allergen Reactions   • Engerix-B [Hepatitis B Virus Vaccine Recombinant] HIVES         Review of Systems:  Positives stated above in HPI.  Full ROS completed and is otherwise negative.       Vital Last Value 24 Hour Range   Temperature 98.3 °F (36.8 °C) Temp  Min: 97.9 °F (36.6 °C)  Max: 98.3 °F (36.8 °C)   Pulse 96 Pulse  Min: 72  Max: 96   Respiratory 20 Resp  Min: 18  Max: 20   Blood Pressure 174/76 BP  Min: 146/80  Max: 184/80   Art /81 No Data Recorded   Pulse Oximetry 100 % SpO2  Min: 98 %  Max: 100 %     Vital Today Admitted   Weight 108.8 kg Weight: 100.8 kg   Height N/A Height: 5' 5\" (165.1 cm)   BMI N/A BMI (Calculated): 37.06     Weight over the past 48 Hours:  Patient Vitals for the past 48 hrs:   Weight   01/18/17 0600 111.1 kg   01/19/17 0400 108.8 kg        Hemodynamics:      Last Value 24 Hour Range   CVP (!) 18 mmHg No Data Recorded   PAS/PAD   No Data Recorded   PCWP   No Data  Recorded   CO   No Data Recorded   CI   No Data Recorded   SVR   No Data Recorded   SV02   No Data Recorded     Intake/Output:    Last Stool Occurrence: 0 (01/18/17 1930)    I/O this shift:  In: 244 [P.O.:240; I.V.:4]  Out: 450 [Urine:450]    I/O last 3 completed shifts:  In: 1920.5 [P.O.:1330; I.V.:290.5; IV Piggyback:300]  Out: 1825 [Urine:1825]      Intake/Output Summary (Last 24 hours) at 01/19/17 1227  Last data filed at 01/19/17 1200   Gross per 24 hour   Intake           1684.5 ml   Output             1760 ml   Net            -75.5 ml       Medications/Infusions:  Prescriptions Prior to Admission   Medication Sig Dispense Refill   • Doxercalciferol (HECTOROL IV) Inject 4 mcg into the vein three times a week. Receives at dialysis Monday, Wednesday and Friday     • Methoxy PEG-Epoetin Beta (MIRCERA IJ) Inject 75 mcg as directed every 28 days.     • metoPROLOL (TOPROL-XL) 25 MG 24 hr tablet Take 100 mg by mouth daily. 4 tabs (=100mg)     • Lanthanum Carbonate (FOSRENOL PO) Take 1 Package by mouth 3 times daily (with meals).     • lisinopril (ZESTRIL) 10 MG tablet Take 2 tablets by mouth daily. 180 tablet 3   • amLODIPine (NORVASC) 10 MG tablet Take 10 mg by mouth daily.     • cinacalcet (SENSIPAR) 30 MG tablet Take 30 mg by mouth daily.     • doxazosin (CARDURA) 4 MG tablet Take 4 mg by mouth daily.     • cloNIDine (CATAPRES-TTS 3) 0.3 MG/24HR Place 1 patch onto the skin once a week.       • furosemide  40 mg Intravenous Once   • [START ON 1/20/2017] famotidine  20 mg Oral Daily   • metoCLOPramide  5 mg Oral 4x Daily AC & HS   • cloNIDine  0.1 mg Oral 3 times per day   • cinacalcet  60 mg Oral Daily with breakfast   • metoPROLOL  150 mg Oral 2 times per day   • tacrolimus  3 mg Oral BIDTX   • clotrimazole  10 mg Oral PCHS   • amLODIPine  10 mg Oral Daily   • mycophenolate  1,000 mg Oral BIDTX   • predniSONE  20 mg Oral Daily with breakfast    Followed by   • [START ON 1/23/2017] predniSONE  15 mg Oral Daily with  breakfast    Followed by   • [START ON 1/30/2017] predniSONE  10 mg Oral Daily with breakfast    Followed by   • [START ON 2/6/2017] predniSONE  5 mg Oral Daily with breakfast   • valGANciclovir  450 mg Oral Once per day on Wed Sat   • bisacodyl  10 mg Rectal Once   • docusate sodium-sennosides  1 tablet Per G Tube BID   • aspirin  81 mg Per G Tube Daily Tx   • heparin (porcine)  7,500 Units Subcutaneous 3 times per day   • sodium chloride (PF)  2 mL Injection 2 times per day     • sodium chloride 0.9% infusion 10 mL/hr at 01/19/17 1017       Physical Exam:  General: awake, no acute distress  HEENT: Head atraumatic, normocephalic. Sclera non-icteric.   Neck: Supple, no JVD.  Trachea midline.  Chest/Lungs: Normal effort. Symmetrical expansion. Clear to auscultation. No wheezes, rales or rhonchi.  CVS: Regular. S1,S2 well heard.  no pericardial rub heard.  Abdomen: +post surgical changes  Neuro: awake, follows commands, oriented x 3  Skin: Warm, dry, intact.  No rashes.   Extremities: Pulses intact and symmetric. No clubbing or cyanosis. tarce edema.  Left AVF: + thrill and bruit.     Laboratory Results:    Recent Labs  Lab 01/19/17  0650 01/18/17  1435 01/18/17  0430 01/17/17  0330   SODIUM 142  --  140 140   POTASSIUM 4.1 4.7 3.4 3.4   CHLORIDE 107  --  106 106   CO2 27  --  22 22   ANIONGAP 12  --  15 15   BUN 31*  --  37* 51*   CREATININE 1.74*  --  2.34* 4.40*   GFRNA 38  --  27 13   GFRA 45  --  31 14   GLUCOSE 87  --  119* 97   CALCIUM 10.2  --  9.9 9.8   PHOS 3.1  --  3.6 5.0*   MG 1.6*  --  1.7 2.1         Recent Labs  Lab 01/19/17  0650 01/18/17  0430 01/17/17  0330   WBC 10.0 9.7 9.4   HGB 8.4* 9.4* 10.3*   HCT 25.8* 28.4* 31.2*    156 144         Recent Labs  Lab 01/14/17  0345 01/13/17  0515 01/12/17  1834   FIO2 30 40 40   APH 7.23* 7.31* 7.26*   APO2 107 149* 105   AHCO3 17* 18* 19*         Recent Labs  Lab 01/19/17  0650 01/18/17  0845 01/17/17  0800   TACRO 12.3 16.5 6.5       Urine Panel  No  results found    Imaging/Procedures:  ECHO-12/20/16  Normal left ventricular systolic function. EF 65%. GLS -20.6%. Normal left ventricular cavity size. Mildly increased upper septal  thickness.  Normal right ventricular size and systolic function.  Mildly thickened mitral valve. Mild-to-moderate mitral valve regurgitation.  PASP 38mmHg.    Echo Stress test 11/12/15 94 % max predicted HR. Target HR acheived     Impression:  Increased LV wall thickness. Normal LV systolic function at rest.  Hypertensive response to exercise  No echocardiographic evidence of myocardial ischemia.  Severe dynamic mid-ventricular obstruction at peak stress, peak gradient 108 mmHg    Impression, Plan & Recommendations:    - ESRD :  · Secondary to HSP and IgA Nephropathy with slower course towards ESRD  · Was on HD, MWF schedule.   · Last dialyzed on 1/9 PTA and then again on 1/14    - S/p DDKTxp 1/11  · DGF  · Dialyzed on 1/14  · Cr improving down to 1.7   · Lasix today     - Immune suppression:  · Induction with Thymo and pulse solumedrol.  · Started on Cellcept on call to OR, 1gm po BID   · FK started 1/12 night-dose increased 1/14; aim at 8-10 trough level   · Taper prednisone to 5 mg daily   · Given reaction to Thymo, using Simulect for induction, 1/13 and in +4    - Hypertension:   · BP's still high  · Has difficult/uncontrolled HTN in past  · Off of IV infusions; using IV PRNs  · Metoprolol , Clonidine , Norvasc     - Prophylaxis:   · CMV prophylaxis: Ganciclovir   · UTI prophylaxis: on abx for now, bactrim soon  · Fungal prophylaxis: Clortimazole  · PCP prophylaxis: Bactrim soon    - Anemia in CKD:  · Hgb stable  · Will consider EPO once BP is better controlled    - Hypercalcemia, hyperparathyroidism:  · PTH  1031, Vit D low  · Started on Sensipar 30 mg daily 1/18  · Monitor calcium level    Vineet Pritchett MD  Transplant Nephrology     no

## 2022-08-16 NOTE — ED PROVIDER NOTE - NSFOLLOWUPINSTRUCTIONS_ED_ALL_ED_FT
Ankle Fracture    WHAT YOU NEED TO KNOW:    An ankle fracture is a break in 1 or more of the bones in your ankle.  Foot Anatomy         DISCHARGE INSTRUCTIONS:    Call your local emergency number (911 in the [place]) for any of the following:   •You feel lightheaded, short of breath, and have chest pain.      •You cough up blood.      Seek care immediately if:   •Your leg feels warm, tender, and painful. It may look swollen and red.      •Blood soaks through your bandage.      •You have severe pain in your ankle.      •Your cast feels too tight.      •Your foot or toes are cold or numb.      •Your foot or toenails turn blue or gray.      Call your doctor if:   •Your splint feels too tight.      •Your swelling has increased or returned.      •You have a fever.      •Your pain does not go away, even after treatment.      •You have questions or concerns about your condition or care.      Medicines: You may need any of the following:   •Acetaminophen decreases pain and fever. It is available without a doctor's order. Ask how much to take and how often to take it. Follow directions. Read the labels of all other medicines you are using to see if they also contain acetaminophen, or ask your doctor or pharmacist. Acetaminophen can cause liver damage if not taken correctly.      •NSAIDs, such as ibuprofen, help decrease swelling, pain, and fever. This medicine is available with or without a doctor's order. NSAIDs can cause stomach bleeding or kidney problems in certain people. If you take blood thinner medicine, always ask your healthcare provider if NSAIDs are safe for you. Always read the medicine label and follow directions.      •Prescription pain medicine may be given. Ask your healthcare provider how to take this medicine safely. Some prescription pain medicines contain acetaminophen. Do not take other medicines that contain acetaminophen without talking to your healthcare provider. Too much acetaminophen may cause liver damage. Prescription pain medicine may cause constipation. Ask your healthcare provider how to prevent or treat constipation.       •Take your medicine as directed. Contact your healthcare provider if you think your medicine is not helping or if you have side effects. Tell him or her if you are allergic to any medicine. Keep a list of the medicines, vitamins, and herbs you take. Include the amounts, and when and why you take them. Bring the list or the pill bottles to follow-up visits. Carry your medicine list with you in case of an emergency.      Self-care:   R.I.C.E.       •Rest your ankle so that it can heal. Return to normal activities as directed.      •Apply ice on your ankle for 15 to 20 minutes every hour or as directed. Use an ice pack, or put crushed ice in a plastic bag. Cover it with a towel. Ice helps prevent tissue damage and decreases swelling and pain.      •Use a support device, such as a brace, cast, or splint to limit your movement and protect your ankle. You may need to use crutches to protect your ankle and decrease your pain as you move around. Do not remove your device and do not put weight on your injured ankle.      •Elevate your ankle above the level of your heart as often as you can. This will help decrease swelling and pain. Prop your ankle on pillows or blankets to keep it elevated comfortably.      Splint and cast care: Cover the splint or cast before you bathe so it does not get wet. Tape 2 plastic trash bags to your skin above the cast. Try to keep your ankle out of the water as much as possible.    Follow up with your doctor in 1 to 2 days, or as directed: Your fracture may need to be reduced (bones pushed back into place) or you may need surgery. Write down your questions so you remember to ask them during your visits.

## 2022-08-16 NOTE — ED ADULT TRIAGE NOTE - CHIEF COMPLAINT QUOTE
Pt BIB EMS from home c/o syncope; Pt went to change her sanitary napkin and fell in restroom; hit back of head and pain to left ankle; hx heavy menstrual period and anemia

## 2022-09-14 ENCOUNTER — INPATIENT (INPATIENT)
Facility: HOSPITAL | Age: 42
LOS: 2 days | Discharge: HOME | DRG: 811 | End: 2022-09-17
Attending: INTERNAL MEDICINE | Admitting: INTERNAL MEDICINE
Payer: MEDICAID

## 2022-09-14 VITALS
SYSTOLIC BLOOD PRESSURE: 114 MMHG | DIASTOLIC BLOOD PRESSURE: 59 MMHG | HEART RATE: 108 BPM | RESPIRATION RATE: 17 BRPM | HEIGHT: 65 IN | OXYGEN SATURATION: 100 % | WEIGHT: 250 LBS

## 2022-09-14 DIAGNOSIS — E05.90 THYROTOXICOSIS, UNSPECIFIED WITHOUT THYROTOXIC CRISIS OR STORM: ICD-10-CM

## 2022-09-14 DIAGNOSIS — N92.0 EXCESSIVE AND FREQUENT MENSTRUATION WITH REGULAR CYCLE: ICD-10-CM

## 2022-09-14 DIAGNOSIS — D62 ACUTE POSTHEMORRHAGIC ANEMIA: ICD-10-CM

## 2022-09-14 DIAGNOSIS — D64.9 ANEMIA, UNSPECIFIED: ICD-10-CM

## 2022-09-14 DIAGNOSIS — Z92.89 PERSONAL HISTORY OF OTHER MEDICAL TREATMENT: Chronic | ICD-10-CM

## 2022-09-14 DIAGNOSIS — I48.0 PAROXYSMAL ATRIAL FIBRILLATION: ICD-10-CM

## 2022-09-14 LAB
ALBUMIN SERPL ELPH-MCNC: 2.4 G/DL — LOW (ref 3.3–5)
ALBUMIN SERPL ELPH-MCNC: 3 G/DL — LOW (ref 3.3–5)
ALP SERPL-CCNC: 110 U/L — SIGNIFICANT CHANGE UP (ref 30–120)
ALP SERPL-CCNC: 92 U/L — SIGNIFICANT CHANGE UP (ref 30–120)
ALT FLD-CCNC: 13 U/L DA — SIGNIFICANT CHANGE UP (ref 10–60)
ALT FLD-CCNC: 20 U/L DA — SIGNIFICANT CHANGE UP (ref 10–60)
ANION GAP SERPL CALC-SCNC: 14 MMOL/L — SIGNIFICANT CHANGE UP (ref 5–17)
ANION GAP SERPL CALC-SCNC: 7 MMOL/L — SIGNIFICANT CHANGE UP (ref 5–17)
APTT BLD: 30.7 SEC — SIGNIFICANT CHANGE UP (ref 27.5–35.5)
AST SERPL-CCNC: 35 U/L — SIGNIFICANT CHANGE UP (ref 10–40)
AST SERPL-CCNC: 38 U/L — SIGNIFICANT CHANGE UP (ref 10–40)
BASOPHILS # BLD AUTO: 0.02 K/UL — SIGNIFICANT CHANGE UP (ref 0–0.2)
BASOPHILS # BLD AUTO: 0.03 K/UL — SIGNIFICANT CHANGE UP (ref 0–0.2)
BASOPHILS NFR BLD AUTO: 0.3 % — SIGNIFICANT CHANGE UP (ref 0–2)
BASOPHILS NFR BLD AUTO: 0.4 % — SIGNIFICANT CHANGE UP (ref 0–2)
BILIRUB SERPL-MCNC: 0.6 MG/DL — SIGNIFICANT CHANGE UP (ref 0.2–1.2)
BILIRUB SERPL-MCNC: 1.9 MG/DL — HIGH (ref 0.2–1.2)
BLD GP AB SCN SERPL QL: SIGNIFICANT CHANGE UP
BUN SERPL-MCNC: 7 MG/DL — SIGNIFICANT CHANGE UP (ref 7–23)
BUN SERPL-MCNC: 8 MG/DL — SIGNIFICANT CHANGE UP (ref 7–23)
CALCIUM SERPL-MCNC: 6.9 MG/DL — LOW (ref 8.4–10.5)
CALCIUM SERPL-MCNC: 8.2 MG/DL — LOW (ref 8.4–10.5)
CHLORIDE SERPL-SCNC: 101 MMOL/L — SIGNIFICANT CHANGE UP (ref 96–108)
CHLORIDE SERPL-SCNC: 105 MMOL/L — SIGNIFICANT CHANGE UP (ref 96–108)
CO2 SERPL-SCNC: 27 MMOL/L — SIGNIFICANT CHANGE UP (ref 22–31)
CO2 SERPL-SCNC: 28 MMOL/L — SIGNIFICANT CHANGE UP (ref 22–31)
CREAT SERPL-MCNC: 0.44 MG/DL — LOW (ref 0.5–1.3)
CREAT SERPL-MCNC: 0.68 MG/DL — SIGNIFICANT CHANGE UP (ref 0.5–1.3)
CRP SERPL-MCNC: 24 MG/L — HIGH
EGFR: 111 ML/MIN/1.73M2 — SIGNIFICANT CHANGE UP
EGFR: 123 ML/MIN/1.73M2 — SIGNIFICANT CHANGE UP
EOSINOPHIL # BLD AUTO: 0 K/UL — SIGNIFICANT CHANGE UP (ref 0–0.5)
EOSINOPHIL # BLD AUTO: 0 K/UL — SIGNIFICANT CHANGE UP (ref 0–0.5)
EOSINOPHIL NFR BLD AUTO: 0 % — SIGNIFICANT CHANGE UP (ref 0–6)
EOSINOPHIL NFR BLD AUTO: 0 % — SIGNIFICANT CHANGE UP (ref 0–6)
ERYTHROCYTE [SEDIMENTATION RATE] IN BLOOD: 8 MM/HR — SIGNIFICANT CHANGE UP (ref 0–20)
FERRITIN SERPL-MCNC: 11 NG/ML — LOW (ref 15–150)
FOLATE SERPL-MCNC: 3.1 NG/ML — LOW
GLUCOSE SERPL-MCNC: 123 MG/DL — HIGH (ref 70–99)
GLUCOSE SERPL-MCNC: 86 MG/DL — SIGNIFICANT CHANGE UP (ref 70–99)
HCG SERPL-ACNC: 1 MIU/ML — SIGNIFICANT CHANGE UP
HCT VFR BLD CALC: 15.8 % — CRITICAL LOW (ref 34.5–45)
HCT VFR BLD CALC: 18.8 % — CRITICAL LOW (ref 34.5–45)
HCT VFR BLD CALC: 19.1 % — CRITICAL LOW (ref 34.5–45)
HCT VFR BLD CALC: 22.2 % — LOW (ref 34.5–45)
HGB BLD-MCNC: 4.5 G/DL — CRITICAL LOW (ref 11.5–15.5)
HGB BLD-MCNC: 5.7 G/DL — CRITICAL LOW (ref 11.5–15.5)
HGB BLD-MCNC: 7 G/DL — CRITICAL LOW (ref 11.5–15.5)
IMM GRANULOCYTES NFR BLD AUTO: 0.4 % — SIGNIFICANT CHANGE UP (ref 0–1.5)
IMM GRANULOCYTES NFR BLD AUTO: 0.4 % — SIGNIFICANT CHANGE UP (ref 0–1.5)
INR BLD: 1.15 RATIO — SIGNIFICANT CHANGE UP (ref 0.88–1.16)
IRON SATN MFR SERPL: 202 UG/DL — HIGH (ref 30–160)
IRON SATN MFR SERPL: 212 UG/DL — HIGH (ref 30–160)
IRON SATN MFR SERPL: 65 % — HIGH (ref 14–50)
IRON SATN MFR SERPL: 68 % — HIGH (ref 14–50)
LIDOCAIN IGE QN: 49 U/L — LOW (ref 73–393)
LYMPHOCYTES # BLD AUTO: 0.73 K/UL — LOW (ref 1–3.3)
LYMPHOCYTES # BLD AUTO: 0.76 K/UL — LOW (ref 1–3.3)
LYMPHOCYTES # BLD AUTO: 10 % — LOW (ref 13–44)
LYMPHOCYTES # BLD AUTO: 9.4 % — LOW (ref 13–44)
MCHC RBC-ENTMCNC: 22.8 PG — LOW (ref 27–34)
MCHC RBC-ENTMCNC: 24.6 PG — LOW (ref 27–34)
MCHC RBC-ENTMCNC: 25.9 PG — LOW (ref 27–34)
MCHC RBC-ENTMCNC: 28.5 GM/DL — LOW (ref 32–36)
MCHC RBC-ENTMCNC: 30.3 GM/DL — LOW (ref 32–36)
MCHC RBC-ENTMCNC: 31.5 GM/DL — LOW (ref 32–36)
MCV RBC AUTO: 80.2 FL — SIGNIFICANT CHANGE UP (ref 80–100)
MCV RBC AUTO: 81 FL — SIGNIFICANT CHANGE UP (ref 80–100)
MCV RBC AUTO: 82.2 FL — SIGNIFICANT CHANGE UP (ref 80–100)
MONOCYTES # BLD AUTO: 0.51 K/UL — SIGNIFICANT CHANGE UP (ref 0–0.9)
MONOCYTES # BLD AUTO: 0.71 K/UL — SIGNIFICANT CHANGE UP (ref 0–0.9)
MONOCYTES NFR BLD AUTO: 7 % — SIGNIFICANT CHANGE UP (ref 2–14)
MONOCYTES NFR BLD AUTO: 8.8 % — SIGNIFICANT CHANGE UP (ref 2–14)
NEUTROPHILS # BLD AUTO: 5.99 K/UL — SIGNIFICANT CHANGE UP (ref 1.8–7.4)
NEUTROPHILS # BLD AUTO: 6.52 K/UL — SIGNIFICANT CHANGE UP (ref 1.8–7.4)
NEUTROPHILS NFR BLD AUTO: 81 % — HIGH (ref 43–77)
NEUTROPHILS NFR BLD AUTO: 82.3 % — HIGH (ref 43–77)
NRBC # BLD: 0 /100 WBCS — SIGNIFICANT CHANGE UP (ref 0–0)
PLATELET # BLD AUTO: 178 K/UL — SIGNIFICANT CHANGE UP (ref 150–400)
PLATELET # BLD AUTO: 203 K/UL — SIGNIFICANT CHANGE UP (ref 150–400)
PLATELET # BLD AUTO: 299 K/UL — SIGNIFICANT CHANGE UP (ref 150–400)
POTASSIUM SERPL-MCNC: 2.6 MMOL/L — CRITICAL LOW (ref 3.5–5.3)
POTASSIUM SERPL-MCNC: 3 MMOL/L — LOW (ref 3.5–5.3)
POTASSIUM SERPL-SCNC: 2.6 MMOL/L — CRITICAL LOW (ref 3.5–5.3)
POTASSIUM SERPL-SCNC: 3 MMOL/L — LOW (ref 3.5–5.3)
PROT SERPL-MCNC: 5 G/DL — LOW (ref 6–8.3)
PROT SERPL-MCNC: 6.2 G/DL — SIGNIFICANT CHANGE UP (ref 6–8.3)
PROTHROM AB SERPL-ACNC: 13.6 SEC — HIGH (ref 10.5–13.4)
RBC # BLD: 1.97 M/UL — LOW (ref 3.8–5.2)
RBC # BLD: 2.32 M/UL — LOW (ref 3.8–5.2)
RBC # BLD: 2.32 M/UL — LOW (ref 3.8–5.2)
RBC # BLD: 2.7 M/UL — LOW (ref 3.8–5.2)
RBC # FLD: 17.7 % — HIGH (ref 10.3–14.5)
RBC # FLD: 18.9 % — HIGH (ref 10.3–14.5)
RBC # FLD: 21.5 % — HIGH (ref 10.3–14.5)
RETICS #: 16.9 K/UL — LOW (ref 25–125)
RETICS/RBC NFR: 0.7 % — SIGNIFICANT CHANGE UP (ref 0.5–2.5)
SARS-COV-2 RNA SPEC QL NAA+PROBE: SIGNIFICANT CHANGE UP
SODIUM SERPL-SCNC: 140 MMOL/L — SIGNIFICANT CHANGE UP (ref 135–145)
SODIUM SERPL-SCNC: 142 MMOL/L — SIGNIFICANT CHANGE UP (ref 135–145)
T3 SERPL-MCNC: 70 NG/DL — LOW (ref 80–200)
T4 AB SER-ACNC: 4.2 UG/DL — LOW (ref 4.6–12)
TIBC SERPL-MCNC: 313 UG/DL — SIGNIFICANT CHANGE UP (ref 220–430)
TIBC SERPL-MCNC: 313 UG/DL — SIGNIFICANT CHANGE UP (ref 220–430)
TROPONIN I, HIGH SENSITIVITY RESULT: 6.4 NG/L — SIGNIFICANT CHANGE UP
TSH SERPL-MCNC: 3.21 UIU/ML — SIGNIFICANT CHANGE UP (ref 0.27–4.2)
UIBC SERPL-MCNC: 101 UG/DL — LOW (ref 110–370)
UIBC SERPL-MCNC: 111 UG/DL — SIGNIFICANT CHANGE UP (ref 110–370)
VIT B12 SERPL-MCNC: 488 PG/ML — SIGNIFICANT CHANGE UP (ref 232–1245)
WBC # BLD: 6.37 K/UL — SIGNIFICANT CHANGE UP (ref 3.8–10.5)
WBC # BLD: 7.28 K/UL — SIGNIFICANT CHANGE UP (ref 3.8–10.5)
WBC # BLD: 8.05 K/UL — SIGNIFICANT CHANGE UP (ref 3.8–10.5)
WBC # FLD AUTO: 6.37 K/UL — SIGNIFICANT CHANGE UP (ref 3.8–10.5)
WBC # FLD AUTO: 7.28 K/UL — SIGNIFICANT CHANGE UP (ref 3.8–10.5)
WBC # FLD AUTO: 8.05 K/UL — SIGNIFICANT CHANGE UP (ref 3.8–10.5)

## 2022-09-14 PROCEDURE — 76856 US EXAM PELVIC COMPLETE: CPT | Mod: 26

## 2022-09-14 PROCEDURE — 99285 EMERGENCY DEPT VISIT HI MDM: CPT

## 2022-09-14 PROCEDURE — 93970 EXTREMITY STUDY: CPT | Mod: 26

## 2022-09-14 PROCEDURE — 93010 ELECTROCARDIOGRAM REPORT: CPT

## 2022-09-14 RX ORDER — SODIUM CHLORIDE 9 MG/ML
1000 INJECTION INTRAMUSCULAR; INTRAVENOUS; SUBCUTANEOUS ONCE
Refills: 0 | Status: COMPLETED | OUTPATIENT
Start: 2022-09-14 | End: 2022-09-14

## 2022-09-14 RX ORDER — ACETAMINOPHEN 500 MG
650 TABLET ORAL ONCE
Refills: 0 | Status: COMPLETED | OUTPATIENT
Start: 2022-09-14 | End: 2022-09-14

## 2022-09-14 RX ORDER — QUETIAPINE FUMARATE 200 MG/1
25 TABLET, FILM COATED ORAL DAILY
Refills: 0 | Status: DISCONTINUED | OUTPATIENT
Start: 2022-09-14 | End: 2022-09-17

## 2022-09-14 RX ORDER — PREGABALIN 225 MG/1
1000 CAPSULE ORAL DAILY
Refills: 0 | Status: DISCONTINUED | OUTPATIENT
Start: 2022-09-14 | End: 2022-09-17

## 2022-09-14 RX ORDER — SERTRALINE 25 MG/1
100 TABLET, FILM COATED ORAL DAILY
Refills: 0 | Status: DISCONTINUED | OUTPATIENT
Start: 2022-09-14 | End: 2022-09-17

## 2022-09-14 RX ORDER — METOPROLOL TARTRATE 50 MG
50 TABLET ORAL
Refills: 0 | Status: DISCONTINUED | OUTPATIENT
Start: 2022-09-14 | End: 2022-09-15

## 2022-09-14 RX ORDER — FOLIC ACID 0.8 MG
1 TABLET ORAL DAILY
Refills: 0 | Status: DISCONTINUED | OUTPATIENT
Start: 2022-09-14 | End: 2022-09-17

## 2022-09-14 RX ORDER — TRANEXAMIC ACID 100 MG/ML
2 INJECTION, SOLUTION INTRAVENOUS
Qty: 30 | Refills: 3
Start: 2022-09-14 | End: 2022-10-03

## 2022-09-14 RX ORDER — TRAZODONE HCL 50 MG
50 TABLET ORAL
Refills: 0 | Status: DISCONTINUED | OUTPATIENT
Start: 2022-09-14 | End: 2022-09-17

## 2022-09-14 RX ORDER — ALBUTEROL 90 UG/1
2 AEROSOL, METERED ORAL EVERY 6 HOURS
Refills: 0 | Status: DISCONTINUED | OUTPATIENT
Start: 2022-09-14 | End: 2022-09-17

## 2022-09-14 RX ORDER — CHLORHEXIDINE GLUCONATE 213 G/1000ML
1 SOLUTION TOPICAL
Refills: 0 | Status: DISCONTINUED | OUTPATIENT
Start: 2022-09-14 | End: 2022-09-17

## 2022-09-14 RX ORDER — POTASSIUM CHLORIDE 20 MEQ
10 PACKET (EA) ORAL
Refills: 0 | Status: COMPLETED | OUTPATIENT
Start: 2022-09-14 | End: 2022-09-14

## 2022-09-14 RX ORDER — TRANEXAMIC ACID 100 MG/ML
1000 INJECTION, SOLUTION INTRAVENOUS ONCE
Refills: 0 | Status: COMPLETED | OUTPATIENT
Start: 2022-09-14 | End: 2022-09-14

## 2022-09-14 RX ORDER — CALCIUM GLUCONATE 100 MG/ML
1 VIAL (ML) INTRAVENOUS ONCE
Refills: 0 | Status: COMPLETED | OUTPATIENT
Start: 2022-09-14 | End: 2022-09-14

## 2022-09-14 RX ORDER — GABAPENTIN 400 MG/1
300 CAPSULE ORAL
Refills: 0 | Status: DISCONTINUED | OUTPATIENT
Start: 2022-09-14 | End: 2022-09-17

## 2022-09-14 RX ORDER — DIAZEPAM 5 MG
2 TABLET ORAL DAILY
Refills: 0 | Status: DISCONTINUED | OUTPATIENT
Start: 2022-09-14 | End: 2022-09-17

## 2022-09-14 RX ORDER — POTASSIUM CHLORIDE 20 MEQ
40 PACKET (EA) ORAL ONCE
Refills: 0 | Status: COMPLETED | OUTPATIENT
Start: 2022-09-14 | End: 2022-09-14

## 2022-09-14 RX ADMIN — Medication 40 MILLIEQUIVALENT(S): at 06:38

## 2022-09-14 RX ADMIN — Medication 650 MILLIGRAM(S): at 09:03

## 2022-09-14 RX ADMIN — SODIUM CHLORIDE 1000 MILLILITER(S): 9 INJECTION INTRAMUSCULAR; INTRAVENOUS; SUBCUTANEOUS at 04:02

## 2022-09-14 RX ADMIN — Medication 1 MILLIGRAM(S): at 11:44

## 2022-09-14 RX ADMIN — Medication 2 MILLIGRAM(S): at 05:56

## 2022-09-14 RX ADMIN — QUETIAPINE FUMARATE 25 MILLIGRAM(S): 200 TABLET, FILM COATED ORAL at 11:14

## 2022-09-14 RX ADMIN — TRANEXAMIC ACID 200 MILLIGRAM(S): 100 INJECTION, SOLUTION INTRAVENOUS at 04:29

## 2022-09-14 RX ADMIN — Medication 100 MILLIEQUIVALENT(S): at 11:27

## 2022-09-14 RX ADMIN — Medication 650 MILLIGRAM(S): at 19:54

## 2022-09-14 RX ADMIN — SODIUM CHLORIDE 1000 MILLILITER(S): 9 INJECTION INTRAMUSCULAR; INTRAVENOUS; SUBCUTANEOUS at 06:34

## 2022-09-14 RX ADMIN — GABAPENTIN 300 MILLIGRAM(S): 400 CAPSULE ORAL at 06:38

## 2022-09-14 RX ADMIN — GABAPENTIN 300 MILLIGRAM(S): 400 CAPSULE ORAL at 20:04

## 2022-09-14 RX ADMIN — Medication 100 MILLIEQUIVALENT(S): at 12:27

## 2022-09-14 RX ADMIN — SERTRALINE 100 MILLIGRAM(S): 25 TABLET, FILM COATED ORAL at 11:14

## 2022-09-14 RX ADMIN — Medication 50 MILLIGRAM(S): at 20:05

## 2022-09-14 RX ADMIN — PREGABALIN 1000 MICROGRAM(S): 225 CAPSULE ORAL at 11:44

## 2022-09-14 RX ADMIN — TRANEXAMIC ACID 1000 MILLIGRAM(S): 100 INJECTION, SOLUTION INTRAVENOUS at 05:44

## 2022-09-14 RX ADMIN — Medication 100 MILLIEQUIVALENT(S): at 10:25

## 2022-09-14 RX ADMIN — Medication 50 MILLIGRAM(S): at 09:02

## 2022-09-14 NOTE — PROVIDER CONTACT NOTE (EICU) - BACKGROUND
40 y/o female with h/o paroxysmal a.fib (not on ac), hyperthyroidism, depression, tobacco abuse, and a h/o menorrhagia for years. She has was brought in by EMS with menorrhagia after a syncopal episode. Reportedly she has had heavy vaginal bleeding for 20 days. Abnormal menstruation history with periods every 3-4 months but heavy and prolonged. No gyn evaluation. She has had blood transfusion for this in the past 7 yrs.   In the ER found to have an H/H of 4.5/18.5. Ordered 4 units pRBCs, 1 unit plts, and 1 FFP. Tranexamic acid 1 gm given.

## 2022-09-14 NOTE — PROVIDER CONTACT NOTE (EICU) - RECOMMENDATIONS
Latest H/H 7/22, continue to monitor H/H q6 hrs as well as pad usage and hemodynamics  Ca to be given   To be monitored in the ICU setting  Gyn consult noted. Pt may require additional tranexamic acid for bleeding    Case discussed with ICU PA

## 2022-09-14 NOTE — H&P ADULT - NEUROLOGICAL
details… cranial nerves II-XII intact/sensation intact/responds to pain/responds to verbal commands/deep reflexes intact

## 2022-09-14 NOTE — ED ADULT NURSE REASSESSMENT NOTE - NS ED NURSE REASSESS COMMENT FT1
4th unit of PRBC completed, bp improved, awaiting FFP & Plts, remains on cardiac monitor, denies any worsening of vaginal bleeding, will continue to assess pt

## 2022-09-14 NOTE — CONSULT NOTE ADULT - ASSESSMENT
41-year-old female history of anemia A. fib not on anticoagulation, depression, hyperthyroidism, smoker, h/o transfusion in past, cesarians, peripheral neuropathy, rt ankle fracture admitted to ICU for    1. ABLA  2. AUB/Menorrhagia  3. Hypotension    Neuro:  - Avoid Neuro Deliriogenic / sedative medications  - Valium, gabapentin, Seroquel zoloft, trazdone    CV:  - Pt hypotensive in the 90's, hold all antihypertensives  - resume home BB when more hemodynamically stable, AFIB rate controlled  - No anticoagulation for atrial fibrillation given low [id number] and ongoing bleeding  - Maintain MAP > 65    Pulm:  - Supplemental oxygen as needed to maintain spo2 >94%  - Albuterol                  GI:  - NPO except meds for tonight  - Advance diet as tolerated to avoid Stress ulceration and optimize nutritional state when indicated    Renal:  - Even to net negative fluid balance as tolerated by hemodynamics and renal fx.    - Preserved Renal Function Continue to monitor Bun/Cr and UOP  - Replacing electrolytes as needed with Goal K> 4, PO> 3, Mg> 2               - Strict I&O's  - Avoid Nephro toxic medication    Heme:  - SCDs for DVT/PE ppx   - No chemical DVT prophylaxis in setting of active bleeding    ID:  - WBC 6.37,  remains afebrile with no antipyretics administered   - Microbiology and Radiology reviewed   - trend CBC with diff, CMP  and fever curve  - Avoiding antibiotics unless there is a concern for a bacterial/fungal infection    Endo:  - ISS for aggressive glycemic control to limit FS glucose to < 180mg/dl.  - Keep Euthyroid, Methimazole    GYN:  - S/p 4units PRBC, 1 FFP, 1 PLT, 1 dose TXA which has slowed down bleeding. Continue to monitor saturated pad output.  - If bleeding increases will give additional dose TXA 1g IV  - Repeat CBC and trend, 1g Calcium Gluconate to be given  - Trending H/H will transfuse for Hgb <7, overt signs of bleeding or hemodynamic instability    Critical Care Time (EXCLUSIVE of any non bundled procedures) :  40 minutes were spent assessing the patient's presenting problems of acute illness that pose a high probability of life threatening  deterioration or end organ damage / dysfunction.  Medical desicion making includes initiation / continuation of plan or care review data/ labwork/ radiographic study, direct patient care bedside ,  discussions with  consultants regarding care,  evaluation and interpretation of vital signs, any necessary ventilator management,   NIV or BIPAP changes  or initiation,    discussions with multidisipliary team,  am or pm rounds, discussions of goals of care with patient and family all non-inclusive of procedures.    Plan discussed with Dr Ned HARRINGTON

## 2022-09-14 NOTE — ED ADULT NURSE REASSESSMENT NOTE - NS ED NURSE REASSESS COMMENT FT1
2nd unit of PRBC started per MD orders via pump, no s/s of reaction noted, will continue to monitor pt, MD at bedside for re eval of pt

## 2022-09-14 NOTE — CONSULT NOTE ADULT - SUBJECTIVE AND OBJECTIVE BOX
History of Present Illness: The patient is a 41 year old female with a history of anemia, paroxysmal atrial fibrillation, hyperthyroidism who presents with syncope and palpitations. She has felt worsening lightheadedness over the past two weeks with palpitations on exertion. Yesterday she states she passed out. No chest pain or shortness of breath. She has had heavy periods.    Past Medical/Surgical History:  anemia, paroxysmal atrial fibrillation, hyperthyroidism    Medications:  Home Medications:  Albuterol (Eqv-Proventil HFA) 90 mcg/inh inhalation aerosol: 2 puff(s) inhaled every 6 hours, As Needed - for shortness of breath and/or wheezing (14 Sep 2022 05:14)  diazePAM 2 mg oral tablet: 1 tab(s) orally once a day, As Needed (14 Sep 2022 05:14)  methIMAzole 10 mg oral tablet: orally once a day (14 Sep 2022 05:14)  SEROquel:  (14 Sep 2022 05:14)  Zoloft 100 mg oral tablet: 1 tab(s) orally once a day (at bedtime) (14 Sep 2022 05:14)      Family History: Non-contributory family history of premature cardiovascular atherosclerotic disease    Social History: No tobacco, alcohol or drug use    Review of Systems:  General: No fevers, chills, weight gain  Skin: No rashes, color changes  Cardiovascular: No chest pain, orthopnea  Respiratory: No shortness of breath, cough  Gastrointestinal: No nausea, abdominal pain  Genitourinary: No incontinence, pain with urination  Musculoskeletal: No pain, swelling, decreased range of motion  Neurological: No headache, weakness  Psychiatric: No depression, anxiety  Endocrine: No weight gain, increased thirst  All other systems are comprehensively negative.    Physical Exam:  Vitals:        Vital Signs Last 24 Hrs  T(C): 37.1 (14 Sep 2022 10:22), Max: 37.5 (14 Sep 2022 08:15)  T(F): 98.8 (14 Sep 2022 10:22), Max: 99.5 (14 Sep 2022 08:15)  HR: 96 (14 Sep 2022 10:22) (96 - 108)  BP: 107/55 (14 Sep 2022 10:22) (90/55 - 114/59)  BP(mean): --  RR: 18 (14 Sep 2022 10:22) (17 - 19)  SpO2: 97% (14 Sep 2022 10:22) (96% - 100%)  Parameters below as of 14 Sep 2022 10:22  Patient On (Oxygen Delivery Method): room air  General: NAD  HEENT: MMM  Neck: No JVD, no carotid bruit  Lungs: CTAB  CV: RRR, nl S1/S2, no M/R/G  Abdomen: S/NT/ND, +BS  Extremities: No LE edema, no cyanosis  Neuro: AAOx3, non-focal  Skin: No rash    Labs:                        4.5    8.05  )-----------( 299      ( 14 Sep 2022 03:37 )             15.8     09-14    142  |  101  |  8   ----------------------------<  123<H>  2.6<LL>   |  27  |  0.68    Ca    8.2<L>      14 Sep 2022 03:37    TPro  6.2  /  Alb  3.0<L>  /  TBili  0.6  /  DBili  x   /  AST  38  /  ALT  20  /  AlkPhos  110  09-14        PT/INR - ( 14 Sep 2022 03:38 )   PT: 13.6 sec;   INR: 1.15 ratio         PTT - ( 14 Sep 2022 03:38 )  PTT:30.7 sec    ECG: NSR, normal axis, nonspecific ST abnormality, PVCs

## 2022-09-14 NOTE — ED ADULT NURSE REASSESSMENT NOTE - NS ED NURSE REASSESS COMMENT FT1
4th unit of PRBC in progress, upon completing VS 15MIN CHECK, pt is hypotensive, pt's BP appears lower than her daily trend, pt states while in the bathroom she passed 2 more clots & changed her pad a total of 3 times since the morning, pt denies feeling any worse, remains on cardiac monitor, MD Silverio made aware & informed to speak with Hospitalist Luis,  spoke with MD Smith, will re evaluate patient & repeat a stat cbc

## 2022-09-14 NOTE — PROGRESS NOTE ADULT - ASSESSMENT
IMPRESSION    42 yo woman with microcytic anemia due to heavy menstrual bleeding in setting of anticoagulation with Xarelto for A-fib (due to hyperthyroidism);   ·	Hemoglobin: 4.5 g/dL (09-14 @ 03:37)  Hx fibroids Heavy menses due to fibroids and AC  ·	has not seen Gyn in ~ 10 yrs  ·	last eval when had last child who is now 14, and perhaps once several yrs after.     Depression and anxiety  ·	Under psychiatric care  ·	In program with [facility].     L breast asymmetry  ·	8mm asymmetric nodule involving inferior inner aspect of the left breast is predominantly new since March 19, 2020.    Hx of Fe def anemia  ·	due to chronic blood loss  ·	metromenorrhagia    Hx of Folate def anemia  ·	likely dietary    LE edema likely due to high output cardiac failure in setting of severe anemia.   ·	would expect improvement with transfusion    COVID 19 hx in past.  Mar 2020   ·	admitted with respiratory symptoms and fever. Tested + COVID-19        RECOMMENDATIONS    Check anemia studies  Start PO Folic acid daily.     No NSAIDS or ASA unless directed by physician/  Xarelto on HOLD.    Transfuse PRBC, plan for 3-4 units.   Transfuse PRBC if Hgb <7.0 or if symptomatic.   Follow CBC    DVT Prophylaxis  SCD    Gyn eval for fibroids.     Will need mammogram    Thank you for consulting us.   Follow in office post DC    Discussed plan of care with patient in detail.   pt expressed understanding of the treatment plan. Risks, benefits and alternatives discussed in detail.   Opportunity given for questions and discussion.   Questions or concerns all addressed and answered to their satisfaction, and in lay terms.     > 64 minutes spent in direct patient care, examining and counseling patient,  reviewing  the notes, lab data/ imaging , discussion with multidisciplinary team.

## 2022-09-14 NOTE — PROVIDER CONTACT NOTE (EICU) - ASSESSMENT
Acute blood loss anemia with menorrhagia  H/o abnormal vaginal bleeding for >5 yrs  Paroxysmal a.fib not on anticoagulation

## 2022-09-14 NOTE — ED ADULT NURSE REASSESSMENT NOTE - NS ED NURSE REASSESS COMMENT FT1
repeat HGB= 7.0, MD YEAGER made aware, pt denies any worsening vaginal bleeding or dizziness at this time, remains on cardiac monitor, will continue to monitor pt

## 2022-09-14 NOTE — CONSULT NOTE ADULT - ASSESSMENT
The patient is a 41 year old female with a history of anemia, paroxysmal atrial fibrillation, hyperthyroidism who presents with syncope and palpitations.    Plan:  - Syncope and palpitations likely due to symptomatic anemia  - ECG with no evidence of ischemia or infarction  - Noted to be anemic with hemoglobin down to 4.5  - No anticoagulation for prior atrial fibrillation given low [id number] and ongoing bleeding/anemia issues  - Transfuse as needed  - Will eventually need gyn follow-up and intervention for fibroids

## 2022-09-14 NOTE — ED PROVIDER NOTE - NS ED ROS FT
Constitutional: - Fever, - Chills, - Anorexia, - Fatigue, - Night sweats  Eyes: - Discharge, - Irritation, - Redness, - Visual changes, - Light sensitivity, - Pain  EARS: - Ear Pain, - Tinnitus, - Decreased hearing  NOSE: - Congestion, - Epistaxis  MOUTH/THROAT: - Vocal Changes, - Drooling, - Sore throat  NECK: - Lumps, - Stiffness, - Pain  CV: + Palpitations, - Chest Pain, - Edema, - Syncope  RESP:  - Cough, - Shortness of Breath, - Dyspnea on Exertion, - Trouble speaking, - Pleuritic pain - Wheezing  GI: - Diarrhea, - Constipation, - Bloody stools, - Nausea, - Vomiting, - Abdominal Pain  : - Dysuria, -Frequency, - Hematuria, - Hesitancy, - Incontinence, - Saddle Anesthesia, - Abnormal discharge  MSK: - Myalgias, - Arthralgias, - Weakness, - Deformities, - Injuries  SKIN: - Color change, - Rash, - Swelling, - Ecchymosis, - Abrasion, - laceration  NEURO: - Change in behavior, - Dec. Alertness, - Headache, - Dizziness, - Change in speech, - Weakness, - Seizure-like activity, - Difficulty ambulating

## 2022-09-14 NOTE — H&P ADULT - ASSESSMENT
41-year-old female history of anemia A. fib not on anticoagulation, depression, yperthyroidism, smoker, h/o transfusion in past, cesarians came in complaining about palpitations episode of syncope yesterday afternoon brought in by EMS because she was taking a shower and  felt faint.  Was near syncopal.  Denies any chest pain shortness of breath.  Admits to having heavy periods for the past 20 days.  Does not have an OB/GYN.  IN ED has tachycardia, reg,bp normotensive, HB 4.5, K 2.6, advised 2 units of rbc, K repletion and Obgyn contact, who will see in am, pt admitted for  Anemia ac of blood loss with chronic  menorrhagia  anxiety depression  hyperthyroidism  paroxysmal afib, in nsr and not on AC   41-year-old female history of anemia A. fib not on anticoagulation, depression, yperthyroidism, smoker, h/o transfusion in past, cesarians came in complaining about palpitations episode of syncope yesterday afternoon brought in by EMS because she was taking a shower and  felt faint.  Was near syncopal.  Denies any chest pain shortness of breath.  Admits to having heavy periods for the past 20 days.  Does not have an OB/GYN.  IN ED has tachycardia, reg,bp normotensive, HB 4.5, K 2.6, advised 2 units of rbc, K repletion and Obgyn contact, who will see in am, pt admitted for  Anemia ac of blood loss with chronic  menometorrhagia- gyn consult  anxiety depression  hyperthyroidism  paroxysmal afib, in nsr and not on AC for >3 yrs  < from: US Pelvis Complete (US Pelvis Complete .) (09.14.22 @ 08:57) >  Heterogeneous myomatous uterus.  < from: US Duplex Venous Lower Ext Complete, Bilateral (09.14.22 @ 08:09) >  No evidence of deep venous thrombosis in either lower extremity.  Small right popliteal fossa cyst measuring 3.0 cm, likely a Baker's cyst.  < from: CT Angio Chest PE Protocol w/ IV Cont (07.25.22 @ 18:20) >  8mm left breast nodule. Correlation with mammography is recommended if   not recently performed.

## 2022-09-14 NOTE — ED ADULT NURSE REASSESSMENT NOTE - NS ED NURSE REASSESS COMMENT FT1
3rd unit of PRBC in progress via pump per MD orders, pt tolerating well, no s/ reaction noted, will continue to monitor pt

## 2022-09-14 NOTE — ED ADULT NURSE REASSESSMENT NOTE - NS ED NURSE REASSESS COMMENT FT1
able to ambulate to bathroom with assistance, states she is feeling better, denies any clots when changing pad, VS remain stable, will continue to monitor pt

## 2022-09-14 NOTE — ED PROVIDER NOTE - PROGRESS NOTE DETAILS
discussed case with Dr. Huynh, will see patient today, recommend giving txa 1g IV and PRBC discussed case with Dr. Silverio will admit

## 2022-09-14 NOTE — ED ADULT NURSE NOTE - OBJECTIVE STATEMENT
pt to ED via ambulance. reported she "fainted" prior to her arrival. reports vaginal bleeding x 20 days and c/o palpitations

## 2022-09-14 NOTE — ED ADULT NURSE REASSESSMENT NOTE - NS ED NURSE REASSESS COMMENT FT1
pt resting at long intervals. up and about earlier ambulatory to bathroom. reports vaginal bleeding is "a little bit"; denied passing clots. pt awaiting transport to [facility]

## 2022-09-14 NOTE — CONSULT NOTE ADULT - SUBJECTIVE AND OBJECTIVE BOX
Patient is a 41y old  Female who presents with a chief complaint of bleeding per vagina, syncope (14 Sep 2022 12:58)      BRIEF HOSPITAL COURSE: 41-year-old female history of anemia A. fib not on anticoagulation, depression, hyperthyroidism, smoker, h/o transfusion in past, cesarians, peripheral neuropathy, rt ankle fracture , came in complaining about palpitations episode of syncope yesterday afternoon brought in by EMS because she was taking a shower and  felt faint.  Was near syncopal.  Denies any chest pain shortness of breath.  Admits to having heavy periods for the past 20 days.  Does not have an OB/GYN.  IN ED has tachycardia, reg,bp normotensive, HB 4.5, K 2.6, advised 2 units of rbc, K repletion and Obgyn contact, who will see in am, pt admitted    Events last 24 hours: S/P 4 units prbc, 1ffp, 1 plt. Pt still bleeding but states it has slowed down. Pt BP 90/50, on room air denies any distress. Will admit to ICU for high risk of decompensation 2/2 bleeding.     PAST MEDICAL & SURGICAL HISTORY:  Atrial fibrillation      History of Hyperthyroidism      Asthma      History of anemia      Depression, unspecified depression type      Smoker      S/P  Section  ,,      History of blood transfusion        Allergies    Motrin (Hives)    Intolerances      FAMILY HISTORY:  Family history of diabetes mellitus (Sibling, Grandparent)    Family history of stomach cancer (Grandparent)      SOCIAL HISTORY:     Review of Systems:  CONSTITUTIONAL: No fever, chills, or fatigue.  EYES: No eye pain, visual disturbances, or discharge.  ENMT:  No difficulty hearing, tinnitus, or vertigo. No sinus or throat pain.  NECK: No pain or stiffness.  RESPIRATORY: No shortness of breath, cough, or wheezing.  CARDIOVASCULAR: No chest pain, palpitations, dizziness, or leg swelling.  GASTROINTESTINAL: No abdominal or epigastric pain. No nausea, vomiting,  diarrhea, or constipation. No hematemesis, melena, or hematochezia.  GENITOURINARY: No dysuria, frequency, hematuria, or incontinence.  NEUROLOGICAL: No headaches, memory loss, loss of strength, numbness, or tremors.  SKIN: No itching, burning, rashes, or lesions.  MUSCULOSKELETAL: No joint pain or swelling; No muscle, back, or extremity pain.  PSYCHIATRIC: No depression, anxiety, mood swings, or difficulty sleeping.    Medications:    metoprolol tartrate 50 milliGRAM(s) Oral two times a day    ALBUTerol    90 MICROgram(s) HFA Inhaler 2 Puff(s) Inhalation every 6 hours PRN    diazepam    Tablet 2 milliGRAM(s) Oral daily PRN  gabapentin 300 milliGRAM(s) Oral two times a day  QUEtiapine 25 milliGRAM(s) Oral daily  sertraline 100 milliGRAM(s) Oral daily  traZODone 50 milliGRAM(s) Oral two times a day            methimazole 10 milliGRAM(s) Oral daily    calcium gluconate IVPB 1 Gram(s) IV Intermittent once  cyanocobalamin 1000 MICROGram(s) Oral daily  folic acid 1 milliGRAM(s) Oral daily      chlorhexidine 4% Liquid 1 Application(s) Topical <User Schedule>            ICU Vital Signs Last 24 Hrs  T(C): 36.8 (14 Sep 2022 19:17), Max: 37.5 (14 Sep 2022 08:15)  T(F): 98.2 (14 Sep 2022 19:17), Max: 99.5 (14 Sep 2022 08:15)  HR: 83 (14 Sep 2022 19:17) (83 - 108)  BP: 97/54 (14 Sep 2022 19:17) (81/47 - 119/64)  BP(mean): --  ABP: --  ABP(mean): --  RR: 19 (14 Sep 2022 19:17) (17 - 19)  SpO2: 99% (14 Sep 2022 19:17) (96% - 100%)    O2 Parameters below as of 14 Sep 2022 19:17  Patient On (Oxygen Delivery Method): room air          Vital Signs Last 24 Hrs  T(C): 36.8 (14 Sep 2022 19:17), Max: 37.5 (14 Sep 2022 08:15)  T(F): 98.2 (14 Sep 2022 19:17), Max: 99.5 (14 Sep 2022 08:15)  HR: 83 (14 Sep 2022 19:17) (83 - 108)  BP: 97/54 (14 Sep 2022 19:17) (81/47 - 119/64)  BP(mean): --  RR: 19 (14 Sep 2022 19:17) (17 - 19)  SpO2: 99% (14 Sep 2022 19:17) (96% - 100%)    Parameters below as of 14 Sep 2022 19:17  Patient On (Oxygen Delivery Method): room air            I&O's Detail      LABS:                        7.0    6.37  )-----------( 178      ( 14 Sep 2022 17:27 )             22.2         140  |  105  |  7   ----------------------------<  86  3.0<L>   |  28  |  0.44<L>    Ca    6.9<L>      14 Sep 2022 12:10    TPro  5.0<L>  /  Alb  2.4<L>  /  TBili  1.9<H>  /  DBili  x   /  AST  35  /  ALT  13  /  AlkPhos  92            CAPILLARY BLOOD GLUCOSE        PT/INR - ( 14 Sep 2022 03:38 )   PT: 13.6 sec;   INR: 1.15 ratio         PTT - ( 14 Sep 2022 03:38 )  PTT:30.7 sec      CULTURES:      Physical Examination:    General: Well appearing, lying in bed in NAD.    HEENT: Pupils equal, reactive to light. Symmetric. No scleral icterus or injection.    PULM: Clear to auscultation B/L. No wheezes, rales, or rhonchi appreciated. No significant sputum production or increased respiratory effort.    NECK: Supple, no lymphadenopathy, trachea midline.    CVS: Regular rate and rhythm, no murmurs appreciated, +s1/s2.    ABD: Soft, nondistended, nontender, normoactive bowel sounds.    EXT: No edema, nontender.    SKIN: Warm and well perfused, no rashes noted.    NEURO: Alert, oriented, interactive, nonfocal.    RADIOLOGY: < from: US Pelvis Complete (US Pelvis Complete .) (22 @ 08:57) >  ACC: [id number] EXAM:  US PELVIC COMPLETE                          PROCEDURE DATE:  2022          INTERPRETATION:  CLINICAL INFORMATION: Heavy vaginal bleeding for 2 weeks.    LMP: 2022  HCG negative 2022    COMPARISON: Pelvic ultrasound 9/3/2021    TECHNIQUE:    Transabdominal pelvic sonogram. Color and Spectral Doppler was performed.   Patient declined transvaginal portion of the study.    FINDINGS:    Limited transabdominal images obtained.    Uterus measures 8.8 x 4.7 x 5.7 cm,anteverted, with heterogeneous   echotexture and posterior uterine fibroid toward the fundus measuring 3.0   x 2.3 x 3.0 cm.    Endometrium is not well delineated, measured at approximately 5 mm.    Ovaries are suboptimally assessed on transabdominaltechnique, with   significant shadowing artifact limiting evaluation for fine detail of the   right and left ovarian parenchyma.    Right ovary measures 2.5 x 2.7 x 3.2 cm with vascular flow.    Left ovary measures 3.1 x 2.2 x 3.3 cm with vascular flow.    No free fluid.    IMPRESSION:    Limited transabdominal study. Patient declined transvaginal portion of   the scan.    Heterogeneous myomatous uterus. Underlying adenomyosis cannot be   excluded. Endometrium does not appear thickened at 5 mm, however, is not   well delineated on this study. GYN evaluation and pelvic MRI are   recommended for further evaluation.    Ovaries are suboptimally assessed on transabdominal technique, with   significant shadowing artifact limiting evaluation for fine detail of the   right and left ovarian parenchyma. Vascular flow is documented to both   ovaries, which are symmetric in size.    --- End of Report ---            SAMMY BRANHAM M.D., ATTENDING RADIOLOGIST  This document has been electronically signed. Sep 14 2022  9:03AM    < end of copied text >

## 2022-09-14 NOTE — ED ADULT NURSE REASSESSMENT NOTE - NS ED NURSE REASSESS COMMENT FT1
attempted to place 2nd IVL for Potassium IVPB 10meq, pt declined and requested medication to be started once 2nd unit of PRBC completed, will continue to monitor pt, remain waiting for Trazadone from pharmacy

## 2022-09-14 NOTE — ED ADULT NURSE REASSESSMENT NOTE - NS ED NURSE REASSESS COMMENT FT1
MD Boateng at bedside with pt, to reval pt, repeat BP now=98/59, cbc collected and sent to lab, will continue to monitor pt

## 2022-09-14 NOTE — ED ADULT NURSE REASSESSMENT NOTE - NS ED NURSE REASSESS COMMENT FT1
received pt in assigned area a&xo3 at change of shift, pt on cardiac monitor, pt's sonogram is in progress,  pt is on cardiac monitor, pt states she is feeling fatigued at this time, denies any chest pain, sob or abd pain, IVL intact, 1st unit of PRBC completed at 730am, 2nd unit released, awaiting 2nd unit from blood  bank, resps even and non labored, skin intact, pt will be given 3 bags of Potassium 10meq once 2ND UNIT is completed, pt only has 1 IV access, will continue to monitor pt. received pt in assigned area a&xo3 at change of shift, pt came to ED for heavy vaginal bleeding & syncope, pt states vaginal bleeding on going for approx 3 weeks,  pt on cardiac monitor, pt's sonogram is in progress,  pt is on cardiac monitor, pt states she is feeling fatigued at this time, denies any chest pain, sob or abd pain, IVL intact, 1st unit of PRBC completed at 730am, 2nd unit released, awaiting 2nd unit from blood  bank, resps even and non labored, skin intact, pt will be given 3 bags of Potassium 10meq once 2ND UNIT is completed, pt only has 1 IV access, will continue to monitor pt. received pt in assigned area a&xo3 at change of shift, pt came to ED for heavy vaginal bleeding & syncope, pt states vaginal bleeding on going for approx 3 weeks,  pt on cardiac monitor, pt's sonogram is in progress,  pt is on cardiac monitor, pt states she is feeling fatigued at this time, denies any chest pain, sob or abd pain, IVL intact, 1st unit of PRBC completed at 731am, 2nd unit released, awaiting 2nd unit from blood  bank, resps even and non labored, skin intact, pt will be given 3 bags of Potassium 10meq once 2ND UNIT is completed, pt only has 1 IV access, will continue to monitor pt.

## 2022-09-14 NOTE — ED PROVIDER NOTE - OBJECTIVE STATEMENT
41-year-old female history of anemia A. fib not on anticoagulation complaining about palpitations episode of syncope yesterday afternoon brought in by EMS because she was taking a shower and  felt faint.  Was near syncopal.  Denies any chest pain shortness of breath.  Admits to having heavy periods for the past 20 days.  Does not have an OB/GYN.

## 2022-09-14 NOTE — CONSULT NOTE ADULT - ASSESSMENT
A/P: 40yo F with AUB/menorhaggia  - Long-standing hx of AUB followed by heavy menses s/p 7x blood transfusions x7yrs.   - Has not seen a gyn due to personal choice  - Suspected AUB via adenomyosis, not fibroid uterus  - Initial Hgb 4.5, improved to 5.7 s/p 2 U PRBS. Will order 1 more unit PRBC.  - Bleeding is much lighter now as she's changed 1 pad over 9hrs in ER  - s/p TXA 1gm IV  - Rx TXA 1300mg TID, max 5days only during menses  - Outpt gyn f/u with [facility]  - Discussed WWE/pap/Mammo/EMBx/Surgical consult for definitive surgical management as pt declined Mirena IUD  - Rx TXA sent as temporizing measure while pt awaiting surgical management after outpt f/u A/P: 42yo F with AUB/menorhaggia  - Long-standing hx of AUB followed by heavy menses s/p 7x blood transfusions x7yrs.   - Has not seen a gyn due to personal choice  - Suspected AUB via adenomyosis, not fibroid uterus  - Initial Hgb 4.5, improved to 5.7 s/p 2 U PRBS. Will order 2 more units PRBC.  - Bleeding is much lighter now as she's changed 1 pad over 9hrs in ER  - s/p TXA 1gm IV, may administer again q24hrs as needed for increased menses  - Rx TXA 1300mg TID, max 5days only during menses  - Outpt gyn f/u with [facility]  - Discussed WWE/pap/Mammo/EMBx/Surgical consult for definitive surgical management as pt declined Mirena IUD  - Discussed with Dr. Silverio

## 2022-09-14 NOTE — H&P ADULT - HISTORY OF PRESENT ILLNESS
41-year-old female history of anemia A. fib not on anticoagulation, depression, yperthyroidism, smoker, h/o transfusion in past, cesarians came in complaining about palpitations episode of syncope yesterday afternoon brought in by EMS because she was taking a shower and  felt faint.  Was near syncopal.  Denies any chest pain shortness of breath.  Admits to having heavy periods for the past 20 days.  Does not have an OB/GYN.  IN ED has tachycardia, reg,bp normotensive, HB 4.5, K 2.6, advised 2 units of rbc, K repletion and Obgyn contact, who will see in am, pt admitted 41-year-old female history of anemia A. fib not on anticoagulation, depression, hyperthyroidism, smoker, h/o transfusion in past, cesarians, peripheral neuropathy, rt ankle fracture , came in complaining about palpitations episode of syncope yesterday afternoon brought in by EMS because she was taking a shower and  felt faint.  Was near syncopal.  Denies any chest pain shortness of breath.  Admits to having heavy periods for the past 20 days.  Does not have an OB/GYN.  IN ED has tachycardia, reg,bp normotensive, HB 4.5, K 2.6, advised 2 units of rbc, K repletion and Obgyn contact, who will see in am, pt admitted

## 2022-09-14 NOTE — CHART NOTE - NSCHARTNOTEFT_GEN_A_CORE
Notified by RN of persistently low BP, despite been getting blood transfusion. Patient denies any concerns, passage of two small blood clots since admission. No CP/SOB/abd pain.    VITALS:   T(C): 36.7 (09-14-22 @ 16:45), Max: 37.5 (09-14-22 @ 08:15)  HR: 90 (09-14-22 @ 16:45) (86 - 108)  BP: 81/47 (09-14-22 @ 16:45) (81/47 - 114/59)  RR: 19 (09-14-22 @ 16:45) (17 - 19)  SpO2: 98% (09-14-22 @ 16:45) (96% - 100%)    GENERAL: NAD, lying in bed comfortably  HEAD:  Atraumatic, Normocephalic  EYES: EOMI, PERRLA, conjunctiva and sclera clear  ENT: Moist mucous membranes  NECK: Supple, No JVD  CHEST/LUNG: Clear to auscultation bilaterally; No rales, rhonchi, wheezing, or rubs. Unlabored respirations  HEART: Regular rate and rhythm; No murmurs, rubs, or gallops  ABDOMEN: BSx4; Soft, nontender, nondistended  EXTREMITIES:  2+ Peripheral Pulses, brisk capillary refill. No clubbing, cyanosis, or edema  NERVOUS SYSTEM:  A&Ox3, no focal deficits   SKIN: No rashes or lesions  PSYCH: Normal affect, euthymic mood    Hypotension with acute blood loss anemia  -stat repeat CBC  -on 4th unit prbc, will order additional plt and prbc if indicated  -repeat BP at bedside 95/50s, MAP > 70  -monitor at [facility] overnight Notified by RN of persistently low BP, despite been getting blood transfusion. Patient denies any concerns, passage of two small blood clots since admission. No CP/SOB/abd pain.    VITALS:   T(C): 36.7 (09-14-22 @ 16:45), Max: 37.5 (09-14-22 @ 08:15)  HR: 90 (09-14-22 @ 16:45) (86 - 108)  BP: 81/47 (09-14-22 @ 16:45) (81/47 - 114/59)  RR: 19 (09-14-22 @ 16:45) (17 - 19)  SpO2: 98% (09-14-22 @ 16:45) (96% - 100%)    GENERAL: NAD, lying in bed comfortably  HEAD:  Atraumatic, Normocephalic  EYES: EOMI, PERRLA, conjunctiva and sclera clear  ENT: Moist mucous membranes  NECK: Supple, No JVD  CHEST/LUNG: Clear to auscultation bilaterally; No rales, rhonchi, wheezing, or rubs. Unlabored respirations  HEART: Regular rate and rhythm; No murmurs, rubs, or gallops  ABDOMEN: BSx4; Soft, nontender, nondistended  EXTREMITIES:  2+ Peripheral Pulses, brisk capillary refill. No clubbing, cyanosis, or edema  NERVOUS SYSTEM:  A&Ox3, no focal deficits   SKIN: No rashes or lesions  PSYCH: Normal affect, euthymic mood    Hypotension with acute blood loss anemia  -stat repeat CBC  -on 4th unit prbc, will order additional 1u plt and 1u FFP  -repeat BP at bedside 95/50s, MAP > 70  -monitor at [facility] overnight

## 2022-09-14 NOTE — ED ADULT NURSE REASSESSMENT NOTE - NS ED NURSE REASSESS COMMENT FT1
received report and assumed care of pt at change of shift. pt sleeping at intervals; aroused easily. NAD. transfusion of Platelets unit number [id number] infusing at this time. no transfusion reaction noted presently

## 2022-09-14 NOTE — CONSULT NOTE ADULT - SUBJECTIVE AND OBJECTIVE BOX
HPI:  Pt is a 42yo P3 with hx of abnomal uterine bleeding, menorrhagia who presents by EMS s/p syncopal episode and heavy vaginal bleeding for 20days. Pt states that for the past 7yrs, menses occurred ever 3-4mo and she would experience heavy and prolonged bleeding when menstruation would finally occur. Pt has had 6-7 blood transfusions over the past 7yrs due to heavy vaginal bleeding and has not seen a gyn in 10yrs. Pt states that shes changed 30 soaked pads per day, but has only had to change 1 pad since being in the ER over 9hrs. Currently denies lightheadedness, dizziness, chest pain, SOB, palpitations. PMH significant for paroxysmal AFIB (no anticoagulation), hyperthyroidism, depression, tobacco use.       MEDICATIONS  (STANDING):  cyanocobalamin 1000 MICROGram(s) Oral daily  folic acid 1 milliGRAM(s) Oral daily  gabapentin 300 milliGRAM(s) Oral two times a day  methimazole 10 milliGRAM(s) Oral daily  metoprolol tartrate 50 milliGRAM(s) Oral two times a day  QUEtiapine 25 milliGRAM(s) Oral daily  sertraline 100 milliGRAM(s) Oral daily  traZODone 50 milliGRAM(s) Oral two times a day    MEDICATIONS  (PRN):  ALBUTerol    90 MICROgram(s) HFA Inhaler 2 Puff(s) Inhalation every 6 hours PRN for shortness of breath and/or wheezing  diazepam    Tablet 2 milliGRAM(s) Oral daily PRN anxiety      Allergies    Motrin (Hives)    Intolerances        PAST MEDICAL & SURGICAL HISTORY:  Atrial fibrillation      History of Hyperthyroidism      Asthma      History of anemia      Depression, unspecified depression type      Smoker      S/P  Section  ,,      History of blood transfusion x7        OB/GYN HISTORY:  Menses q3-4mo, heavy and prolonged  Fibroid uterus  Suspected adenomyosis  C/S x 3      FAMILY HISTORY:  Family history of diabetes mellitus (Sibling, Grandparent)    Family history of stomach cancer (Grandparent)        SOCIAL HISTORY:  Denies ilicit drug use  Tobacco use - 1/2 PPD    REVIEW OF SYSTEMS    As per HPI    Vital Signs Last 24 Hrs  T(C): 37.1 (14 Sep 2022 10:22), Max: 37.5 (14 Sep 2022 08:15)  T(F): 98.8 (14 Sep 2022 10:22), Max: 99.5 (14 Sep 2022 08:15)  HR: 96 (14 Sep 2022 10:22) (96 - 108)  BP: 107/55 (14 Sep 2022 10:22) (90/55 - 114/59)  BP(mean): --  RR: 18 (14 Sep 2022 10:22) (17 - 19)  SpO2: 97% (14 Sep 2022 10:22) (96% - 100%)    Parameters below as of 14 Sep 2022 10:22  Patient On (Oxygen Delivery Method): room air        Last Menstrual Period  2021    PHYSICAL EXAM:      Constitutional: Awake, NAD    Genitourinary: S/NT/ND, SVE deferred      LABS:                        5.7    7.28  )-----------( 203      ( 14 Sep 2022 12:10 )             18.8         140  |  105  |  7   ----------------------------<  86  3.0<L>   |  28  |  0.44<L>    Ca    6.9<L>      14 Sep 2022 12:10    TPro  5.0<L>  /  Alb  2.4<L>  /  TBili  1.9<H>  /  DBili  x   /  AST  35  /  ALT  13  /  AlkPhos  92      I&O's Detail    PT/INR - ( 14 Sep 2022 03:38 )   PT: 13.6 sec;   INR: 1.15 ratio         PTT - ( 14 Sep 2022 03:38 )  PTT:30.7 sec      RADIOLOGY & ADDITIONAL STUDIES:    < from: US Pelvis Complete (US Pelvis Complete .) (22 @ 08:57) >    ACC: [id number] EXAM:  US PELVIC COMPLETE                          PROCEDURE DATE:  2022          INTERPRETATION:  CLINICAL INFORMATION: Heavy vaginal bleeding for 2 weeks.    LMP: 2022  HCG negative 2022    COMPARISON: Pelvic ultrasound 9/3/2021    TECHNIQUE:    Transabdominal pelvic sonogram. Color and Spectral Doppler was performed.   Patient declined transvaginal portion of the study.    FINDINGS:    Limited transabdominal images obtained.    Uterus measures 8.8 x 4.7 x 5.7 cm,anteverted, with heterogeneous   echotexture and posterior uterine fibroid toward the fundus measuring 3.0   x 2.3 x 3.0 cm.    Endometrium is not well delineated, measured at approximately 5 mm.    Ovaries are suboptimally assessed on transabdominaltechnique, with   significant shadowing artifact limiting evaluation for fine detail of the   right and left ovarian parenchyma.    Right ovary measures 2.5 x 2.7 x 3.2 cm with vascular flow.    Left ovary measures 3.1 x 2.2 x 3.3 cm with vascular flow.    No free fluid.    IMPRESSION:    Limited transabdominal study. Patient declined transvaginal portion of   the scan.    Heterogeneous myomatous uterus. Underlying adenomyosis cannot be   excluded. Endometrium does not appear thickened at 5 mm, however, is not   well delineated on this study. GYN evaluation and pelvic MRI are   recommended for further evaluation.    Ovaries are suboptimally assessed on transabdominal technique, with   significant shadowing artifact limiting evaluation for fine detail of the   right and left ovarian parenchyma. Vascular flow is documented to both   ovaries, which are symmetric in size.    --- End of Report ---            SAMMY BRANHAM M.D., ATTENDING RADIOLOGIST  This document has been electronically signed. Sep 14 2022  9:03AM

## 2022-09-14 NOTE — ED ADULT NURSE NOTE - NSIMPLEMENTINTERV_GEN_ALL_ED
Implemented All Universal Safety Interventions:  [place] to call system. Call bell, personal items and telephone within reach. Instruct patient to call for assistance. Room bathroom lighting operational. Non-slip footwear when patient is off stretcher. Physically safe environment: no spills, clutter or unnecessary equipment. Stretcher in lowest position, wheels locked, appropriate side rails in place.

## 2022-09-14 NOTE — CONSULT NOTE ADULT - ASSESSMENT
IMPRESSION    40 yo woman with microcytic anemia due to heavy menstrual bleeding in setting of anticoagulation with Xarelto for A-fib (due to hyperthyroidism);   ·	Hemoglobin: 4.5 g/dL (09-14 @ 03:37)  Hx fibroids Heavy menses due to fibroids and AC  ·	has not seen Gyn in ~ 10 yrs  ·	last eval when had last child who is now 16, and perhaps once several yrs after.     Depression and anxiety  ·	Under psychiatric care  ·	In program with [facility].     L breast asymmetry  ·	8mm asymmetric nodule involving inferior inner aspect of the left breast is predominantly new since March 19, 2020.    Hx of Fe def anemia  ·	due to chronic blood loss  ·	metromenorrhagia    Hx of Folate def anemia  ·	likely dietary    LE edema likely due to high output cardiac failure in setting of severe anemia.   ·	would expect improvement with transfusion    COVID 19 hx in past.  Mar 2020   ·	admitted with respiratory symptoms and fever. Tested + COVID-19        RECOMMENDATIONS    Check anemia studies  Start PO Folic acid daily.     No NSAIDS or ASA unless directed by physician/  Xarelto on HOLD.    Transfuse PRBC, plan for 3-4 units.   Transfuse PRBC if Hgb <7.0 or if symptomatic.   Follow CBC    DVT Prophylaxis  SCD    Gyn eval for fibroids.     Will need mammogram    Thank you for consulting us.   Follow in office post DC    Discussed plan of care with patient in detail.   pt expressed understanding of the treatment plan. Risks, benefits and alternatives discussed in detail.   Opportunity given for questions and discussion.   Questions or concerns all addressed and answered to their satisfaction, and in lay terms.     > 64 minutes spent in direct patient care, examining and counseling patient,  reviewing  the notes, lab data/ imaging , discussion with multidisciplinary team.      IMPRESSION    42 yo woman with microcytic anemia due to heavy menstrual bleeding in setting of anticoagulation with Xarelto for A-fib (due to hyperthyroidism);   ·	Hemoglobin: 4.5 g/dL (09-14 @ 03:37)  Hx fibroids Heavy menses due to fibroids and AC  ·	has not seen Gyn in ~ 10 yrs  ·	last eval when had last child who is now 16, and perhaps once several yrs after.     Depression and anxiety  ·	Under psychiatric care  ·	In program with [facility].     L breast asymmetry  ·	8mm asymmetric nodule involving inferior inner aspect of the left breast is predominantly new since March 19, 2020.    Hx of Fe def anemia  ·	due to chronic blood loss  ·	metromenorrhagia    Hx of Folate def anemia  ·	likely dietary    LE edema likely due to high output cardiac failure in setting of severe anemia.   ·	would expect improvement with transfusion    COVID 19 hx in past.  Mar 2020   ·	admitted with respiratory symptoms and fever. Tested + COVID-19        RECOMMENDATIONS    Check anemia studies  Start PO Folic acid daily.     No NSAIDS or ASA unless directed by physician/  Xarelto on HOLD.    Transfuse PRBC, plan for 3-4 units.   Transfuse PRBC if Hgb <7.0 or if symptomatic.   Follow CBC    DVT Prophylaxis  SCD    Gyn eval for fibroids.     Will need mammogram    Thank you for consulting us.   Follow in office post DC    Discussed plan of care with patient in detail.   pt expressed understanding of the treatment plan. Risks, benefits and alternatives discussed in detail.   Opportunity given for questions and discussion.   Questions or concerns all addressed and answered to their satisfaction, and in lay terms.     > 71 minutes spent in direct patient care, examining and counseling patient,  reviewing  the notes, lab data/ imaging , discussion with multidisciplinary team.       ADDENDUM:   Breast exam performed with RN Donya Toussaint present  No overt discrete mass in breast or axilla.   Fibrocystic breast noted    Pt aware of 8mm breast lesion.   Plans to make outpt mammogram.

## 2022-09-14 NOTE — CONSULT NOTE ADULT - SUBJECTIVE AND OBJECTIVE BOX
Patient is a 41y old  Female who presents with a chief complaint of     HPI:  41-year-old female history of anemia A. fib not on anticoagulation, depression, yperthyroidism, smoker, h/o transfusion in past, cesarians came in complaining about palpitations episode of syncope yesterday afternoon brought in by EMS because she was taking a shower and  felt faint.  Was near syncopal.  Denies any chest pain shortness of breath.  Admits to having heavy periods for the past 20 days.  Does not have an OB/GYN.  IN ED has tachycardia, reg,bp normotensive, HB 4.5, K 2.6, advised 2 units of rbc, K repletion and Obgyn contact, who will see in am, pt admitted (14 Sep 2022 04:56)    Heme asked to see pt for anemia.   Hx of fibroids  on Xarelto for Afib. Increased bleeding with menses. Prior admissions for same bleeding.   had increased vaginal bleeding this week, had gone through 30 pads in 1d  c/o LH, dizziness, malaise.       PAST MEDICAL & SURGICAL HISTORY:  Atrial fibrillation  History of Hyperthyroidism  Asthma  History of anemia  Depression, unspecified depression type  Smoker  S/P  Section  ,,  History of blood transfusion       HEALTH ISSUES - PROBLEM Dx:  Anemia due to acute blood loss  Menorrhagia  Menorrhagia  Hyperthyroidism  Paroxysmal atrial fibrillation      Anemia [D64.9]  Atrial fibrillation [427.31]  History of Hyperthyroidism [V12.2]  Asthma [493.90]  History of anemia [V12.3]  Depression, unspecified depression type [F32.9]  H/O blood transfusion reaction [Z87.898]  Smoker [F17.200]  S/P  Section [654.20]  History of blood transfusion [Z92.89]  Vaginal bleeding [N93.9]      FAMILY HISTORY:  Family history of diabetes mellitus (Sibling, Grandparent)  Family history of stomach cancer (Grandparent)      [SOCIAL HISTORY: ]     smokin.5 PPD smoker, trying to quit     EtOH:  social     illicit drugs:  denies     occupation:  disabled     marital status:  single     Other: 3 children  23yo, 21yo and 15yo  Lives with children and mother      [ALLERGIES/INTOLERANCES:]  Allergies     Motrin (Hives)  Intolerances      [MEDICATIONS]  MEDICATIONS  (STANDING):  gabapentin 300 milliGRAM(s) Oral two times a day  methimazole 10 milliGRAM(s) Oral daily  metoprolol tartrate 50 milliGRAM(s) Oral two times a day  potassium chloride  10 mEq/100 mL IVPB 10 milliEquivalent(s) IV Intermittent every 1 hour  QUEtiapine 25 milliGRAM(s) Oral daily  sertraline 100 milliGRAM(s) Oral daily  traZODone 50 milliGRAM(s) Oral two times a day    MEDICATIONS  (PRN):  ALBUTerol    90 MICROgram(s) HFA Inhaler 2 Puff(s) Inhalation every 6 hours PRN for shortness of breath and/or wheezing  diazepam    Tablet 2 milliGRAM(s) Oral daily PRN anxiety      [REVIEW OF SYSTEMS: ]  CONSTITUTIONAL: +malaise, no fever, no shakes, no chills   EYES: No eye pain, no visual disturbances, no discharge  ENMT:  no discharge  NECK: No pain, no stiffness  BREASTS: No pain, no masses, no nipple discharge  RESPIRATORY: No cough, no wheezing, no chills, no hemoptysis; No shortness of breath  CARDIOVASCULAR: No chest pain, no palpitations, +dizziness, no leg swelling  GASTROINTESTINAL: No abdominal, no epigastric pain. No nausea, no vomiting, no hematemesis; No diarrhea , no constipation. No melena, no hematochezia.  GENITOURINARY: No dysuria, no frequency, no hematuria, no incontinence  NEUROLOGICAL: No headaches, no memory loss, no loss of strength, no numbness, no tremors  SKIN: No itching, no burning, no rashes, no lesions   LYMPH NODES: No enlarged glands  ENDOCRINE: No heat or cold intolerance; No hair loss  MUSCULOSKELETAL: No joint pain or swelling; No muscle, no back, no extremity pain  PSYCHIATRIC: No depression, no anxiety, no mood swings, no difficulty sleeping  HEME/LYMPH: No easy bruising, no bleeding gums    [VITALS SIGNS 24hrs]  Vital Signs Last 24 Hrs  T(C): 37.2 (14 Sep 2022 08:37), Max: 37.5 (14 Sep 2022 08:15)  T(F): 99 (14 Sep 2022 08:37), Max: 99.5 (14 Sep 2022 08:15)  HR: 97 (14 Sep 2022 08:37) (96 - 108)  BP: 91/55 (14 Sep 2022 08:37) (90/55 - 114/59)  BP(mean): --  RR: 18 (14 Sep 2022 08:37) (17 - 19)  SpO2: 99% (14 Sep 2022 08:37) (96% - 100%)    Parameters below as of 14 Sep 2022 08:37  Patient On (Oxygen Delivery Method): room air      Daily Height in cm: 165.1 (14 Sep 2022 02:58)    Daily     I&O's Summary      [PHYSICAL EXAM]  General: adult in NAD,  WN,  WD.  HEENT: clear oropharynx, anicteric sclera, pink conjunctivae.  Neck: supple, no masses.  CV: normal S1S2, no murmur, no rubs, no gallops.  Lungs: clear to auscultation, no wheezes, no rales, no rhonchi.  Abdomen: soft, non-tender, non-distended, no hepatosplenomegaly, normal BS, no guarding.  Ext: no clubbing, no cyanosis, trace edema.  Skin: no rashes,  no petechiae, no venous stasis changes.  Neuro: alert and oriented X3, no focal motor deficits.  LN: no SC LAMONT.      [LABS: ]                        4.5    8.05  )-----------( 299      ( 14 Sep 2022 03:37 )             15.8     CBC Full  -  ( 14 Sep 2022 03:37 )  WBC Count : 8.05 K/uL  RBC Count : 1.97 M/uL  Hemoglobin : 4.5 g/dL  Hematocrit : 15.8 %  Platelet Count - Automated : 299 K/uL  Mean Cell Volume : 80.2 fl  Mean Cell Hemoglobin : 22.8 pg  Mean Cell Hemoglobin Concentration : 28.5 gm/dL  Auto Neutrophil # : 6.52 K/uL  Auto Lymphocyte # : 0.76 K/uL  Auto Monocyte # : 0.71 K/uL  Auto Eosinophil # : 0.00 K/uL  Auto Basophil # : 0.03 K/uL  Auto Neutrophil % : 81.0 %  Auto Lymphocyte % : 9.4 %  Auto Monocyte % : 8.8 %  Auto Eosinophil % : 0.0 %  Auto Basophil % : 0.4 %        142  |  101  |  8   ----------------------------<  123<H>  2.6<LL>   |  27  |  0.68    Ca    8.2<L>      14 Sep 2022 03:37    TPro  6.2  /  Alb  3.0<L>  /  TBili  0.6  /  DBili  x   /  AST  38  /  ALT  20  /  AlkPhos  110      PT/INR - ( 14 Sep 2022 03:38 )   PT: 13.6 sec;   INR: 1.15 ratio         PTT - ( 14 Sep 2022 03:38 )  PTT:30.7 sec  LIVER FUNCTIONS - ( 14 Sep 2022 03:37 )  Alb: 3.0 g/dL / Pro: 6.2 g/dL / ALK PHOS: 110 U/L / ALT: 20 U/L DA / AST: 38 U/L / GGT: x             CBC TREND (5 Days)  WBC Count: 8.05 K/uL ( @ 03:37)    Hemoglobin: 4.5 g/dL ( @ 03:37)    Hematocrit: 15.8 % ( @ 03:37)    Platelet Count - Automated: 299 K/uL ( @ 03:37)       [MICROBIOLOGY /  VIROLOGY:]            [RADIOLOGY & ADDITIONAL STUDIES:]       < from: US Duplex Venous Lower Ext Complete, Bilateral (22 @ 08:09) >  ACC: [id number] EXAM:  US DPLX LWR EXT VEINS COMPL BI                        PROCEDURE DATE:  2022    INTERPRETATION:  CLINICAL INFORMATION: Calf edema, palpitations, rule out DVT  COMPARISON: None available.  TECHNIQUE: Duplex sonography of the BILATERAL LOWER extremity veins with color and spectral Doppler, with and without compression.  FINDINGS:  RIGHT:  Normal compressibility of the RIGHT common femoral, femoral and popliteal veins.  Doppler examination shows normal spontaneous and phasic flow.  No RIGHT calf vein thrombosis is detected.  There is a right popliteal fossa cyst measuring 3.0 x 1.0 x 2.0 cm.    LEFT:  Normal compressibility of the LEFT common femoral, femoral and popliteal veins.  Doppler examination shows normal spontaneous and phasic flow.  No LEFT calf vein thrombosis is detected.    Bilateral subcutaneous calf edema is noted.    IMPRESSION:  No evidence of deep venous thrombosis in either lower extremity.  Small right popliteal fossa cyst measuring 3.0 cm, likely a Baker's cyst.  -- End of Report ---  DUNCAN OLSEN MD; Attending Radiologist  This document has been electronically signed. Sep 14 2022  8:15AM  < end of copied text >      < from: CT Angio Chest PE Protocol w/ IV Cont (22 @ 18:20) >  ACC: [id number] EXAM:  CT ANGIO CHEST PULDIANELYS MORIN                        PROCEDURE DATE:  2022    INTERPRETATION:  Clinical indication: Positive d-dimer, chest pain.  CT angiogram of the chest was performed following intravenous administration of 78 cc of Omnipaque-350. 22 cc of contrast was discarded. MIP images are submitted.  Comparison is made with the chest CT of 2020.  ·	The study is nondiagnostic for evaluation of pulmonary embolism due to timing of contrast opacification and associated nonopacification of the pulmonary arteries.  ·	No enlarged axillary lymph nodes. Partially calcified mediastinal and right hilar lymph nodes are without significant interval change given differences in timing of contrast opacification. For reference a 1.4 cm lymph node anterior to the right mainstem bronchus demonstrate no significant overall interval change.  ·	No pleural or pericardial effusion. Heart size is normal.  ·	8mm asymmetric nodule involvingthe inferior inner aspect of the left breast is predominantly new since 2020.  ·	Evaluation of the upper abdomen demonstrate decreased density of the liver suggestive of hepatic steatosis. Hepatic and splenic calcifications are unchanged.  ·	Evaluation of the lungs demonstrate no pneumonia. Cluster of small cysts are within the right apex appears unchanged may represent apical   ·	scarring. No traction bronchiectasis. No central endobronchial lesions.  ·	Mild degenerative changes of the spine.  IMPRESSION:   Nondiagnostic study for evaluation of pulmonary embolism due to lack of pulmonary arterial opacification.   Repeat CTPA or VQ scan can be performed for further evaluation as clinically warranted.  8mm left breast nodule. Correlation with mammography is recommended if not recently performed.  --- End of Report ---  SCOOTER CUELLO MD; Attending Radiologist  This document has been electronically signed. 2022  7:25PM  < end of copied text >      < from: [facility] Duplex Lower Ext Vein Scan, Left (21 @ 12:26) >  EXAM:  DUPLEX EXT VEINS LOWER LT                        PROCEDURE DATE:  2021    INTERPRETATION:  CLINICAL INFORMATION: Left lower extremity bruising  COMPARISON: None available.  TECHNIQUE: Duplex sonography of the LEFT LOWERextremity veins with color and spectral Doppler, with and without compression.  FINDINGS:  ·	Technically difficult study due to patient contracture.  ·	There is normal compressibility of the left common femoral, femoral and popliteal veins.  ·	The contralateral common femoral vein is patent.  ·	Doppler examination shows normal spontaneous and phasic flow.  ·	No calf vein thrombosis is detected.  IMPRESSION:  No evidence of left lower extremity deep venous thrombosis.  --- End of Report ---  DUNCAN OLSEN MD; Attending Radiologist  This document has been electronically signed. Sep 14 2021 12:55PM  < end of copied text >        < from: US Transvaginal (21 @ 04:55) >  EXAM:  US TRANSVAGINAL                        PROCEDURE DATE:  2021    INTERPRETATION:  CLINICAL INFORMATION: Vaginal bleeding for 3 weeks.  LMP: Unsure.  COMPARISON: CT of the abdomen and pelvis from 2017. Pelvic ultrasound from 2010.  TECHNIQUE: Endovaginal and transabdominal pelvic sonogram. Color and Spectral Doppler was performed.  FINDINGS:  Uterus: 8.5 x 5.2 x 6.4 cm. 2.5 x 2.1 x 2.5 cm posterior fibroid.  Endometrium: 4 mm. Within normal limits.  Right ovary: Not visualized. No adnexal mass.  Left ovary: Not visualized. No adnexal mass.  Fluid: None.    IMPRESSION:  Fibroid uterus. Ovaries not visualized.  --- End of Report ---            TULIO BOWSER MD; Attending Radiologist  This document has been electronically signed. Sep  3 2021  5:00AM    < end of copied text >

## 2022-09-14 NOTE — ED PROVIDER NOTE - PHYSICAL EXAMINATION
Gen: Alert, NAD  Head/eyes: NC/AT, PERRL  ENT: airway patent  Neck: supple  Pulm/lung: Bilateral clear BS  CV/heart: +tachycardic  GI/Abd: soft, NT/ND, +BS, no guarding/rebound tenderness  Musculoskeletal: no edema/erythema/cyanosis  Skin: no rash  Neuro: AAOx3, grossly intact

## 2022-09-14 NOTE — PROGRESS NOTE ADULT - SUBJECTIVE AND OBJECTIVE BOX
Patient is a 41y old  Female who presents with a chief complaint of     HPI:  41-year-old female history of anemia A. fib not on anticoagulation, depression, yperthyroidism, smoker, h/o transfusion in past, cesarians came in complaining about palpitations episode of syncope yesterday afternoon brought in by EMS because she was taking a shower and  felt faint.  Was near syncopal.  Denies any chest pain shortness of breath.  Admits to having heavy periods for the past 20 days.  Does not have an OB/GYN.  IN ED has tachycardia, reg,bp normotensive, HB 4.5, K 2.6, advised 2 units of rbc, K repletion and Obgyn contact, who will see in am, pt admitted (14 Sep 2022 04:56)    Heme asked to see pt for anemia.   Hx of fibroids  on Xarelto for Afib. Increased bleeding with menses. Prior admissions for same bleeding.   had increased vaginal bleeding this week, had gone through 30 pads in 1d  c/o LH, dizziness, malaise.       PAST MEDICAL & SURGICAL HISTORY:  Atrial fibrillation  History of Hyperthyroidism  Asthma  History of anemia  Depression, unspecified depression type  Smoker  S/P  Section  ,,  History of blood transfusion       HEALTH ISSUES - PROBLEM Dx:  Anemia due to acute blood loss  Menorrhagia  Menorrhagia  Hyperthyroidism  Paroxysmal atrial fibrillation      Anemia [D64.9]  Atrial fibrillation [427.31]  History of Hyperthyroidism [V12.2]  Asthma [493.90]  History of anemia [V12.3]  Depression, unspecified depression type [F32.9]  H/O blood transfusion reaction [Z87.898]  Smoker [F17.200]  S/P  Section [654.20]  History of blood transfusion [Z92.89]  Vaginal bleeding [N93.9]      FAMILY HISTORY:  Family history of diabetes mellitus (Sibling, Grandparent)  Family history of stomach cancer (Grandparent)      [SOCIAL HISTORY: ]     smokin.5 PPD smoker, trying to quit     EtOH:  social     illicit drugs:  denies     occupation:  disabled     marital status:  single     Other: 3 children  23yo, 21yo and 15yo  Lives with children and mother      [ALLERGIES/INTOLERANCES:]  Allergies     Motrin (Hives)  Intolerances      [MEDICATIONS]  MEDICATIONS  (STANDING):  gabapentin 300 milliGRAM(s) Oral two times a day  methimazole 10 milliGRAM(s) Oral daily  metoprolol tartrate 50 milliGRAM(s) Oral two times a day  potassium chloride  10 mEq/100 mL IVPB 10 milliEquivalent(s) IV Intermittent every 1 hour  QUEtiapine 25 milliGRAM(s) Oral daily  sertraline 100 milliGRAM(s) Oral daily  traZODone 50 milliGRAM(s) Oral two times a day    MEDICATIONS  (PRN):  ALBUTerol    90 MICROgram(s) HFA Inhaler 2 Puff(s) Inhalation every 6 hours PRN for shortness of breath and/or wheezing  diazepam    Tablet 2 milliGRAM(s) Oral daily PRN anxiety      [REVIEW OF SYSTEMS: ]  CONSTITUTIONAL: +malaise, no fever, no shakes, no chills   EYES: No eye pain, no visual disturbances, no discharge  ENMT:  no discharge  NECK: No pain, no stiffness  BREASTS: No pain, no masses, no nipple discharge  RESPIRATORY: No cough, no wheezing, no chills, no hemoptysis; No shortness of breath  CARDIOVASCULAR: No chest pain, no palpitations, +dizziness, no leg swelling  GASTROINTESTINAL: No abdominal, no epigastric pain. No nausea, no vomiting, no hematemesis; No diarrhea , no constipation. No melena, no hematochezia.  GENITOURINARY: No dysuria, no frequency, no hematuria, no incontinence  NEUROLOGICAL: No headaches, no memory loss, no loss of strength, no numbness, no tremors  SKIN: No itching, no burning, no rashes, no lesions   LYMPH NODES: No enlarged glands  ENDOCRINE: No heat or cold intolerance; No hair loss  MUSCULOSKELETAL: No joint pain or swelling; No muscle, no back, no extremity pain  PSYCHIATRIC: No depression, no anxiety, no mood swings, no difficulty sleeping  HEME/LYMPH: No easy bruising, no bleeding gums    [VITALS SIGNS 24hrs]  Vital Signs Last 24 Hrs  T(C): 37.2 (14 Sep 2022 08:37), Max: 37.5 (14 Sep 2022 08:15)  T(F): 99 (14 Sep 2022 08:37), Max: 99.5 (14 Sep 2022 08:15)  HR: 97 (14 Sep 2022 08:37) (96 - 108)  BP: 91/55 (14 Sep 2022 08:37) (90/55 - 114/59)  BP(mean): --  RR: 18 (14 Sep 2022 08:37) (17 - 19)  SpO2: 99% (14 Sep 2022 08:37) (96% - 100%)    Parameters below as of 14 Sep 2022 08:37  Patient On (Oxygen Delivery Method): room air      Daily Height in cm: 165.1 (14 Sep 2022 02:58)    Daily     I&O's Summary      [PHYSICAL EXAM]  General: adult in NAD,  WN,  WD.  HEENT: clear oropharynx, anicteric sclera, pink conjunctivae.  Neck: supple, no masses.  CV: normal S1S2, no murmur, no rubs, no gallops.  Lungs: clear to auscultation, no wheezes, no rales, no rhonchi.  Abdomen: soft, non-tender, non-distended, no hepatosplenomegaly, normal BS, no guarding.  Ext: no clubbing, no cyanosis, trace edema.  Skin: no rashes,  no petechiae, no venous stasis changes.  Neuro: alert and oriented X3, no focal motor deficits.  LN: no SC LAMONT.      [LABS: ]                        4.5    8.05  )-----------( 299      ( 14 Sep 2022 03:37 )             15.8     CBC Full  -  ( 14 Sep 2022 03:37 )  WBC Count : 8.05 K/uL  RBC Count : 1.97 M/uL  Hemoglobin : 4.5 g/dL  Hematocrit : 15.8 %  Platelet Count - Automated : 299 K/uL  Mean Cell Volume : 80.2 fl  Mean Cell Hemoglobin : 22.8 pg  Mean Cell Hemoglobin Concentration : 28.5 gm/dL  Auto Neutrophil # : 6.52 K/uL  Auto Lymphocyte # : 0.76 K/uL  Auto Monocyte # : 0.71 K/uL  Auto Eosinophil # : 0.00 K/uL  Auto Basophil # : 0.03 K/uL  Auto Neutrophil % : 81.0 %  Auto Lymphocyte % : 9.4 %  Auto Monocyte % : 8.8 %  Auto Eosinophil % : 0.0 %  Auto Basophil % : 0.4 %        142  |  101  |  8   ----------------------------<  123<H>  2.6<LL>   |  27  |  0.68    Ca    8.2<L>      14 Sep 2022 03:37    TPro  6.2  /  Alb  3.0<L>  /  TBili  0.6  /  DBili  x   /  AST  38  /  ALT  20  /  AlkPhos  110      PT/INR - ( 14 Sep 2022 03:38 )   PT: 13.6 sec;   INR: 1.15 ratio         PTT - ( 14 Sep 2022 03:38 )  PTT:30.7 sec  LIVER FUNCTIONS - ( 14 Sep 2022 03:37 )  Alb: 3.0 g/dL / Pro: 6.2 g/dL / ALK PHOS: 110 U/L / ALT: 20 U/L DA / AST: 38 U/L / GGT: x             CBC TREND (5 Days)  WBC Count: 8.05 K/uL ( @ 03:37)    Hemoglobin: 4.5 g/dL ( @ 03:37)    Hematocrit: 15.8 % ( @ 03:37)    Platelet Count - Automated: 299 K/uL ( @ 03:37)       [MICROBIOLOGY /  VIROLOGY:]            [RADIOLOGY & ADDITIONAL STUDIES:]       < from: US Duplex Venous Lower Ext Complete, Bilateral (22 @ 08:09) >  ACC: [id number] EXAM:  US DPLX LWR EXT VEINS COMPL BI                        PROCEDURE DATE:  2022    INTERPRETATION:  CLINICAL INFORMATION: Calf edema, palpitations, rule out DVT  COMPARISON: None available.  TECHNIQUE: Duplex sonography of the BILATERAL LOWER extremity veins with color and spectral Doppler, with and without compression.  FINDINGS:  RIGHT:  Normal compressibility of the RIGHT common femoral, femoral and popliteal veins.  Doppler examination shows normal spontaneous and phasic flow.  No RIGHT calf vein thrombosis is detected.  There is a right popliteal fossa cyst measuring 3.0 x 1.0 x 2.0 cm.    LEFT:  Normal compressibility of the LEFT common femoral, femoral and popliteal veins.  Doppler examination shows normal spontaneous and phasic flow.  No LEFT calf vein thrombosis is detected.    Bilateral subcutaneous calf edema is noted.    IMPRESSION:  No evidence of deep venous thrombosis in either lower extremity.  Small right popliteal fossa cyst measuring 3.0 cm, likely a Baker's cyst.  -- End of Report ---  DUNCAN OLSEN MD; Attending Radiologist  This document has been electronically signed. Sep 14 2022  8:15AM  < end of copied text >      < from: CT Angio Chest PE Protocol w/ IV Cont (22 @ 18:20) >  ACC: [id number] EXAM:  CT ANGIO CHEST PULDIANELYS MORIN                        PROCEDURE DATE:  2022    INTERPRETATION:  Clinical indication: Positive d-dimer, chest pain.  CT angiogram of the chest was performed following intravenous administration of 78 cc of Omnipaque-350. 22 cc of contrast was discarded. MIP images are submitted.  Comparison is made with the chest CT of 2020.  ·	The study is nondiagnostic for evaluation of pulmonary embolism due to timing of contrast opacification and associated nonopacification of the pulmonary arteries.  ·	No enlarged axillary lymph nodes. Partially calcified mediastinal and right hilar lymph nodes are without significant interval change given differences in timing of contrast opacification. For reference a 1.4 cm lymph node anterior to the right mainstem bronchus demonstrate no significant overall interval change.  ·	No pleural or pericardial effusion. Heart size is normal.  ·	8mm asymmetric nodule involvingthe inferior inner aspect of the left breast is predominantly new since 2020.  ·	Evaluation of the upper abdomen demonstrate decreased density of the liver suggestive of hepatic steatosis. Hepatic and splenic calcifications are unchanged.  ·	Evaluation of the lungs demonstrate no pneumonia. Cluster of small cysts are within the right apex appears unchanged may represent apical   ·	scarring. No traction bronchiectasis. No central endobronchial lesions.  ·	Mild degenerative changes of the spine.  IMPRESSION:   Nondiagnostic study for evaluation of pulmonary embolism due to lack of pulmonary arterial opacification.   Repeat CTPA or VQ scan can be performed for further evaluation as clinically warranted.  8mm left breast nodule. Correlation with mammography is recommended if not recently performed.  --- End of Report ---  SCOOTER CUELLO MD; Attending Radiologist  This document has been electronically signed. 2022  7:25PM  < end of copied text >      < from: [facility] Duplex Lower Ext Vein Scan, Left (21 @ 12:26) >  EXAM:  DUPLEX EXT VEINS LOWER LT                        PROCEDURE DATE:  2021    INTERPRETATION:  CLINICAL INFORMATION: Left lower extremity bruising  COMPARISON: None available.  TECHNIQUE: Duplex sonography of the LEFT LOWERextremity veins with color and spectral Doppler, with and without compression.  FINDINGS:  ·	Technically difficult study due to patient contracture.  ·	There is normal compressibility of the left common femoral, femoral and popliteal veins.  ·	The contralateral common femoral vein is patent.  ·	Doppler examination shows normal spontaneous and phasic flow.  ·	No calf vein thrombosis is detected.  IMPRESSION:  No evidence of left lower extremity deep venous thrombosis.  --- End of Report ---  DUNCAN OLSEN MD; Attending Radiologist  This document has been electronically signed. Sep 14 2021 12:55PM  < end of copied text >        < from: US Transvaginal (21 @ 04:55) >  EXAM:  US TRANSVAGINAL                        PROCEDURE DATE:  2021    INTERPRETATION:  CLINICAL INFORMATION: Vaginal bleeding for 3 weeks.  LMP: Unsure.  COMPARISON: CT of the abdomen and pelvis from 2017. Pelvic ultrasound from 2010.  TECHNIQUE: Endovaginal and transabdominal pelvic sonogram. Color and Spectral Doppler was performed.  FINDINGS:  Uterus: 8.5 x 5.2 x 6.4 cm. 2.5 x 2.1 x 2.5 cm posterior fibroid.  Endometrium: 4 mm. Within normal limits.  Right ovary: Not visualized. No adnexal mass.  Left ovary: Not visualized. No adnexal mass.  Fluid: None.    IMPRESSION:  Fibroid uterus. Ovaries not visualized.  --- End of Report ---            TULIO BOWSER MD; Attending Radiologist  This document has been electronically signed. Sep  3 2021  5:00AM    < end of copied text >         see consult note

## 2022-09-15 LAB
ALBUMIN SERPL ELPH-MCNC: 2.4 G/DL — LOW (ref 3.3–5)
ALBUMIN SERPL ELPH-MCNC: 2.9 G/DL — LOW (ref 3.3–5)
ALP SERPL-CCNC: 101 U/L — SIGNIFICANT CHANGE UP (ref 30–120)
ALP SERPL-CCNC: 83 U/L — SIGNIFICANT CHANGE UP (ref 30–120)
ALT FLD-CCNC: 10 U/L DA — SIGNIFICANT CHANGE UP (ref 10–60)
ALT FLD-CCNC: 11 U/L DA — SIGNIFICANT CHANGE UP (ref 10–60)
ANION GAP SERPL CALC-SCNC: 7 MMOL/L — SIGNIFICANT CHANGE UP (ref 5–17)
ANION GAP SERPL CALC-SCNC: 9 MMOL/L — SIGNIFICANT CHANGE UP (ref 5–17)
APTT BLD: 29.6 SEC — SIGNIFICANT CHANGE UP (ref 27.5–35.5)
AST SERPL-CCNC: 35 U/L — SIGNIFICANT CHANGE UP (ref 10–40)
AST SERPL-CCNC: 41 U/L — HIGH (ref 10–40)
BASOPHILS # BLD AUTO: 0.02 K/UL — SIGNIFICANT CHANGE UP (ref 0–0.2)
BASOPHILS NFR BLD AUTO: 0.4 % — SIGNIFICANT CHANGE UP (ref 0–2)
BILIRUB SERPL-MCNC: 1.2 MG/DL — SIGNIFICANT CHANGE UP (ref 0.2–1.2)
BILIRUB SERPL-MCNC: 1.9 MG/DL — HIGH (ref 0.2–1.2)
BUN SERPL-MCNC: 4 MG/DL — LOW (ref 7–23)
BUN SERPL-MCNC: 5 MG/DL — LOW (ref 7–23)
CALCIUM SERPL-MCNC: 7 MG/DL — LOW (ref 8.4–10.5)
CALCIUM SERPL-MCNC: 7.3 MG/DL — LOW (ref 8.4–10.5)
CHLORIDE SERPL-SCNC: 106 MMOL/L — SIGNIFICANT CHANGE UP (ref 96–108)
CHLORIDE SERPL-SCNC: 108 MMOL/L — SIGNIFICANT CHANGE UP (ref 96–108)
CO2 SERPL-SCNC: 26 MMOL/L — SIGNIFICANT CHANGE UP (ref 22–31)
CO2 SERPL-SCNC: 28 MMOL/L — SIGNIFICANT CHANGE UP (ref 22–31)
CREAT SERPL-MCNC: 0.44 MG/DL — LOW (ref 0.5–1.3)
CREAT SERPL-MCNC: 0.46 MG/DL — LOW (ref 0.5–1.3)
EGFR: 122 ML/MIN/1.73M2 — SIGNIFICANT CHANGE UP
EGFR: 123 ML/MIN/1.73M2 — SIGNIFICANT CHANGE UP
EOSINOPHIL # BLD AUTO: 0.16 K/UL — SIGNIFICANT CHANGE UP (ref 0–0.5)
EOSINOPHIL NFR BLD AUTO: 2.9 % — SIGNIFICANT CHANGE UP (ref 0–6)
FIBRINOGEN PPP-MCNC: 364 MG/DL — SIGNIFICANT CHANGE UP (ref 330–520)
FOLATE RBC-MCNC: 1230 NG/ML — SIGNIFICANT CHANGE UP (ref 499–1504)
GLUCOSE SERPL-MCNC: 85 MG/DL — SIGNIFICANT CHANGE UP (ref 70–99)
GLUCOSE SERPL-MCNC: 91 MG/DL — SIGNIFICANT CHANGE UP (ref 70–99)
HCT VFR BLD CALC: 23.8 % — LOW (ref 34.5–45)
HCT VFR BLD CALC: 23.9 % — LOW (ref 34.5–45)
HCT VFR BLD CALC: 26.3 % — LOW (ref 34.5–45)
HCT VFR BLD CALC: 27.3 % — LOW (ref 34.5–45)
HCT VFR BLD CALC: 27.3 % — LOW (ref 34.5–45)
HGB BLD-MCNC: 7.3 G/DL — LOW (ref 11.5–15.5)
HGB BLD-MCNC: 7.3 G/DL — LOW (ref 11.5–15.5)
HGB BLD-MCNC: 8.2 G/DL — LOW (ref 11.5–15.5)
HGB BLD-MCNC: 8.3 G/DL — LOW (ref 11.5–15.5)
HGB BLD-MCNC: 8.6 G/DL — LOW (ref 11.5–15.5)
IMM GRANULOCYTES NFR BLD AUTO: 0.5 % — SIGNIFICANT CHANGE UP (ref 0–1.5)
INR BLD: 1.15 RATIO — SIGNIFICANT CHANGE UP (ref 0.88–1.16)
LYMPHOCYTES # BLD AUTO: 0.56 K/UL — LOW (ref 1–3.3)
LYMPHOCYTES # BLD AUTO: 10.1 % — LOW (ref 13–44)
MAGNESIUM SERPL-MCNC: 1.3 MG/DL — LOW (ref 1.6–2.6)
MCHC RBC-ENTMCNC: 25.1 PG — LOW (ref 27–34)
MCHC RBC-ENTMCNC: 25.4 PG — LOW (ref 27–34)
MCHC RBC-ENTMCNC: 25.9 PG — LOW (ref 27–34)
MCHC RBC-ENTMCNC: 30 GM/DL — LOW (ref 32–36)
MCHC RBC-ENTMCNC: 30.5 GM/DL — LOW (ref 32–36)
MCHC RBC-ENTMCNC: 31.6 GM/DL — LOW (ref 32–36)
MCV RBC AUTO: 81.9 FL — SIGNIFICANT CHANGE UP (ref 80–100)
MCV RBC AUTO: 82.1 FL — SIGNIFICANT CHANGE UP (ref 80–100)
MCV RBC AUTO: 84.5 FL — SIGNIFICANT CHANGE UP (ref 80–100)
MONOCYTES # BLD AUTO: 0.4 K/UL — SIGNIFICANT CHANGE UP (ref 0–0.9)
MONOCYTES NFR BLD AUTO: 7.2 % — SIGNIFICANT CHANGE UP (ref 2–14)
NEUTROPHILS # BLD AUTO: 4.37 K/UL — SIGNIFICANT CHANGE UP (ref 1.8–7.4)
NEUTROPHILS NFR BLD AUTO: 78.9 % — HIGH (ref 43–77)
NRBC # BLD: 0 /100 WBCS — SIGNIFICANT CHANGE UP (ref 0–0)
PHOSPHATE SERPL-MCNC: 3.2 MG/DL — SIGNIFICANT CHANGE UP (ref 2.5–4.5)
PLATELET # BLD AUTO: 195 K/UL — SIGNIFICANT CHANGE UP (ref 150–400)
PLATELET # BLD AUTO: 204 K/UL — SIGNIFICANT CHANGE UP (ref 150–400)
PLATELET # BLD AUTO: 214 K/UL — SIGNIFICANT CHANGE UP (ref 150–400)
POTASSIUM SERPL-MCNC: 2.9 MMOL/L — CRITICAL LOW (ref 3.5–5.3)
POTASSIUM SERPL-MCNC: 3.4 MMOL/L — LOW (ref 3.5–5.3)
POTASSIUM SERPL-SCNC: 2.9 MMOL/L — CRITICAL LOW (ref 3.5–5.3)
POTASSIUM SERPL-SCNC: 3.4 MMOL/L — LOW (ref 3.5–5.3)
PROT SERPL-MCNC: 5 G/DL — LOW (ref 6–8.3)
PROT SERPL-MCNC: 6 G/DL — SIGNIFICANT CHANGE UP (ref 6–8.3)
PROTHROM AB SERPL-ACNC: 13.2 SEC — SIGNIFICANT CHANGE UP (ref 10.5–13.4)
RBC # BLD: 2.91 M/UL — LOW (ref 3.8–5.2)
RBC # BLD: 3.21 M/UL — LOW (ref 3.8–5.2)
RBC # BLD: 3.23 M/UL — LOW (ref 3.8–5.2)
RBC # FLD: 17.5 % — HIGH (ref 10.3–14.5)
RBC # FLD: 17.6 % — HIGH (ref 10.3–14.5)
RBC # FLD: 18 % — HIGH (ref 10.3–14.5)
SODIUM SERPL-SCNC: 141 MMOL/L — SIGNIFICANT CHANGE UP (ref 135–145)
SODIUM SERPL-SCNC: 143 MMOL/L — SIGNIFICANT CHANGE UP (ref 135–145)
WBC # BLD: 5.54 K/UL — SIGNIFICANT CHANGE UP (ref 3.8–10.5)
WBC # BLD: 6.21 K/UL — SIGNIFICANT CHANGE UP (ref 3.8–10.5)
WBC # BLD: 6.38 K/UL — SIGNIFICANT CHANGE UP (ref 3.8–10.5)
WBC # FLD AUTO: 5.54 K/UL — SIGNIFICANT CHANGE UP (ref 3.8–10.5)
WBC # FLD AUTO: 6.21 K/UL — SIGNIFICANT CHANGE UP (ref 3.8–10.5)
WBC # FLD AUTO: 6.38 K/UL — SIGNIFICANT CHANGE UP (ref 3.8–10.5)

## 2022-09-15 PROCEDURE — 99233 SBSQ HOSP IP/OBS HIGH 50: CPT

## 2022-09-15 RX ORDER — IRON SUCROSE 20 MG/ML
100 INJECTION, SOLUTION INTRAVENOUS EVERY 24 HOURS
Refills: 0 | Status: COMPLETED | OUTPATIENT
Start: 2022-09-15 | End: 2022-09-17

## 2022-09-15 RX ORDER — POTASSIUM CHLORIDE 20 MEQ
40 PACKET (EA) ORAL ONCE
Refills: 0 | Status: COMPLETED | OUTPATIENT
Start: 2022-09-15 | End: 2022-09-15

## 2022-09-15 RX ORDER — INFLUENZA VIRUS VACCINE 15; 15; 15; 15 UG/.5ML; UG/.5ML; UG/.5ML; UG/.5ML
0.5 SUSPENSION INTRAMUSCULAR ONCE
Refills: 0 | Status: DISCONTINUED | OUTPATIENT
Start: 2022-09-15 | End: 2022-09-17

## 2022-09-15 RX ORDER — MEDROXYPROGESTERONE ACETATE 150 MG/ML
1 INJECTION, SUSPENSION, EXTENDED RELEASE INTRAMUSCULAR
Qty: 14 | Refills: 5
Start: 2022-09-15 | End: 2022-12-07

## 2022-09-15 RX ORDER — SODIUM CHLORIDE 9 MG/ML
1000 INJECTION, SOLUTION INTRAVENOUS ONCE
Refills: 0 | Status: COMPLETED | OUTPATIENT
Start: 2022-09-15 | End: 2022-09-15

## 2022-09-15 RX ORDER — MEDROXYPROGESTERONE ACETATE 150 MG/ML
10 INJECTION, SUSPENSION, EXTENDED RELEASE INTRAMUSCULAR DAILY
Refills: 0 | Status: DISCONTINUED | OUTPATIENT
Start: 2022-09-16 | End: 2022-09-17

## 2022-09-15 RX ORDER — TRANEXAMIC ACID 100 MG/ML
1000 INJECTION, SOLUTION INTRAVENOUS ONCE
Refills: 0 | Status: COMPLETED | OUTPATIENT
Start: 2022-09-15 | End: 2022-09-15

## 2022-09-15 RX ORDER — MAGNESIUM SULFATE 500 MG/ML
1 VIAL (ML) INJECTION ONCE
Refills: 0 | Status: COMPLETED | OUTPATIENT
Start: 2022-09-15 | End: 2022-09-15

## 2022-09-15 RX ORDER — POTASSIUM CHLORIDE 20 MEQ
10 PACKET (EA) ORAL
Refills: 0 | Status: COMPLETED | OUTPATIENT
Start: 2022-09-15 | End: 2022-09-15

## 2022-09-15 RX ORDER — MEDROXYPROGESTERONE ACETATE 150 MG/ML
10 INJECTION, SUSPENSION, EXTENDED RELEASE INTRAMUSCULAR DAILY
Refills: 0 | Status: DISCONTINUED | OUTPATIENT
Start: 2022-09-15 | End: 2022-09-15

## 2022-09-15 RX ADMIN — Medication 50 MILLIGRAM(S): at 17:37

## 2022-09-15 RX ADMIN — PREGABALIN 1000 MICROGRAM(S): 225 CAPSULE ORAL at 09:34

## 2022-09-15 RX ADMIN — GABAPENTIN 300 MILLIGRAM(S): 400 CAPSULE ORAL at 17:37

## 2022-09-15 RX ADMIN — QUETIAPINE FUMARATE 25 MILLIGRAM(S): 200 TABLET, FILM COATED ORAL at 09:34

## 2022-09-15 RX ADMIN — SERTRALINE 100 MILLIGRAM(S): 25 TABLET, FILM COATED ORAL at 09:34

## 2022-09-15 RX ADMIN — Medication 40 MILLIEQUIVALENT(S): at 09:31

## 2022-09-15 RX ADMIN — Medication 50 MILLIGRAM(S): at 05:36

## 2022-09-15 RX ADMIN — GABAPENTIN 300 MILLIGRAM(S): 400 CAPSULE ORAL at 05:36

## 2022-09-15 RX ADMIN — Medication 1 MILLIGRAM(S): at 09:33

## 2022-09-15 RX ADMIN — CHLORHEXIDINE GLUCONATE 1 APPLICATION(S): 213 SOLUTION TOPICAL at 06:54

## 2022-09-15 RX ADMIN — Medication 40 MILLIEQUIVALENT(S): at 17:37

## 2022-09-15 RX ADMIN — Medication 100 GRAM(S): at 05:37

## 2022-09-15 RX ADMIN — IRON SUCROSE 100 MILLIGRAM(S): 20 INJECTION, SOLUTION INTRAVENOUS at 12:21

## 2022-09-15 RX ADMIN — Medication 100 MILLIEQUIVALENT(S): at 09:34

## 2022-09-15 RX ADMIN — SODIUM CHLORIDE 1000 MILLILITER(S): 9 INJECTION, SOLUTION INTRAVENOUS at 08:53

## 2022-09-15 RX ADMIN — TRANEXAMIC ACID 200 MILLIGRAM(S): 100 INJECTION, SOLUTION INTRAVENOUS at 12:20

## 2022-09-15 RX ADMIN — Medication 100 MILLIEQUIVALENT(S): at 05:37

## 2022-09-15 RX ADMIN — Medication 100 GRAM(S): at 00:31

## 2022-09-15 NOTE — PROGRESS NOTE ADULT - SUBJECTIVE AND OBJECTIVE BOX
Patient is a 41y old  Female who presents with a chief complaint of bleeding per vagina, syncope (15 Sep 2022 08:57)      INTERVAL HPI/OVERNIGHT EVENTS:overnight events noted    Home Medications:  Albuterol (Eqv-Proventil HFA) 90 mcg/inh inhalation aerosol: 2 puff(s) inhaled every 6 hours, As Needed - for shortness of breath and/or wheezing (14 Sep 2022 05:14)  diazePAM 2 mg oral tablet: 1 tab(s) orally once a day, As Needed (14 Sep 2022 05:14)  methIMAzole 10 mg oral tablet: orally once a day (14 Sep 2022 05:14)  SEROquel:  (14 Sep 2022 05:14)  Zoloft 100 mg oral tablet: 1 tab(s) orally once a day (at bedtime) (14 Sep 2022 05:14)      MEDICATIONS  (STANDING):  chlorhexidine 4% Liquid 1 Application(s) Topical <User Schedule>  cyanocobalamin 1000 MICROGram(s) Oral daily  folic acid 1 milliGRAM(s) Oral daily  gabapentin 300 milliGRAM(s) Oral two times a day  influenza   Vaccine 0.5 milliLiter(s) IntraMuscular once  iron sucrose Injectable 100 milliGRAM(s) IV Push every 24 hours  methimazole 10 milliGRAM(s) Oral daily  metoprolol tartrate 50 milliGRAM(s) Oral two times a day  QUEtiapine 25 milliGRAM(s) Oral daily  sertraline 100 milliGRAM(s) Oral daily  traZODone 50 milliGRAM(s) Oral two times a day    MEDICATIONS  (PRN):  ALBUTerol    90 MICROgram(s) HFA Inhaler 2 Puff(s) Inhalation every 6 hours PRN for shortness of breath and/or wheezing  diazepam    Tablet 2 milliGRAM(s) Oral daily PRN anxiety      Allergies    Motrin (Hives)    Intolerances        REVIEW OF SYSTEMS:  CONSTITUTIONAL: No fever, weight loss, or fatigue  EYES: No eye pain, visual disturbances, or discharge  ENMT:  No difficulty hearing, tinnitus, vertigo; No sinus or throat pain  NECK: No pain or stiffness  BREASTS: No pain, masses, or nipple discharge  RESPIRATORY: No cough, wheezing, chills or hemoptysis; No shortness of breath  CARDIOVASCULAR: No chest pain, palpitations, dizziness, or leg swelling  GASTROINTESTINAL: No abdominal or epigastric pain. No nausea, vomiting, or hematemesis; No diarrhea or constipation. No melena or hematochezia.  GENITOURINARY:bleeding and clots much less  NEUROLOGICAL: No headaches, memory loss, loss of strength, numbness, or tremors  SKIN: No itching, burning, rashes, or lesions   LYMPH NODES: No enlarged glands  ENDOCRINE: No heat or cold intolerance; No hair loss  MUSCULOSKELETAL: No joint pain or swelling; No muscle, back, or extremity pain  PSYCHIATRIC: No depression, anxiety, mood swings, or difficulty sleeping  HEME/LYMPH: No easy bruising, or bleeding gums  ALLERGY AND IMMUNOLOGIC: No hives or eczema    Vital Signs Last 24 Hrs  T(C): 36.8 (15 Sep 2022 04:49), Max: 37.1 (14 Sep 2022 10:28)  T(F): 98.3 (15 Sep 2022 04:49), Max: 98.8 (14 Sep 2022 10:28)  HR: 71 (15 Sep 2022 07:00) (71 - 97)  BP: 89/50 (15 Sep 2022 07:00) (81/47 - 119/64)  BP(mean): 62 (15 Sep 2022 07:00) (61 - 77)  RR: 13 (15 Sep 2022 07:00) (13 - 21)  SpO2: 91% (15 Sep 2022 07:00) (91% - 99%)    Parameters below as of 15 Sep 2022 07:00  Patient On (Oxygen Delivery Method): room air        PHYSICAL EXAM:  GENERAL: well-groomed, well-developed  HEAD:  Atraumatic, Normocephalic  EYES: EOMI, PERRLA, conjunctiva and sclera clear  ENMT: Moist mucous membranes,   NECK: Supple, No JVD, Normal thyroid  NERVOUS SYSTEM:  Alert & Oriented X3, Good concentration; Motor Strength 5/5 B/L upper and lower extremities; DTRs 2+ intact and symmetric  CHEST/LUNG: Clear to percussion bilaterally; No rales, rhonchi, wheezing, or rubs  HEART: Regular rate and rhythm; No murmurs, rubs, or gallops  ABDOMEN: Soft, Nontender, Nondistended; Bowel sounds present  EXTREMITIES:  2+ Peripheral Pulses, No clubbing, cyanosis, or edema  LYMPH: No lymphadenopathy noted  SKIN: No rashes or lesions    LABS:                        8.2    6.21  )-----------( 195      ( 15 Sep 2022 06:12 )             27.3     09-15    143  |  108  |  4<L>  ----------------------------<  85  2.9<LL>   |  28  |  0.44<L>    Ca    7.0<L>      15 Sep 2022 06:00  Phos  3.2     09-15  Mg     1.3     09-15    TPro  5.0<L>  /  Alb  2.4<L>  /  TBili  1.2  /  DBili  x   /  AST  35  /  ALT  10  /  AlkPhos  83  09-15    PT/INR - ( 14 Sep 2022 03:38 )   PT: 13.6 sec;   INR: 1.15 ratio         PTT - ( 14 Sep 2022 03:38 )  PTT:30.7 sec    CAPILLARY BLOOD GLUCOSE              I&O's Summary    14 Sep 2022 07:01  -  15 Sep 2022 07:00  --------------------------------------------------------  IN: 200 mL / OUT: 0 mL / NET: 200 mL        RADIOLOGY & ADDITIONAL TESTS:    Imaging Personally Reviewed:  [x ] YES  [ ] NO    Consultant(s) Notes Reviewed:  [x ] YES  [ ] NO    Care Discussed with Consultants/Other Providers [ x] YES  [ ] NO

## 2022-09-15 NOTE — PROGRESS NOTE ADULT - SUBJECTIVE AND OBJECTIVE BOX
Chief Complaint: Syncope    Interval Events: No events overnight. Sleeping.    Review of Systems:  General: No fevers, chills, weight gain  Skin: No rashes, color changes  Cardiovascular: No chest pain, orthopnea  Respiratory: No shortness of breath, cough  Gastrointestinal: No nausea, abdominal pain  Genitourinary: No incontinence, pain with urination  Musculoskeletal: No pain, swelling, decreased range of motion  Neurological: No headache, weakness  Psychiatric: No depression, anxiety  Endocrine: No weight gain, increased thirst  All other systems are comprehensively negative.    Physical Exam:  Vitals:        Vital Signs Last 24 Hrs  T(C): 36.8 (15 Sep 2022 04:49), Max: 37.1 (14 Sep 2022 10:28)  T(F): 98.3 (15 Sep 2022 04:49), Max: 98.8 (14 Sep 2022 10:28)  HR: 80 (15 Sep 2022 09:00) (71 - 97)  BP: 88/61 (15 Sep 2022 09:00) (78/48 - 119/64)  BP(mean): 70 (15 Sep 2022 09:00) (58 - 77)  RR: 17 (15 Sep 2022 09:00) (13 - 21)  SpO2: 97% (15 Sep 2022 09:00) (91% - 99%)  Parameters below as of 15 Sep 2022 09:00  Patient On (Oxygen Delivery Method): room air  General: NAD  HEENT: MMM  Neck: No JVD, no carotid bruit  Lungs: CTAB  CV: RRR, nl S1/S2, no M/R/G  Abdomen: S/NT/ND, +BS  Extremities: No LE edema, no cyanosis  Neuro: AAOx3, non-focal  Skin: No rash    Labs:                        8.2    6.21  )-----------( 195      ( 15 Sep 2022 06:12 )             27.3     09-15    143  |  108  |  4<L>  ----------------------------<  85  2.9<LL>   |  28  |  0.44<L>    Ca    7.0<L>      15 Sep 2022 06:00  Phos  3.2     09-15  Mg     1.3     09-15    TPro  5.0<L>  /  Alb  2.4<L>  /  TBili  1.2  /  DBili  x   /  AST  35  /  ALT  10  /  AlkPhos  83  09-15        PT/INR - ( 14 Sep 2022 03:38 )   PT: 13.6 sec;   INR: 1.15 ratio         PTT - ( 14 Sep 2022 03:38 )  PTT:30.7 sec    Telemetry: Sinus rhythm

## 2022-09-15 NOTE — PATIENT PROFILE ADULT - FALL HARM RISK - HARM RISK INTERVENTIONS

## 2022-09-15 NOTE — DIETITIAN INITIAL EVALUATION ADULT - SIGNS/SYMPTOMS
-fasting lipid profile does show elevation of cholesterol  Patient states that her diet is not great during the summer as kids are off from school and they frequently will eat out for meals    -patient would like to make dietary adjustments and will check her lipid panel in the winter    If her cholesterol is still elevated with dietary modification in the winter time then I would recommend going on rosuvastatin 5 mg once daily as evidenced by BMI >40 as evidenced by hypokalemia, low ferritin level

## 2022-09-15 NOTE — PROGRESS NOTE ADULT - SUBJECTIVE AND OBJECTIVE BOX
Interval History:  bleeding improved after transxemic acid treatment  Chart reviewed and events noted;   Overnight events:    MEDICATIONS  (STANDING):  chlorhexidine 4% Liquid 1 Application(s) Topical <User Schedule>  cyanocobalamin 1000 MICROGram(s) Oral daily  folic acid 1 milliGRAM(s) Oral daily  gabapentin 300 milliGRAM(s) Oral two times a day  influenza   Vaccine 0.5 milliLiter(s) IntraMuscular once  iron sucrose Injectable 100 milliGRAM(s) IV Push every 24 hours  methimazole 10 milliGRAM(s) Oral daily  metoprolol tartrate 50 milliGRAM(s) Oral two times a day  potassium chloride    Tablet ER 40 milliEquivalent(s) Oral once  potassium chloride  10 mEq/100 mL IVPB 10 milliEquivalent(s) IV Intermittent every 1 hour  QUEtiapine 25 milliGRAM(s) Oral daily  sertraline 100 milliGRAM(s) Oral daily  traZODone 50 milliGRAM(s) Oral two times a day    MEDICATIONS  (PRN):  ALBUTerol    90 MICROgram(s) HFA Inhaler 2 Puff(s) Inhalation every 6 hours PRN for shortness of breath and/or wheezing  diazepam    Tablet 2 milliGRAM(s) Oral daily PRN anxiety      Vital Signs Last 24 Hrs  T(C): 36.8 (15 Sep 2022 04:49), Max: 37.3 (14 Sep 2022 09:37)  T(F): 98.3 (15 Sep 2022 04:49), Max: 99.1 (14 Sep 2022 09:37)  HR: 71 (15 Sep 2022 07:00) (71 - 97)  BP: 89/50 (15 Sep 2022 07:00) (81/47 - 119/64)  BP(mean): 62 (15 Sep 2022 07:00) (61 - 77)  RR: 13 (15 Sep 2022 07:00) (13 - 21)  SpO2: 91% (15 Sep 2022 07:00) (91% - 99%)    Parameters below as of 15 Sep 2022 07:00  Patient On (Oxygen Delivery Method): room air        PHYSICAL EXAM  General: adult in NAD  HEENT: clear oropharynx, anicteric sclera, pink conjunctivae  Neck: supple  CV: normal S1S2 with no murmur rubs or gallops  Lungs: clear to auscultation, no wheezes, no rhales  Abdomen: soft non-tender non-distended, no hepato/splenomegaly  Ext: no clubbing cyanosis or edema  Skin: no rashes and no petichiae  Neuro: alert and oriented X3 no focal deficits      LABS:  CBC Full  -  ( 15 Sep 2022 06:12 )  WBC Count : 6.21 K/uL  RBC Count : 3.23 M/uL  Hemoglobin : 8.2 g/dL  Hematocrit : 27.3 %  Platelet Count - Automated : 195 K/uL  Mean Cell Volume : 84.5 fl  Mean Cell Hemoglobin : 25.4 pg  Mean Cell Hemoglobin Concentration : 30.0 gm/dL  Auto Neutrophil # : x  Auto Lymphocyte # : x  Auto Monocyte # : x  Auto Eosinophil # : x  Auto Basophil # : x  Auto Neutrophil % : x  Auto Lymphocyte % : x  Auto Monocyte % : x  Auto Eosinophil % : x  Auto Basophil % : x    09-15    143  |  108  |  4<L>  ----------------------------<  85  2.9<LL>   |  28  |  0.44<L>    Ca    7.0<L>      15 Sep 2022 06:00  Phos  3.2     09-15  Mg     1.3     09-15    TPro  5.0<L>  /  Alb  2.4<L>  /  TBili  1.2  /  DBili  x   /  AST  35  /  ALT  10  /  AlkPhos  83  09-15    PT/INR - ( 14 Sep 2022 03:38 )   PT: 13.6 sec;   INR: 1.15 ratio         PTT - ( 14 Sep 2022 03:38 )  PTT:30.7 sec    fe studies  Ferritin, Serum: 11 ng/mL (09-14 @ 12:10)  Iron - Total Binding Capacity.: 313 ug/dL (09-14 @ 12:10)  Iron - Total Binding Capacity.: 313 ug/dL (09-14 @ 12:00)      WBC trend  6.21 K/uL (09-15-22 @ 06:12)  5.54 K/uL (09-15-22 @ 06:00)  6.38 K/uL (09-15-22 @ 00:00)  6.37 K/uL (09-14-22 @ 17:27)  7.28 K/uL (09-14-22 @ 12:10)  8.05 K/uL (09-14-22 @ 03:37)      Hgb trend  8.2 g/dL (09-15-22 @ 06:12)  7.3 g/dL (09-15-22 @ 06:00)  8.3 g/dL (09-15-22 @ 00:00)  7.0 g/dL (09-14-22 @ 17:27)  5.7 g/dL (09-14-22 @ 12:10)  4.5 g/dL (09-14-22 @ 03:37)      plt trend  195 K/uL (09-15-22 @ 06:12)  204 K/uL (09-15-22 @ 06:00)  214 K/uL (09-15-22 @ 00:00)  178 K/uL (09-14-22 @ 17:27)  203 K/uL (09-14-22 @ 12:10)  299 K/uL (09-14-22 @ 03:37)        RADIOLOGY & ADDITIONAL STUDIES:

## 2022-09-15 NOTE — PROGRESS NOTE ADULT - ASSESSMENT
A/P: Heavy vaginal secondary to suspected adenomyosis    - Vaginal bleeding has improved  - Will continue Tranexamic acid, will switch to oral dose: 1300mg TID, max 5days  - Had considered Medroxyprogesterone but per pt, bleeding has improved with TXA and will continue  - Rx previously sent  - s/p 5U PRBC, 1 U FFP, 1 U Plt  - Declines OCPs, Mirena IUD  - Understands need for outpt f/u for complete gyn care, endometrial sampling,   - Pt expresses desire for definitive outpt surgical management  A/P: Heavy vaginal secondary to suspected adenomyosis    - Vaginal bleeding has improved  - Will switch to Medroxyprogesterone 10mg x 14days, start 9/16 AM (discussed with pharmacy)  - Will continue outpt cyclic Medroxyprogesterone 10mg daily x 14 days on, then 14 days off, Rx sent   - Previously sent Rx TXA discontinued.  - s/p 5U PRBC, 1 U FFP, 1 U Plt  - Declines OCPs, Mirena IUD  - Understands need for outpt f/u for complete gyn care, endometrial sampling,   - Pt expresses desire for definitive outpt surgical management

## 2022-09-15 NOTE — DIETITIAN INITIAL EVALUATION ADULT - REASON
No overt signs of muscle or fat depletion. Nutrition Focused Physical Exam is not indicated at this time

## 2022-09-15 NOTE — DIETITIAN INITIAL EVALUATION ADULT - ORAL INTAKE PTA/DIET HISTORY
Reported not following any therapeutic diet at home, appetite/po intake was good. States takes "a lot of pills", unable to obtain detail. Per H&P, pt was taking folic acid, ferrous sulfate pta. States sometimes not taking due to forgetfulness vs constipation side effect. NKFA. No other nutrition supplements reported.

## 2022-09-15 NOTE — CONSULT NOTE ADULT - ASSESSMENT
41-year-old female history of anemia A. fib not on anticoagulation, depression, hyperthyroidism, smoker, h/o transfusion in past, cesarians, peripheral neuropathy, rt ankle fracture 41-year-old female history of anemia A. fib not on anticoagulation, depression, hyperthyroidism, smoker, h/o transfusion in past, cesarians, peripheral neuropathy, rt ankle fracture    syncopal episode and heavy vaginal bleeding for 20days  anemia  menorrhagia  smoker  nicotine addiction  AF  depression  thyroid disease  neuropathy    GYN eval noted  HEME eval noted  on Proventil PRN  Smoking cess ed and counseling  spcu monitoring  s/p PRBC  Hemodynamic monitoring  no AC for AF at present  serial labs - H and H -   keep MAP > 60  I and O  US pelvis noted  US doppler LE noted

## 2022-09-15 NOTE — PROGRESS NOTE ADULT - ASSESSMENT
41-year-old female history of anemia A. fib not on anticoagulation, depression, hyperthyroidism, smoker, h/o transfusion in past, cesarians came in complaining about palpitations episode of syncope yesterday afternoon brought in by EMS because she was taking a shower and  felt faint.  Was near syncopal.  Denies any chest pain shortness of breath.  Admits to having heavy periods for the past 20 days.  Does not have an OB/GYN.  IN ED has tachycardia, reg,bp normotensive, HB 4.5, K 2.6, advised 2 units of rbc, K repletion and Obgyn contact, who will see in am, pt admitted for  Anemia ac of blood loss with chronic  menometorrhagia  anxiety depression  hyperthyroidism  paroxysmal afib, in nsr and not on AC for >3 yrs        Acute on chronic blood loss anemia due to menometorrhagia. having borderline hemorrhagic shock although given dose of metoprolol this am  s/p 4 units prbc, 1 unit ffp, 1 unit plt.   s/p 1 unit tranexamic acid  = gyn and heme following  - giving 1 unit prbc again today, 1L LR bolus  - monitor bp  - giving 2nd dose of transexamic acid again today  - heme ordered IV iron  - no surgical intervention at this time  - monitor h/h q6h        ·  Problem: Hyperthyroidism.   ·  Plan: cont home meds tft.       ·  Problem: Paroxysmal atrial fibrillation.   ·  Plan: in nsr. no ac due to bleeding   - hold metoprolol due to hypotension

## 2022-09-15 NOTE — PROGRESS NOTE ADULT - SUBJECTIVE AND OBJECTIVE BOX
Patient is a 41y old  Female who presents with a chief complaint of bleeding per vagina, syncope (15 Sep 2022 09:43)      INTERVAL HPI/OVERNIGHT EVENTS:  Patient seen and examined at bedside, patient  states still bleeding, 'changed pads 9 times in past 12-24 hours' spoke with nursing, had 1 pad change this am. Denies dizziness, chest pain, shortness of breath. Patient hypotensive overnight to 70s, as per patient is chronically low.     ROS reviewed and is otherwise negative        Vital Signs Last 24 Hrs  T(C): 36.8 (15 Sep 2022 04:49), Max: 36.9 (14 Sep 2022 14:10)  T(F): 98.3 (15 Sep 2022 04:49), Max: 98.5 (14 Sep 2022 14:10)  HR: 76 (15 Sep 2022 11:00) (71 - 97)  BP: 83/40 (15 Sep 2022 11:00) (78/48 - 119/64)  BP(mean): 53 (15 Sep 2022 11:00) (53 - 77)  RR: 14 (15 Sep 2022 11:00) (13 - 21)  SpO2: 100% (15 Sep 2022 11:00) (91% - 100%)    Parameters below as of 15 Sep 2022 11:00  Patient On (Oxygen Delivery Method): room air        PHYSICAL EXAM:  GENERAL: NAD, well-groomed, well-developed  HEAD:  Atraumatic, Normocephalic  EYES: EOMI, PERRLA  ENMT: Moist mucous membranes, Good dentition, No lesions; No tonsillar erythema, exudates, or enlargement  NECK: Supple, No JVD, Normal thyroid  NERVOUS SYSTEM:  Alert & Oriented X3, Good concentration; All 4 extremities mobile, no gross sensory deficits.   CHEST/LUNG: Clear to auscultation bilaterally; No rales, rhonchi, wheezing, or rubs  HEART: Regular rate and rhythm; No murmurs, rubs, or gallops  ABDOMEN: Soft, Nontender, Nondistended; Bowel sounds present  EXTREMITIES:  + Pulses, No clubbing, cyanosis, or edema  SKIN: No rashes or lesions    MEDICATIONS  (STANDING):  chlorhexidine 4% Liquid 1 Application(s) Topical <User Schedule>  cyanocobalamin 1000 MICROGram(s) Oral daily  folic acid 1 milliGRAM(s) Oral daily  gabapentin 300 milliGRAM(s) Oral two times a day  influenza   Vaccine 0.5 milliLiter(s) IntraMuscular once  iron sucrose Injectable 100 milliGRAM(s) IV Push every 24 hours  methimazole 10 milliGRAM(s) Oral daily  metoprolol tartrate 50 milliGRAM(s) Oral two times a day  QUEtiapine 25 milliGRAM(s) Oral daily  sertraline 100 milliGRAM(s) Oral daily  tranexamic acid IVPB 1000 milliGRAM(s) IV Intermittent once  traZODone 50 milliGRAM(s) Oral two times a day    MEDICATIONS  (PRN):  ALBUTerol    90 MICROgram(s) HFA Inhaler 2 Puff(s) Inhalation every 6 hours PRN for shortness of breath and/or wheezing  diazepam    Tablet 2 milliGRAM(s) Oral daily PRN anxiety      Allergies    Motrin (Hives)    Intolerances          LABS:                        8.2    6.21  )-----------( 195      ( 15 Sep 2022 06:12 )             27.3     15 Sep 2022 06:00    143    |  108    |  4      ----------------------------<  85     2.9     |  28     |  0.44     Ca    7.0        15 Sep 2022 06:00  Phos  3.2       15 Sep 2022 00:01  Mg     1.3       15 Sep 2022 00:01    TPro  5.0    /  Alb  2.4    /  TBili  1.2    /  DBili  x      /  AST  35     /  ALT  10     /  AlkPhos  83     15 Sep 2022 06:00    PT/INR - ( 14 Sep 2022 03:38 )   PT: 13.6 sec;   INR: 1.15 ratio         PTT - ( 14 Sep 2022 03:38 )  PTT:30.7 sec    CAPILLARY BLOOD GLUCOSE

## 2022-09-15 NOTE — CONSULT NOTE ADULT - CONSULT REASON
ABLA 2/2 Menorrhagia, hypotension
Syncope
Anemia due to hemorrhage.      [ D62]  Fibroids  Vaginal bleeding
Heavy vaginal bleeding
Bleeding  Anemia  Hypotension

## 2022-09-15 NOTE — PROGRESS NOTE ADULT - ASSESSMENT
41-year-old female history of anemia A. fib not on anticoagulation, depression, yperthyroidism, smoker, h/o transfusion in past, cesarians came in complaining about palpitations episode of syncope yesterday afternoon brought in by EMS because she was taking a shower and  felt faint.  Was near syncopal.  Denies any chest pain shortness of breath.  Admits to having heavy periods for the past 20 days.  Does not have an OB/GYN.  IN ED has tachycardia, reg,bp normotensive, HB 4.5, K 2.6, advised 2 units of rbc, K repletion and Obgyn contact, who will see in am, pt admitted for  Anemia ac of blood loss with chronic  menometorrhagia- gyn consult  anxiety depression  hyperthyroidism  paroxysmal afib, in nsr and not on AC for >3 yrs  < from: US Pelvis Complete (US Pelvis Complete .) (09.14.22 @ 08:57) >  Heterogeneous myomatous uterus.  < from: US Duplex Venous Lower Ext Complete, Bilateral (09.14.22 @ 08:09) >  No evidence of deep venous thrombosis in either lower extremity.  Small right popliteal fossa cyst measuring 3.0 cm, likely a Baker's cyst.  < from: CT Angio Chest PE Protocol w/ IV Cont (07.25.22 @ 18:20) >  8mm left breast nodule. Correlation with mammography is recommended if   not recently performed.    had clots overnight as per discussion and consult with GYN , can repeat TXA, and hypotension, off beta blockers IV fluids.  not on AC

## 2022-09-15 NOTE — PROGRESS NOTE ADULT - ASSESSMENT
The patient is a 41 year old female with a history of anemia, paroxysmal atrial fibrillation, hyperthyroidism who presents with syncope and palpitations.    Plan:  - Syncope and palpitations likely due to symptomatic anemia  - ECG with no evidence of ischemia or infarction  - Initially anemic with hemoglobin down to 4.5  - No anticoagulation for prior atrial fibrillation given low [id number] and ongoing bleeding/anemia issues  - Transfuse as needed  - Will eventually need gyn follow-up and intervention for fibroids

## 2022-09-15 NOTE — CONSULT NOTE ADULT - SUBJECTIVE AND OBJECTIVE BOX
Date/Time Patient Seen:  		  Referring MD:   Data Reviewed	       Patient is a 41y old  Female who presents with a chief complaint of bleeding per vagina, syncope (14 Sep 2022 22:02)      Subjective/HPI   42 y/o female with h/o paroxysmal a.fib (not on ac), hyperthyroidism, depression, tobacco abuse, and a h/o menorrhagia for years.  PAST MEDICAL & SURGICAL HISTORY:  Atrial fibrillation    History of Hyperthyroidism    Asthma    History of anemia    Depression, unspecified depression type    H/O blood transfusion reaction    Smoker    S/P  Section  ,,    History of blood transfusion          Medication list         MEDICATIONS  (STANDING):  chlorhexidine 4% Liquid 1 Application(s) Topical <User Schedule>  cyanocobalamin 1000 MICROGram(s) Oral daily  folic acid 1 milliGRAM(s) Oral daily  gabapentin 300 milliGRAM(s) Oral two times a day  influenza   Vaccine 0.5 milliLiter(s) IntraMuscular once  methimazole 10 milliGRAM(s) Oral daily  metoprolol tartrate 50 milliGRAM(s) Oral two times a day  potassium chloride  10 mEq/100 mL IVPB 10 milliEquivalent(s) IV Intermittent every 1 hour  QUEtiapine 25 milliGRAM(s) Oral daily  sertraline 100 milliGRAM(s) Oral daily  traZODone 50 milliGRAM(s) Oral two times a day    MEDICATIONS  (PRN):  ALBUTerol    90 MICROgram(s) HFA Inhaler 2 Puff(s) Inhalation every 6 hours PRN for shortness of breath and/or wheezing  diazepam    Tablet 2 milliGRAM(s) Oral daily PRN anxiety         Vitals log        ICU Vital Signs Last 24 Hrs  T(C): 36.8 (15 Sep 2022 04:49), Max: 37.5 (14 Sep 2022 08:15)  T(F): 98.3 (15 Sep 2022 04:49), Max: 99.5 (14 Sep 2022 08:15)  HR: 86 (15 Sep 2022 06:00) (83 - 100)  BP: 91/57 (15 Sep 2022 06:00) (81/47 - 119/64)  BP(mean): 67 (15 Sep 2022 06:00) (61 - 77)  ABP: --  ABP(mean): --  RR: 17 (15 Sep 2022 06:00) (15 - 21)  SpO2: 99% (15 Sep 2022 06:00) (96% - 99%)    O2 Parameters below as of 15 Sep 2022 06:00  Patient On (Oxygen Delivery Method): room air                 Input and Output:  I&O's Detail    14 Sep 2022 07:01  -  15 Sep 2022 07:00  --------------------------------------------------------  IN:    IV PiggyBack: 200 mL  Total IN: 200 mL    OUT:  Total OUT: 0 mL    Total NET: 200 mL          Lab Data                        7.3    5.54  )-----------( 204      ( 15 Sep 2022 06:00 )             23.9     09-15    141  |  106  |  5<L>  ----------------------------<  91  3.4<L>   |  26  |  0.46<L>    Ca    7.3<L>      15 Sep 2022 00:01  Phos  3.2     09-15  Mg     1.3     09-15    TPro  6.0  /  Alb  2.9<L>  /  TBili  1.9<H>  /  DBili  x   /  AST  41<H>  /  ALT  11  /  AlkPhos  101  09-15            Review of Systems	      Objective     Physical Examination        Pertinent Lab findings & Imaging      Norris:  NO   Adequate UO     I&O's Detail    14 Sep 2022 07:01  -  15 Sep 2022 07:00  --------------------------------------------------------  IN:    IV PiggyBack: 200 mL  Total IN: 200 mL    OUT:  Total OUT: 0 mL    Total NET: 200 mL               Discussed with:     Cultures:	        Radiology      ACC: [id number] EXAM:  US PELVIC COMPLETE                          PROCEDURE DATE:  2022          INTERPRETATION:  CLINICAL INFORMATION: Heavy vaginal bleeding for 2 weeks.    LMP: 2022  HCG negative 2022    COMPARISON: Pelvic ultrasound 9/3/2021    TECHNIQUE:    Transabdominal pelvic sonogram. Color and Spectral Doppler was performed.   Patient declined transvaginal portion of the study.    FINDINGS:    Limited transabdominal images obtained.    Uterus measures 8.8 x 4.7 x 5.7 cm, anteverted, with heterogeneous   echotexture and posterior uterine fibroid toward the fundus measuring 3.0   x 2.3 x 3.0 cm.    Endometrium is not well delineated, measured at approximately 5 mm.    Ovaries are suboptimally assessed on transabdominal technique, with   significant shadowing artifact limiting evaluation for fine detail of the   right and left ovarian parenchyma.    Right ovary measures 2.5 x 2.7 x 3.2 cm with vascular flow.    Left ovary measures 3.1 x 2.2 x 3.3 cm with vascular flow.    No free fluid.    IMPRESSION:    Limited transabdominal study. Patient declined transvaginal portion of   the scan.    Heterogeneous myomatous uterus. Underlying adenomyosis cannot be   excluded. Endometrium does not appear thickened at 5 mm, however, is not   well delineated on this study. GYN evaluation and pelvic MRI are   recommended for further evaluation.    Ovaries are suboptimally assessed on transabdominal technique, with   significant shadowing artifact limiting evaluation for fine detail of the   right and left ovarian parenchyma. Vascular flow is documented to both   ovaries, which are symmetric in size.    --- End of Report ---            SAMMY BRANHAM M.D., ATTENDING RADIOLOGIST  This document has been electronically signed. Sep 14 2022  9:03AM        ACC: [id number] EXAM:  CT ANGIO CHEST PULM ART WAWIC                          PROCEDURE DATE:  2022          INTERPRETATION:  Clinical indication: Positive d-dimer, chest pain.    CT angiogram of the chest was performed following intravenous   administration of 78 cc of Omnipaque-350. 22 cc of contrast was   discarded. MIP images are submitted.    Comparison is made with the chest CT of 2020.    The study is nondiagnostic for evaluation of pulmonary embolism due to   timing of contrast opacification and associated nonopacification of the   pulmonary arteries.    No enlarged axillary lymph nodes. Partially calcified mediastinal and   right hilar lymph nodes are without significant interval change given   differences in timing of contrast opacification. For reference a 1.4 cm   lymph node anterior to the right mainstem bronchus demonstrate no   significant overall interval change.    No pleural or pericardial effusion. Heart size is normal.    8mm asymmetric nodule involving the inferior inner aspect of the left   breast is predominantly new since 2020.    Evaluation of the upper abdomen demonstrate decreased density of the   liver suggestive of hepatic steatosis. Hepatic and splenic calcifications   are unchanged.    Evaluation of the lungs demonstrate no pneumonia. Cluster of small cysts   are within the right apex appears unchanged may represent apical   scarring. No traction bronchiectasis. No central endobronchial lesions.    Mild degenerative changes of the spine.    IMPRESSION: Nondiagnostic study for evaluation of pulmonary embolism due   to lack of pulmonary arterial opacification. Repeat CTPA or VQ scan can   be performed for further evaluation as clinically warranted.    8mm left breast nodule. Correlation with mammography is recommended if   not recently performed.    --- End of Report ---            SCOOTER CUELLO MD; Attending Radiologist  This document has been electronically signed. 2022  7:25PM                   Date/Time Patient Seen:  		  Referring MD:   Data Reviewed	       Patient is a 41y old  Female who presents with a chief complaint of bleeding per vagina, syncope (14 Sep 2022 22:02)      Subjective/HPI    in bed  seen and examined  vs noted  labs reviewed  imaging reviewed     42 y/o female with h/o paroxysmal a.fib (not on ac), hyperthyroidism, depression, tobacco abuse, and a h/o menorrhagia for years.  PAST MEDICAL & SURGICAL HISTORY:  Atrial fibrillation    History of Hyperthyroidism    Asthma    History of anemia    Depression, unspecified depression type    H/O blood transfusion reaction    Smoker    S/P  Section  ,,    History of blood transfusion          Medication list         MEDICATIONS  (STANDING):  chlorhexidine 4% Liquid 1 Application(s) Topical <User Schedule>  cyanocobalamin 1000 MICROGram(s) Oral daily  folic acid 1 milliGRAM(s) Oral daily  gabapentin 300 milliGRAM(s) Oral two times a day  influenza   Vaccine 0.5 milliLiter(s) IntraMuscular once  methimazole 10 milliGRAM(s) Oral daily  metoprolol tartrate 50 milliGRAM(s) Oral two times a day  potassium chloride  10 mEq/100 mL IVPB 10 milliEquivalent(s) IV Intermittent every 1 hour  QUEtiapine 25 milliGRAM(s) Oral daily  sertraline 100 milliGRAM(s) Oral daily  traZODone 50 milliGRAM(s) Oral two times a day    MEDICATIONS  (PRN):  ALBUTerol    90 MICROgram(s) HFA Inhaler 2 Puff(s) Inhalation every 6 hours PRN for shortness of breath and/or wheezing  diazepam    Tablet 2 milliGRAM(s) Oral daily PRN anxiety         Vitals log        ICU Vital Signs Last 24 Hrs  T(C): 36.8 (15 Sep 2022 04:49), Max: 37.5 (14 Sep 2022 08:15)  T(F): 98.3 (15 Sep 2022 04:49), Max: 99.5 (14 Sep 2022 08:15)  HR: 86 (15 Sep 2022 06:00) (83 - 100)  BP: 91/57 (15 Sep 2022 06:00) (81/47 - 119/64)  BP(mean): 67 (15 Sep 2022 06:00) (61 - 77)  ABP: --  ABP(mean): --  RR: 17 (15 Sep 2022 06:00) (15 - 21)  SpO2: 99% (15 Sep 2022 06:00) (96% - 99%)    O2 Parameters below as of 15 Sep 2022 06:00  Patient On (Oxygen Delivery Method): room air                 Input and Output:  I&O's Detail    14 Sep 2022 07:01  -  15 Sep 2022 07:00  --------------------------------------------------------  IN:    IV PiggyBack: 200 mL  Total IN: 200 mL    OUT:  Total OUT: 0 mL    Total NET: 200 mL          Lab Data                        7.3    5.54  )-----------( 204      ( 15 Sep 2022 06:00 )             23.9     09-15    141  |  106  |  5<L>  ----------------------------<  91  3.4<L>   |  26  |  0.46<L>    Ca    7.3<L>      15 Sep 2022 00:01  Phos  3.2     09-15  Mg     1.3     09-15    TPro  6.0  /  Alb  2.9<L>  /  TBili  1.9<H>  /  DBili  x   /  AST  41<H>  /  ALT  11  /  AlkPhos  101  09-15            Review of Systems	      Objective     Physical Examination        Pertinent Lab findings & Imaging      Norris:  NO   Adequate UO     I&O's Detail    14 Sep 2022 07:01  -  15 Sep 2022 07:00  --------------------------------------------------------  IN:    IV PiggyBack: 200 mL  Total IN: 200 mL    OUT:  Total OUT: 0 mL    Total NET: 200 mL               Discussed with:     Cultures:	        Radiology      ACC: [id number] EXAM:  US PELVIC COMPLETE                          PROCEDURE DATE:  2022          INTERPRETATION:  CLINICAL INFORMATION: Heavy vaginal bleeding for 2 weeks.    LMP: 2022  HCG negative 2022    COMPARISON: Pelvic ultrasound 9/3/2021    TECHNIQUE:    Transabdominal pelvic sonogram. Color and Spectral Doppler was performed.   Patient declined transvaginal portion of the study.    FINDINGS:    Limited transabdominal images obtained.    Uterus measures 8.8 x 4.7 x 5.7 cm, anteverted, with heterogeneous   echotexture and posterior uterine fibroid toward the fundus measuring 3.0   x 2.3 x 3.0 cm.    Endometrium is not well delineated, measured at approximately 5 mm.    Ovaries are suboptimally assessed on transabdominal technique, with   significant shadowing artifact limiting evaluation for fine detail of the   right and left ovarian parenchyma.    Right ovary measures 2.5 x 2.7 x 3.2 cm with vascular flow.    Left ovary measures 3.1 x 2.2 x 3.3 cm with vascular flow.    No free fluid.    IMPRESSION:    Limited transabdominal study. Patient declined transvaginal portion of   the scan.    Heterogeneous myomatous uterus. Underlying adenomyosis cannot be   excluded. Endometrium does not appear thickened at 5 mm, however, is not   well delineated on this study. GYN evaluation and pelvic MRI are   recommended for further evaluation.    Ovaries are suboptimally assessed on transabdominal technique, with   significant shadowing artifact limiting evaluation for fine detail of the   right and left ovarian parenchyma. Vascular flow is documented to both   ovaries, which are symmetric in size.    --- End of Report ---            SAMMY BRANHAM M.D., ATTENDING RADIOLOGIST  This document has been electronically signed. Sep 14 2022  9:03AM        ACC: [id number] EXAM:  CT ANGIO CHEST PULM ART WAWIC                          PROCEDURE DATE:  2022          INTERPRETATION:  Clinical indication: Positive d-dimer, chest pain.    CT angiogram of the chest was performed following intravenous   administration of 78 cc of Omnipaque-350. 22 cc of contrast was   discarded. MIP images are submitted.    Comparison is made with the chest CT of 2020.    The study is nondiagnostic for evaluation of pulmonary embolism due to   timing of contrast opacification and associated nonopacification of the   pulmonary arteries.    No enlarged axillary lymph nodes. Partially calcified mediastinal and   right hilar lymph nodes are without significant interval change given   differences in timing of contrast opacification. For reference a 1.4 cm   lymph node anterior to the right mainstem bronchus demonstrate no   significant overall interval change.    No pleural or pericardial effusion. Heart size is normal.    8mm asymmetric nodule involving the inferior inner aspect of the left   breast is predominantly new since 2020.    Evaluation of the upper abdomen demonstrate decreased density of the   liver suggestive of hepatic steatosis. Hepatic and splenic calcifications   are unchanged.    Evaluation of the lungs demonstrate no pneumonia. Cluster of small cysts   are within the right apex appears unchanged may represent apical   scarring. No traction bronchiectasis. No central endobronchial lesions.    Mild degenerative changes of the spine.    IMPRESSION: Nondiagnostic study for evaluation of pulmonary embolism due   to lack of pulmonary arterial opacification. Repeat CTPA or VQ scan can   be performed for further evaluation as clinically warranted.    8mm left breast nodule. Correlation with mammography is recommended if   not recently performed.    --- End of Report ---            SCOOTER CUELLO MD; Attending Radiologist  This document has been electronically signed. 2022  7:25PM

## 2022-09-15 NOTE — PROGRESS NOTE ADULT - SUBJECTIVE AND OBJECTIVE BOX
INTERVAL HPI/OVERNIGHT EVENTS: Pt seen and examined at bedside.  Pt states that she feels better and has had decreased bleeding overall. Has changed pad and tampon 3x since the morning, not fully soaked but for cleanliness. Passed 2 quarter sized clots since admission. States that bleeding overall has decreased. Denies CP, palpitations, SOB, lightheadedness.     MEDICATIONS  (STANDING):  chlorhexidine 4% Liquid 1 Application(s) Topical <User Schedule>  cyanocobalamin 1000 MICROGram(s) Oral daily  folic acid 1 milliGRAM(s) Oral daily  gabapentin 300 milliGRAM(s) Oral two times a day  influenza   Vaccine 0.5 milliLiter(s) IntraMuscular once  iron sucrose Injectable 100 milliGRAM(s) IV Push every 24 hours  methimazole 10 milliGRAM(s) Oral daily  QUEtiapine 25 milliGRAM(s) Oral daily  sertraline 100 milliGRAM(s) Oral daily  traZODone 50 milliGRAM(s) Oral two times a day    MEDICATIONS  (PRN):  ALBUTerol    90 MICROgram(s) HFA Inhaler 2 Puff(s) Inhalation every 6 hours PRN for shortness of breath and/or wheezing  diazepam    Tablet 2 milliGRAM(s) Oral daily PRN anxiety      Vital Signs Last 24 Hrs  T(C): 36.8 (15 Sep 2022 04:49), Max: 36.9 (14 Sep 2022 16:25)  T(F): 98.3 (15 Sep 2022 04:49), Max: 98.5 (14 Sep 2022 16:25)  HR: 72 (15 Sep 2022 15:00) (71 - 97)  BP: 99/51 (15 Sep 2022 15:00) (78/48 - 119/64)  BP(mean): 67 (15 Sep 2022 15:00) (53 - 77)  RR: 20 (15 Sep 2022 15:00) (13 - 21)  SpO2: 98% (15 Sep 2022 15:00) (91% - 100%)    Parameters below as of 15 Sep 2022 15:00  Patient On (Oxygen Delivery Method): room air        PHYSICAL EXAM:    GA: NAD, A+0 x 3  Abd: ( + ) BS, soft, nontender, nondistended, no rebound or guarding,     LABS:                        7.3    x     )-----------( x        ( 15 Sep 2022 12:39 )             23.8     09-15    143  |  108  |  4<L>  ----------------------------<  85  2.9<LL>   |  28  |  0.44<L>    Ca    7.0<L>      15 Sep 2022 06:00  Phos  3.2     09-15  Mg     1.3     09-15    TPro  5.0<L>  /  Alb  2.4<L>  /  TBili  1.2  /  DBili  x   /  AST  35  /  ALT  10  /  AlkPhos  83  09-15    PT/INR - ( 15 Sep 2022 12:39 )   PT: 13.2 sec;   INR: 1.15 ratio         PTT - ( 15 Sep 2022 12:39 )  PTT:29.6 sec      RADIOLOGY & ADDITIONAL TESTS:

## 2022-09-15 NOTE — DIETITIAN INITIAL EVALUATION ADULT - OTHER INFO
Per H&P "41-year-old female history of anemia A. fib not on anticoagulation, depression, hyperthyroidism, smoker, h/o transfusion in past, cesarians, peripheral neuropathy, rt ankle fracture , came in complaining about palpitations episode of syncope yesterday afternoon brought in by EMS because she was taking a shower and  felt faint.  Was near syncopal.  Denies any chest pain shortness of breath.  Admits to having heavy periods for the past 20 days.  Does not have an OB/GYN."    Visited patient in room, presents with good appetite, diet just advanced to regular this am. Denies n/v/d/c, last BM yesterday. No reported difficulty chewing or swallowing. No reported recent weight changes, current adm weight 250#, will continue to monitor weight trends as able.     Pertinent medications/nutrition labs reviewed; noted hypokalemia, low ferrtin level, receiving iron, potassium supplements; also ordered for vitamin B12, folic acid. Received IVF. Provided education on appropriate time of taking iron supplements if taking calcium/vitamin D or foods that high in such. Pt receptive, no questions at this time. RD to continue to monitor nutrition status per protocol.

## 2022-09-15 NOTE — DIETITIAN INITIAL EVALUATION ADULT - PERTINENT LABORATORY DATA
09-15    143  |  108  |  4<L>  ----------------------------<  85  2.9<LL>   |  28  |  0.44<L>    Ca    7.0<L>      15 Sep 2022 06:00  Phos  3.2     09-15  Mg     1.3     09-15    TPro  5.0<L>  /  Alb  2.4<L>  /  TBili  1.2  /  DBili  x   /  AST  35  /  ALT  10  /  AlkPhos  83  09-15  A1C with Estimated Average Glucose Result: 4.1 % (10-06-21 @ 10:51)

## 2022-09-15 NOTE — DIETITIAN INITIAL EVALUATION ADULT - PERTINENT MEDS FT
MEDICATIONS  (STANDING):  chlorhexidine 4% Liquid 1 Application(s) Topical <User Schedule>  cyanocobalamin 1000 MICROGram(s) Oral daily  folic acid 1 milliGRAM(s) Oral daily  gabapentin 300 milliGRAM(s) Oral two times a day  influenza   Vaccine 0.5 milliLiter(s) IntraMuscular once  iron sucrose Injectable 100 milliGRAM(s) IV Push every 24 hours  methimazole 10 milliGRAM(s) Oral daily  QUEtiapine 25 milliGRAM(s) Oral daily  sertraline 100 milliGRAM(s) Oral daily  tranexamic acid IVPB 1000 milliGRAM(s) IV Intermittent once  traZODone 50 milliGRAM(s) Oral two times a day    MEDICATIONS  (PRN):  ALBUTerol    90 MICROgram(s) HFA Inhaler 2 Puff(s) Inhalation every 6 hours PRN for shortness of breath and/or wheezing  diazepam    Tablet 2 milliGRAM(s) Oral daily PRN anxiety

## 2022-09-15 NOTE — PROGRESS NOTE ADULT - ASSESSMENT
IMPRESSION    42 yo woman with microcytic anemia due to heavy menstrual bleeding in setting of anticoagulation with Xarelto for A-fib (due to hyperthyroidism);   ·	Hemoglobin: 4.5 g/dL (09-14 @ 03:37)  Hx fibroids Heavy menses due to fibroids and AC  ·	has not seen Gyn in ~ 10 yrs  ·	last eval when had last child who is now 14, and perhaps once several yrs after.     Depression and anxiety  ·	Under psychiatric care  ·	In program with [facility].     L breast asymmetry  ·	8mm asymmetric nodule involving inferior inner aspect of the left breast is predominantly new since March 19, 2020.    Hx of Fe def anemia  ·	due to chronic blood loss  ·	metromenorrhagia   ferritin 11    Hx of Folate def anemia  ·	likely dietary    LE edema likely due to high output cardiac failure in setting of severe anemia.   ·	would expect improvement with transfusion    COVID 19 hx in past.  Mar 2020   ·	admitted with respiratory symptoms and fever. Tested + COVID-19        RECOMMENDATIONS    Check anemia studies  Start PO Folic acid daily.     No NSAIDS or ASA unless directed by physician/  Xarelto on HOLD.    Transfuse PRBC, plan for 3-4 units.   Transfuse PRBC if Hgb <7.0 or if symptomatic.   Follow CBC    DVT Prophylaxis  SCD    Gyn eval for fibroids.     Will need mammogram    venofer 100mg IV daily x 3  Follow in office post DC

## 2022-09-16 LAB
ALBUMIN SERPL ELPH-MCNC: 2.3 G/DL — LOW (ref 3.3–5)
ALP SERPL-CCNC: 80 U/L — SIGNIFICANT CHANGE UP (ref 30–120)
ALT FLD-CCNC: 14 U/L DA — SIGNIFICANT CHANGE UP (ref 10–60)
ANION GAP SERPL CALC-SCNC: 4 MMOL/L — LOW (ref 5–17)
AST SERPL-CCNC: 29 U/L — SIGNIFICANT CHANGE UP (ref 10–40)
BILIRUB SERPL-MCNC: 0.8 MG/DL — SIGNIFICANT CHANGE UP (ref 0.2–1.2)
BUN SERPL-MCNC: 3 MG/DL — LOW (ref 7–23)
CALCIUM SERPL-MCNC: 6.9 MG/DL — LOW (ref 8.4–10.5)
CHLORIDE SERPL-SCNC: 108 MMOL/L — SIGNIFICANT CHANGE UP (ref 96–108)
CO2 SERPL-SCNC: 30 MMOL/L — SIGNIFICANT CHANGE UP (ref 22–31)
CREAT SERPL-MCNC: 0.34 MG/DL — LOW (ref 0.5–1.3)
EGFR: 131 ML/MIN/1.73M2 — SIGNIFICANT CHANGE UP
GLUCOSE SERPL-MCNC: 84 MG/DL — SIGNIFICANT CHANGE UP (ref 70–99)
HCT VFR BLD CALC: 24.8 % — LOW (ref 34.5–45)
HGB BLD-MCNC: 7.7 G/DL — LOW (ref 11.5–15.5)
MCHC RBC-ENTMCNC: 25.8 PG — LOW (ref 27–34)
MCHC RBC-ENTMCNC: 31 GM/DL — LOW (ref 32–36)
MCV RBC AUTO: 83.2 FL — SIGNIFICANT CHANGE UP (ref 80–100)
NRBC # BLD: 0 /100 WBCS — SIGNIFICANT CHANGE UP (ref 0–0)
PLATELET # BLD AUTO: 209 K/UL — SIGNIFICANT CHANGE UP (ref 150–400)
POTASSIUM SERPL-MCNC: 3.7 MMOL/L — SIGNIFICANT CHANGE UP (ref 3.5–5.3)
POTASSIUM SERPL-SCNC: 3.7 MMOL/L — SIGNIFICANT CHANGE UP (ref 3.5–5.3)
PROT SERPL-MCNC: 4.5 G/DL — LOW (ref 6–8.3)
RBC # BLD: 2.98 M/UL — LOW (ref 3.8–5.2)
RBC # FLD: 17.9 % — HIGH (ref 10.3–14.5)
SODIUM SERPL-SCNC: 142 MMOL/L — SIGNIFICANT CHANGE UP (ref 135–145)
WBC # BLD: 7.03 K/UL — SIGNIFICANT CHANGE UP (ref 3.8–10.5)
WBC # FLD AUTO: 7.03 K/UL — SIGNIFICANT CHANGE UP (ref 3.8–10.5)

## 2022-09-16 RX ADMIN — GABAPENTIN 300 MILLIGRAM(S): 400 CAPSULE ORAL at 17:30

## 2022-09-16 RX ADMIN — Medication 2 MILLIGRAM(S): at 17:33

## 2022-09-16 RX ADMIN — IRON SUCROSE 100 MILLIGRAM(S): 20 INJECTION, SOLUTION INTRAVENOUS at 13:08

## 2022-09-16 RX ADMIN — SERTRALINE 100 MILLIGRAM(S): 25 TABLET, FILM COATED ORAL at 13:10

## 2022-09-16 RX ADMIN — QUETIAPINE FUMARATE 25 MILLIGRAM(S): 200 TABLET, FILM COATED ORAL at 13:09

## 2022-09-16 RX ADMIN — Medication 50 MILLIGRAM(S): at 17:30

## 2022-09-16 RX ADMIN — MEDROXYPROGESTERONE ACETATE 10 MILLIGRAM(S): 150 INJECTION, SUSPENSION, EXTENDED RELEASE INTRAMUSCULAR at 13:09

## 2022-09-16 RX ADMIN — PREGABALIN 1000 MICROGRAM(S): 225 CAPSULE ORAL at 13:10

## 2022-09-16 RX ADMIN — Medication 1 MILLIGRAM(S): at 13:10

## 2022-09-16 RX ADMIN — Medication 50 MILLIGRAM(S): at 05:31

## 2022-09-16 RX ADMIN — GABAPENTIN 300 MILLIGRAM(S): 400 CAPSULE ORAL at 05:30

## 2022-09-16 NOTE — PROGRESS NOTE ADULT - SUBJECTIVE AND OBJECTIVE BOX
All interim records and events noted.    still w vaginal bleed, "medium", but much improved since adm  no abdomen/pelvic pain      MEDICATIONS  (STANDING):  chlorhexidine 4% Liquid 1 Application(s) Topical <User Schedule>  cyanocobalamin 1000 MICROGram(s) Oral daily  folic acid 1 milliGRAM(s) Oral daily  gabapentin 300 milliGRAM(s) Oral two times a day  influenza   Vaccine 0.5 milliLiter(s) IntraMuscular once  iron sucrose Injectable 100 milliGRAM(s) IV Push every 24 hours  medroxyPROGESTERone 10 milliGRAM(s) Oral daily  methimazole 10 milliGRAM(s) Oral daily  QUEtiapine 25 milliGRAM(s) Oral daily  sertraline 100 milliGRAM(s) Oral daily  traZODone 50 milliGRAM(s) Oral two times a day    MEDICATIONS  (PRN):  ALBUTerol    90 MICROgram(s) HFA Inhaler 2 Puff(s) Inhalation every 6 hours PRN for shortness of breath and/or wheezing  diazepam    Tablet 2 milliGRAM(s) Oral daily PRN anxiety      Vital Signs Last 24 Hrs  T(C): 37 (16 Sep 2022 08:32), Max: 37 (16 Sep 2022 08:32)  T(F): 98.6 (16 Sep 2022 08:32), Max: 98.6 (16 Sep 2022 08:32)  HR: 89 (16 Sep 2022 08:32) (72 - 97)  BP: 95/62 (16 Sep 2022 08:32) (91/57 - 109/75)  BP(mean): 73 (15 Sep 2022 17:00) (67 - 80)  RR: 18 (16 Sep 2022 08:32) (17 - 21)  SpO2: 95% (16 Sep 2022 08:32) (94% - 100%)    Parameters below as of 16 Sep 2022 08:32  Patient On (Oxygen Delivery Method): room air        PHYSICAL EXAM  General: well developed  well nourished, in no acute distress  Head: atraumatic, normocephalic  ENT: sclera anicteric  Neck: supple, trachea midline  CV: S1 S2, regular rate and rhythm  Lungs: clear to auscultation, no wheezes/rhonchi  Abdomen: soft, nontender, bowel sounds present, no palpable masses. Pouchy  Extrem: no clubbing/cyanosis/edema  Skin: no significant increased ecchymosis/petechiae  Neuro: alert and oriented X3,  no focal deficits      LABS:             7.7    7.03  )-----------( 209      ( 09-16 @ 12:17 )             24.8                8.6    x     )-----------( x        ( 09-15 @ 19:54 )             27.3                7.3    x     )-----------( x        ( 09-15 @ 12:39 )             23.8                8.2    6.21  )-----------( 195      ( 09-15 @ 06:12 )             27.3                7.3    5.54  )-----------( 204      ( 09-15 @ 06:00 )             23.9                8.3    6.38  )-----------( 214      ( 09-15 @ 00:00 )             26.3                7.0    6.37  )-----------( 178      ( 09-14 @ 17:27 )             22.2                5.7    7.28  )-----------( 203      ( 09-14 @ 12:10 )             19.1                4.5    8.05  )-----------( 299      ( 09-14 @ 03:37 )             15.8       09-16    142  |  108  |  3<L>  ----------------------------<  84  3.7   |  30  |  0.34<L>    Ca    6.9<L>      16 Sep 2022 12:16  Phos  3.2     09-15  Mg     1.3     09-15    TPro  5.0<L>  /  Alb  2.4<L>  /  TBili  1.2  /  DBili  x   /  AST  35  /  ALT  10  /  AlkPhos  83  09-15    09-15 @ 12:39  PT13.2 INR1.15  PTT29.6  09-14 @ 03:38  PT13.6 INR1.15  PTT30.7      RADIOLOGY & ADDITIONAL STUDIES:    IMPRESSION/RECOMMENDATIONS:

## 2022-09-16 NOTE — PROGRESS NOTE ADULT - SUBJECTIVE AND OBJECTIVE BOX
Date/Time Patient Seen:  		  Referring MD:   Data Reviewed	       Patient is a 41y old  Female who presents with a chief complaint of bleeding per vagina, syncope (15 Sep 2022 15:41)      Subjective/HPI     PAST MEDICAL & SURGICAL HISTORY:  Atrial fibrillation    History of Hyperthyroidism    Asthma    History of anemia    Depression, unspecified depression type    H/O blood transfusion reaction    Smoker    S/P  Section  ,,    History of blood transfusion          Medication list         MEDICATIONS  (STANDING):  chlorhexidine 4% Liquid 1 Application(s) Topical <User Schedule>  cyanocobalamin 1000 MICROGram(s) Oral daily  folic acid 1 milliGRAM(s) Oral daily  gabapentin 300 milliGRAM(s) Oral two times a day  influenza   Vaccine 0.5 milliLiter(s) IntraMuscular once  iron sucrose Injectable 100 milliGRAM(s) IV Push every 24 hours  medroxyPROGESTERone 10 milliGRAM(s) Oral daily  methimazole 10 milliGRAM(s) Oral daily  QUEtiapine 25 milliGRAM(s) Oral daily  sertraline 100 milliGRAM(s) Oral daily  traZODone 50 milliGRAM(s) Oral two times a day    MEDICATIONS  (PRN):  ALBUTerol    90 MICROgram(s) HFA Inhaler 2 Puff(s) Inhalation every 6 hours PRN for shortness of breath and/or wheezing  diazepam    Tablet 2 milliGRAM(s) Oral daily PRN anxiety         Vitals log        ICU Vital Signs Last 24 Hrs  T(C): 36.9 (15 Sep 2022 23:07), Max: 36.9 (15 Sep 2022 23:07)  T(F): 98.4 (15 Sep 2022 23:07), Max: 98.4 (15 Sep 2022 23:07)  HR: 82 (15 Sep 2022 23:07) (71 - 97)  BP: 94/61 (15 Sep 2022 23:07) (78/48 - 109/75)  BP(mean): 73 (15 Sep 2022 17:00) (53 - 80)  ABP: --  ABP(mean): --  RR: 18 (15 Sep 2022 23:07) (13 - 21)  SpO2: 95% (15 Sep 2022 23:07) (91% - 100%)    O2 Parameters below as of 15 Sep 2022 23:07  Patient On (Oxygen Delivery Method): room air                 Input and Output:  I&O's Detail    14 Sep 2022 07:01  -  15 Sep 2022 07:00  --------------------------------------------------------  IN:    IV PiggyBack: 200 mL  Total IN: 200 mL    OUT:  Total OUT: 0 mL    Total NET: 200 mL      15 Sep 2022 07:  -  16 Sep 2022 06:50  --------------------------------------------------------  IN:    Lactated Ringers Bolus: 1000 mL    Oral Fluid: 720 mL    PRBCs (Packed Red Blood Cells): 350 mL  Total IN: 2070 mL    OUT:  Total OUT: 0 mL    Total NET: 2070 mL          Lab Data                        8.6    x     )-----------( x        ( 15 Sep 2022 19:54 )             27.3     15    143  |  108  |  4<L>  ----------------------------<  85  2.9<LL>   |  28  |  0.44<L>    Ca    7.0<L>      15 Sep 2022 06:00  Phos  3.2     15  Mg     1.3     09-15    TPro  5.0<L>  /  Alb  2.4<L>  /  TBili  1.2  /  DBili  x   /  AST  35  /  ALT  10  /  AlkPhos  83  09-15            Review of Systems	      Objective     Physical Examination    heart s1s2  lung dec BS  abd soft      Pertinent Lab findings & Imaging      Myrna:  NO   Adequate UO     I&O's Detail    14 Sep 2022 07:  -  15 Sep 2022 07:00  --------------------------------------------------------  IN:    IV PiggyBack: 200 mL  Total IN: 200 mL    OUT:  Total OUT: 0 mL    Total NET: 200 mL      15 Sep 2022 07:01  -  16 Sep 2022 06:50  --------------------------------------------------------  IN:    Lactated Ringers Bolus: 1000 mL    Oral Fluid: 720 mL    PRBCs (Packed Red Blood Cells): 350 mL  Total IN: 2070 mL    OUT:  Total OUT: 0 mL    Total NET: 0 mL               Discussed with:     Cultures:	        Radiology

## 2022-09-16 NOTE — PROGRESS NOTE ADULT - ASSESSMENT
41-year-old female history of anemia A. fib not on anticoagulation, depression, hyperthyroidism, smoker, h/o transfusion in past, cesarians, peripheral neuropathy, rt ankle fracture    syncopal episode and heavy vaginal bleeding for 20days  anemia  menorrhagia  smoker  nicotine addiction  AF  depression  thyroid disease  neuropathy    ordered for Provera - GYN f/u noted  transferred out of [facility]    GYN eval noted - follow up reviewed  HEME eval noted  on Proventil PRN  Smoking cess ed and counseling  s/p PRBC  Hemodynamic monitoring  no AC for AF at present  serial labs - H and H -   keep MAP > 60  I and O  US pelvis noted  US doppler LE noted

## 2022-09-16 NOTE — PROGRESS NOTE ADULT - ASSESSMENT
41-year-old female history of anemia A. fib not on anticoagulation, depression, yperthyroidism, smoker, h/o transfusion in past, cesarians came in complaining about palpitations episode of syncope yesterday afternoon brought in by EMS because she was taking a shower and  felt faint.  Was near syncopal.  Denies any chest pain shortness of breath.  Admits to having heavy periods for the past 20 days.  Does not have an OB/GYN.  IN ED has tachycardia, reg,bp normotensive, HB 4.5, K 2.6, advised 2 units of rbc, K repletion and Obgyn contact, who will see in am, pt admitted for  Anemia ac of blood loss with chronic  menometorrhagia- gyn consult  anxiety depression  hyperthyroidism  paroxysmal afib, in nsr and not on AC for >3 yrs  < from: US Pelvis Complete (US Pelvis Complete .) (09.14.22 @ 08:57) >  Heterogeneous myomatous uterus.  < from: US Duplex Venous Lower Ext Complete, Bilateral (09.14.22 @ 08:09) >  No evidence of deep venous thrombosis in either lower extremity.  Small right popliteal fossa cyst measuring 3.0 cm, likely a Baker's cyst.  < from: CT Angio Chest PE Protocol w/ IV Cont (07.25.22 @ 18:20) >  8mm left breast nodule. Correlation with mammography is recommended if   not recently performed.    had clots overnight as per discussion and consult with GYN , can repeat TXA, and hypotension, off beta blockers IV fluids.  not on AC  started on venofer 9/15 for 3 days  hypokalemia repleted - monitor           Self

## 2022-09-16 NOTE — PROGRESS NOTE ADULT - ASSESSMENT
The patient is a 41 year old female with a history of anemia, paroxysmal atrial fibrillation, hyperthyroidism who presents with syncope and palpitations.    Plan:  - Syncope and palpitations likely due to symptomatic anemia  - ECG with no evidence of ischemia or infarction  - Initially anemic with hemoglobin down to 4.5 - improved with transfusion  - No anticoagulation for prior atrial fibrillation given low [id number] and ongoing bleeding/anemia issues  - Transfuse as needed  - Gyn follow-up

## 2022-09-16 NOTE — PROGRESS NOTE ADULT - ASSESSMENT
42 yo woman with depression/anxiety, folate def, iron def  anemia due to heavy menstrual bleeding in setting of anticoagulation with Xarelto for A-fib (due to hyperthyroidism);   ·	Hemoglobin: 4.5 g/dL (09-14 @ 03:37)  Hx fibroids Heavy menses due to fibroids and AC  ·	has not seen Gyn in ~ 10 yr      -seen by GYN, started on Provera  -started on IV Venofer and oral folate, for 3rd dose Venofer tomorrow  -post PRBC large volume protocol  -H/H last 4 7s-8s  -clinically stable, less vaginal bleed,   -cont serial CBC monitor

## 2022-09-16 NOTE — PROGRESS NOTE ADULT - SUBJECTIVE AND OBJECTIVE BOX
Patient is a 41y old  Female who presents with a chief complaint of bleeding per vagina, syncope (16 Sep 2022 06:50)      INTERVAL HPI/OVERNIGHT EVENTS:overnight events noted    Home Medications:  Albuterol (Eqv-Proventil HFA) 90 mcg/inh inhalation aerosol: 2 puff(s) inhaled every 6 hours, As Needed - for shortness of breath and/or wheezing (14 Sep 2022 05:14)  diazePAM 2 mg oral tablet: 1 tab(s) orally once a day, As Needed (14 Sep 2022 05:14)  methIMAzole 10 mg oral tablet: orally once a day (14 Sep 2022 05:14)  SEROquel:  (14 Sep 2022 05:14)  Zoloft 100 mg oral tablet: 1 tab(s) orally once a day (at bedtime) (14 Sep 2022 05:14)      MEDICATIONS  (STANDING):  chlorhexidine 4% Liquid 1 Application(s) Topical <User Schedule>  cyanocobalamin 1000 MICROGram(s) Oral daily  folic acid 1 milliGRAM(s) Oral daily  gabapentin 300 milliGRAM(s) Oral two times a day  influenza   Vaccine 0.5 milliLiter(s) IntraMuscular once  iron sucrose Injectable 100 milliGRAM(s) IV Push every 24 hours  medroxyPROGESTERone 10 milliGRAM(s) Oral daily  methimazole 10 milliGRAM(s) Oral daily  QUEtiapine 25 milliGRAM(s) Oral daily  sertraline 100 milliGRAM(s) Oral daily  traZODone 50 milliGRAM(s) Oral two times a day    MEDICATIONS  (PRN):  ALBUTerol    90 MICROgram(s) HFA Inhaler 2 Puff(s) Inhalation every 6 hours PRN for shortness of breath and/or wheezing  diazepam    Tablet 2 milliGRAM(s) Oral daily PRN anxiety      Allergies    Motrin (Hives)    Intolerances        REVIEW OF SYSTEMS:  CONSTITUTIONAL: No fever, weight loss, or fatigue  EYES: No eye pain, visual disturbances, or discharge  ENMT:  No difficulty hearing, tinnitus, vertigo; No sinus or throat pain  NECK: No pain or stiffness  BREASTS: No pain, masses, or nipple discharge  RESPIRATORY: No cough, wheezing, chills or hemoptysis; No shortness of breath  CARDIOVASCULAR: No chest pain, palpitations, dizziness, or leg swelling  GASTROINTESTINAL: No abdominal or epigastric pain. No nausea, vomiting, or hematemesis; has  constipation.   GENITOURINARY: No dysuria, frequency, hematuria, or incontinence  NEUROLOGICAL: No headaches, memory loss, loss of strength, numbness, or tremors  SKIN: No itching, burning, rashes, or lesions   LYMPH NODES: No enlarged glands  ENDOCRINE: No heat or cold intolerance; No hair loss  MUSCULOSKELETAL: No joint pain or swelling; No muscle, back, or extremity pain  PSYCHIATRIC: No depression, anxiety, mood swings, or difficulty sleeping  HEME/LYMPH: No easy bruising, or bleeding gums  ALLERGY AND IMMUNOLOGIC: No hives or eczema    Vital Signs Last 24 Hrs  T(C): 37 (16 Sep 2022 08:32), Max: 37 (16 Sep 2022 08:32)  T(F): 98.6 (16 Sep 2022 08:32), Max: 98.6 (16 Sep 2022 08:32)  HR: 89 (16 Sep 2022 08:32) (72 - 97)  BP: 95/62 (16 Sep 2022 08:32) (91/57 - 109/75)  BP(mean): 73 (15 Sep 2022 17:00) (67 - 80)  RR: 18 (16 Sep 2022 08:32) (17 - 21)  SpO2: 95% (16 Sep 2022 08:32) (94% - 100%)    Parameters below as of 16 Sep 2022 08:32  Patient On (Oxygen Delivery Method): room air        PHYSICAL EXAM:  GENERAL: NAD, well-groomed, well-developed  HEAD:  Atraumatic, Normocephalic  EYES: EOMI, PERRLA, conjunctiva and sclera clear  ENMT: Moist mucous membranes,   NECK: Supple, No JVD, Normal thyroid  NERVOUS SYSTEM:  Alert & Oriented X3, Good concentration; Motor Strength 5/5 B/L upper and lower extremities; DTRs 2+ intact and symmetric  CHEST/LUNG: Clear to percussion bilaterally; No rales, rhonchi, wheezing, or rubs  HEART: Regular rate and rhythm; No murmurs, rubs, or gallops  ABDOMEN: Soft, Nontender, Nondistended; Bowel sounds present  EXTREMITIES:  2+ Peripheral Pulses, No clubbing, cyanosis, or edema  LYMPH: No lymphadenopathy noted  SKIN: No rashes or lesions    LABS:                        8.6    x     )-----------( x        ( 15 Sep 2022 19:54 )             27.3     09-15    143  |  108  |  4<L>  ----------------------------<  85  2.9<LL>   |  28  |  0.44<L>    Ca    7.0<L>      15 Sep 2022 06:00  Phos  3.2     09-15  Mg     1.3     09-15    TPro  5.0<L>  /  Alb  2.4<L>  /  TBili  1.2  /  DBili  x   /  AST  35  /  ALT  10  /  AlkPhos  83  09-15    PT/INR - ( 15 Sep 2022 12:39 )   PT: 13.2 sec;   INR: 1.15 ratio         PTT - ( 15 Sep 2022 12:39 )  PTT:29.6 sec    CAPILLARY BLOOD GLUCOSE              I&O's Summary    15 Sep 2022 07:01  -  16 Sep 2022 07:00  --------------------------------------------------------  IN: 2070 mL / OUT: 0 mL / NET: 2070 mL        RADIOLOGY & ADDITIONAL TESTS:    Imaging Personally Reviewed:  [ x] YES  [ ] NO    Consultant(s) Notes Reviewed:  [x ] YES  [ ] NO    Care Discussed with Consultants/Other Providers [x ] YES  [ ] NO

## 2022-09-16 NOTE — PROGRESS NOTE ADULT - SUBJECTIVE AND OBJECTIVE BOX
Chief Complaint: Syncope    Interval Events: No events overnight. Sleeping.    Review of Systems:  General: No fevers, chills, weight gain  Skin: No rashes, color changes  Cardiovascular: No chest pain, orthopnea  Respiratory: No shortness of breath, cough  Gastrointestinal: No nausea, abdominal pain  Genitourinary: No incontinence, pain with urination  Musculoskeletal: No pain, swelling, decreased range of motion  Neurological: No headache, weakness  Psychiatric: No depression, anxiety  Endocrine: No weight gain, increased thirst  All other systems are comprehensively negative.    Physical Exam:  Vital Signs Last 24 Hrs  T(C): 37 (16 Sep 2022 08:32), Max: 37 (16 Sep 2022 08:32)  T(F): 98.6 (16 Sep 2022 08:32), Max: 98.6 (16 Sep 2022 08:32)  HR: 89 (16 Sep 2022 08:32) (72 - 97)  BP: 95/62 (16 Sep 2022 08:32) (83/40 - 109/75)  BP(mean): 73 (15 Sep 2022 17:00) (53 - 80)  RR: 18 (16 Sep 2022 08:32) (14 - 21)  SpO2: 95% (16 Sep 2022 08:32) (94% - 100%)  Parameters below as of 16 Sep 2022 08:32  Patient On (Oxygen Delivery Method): room air  General: NAD  HEENT: MMM  Neck: No JVD, no carotid bruit  Lungs: CTAB  CV: RRR, nl S1/S2, no M/R/G  Abdomen: S/NT/ND, +BS  Extremities: No LE edema, no cyanosis  Neuro: AAOx3, non-focal  Skin: No rash    Labs:    09-15    143  |  108  |  4<L>  ----------------------------<  85  2.9<LL>   |  28  |  0.44<L>    Ca    7.0<L>      15 Sep 2022 06:00  Phos  3.2     09-15  Mg     1.3     09-15    TPro  5.0<L>  /  Alb  2.4<L>  /  TBili  1.2  /  DBili  x   /  AST  35  /  ALT  10  /  AlkPhos  83  09-15                        8.6    x     )-----------( x        ( 15 Sep 2022 19:54 )             27.3       Telemetry: Sinus rhythm

## 2022-09-17 ENCOUNTER — TRANSCRIPTION ENCOUNTER (OUTPATIENT)
Age: 42
End: 2022-09-17

## 2022-09-17 VITALS
HEART RATE: 84 BPM | OXYGEN SATURATION: 98 % | SYSTOLIC BLOOD PRESSURE: 94 MMHG | TEMPERATURE: 99 F | DIASTOLIC BLOOD PRESSURE: 61 MMHG | RESPIRATION RATE: 16 BRPM

## 2022-09-17 LAB
ALBUMIN SERPL ELPH-MCNC: 2.3 G/DL — LOW (ref 3.3–5)
ALP SERPL-CCNC: 76 U/L — SIGNIFICANT CHANGE UP (ref 30–120)
ALT FLD-CCNC: <10 U/L DA — LOW (ref 10–60)
ANION GAP SERPL CALC-SCNC: 6 MMOL/L — SIGNIFICANT CHANGE UP (ref 5–17)
AST SERPL-CCNC: 29 U/L — SIGNIFICANT CHANGE UP (ref 10–40)
BASOPHILS # BLD AUTO: 0.03 K/UL — SIGNIFICANT CHANGE UP (ref 0–0.2)
BASOPHILS NFR BLD AUTO: 0.4 % — SIGNIFICANT CHANGE UP (ref 0–2)
BILIRUB SERPL-MCNC: 0.7 MG/DL — SIGNIFICANT CHANGE UP (ref 0.2–1.2)
BUN SERPL-MCNC: 2 MG/DL — LOW (ref 7–23)
CALCIUM SERPL-MCNC: 7.1 MG/DL — LOW (ref 8.4–10.5)
CHLORIDE SERPL-SCNC: 108 MMOL/L — SIGNIFICANT CHANGE UP (ref 96–108)
CO2 SERPL-SCNC: 27 MMOL/L — SIGNIFICANT CHANGE UP (ref 22–31)
CREAT SERPL-MCNC: 0.39 MG/DL — LOW (ref 0.5–1.3)
EGFR: 127 ML/MIN/1.73M2 — SIGNIFICANT CHANGE UP
EOSINOPHIL # BLD AUTO: 0 K/UL — SIGNIFICANT CHANGE UP (ref 0–0.5)
EOSINOPHIL NFR BLD AUTO: 0 % — SIGNIFICANT CHANGE UP (ref 0–6)
GLUCOSE SERPL-MCNC: 86 MG/DL — SIGNIFICANT CHANGE UP (ref 70–99)
HCT VFR BLD CALC: 24.5 % — LOW (ref 34.5–45)
HGB BLD-MCNC: 7.5 G/DL — LOW (ref 11.5–15.5)
IMM GRANULOCYTES NFR BLD AUTO: 0.1 % — SIGNIFICANT CHANGE UP (ref 0–0.9)
LYMPHOCYTES # BLD AUTO: 0.68 K/UL — LOW (ref 1–3.3)
LYMPHOCYTES # BLD AUTO: 9.7 % — LOW (ref 13–44)
MCHC RBC-ENTMCNC: 25.9 PG — LOW (ref 27–34)
MCHC RBC-ENTMCNC: 30.6 GM/DL — LOW (ref 32–36)
MCV RBC AUTO: 84.5 FL — SIGNIFICANT CHANGE UP (ref 80–100)
MONOCYTES # BLD AUTO: 0.44 K/UL — SIGNIFICANT CHANGE UP (ref 0–0.9)
MONOCYTES NFR BLD AUTO: 6.3 % — SIGNIFICANT CHANGE UP (ref 2–14)
NEUTROPHILS # BLD AUTO: 5.87 K/UL — SIGNIFICANT CHANGE UP (ref 1.8–7.4)
NEUTROPHILS NFR BLD AUTO: 83.5 % — HIGH (ref 43–77)
NRBC # BLD: 0 /100 WBCS — SIGNIFICANT CHANGE UP (ref 0–0)
PLATELET # BLD AUTO: 207 K/UL — SIGNIFICANT CHANGE UP (ref 150–400)
POTASSIUM SERPL-MCNC: 3.3 MMOL/L — LOW (ref 3.5–5.3)
POTASSIUM SERPL-SCNC: 3.3 MMOL/L — LOW (ref 3.5–5.3)
PROT SERPL-MCNC: 4.8 G/DL — LOW (ref 6–8.3)
RBC # BLD: 2.9 M/UL — LOW (ref 3.8–5.2)
RBC # FLD: 18.4 % — HIGH (ref 10.3–14.5)
SODIUM SERPL-SCNC: 141 MMOL/L — SIGNIFICANT CHANGE UP (ref 135–145)
WBC # BLD: 7.03 K/UL — SIGNIFICANT CHANGE UP (ref 3.8–10.5)
WBC # FLD AUTO: 7.03 K/UL — SIGNIFICANT CHANGE UP (ref 3.8–10.5)

## 2022-09-17 PROCEDURE — 84100 ASSAY OF PHOSPHORUS: CPT

## 2022-09-17 PROCEDURE — 85652 RBC SED RATE AUTOMATED: CPT

## 2022-09-17 PROCEDURE — 85045 AUTOMATED RETICULOCYTE COUNT: CPT

## 2022-09-17 PROCEDURE — 82746 ASSAY OF FOLIC ACID SERUM: CPT

## 2022-09-17 PROCEDURE — 82747 ASSAY OF FOLIC ACID RBC: CPT

## 2022-09-17 PROCEDURE — 86850 RBC ANTIBODY SCREEN: CPT

## 2022-09-17 PROCEDURE — 99285 EMERGENCY DEPT VISIT HI MDM: CPT

## 2022-09-17 PROCEDURE — 86923 COMPATIBILITY TEST ELECTRIC: CPT

## 2022-09-17 PROCEDURE — 93005 ELECTROCARDIOGRAM TRACING: CPT

## 2022-09-17 PROCEDURE — 76856 US EXAM PELVIC COMPLETE: CPT

## 2022-09-17 PROCEDURE — 82607 VITAMIN B-12: CPT

## 2022-09-17 PROCEDURE — 96365 THER/PROPH/DIAG IV INF INIT: CPT

## 2022-09-17 PROCEDURE — 36415 COLL VENOUS BLD VENIPUNCTURE: CPT

## 2022-09-17 PROCEDURE — 93970 EXTREMITY STUDY: CPT

## 2022-09-17 PROCEDURE — P9059: CPT

## 2022-09-17 PROCEDURE — 85018 HEMOGLOBIN: CPT

## 2022-09-17 PROCEDURE — 83735 ASSAY OF MAGNESIUM: CPT

## 2022-09-17 PROCEDURE — 84484 ASSAY OF TROPONIN QUANT: CPT

## 2022-09-17 PROCEDURE — P9037: CPT

## 2022-09-17 PROCEDURE — 87635 SARS-COV-2 COVID-19 AMP PRB: CPT

## 2022-09-17 PROCEDURE — 86901 BLOOD TYPING SEROLOGIC RH(D): CPT

## 2022-09-17 PROCEDURE — 85027 COMPLETE CBC AUTOMATED: CPT

## 2022-09-17 PROCEDURE — 80053 COMPREHEN METABOLIC PANEL: CPT

## 2022-09-17 PROCEDURE — 86900 BLOOD TYPING SEROLOGIC ABO: CPT

## 2022-09-17 PROCEDURE — 85025 COMPLETE CBC W/AUTO DIFF WBC: CPT

## 2022-09-17 PROCEDURE — 36430 TRANSFUSION BLD/BLD COMPNT: CPT

## 2022-09-17 PROCEDURE — 85384 FIBRINOGEN ACTIVITY: CPT

## 2022-09-17 PROCEDURE — P9016: CPT

## 2022-09-17 PROCEDURE — 84702 CHORIONIC GONADOTROPIN TEST: CPT

## 2022-09-17 PROCEDURE — 85610 PROTHROMBIN TIME: CPT

## 2022-09-17 PROCEDURE — 84443 ASSAY THYROID STIM HORMONE: CPT

## 2022-09-17 PROCEDURE — 82728 ASSAY OF FERRITIN: CPT

## 2022-09-17 PROCEDURE — 83540 ASSAY OF IRON: CPT

## 2022-09-17 PROCEDURE — 83690 ASSAY OF LIPASE: CPT

## 2022-09-17 PROCEDURE — 84436 ASSAY OF TOTAL THYROXINE: CPT

## 2022-09-17 PROCEDURE — 84480 ASSAY TRIIODOTHYRONINE (T3): CPT

## 2022-09-17 PROCEDURE — 85730 THROMBOPLASTIN TIME PARTIAL: CPT

## 2022-09-17 PROCEDURE — 83550 IRON BINDING TEST: CPT

## 2022-09-17 PROCEDURE — 86140 C-REACTIVE PROTEIN: CPT

## 2022-09-17 PROCEDURE — 85014 HEMATOCRIT: CPT

## 2022-09-17 PROCEDURE — P9100: CPT

## 2022-09-17 RX ORDER — FOLIC ACID 0.8 MG
1 TABLET ORAL
Qty: 0 | Refills: 0 | DISCHARGE
Start: 2022-09-17

## 2022-09-17 RX ORDER — FOLIC ACID 0.8 MG
1 TABLET ORAL
Qty: 30 | Refills: 0
Start: 2022-09-17 | End: 2022-10-16

## 2022-09-17 RX ORDER — POTASSIUM CHLORIDE 20 MEQ
40 PACKET (EA) ORAL ONCE
Refills: 0 | Status: COMPLETED | OUTPATIENT
Start: 2022-09-17 | End: 2022-09-17

## 2022-09-17 RX ORDER — ASCORBIC ACID 60 MG
1 TABLET,CHEWABLE ORAL
Qty: 30 | Refills: 0
Start: 2022-09-17 | End: 2022-10-16

## 2022-09-17 RX ORDER — FERROUS SULFATE 325(65) MG
1 TABLET ORAL
Qty: 60 | Refills: 0
Start: 2022-09-17 | End: 2022-10-16

## 2022-09-17 RX ORDER — PREGABALIN 225 MG/1
1 CAPSULE ORAL
Qty: 30 | Refills: 0
Start: 2022-09-17 | End: 2022-10-16

## 2022-09-17 RX ADMIN — Medication 40 MILLIEQUIVALENT(S): at 11:43

## 2022-09-17 RX ADMIN — Medication 50 MILLIGRAM(S): at 05:02

## 2022-09-17 RX ADMIN — IRON SUCROSE 100 MILLIGRAM(S): 20 INJECTION, SOLUTION INTRAVENOUS at 11:42

## 2022-09-17 RX ADMIN — GABAPENTIN 300 MILLIGRAM(S): 400 CAPSULE ORAL at 05:02

## 2022-09-17 RX ADMIN — PREGABALIN 1000 MICROGRAM(S): 225 CAPSULE ORAL at 11:42

## 2022-09-17 RX ADMIN — MEDROXYPROGESTERONE ACETATE 10 MILLIGRAM(S): 150 INJECTION, SUSPENSION, EXTENDED RELEASE INTRAMUSCULAR at 11:44

## 2022-09-17 RX ADMIN — Medication 1 MILLIGRAM(S): at 11:42

## 2022-09-17 NOTE — PROGRESS NOTE ADULT - REASON FOR ADMISSION
bleeding per vagina, syncope
Anemia  vaginal bleeding
bleeding per vagina, syncope

## 2022-09-17 NOTE — PROGRESS NOTE ADULT - ASSESSMENT
42 yo woman with depression/anxiety, folate def, iron def  anemia due to heavy menstrual bleeding in setting of anticoagulation with Xarelto for A-fib (due to hyperthyroidism);   ·	Hemoglobin: 4.5 g/dL (09-14 @ 03:37)  Hx fibroids Heavy menses due to fibroids and AC  ·	has not seen Gyn in ~ 10 yr  -seen by GYN, started on Provera      -post IV Venofer w good tolerance  -Hgb 7.5, adequate and clinically asx  -from Heme standpoint can be discharged home w outpatient f/u  -pt is intructed to take po iron upon discharge(already has supply)    will sign off, please call if any questions

## 2022-09-17 NOTE — DISCHARGE NOTE NURSING/CASE MANAGEMENT/SOCIAL WORK - PATIENT PORTAL LINK FT
You can access the FollowMyHealth Patient Portal offered by [facility] by registering at the following website: [email or web address]. By joining [facility] [employer] [facility]’s FollowMyHealth portal, you will also be able to view your health information using other applications (apps) compatible with our system.

## 2022-09-17 NOTE — PROGRESS NOTE ADULT - SUBJECTIVE AND OBJECTIVE BOX
Patient is a 41y old  Female who presents with a chief complaint of bleeding per vagina, syncope (17 Sep 2022 08:09)      INTERVAL HPI/OVERNIGHT EVENTS:overnight events noted    Home Medications:  Albuterol (Eqv-Proventil HFA) 90 mcg/inh inhalation aerosol: 2 puff(s) inhaled every 6 hours, As Needed - for shortness of breath and/or wheezing (14 Sep 2022 05:14)  diazePAM 2 mg oral tablet: 1 tab(s) orally once a day, As Needed (14 Sep 2022 05:14)  methIMAzole 10 mg oral tablet: orally once a day (14 Sep 2022 05:14)  SEROquel:  (14 Sep 2022 05:14)  Zoloft 100 mg oral tablet: 1 tab(s) orally once a day (at bedtime) (14 Sep 2022 05:14)      MEDICATIONS  (STANDING):  chlorhexidine 4% Liquid 1 Application(s) Topical <User Schedule>  cyanocobalamin 1000 MICROGram(s) Oral daily  folic acid 1 milliGRAM(s) Oral daily  gabapentin 300 milliGRAM(s) Oral two times a day  influenza   Vaccine 0.5 milliLiter(s) IntraMuscular once  iron sucrose Injectable 100 milliGRAM(s) IV Push every 24 hours  medroxyPROGESTERone 10 milliGRAM(s) Oral daily  methimazole 10 milliGRAM(s) Oral daily  potassium chloride    Tablet ER 40 milliEquivalent(s) Oral once  QUEtiapine 25 milliGRAM(s) Oral daily  sertraline 100 milliGRAM(s) Oral daily  traZODone 50 milliGRAM(s) Oral two times a day    MEDICATIONS  (PRN):  ALBUTerol    90 MICROgram(s) HFA Inhaler 2 Puff(s) Inhalation every 6 hours PRN for shortness of breath and/or wheezing  diazepam    Tablet 2 milliGRAM(s) Oral daily PRN anxiety      Allergies    Motrin (Hives)    Intolerances        REVIEW OF SYSTEMS:  CONSTITUTIONAL: No fever, weight loss, or fatigue  EYES: No eye pain, visual disturbances, or discharge  ENMT:  No difficulty hearing, tinnitus, vertigo; No sinus or throat pain  NECK: No pain or stiffness  BREASTS: No pain, masses, or nipple discharge  RESPIRATORY: No cough, wheezing, chills or hemoptysis; No shortness of breath  CARDIOVASCULAR: No chest pain, palpitations, dizziness, or leg swelling  GASTROINTESTINAL: No abdominal or epigastric pain. No nausea, vomiting, or hematemesis; No diarrhea or constipation. No melena or hematochezia.  GENITOURINARY: No dysuria, frequency, hematuria, or incontinence  NEUROLOGICAL: No headaches, memory loss, loss of strength, numbness, or tremors  SKIN: No itching, burning, rashes, or lesions   LYMPH NODES: No enlarged glands  ENDOCRINE: No heat or cold intolerance; No hair loss  MUSCULOSKELETAL: No joint pain or swelling; No muscle, back, or extremity pain  PSYCHIATRIC: No depression, anxiety, mood swings, or difficulty sleeping  HEME/LYMPH: No easy bruising, or bleeding gums  ALLERGY AND IMMUNOLOGIC: No hives or eczema    Vital Signs Last 24 Hrs  T(C): 37.3 (17 Sep 2022 08:02), Max: 37.3 (17 Sep 2022 08:02)  T(F): 99.1 (17 Sep 2022 08:02), Max: 99.1 (17 Sep 2022 08:02)  HR: 84 (17 Sep 2022 08:02) (79 - 88)  BP: 94/61 (17 Sep 2022 08:02) (94/61 - 102/68)  BP(mean): --  RR: 16 (17 Sep 2022 08:02) (16 - 18)  SpO2: 98% (17 Sep 2022 08:02) (96% - 98%)    Parameters below as of 17 Sep 2022 08:02  Patient On (Oxygen Delivery Method): room air        PHYSICAL EXAM:  GENERAL: NAD, well-groomed, well-developed  HEAD:  Atraumatic, Normocephalic  EYES: EOMI, PERRLA, conjunctiva and sclera clear  ENMT: Moist mucous membranes,   NECK: Supple, No JVD, Normal thyroid  NERVOUS SYSTEM:  Alert & Oriented X3, Good concentration; Motor Strength 5/5 B/L upper and lower extremities; DTRs 2+ intact and symmetric  CHEST/LUNG: Clear to percussion bilaterally; No rales, rhonchi, wheezing, or rubs  HEART: Regular rate and rhythm; No murmurs, rubs, or gallops  ABDOMEN: Soft, Nontender, Nondistended; Bowel sounds present  EXTREMITIES:  2+ Peripheral Pulses, No clubbing, cyanosis, or edema  LYMPH: No lymphadenopathy noted  SKIN: No rashes or lesions    LABS:                        7.5    7.03  )-----------( 207      ( 17 Sep 2022 06:57 )             24.5     09-17    141  |  108  |  2<L>  ----------------------------<  86  3.3<L>   |  27  |  0.39<L>    Ca    7.1<L>      17 Sep 2022 06:57    TPro  4.8<L>  /  Alb  2.3<L>  /  TBili  0.7  /  DBili  x   /  AST  29  /  ALT  <10<L>  /  AlkPhos  76  09-17    PT/INR - ( 15 Sep 2022 12:39 )   PT: 13.2 sec;   INR: 1.15 ratio         PTT - ( 15 Sep 2022 12:39 )  PTT:29.6 sec    CAPILLARY BLOOD GLUCOSE              I&O's Summary      RADIOLOGY & ADDITIONAL TESTS:    Imaging Personally Reviewed:  [ x] YES  [ ] NO    Consultant(s) Notes Reviewed:  [ x] YES  [ ] NO    Care Discussed with Consultants/Other Providers [x ] YES  [ ] NO

## 2022-09-17 NOTE — PROGRESS NOTE ADULT - TIME BILLING
I have discussed care plan with patient and HCP,expressed understanding of problems treatment and their effect and side effects, alternatives in detail,I have asked if they have any questions and concerns and appropriately addressed them to best of my ability  Reviewed all diagonostic tests, lab results and drug drug interactions, and medications

## 2022-09-17 NOTE — PROGRESS NOTE ADULT - SUBJECTIVE AND OBJECTIVE BOX
All interim records and events noted.    only intermittent vaginal bleed  feeling well      MEDICATIONS  (STANDING):  chlorhexidine 4% Liquid 1 Application(s) Topical <User Schedule>  cyanocobalamin 1000 MICROGram(s) Oral daily  folic acid 1 milliGRAM(s) Oral daily  gabapentin 300 milliGRAM(s) Oral two times a day  influenza   Vaccine 0.5 milliLiter(s) IntraMuscular once  iron sucrose Injectable 100 milliGRAM(s) IV Push every 24 hours  medroxyPROGESTERone 10 milliGRAM(s) Oral daily  methimazole 10 milliGRAM(s) Oral daily  QUEtiapine 25 milliGRAM(s) Oral daily  sertraline 100 milliGRAM(s) Oral daily  traZODone 50 milliGRAM(s) Oral two times a day    MEDICATIONS  (PRN):  ALBUTerol    90 MICROgram(s) HFA Inhaler 2 Puff(s) Inhalation every 6 hours PRN for shortness of breath and/or wheezing  diazepam    Tablet 2 milliGRAM(s) Oral daily PRN anxiety      Vital Signs Last 24 Hrs  T(C): 37.3 (17 Sep 2022 08:02), Max: 37.3 (17 Sep 2022 08:02)  T(F): 99.1 (17 Sep 2022 08:02), Max: 99.1 (17 Sep 2022 08:02)  HR: 84 (17 Sep 2022 08:02) (79 - 89)  BP: 94/61 (17 Sep 2022 08:02) (94/61 - 102/68)  BP(mean): --  RR: 16 (17 Sep 2022 08:02) (16 - 18)  SpO2: 98% (17 Sep 2022 08:02) (95% - 98%)    Parameters below as of 17 Sep 2022 08:02  Patient On (Oxygen Delivery Method): room air        PHYSICAL EXAM  General: well developed  well nourished, in no acute distress  Head: atraumatic, normocephalic  ENT: buccal mucosa moist  Neck: supple, trachea midline  CV: S1 S2, regular rate and rhythm  Lungs: clear to auscultation, no wheezes/rhonchi  Abdomen: soft, nontender, bowel sounds present, no palpable masses  Extrem: no clubbing/cyanosis/edema  Skin: no significant increased ecchymosis/petechiae  Neuro: alert and oriented X3,  no focal deficits      LABS:             7.5    7.03  )-----------( 207      ( 09-17 @ 06:57 )             24.5                7.7    7.03  )-----------( 209      ( 09-16 @ 12:17 )             24.8                8.6    x     )-----------( x        ( 09-15 @ 19:54 )             27.3                7.3    x     )-----------( x        ( 09-15 @ 12:39 )             23.8                8.2    6.21  )-----------( 195      ( 09-15 @ 06:12 )             27.3                7.3    5.54  )-----------( 204      ( 09-15 @ 06:00 )             23.9                8.3    6.38  )-----------( 214      ( 09-15 @ 00:00 )             26.3                7.0    6.37  )-----------( 178      ( 09-14 @ 17:27 )             22.2                5.7    7.28  )-----------( 203      ( 09-14 @ 12:10 )             19.1       09-17    141  |  108  |  2<L>  ----------------------------<  86  3.3<L>   |  27  |  0.39<L>    Ca    7.1<L>      17 Sep 2022 06:57    TPro  4.8<L>  /  Alb  2.3<L>  /  TBili  0.7  /  DBili  x   /  AST  29  /  ALT  x   /  AlkPhos  76  09-17    09-15 @ 12:39  PT13.2 INR1.15  PTT29.6  09-14 @ 03:38  PT13.6 INR1.15  PTT30.7      RADIOLOGY & ADDITIONAL STUDIES:    IMPRESSION/RECOMMENDATIONS:   140822:4-6 Days|| ||00\01||False;

## 2022-09-17 NOTE — PROGRESS NOTE ADULT - PROBLEM SELECTOR PLAN 1
prbc 2 units in ed, w up ordered, gyn consult, hematology consult noted received more PRBC and became hypotensive and watched in spcu for a day
prbc 2 units in ed, w up ordered, gyn consult, hematology consult noted
prbc 2 units in ed, w up ordered, gyn consult, hematology consult noted received more PRBC and became hypotensive and watched in spcu for a day.improved

## 2022-09-17 NOTE — DISCHARGE NOTE PROVIDER - NSDCMRMEDTOKEN_GEN_ALL_CORE_FT
Albuterol (Eqv-Proventil HFA) 90 mcg/inh inhalation aerosol: 2 puff(s) inhaled every 6 hours, As Needed - for shortness of breath and/or wheezing  cyanocobalamin 1000 mcg oral tablet: 1 tab(s) orally once a day  diazePAM 2 mg oral tablet: 1 tab(s) orally once a day, As Needed  ferrous sulfate 325 mg (65 mg elemental iron) oral tablet: 1 tab(s) orally 2 times a day   folic acid 1 mg oral tablet: 1 tab(s) orally once a day  folic acid 1 mg oral tablet: 1 tab(s) orally once a day  gabapentin 300 mg oral capsule: 1 cap(s) orally 2 times a day   methIMAzole 10 mg oral tablet: orally once a day  Provera 10 mg oral tablet: 1 tab(s) orally once a day x 14 days every 4 weeks.  SEROquel:   traZODone 50 mg oral tablet: 1 tab(s) orally 2 times a day  Vitamin C 500 mg oral tablet: 1 tab(s) orally once a day   Zoloft 100 mg oral tablet: 1 tab(s) orally once a day (at bedtime)

## 2022-09-17 NOTE — PROGRESS NOTE ADULT - PROVIDER SPECIALTY LIST ADULT
Cardiology
GYN
Heme/Onc
Cardiology
Heme/Onc
Hospitalist
Pulmonology
Heme/Onc
Heme/Onc
Pulmonology
Internal Medicine

## 2022-09-17 NOTE — DISCHARGE NOTE PROVIDER - HOSPITAL COURSE
41-year-old female history of anemia A. fib not on anticoagulation, depression, yperthyroidism, smoker, h/o transfusion in past, cesarians came in complaining about palpitations episode of syncope yesterday afternoon brought in by EMS because she was taking a shower and  felt faint.  Was near syncopal.  Denies any chest pain shortness of breath.  Admits to having heavy periods for the past 20 days.  Does not have an OB/GYN.  IN ED has tachycardia, reg,bp normotensive, HB 4.5, K 2.6, advised 2 units of rbc, K repletion and Obgyn contact, who will see in am, pt admitted for  Anemia ac of blood loss with chronic  menometorrhagia- gyn consult  anxiety depression  hyperthyroidism  paroxysmal afib, in nsr and not on AC for >3 yrs  < from: US Pelvis Complete (US Pelvis Complete .) (09.14.22 @ 08:57) >  Heterogeneous myomatous uterus.  < from: US Duplex Venous Lower Ext Complete, Bilateral (09.14.22 @ 08:09) >  No evidence of deep venous thrombosis in either lower extremity.  Small right popliteal fossa cyst measuring 3.0 cm, likely a Baker's cyst.  < from: CT Angio Chest PE Protocol w/ IV Cont (07.25.22 @ 18:20) >  8mm left breast nodule. Correlation with mammography is recommended if   not recently performed.    had clots overnight as per discussion and consult with GYN , can repeat TXA, and hypotension, off beta blockers IV fluids.  not on AC  started on venofer 9/15 for 3 days  hypokalemia repleted - monitor  bleeding improved   stable at discharge , heme f up noted  out pt f up with heme and GYN 41-year-old female history of anemia A. fib not on anticoagulation, depression, yperthyroidism, smoker, h/o transfusion in past, cesarians came in complaining about palpitations episode of syncope yesterday afternoon brought in by EMS because she was taking a shower and  felt faint.  Was near syncopal.  Denies any chest pain shortness of breath.  Admits to having heavy periods for the past 20 days.  Does not have an OB/GYN.  IN ED has tachycardia, reg,bp normotensive, HB 4.5, K 2.6, advised 2 units of rbc, K repletion and Obgyn contact, who will see in am, pt admitted for  Anemia ac of blood loss with chronic  menometorrhagia- with DUB with adenomyosis with fibroid uterus  anxiety depression  hyperthyroidism  hypotension due to blood loss- antihypertensives stopped and hydrated  paroxysmal afib, in nsr and not on AC for >3 yrs  < from: US Pelvis Complete (US Pelvis Complete .) (09.14.22 @ 08:57) >  Heterogeneous myomatous uterus.  < from: US Duplex Venous Lower Ext Complete, Bilateral (09.14.22 @ 08:09) >  No evidence of deep venous thrombosis in either lower extremity.  Small right popliteal fossa cyst measuring 3.0 cm, likely a Baker's cyst.  < from: CT Angio Chest PE Protocol w/ IV Cont (07.25.22 @ 18:20) >  8mm left breast nodule. Correlation with mammography is recommended if   not recently performed.  out pt f up for breast nodule    had clots overnight as per discussion and consult with GYN , can repeat TXA, and hypotension, off beta blockers IV fluids.  not on AC  started on venofer 9/15 for 3 days  hypokalemia repleted - monitor  bleeding improved   stable at discharge , heme f up noted  out pt f up with heme and GYN

## 2022-09-17 NOTE — PROGRESS NOTE ADULT - ASSESSMENT
41-year-old female history of anemia A. fib not on anticoagulation, depression, yperthyroidism, smoker, h/o transfusion in past, cesarians came in complaining about palpitations episode of syncope yesterday afternoon brought in by EMS because she was taking a shower and  felt faint.  Was near syncopal.  Denies any chest pain shortness of breath.  Admits to having heavy periods for the past 20 days.  Does not have an OB/GYN.  IN ED has tachycardia, reg,bp normotensive, HB 4.5, K 2.6, advised 2 units of rbc, K repletion and Obgyn contact, who will see in am, pt admitted for  Anemia ac of blood loss with chronic  menometorrhagia- gyn consult  anxiety depression  hyperthyroidism  paroxysmal afib, in nsr and not on AC for >3 yrs  < from: US Pelvis Complete (US Pelvis Complete .) (09.14.22 @ 08:57) >  Heterogeneous myomatous uterus.  < from: US Duplex Venous Lower Ext Complete, Bilateral (09.14.22 @ 08:09) >  No evidence of deep venous thrombosis in either lower extremity.  Small right popliteal fossa cyst measuring 3.0 cm, likely a Baker's cyst.  < from: CT Angio Chest PE Protocol w/ IV Cont (07.25.22 @ 18:20) >  8mm left breast nodule. Correlation with mammography is recommended if   not recently performed.    had clots overnight as per discussion and consult with GYN , can repeat TXA, and hypotension, off beta blockers IV fluids.  not on AC  started on venofer 9/15 for 3 days  hypokalemia repleted - monitor  bleeding improved   stable for discharge , heme f up noted  out pt f up with heme and GYN

## 2022-09-17 NOTE — PROGRESS NOTE ADULT - SUBJECTIVE AND OBJECTIVE BOX
Date/Time Patient Seen:  		  Referring MD:   Data Reviewed	       Patient is a 41y old  Female who presents with a chief complaint of bleeding per vagina, syncope (16 Sep 2022 12:42)      Subjective/HPI     PAST MEDICAL & SURGICAL HISTORY:  Atrial fibrillation    History of Hyperthyroidism    Asthma    History of anemia    Depression, unspecified depression type    H/O blood transfusion reaction    Smoker    S/P  Section  ,,    History of blood transfusion          Medication list         MEDICATIONS  (STANDING):  chlorhexidine 4% Liquid 1 Application(s) Topical <User Schedule>  cyanocobalamin 1000 MICROGram(s) Oral daily  folic acid 1 milliGRAM(s) Oral daily  gabapentin 300 milliGRAM(s) Oral two times a day  influenza   Vaccine 0.5 milliLiter(s) IntraMuscular once  iron sucrose Injectable 100 milliGRAM(s) IV Push every 24 hours  medroxyPROGESTERone 10 milliGRAM(s) Oral daily  methimazole 10 milliGRAM(s) Oral daily  QUEtiapine 25 milliGRAM(s) Oral daily  sertraline 100 milliGRAM(s) Oral daily  traZODone 50 milliGRAM(s) Oral two times a day    MEDICATIONS  (PRN):  ALBUTerol    90 MICROgram(s) HFA Inhaler 2 Puff(s) Inhalation every 6 hours PRN for shortness of breath and/or wheezing  diazepam    Tablet 2 milliGRAM(s) Oral daily PRN anxiety         Vitals log        ICU Vital Signs Last 24 Hrs  T(C): 37.2 (16 Sep 2022 23:30), Max: 37.2 (16 Sep 2022 23:30)  T(F): 99 (16 Sep 2022 23:30), Max: 99 (16 Sep 2022 23:30)  HR: 79 (16 Sep 2022 23:30) (79 - 89)  BP: 98/66 (16 Sep 2022 23:30) (95/62 - 102/68)  BP(mean): --  ABP: --  ABP(mean): --  RR: 17 (16 Sep 2022 23:30) (17 - 18)  SpO2: 98% (16 Sep 2022 23:30) (95% - 98%)    O2 Parameters below as of 16 Sep 2022 23:30  Patient On (Oxygen Delivery Method): room air                 Input and Output:  I&O's Detail    15 Sep 2022 07:01  -  16 Sep 2022 07:00  --------------------------------------------------------  IN:    Lactated Ringers Bolus: 1000 mL    Oral Fluid: 720 mL    PRBCs (Packed Red Blood Cells): 350 mL  Total IN: 2070 mL    OUT:  Total OUT: 0 mL    Total NET: 0 mL          Lab Data                        7.7    7.03  )-----------( 209      ( 16 Sep 2022 12:17 )             24.8     09-16    142  |  108  |  3<L>  ----------------------------<  84  3.7   |  30  |  0.34<L>    Ca    6.9<L>      16 Sep 2022 12:16    TPro  4.5<L>  /  Alb  2.3<L>  /  TBili  0.8  /  DBili  x   /  AST  29  /  ALT  14  /  AlkPhos  80  09-16            Review of Systems	      Objective     Physical Examination    heart s1s2  lung dec BS  abd soft      Pertinent Lab findings & Imaging      Myrna:  NO   Adequate UO     I&O's Detail    15 Sep 2022 07:01  -  16 Sep 2022 07:00  --------------------------------------------------------  IN:    Lactated Ringers Bolus: 1000 mL    Oral Fluid: 720 mL    PRBCs (Packed Red Blood Cells): 350 mL  Total IN: 2070 mL    OUT:  Total OUT: 0 mL    Total NET: 0 mL               Discussed with:     Cultures:	        Radiology

## 2022-09-17 NOTE — DISCHARGE NOTE PROVIDER - NSDCCPCAREPLAN_GEN_ALL_CORE_FT
PRINCIPAL DISCHARGE DIAGNOSIS  Diagnosis: Symptomatic anemia  Assessment and Plan of Treatment: feso4 325 mg BID, f up with heme and gyn      SECONDARY DISCHARGE DIAGNOSES  Diagnosis: Vaginal bleeding  Assessment and Plan of Treatment: f up with gyn

## 2022-09-17 NOTE — DISCHARGE NOTE NURSING/CASE MANAGEMENT/SOCIAL WORK - NSDCPEFALRISK_GEN_ALL_CORE
For information on Fall & Injury Prevention, visit: [email or web address] OR  [email or web address] OR  https://www.cdc.gov/steadi/patient.html

## 2022-10-16 NOTE — ED ADULT NURSE NOTE - SUICIDE SCREENING QUESTION 3
PT BIBA for ETOH intox. family called b/c pt was vomiting. Responsive to painful stimuli.  Changed into yellow gown belongings secured
No

## 2022-10-25 NOTE — PROGRESS NOTE ADULT - PROBLEM SELECTOR PROBLEM 5
· 89 on admission  · Received IVF overnight  · Admits to poor oral intake last couple of days due to feeling unwell    Currently tolerating diet  · Resolved - sodium 136 Asthma

## 2022-12-18 NOTE — ED ADULT NURSE NOTE - QUALITY
pt c/o intermittent chest pain and difficulty breathing x2 days. Denies associated symptoms hx HTN
cramping

## 2023-01-17 NOTE — ED PROVIDER NOTE - CPE EDP CARDIAC NORM
Patient's first and last name, , procedure, and correct site confirmed prior to the start of procedure.
normal...

## 2023-01-19 NOTE — ED ADULT NURSE NOTE - RESPIRATORY WDL
"2023       Tej Ledezma DO  [address] [facility] [address]  [address]      Patient: Jeannie Blue   YOB: 1959   Date of Visit: 2023       Dear Dr. Mona Ibrahim:    Thank you for referring Jeannie Blue to me for evaluation. Below are my notes for this visit with him. If you have questions, please do not hesitate to call me. I look forward to following your patient along with you. Sincerely,        Nessa Muhammad MD        CC: No Recipients  Nessa Muhammad MD  2023  8:55 AM  Signed  [address], [facility] [place] [facility] [address] [facility]  P [phone]  F [phone]    PATIENT:  Jeannie Blue   : 1959  Referring physician:   Tej Ledezma DO    CARDIOLOGY FOLLOW-UP NOTE    [address] [place] [address]    CHIEF COMPLAINT  No chief complaint on file. HISTORY OF PRESENT ILLNESS  Mr. Germania Mendieta is a 70year old male, who presents for follow-up. HeÂ has aÂ history of coronary artery diseaseÂ (multivessel stenting to RCA, LCx, and LAD on lifelong Plavix), hypertension, hyperlipidemia, chronic renal insufficiency. Â He was hospitalized at [facility] with ""diabetic issues\"" in . Records unavailable. Stated that his diabetic medications were adjusted. He has done well since then. When last seen on 22, he was doing well. He remained active and denied chest pain/shortness of breath with exertion. He was compliant with all medications. He was motivated to lose weight. He has been active and has no complaints today. He is compliant with all medications. MEDICATIONS:  Current Outpatient Medications   Medication Sig Dispense Refill   â¢ simvastatin (ZOCOR) 80 MG tablet TAKE ONE TABLET BY MOUTH AT BEDTIME 30 tablet 0   â¢ dulaglutide (TRULICITY) 1.5 LK/9.8WL pen-injector Inject 1.5 mg into the skin every 7 days. 6 mL 0   â¢ empagliflozin (Jardiance) 10 MG tablet Take 1 tablet by mouth daily (before breakfast).  90 " tablet 0   â¢ metformin (GLUCOPHAGE) 1000 MG tablet Take 1 tablet by mouth in the morning and 1 tablet in the evening. Take with meals. 180 tablet 0   â¢ omeprazole (PriLOSEC) 40 MG capsule TAKE ONE CAPSULE BY MOUTH ONE TIME DAILY 90 capsule 0   â¢ lisinopril-hydroCHLOROthiazide (ZESTORETIC) 20-12.5 MG per tablet TAKE ONE TABLET BY MOUTH ONE TIME DAILY 30 tablet 0   â¢ ALPRAZolam (XANAX) 0.25 MG tablet Take 1 tablet by mouth daily as needed for Sleep. 30 tablet 0   â¢ bisoprolol (ZEBETA) 10 MG tablet TAKE ONE TABLET BY MOUTH ONE TIME DAILY 90 tablet 1   â¢ clopidogrel (PLAVIX) 75 MG tablet TAKE ONE TABLET BY MOUTH ONE TIME DAILY 90 tablet 3   â¢ Insulin Pen Needle (PEN NEEDLES) 32G X 4 MM Misc 1 each 2 times daily. 100 each 3   â¢ aspirin 81 MG tablet Take 1 tablet by mouth daily. â¢ Cholecalciferol (VITAMIN D-3) 1000 units Cap 2000 iu daily       No current facility-administered medications for this visit. ALLERGIES:  ALLERGIES:   Allergen Reactions   â¢ Citalopram Hydrobromide Other (See Comments)     stomachÂ upset   â¢ Duloxetine Hcl ARTHRALGIA   â¢ Lexapro Other (See Comments)     Unknown   â¢ Paroxetine Other (See Comments)     Anxiety worsened   â¢ Zoloft Other (See Comments)     Unknown        Past Medical History:  Past Medical History:   Diagnosis Date   â¢ CAD (coronary artery disease)     with multivessel disease and stenting, stay on asa/plavix - 2008 multivessel stent RCA/CX, 2010 LAD).    â¢ Diabetes (CMS/HCC)    â¢ Hyperlipidemia    â¢ Hypertension    â¢ Panic attacks        Past Surgical history:  Past Surgical History:   Procedure Laterality Date   â¢ Colonoscopy     â¢ Ptca      Stent   â¢ Ptca      Stent       Family History:  Family History   Problem Relation Age of Onset   â¢ Atrial Fibrilliation Mother    â¢ Pacemaker Mother    â¢ Heart disease Father        Social History:  Social History     Tobacco Use   â¢ Smoking status: Former     Packs/day: 0.25     Years: 20.00     Pack years: 5.00     Types: Cigarettes Quit date:      Years since quittin.0   â¢ Smokeless tobacco: Never   Vaping Use   â¢ Vaping Use: never used   Substance Use Topics   â¢ Alcohol use: No   â¢ Drug use: No         REVIEW OF SYSTEMS    Constitutional: Negative for fever. HENT: Negative for ear discharge and mouth sores. Eyes: Negative for discharge. Respiratory: Negative for apnea. Gastrointestinal: Negative for nausea. Endocrine: Negative for polyphagia. Genitourinary: Negative for urgency. Musculoskeletal: Negative for neck stiffness. Skin: Negative for pallor. Allergic/Immunologic: Negative for immunocompromised state. Neurological: Negative for seizures. Hematological: Negative for adenopathy. Psychiatric/Behavioral: Negative for behavioral problems. There were no vitals taken for this visit. PHYSICAL EXAM  Constitutional: Appears well-developed. HENT: MMM, OP clear  Head: Normocephalic. Mouth/Throat: Oropharynx is clear and moist.   Eyes: EOM are normal. No scleral icterus. Neck: No JVD present. No thyromegaly present. Cardiovascular: Normal rate and regular rhythm. Pulmonary/Chest: Effort normal.  has no wheezes. Abdominal: Soft. Bowel sounds are normal. There is no tenderness. Musculoskeletal: exhibits no effusion. Lymphadenopathy:  No occipital adenopathy present. Neurological: alert. Skin: Skin is warm. Psychiatric:  speech is normal.     CARDIAC STUDIES  Lipids (2019): LDL 45, HDL 32, triglycerides 305Â   Lipids (22): ; ; HDL 33; LDL 51  Lipids (1/10/23): ;  (H); HDL 37; LDL 70  Â   ASSESSMENT/PLAN  Coronary artery disease involving native coronary artery of native heart without angina pectoris  LDL very low on simvastatin so won't switch to high-intensity. Â   Essential hypertension  BP is not at goal but he didn't take medications today. Â   Other hyperlipidemia  LDL very low on simvastatin so won't switch to high-intensity.  LDL 70 from 1/10/23. Â   Type 2 diabetes mellitus with complication, without long-term current use of insulin (CMS/[facility])  Management as per primary. A1c 7.1% from 1/10/23. Fran Cunha MD  Interventional Cardiology  [facility]    A letter has been sent to the referring physician. Breathing spontaneous and unlabored. Breath sounds clear and equal bilaterally with regular rhythm.

## 2023-01-27 NOTE — H&P ADULT - NSHPPOAPRESSUREULCER_GEN_ALL_CORE
normal... no Well appearing, awake, alert, oriented to person, place, time/situation and in no apparent distress.

## 2023-02-03 NOTE — ED ADULT NURSE REASSESSMENT NOTE - NS ED NURSE REASSESS COMMENT FT1
"Chief Complaint   Patient presents with     Consult     Hospital/pneumonia follow up.     Vitals:    02/03/23 0801   BP: 98/68   BP Location: Left arm   Patient Position: Chair   Pulse: 92   Resp: 24   Temp: 97.1  F (36.2  C)   TempSrc: Tympanic   Weight: 76 lb 15.1 oz (34.9 kg)   Height: 4' 5.94\" (137 cm)           Sherry Jo M.A.    February 3, 2023  " FFP unit number [id number] in progress at this time. no transfusion reaction seen presently

## 2023-03-30 ENCOUNTER — INPATIENT (INPATIENT)
Facility: HOSPITAL | Age: 43
LOS: 0 days | Discharge: LEFT BEFORE DISCHARGE | DRG: 641 | End: 2023-03-31
Attending: STUDENT IN AN ORGANIZED HEALTH CARE EDUCATION/TRAINING PROGRAM | Admitting: STUDENT IN AN ORGANIZED HEALTH CARE EDUCATION/TRAINING PROGRAM
Payer: MEDICAID

## 2023-03-30 VITALS
OXYGEN SATURATION: 98 % | HEART RATE: 98 BPM | DIASTOLIC BLOOD PRESSURE: 80 MMHG | TEMPERATURE: 98 F | WEIGHT: 240.08 LBS | SYSTOLIC BLOOD PRESSURE: 150 MMHG | RESPIRATION RATE: 18 BRPM | HEIGHT: 65 IN

## 2023-03-30 DIAGNOSIS — E83.42 HYPOMAGNESEMIA: ICD-10-CM

## 2023-03-30 DIAGNOSIS — Z92.89 PERSONAL HISTORY OF OTHER MEDICAL TREATMENT: Chronic | ICD-10-CM

## 2023-03-30 LAB
ALBUMIN SERPL ELPH-MCNC: 3 G/DL — LOW (ref 3.3–5)
ALP SERPL-CCNC: 114 U/L — SIGNIFICANT CHANGE UP (ref 30–120)
ALT FLD-CCNC: 19 U/L DA — SIGNIFICANT CHANGE UP (ref 10–60)
ANION GAP SERPL CALC-SCNC: 12 MMOL/L — SIGNIFICANT CHANGE UP (ref 5–17)
APTT BLD: 35.8 SEC — HIGH (ref 27.5–35.5)
AST SERPL-CCNC: 90 U/L — HIGH (ref 10–40)
BASOPHILS # BLD AUTO: 0.03 K/UL — SIGNIFICANT CHANGE UP (ref 0–0.2)
BASOPHILS NFR BLD AUTO: 0.8 % — SIGNIFICANT CHANGE UP (ref 0–2)
BILIRUB SERPL-MCNC: 1.5 MG/DL — HIGH (ref 0.2–1.2)
BLD GP AB SCN SERPL QL: SIGNIFICANT CHANGE UP
BUN SERPL-MCNC: 6 MG/DL — LOW (ref 7–23)
CALCIUM SERPL-MCNC: 6.5 MG/DL — CRITICAL LOW (ref 8.4–10.5)
CHLORIDE SERPL-SCNC: 100 MMOL/L — SIGNIFICANT CHANGE UP (ref 96–108)
CO2 SERPL-SCNC: 29 MMOL/L — SIGNIFICANT CHANGE UP (ref 22–31)
CREAT SERPL-MCNC: 0.3 MG/DL — LOW (ref 0.5–1.3)
EGFR: 134 ML/MIN/1.73M2 — SIGNIFICANT CHANGE UP
EOSINOPHIL # BLD AUTO: 0 K/UL — SIGNIFICANT CHANGE UP (ref 0–0.5)
EOSINOPHIL NFR BLD AUTO: 0 % — SIGNIFICANT CHANGE UP (ref 0–6)
GLUCOSE SERPL-MCNC: 86 MG/DL — SIGNIFICANT CHANGE UP (ref 70–99)
HCG SERPL-ACNC: <1 MIU/ML — SIGNIFICANT CHANGE UP
HCT VFR BLD CALC: 28 % — LOW (ref 34.5–45)
HGB BLD-MCNC: 8.3 G/DL — LOW (ref 11.5–15.5)
IMM GRANULOCYTES NFR BLD AUTO: 0 % — SIGNIFICANT CHANGE UP (ref 0–0.9)
INR BLD: 1.27 RATIO — HIGH (ref 0.88–1.16)
LYMPHOCYTES # BLD AUTO: 0.56 K/UL — LOW (ref 1–3.3)
LYMPHOCYTES # BLD AUTO: 14.4 % — SIGNIFICANT CHANGE UP (ref 13–44)
MAGNESIUM SERPL-MCNC: 0.6 MG/DL — CRITICAL LOW (ref 1.6–2.6)
MCHC RBC-ENTMCNC: 22.6 PG — LOW (ref 27–34)
MCHC RBC-ENTMCNC: 29.6 GM/DL — LOW (ref 32–36)
MCV RBC AUTO: 76.3 FL — LOW (ref 80–100)
MONOCYTES # BLD AUTO: 0.32 K/UL — SIGNIFICANT CHANGE UP (ref 0–0.9)
MONOCYTES NFR BLD AUTO: 8.2 % — SIGNIFICANT CHANGE UP (ref 2–14)
NEUTROPHILS # BLD AUTO: 2.97 K/UL — SIGNIFICANT CHANGE UP (ref 1.8–7.4)
NEUTROPHILS NFR BLD AUTO: 76.6 % — SIGNIFICANT CHANGE UP (ref 43–77)
NRBC # BLD: 0 /100 WBCS — SIGNIFICANT CHANGE UP (ref 0–0)
PLATELET # BLD AUTO: 205 K/UL — SIGNIFICANT CHANGE UP (ref 150–400)
POTASSIUM SERPL-MCNC: 3.4 MMOL/L — LOW (ref 3.5–5.3)
POTASSIUM SERPL-SCNC: 3.4 MMOL/L — LOW (ref 3.5–5.3)
PROT SERPL-MCNC: 6.1 G/DL — SIGNIFICANT CHANGE UP (ref 6–8.3)
PROTHROM AB SERPL-ACNC: 15 SEC — HIGH (ref 10.5–13.4)
RBC # BLD: 3.67 M/UL — LOW (ref 3.8–5.2)
RBC # FLD: 20 % — HIGH (ref 10.3–14.5)
SARS-COV-2 RNA SPEC QL NAA+PROBE: SIGNIFICANT CHANGE UP
SODIUM SERPL-SCNC: 141 MMOL/L — SIGNIFICANT CHANGE UP (ref 135–145)
TROPONIN I, HIGH SENSITIVITY RESULT: 7.1 NG/L — SIGNIFICANT CHANGE UP
WBC # BLD: 3.88 K/UL — SIGNIFICANT CHANGE UP (ref 3.8–10.5)
WBC # FLD AUTO: 3.88 K/UL — SIGNIFICANT CHANGE UP (ref 3.8–10.5)

## 2023-03-30 PROCEDURE — 71046 X-RAY EXAM CHEST 2 VIEWS: CPT | Mod: 26

## 2023-03-30 PROCEDURE — 99223 1ST HOSP IP/OBS HIGH 75: CPT

## 2023-03-30 PROCEDURE — 99285 EMERGENCY DEPT VISIT HI MDM: CPT

## 2023-03-30 RX ORDER — MAGNESIUM OXIDE 400 MG ORAL TABLET 241.3 MG
400 TABLET ORAL DAILY
Refills: 0 | Status: DISCONTINUED | OUTPATIENT
Start: 2023-03-30 | End: 2023-03-31

## 2023-03-30 RX ORDER — ALBUTEROL 90 UG/1
2 AEROSOL, METERED ORAL EVERY 6 HOURS
Refills: 0 | Status: DISCONTINUED | OUTPATIENT
Start: 2023-03-30 | End: 2023-03-31

## 2023-03-30 RX ORDER — FOLIC ACID 0.8 MG
1 TABLET ORAL DAILY
Refills: 0 | Status: DISCONTINUED | OUTPATIENT
Start: 2023-03-30 | End: 2023-03-31

## 2023-03-30 RX ORDER — SODIUM CHLORIDE 9 MG/ML
1000 INJECTION INTRAMUSCULAR; INTRAVENOUS; SUBCUTANEOUS
Refills: 0 | Status: DISCONTINUED | OUTPATIENT
Start: 2023-03-30 | End: 2023-03-31

## 2023-03-30 RX ORDER — ACETAMINOPHEN 500 MG
1000 TABLET ORAL ONCE
Refills: 0 | Status: DISCONTINUED | OUTPATIENT
Start: 2023-03-30 | End: 2023-03-30

## 2023-03-30 RX ORDER — GABAPENTIN 400 MG/1
300 CAPSULE ORAL
Refills: 0 | Status: DISCONTINUED | OUTPATIENT
Start: 2023-03-30 | End: 2023-03-31

## 2023-03-30 RX ORDER — CALCIUM GLUCONATE 100 MG/ML
2 VIAL (ML) INTRAVENOUS ONCE
Refills: 0 | Status: COMPLETED | OUTPATIENT
Start: 2023-03-30 | End: 2023-03-30

## 2023-03-30 RX ORDER — SODIUM CHLORIDE 9 MG/ML
1000 INJECTION INTRAMUSCULAR; INTRAVENOUS; SUBCUTANEOUS ONCE
Refills: 0 | Status: COMPLETED | OUTPATIENT
Start: 2023-03-30 | End: 2023-03-30

## 2023-03-30 RX ORDER — PREGABALIN 225 MG/1
1000 CAPSULE ORAL DAILY
Refills: 0 | Status: DISCONTINUED | OUTPATIENT
Start: 2023-03-30 | End: 2023-03-31

## 2023-03-30 RX ORDER — ACETAMINOPHEN 500 MG
650 TABLET ORAL EVERY 6 HOURS
Refills: 0 | Status: DISCONTINUED | OUTPATIENT
Start: 2023-03-30 | End: 2023-03-31

## 2023-03-30 RX ORDER — DIAZEPAM 5 MG
5 TABLET ORAL DAILY
Refills: 0 | Status: DISCONTINUED | OUTPATIENT
Start: 2023-03-30 | End: 2023-03-31

## 2023-03-30 RX ORDER — FERROUS SULFATE 325(65) MG
325 TABLET ORAL DAILY
Refills: 0 | Status: DISCONTINUED | OUTPATIENT
Start: 2023-03-30 | End: 2023-03-31

## 2023-03-30 RX ORDER — MAGNESIUM SULFATE 500 MG/ML
2 VIAL (ML) INJECTION ONCE
Refills: 0 | Status: COMPLETED | OUTPATIENT
Start: 2023-03-30 | End: 2023-03-30

## 2023-03-30 RX ORDER — POTASSIUM CHLORIDE 20 MEQ
20 PACKET (EA) ORAL ONCE
Refills: 0 | Status: COMPLETED | OUTPATIENT
Start: 2023-03-30 | End: 2023-03-31

## 2023-03-30 RX ORDER — QUETIAPINE FUMARATE 200 MG/1
0 TABLET, FILM COATED ORAL
Qty: 0 | Refills: 0 | DISCHARGE

## 2023-03-30 RX ORDER — SERTRALINE 25 MG/1
100 TABLET, FILM COATED ORAL DAILY
Refills: 0 | Status: DISCONTINUED | OUTPATIENT
Start: 2023-03-30 | End: 2023-03-31

## 2023-03-30 RX ORDER — TRAZODONE HCL 50 MG
50 TABLET ORAL
Refills: 0 | Status: DISCONTINUED | OUTPATIENT
Start: 2023-03-30 | End: 2023-03-31

## 2023-03-30 RX ADMIN — Medication 2 GRAM(S): at 21:04

## 2023-03-30 RX ADMIN — Medication 25 GRAM(S): at 21:31

## 2023-03-30 RX ADMIN — Medication 200 GRAM(S): at 20:34

## 2023-03-30 RX ADMIN — SODIUM CHLORIDE 1000 MILLILITER(S): 9 INJECTION INTRAMUSCULAR; INTRAVENOUS; SUBCUTANEOUS at 20:33

## 2023-03-30 RX ADMIN — SODIUM CHLORIDE 1000 MILLILITER(S): 9 INJECTION INTRAMUSCULAR; INTRAVENOUS; SUBCUTANEOUS at 21:33

## 2023-03-30 NOTE — ED PROVIDER NOTE - CLINICAL SUMMARY MEDICAL DECISION MAKING FREE TEXT BOX
Patient c/o weakness, dizziness, SOBOE and chest heaviness for 2 days, after 2 weeks of heavy vaginal bleeding. Has felt this way before when her Hgb was low. Will get labs and transfuse if indicated

## 2023-03-30 NOTE — ED ADULT NURSE NOTE - OBJECTIVE STATEMENT
43 yo female presents to the ED with complaints of chest pain , SOB, dizziness , pt states she has a hx of anemia and was transfused in the past . Pt VS are stable at this time.

## 2023-03-30 NOTE — ED PROVIDER NOTE - ATTENDING APP SHARED VISIT CONTRIBUTION OF CARE
Samir Maravilla MD: I have personally performed a face to face diagnostic evaluation on this patient.  I have reviewed the PA note and agree with the history, exam, and plan of care, except as noted.  History and Exam by me shows same findings as documented

## 2023-03-30 NOTE — H&P ADULT - NSHPPHYSICALEXAM_GEN_ALL_CORE
Vitals:  T(C): 37 (03-30-23 @ 22:30), Max: 37 (03-30-23 @ 22:30)  HR: 85 (03-30-23 @ 22:30) (85 - 98)  BP: 109/71 (03-30-23 @ 22:30) (109/71 - 150/80)  RR: 17 (03-30-23 @ 22:30) (17 - 18)  SpO2: 99% (03-30-23 @ 22:30) (98% - 99%)    PHYSICAL EXAM:  General: Well developed, well nourished, no apparent distress  HEENT: Normocephalic, atraumatic, PERLLA,  moist mucous membranes   Neck: Supple, nontender, no palpable mass  Neurology: Alert and oriented x3, nonfocal, sensation intact   Respiratory: Clear to auscultation bilateral, no wheezing, rales or rhonchi   Cardio: S1,S2, no murmurs, rubs or gallops  Abdominal: Soft, non-tender, non-distended bowel sounds present x4, no palpable masses  Extremities: No clubbing, cyanosis or edema, peripheral pulses present  MSK: Normal range of motion, no joint erythema or warmth, no joint swelling   Heme: No obvious ecchymosis or petechiae   Skin: warm, dry, normal color Vitals:  T(C): 37 (03-30-23 @ 22:30), Max: 37 (03-30-23 @ 22:30)  HR: 85 (03-30-23 @ 22:30) (85 - 98)  BP: 109/71 (03-30-23 @ 22:30) (109/71 - 150/80)  RR: 17 (03-30-23 @ 22:30) (17 - 18)  SpO2: 99% (03-30-23 @ 22:30) (98% - 99%)    PHYSICAL EXAM:  General: Well developed, well nourished, no apparent distress  HEENT: Normocephalic, atraumatic, PERLLA,  moist mucous membranes   Neck: Supple, nontender, no palpable mass  Neurology: Alert and oriented x3, nonfocal, sensation intact   Respiratory: Clear to auscultation bilateral, no wheezing, rales or rhonchi   Cardio: S1,S2, no murmurs, rubs or gallops  Abdominal: Soft, non-tender, non-distended bowel sounds present x4, no palpable masses  Extremities: Trace LE edema. No clubbing, cyanosis or edema, peripheral pulses present  MSK: Normal range of motion, no joint erythema or warmth, no joint swelling   Heme: No obvious ecchymosis or petechiae   Skin: warm, dry, normal color

## 2023-03-30 NOTE — H&P ADULT - ASSESSMENT
42-year-old female history of anemia A. fib not on anticoagulation, depression, hyperthyroidism (on Methemizole), smoker, h/o transfusion in past, cesarians, peripheral neuropathy, rt ankle fracture , came in with chest tightness/pressure, near-syncope that started 2 days ago.    Near syncope/ chest pressure   Admit to tele  Cardio and neuro eval   Fall precautions   Troponin neg    Severe electrolyte abn  Supplemented in ED  Monitor repeat    Hx fibroids Heavy menses due to fibroids and AC  Seen by GYN last cisit  HGB stable  Outpatient follow up     Depression and anxiety  Under psychiatric care  In program with [facility].     L breast asymmetry  8mm asymmetric nodule involving inferior inner aspect of the left breast is predominantly new since March 19, 2020.  Outpatient follow up  42-year-old female history of anemia A. fib not on anticoagulation, depression, hyperthyroidism (on Methemizole), smoker, h/o transfusion in past, cesarians, peripheral neuropathy, rt ankle fracture , came in with chest tightness/pressure, near-syncope that started 2 days ago.    Near syncope/ chest pressure   Admit to tele  Cardio and neuro eval   Fall precautions   Troponin neg    Severe electrolyte abn  In setting of diarrhea and poor po intake  Supplemented in ED  Monitor repeat  Fluids     Hx fibroids Heavy menses due to fibroids and AC  Seen by GYN last visit   HGB stable  Outpatient follow up     Depression and anxiety  Under psychiatric care  In program with [facility]  Continue home meds     L breast asymmetry  8mm asymmetric nodule involving inferior inner aspect of the left breast is predominantly new since March 19, 2020.  Outpatient follow up     DVT ppx  SCD's in setting of hx of bleed  Has been ambulating  42-year-old female history of anemia A. fib not on anticoagulation, depression, hyperthyroidism (on Methemizole), smoker, h/o transfusion in past, cesarians, peripheral neuropathy, rt ankle fracture , came in with chest tightness/pressure, near-syncope that started 2 days ago.    Near syncope/ chest pressure   Admit to tele  Cardio eval   Fall precautions   Troponin neg  QTC prolonged 520ms per ED, repeat EKG in AM    Severe electrolyte abn  In setting of diarrhea and poor po intake  Supplemented in ED  Monitor repeat  Fluids     Hx fibroids Heavy menses due to fibroids and AC  Seen by GYN last visit   HGB stable  Outpatient follow up     Depression and anxiety  Under psychiatric care  In program with [facility]  Continue home meds     L breast asymmetry  8mm asymmetric nodule involving inferior inner aspect of the left breast is predominantly new since March 19, 2020.  Outpatient follow up     DVT ppx  SCD's in setting of hx of bleed  Has been ambulating

## 2023-03-30 NOTE — ED PROVIDER NOTE - NS ED ATTENDING STATEMENT MOD
This was a shared visit with the BERNIE. I reviewed and verified the documentation and independently performed the documented:

## 2023-03-30 NOTE — ED ADULT NURSE NOTE - NURSING MUSC ROM
full range of motion in all extremities Hide Vanicream Products: No Action 4: Continue Continue Regimen: Minocycline 100mg-take one tab by mouth twice daily-patient is to decrease to once daily and if acne is still doing well after 1 month she may stop it.\\nAdapalene-apply to affected areas on face at night then wash in the morning. Detail Level: Zone Other Instructions: F/u in 6 months if taking minocycline

## 2023-03-30 NOTE — ED PROVIDER NOTE - OBJECTIVE STATEMENT
42 F hx anemia (on iron pills), hyperthyroid (on methimazole), afib (no anticoagulation) c/o tapia, chest tightness/pressure, near-syncope that started 2 days ago. Feels similar to times when shes needed transfusion, last time was about 8 months ago. Reports recently had 2 weeks of vaginal bleeding, none today. States she is "working on it" when asked about gyn and heme follow up. PMD "Bozena". Denies fever, diarrhea.

## 2023-03-30 NOTE — ED ADULT TRIAGE NOTE - TEMPERATURE IN FAHRENHEIT (DEGREES F)
WORKER'S COMPENSATION RETURN TO WORK REPORT   [address] [facility] [address]  [phone]    May 21, 2018  EMPLOYEE INFORMATION:   EMPLOYER INFORMATION:  NAME: Anitha TOVAR  : 1986     [phone]              APPOINTMENT INFORMATION:  Date: 2018.       Time out: 11:51 AM.   Location: [facility]   Treating Provider: Rodrigo Tinsley MD    DIAGNOSES:   1. Cervicothoracic sprain/strain  2. Sacroiliac and spinal joint dysfunction  3. Myofascial pain (neck and shoulder).   4. Lateral epicondylitis.     STATUS: This injury/condition is WORK RELATED.    RETURN TO WORK:   Ms. Gayle is to return to work today WITH the restrictions indicated below.     RESTRICTIONS:   Avoid repetitive/sustained forward bending of the head and neck.    TREATMENT PLAN:  Chiropractic manual and exercise therapies    Per Dr. Tinsley:  Referral for PT/Dry needling  Flexeril for muscle spasms    FOLLOW-UP VISIT: 4 weeks.   2018 @ 4:40PM  Thank you for the privilege of providing medical care for this injury/condition.  If there are any questions, please call the clinic at Dept: [phone].      Electronically signed on 2018 at 11:51 AM by:   Rodrigo Tinsley MD        98.2

## 2023-03-30 NOTE — H&P ADULT - HISTORY OF PRESENT ILLNESS
42-year-old female history of anemia A. fib not on anticoagulation, depression, hyperthyroidism (on Methemizole), smoker, h/o transfusion in past, cesarians, peripheral neuropathy, rt ankle fracture , came in with chest tightness/pressure, near-syncope that started 2 days ago. Patient initially came to the ED as she felt like she previously did when she required PRBC transfusions. She has a 2 week hx of vaginal bleed that has since stopped. She was seen by GYN previous admissions and advised outpatient follow up. Patient denies fevers, chills, chest pain, palpitations. In the ED, patient with multiple electrolyte abn including hypokalemia hypomagnesemia and hypocalcemia 42-year-old female history of anemia A. fib not on anticoagulation, depression, hyperthyroidism (on Methemizole), smoker, h/o transfusion in past, cesarians, peripheral neuropathy, rt ankle fracture , came in with chest tightness/pressure, near-syncope that started 2 days ago. Patient initially came to the ED as she felt like she previously did when she required PRBC transfusions. She has a 2 week hx of vaginal bleed that has since stopped. She was seen by GYN previous admissions and advised outpatient follow up which she has scheduled for next week. Patient denies fevers, chills, chest pain, palpitations. In the ED, patient with multiple electrolyte abn including hypokalemia hypomagnesemia and hypocalcemia. Of note, patient with about 2 weeks of diarrhea and has not been eating or drinking much

## 2023-03-30 NOTE — H&P ADULT - NSHPREVIEWOFSYSTEMS_GEN_ALL_CORE
Constitutional: denies fever, chills  HEENT: denies blurry vision, difficulty hearing  Respiratory: denies SOB, WEN, cough, sputum production, wheezing  Cardiovascular: denies CP, palpitations, LE edema  Gastrointestinal: denies nausea, vomiting, diarrhea, constipation, abdominal pain, melena, hematochezia   Genitourinary: denies dysuria, frequency, urgency, hematuria   Skin: Denies rashes, itching  Musculoskeletal: denies myalgias, joint swelling, muscle weakness  Neurologic: Admits generalized weakness. Denies headache, weakness, dizziness, numbness/tingling  Hematology/Oncology: denies bleeding, easy bruising  Psych: Denies changes in mood

## 2023-03-31 ENCOUNTER — TRANSCRIPTION ENCOUNTER (OUTPATIENT)
Age: 43
End: 2023-03-31

## 2023-03-31 VITALS
TEMPERATURE: 99 F | DIASTOLIC BLOOD PRESSURE: 73 MMHG | RESPIRATION RATE: 16 BRPM | HEART RATE: 109 BPM | SYSTOLIC BLOOD PRESSURE: 104 MMHG | OXYGEN SATURATION: 98 %

## 2023-03-31 DIAGNOSIS — D50.0 IRON DEFICIENCY ANEMIA SECONDARY TO BLOOD LOSS (CHRONIC): ICD-10-CM

## 2023-03-31 DIAGNOSIS — N92.1 EXCESSIVE AND FREQUENT MENSTRUATION WITH IRREGULAR CYCLE: ICD-10-CM

## 2023-03-31 LAB
ANION GAP SERPL CALC-SCNC: 11 MMOL/L — SIGNIFICANT CHANGE UP (ref 5–17)
ANION GAP SERPL CALC-SCNC: 9 MMOL/L — SIGNIFICANT CHANGE UP (ref 5–17)
BUN SERPL-MCNC: 4 MG/DL — LOW (ref 7–23)
BUN SERPL-MCNC: 5 MG/DL — LOW (ref 7–23)
CALCIUM SERPL-MCNC: 6.3 MG/DL — CRITICAL LOW (ref 8.4–10.5)
CALCIUM SERPL-MCNC: 6.4 MG/DL — CRITICAL LOW (ref 8.4–10.5)
CALCIUM SERPL-MCNC: 6.4 MG/DL — CRITICAL LOW (ref 8.4–10.5)
CHLORIDE SERPL-SCNC: 102 MMOL/L — SIGNIFICANT CHANGE UP (ref 96–108)
CHLORIDE SERPL-SCNC: 104 MMOL/L — SIGNIFICANT CHANGE UP (ref 96–108)
CO2 SERPL-SCNC: 28 MMOL/L — SIGNIFICANT CHANGE UP (ref 22–31)
CO2 SERPL-SCNC: 28 MMOL/L — SIGNIFICANT CHANGE UP (ref 22–31)
CREAT SERPL-MCNC: 0.43 MG/DL — LOW (ref 0.5–1.3)
CREAT SERPL-MCNC: 0.45 MG/DL — LOW (ref 0.5–1.3)
EGFR: 122 ML/MIN/1.73M2 — SIGNIFICANT CHANGE UP
EGFR: 123 ML/MIN/1.73M2 — SIGNIFICANT CHANGE UP
FERRITIN SERPL-MCNC: 27 NG/ML — SIGNIFICANT CHANGE UP (ref 15–150)
GLUCOSE SERPL-MCNC: 81 MG/DL — SIGNIFICANT CHANGE UP (ref 70–99)
GLUCOSE SERPL-MCNC: 88 MG/DL — SIGNIFICANT CHANGE UP (ref 70–99)
HCT VFR BLD CALC: 22.7 % — LOW (ref 34.5–45)
HCT VFR BLD CALC: 23.5 % — LOW (ref 34.5–45)
HGB BLD-MCNC: 6.7 G/DL — CRITICAL LOW (ref 11.5–15.5)
HGB BLD-MCNC: 6.9 G/DL — CRITICAL LOW (ref 11.5–15.5)
IRON SATN MFR SERPL: 135 UG/DL — SIGNIFICANT CHANGE UP (ref 30–160)
IRON SATN MFR SERPL: 56 % — HIGH (ref 14–50)
MAGNESIUM SERPL-MCNC: 1 MG/DL — CRITICAL LOW (ref 1.6–2.6)
MAGNESIUM SERPL-MCNC: 1.7 MG/DL — SIGNIFICANT CHANGE UP (ref 1.6–2.6)
MCHC RBC-ENTMCNC: 22.5 PG — LOW (ref 27–34)
MCHC RBC-ENTMCNC: 29.5 GM/DL — LOW (ref 32–36)
MCV RBC AUTO: 76.2 FL — LOW (ref 80–100)
NRBC # BLD: 0 /100 WBCS — SIGNIFICANT CHANGE UP (ref 0–0)
PLATELET # BLD AUTO: 169 K/UL — SIGNIFICANT CHANGE UP (ref 150–400)
POTASSIUM SERPL-MCNC: 2.7 MMOL/L — CRITICAL LOW (ref 3.5–5.3)
POTASSIUM SERPL-MCNC: 2.9 MMOL/L — CRITICAL LOW (ref 3.5–5.3)
POTASSIUM SERPL-SCNC: 2.7 MMOL/L — CRITICAL LOW (ref 3.5–5.3)
POTASSIUM SERPL-SCNC: 2.9 MMOL/L — CRITICAL LOW (ref 3.5–5.3)
PTH-INTACT FLD-MCNC: 20 PG/ML — SIGNIFICANT CHANGE UP (ref 15–65)
RBC # BLD: 2.98 M/UL — LOW (ref 3.8–5.2)
RBC # FLD: 19.8 % — HIGH (ref 10.3–14.5)
SODIUM SERPL-SCNC: 141 MMOL/L — SIGNIFICANT CHANGE UP (ref 135–145)
SODIUM SERPL-SCNC: 141 MMOL/L — SIGNIFICANT CHANGE UP (ref 135–145)
T3 SERPL-MCNC: 60 NG/DL — LOW (ref 80–200)
T4 AB SER-ACNC: 4.1 UG/DL — LOW (ref 4.6–12)
TIBC SERPL-MCNC: 242 UG/DL — SIGNIFICANT CHANGE UP (ref 220–430)
TSH SERPL-MCNC: 2.51 UIU/ML — SIGNIFICANT CHANGE UP (ref 0.27–4.2)
UIBC SERPL-MCNC: 107 UG/DL — LOW (ref 110–370)
WBC # BLD: 4.07 K/UL — SIGNIFICANT CHANGE UP (ref 3.8–10.5)
WBC # FLD AUTO: 4.07 K/UL — SIGNIFICANT CHANGE UP (ref 3.8–10.5)

## 2023-03-31 PROCEDURE — 36430 TRANSFUSION BLD/BLD COMPNT: CPT

## 2023-03-31 PROCEDURE — 93010 ELECTROCARDIOGRAM REPORT: CPT

## 2023-03-31 PROCEDURE — 86923 COMPATIBILITY TEST ELECTRIC: CPT

## 2023-03-31 PROCEDURE — 99239 HOSP IP/OBS DSCHRG MGMT >30: CPT

## 2023-03-31 PROCEDURE — 84484 ASSAY OF TROPONIN QUANT: CPT

## 2023-03-31 PROCEDURE — 85018 HEMOGLOBIN: CPT

## 2023-03-31 PROCEDURE — 82310 ASSAY OF CALCIUM: CPT

## 2023-03-31 PROCEDURE — P9016: CPT

## 2023-03-31 PROCEDURE — 83550 IRON BINDING TEST: CPT

## 2023-03-31 PROCEDURE — 36415 COLL VENOUS BLD VENIPUNCTURE: CPT

## 2023-03-31 PROCEDURE — 96375 TX/PRO/DX INJ NEW DRUG ADDON: CPT

## 2023-03-31 PROCEDURE — 86900 BLOOD TYPING SEROLOGIC ABO: CPT

## 2023-03-31 PROCEDURE — 82728 ASSAY OF FERRITIN: CPT

## 2023-03-31 PROCEDURE — 86901 BLOOD TYPING SEROLOGIC RH(D): CPT

## 2023-03-31 PROCEDURE — 85610 PROTHROMBIN TIME: CPT

## 2023-03-31 PROCEDURE — 85025 COMPLETE CBC W/AUTO DIFF WBC: CPT

## 2023-03-31 PROCEDURE — 85014 HEMATOCRIT: CPT

## 2023-03-31 PROCEDURE — 84702 CHORIONIC GONADOTROPIN TEST: CPT

## 2023-03-31 PROCEDURE — 83735 ASSAY OF MAGNESIUM: CPT

## 2023-03-31 PROCEDURE — 86850 RBC ANTIBODY SCREEN: CPT

## 2023-03-31 PROCEDURE — 84443 ASSAY THYROID STIM HORMONE: CPT

## 2023-03-31 PROCEDURE — 71046 X-RAY EXAM CHEST 2 VIEWS: CPT

## 2023-03-31 PROCEDURE — 83540 ASSAY OF IRON: CPT

## 2023-03-31 PROCEDURE — 80053 COMPREHEN METABOLIC PANEL: CPT

## 2023-03-31 PROCEDURE — 93005 ELECTROCARDIOGRAM TRACING: CPT

## 2023-03-31 PROCEDURE — 87635 SARS-COV-2 COVID-19 AMP PRB: CPT

## 2023-03-31 PROCEDURE — 84436 ASSAY OF TOTAL THYROXINE: CPT

## 2023-03-31 PROCEDURE — 85027 COMPLETE CBC AUTOMATED: CPT

## 2023-03-31 PROCEDURE — 85730 THROMBOPLASTIN TIME PARTIAL: CPT

## 2023-03-31 PROCEDURE — 83970 ASSAY OF PARATHORMONE: CPT

## 2023-03-31 PROCEDURE — 96365 THER/PROPH/DIAG IV INF INIT: CPT

## 2023-03-31 PROCEDURE — 84480 ASSAY TRIIODOTHYRONINE (T3): CPT

## 2023-03-31 PROCEDURE — 99285 EMERGENCY DEPT VISIT HI MDM: CPT | Mod: 25

## 2023-03-31 PROCEDURE — 80048 BASIC METABOLIC PNL TOTAL CA: CPT

## 2023-03-31 RX ORDER — MEDROXYPROGESTERONE ACETATE 150 MG/ML
10 INJECTION, SUSPENSION, EXTENDED RELEASE INTRAMUSCULAR ONCE
Refills: 0 | Status: COMPLETED | OUTPATIENT
Start: 2023-03-31 | End: 2023-03-31

## 2023-03-31 RX ORDER — CALCIUM GLUCONATE 100 MG/ML
2 VIAL (ML) INTRAVENOUS ONCE
Refills: 0 | Status: COMPLETED | OUTPATIENT
Start: 2023-03-31 | End: 2023-03-31

## 2023-03-31 RX ORDER — MAGNESIUM OXIDE 400 MG ORAL TABLET 241.3 MG
2 TABLET ORAL
Qty: 0 | Refills: 0 | DISCHARGE
Start: 2023-03-31

## 2023-03-31 RX ORDER — MAGNESIUM SULFATE 500 MG/ML
2 VIAL (ML) INJECTION ONCE
Refills: 0 | Status: COMPLETED | OUTPATIENT
Start: 2023-03-31 | End: 2023-03-31

## 2023-03-31 RX ORDER — CALCIUM CARBONATE 500(1250)
1 TABLET ORAL
Refills: 0 | Status: DISCONTINUED | OUTPATIENT
Start: 2023-03-31 | End: 2023-03-31

## 2023-03-31 RX ORDER — POTASSIUM CHLORIDE 20 MEQ
10 PACKET (EA) ORAL ONCE
Refills: 0 | Status: COMPLETED | OUTPATIENT
Start: 2023-03-31 | End: 2023-03-31

## 2023-03-31 RX ORDER — POTASSIUM CHLORIDE 20 MEQ
10 PACKET (EA) ORAL ONCE
Refills: 0 | Status: DISCONTINUED | OUTPATIENT
Start: 2023-03-31 | End: 2023-03-31

## 2023-03-31 RX ORDER — IRON SUCROSE 20 MG/ML
100 INJECTION, SOLUTION INTRAVENOUS ONCE
Refills: 0 | Status: COMPLETED | OUTPATIENT
Start: 2023-03-31 | End: 2023-03-31

## 2023-03-31 RX ORDER — DIAZEPAM 5 MG
1 TABLET ORAL
Qty: 0 | Refills: 0 | DISCHARGE

## 2023-03-31 RX ORDER — CALCIUM CARBONATE 500(1250)
1 TABLET ORAL
Qty: 0 | Refills: 0 | DISCHARGE
Start: 2023-03-31

## 2023-03-31 RX ORDER — POTASSIUM CHLORIDE 20 MEQ
40 PACKET (EA) ORAL EVERY 4 HOURS
Refills: 0 | Status: COMPLETED | OUTPATIENT
Start: 2023-03-31 | End: 2023-03-31

## 2023-03-31 RX ADMIN — Medication 50 MILLIGRAM(S): at 06:13

## 2023-03-31 RX ADMIN — Medication 25 GRAM(S): at 09:00

## 2023-03-31 RX ADMIN — IRON SUCROSE 100 MILLIGRAM(S): 20 INJECTION, SOLUTION INTRAVENOUS at 17:55

## 2023-03-31 RX ADMIN — MEDROXYPROGESTERONE ACETATE 10 MILLIGRAM(S): 150 INJECTION, SUSPENSION, EXTENDED RELEASE INTRAMUSCULAR at 17:55

## 2023-03-31 RX ADMIN — Medication 20 MILLIEQUIVALENT(S): at 00:05

## 2023-03-31 RX ADMIN — Medication 100 MILLIEQUIVALENT(S): at 11:00

## 2023-03-31 RX ADMIN — Medication 325 MILLIGRAM(S): at 12:26

## 2023-03-31 RX ADMIN — Medication 40 MILLIEQUIVALENT(S): at 11:24

## 2023-03-31 RX ADMIN — GABAPENTIN 300 MILLIGRAM(S): 400 CAPSULE ORAL at 06:14

## 2023-03-31 RX ADMIN — PREGABALIN 1000 MICROGRAM(S): 225 CAPSULE ORAL at 12:27

## 2023-03-31 RX ADMIN — Medication 40 MILLIEQUIVALENT(S): at 16:44

## 2023-03-31 RX ADMIN — SODIUM CHLORIDE 75 MILLILITER(S): 9 INJECTION INTRAMUSCULAR; INTRAVENOUS; SUBCUTANEOUS at 16:44

## 2023-03-31 RX ADMIN — SODIUM CHLORIDE 75 MILLILITER(S): 9 INJECTION INTRAMUSCULAR; INTRAVENOUS; SUBCUTANEOUS at 01:47

## 2023-03-31 RX ADMIN — Medication 1 TABLET(S): at 17:55

## 2023-03-31 RX ADMIN — Medication 5 MILLIGRAM(S): at 00:04

## 2023-03-31 RX ADMIN — Medication 1 MILLIGRAM(S): at 12:26

## 2023-03-31 RX ADMIN — Medication 200 GRAM(S): at 11:59

## 2023-03-31 RX ADMIN — MAGNESIUM OXIDE 400 MG ORAL TABLET 400 MILLIGRAM(S): 241.3 TABLET ORAL at 12:34

## 2023-03-31 RX ADMIN — GABAPENTIN 300 MILLIGRAM(S): 400 CAPSULE ORAL at 00:05

## 2023-03-31 RX ADMIN — Medication 50 MILLIGRAM(S): at 00:05

## 2023-03-31 RX ADMIN — Medication 12.5 GRAM(S): at 16:43

## 2023-03-31 RX ADMIN — SERTRALINE 100 MILLIGRAM(S): 25 TABLET, FILM COATED ORAL at 12:26

## 2023-03-31 NOTE — DISCHARGE NOTE PROVIDER - HOSPITAL COURSE
FROM ADMISSION H+P:   HPI:  42-year-old female history of anemia A. fib not on anticoagulation, depression, hyperthyroidism (on Methemizole), smoker, h/o transfusion in past, cesarians, peripheral neuropathy, rt ankle fracture , came in with chest tightness/pressure, near-syncope that started 2 days ago. Patient initially came to the ED as she felt like she previously did when she required PRBC transfusions. She has a 2 week hx of vaginal bleed that has since stopped. She was seen by GYN previous admissions and advised outpatient follow up which she has scheduled for next week. Patient denies fevers, chills, chest pain, palpitations. In the ED, patient with multiple electrolyte abn including hypokalemia hypomagnesemia and hypocalcemia. Of note, patient with about 2 weeks of diarrhea and has not been eating or drinking much      ---  HOSPITAL COURSE:   42-year-old female history of anemia A. fib not on anticoagulation, depression, hyperthyroidism (on Methemizole), smoker, h/o transfusion in past, cesarians, peripheral neuropathy, rt ankle fracture , came in with chest tightness/pressure, near-syncope that started 2 days ago.    Near syncope/ chest pressure   Cardio eval noted  Fall precautions   Troponin neg  QTC prolonged 520ms, repeat 524ms, avoid Qtc prolonging medications  Continue tele moitorin    Severe electrolyte abn  In setting of diarrhea and poor po intake  Supplemented with minimal improvement  Monitor repeat  Fluids   Nephro eval  PTH level wnl  Will need outpatient follow up    Hyperthyroid  On Methimazole   TSH wnl but T3 and T4 low  Outpatient endocrine follow up     Hx fibroids Heavy menses due to fibroids and AC  Seen by GYN last visit   1 unit PRBC ordered, 1 dose Venofer ordered   Outpatient follow up with GYN and hematology   Takes Provera as needed for severe bleeding    Depression and anxiety  Under psychiatric care  In program with [facility]  Continue home meds     L breast asymmetry  8mm asymmetric nodule involving inferior inner aspect of the left breast is predominantly new since March 19, 2020.  Outpatient follow up     Patient left AMA despite much encouragement to stay. She understands she is at high risk for AMA and death due to electrolyte abnormalities. Strict return to care precautions given        ---  TIME SPENT:  I, the attending physician, was physically present for the key portions of the evaluation and management (E/M) service provided. The total amount of time spent reviewing the hospital notes, laboratory values, imaging findings, assessing/counseling the patient, discussing with consultant physicians, social work, nursing staff was 40 minutes additional time spent FROM ADMISSION H+P:   HPI:  42-year-old female history of anemia A. fib not on anticoagulation, depression, hyperthyroidism (on Methemizole), smoker, h/o transfusion in past, cesarians, peripheral neuropathy, rt ankle fracture , came in with chest tightness/pressure, near-syncope that started 2 days ago. Patient initially came to the ED as she felt like she previously did when she required PRBC transfusions. She has a 2 week hx of vaginal bleed that has since stopped. She was seen by GYN previous admissions and advised outpatient follow up which she has scheduled for next week. Patient denies fevers, chills, chest pain, palpitations. In the ED, patient with multiple electrolyte abn including hypokalemia hypomagnesemia and hypocalcemia. Of note, patient with about 2 weeks of diarrhea and has not been eating or drinking much      ---  HOSPITAL COURSE:   42-year-old female history of anemia A. fib not on anticoagulation, depression, hyperthyroidism (on Methemizole), smoker, h/o transfusion in past, cesarians, peripheral neuropathy, rt ankle fracture , came in with chest tightness/pressure, near-syncope that started 2 days ago.    Near syncope/ chest pressure   Cardio eval noted  Fall precautions   Troponin neg  QTC prolonged 520ms, repeat 524ms, avoid Qtc prolonging medications    Severe electrolyte abn  In setting of diarrhea and poor po intake  Supplemented with minimal improvement  PTH level wnl  Will need outpatient follow up    Hyperthyroid  On Methimazole   TSH wnl but T3 and T4 low  Outpatient endocrine follow up     Hx fibroids Heavy menses due to fibroids and AC  Seen by GYN last visit   1 unit PRBC ordered, 1 dose Venofer ordered   Outpatient follow up with GYN and hematology   Takes Provera as needed for severe bleeding    Depression and anxiety  Under psychiatric care  In program with [facility]  Continue home meds     L breast asymmetry  8mm asymmetric nodule involving inferior inner aspect of the left breast is predominantly new since March 19, 2020.  Outpatient follow up     Patient left AMA despite much encouragement to stay. She understands she is at high risk for AMA and death due to electrolyte abnormalities. Strict return to care precautions given        ---  TIME SPENT:  I, the attending physician, was physically present for the key portions of the evaluation and management (E/M) service provided. The total amount of time spent reviewing the hospital notes, laboratory values, imaging findings, assessing/counseling the patient, discussing with consultant physicians, social work, nursing staff was 40 minutes additional time spent

## 2023-03-31 NOTE — CARE COORDINATION ASSESSMENT. - NSPASTMEDSURGHISTORY_GEN_ALL_CORE_FT
PAST MEDICAL & SURGICAL HISTORY:  History of Hyperthyroidism      Atrial fibrillation      S/P  Section  ,,      History of anemia      Asthma      Depression, unspecified depression type      Smoker      History of blood transfusion

## 2023-03-31 NOTE — PROVIDER CONTACT NOTE (CRITICAL VALUE NOTIFICATION) - ACTION/TREATMENT ORDERED:
DR Jackson made aware, remote tele on, will repeat lab, will transfuse blood according to order. will continue monitoring pt
see orders

## 2023-03-31 NOTE — PROGRESS NOTE ADULT - SUBJECTIVE AND OBJECTIVE BOX
Patient is a 42y old  Female who presents with a chief complaint of     INTERVAL HPI/OVERNIGHT EVENTS: Patient seen and examined at bedside. No overnight events. Feels better today. HGB 6.7, seen by hematology and ordered 1 unit PRBC     MEDICATIONS  (STANDING):  cyanocobalamin 1000 MICROGram(s) Oral daily  ferrous    sulfate 325 milliGRAM(s) Oral daily  folic acid 1 milliGRAM(s) Oral daily  gabapentin 300 milliGRAM(s) Oral two times a day  magnesium oxide 400 milliGRAM(s) Oral daily  methimazole 10 milliGRAM(s) Oral daily  potassium chloride    Tablet ER 40 milliEquivalent(s) Oral every 4 hours  sertraline 100 milliGRAM(s) Oral daily  sodium chloride 0.9%. 1000 milliLiter(s) (75 mL/Hr) IV Continuous <Continuous>  traZODone 50 milliGRAM(s) Oral two times a day    MEDICATIONS  (PRN):  acetaminophen     Tablet .. 650 milliGRAM(s) Oral every 6 hours PRN Temp greater or equal to 38C (100.4F), Mild Pain (1 - 3)  albuterol    90 MICROgram(s) HFA Inhaler 2 Puff(s) Inhalation every 6 hours PRN Shortness of Breath and/or Wheezing  aluminum hydroxide/magnesium hydroxide/simethicone Suspension 30 milliLiter(s) Oral every 4 hours PRN Dyspepsia  diazepam    Tablet 5 milliGRAM(s) Oral daily PRN anxiety      Allergies    Motrin (Hives)    Intolerances        REVIEW OF SYSTEMS:  CONSTITUTIONAL: No fever or chills  HEENT:  No headache, no sore throat  RESPIRATORY: No cough, wheezing, or shortness of breath  CARDIOVASCULAR: No chest pain, palpitations, or leg swelling  GASTROINTESTINAL: No abd pain, nausea, vomiting, or diarrhea  GENITOURINARY: No dysuria, frequency, or hematuria  NEUROLOGICAL: no focal weakness or dizziness  MUSCULOSKELETAL: no myalgias     Vital Signs Last 24 Hrs  T(C): 37.1 (31 Mar 2023 07:56), Max: 37.4 (31 Mar 2023 00:40)  T(F): 98.7 (31 Mar 2023 07:56), Max: 99.3 (31 Mar 2023 00:40)  HR: 93 (31 Mar 2023 07:56) (85 - 98)  BP: 96/58 (31 Mar 2023 07:56) (96/58 - 150/80)  BP(mean): --  RR: 16 (31 Mar 2023 07:56) (16 - 18)  SpO2: 95% (31 Mar 2023 07:56) (95% - 99%)    Parameters below as of 31 Mar 2023 07:56  Patient On (Oxygen Delivery Method): room air        PHYSICAL EXAM:  GENERAL: NAD  HEENT:  NCAT, moist mucous membranes  CHEST/LUNG:  CTA b/l, no rales, wheezes, or rhonchi  HEART:  RRR, S1, S2  ABDOMEN:  BS+, soft, nontender, nondistended  EXTREMITIES: non-pitting LE edema, no cyanosis, or calf tenderness  NERVOUS SYSTEM: AA&Ox3, sensation intact    LABS:                        6.9    x     )-----------( x        ( 31 Mar 2023 11:52 )             23.5     CBC Full  -  ( 31 Mar 2023 11:52 )  WBC Count : x  Hemoglobin : 6.9 g/dL  Hematocrit : 23.5 %  Platelet Count - Automated : x  Mean Cell Volume : x  Mean Cell Hemoglobin : x  Mean Cell Hemoglobin Concentration : x  Auto Neutrophil # : x  Auto Lymphocyte # : x  Auto Monocyte # : x  Auto Eosinophil # : x  Auto Basophil # : x  Auto Neutrophil % : x  Auto Lymphocyte % : x  Auto Monocyte % : x  Auto Eosinophil % : x  Auto Basophil % : x    31 Mar 2023 11:52    141    |  102    |  5      ----------------------------<  88     2.7     |  28     |  0.43     Ca    6.4        31 Mar 2023 11:52  Mg     1.7       31 Mar 2023 11:52    TPro  6.1    /  Alb  3.0    /  TBili  1.5    /  DBili  x      /  AST  90     /  ALT  19     /  AlkPhos  114    30 Mar 2023 19:15    PT/INR - ( 30 Mar 2023 19:15 )   PT: 15.0 sec;   INR: 1.27 ratio         PTT - ( 30 Mar 2023 19:15 )  PTT:35.8 sec    CAPILLARY BLOOD GLUCOSE              RADIOLOGY & ADDITIONAL TESTS: < from: Xray Chest 2 Views PA/Lat (03.30.23 @ 19:55) >    ACC: [id number] EXAM:  XR CHEST PA LAT 2V   ORDERED BY: OSMIN MATSON     PROCEDURE DATE:  03/30/2023          INTERPRETATION:  PROCEDURE: PA and lateral views of the chest.    CLINICAL INFORMATION: Chest pain.    COMPARISON: 7/25/2022.    FINDINGS:    Lungs: The lungs are clear.  Heart: The heart is normal in size.  Mediastinum: The mediastinum is within normal limits.    IMPRESSION:    Clear lungs.    --- End of Report ---            ALEKSANDRA JAIME MD; Attending Radiologist  This document has been electronically signed. Mar 31 2023  7:30AM    < end of copied text >      Consultant(s) Notes Reviewed:  [x] YES  [ ] NO    Care Discussed with [x] Consultants  [x] Patient  [ ] Family  [ ]      [ x] Other; RN  DVT ppx

## 2023-03-31 NOTE — CONSULT NOTE ADULT - ASSESSMENT
Microcytic anemia most likely secondary to fe deficiency related to menometrorrhagia.  Patient is aware of diagnosis and is being evaluated by gyn as an outpatient    Recommendations:  1.  follow CBC  2.  receiving 1 unit PRBC today  3.  to check fe stores and evaluate for IV venofer  4.  to have gyn evaluation as outpatient.  is considering hysterectomy   5.  further heme recommendations pending above

## 2023-03-31 NOTE — PROGRESS NOTE ADULT - ASSESSMENT
42-year-old female history of anemia A. fib not on anticoagulation, depression, hyperthyroidism (on Methemizole), smoker, h/o transfusion in past, cesarians, peripheral neuropathy, rt ankle fracture , came in with chest tightness/pressure, near-syncope that started 2 days ago.    Near syncope/ chest pressure   Cardio eval noted  Fall precautions   Troponin neg  QTC prolonged 520ms, repeat 524ms, avoid Qtc prolonging medications  Continue tele moitorin    Severe electrolyte abn  In setting of diarrhea and poor po intake  Supplemented with minimal improvement  Monitor repeat  Fluids   Nephro eval  PTH level wnl  Will need outpatient follow up    Hyperthyroid  On Methimazole   TSH wnl but T3 and T4 low  Outpatient endocrine follow up     Hx fibroids Heavy menses due to fibroids and AC  Seen by GYN last visit   1 unit PRBC ordered, 1 dose Venofer ordered   Outpatient follow up with GYN and hematology   Takes Provera as needed for severe bleeding    Depression and anxiety  Under psychiatric care  In program with [facility]  Continue home meds     L breast asymmetry  8mm asymmetric nodule involving inferior inner aspect of the left breast is predominantly new since March 19, 2020.  Outpatient follow up     DVT ppx  SCD's in setting of hx of bleed  Has been ambulating

## 2023-03-31 NOTE — CHART NOTE - NSCHARTNOTEFT_GEN_A_CORE
The patient has decided to leave AMA because her son had a car accident and she needs to go home.  Had a lengthy discussion with patient. The patient demonstrates adequate understanding that she is leaving against medical advice despite the risk of missing a potentially serious diagnosis which may lead to injury, permanent disability and/or death. The benefits of staying have been explained, including nursing and physician monitoring, diagnostic testing and treatment. The patient was able to understand and state the risks of leaving AMA.  The opportunity was provided for the patient to ask questions and I addressed the questions/concerns to the best of my ability. The patient understands that she can return anytime for medical care. Follow up has been discussed and includes appointment with PCP, hematology, GYN and nephrology (patient responsible for making appointment.) Also present for discussions: the patient's primary RN Nelly
Called by [facility] for calcium level of 6.4 (drawn on 30-Mar, resulted on 31-Mar-2023 22:26).  Hypocalcemia was already noted on the chart and patient left AMA today.

## 2023-03-31 NOTE — PROGRESS NOTE ADULT - TIME BILLING
performing the following activities: Preparing to see the patient, Obtaining and/or reviewing separately obtained history, Performing a medically appropriate examination and/or evaluation, Documenting clinical information in the medical record, Care coordination , Counseling and educating the patient/family/caregiver, Ordering medications, tests, or procedures, Independently interpreting results and communicating results to the patient/family/caregiver , and Referring and communicating with other health care professionals. More than 50% of time spent with direct patient care

## 2023-03-31 NOTE — DISCHARGE NOTE PROVIDER - NSDCFUADDINST_GEN_ALL_CORE_FT
- Please make an appointment for follow up with your primary doctor within 1-3 days of discharge  - It is important to see your primary physician as well as the physicians listed below to perform a comprehensive medical review.  Call their offices for an appointment as soon as you leave the hospital.  You will also need to see them for renewal of your medications.  If you do not have a primary physician, contact the [facility] Physician Referral Service at [phone].  To obtain your results, you can access the [facility] Patient Portal at [email or web address]   - Your medical issues appear to be stable at this time, but if your symptoms recur or worsen, contact your physicians and/or return to the hospital if necessary.    -If you encounter any issues or questions with your medication, call your physicians before stopping the medication.    - Limit your diet to 2 grams of sodium daily.  - Patient or caretaker is responsible for making all appointments

## 2023-03-31 NOTE — CONSULT NOTE ADULT - ASSESSMENT
The patient is a 42 year old female with a history of anemia, paroxysmal atrial fibrillation, hyperthyroidism who presents with chest pain.    Plan:  - ECG not available; will repeat. Reportedly QTc was initially prolonged  - Replete K, Mg, Ca  - Given duration of symptoms, an acute MI is ruled out with one set of cardiac enzymes  - The patient has a history of chest pain and lightheadedness in the setting of symptomatic anemia  - Today hemoglobin down to 6.7  - Transfuse  - No anticoagulation for prior atrial fibrillation given low [id number] and ongoing bleeding/anemia issues  - Would benefit from further outpatient gyn intervention

## 2023-03-31 NOTE — CONSULT NOTE ADULT - SUBJECTIVE AND OBJECTIVE BOX
NEPHROLOGY CONSULTATION    CHIEF COMPLAINT:  Electrolyte disorder    HPI:  Admitted for acute blood loss anemia from heavy menstrual bleeding associated with dyspnea, chest tightness and near syncope.  Noted low calcium, magnesium and potassium which date back to  and never worked up or treated.  States her BP has been "normal".        ROS:  states diarrhea over last week but not chronic;  denies diuretics, vomiting, radiation to neck      PAST MEDICAL & SURGICAL HISTORY:  Atrial fibrillation  History of Hyperthyroidism  Asthma  History of anemia  Depression, unspecified depression type  S/P  Section  History of blood transfusion    SOCIAL HISTORY:  smoker    FAMILY HISTORY:  no renal disease      MEDICATIONS  (STANDING):  cyanocobalamin 1000 MICROGram(s) Oral daily  ferrous    sulfate 325 milliGRAM(s) Oral daily  folic acid 1 milliGRAM(s) Oral daily  gabapentin 300 milliGRAM(s) Oral two times a day  iron sucrose Injectable 100 milliGRAM(s) IV Push once  magnesium oxide 400 milliGRAM(s) Oral daily  methimazole 10 milliGRAM(s) Oral daily  potassium chloride    Tablet ER 40 milliEquivalent(s) Oral every 4 hours  sertraline 100 milliGRAM(s) Oral daily  sodium chloride 0.9%. 1000 milliLiter(s) (75 mL/Hr) IV Continuous <Continuous>  traZODone 50 milliGRAM(s) Oral two times a day      PHYSICAL EXAMINATION:  T(F): 98.7 (23 @ 07:56)  HR: 93 (23 @ 07:56)  BP: 96/58 (23 @ 07:56)  RR: 16 (23 @ 07:56)  SpO2: 95% (23 @ 07:56)  Conversant, no apparent distress  PERRLA, pink conjunctivae, no ptosis  Good dentition, no pharyngeal erythema  Neck non tender, no mass, no thyromegaly or nodules  Normal respiratory effort, lungs clear to auscultation  Heart with RRR, no murmurs or rubs, no peripheral edema  Abdomen soft, no masses, no organomegaly  Skin no rashes, ulcers or lesions, normal turgor and temperature  Appropriate affect, AO x 3    LABS:                        6.9    x     )-----------( x        ( 31 Mar 2023 11:52 )             23.5         141  |  102  |  5<L>  ----------------------------<  88  2.7<LL>   |  28  |  0.43<L>    Ca    6.4<LL>      31 Mar 2023 11:52  Mg     1.7         TPro  6.1  /  Alb  3.0<L>  /  TBili  1.5<H>  /  DBili  x   /  AST  90<H>  /  ALT  19  /  AlkPhos  114        RADIOLOGY:  Chest X-Ray personally reviewed and shows NAPD    ASSESSMENT:  1.  Chronic hypocalcemia, hypomagnesemia, hypokalemia - rule out malnutrition, chronic malabsorption, hypoparathyroidism, Gitelman syndrome, vitamin D deficiency    PLAN:  Dx:  check PTH, vit D levels, urine electrolytes, amber/renin  If vit D < 30 then supplement with ergocalciferol 50K units PO weekly  Magnesium sulfate 2 grams IVPB daily for few days regardless of level to replete intracellular stores  Calcium carbonate 1250 mg PO BID-TID  KCl 40 meq PO BID              
History of Present Illness: The patient is a 42 year old female with a history of anemia, paroxysmal atrial fibrillation, hyperthyroidism who presents with chest pain. She noted bilateral chest pressure starting two days ago. There has been associated lightheadedness. No shortness of breath. She states she has also had diarrhea and decreased PO intake.    Past Medical/Surgical History:  anemia, paroxysmal atrial fibrillation, hyperthyroidism    Medications:  Home Medications:  Albuterol (Eqv-Proventil HFA) 90 mcg/inh inhalation aerosol: 2 puff(s) inhaled every 6 hours, As Needed - for shortness of breath and/or wheezing (14 Sep 2022 05:14)  diazePAM 2 mg oral tablet: 1 tab(s) orally once a day, As Needed (14 Sep 2022 05:14)  folic acid 1 mg oral tablet: 1 tab(s) orally once a day (17 Sep 2022 11:30)  methIMAzole 10 mg oral tablet: orally once a day (14 Sep 2022 05:14)  Zoloft 100 mg oral tablet: 1 tab(s) orally once a day (at bedtime) (14 Sep 2022 05:14)      Family History: Non-contributory family history of premature cardiovascular atherosclerotic disease    Social History: No tobacco, alcohol or drug use    Review of Systems:  General: No fevers, chills, weight gain  Skin: No rashes, color changes  Cardiovascular: No chest pain, orthopnea  Respiratory: No shortness of breath, cough  Gastrointestinal: No nausea, abdominal pain  Genitourinary: No incontinence, pain with urination  Musculoskeletal: No pain, swelling, decreased range of motion  Neurological: No headache, weakness  Psychiatric: No depression, anxiety  Endocrine: No weight gain, increased thirst  All other systems are comprehensively negative.    Physical Exam:  Vitals:        Vital Signs Last 24 Hrs  T(C): 37.1 (31 Mar 2023 07:56), Max: 37.4 (31 Mar 2023 00:40)  T(F): 98.7 (31 Mar 2023 07:56), Max: 99.3 (31 Mar 2023 00:40)  HR: 93 (31 Mar 2023 07:56) (85 - 98)  BP: 96/58 (31 Mar 2023 07:56) (96/58 - 150/80)  BP(mean): --  RR: 16 (31 Mar 2023 07:56) (16 - 18)  SpO2: 95% (31 Mar 2023 07:56) (95% - 99%)  Parameters below as of 31 Mar 2023 07:56  Patient On (Oxygen Delivery Method): room air  General: NAD  HEENT: MMM  Neck: No JVD, no carotid bruit  Lungs: CTAB  CV: RRR, nl S1/S2, no M/R/G  Abdomen: S/NT/ND, +BS  Extremities: No LE edema, no cyanosis  Neuro: AAOx3, non-focal  Skin: No rash    Labs:                        6.7    4.07  )-----------( 169      ( 31 Mar 2023 06:00 )             22.7     03-31    141  |  104  |  4<L>  ----------------------------<  81  2.9<LL>   |  28  |  0.45<L>    Ca    6.3<LL>      31 Mar 2023 06:00  Mg     1.0     03-31    TPro  6.1  /  Alb  3.0<L>  /  TBili  1.5<H>  /  DBili  x   /  AST  90<H>  /  ALT  19  /  AlkPhos  114  03-30        PT/INR - ( 30 Mar 2023 19:15 )   PT: 15.0 sec;   INR: 1.27 ratio         PTT - ( 30 Mar 2023 19:15 )  PTT:35.8 sec    ECG/Telemetry: Not available    
Patient is a 42y old  Female who presents with a chief complaint of     HPI:  42-year-old female history of anemia A. fib not on anticoagulation, depression, hyperthyroidism (on Methemizole), smoker, h/o transfusion in past, cesarians, peripheral neuropathy, rt ankle fracture , came in with chest tightness/pressure, near-syncope that started 2 days ago. Patient initially came to the ED as she felt like she previously did when she required PRBC transfusions. She has a 2 week hx of vaginal bleed that has since stopped. She was seen by GYN previous admissions and advised outpatient follow up which she has scheduled for next week. Patient denies fevers, chills, chest pain, palpitations. In the ED, patient with multiple electrolyte abn including hypokalemia hypomagnesemia and hypocalcemia. Of note, patient with about 2 weeks of diarrhea and has not been eating or drinking much (30 Mar 2023 22:37)       ROS:  Negative except for:    PAST MEDICAL & SURGICAL HISTORY:  Atrial fibrillation      History of Hyperthyroidism      Asthma      History of anemia      Depression, unspecified depression type      Smoker      S/P  Section  ,,      History of blood transfusion          SOCIAL HISTORY:    FAMILY HISTORY:  Family history of diabetes mellitus (Sibling, Grandparent)    Family history of stomach cancer (Grandparent)        MEDICATIONS  (STANDING):  calcium gluconate IVPB 2 Gram(s) IV Intermittent once  cyanocobalamin 1000 MICROGram(s) Oral daily  ferrous    sulfate 325 milliGRAM(s) Oral daily  folic acid 1 milliGRAM(s) Oral daily  gabapentin 300 milliGRAM(s) Oral two times a day  magnesium oxide 400 milliGRAM(s) Oral daily  methimazole 10 milliGRAM(s) Oral daily  potassium chloride    Tablet ER 40 milliEquivalent(s) Oral every 4 hours  potassium chloride  10 mEq/100 mL IVPB 10 milliEquivalent(s) IV Intermittent once  sertraline 100 milliGRAM(s) Oral daily  sodium chloride 0.9%. 1000 milliLiter(s) (75 mL/Hr) IV Continuous <Continuous>  traZODone 50 milliGRAM(s) Oral two times a day    MEDICATIONS  (PRN):  acetaminophen     Tablet .. 650 milliGRAM(s) Oral every 6 hours PRN Temp greater or equal to 38C (100.4F), Mild Pain (1 - 3)  albuterol    90 MICROgram(s) HFA Inhaler 2 Puff(s) Inhalation every 6 hours PRN Shortness of Breath and/or Wheezing  aluminum hydroxide/magnesium hydroxide/simethicone Suspension 30 milliLiter(s) Oral every 4 hours PRN Dyspepsia  diazepam    Tablet 5 milliGRAM(s) Oral daily PRN anxiety      Allergies    Motrin (Hives)    Intolerances        Vital Signs Last 24 Hrs  T(C): 37.1 (31 Mar 2023 07:56), Max: 37.4 (31 Mar 2023 00:40)  T(F): 98.7 (31 Mar 2023 07:56), Max: 99.3 (31 Mar 2023 00:40)  HR: 93 (31 Mar 2023 07:56) (85 - 98)  BP: 96/58 (31 Mar 2023 07:56) (96/58 - 150/80)  BP(mean): --  RR: 16 (31 Mar 2023 07:56) (16 - 18)  SpO2: 95% (31 Mar 2023 07:56) (95% - 99%)    Parameters below as of 31 Mar 2023 07:56  Patient On (Oxygen Delivery Method): room air        PHYSICAL EXAM  General: adult in NAD  HEENT: clear oropharynx, anicteric sclera, pink conjunctivae  Neck: supple  CV: normal S1S2 with no murmur rubs or gallops  Lungs: clear to auscultation, no wheezes, no rhales  Abdomen: soft non-tender non-distended, no hepato/splenomegaly  Ext: no clubbing cyanosis or edema  Skin: no rashes and no petichiae  Neuro: alert and oriented X3 no focal deficits      LABS:    CBC Full  -  ( 31 Mar 2023 06:00 )  WBC Count : 4.07 K/uL  RBC Count : 2.98 M/uL  Hemoglobin : 6.7 g/dL  Hematocrit : 22.7 %  Platelet Count - Automated : 169 K/uL  Mean Cell Volume : 76.2 fl  Mean Cell Hemoglobin : 22.5 pg  Mean Cell Hemoglobin Concentration : 29.5 gm/dL  Auto Neutrophil # : x  Auto Lymphocyte # : x  Auto Monocyte # : x  Auto Eosinophil # : x  Auto Basophil # : x  Auto Neutrophil % : x  Auto Lymphocyte % : x  Auto Monocyte % : x  Auto Eosinophil % : x  Auto Basophil % : x    03-31    141  |  104  |  4<L>  ----------------------------<  81  2.9<LL>   |  28  |  0.45<L>    Ca    6.3<LL>      31 Mar 2023 06:00  Mg     1.0     -    TPro  6.1  /  Alb  3.0<L>  /  TBili  1.5<H>  /  DBili  x   /  AST  90<H>  /  ALT  19  /  AlkPhos  114  03-30    PT/INR - ( 30 Mar 2023 19:15 )   PT: 15.0 sec;   INR: 1.27 ratio         PTT - ( 30 Mar 2023 19:15 )  PTT:35.8 sec          BLOOD SMEAR INTERPRETATION:    RADIOLOGY & ADDITIONAL STUDIES:

## 2023-03-31 NOTE — DISCHARGE NOTE PROVIDER - NSDCCPCAREPLAN_GEN_ALL_CORE_FT
PRINCIPAL DISCHARGE DIAGNOSIS  Diagnosis: Hypomagnesemia  Assessment and Plan of Treatment: follow up with pcp, repeat lab work asap      SECONDARY DISCHARGE DIAGNOSES  Diagnosis: Hypocalcemia  Assessment and Plan of Treatment:

## 2023-03-31 NOTE — DISCHARGE NOTE PROVIDER - CARE PROVIDER_API CALL
Chon Smart)  Hematology; Internal Medicine; Medical Oncology  [address], [address]  [place] [address]  Phone: [phone]  Fax: [phone]  Follow Up Time: 1 week    Massimo Watkins  NEPHROLOGY  [address], [address]  [place], [place] [id number]  Phone: [phone]  Fax: [phone]  Follow Up Time: 1 week    Aminata Harris)  Obstetrics and Gynecology  [address] [place] [address]  Phone: [phone]  Fax: [phone]  Follow Up Time: 1-3 days    Palla, Venugopal R (MD)  Cardiovascular Disease; Internal Medicine  [address], [place] [id number]  Phone: [phone]  Fax: [phone]  Follow Up Time: 1 week

## 2023-03-31 NOTE — DISCHARGE NOTE PROVIDER - PROVIDER TOKENS
PROVIDER:[TOKEN:[[id number]],FOLLOWUP:[1 week]],PROVIDER:[TOKEN:[[id number]],FOLLOWUP:[1 week]],PROVIDER:[TOKEN:[[id number]],FOLLOWUP:[1-3 days]],PROVIDER:[TOKEN:[[id number]],FOLLOWUP:[1 week]]

## 2023-03-31 NOTE — DISCHARGE NOTE NURSING/CASE MANAGEMENT/SOCIAL WORK - PATIENT PORTAL LINK FT
You can access the FollowMyHealth Patient Portal offered by [facility] by registering at the following website: [email or web address]. By joining "[employer]"’s FollowMyHealth portal, you will also be able to view your health information using other applications (apps) compatible with our system.

## 2023-03-31 NOTE — CARE COORDINATION ASSESSMENT. - NSCAREPROVIDERS_GEN_ALL_CORE_FT
CARE PROVIDERS:  Accepting Physician: Em Zuleta  Admitting: Em Zuleta  Attending: Em Zuleta  Case Management: Nasima Azul  Consultant: Jose De Oliveira  Covering Team: Eduin Chairez  Covering Team: Josh Chou ED ACP: Nel Godfrey  ED Attending: Samir Maravilla  ED Nurse: Annabella Childers  Emergency Medicine: DorothymarTessie stephens  [facility]  Nurse: Babita Kelsey  Nurse: Ty Childers  Nurse: Nelly Belcher  Nurse: Shelbie Alexandre  Nurse: Aleisha Eldridge  Nurse: Shaunna Hammond  Nurse: Santino Mota  Nurse: Sheree Allred  Override: Shaunna Hammond  Override: Shelbie Alexandre  PCA/Nursing Assistant: Forrest Robbins  PCA/Nursing Assistant: Ernestina Norman  PCA/Nursing Assistant: Alberto Hernandez  Registered Dietitian: Bety Loaiza  : Mita Solitario// Supp. Assoc.: Lizzy Beltran

## 2023-03-31 NOTE — CARE COORDINATION ASSESSMENT. - NSDCPLANSERVICES_GEN_ALL_CORE
42-year-old female history of anemia A. fib not on anticoagulation, depression, hyperthyroidism (on Methemizole), smoker, h/o transfusion in past, cesarians, peripheral neuropathy, rt ankle fracture , came in with chest tightness/pressure, near-syncope that started 2 days ago. Patient has been having diarrhea for 2 weeks and ot eating.    Met with  pt at the bedside and  reviewed role and availability of CM throughout her hospital stay. Patient has no complaints at this time. Patient lives in a private house with her mother and her 3 adult children. Prior to admission patient states she was minimally ind, due to increased peripheral neuropathy and frequent falls. Patient states she is in the process of being worked up with podiatry and neurology. Denies any services or homecare. Dme at home rolling walker and commode. Patient receives assistance from her family members with adls. Patient is interested in home health follow up as she need help with coordinating all her apts and follow ups. Referral will be sent accordingly. Anticipated dc needs not known at this time. CM will cont to be available. Family will be able to transport when medically stable  PCP: JACI COFFEY  [phone]  PHARMACY  [facility], Home/Home Health Services

## 2023-03-31 NOTE — CARE COORDINATION ASSESSMENT. - REFERRAL INFORMATION CONCERNS
Right leg  Pain -chronic (hx/o \"problems with my legs)  Redness  Warm to touch  Increased swelling (> baseline).    Initiated Date/Time 5/22/2021 5:52 pm   Message Sent Date/Time 5/22/2021 5:59 pm   Source [facility] Contact Center   Department ACC NURSE   Phone Number [phone]   Method Text   Contacted Kamlesh Freitas MD   Requested Bessy Delgado MD   Details Patient   Message   [phone] PATIENT NUMBER -------------------------------- [facility] NURSE LINE (IF QUESTIONS ONLY - [phone]) URGENT ADULT PATIENT FROM: JUAN VAN REQUESTED DR:BESSY SALAS DR, PT K[facility] AUGUST, 62 Y/O F, [birth year], MRN: [id number], W/ C/O R LEG PAIN, REDNESS & WARMTH, INCREASED SWELLING THAN USUAL BASELINE. PT W/ RECENT SURGERY ON 5/5/21 (PARATHYROIDECTOMY) AND HX/O PE. I ADVISED ER FOR FURTHER EVALUATION, PT DECLINED. CAN YOU PLEASE HELP ADVISE PT? HER # -[phone]. THANK YOU, JUAN VAN, [facility].     no concerns

## 2023-03-31 NOTE — DISCHARGE NOTE PROVIDER - NSDCMRMEDTOKEN_GEN_ALL_CORE_FT
Albuterol (Eqv-Proventil HFA) 90 mcg/inh inhalation aerosol: 2 puff(s) inhaled every 6 hours, As Needed - for shortness of breath and/or wheezing  calcium carbonate 1250 mg (500 mg elemental calcium) oral tablet: 1 tab(s) orally 2 times a day  cyanocobalamin 1000 mcg oral tablet: 1 tab(s) orally once a day  diazePAM 5 mg oral tablet: 1 orally once a day  ferrous sulfate 325 mg (65 mg elemental iron) oral tablet: 1 tab(s) orally 2 times a day   folic acid 1 mg oral tablet: 1 tab(s) orally once a day  folic acid 1 mg oral tablet: 1 tab(s) orally once a day  gabapentin 300 mg oral capsule: 1 cap(s) orally 2 times a day   magnesium oxide 400 mg oral tablet: 2 tab(s) orally once a day  methIMAzole 10 mg oral tablet: orally once a day  Provera 10 mg oral tablet: 1 tab(s) orally once a day x 14 days every 4 weeks.  traZODone 50 mg oral tablet: 1 tab(s) orally 2 times a day  Vitamin C 500 mg oral tablet: 1 tab(s) orally once a day   Zoloft 100 mg oral tablet: 1 tab(s) orally once a day (at bedtime)

## 2023-03-31 NOTE — DISCHARGE NOTE PROVIDER - CARE PROVIDERS DIRECT ADDRESSES
,DirectAddress_Unknown,DirectAddress_Unknown,DirectAddress_Unknown,[email or web address]

## 2023-03-31 NOTE — PATIENT PROFILE ADULT - FALL HARM RISK - UNIVERSAL INTERVENTIONS
Bed in lowest position, wheels locked, appropriate side rails in place/Call bell, personal items and telephone in reach/Instruct patient to call for assistance before getting out of bed or chair/Non-slip footwear when patient is out of bed/[place] to call system/Physically safe environment - no spills, clutter or unnecessary equipment/Purposeful Proactive Rounding/Room/bathroom lighting operational, light cord in reach

## 2023-04-21 ENCOUNTER — EMERGENCY (EMERGENCY)
Facility: HOSPITAL | Age: 43
LOS: 1 days | Discharge: ANOTHER HOSPITAL | End: 2023-04-21
Attending: STUDENT IN AN ORGANIZED HEALTH CARE EDUCATION/TRAINING PROGRAM | Admitting: EMERGENCY MEDICINE
Payer: MEDICAID

## 2023-04-21 VITALS
TEMPERATURE: 98 F | WEIGHT: 240.08 LBS | HEIGHT: 65 IN | OXYGEN SATURATION: 96 % | SYSTOLIC BLOOD PRESSURE: 111 MMHG | RESPIRATION RATE: 16 BRPM | DIASTOLIC BLOOD PRESSURE: 72 MMHG | HEART RATE: 116 BPM

## 2023-04-21 DIAGNOSIS — Z92.89 PERSONAL HISTORY OF OTHER MEDICAL TREATMENT: Chronic | ICD-10-CM

## 2023-04-21 LAB
ALBUMIN SERPL ELPH-MCNC: 3.1 G/DL — LOW (ref 3.3–5)
ALP SERPL-CCNC: 108 U/L — SIGNIFICANT CHANGE UP (ref 30–120)
ALT FLD-CCNC: 19 U/L DA — SIGNIFICANT CHANGE UP (ref 10–60)
ANION GAP SERPL CALC-SCNC: 15 MMOL/L — SIGNIFICANT CHANGE UP (ref 5–17)
ANISOCYTOSIS BLD QL: SLIGHT — SIGNIFICANT CHANGE UP
APTT BLD: 21.9 SEC — LOW (ref 27.5–35.5)
AST SERPL-CCNC: 85 U/L — HIGH (ref 10–40)
BASOPHILS NFR BLD AUTO: 0 % — SIGNIFICANT CHANGE UP (ref 0–2)
BILIRUB SERPL-MCNC: 1.7 MG/DL — HIGH (ref 0.2–1.2)
BLD GP AB SCN SERPL QL: SIGNIFICANT CHANGE UP
BUN SERPL-MCNC: 6 MG/DL — LOW (ref 7–23)
CALCIUM SERPL-MCNC: 8.5 MG/DL — SIGNIFICANT CHANGE UP (ref 8.4–10.5)
CHLORIDE SERPL-SCNC: 98 MMOL/L — SIGNIFICANT CHANGE UP (ref 96–108)
CK MB BLD-MCNC: 0.4 % — SIGNIFICANT CHANGE UP (ref 0–3.5)
CK MB CFR SERPL CALC: 0.8 NG/ML — SIGNIFICANT CHANGE UP (ref 0–3.6)
CK SERPL-CCNC: 182 U/L — SIGNIFICANT CHANGE UP (ref 26–192)
CO2 SERPL-SCNC: 25 MMOL/L — SIGNIFICANT CHANGE UP (ref 22–31)
CREAT SERPL-MCNC: 0.68 MG/DL — SIGNIFICANT CHANGE UP (ref 0.5–1.3)
D DIMER BLD IA.RAPID-MCNC: <150 NG/ML DDU — SIGNIFICANT CHANGE UP
EGFR: 110 ML/MIN/1.73M2 — SIGNIFICANT CHANGE UP
EOSINOPHIL NFR BLD AUTO: 0 % — SIGNIFICANT CHANGE UP (ref 0–6)
GLUCOSE SERPL-MCNC: 98 MG/DL — SIGNIFICANT CHANGE UP (ref 70–99)
HCG SERPL-ACNC: 1 MIU/ML — SIGNIFICANT CHANGE UP
HCT VFR BLD CALC: 23.9 % — LOW (ref 34.5–45)
HGB BLD-MCNC: 7.3 G/DL — LOW (ref 11.5–15.5)
HYPOCHROMIA BLD QL: SLIGHT — SIGNIFICANT CHANGE UP
INR BLD: 1.17 RATIO — HIGH (ref 0.88–1.16)
LYMPHOCYTES # BLD AUTO: 4 % — LOW (ref 13–44)
MAGNESIUM SERPL-MCNC: 1.1 MG/DL — LOW (ref 1.6–2.6)
MANUAL SMEAR VERIFICATION: SIGNIFICANT CHANGE UP
MCHC RBC-ENTMCNC: 23.4 PG — LOW (ref 27–34)
MCHC RBC-ENTMCNC: 30.5 GM/DL — LOW (ref 32–36)
MCV RBC AUTO: 76.6 FL — LOW (ref 80–100)
MONOCYTES NFR BLD AUTO: 4 % — SIGNIFICANT CHANGE UP (ref 2–14)
NEUTROPHILS NFR BLD AUTO: 92 % — HIGH (ref 43–77)
NRBC # BLD: 0 — SIGNIFICANT CHANGE UP
NRBC # BLD: SIGNIFICANT CHANGE UP /100 WBCS (ref 0–0)
NT-PROBNP SERPL-SCNC: 90 PG/ML — SIGNIFICANT CHANGE UP (ref 0–125)
PLAT MORPH BLD: NORMAL — SIGNIFICANT CHANGE UP
PLATELET # BLD AUTO: 182 K/UL — SIGNIFICANT CHANGE UP (ref 150–400)
POTASSIUM SERPL-MCNC: 4.1 MMOL/L — SIGNIFICANT CHANGE UP (ref 3.5–5.3)
POTASSIUM SERPL-SCNC: 4.1 MMOL/L — SIGNIFICANT CHANGE UP (ref 3.5–5.3)
PROT SERPL-MCNC: 6.6 G/DL — SIGNIFICANT CHANGE UP (ref 6–8.3)
PROTHROM AB SERPL-ACNC: 13.5 SEC — HIGH (ref 10.5–13.4)
RBC # BLD: 3.12 M/UL — LOW (ref 3.8–5.2)
RBC # FLD: 22.5 % — HIGH (ref 10.3–14.5)
RBC BLD AUTO: ABNORMAL
SODIUM SERPL-SCNC: 138 MMOL/L — SIGNIFICANT CHANGE UP (ref 135–145)
TROPONIN I, HIGH SENSITIVITY RESULT: 5.7 NG/L — SIGNIFICANT CHANGE UP
WBC # BLD: 5.97 K/UL — SIGNIFICANT CHANGE UP (ref 3.8–10.5)
WBC # FLD AUTO: 5.97 K/UL — SIGNIFICANT CHANGE UP (ref 3.8–10.5)

## 2023-04-21 PROCEDURE — 99285 EMERGENCY DEPT VISIT HI MDM: CPT

## 2023-04-21 PROCEDURE — 71045 X-RAY EXAM CHEST 1 VIEW: CPT | Mod: 26

## 2023-04-21 PROCEDURE — 93010 ELECTROCARDIOGRAM REPORT: CPT

## 2023-04-21 RX ORDER — SODIUM CHLORIDE 9 MG/ML
1000 INJECTION INTRAMUSCULAR; INTRAVENOUS; SUBCUTANEOUS ONCE
Refills: 0 | Status: COMPLETED | OUTPATIENT
Start: 2023-04-21 | End: 2023-04-21

## 2023-04-21 RX ORDER — SERTRALINE 25 MG/1
50 TABLET, FILM COATED ORAL ONCE
Refills: 0 | Status: COMPLETED | OUTPATIENT
Start: 2023-04-21 | End: 2023-04-21

## 2023-04-21 RX ORDER — DIAZEPAM 5 MG
5 TABLET ORAL ONCE
Refills: 0 | Status: DISCONTINUED | OUTPATIENT
Start: 2023-04-21 | End: 2023-04-21

## 2023-04-21 RX ADMIN — SODIUM CHLORIDE 1000 MILLILITER(S): 9 INJECTION INTRAMUSCULAR; INTRAVENOUS; SUBCUTANEOUS at 20:09

## 2023-04-21 NOTE — ED ADULT NURSE NOTE - OBJECTIVE STATEMENT
41 y/o female presents to ED c/o of chest pressure x 2 days and vaginal bleeding x 16 days. AAOX3, spontaneous respiration at RA. Denies any sob, fever or chills. 43 y/o female presents to ED c/o of chest pressure x 2 days and vaginal bleeding x 16 days. AAOX3, spontaneous respiration at RA. Denies any sob, fever or chills. comfort measure provided, callbell within reach, reinforced surrounding and plan of care will continue to monitor.

## 2023-04-21 NOTE — ED ADULT TRIAGE NOTE - HEIGHT IN FEET
Subjective


Principal diagnosis: 





Coronary artery disease, status post prior stenting to his right coronary 

artery.  Preserved left ventricular function.  Hyperlipidemia.  Hypertension.  

ETOH abuse.





POD #22 total arterial non-aortic patch off-pump double coronary artery bypass 

grafting using in situ skeletonized right internal mammary artery to the left 

anterior descending artery across the anterior midline in situ totally 

skeletonized left internal mammary artery to the first obtuse marginal artery.  

Intraoperative transesophageal echocardiogram and epi-aortic scanning.  

Intraoperative graft flow measurements using the [employer] System[employer] system





POD #22 flexible bronchoscopy with bronchial washings secondary to 

postoperative mucous plugging with increasing oxygen demand the night of 

surgery.  Pt had acute hypoxic post operative respiratory failure as an 

unexpected post-surgical condition, related to the patient's underlying medical 

comorbidities. Unable to determine if ventilator associated pneumonia vs. 

trachobronchitis, an unexpected post-surgical condition.  Sputum culture grew 

out Moraxella, blood cultures are negative to date, urine culture grew 

Klebsiella.  Patient placed on Levaquin per pulmonology.





Pt developed postoperative alcohol withdrawal requiring increased sedation and 

prolonged mechanical ventilation.





Patient developed postoperative ileus, an unexpected post-surgical condition, 

currently resolving.   Currently receiving TPN.  General surgery and consult, 

no intervention at this time.  Passed modified barium swallow yesterday.  NGT 

removed.  Placed on diet.  TPN continues and calorie count in place until 

determination that patient is meeting caloric needs. 





Patient currently sitting up in cardiac chair in no apparent distress.  No 

concerns/complaints.  Wife at bedside.  Encouraged to increase arm/leg activity 

while sitting in chair.





Objective





- Vital Signs


Vital signs: 


 Vital Signs











Temp  98.9 F   05/05/17 04:00


 


Pulse  69   05/05/17 08:02


 


Resp  14   05/05/17 04:00


 


BP  105/54   05/05/17 04:00


 


Pulse Ox  94 L  05/05/17 04:00








 Intake & Output











 05/04/17 05/05/17 05/05/17





 18:59 06:59 18:59


 


Intake Total 200.213 2592.971 80


 


Output Total 1310 1140 210


 


Balance -340.487 -28.029 -130


 


Weight  95.8 kg 95.8 kg


 


Intake:   


 


   240 80


 


    .9 NaCL 240 240 80


 


  Intake, IV Titration 609.513 571.971 





  Amount   


 


    Heparin Sodium,Porcine/  500 





    D5w Pmx 25,000 unit In   





    Dextrose/Water 1 500ml.   





    bag @ 10 UNITS/KG/HR 19.   





    98 mls/hr IV .Q24H ELIER Rx   





    #:[id number]   


 


    Insulin Regular 100 unit 67.513 71.971 





    In Sodium Chloride 0.9%   





    100 ml @ Per Protocol IV   





    .Q0M ELIER Rx#:[id number]   


 


    Levofloxacin 750Mg-D5w 150  





    Pmx 750 mg In Dextrose/   





    Water 1 150ml.bag @ 100   





    mls/hr IVPB Q24H ELIER Rx#:   





    [id number]   


 


    Magnesium Sulfate-D5w Pmx 10  





    1 gm In Dextrose/Water 1   





    100ml.bag @ 100 mls/hr   





    IVPB Q1H ELIER Rx#:   





    [id number]   


 


    Mvi, Adult No.4 with Vit 342  





    K 10 ml Trace (Conc-1Ml/   





    Dose) 1 ml In Amino Acid   





    5%-D25w+Lytes*E* 1,000 ml   





    @ 75 mls/hr IV .BY   





    DURATION ELIER Rx#:   





    [id number]   


 


    Mvi, Adult No.4 with Vit 40  





    K 10 ml Trace (Conc-1Ml/   





    Dose) 1 ml Sodium Acetate   





    30 meq Potassium   





    Chloride 20 meq Calcium   





    Gluconate 1,000 mg In   





    Amino Acid 5%-D25w 1,000   





    ml @ 75 mls/hr IV .   





    [id number] ELIER Rx#:[id number]   


 


  Oral  300 


 


  Tube Feeding 120  


 


Output:   


 


  Urine 1310 1140 210


 


Other:   


 


  Voiding Method Indwelling Catheter Indwelling Catheter 


 


  # Bowel Movements 1  








 ABP, PAP, CO, CI - Last Documented











Arterial Blood Pressure        172/58


 


Pulmonary Artery Pressure      46/23


 


Cardiac Output                 6.8


 


Cardiac Index                  3.3

















- Constitutional


General appearance: Present: cooperative, no acute distress, obese





- Respiratory


Details: 





Lung sounds diminished sharon.  Resp even/non-labored.  Currently on room air with 

O2 sats 95%.  Able to achieve 750 ml on incentive spirometry.  Effective cough.





- Cardiovascular


Details: 





S1/S2 present.  Reg rate/rhythm, NSR on telemetry.  Sternum stable superiorly, 

movable inferiorly.  Heart hugger in place with pt starting to demonstrate 

appropriate use.  Trace sharon lower extremity edema present.Teds/SCDs present.





- Gastrointestinal


Gastrointestinal Comment(s): 





Abd soft/NT/slightly distended.  Hyperactive BS. TPN infusing, pt tolerating 

diet. BM yesterday.





- Genitourinary


Genitourinary Comment(s): 





Angelo present draining clear yellow urine.  Output  ml/hr. 





- Musculoskeletal


Musculoskeletal: Present: generalized weakness





- Psychiatric


Psychiatric: Present: A&O x's 3, appropriate affect, intact judgment & insight





- Allied health notes


Allied health notes reviewed: nursing





- Labs


CBC & Chem 7: 


 05/05/17 05:20





 05/05/17 05:20


Labs: 


 Abnormal Lab Results - Last 24 Hours (Table)











  05/04/17 05/04/17 05/04/17 Range/Units





  11:04 12:35 13:29 


 


WBC     (3.8-10.6)  k/uL


 


RBC     (4.30-5.90)  m/uL


 


Hgb     (13.0-17.5)  gm/dL


 


Hct     (39.0-53.0)  %


 


PT     (9.0-12.0)  sec


 


APTT     (22.0-30.0)  sec


 


Sodium     (137-145)  mmol/L


 


POC Glucose (mg/dL)  150 H  102 H  117 H  (75-99)  mg/dL


 


Calcium     (8.4-10.2)  mg/dL














  05/04/17 05/04/17 05/04/17 Range/Units





  14:27 15:08 16:03 


 


WBC     (3.8-10.6)  k/uL


 


RBC     (4.30-5.90)  m/uL


 


Hgb     (13.0-17.5)  gm/dL


 


Hct     (39.0-53.0)  %


 


PT     (9.0-12.0)  sec


 


APTT     (22.0-30.0)  sec


 


Sodium     (137-145)  mmol/L


 


POC Glucose (mg/dL)  123 H  121 H  124 H  (75-99)  mg/dL


 


Calcium     (8.4-10.2)  mg/dL














  05/04/17 05/04/17 05/04/17 Range/Units





  18:18 19:04 20:02 


 


WBC     (3.8-10.6)  k/uL


 


RBC     (4.30-5.90)  m/uL


 


Hgb     (13.0-17.5)  gm/dL


 


Hct     (39.0-53.0)  %


 


PT     (9.0-12.0)  sec


 


APTT     (22.0-30.0)  sec


 


Sodium     (137-145)  mmol/L


 


POC Glucose (mg/dL)  108 H  122 H  127 H  (75-99)  mg/dL


 


Calcium     (8.4-10.2)  mg/dL














  05/04/17 05/04/17 05/05/17 Range/Units





  21:24 22:00 01:04 


 


WBC     (3.8-10.6)  k/uL


 


RBC     (4.30-5.90)  m/uL


 


Hgb     (13.0-17.5)  gm/dL


 


Hct     (39.0-53.0)  %


 


PT     (9.0-12.0)  sec


 


APTT     (22.0-30.0)  sec


 


Sodium     (137-145)  mmol/L


 


POC Glucose (mg/dL)  115 H  129 H  110 H  (75-99)  mg/dL


 


Calcium     (8.4-10.2)  mg/dL














  05/05/17 05/05/17 05/05/17 Range/Units





  04:32 05:20 05:20 


 


WBC   10.8 H   (3.8-10.6)  k/uL


 


RBC   2.74 L   (4.30-5.90)  m/uL


 


Hgb   9.0 L   (13.0-17.5)  gm/dL


 


Hct   26.2 L   (39.0-53.0)  %


 


PT     (9.0-12.0)  sec


 


APTT    54.1 H  (22.0-30.0)  sec


 


Sodium     (137-145)  mmol/L


 


POC Glucose (mg/dL)  73 L    (75-99)  mg/dL


 


Calcium     (8.4-10.2)  mg/dL














  05/05/17 05/05/17 05/05/17 Range/Units





  05:20 05:20 06:15 


 


WBC     (3.8-10.6)  k/uL


 


RBC     (4.30-5.90)  m/uL


 


Hgb     (13.0-17.5)  gm/dL


 


Hct     (39.0-53.0)  %


 


PT   12.1 H   (9.0-12.0)  sec


 


APTT     (22.0-30.0)  sec


 


Sodium  134 L    (137-145)  mmol/L


 


POC Glucose (mg/dL)    107 H  (75-99)  mg/dL


 


Calcium  8.0 L    (8.4-10.2)  mg/dL














- Imaging and Cardiology


Chest x-ray: report reviewed, image reviewed





Assessment and Plan


(1) Status post coronary artery bypass graft


Status: Acute   





(2) Coronary artery disease


Status: Acute   





(3) Family history of heart disease


Status: Acute   





(4) History of PTCA


Status: Acute   





(5) Hyperlipidemia


Status: Acute   





(6) Hypertension


Status: Acute   





(7) Nicotine dependence, chewing tobacco, uncomplicated


Status: Acute   


Plan: 





1.  Continue ASA, Lipitor, heparin, Lopressor, lisinopril, digoxin, Lasix. 

Start Coumadin today.


2.  Continue amiodarone for atrial fibrillation prophylaxis.


3.  Wean O2 as tolerated.


4.  Dulcolax suppository today.


5.  Continue TPN for until patient able to take in enough calories. Calorie 

count in place.


6.  Insulin/diabetic management per primary care service.


7.  Continue antibiotics per pulmonology.


8.  Increase activity, out of bed to chair.  Physical therapy following.  


9.  Daily labs, chest x-rays.


10.  GI/DVT prophylaxis.


11.  Potassium replacement per protocol.  


12.  Zoloft 50 mg daily started yesterday.


13.  Transfer to  [facility].


14.  Discharge planning in process.  Patient will need rehabilitation upon 

discharge.





Time with Patient: Greater than 30 5

## 2023-04-22 ENCOUNTER — EMERGENCY (EMERGENCY)
Facility: HOSPITAL | Age: 43
LOS: 1 days | Discharge: HOME | End: 2023-04-22
Attending: STUDENT IN AN ORGANIZED HEALTH CARE EDUCATION/TRAINING PROGRAM | Admitting: STUDENT IN AN ORGANIZED HEALTH CARE EDUCATION/TRAINING PROGRAM
Payer: MEDICAID

## 2023-04-22 VITALS
DIASTOLIC BLOOD PRESSURE: 58 MMHG | RESPIRATION RATE: 19 BRPM | OXYGEN SATURATION: 100 % | SYSTOLIC BLOOD PRESSURE: 113 MMHG | TEMPERATURE: 98 F | HEART RATE: 100 BPM

## 2023-04-22 VITALS
OXYGEN SATURATION: 100 % | DIASTOLIC BLOOD PRESSURE: 66 MMHG | HEIGHT: 65 IN | HEART RATE: 97 BPM | TEMPERATURE: 98 F | SYSTOLIC BLOOD PRESSURE: 117 MMHG | RESPIRATION RATE: 18 BRPM

## 2023-04-22 VITALS
OXYGEN SATURATION: 99 % | RESPIRATION RATE: 18 BRPM | SYSTOLIC BLOOD PRESSURE: 110 MMHG | DIASTOLIC BLOOD PRESSURE: 73 MMHG | HEART RATE: 100 BPM | TEMPERATURE: 98 F

## 2023-04-22 DIAGNOSIS — Z92.89 PERSONAL HISTORY OF OTHER MEDICAL TREATMENT: Chronic | ICD-10-CM

## 2023-04-22 LAB
APTT BLD: 32.3 SEC — SIGNIFICANT CHANGE UP (ref 27–36.3)
BASOPHILS # BLD AUTO: 0.02 K/UL — SIGNIFICANT CHANGE UP (ref 0–0.2)
BASOPHILS NFR BLD AUTO: 0.3 % — SIGNIFICANT CHANGE UP (ref 0–2)
BLD GP AB SCN SERPL QL: NEGATIVE — SIGNIFICANT CHANGE UP
EOSINOPHIL # BLD AUTO: 0 K/UL — SIGNIFICANT CHANGE UP (ref 0–0.5)
EOSINOPHIL NFR BLD AUTO: 0 % — SIGNIFICANT CHANGE UP (ref 0–6)
HCT VFR BLD CALC: 21.5 % — LOW (ref 34.5–45)
HCT VFR BLD CALC: 26.6 % — LOW (ref 34.5–45)
HCT VFR BLD CALC: 28.9 % — LOW (ref 34.5–45)
HGB BLD-MCNC: 6.7 G/DL — CRITICAL LOW (ref 11.5–15.5)
HGB BLD-MCNC: 8.2 G/DL — LOW (ref 11.5–15.5)
HGB BLD-MCNC: 9 G/DL — LOW (ref 11.5–15.5)
IMM GRANULOCYTES NFR BLD AUTO: 0.2 % — SIGNIFICANT CHANGE UP (ref 0–0.9)
INR BLD: 1.22 RATIO — HIGH (ref 0.88–1.16)
LYMPHOCYTES # BLD AUTO: 0.26 K/UL — LOW (ref 1–3.3)
LYMPHOCYTES # BLD AUTO: 4.4 % — LOW (ref 13–44)
MCHC RBC-ENTMCNC: 23.8 PG — LOW (ref 27–34)
MCHC RBC-ENTMCNC: 23.9 PG — LOW (ref 27–34)
MCHC RBC-ENTMCNC: 24.2 PG — LOW (ref 27–34)
MCHC RBC-ENTMCNC: 30.8 GM/DL — LOW (ref 32–36)
MCHC RBC-ENTMCNC: 31.1 GM/DL — LOW (ref 32–36)
MCHC RBC-ENTMCNC: 31.2 GM/DL — LOW (ref 32–36)
MCV RBC AUTO: 76.8 FL — LOW (ref 80–100)
MCV RBC AUTO: 77.1 FL — LOW (ref 80–100)
MCV RBC AUTO: 77.7 FL — LOW (ref 80–100)
MONOCYTES # BLD AUTO: 0.23 K/UL — SIGNIFICANT CHANGE UP (ref 0–0.9)
MONOCYTES NFR BLD AUTO: 3.9 % — SIGNIFICANT CHANGE UP (ref 2–14)
NEUTROPHILS # BLD AUTO: 5.33 K/UL — SIGNIFICANT CHANGE UP (ref 1.8–7.4)
NEUTROPHILS NFR BLD AUTO: 91.2 % — HIGH (ref 43–77)
NRBC # BLD: 0 /100 WBCS — SIGNIFICANT CHANGE UP (ref 0–0)
NRBC # FLD: 0.02 K/UL — HIGH (ref 0–0)
NRBC # FLD: 0.02 K/UL — HIGH (ref 0–0)
PLATELET # BLD AUTO: 115 K/UL — LOW (ref 150–400)
PLATELET # BLD AUTO: 174 K/UL — SIGNIFICANT CHANGE UP (ref 150–400)
PLATELET # BLD AUTO: 195 K/UL — SIGNIFICANT CHANGE UP (ref 150–400)
PROTHROM AB SERPL-ACNC: 14.2 SEC — HIGH (ref 10.5–13.4)
RBC # BLD: 2.8 M/UL — LOW (ref 3.8–5.2)
RBC # BLD: 3.45 M/UL — LOW (ref 3.8–5.2)
RBC # BLD: 3.72 M/UL — LOW (ref 3.8–5.2)
RBC # FLD: 20.6 % — HIGH (ref 10.3–14.5)
RBC # FLD: 20.9 % — HIGH (ref 10.3–14.5)
RBC # FLD: 21.2 % — HIGH (ref 10.3–14.5)
RH IG SCN BLD-IMP: POSITIVE — SIGNIFICANT CHANGE UP
WBC # BLD: 5.85 K/UL — SIGNIFICANT CHANGE UP (ref 3.8–10.5)
WBC # BLD: 5.98 K/UL — SIGNIFICANT CHANGE UP (ref 3.8–10.5)
WBC # BLD: 6.62 K/UL — SIGNIFICANT CHANGE UP (ref 3.8–10.5)
WBC # FLD AUTO: 5.85 K/UL — SIGNIFICANT CHANGE UP (ref 3.8–10.5)
WBC # FLD AUTO: 5.98 K/UL — SIGNIFICANT CHANGE UP (ref 3.8–10.5)
WBC # FLD AUTO: 6.62 K/UL — SIGNIFICANT CHANGE UP (ref 3.8–10.5)

## 2023-04-22 PROCEDURE — P9016: CPT

## 2023-04-22 PROCEDURE — 99284 EMERGENCY DEPT VISIT MOD MDM: CPT

## 2023-04-22 PROCEDURE — 36415 COLL VENOUS BLD VENIPUNCTURE: CPT

## 2023-04-22 PROCEDURE — 85730 THROMBOPLASTIN TIME PARTIAL: CPT

## 2023-04-22 PROCEDURE — 83735 ASSAY OF MAGNESIUM: CPT

## 2023-04-22 PROCEDURE — 76857 US EXAM PELVIC LIMITED: CPT | Mod: 26

## 2023-04-22 PROCEDURE — 82550 ASSAY OF CK (CPK): CPT

## 2023-04-22 PROCEDURE — 85610 PROTHROMBIN TIME: CPT

## 2023-04-22 PROCEDURE — 80053 COMPREHEN METABOLIC PANEL: CPT

## 2023-04-22 PROCEDURE — 96361 HYDRATE IV INFUSION ADD-ON: CPT

## 2023-04-22 PROCEDURE — 96365 THER/PROPH/DIAG IV INF INIT: CPT

## 2023-04-22 PROCEDURE — 96375 TX/PRO/DX INJ NEW DRUG ADDON: CPT

## 2023-04-22 PROCEDURE — 99285 EMERGENCY DEPT VISIT HI MDM: CPT | Mod: 25

## 2023-04-22 PROCEDURE — 84702 CHORIONIC GONADOTROPIN TEST: CPT

## 2023-04-22 PROCEDURE — 71045 X-RAY EXAM CHEST 1 VIEW: CPT

## 2023-04-22 PROCEDURE — 93005 ELECTROCARDIOGRAM TRACING: CPT

## 2023-04-22 PROCEDURE — 82553 CREATINE MB FRACTION: CPT

## 2023-04-22 PROCEDURE — 85025 COMPLETE CBC W/AUTO DIFF WBC: CPT

## 2023-04-22 PROCEDURE — 96366 THER/PROPH/DIAG IV INF ADDON: CPT

## 2023-04-22 PROCEDURE — 86900 BLOOD TYPING SEROLOGIC ABO: CPT

## 2023-04-22 PROCEDURE — 93010 ELECTROCARDIOGRAM REPORT: CPT

## 2023-04-22 PROCEDURE — 83880 ASSAY OF NATRIURETIC PEPTIDE: CPT

## 2023-04-22 PROCEDURE — 86923 COMPATIBILITY TEST ELECTRIC: CPT

## 2023-04-22 PROCEDURE — 36430 TRANSFUSION BLD/BLD COMPNT: CPT

## 2023-04-22 PROCEDURE — 86901 BLOOD TYPING SEROLOGIC RH(D): CPT

## 2023-04-22 PROCEDURE — 86850 RBC ANTIBODY SCREEN: CPT

## 2023-04-22 PROCEDURE — 85379 FIBRIN DEGRADATION QUANT: CPT

## 2023-04-22 PROCEDURE — 84484 ASSAY OF TROPONIN QUANT: CPT

## 2023-04-22 RX ORDER — TRANEXAMIC ACID 100 MG/ML
2 INJECTION, SOLUTION INTRAVENOUS
Refills: 0
Start: 2023-04-22

## 2023-04-22 RX ORDER — TRANEXAMIC ACID 100 MG/ML
1000 INJECTION, SOLUTION INTRAVENOUS ONCE
Refills: 0 | Status: COMPLETED | OUTPATIENT
Start: 2023-04-22 | End: 2023-04-22

## 2023-04-22 RX ORDER — TRANEXAMIC ACID 100 MG/ML
2 INJECTION, SOLUTION INTRAVENOUS
Qty: 30 | Refills: 0
Start: 2023-04-22 | End: 2023-04-26

## 2023-04-22 RX ORDER — SODIUM CHLORIDE 9 MG/ML
1000 INJECTION INTRAMUSCULAR; INTRAVENOUS; SUBCUTANEOUS ONCE
Refills: 0 | Status: COMPLETED | OUTPATIENT
Start: 2023-04-22 | End: 2023-04-22

## 2023-04-22 RX ORDER — MAGNESIUM SULFATE 500 MG/ML
2 VIAL (ML) INJECTION ONCE
Refills: 0 | Status: COMPLETED | OUTPATIENT
Start: 2023-04-22 | End: 2023-04-22

## 2023-04-22 RX ADMIN — Medication 25 GRAM(S): at 03:16

## 2023-04-22 RX ADMIN — SODIUM CHLORIDE 1000 MILLILITER(S): 9 INJECTION INTRAMUSCULAR; INTRAVENOUS; SUBCUTANEOUS at 07:33

## 2023-04-22 RX ADMIN — SERTRALINE 50 MILLIGRAM(S): 25 TABLET, FILM COATED ORAL at 01:01

## 2023-04-22 RX ADMIN — Medication 5 MILLIGRAM(S): at 00:47

## 2023-04-22 RX ADMIN — TRANEXAMIC ACID 200 MILLIGRAM(S): 100 INJECTION, SOLUTION INTRAVENOUS at 06:04

## 2023-04-22 RX ADMIN — Medication 2 GRAM(S): at 05:30

## 2023-04-22 NOTE — ED PROVIDER NOTE - CLINICAL SUMMARY MEDICAL DECISION MAKING FREE TEXT BOX
42 p/w vaginal bleeding, cp, sob  mildly tachycardic, normotensive, afebrile  exam +blood in pad, not hemorrhaging. ddx anemia 2/2 to DUB, adenomyosis  plan for cbc, transfuse as needed, ptt/inr for coagulopathy, gyx cx  likely admission

## 2023-04-22 NOTE — ED PROVIDER NOTE - PROGRESS NOTE DETAILS
Rosanne Petersen PGY-3   Patient offered CDU admission for serial H and H, patient would like to defer today. states she will follow up with obgyn outpatient and come back to ER if she develops worsening bleeding again  spoke with gyn, H&H has stabilized s/p 2u, will rx TXA outpatient 1300tid 5 days  given strict return precautions for any worsening bleeding, fever, cp, sob or any new concerning symptoms to come back to ER

## 2023-04-22 NOTE — ED PROVIDER NOTE - PROGRESS NOTE DETAILS
Patient still c/o dizziness and near-syncope when standing up.  Patient reports persistent heavy vaginal bleeding and generalized malaise.

## 2023-04-22 NOTE — ED PROVIDER NOTE - OBJECTIVE STATEMENT
42 year old female with a history of asthma, a-fib (not on anticoagulation), anemia, hyperthyroid presents with chest pain, SOB, fatigue.  Patient states she developed these symptoms 2 days ago.  She reports weakness, near-syncope, and pressure across her chest. Associated with SOB and diaphoresis. She also reports 16 days of heavy vaginal bleeding.  Patient was admitted 3/30-3/31 for similar sx, signed out AMA.  Patient has not followed up with Gyn.  States she has had DUB intermittently for years and was advised to get a hysterectomy.  Patient took 600mg Advil with no relief. 42 year old female with a history of asthma, a-fib (not on anticoagulation), anemia, hyperthyroid presents with chest pain, SOB, fatigue.  Patient states she developed these symptoms 2 days ago.  She reports weakness, near-syncope, and pressure across her chest. Associated with SOB and diaphoresis. She also reports 16 days of heavy vaginal bleeding.  Patient was admitted 3/30-3/31 for similar sx, found to be anemic with significant electrolyte abnormalities.  She signed out AMA.  Patient has not followed up with Gyn.  States she has had DUB intermittently for years and was advised to get a hysterectomy.  Patient took 600mg Advil with no relief.

## 2023-04-22 NOTE — ED PROVIDER NOTE - NSFOLLOWUPINSTRUCTIONS_ED_ALL_ED_FT
Dysfunctional Uterine Bleeding      Dysfunctional uterine bleeding is abnormal bleeding from the uterus. Dysfunctional uterine bleeding includes:  •A menstrual period that comes earlier or later than usual.  •A menstrual period that is lighter or heavier than usual, or has large blood clots.  •Vaginal bleeding between menstrual periods.  •Skipping one or more menstrual periods.  •Vaginal bleeding after sex.  •Vaginal bleeding after menopause.    Follow these instructions at home:      Eating and drinking   •Eat well-balanced meals. Include foods that are high in iron, such as liver, meat, shellfish, green leafy vegetables, and eggs.  •To prevent or treat constipation, your health care provider may recommend that you:  •Drink enough fluid to keep your urine pale yellow.  •Take over-the-counter or prescription medicines.  •Eat foods that are high in fiber, such as beans, whole grains, and fresh fruits and vegetables.  •Limit foods that are high in fat and processed sugars, such as fried or sweet foods.    Medicines     •Take over-the-counter and prescription medicines only as told by your health care provider.  • Do not change medicines without talking with your health care provider.    •Aspirin or medicines that contain aspirin may make the bleeding worse. Do not take those medicines:  •During the week before your menstrual period.  •During your menstrual period.  •If you were prescribed iron pills, take them as told by your health care provider. Iron pills help to replace iron that your body loses because of this condition.      Activity   •If you need to change your sanitary pad or tampon more than one time every 2 hours:  •Lie in bed with your feet raised (elevated).  •Place a cold pack on your lower abdomen.  •Rest as much as possible until the bleeding stops or slows down.  • Do not try to lose weight until the bleeding has stopped and your blood iron level is back to normal.    General instructions    •For two months, write down:  •When your menstrual period starts.  •When your menstrual period ends.  •When any abnormal vaginal bleeding occurs.  •What problems you notice.  •Keep all follow up visits as told by your health care provider. This is important.    Contact a health care provider if you:    •Feel light-headed or weak.  •Have nausea and vomiting.  •Cannot eat or drink without vomiting.  •Feel dizzy or have diarrhea while you are taking medicines.  •Are taking birth control pills or hormones, and you want to change them or stop taking them.      Get help right away if:    •You develop a fever or chills.  •You need to change your sanitary pad or tampon more than one time per hour.  •Your vaginal bleeding becomes heavier, or your flow contains clots more often.  •You develop pain in your abdomen.  •You lose consciousness.  •You develop a rash.      Summary    •Dysfunctional uterine bleeding is abnormal bleeding from the uterus.  •It includes menstrual bleeding of abnormal duration, volume, or regularity.  •Bleeding after sex and after menopause are also considered dysfunctional uterine bleeding.      This information is not intended to replace advice given to you by your health care provider. Make sure you discuss any questions you have with your health care provider.

## 2023-04-22 NOTE — ED PROVIDER NOTE - CARE PLAN
Principal Discharge DX:	Anemia due to acute blood loss  Secondary Diagnosis:	DUB (dysfunctional uterine bleeding)   1

## 2023-04-22 NOTE — ED PROVIDER NOTE - PATIENT PORTAL LINK FT
You can access the FollowMyHealth Patient Portal offered by [facility] by registering at the following website: [email or web address]. By joining [facility] FollowMyHealth portal, you will also be able to view your health information using other applications (apps) compatible with our system.

## 2023-04-22 NOTE — CONSULT NOTE ADULT - ASSESSMENT
RECS PENDING TVUS  42 year old  LMP 2 months ago presents to [facility] ED as a transfer from [facility] due to concerns for heavy vaginal bleeding. In [place] patient got 2upRBC and TXA. Upon arriving to [facility] ED, patient endorsed that the bleeding had decreased. In the ED, patient's vital signs were stable. On physical exam 20cc of blood were noted in the vaginal vault with no active bleeding from the cervical os. Patient's diaper was noted to be 30% saturated, last changed 3 hours prior. Ultrasound showed a posterior fibroid, which was stable compared to ultrasounds taken in the past.     RECS PENDING EVAL BY ATTENDING  42 year old  LMP 2 months ago presents to [facility] ED as a transfer from [facility] due to concerns for heavy vaginal bleeding. In [place] patient got 2upRBC and TXA. Upon arriving to [facility] ED, patient endorsed that the bleeding had decreased. In the ED, patient's vital signs were stable. On physical exam 20cc of blood were noted in the vaginal vault with no active bleeding from the cervical os. Patient's diaper was noted to be 30% saturated, last changed 3 hours prior. Ultrasound showed a posterior fibroid, which was stable compared to ultrasounds taken in the past. While in [facility] ED H/H was stable. Given minimal bleeding on GYN exam, no acute surgical intervention at this time    -patient to be discharged on 1300mg TXA TID x 5 days  -Patient to f/u in GYN clinic ([facility], [facility], [facility], [phone]) for further management of heavy menstrual bleeding  -patient given return precautions (saturating through more than a pad per hour > 2 hours, bleeding associated with lightheadedness, dizziness)  -all questions answered to patient's satisfaction  -patient cleared for d/c from GYN perspective  -rest of management per ED    patient seen and evaluated w Dr Evelyn Vail PGY2
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
## 2023-04-22 NOTE — ED PROVIDER NOTE - ATTENDING CONTRIBUTION TO CARE
43 yo female for evaluation of persistent vaginal bleeding x over 2 weeks, frequent pa d changes. +chest pain, SOB PE: Well appearing, RRR, CTA BL lungs, abd soft NTND A/P Labs, imaging, medicate, reassess

## 2023-04-22 NOTE — ED ADULT NURSE NOTE - OBJECTIVE STATEMENT
Pt received to bed 14 transfer from [facility] A&Ox4, ambulatory at baseline accompanied by mother with PMH anemia, fibroids, a-fib (no anticoagulation use) coming to the ED for complaints of vaginal bleeding x 16days with clots. Pt states that with standing and ambulation more vaginal bleeding occurs. Pt states to be saturating a pad every 15-30 minutes and states that she just "cleaned herself up so no bleeding at this time, but it increases upon standing". Pt has 3 previous c-sections in the past and 3 living children. Pt endorsing dizziness, weakness, moderate SOB and mid sternal chest pain. Pt states that she feels like "she is going to pass out when she walks". Pt received 1g txa and 2g magnesium at [facility] along with 2 units of packed RBCs. Denies N/V/D, abdominal pain, headache at this time. Pt RR equal and unlabored, O2 sat 98% on room air, no active distress noted at this time. Pt placed on cardiac monitor. NSR on cardiac monitor. Abdomen soft, non-tender, nondistended. Pt endorsing multiple falls due to dizziness and syncope over the last few months, approximately 2-3 falls. No apparent injury noted. Pt endorses smoking cigarettes daily approximately 1 pack every 2 days.  Pt arrives with 20G IV placed to the R forearm from [facility]. Comfort measures provided. Care plan continued. Comfort measures provided. Awaiting OB/GYN consult.

## 2023-04-22 NOTE — ED PROVIDER NOTE - OBJECTIVE STATEMENT
42 year old female with a history of asthma, a-fib (not on anticoagulation), anemia, hyperthyroid, hx of DUB due to fibroids presents with chest pain, SOB, fatigue after multiple days of vaginal bleeding  vaginal bleeding intermittent for 16 days, past 4 days changing pads every 30mins.   went to outside hospital noted to be anemic, given txa 1g, 1prbc, hb dropped. patient transfer to our hospital for gyn  no fever, vomiting, diarrhea, back pain, vaginal discharge  not on any AC due to hx of bleeding

## 2023-04-22 NOTE — ED ADULT NURSE NOTE - CHIEF COMPLAINT QUOTE
No additional comment transfer from [facility] for eval of vaginal bleeding  2UPRBC given at facility,1 GM TXA iv and 2 GM Magnesium iv   pt c/o chest pressure with minimal activity   #20 right arm

## 2023-04-22 NOTE — ED PROVIDER NOTE - DIFFERENTIAL DIAGNOSIS
Ddx includes but not limited to anemia due to acute blood loss, DUB, ACS, unstable angina, MI, cardiac arrhythmia, PE, pleural effusion Differential Diagnosis

## 2023-04-22 NOTE — ED ADULT NURSE NOTE - SUICIDE SCREENING DEPRESSION
Patient Seen in: [facility]      History   Patient presents with:  Cough    Stated Complaint: cough intermitten x 2 months    HPI/Subjective:   HPI    1year-old female here with her parents for Covid testing.   Parents report they are all external ear normal.      Left Ear: Tympanic membrane and external ear normal.      Nose: Congestion and rhinorrhea present. Rhinorrhea is clear. Mouth/Throat:      Lips: Pink. Mouth: Mucous membranes are moist.      Pharynx: Oropharynx is clear. [id number] [facility]  [phone]                Medications Prescribed:  There are no discharge medications for this patient. Negative

## 2023-04-22 NOTE — ED ADULT TRIAGE NOTE - CHIEF COMPLAINT QUOTE
transfer from [facility] for eval of vaginal bleeding  2UPRBC given at facility,1 GM TXA iv and 2 GM Magnesium iv   pt c/o chest pressure with minimal activity   #20 right arm

## 2023-04-22 NOTE — ED PROVIDER NOTE - CLINICAL SUMMARY MEDICAL DECISION MAKING FREE TEXT BOX
42 year old female with a history of anemia, DUB presents with chest pain, SOB, weakness, near-syncope.  Reports 16 days of continuous heavy vaginal bleeding.  Was admitted 3 weeks ago for similar sx, found to be anemic with electrolyte abnormalities, signed out AMA.  Check labs, H/H, CE, CXR, EKG, coags, TS, likely transfusion and admission.  Consult Ob/Gyn

## 2023-04-22 NOTE — ED ADULT NURSE REASSESSMENT NOTE - NS ED NURSE REASSESS COMMENT FT1
Pt resting in bed A&Ox4, ambulatory at baseline states to feel a little better since coming in. Pt states that at this time no vaginal bleeding noted. Pt denies SOB, Chest pain at this time. RR equal and unlabored.  VS stable. NSR on cardiac monitor.  20G IV placed in the L hand. Labs drawn and sent. Care plan continued. Safety maintained.
Pt to be discharged at this time. Provider D/C IV and paper work provided. Pt A&Ox4, ambulatory RR equal and unlabored. Pt states that vaginal bleeding has stopped at this time. NSR on cardiac monitor. Denies chest pain, SOB. Safety maintained.

## 2023-04-22 NOTE — CONSULT NOTE ADULT - SUBJECTIVE AND OBJECTIVE BOX
ROBBIE MCKNIGHT  42y  Female [id number]    HPI: 42 year old         Name of GYN Physician: ELIEL/Amara     POB: C/S x3, TOP x3, SAB x1  GYN: +Fibroids, Denies cysts, abnormal paps, STIs  PMHx: Atrial fibrillation, Hyperthyroidism, Depression, Asthma  Surgeries: C/S x3, D+C x3  All: Motrin    Vital Signs Last 24 Hrs  T(C): 36.4 (2023 11:34), Max: 36.9 (2023 01:20)  T(F): 97.5 (2023 11:34), Max: 98.5 (2023 01:20)  HR: 95 (2023 11:34) (95 - 116)  BP: 111/68 (2023 11:34) (98/55 - 117/66)  BP(mean): --  RR: 18 (2023 11:34) (16 - 20)  SpO2: 98% (2023 11:34) (96% - 100%)    Parameters below as of 2023 11:34  Patient On (Oxygen Delivery Method): room air        Physical Exam:   General: sitting comfortably in bed, NAD   CV: RR S1S2 no m/r/g  Lungs: CTA b/l, good air flow b/l   Abd: Soft, non-tender, non-distended.  Bowel sounds present.    :  Diaper 30% saturated, last changed 3 hours before GYN evaluation.  External labia wnl.  Bimanual exam with no cervical motion tenderness, uterus wnl, adnexa non palpable b/l.  Cervix closed  Speculum Exam: No active bleeding from os. 20 cc of blood in vault.   Ext: non-tender b/l, no edema     LABS:                              8.2    6.62  )-----------( 195      ( 2023 11:46 )             26.6     04-    138  |  98  |  6<L>  ----------------------------<  98  4.1   |  25  |  0.68    Ca    8.5      2023 19:31  Mg     1.1         TPro  6.6  /  Alb  3.1<L>  /  TBili  1.7<H>  /  DBili  x   /  AST  85<H>  /  ALT  19  /  AlkPhos  108  04-21    I&O's Detail    PT/INR - ( 2023 19:31 )   PT: 13.5 sec;   INR: 1.17 ratio         PTT - ( 2023 19:31 )  PTT:21.9 sec      RADIOLOGY & ADDITIONAL STUDIES: ROBBIE MCKNIGHT  42y  Female [id number]    HPI: 42 year old  LMP 2 months ago presents to [facility] ED as a transfer from [facility] due to concerns for heavy vaginal bleeding. Patient states that for the past two months she has been intermittently bleeding. States that she will occasionally go 2-3 days without bleeding and then the bleeding will restart. Patient states that some days the bleeding is light where she only changes her pad 6x over the course of the day; however, other days it is heavy where she saturates through 10-15 pads over the course of the day and passes concomitant clots. Over the past two days patient noticed that the bleeding became acutely worse. She also endorsed concomitant chest pain, shortness of breath, lightheadedness, and dizziness, which caused her to present to the ED for further evaluation. At [place] patient was found to have an H/H of 7.3/23.9. Patient was subsequently transfused a unit and received TXA. Repeat H/H was 6.7/21.5. Patient was subsequently transferred to [facility] for further evaluation given the inappropriate rise in H/H.     On GYN evaluation patient stated that since being transferred from [place] the bleeding had slowed down. States that she had not had to change her pad since arriving to [facility]. Patient denied any chest pain, shortness of breath, lightheadedness, dizziness, fevers, chills, nausea, or vomiting. That being said, patient was in bed and endorsed that she felt that she might get lightheaded and dizzy if she were to stand up.    Of note, patient states that over the past two years her periods have become increasingly irregular where some months she will not bleed and other months she will have heavier bleeding. Denies that heavier bleeding ever being as severe as what the bleeding has been the past two months. Prior to the past two years patient endorses regular menstrual periods. States that they last about seven days where she would go through six pads over the course of the day.    Name of GYN Physician: ELIEL/Amara     POB: C/S x3, TOP x3, SAB x1  GYN: +Fibroids, Denies cysts, abnormal paps, STIs  PMHx: Atrial fibrillation, Hyperthyroidism, Depression, Asthma  Surgeries: C/S x3, D+C x3  All: Motrin    Vital Signs Last 24 Hrs  T(C): 36.4 (2023 11:34), Max: 36.9 (2023 01:20)  T(F): 97.5 (2023 11:34), Max: 98.5 (2023 01:20)  HR: 95 (2023 11:34) (95 - 116)  BP: 111/68 (2023 11:34) (98/55 - 117/66)  BP(mean): --  RR: 18 (2023 11:34) (16 - 20)  SpO2: 98% (2023 11:34) (96% - 100%)    Parameters below as of 2023 11:34  Patient On (Oxygen Delivery Method): room air      Physical Exam:   General: sitting comfortably in bed, NAD   CV: RR S1S2 no m/r/g  Lungs: CTA b/l, good air flow b/l   Abd: Soft, non-tender, non-distended.  Bowel sounds present.    :  Diaper 30% saturated, last changed 3 hours before GYN evaluation.  External labia wnl.  Bimanual exam with no cervical motion tenderness, uterus wnl, adnexa non palpable b/l.  Cervix closed  Speculum Exam: No active bleeding from os. 20 cc of blood in vault.   Ext: non-tender b/l, no edema     LABS:                              8.2    6.62  )-----------( 195      ( 2023 11:46 )             26.6     04-    138  |  98  |  6<L>  ----------------------------<  98  4.1   |  25  |  0.68    Ca    8.5      2023 19:31  Mg     1.1     04-    TPro  6.6  /  Alb  3.1<L>  /  TBili  1.7<H>  /  DBili  x   /  AST  85<H>  /  ALT  19  /  AlkPhos  108  04-    I&O's Detail    PT/INR - ( 2023 19:31 )   PT: 13.5 sec;   INR: 1.17 ratio         PTT - ( 2023 19:31 )  PTT:21.9 sec      RADIOLOGY & ADDITIONAL STUDIES:    ACC: [id number] EXAM:  US PELVIC LIMITED OR FOLLOW UP   ORDERED BY: YAKELIN DUPREE     PROCEDURE DATE:  2023          INTERPRETATION:  CLINICAL INFORMATION: Abnormal uterine bleeding    LMP: Unknown, ongoing irregular bleeding    COMPARISON: Pelvic sonogram 2022    TECHNIQUE:  Transabdominal pelvic sonogram only. Color and Spectral Doppler was   performed. Transvaginal approach was declined.    FINDINGS:  Uterus: 9.4 cm x 4.7 cm x 6.5 cm. Posterior fibroid measures 2.4 x 3.1 x   2.3 cm.  Endometrium: 13 mm. The endometrium is difficult to visualize due to   adjacent fibroid and transabdominal approach.    Right ovary: Not visualized.  Left ovary: Not visualized.    Fluid: None.    IMPRESSION:  Limited transabdominal pelvic sonogram.    Fibroid uterus with a dominant posterior fibroid measuring up to 3.1 cm.    Poorly visualized endometrial cavity measures up to 1.3 cm.    The bilateral ovaries were not visualized.        --- End of Report ---          CHECO NIETO MD; Resident Radiologist  This document has been electronically signed.  MEAGAN LAU MD; Attending Radiologist  This document has been electronically signed. 2023  2:52PM

## 2023-04-22 NOTE — ED PROVIDER NOTE - NSFOLLOWUPCLINICS_GEN_ALL_ED_FT
[facility] Gynecology and Obstetrics  Gynceology/OB  [address] [place] [address]  Phone: [phone]  Fax:

## 2023-04-22 NOTE — ED PROVIDER NOTE - NS ED ROS FT
Constitutional: No fever, chills.  Eyes:  No visual changes  ENMT:  No neck pain  Cardiac:  +chest pain  Respiratory:  +SOB  GI:  No nausea, vomiting, diarrhea, abdominal pain.  : +vaginal bleeding   MS:  No back pain.  Neuro:  +lightheadedness  Skin:  No skin rash  Except as documented in the HPI,  all other systems are negative.

## 2023-08-26 ENCOUNTER — INPATIENT (INPATIENT)
Facility: HOSPITAL | Age: 43
LOS: 3 days | Discharge: ANOTHER HOSPITAL | DRG: 812 | End: 2023-08-30
Attending: STUDENT IN AN ORGANIZED HEALTH CARE EDUCATION/TRAINING PROGRAM | Admitting: STUDENT IN AN ORGANIZED HEALTH CARE EDUCATION/TRAINING PROGRAM
Payer: MEDICAID

## 2023-08-26 VITALS
WEIGHT: 237 LBS | HEART RATE: 101 BPM | RESPIRATION RATE: 16 BRPM | TEMPERATURE: 98 F | OXYGEN SATURATION: 98 % | HEIGHT: 65 IN | DIASTOLIC BLOOD PRESSURE: 67 MMHG | SYSTOLIC BLOOD PRESSURE: 105 MMHG

## 2023-08-26 DIAGNOSIS — D62 ACUTE POSTHEMORRHAGIC ANEMIA: ICD-10-CM

## 2023-08-26 DIAGNOSIS — Z92.89 PERSONAL HISTORY OF OTHER MEDICAL TREATMENT: Chronic | ICD-10-CM

## 2023-08-26 LAB
ALBUMIN SERPL ELPH-MCNC: 2.2 G/DL — LOW (ref 3.3–5)
ALBUMIN SERPL ELPH-MCNC: 2.4 G/DL — LOW (ref 3.3–5)
ALP SERPL-CCNC: 148 U/L — HIGH (ref 30–120)
ALP SERPL-CCNC: 166 U/L — HIGH (ref 30–120)
ALT FLD-CCNC: 21 U/L — SIGNIFICANT CHANGE UP (ref 10–60)
ALT FLD-CCNC: 31 U/L — SIGNIFICANT CHANGE UP (ref 10–60)
ANION GAP SERPL CALC-SCNC: 10 MMOL/L — SIGNIFICANT CHANGE UP (ref 5–17)
ANION GAP SERPL CALC-SCNC: 12 MMOL/L — SIGNIFICANT CHANGE UP (ref 5–17)
APTT BLD: 27.9 SEC — SIGNIFICANT CHANGE UP (ref 24.5–35.6)
AST SERPL-CCNC: 60 U/L — HIGH (ref 10–40)
AST SERPL-CCNC: 89 U/L — HIGH (ref 10–40)
BASOPHILS # BLD AUTO: 0 K/UL — SIGNIFICANT CHANGE UP (ref 0–0.2)
BASOPHILS NFR BLD AUTO: 0 % — SIGNIFICANT CHANGE UP (ref 0–2)
BILIRUB SERPL-MCNC: 1.6 MG/DL — HIGH (ref 0.2–1.2)
BILIRUB SERPL-MCNC: 3.2 MG/DL — HIGH (ref 0.2–1.2)
BUN SERPL-MCNC: 5 MG/DL — LOW (ref 7–23)
BUN SERPL-MCNC: 6 MG/DL — LOW (ref 7–23)
CALCIUM SERPL-MCNC: 6.4 MG/DL — CRITICAL LOW (ref 8.4–10.5)
CALCIUM SERPL-MCNC: 6.6 MG/DL — LOW (ref 8.4–10.5)
CHLORIDE SERPL-SCNC: 103 MMOL/L — SIGNIFICANT CHANGE UP (ref 96–108)
CHLORIDE SERPL-SCNC: 105 MMOL/L — SIGNIFICANT CHANGE UP (ref 96–108)
CK MB BLD-MCNC: 2.5 % — SIGNIFICANT CHANGE UP (ref 0–3.5)
CK MB CFR SERPL CALC: 2 NG/ML — SIGNIFICANT CHANGE UP (ref 0–3.6)
CK SERPL-CCNC: 79 U/L — SIGNIFICANT CHANGE UP (ref 26–192)
CO2 SERPL-SCNC: 27 MMOL/L — SIGNIFICANT CHANGE UP (ref 22–31)
CO2 SERPL-SCNC: 27 MMOL/L — SIGNIFICANT CHANGE UP (ref 22–31)
CREAT SERPL-MCNC: 0.49 MG/DL — LOW (ref 0.5–1.3)
CREAT SERPL-MCNC: 0.73 MG/DL — SIGNIFICANT CHANGE UP (ref 0.5–1.3)
EGFR: 104 ML/MIN/1.73M2 — SIGNIFICANT CHANGE UP
EGFR: 119 ML/MIN/1.73M2 — SIGNIFICANT CHANGE UP
EOSINOPHIL # BLD AUTO: 0 K/UL — SIGNIFICANT CHANGE UP (ref 0–0.5)
EOSINOPHIL NFR BLD AUTO: 0 % — SIGNIFICANT CHANGE UP (ref 0–6)
FERRITIN SERPL-MCNC: 147 NG/ML — SIGNIFICANT CHANGE UP (ref 15–150)
GLUCOSE SERPL-MCNC: 91 MG/DL — SIGNIFICANT CHANGE UP (ref 70–99)
GLUCOSE SERPL-MCNC: 95 MG/DL — SIGNIFICANT CHANGE UP (ref 70–99)
HCG SERPL-ACNC: 1 MIU/ML — SIGNIFICANT CHANGE UP
HCT VFR BLD CALC: 14.1 % — CRITICAL LOW (ref 34.5–45)
HCT VFR BLD CALC: 18 % — CRITICAL LOW (ref 34.5–45)
HGB BLD-MCNC: 3.6 G/DL — CRITICAL LOW (ref 11.5–15.5)
HGB BLD-MCNC: 5.2 G/DL — CRITICAL LOW (ref 11.5–15.5)
IMM GRANULOCYTES NFR BLD AUTO: 0.2 % — SIGNIFICANT CHANGE UP (ref 0–0.9)
INR BLD: 1.42 RATIO — HIGH (ref 0.85–1.18)
IRON SATN MFR SERPL: 17 UG/DL — LOW (ref 30–160)
IRON SATN MFR SERPL: 7 % — LOW (ref 14–50)
LIDOCAIN IGE QN: 17 U/L — SIGNIFICANT CHANGE UP (ref 16–77)
LYMPHOCYTES # BLD AUTO: 0.53 K/UL — LOW (ref 1–3.3)
LYMPHOCYTES # BLD AUTO: 11.6 % — LOW (ref 13–44)
MAGNESIUM SERPL-MCNC: 0.7 MG/DL — CRITICAL LOW (ref 1.6–2.6)
MCHC RBC-ENTMCNC: 21.2 PG — LOW (ref 27–34)
MCHC RBC-ENTMCNC: 24 PG — LOW (ref 27–34)
MCHC RBC-ENTMCNC: 25.5 GM/DL — LOW (ref 32–36)
MCHC RBC-ENTMCNC: 28.9 GM/DL — LOW (ref 32–36)
MCV RBC AUTO: 82.9 FL — SIGNIFICANT CHANGE UP (ref 80–100)
MCV RBC AUTO: 82.9 FL — SIGNIFICANT CHANGE UP (ref 80–100)
MONOCYTES # BLD AUTO: 0.5 K/UL — SIGNIFICANT CHANGE UP (ref 0–0.9)
MONOCYTES NFR BLD AUTO: 10.9 % — SIGNIFICANT CHANGE UP (ref 2–14)
NEUTROPHILS # BLD AUTO: 3.53 K/UL — SIGNIFICANT CHANGE UP (ref 1.8–7.4)
NEUTROPHILS NFR BLD AUTO: 77.3 % — HIGH (ref 43–77)
NRBC # BLD: 0 /100 WBCS — SIGNIFICANT CHANGE UP (ref 0–0)
NRBC # BLD: 0 /100 WBCS — SIGNIFICANT CHANGE UP (ref 0–0)
PLATELET # BLD AUTO: 106 K/UL — LOW (ref 150–400)
PLATELET # BLD AUTO: 113 K/UL — LOW (ref 150–400)
POTASSIUM SERPL-MCNC: 3.2 MMOL/L — LOW (ref 3.5–5.3)
POTASSIUM SERPL-MCNC: 3.4 MMOL/L — LOW (ref 3.5–5.3)
POTASSIUM SERPL-SCNC: 3.2 MMOL/L — LOW (ref 3.5–5.3)
POTASSIUM SERPL-SCNC: 3.4 MMOL/L — LOW (ref 3.5–5.3)
PROT SERPL-MCNC: 4.6 G/DL — LOW (ref 6–8.3)
PROT SERPL-MCNC: 5 G/DL — LOW (ref 6–8.3)
PROTHROM AB SERPL-ACNC: 15.3 SEC — HIGH (ref 9.5–13)
RBC # BLD: 1.7 M/UL — LOW (ref 3.8–5.2)
RBC # BLD: 2.17 M/UL — LOW (ref 3.8–5.2)
RBC # FLD: 17.9 % — HIGH (ref 10.3–14.5)
RBC # FLD: 21.9 % — HIGH (ref 10.3–14.5)
SODIUM SERPL-SCNC: 142 MMOL/L — SIGNIFICANT CHANGE UP (ref 135–145)
SODIUM SERPL-SCNC: 142 MMOL/L — SIGNIFICANT CHANGE UP (ref 135–145)
T3 SERPL-MCNC: 57 NG/DL — LOW (ref 80–200)
T4 AB SER-ACNC: 4.1 UG/DL — LOW (ref 4.6–12)
T4 FREE SERPL-MCNC: 1 NG/DL — SIGNIFICANT CHANGE UP (ref 0.9–1.8)
TIBC SERPL-MCNC: 260 UG/DL — SIGNIFICANT CHANGE UP (ref 220–430)
TRANSFERRIN SERPL-MCNC: 216 MG/DL — SIGNIFICANT CHANGE UP (ref 200–360)
TROPONIN I, HIGH SENSITIVITY RESULT: 10.6 NG/L — SIGNIFICANT CHANGE UP
TSH SERPL-MCNC: 3.32 UIU/ML — SIGNIFICANT CHANGE UP (ref 0.27–4.2)
UIBC SERPL-MCNC: 243 UG/DL — SIGNIFICANT CHANGE UP (ref 110–370)
WBC # BLD: 4.57 K/UL — SIGNIFICANT CHANGE UP (ref 3.8–10.5)
WBC # BLD: 5.47 K/UL — SIGNIFICANT CHANGE UP (ref 3.8–10.5)
WBC # FLD AUTO: 4.57 K/UL — SIGNIFICANT CHANGE UP (ref 3.8–10.5)
WBC # FLD AUTO: 5.47 K/UL — SIGNIFICANT CHANGE UP (ref 3.8–10.5)

## 2023-08-26 PROCEDURE — 99232 SBSQ HOSP IP/OBS MODERATE 35: CPT

## 2023-08-26 PROCEDURE — 99223 1ST HOSP IP/OBS HIGH 75: CPT

## 2023-08-26 PROCEDURE — 76830 TRANSVAGINAL US NON-OB: CPT | Mod: 26

## 2023-08-26 PROCEDURE — 99285 EMERGENCY DEPT VISIT HI MDM: CPT

## 2023-08-26 PROCEDURE — 93010 ELECTROCARDIOGRAM REPORT: CPT

## 2023-08-26 RX ORDER — DIAZEPAM 5 MG
5 TABLET ORAL
Refills: 0 | Status: DISCONTINUED | OUTPATIENT
Start: 2023-08-26 | End: 2023-08-30

## 2023-08-26 RX ORDER — GABAPENTIN 400 MG/1
400 CAPSULE ORAL ONCE
Refills: 0 | Status: COMPLETED | OUTPATIENT
Start: 2023-08-26 | End: 2023-08-26

## 2023-08-26 RX ORDER — ALBUTEROL 90 UG/1
2 AEROSOL, METERED ORAL EVERY 6 HOURS
Refills: 0 | Status: DISCONTINUED | OUTPATIENT
Start: 2023-08-26 | End: 2023-08-30

## 2023-08-26 RX ORDER — CALCIUM CARBONATE 500(1250)
1 TABLET ORAL
Refills: 0 | Status: DISCONTINUED | OUTPATIENT
Start: 2023-08-26 | End: 2023-08-30

## 2023-08-26 RX ORDER — TRAZODONE HCL 50 MG
50 TABLET ORAL
Refills: 0 | Status: DISCONTINUED | OUTPATIENT
Start: 2023-08-26 | End: 2023-08-30

## 2023-08-26 RX ORDER — ONDANSETRON 8 MG/1
4 TABLET, FILM COATED ORAL EVERY 8 HOURS
Refills: 0 | Status: DISCONTINUED | OUTPATIENT
Start: 2023-08-26 | End: 2023-08-30

## 2023-08-26 RX ORDER — SODIUM CHLORIDE 9 MG/ML
1000 INJECTION INTRAMUSCULAR; INTRAVENOUS; SUBCUTANEOUS
Refills: 0 | Status: DISCONTINUED | OUTPATIENT
Start: 2023-08-26 | End: 2023-08-30

## 2023-08-26 RX ORDER — MEDROXYPROGESTERONE ACETATE 150 MG/ML
150 INJECTION, SUSPENSION, EXTENDED RELEASE INTRAMUSCULAR ONCE
Refills: 0 | Status: DISCONTINUED | OUTPATIENT
Start: 2023-08-26 | End: 2023-08-26

## 2023-08-26 RX ORDER — MAGNESIUM SULFATE 500 MG/ML
2 VIAL (ML) INJECTION ONCE
Refills: 0 | Status: DISCONTINUED | OUTPATIENT
Start: 2023-08-26 | End: 2023-08-26

## 2023-08-26 RX ORDER — MAGNESIUM OXIDE 400 MG ORAL TABLET 241.3 MG
400 TABLET ORAL DAILY
Refills: 0 | Status: DISCONTINUED | OUTPATIENT
Start: 2023-08-26 | End: 2023-08-30

## 2023-08-26 RX ORDER — MAGNESIUM SULFATE 500 MG/ML
1 VIAL (ML) INJECTION ONCE
Refills: 0 | Status: COMPLETED | OUTPATIENT
Start: 2023-08-26 | End: 2023-08-26

## 2023-08-26 RX ORDER — DIAZEPAM 5 MG
1 TABLET ORAL
Refills: 0 | DISCHARGE

## 2023-08-26 RX ORDER — FOLIC ACID 0.8 MG
1 TABLET ORAL DAILY
Refills: 0 | Status: DISCONTINUED | OUTPATIENT
Start: 2023-08-26 | End: 2023-08-30

## 2023-08-26 RX ORDER — POTASSIUM CHLORIDE 20 MEQ
40 PACKET (EA) ORAL EVERY 4 HOURS
Refills: 0 | Status: COMPLETED | OUTPATIENT
Start: 2023-08-26 | End: 2023-08-26

## 2023-08-26 RX ORDER — ACETAMINOPHEN 500 MG
650 TABLET ORAL EVERY 6 HOURS
Refills: 0 | Status: DISCONTINUED | OUTPATIENT
Start: 2023-08-26 | End: 2023-08-26

## 2023-08-26 RX ORDER — TRANEXAMIC ACID 100 MG/ML
1000 INJECTION, SOLUTION INTRAVENOUS ONCE
Refills: 0 | Status: COMPLETED | OUTPATIENT
Start: 2023-08-26 | End: 2023-08-26

## 2023-08-26 RX ORDER — METOPROLOL TARTRATE 50 MG
50 TABLET ORAL DAILY
Refills: 0 | Status: DISCONTINUED | OUTPATIENT
Start: 2023-08-26 | End: 2023-08-29

## 2023-08-26 RX ORDER — SERTRALINE 25 MG/1
100 TABLET, FILM COATED ORAL DAILY
Refills: 0 | Status: DISCONTINUED | OUTPATIENT
Start: 2023-08-26 | End: 2023-08-30

## 2023-08-26 RX ORDER — MAGNESIUM SULFATE 500 MG/ML
3 VIAL (ML) INJECTION ONCE
Refills: 0 | Status: COMPLETED | OUTPATIENT
Start: 2023-08-26 | End: 2023-08-26

## 2023-08-26 RX ORDER — CALCIUM GLUCONATE 100 MG/ML
1 VIAL (ML) INTRAVENOUS ONCE
Refills: 0 | Status: COMPLETED | OUTPATIENT
Start: 2023-08-26 | End: 2023-08-26

## 2023-08-26 RX ORDER — IRON SUCROSE 20 MG/ML
200 INJECTION, SOLUTION INTRAVENOUS EVERY 24 HOURS
Refills: 0 | Status: DISCONTINUED | OUTPATIENT
Start: 2023-08-26 | End: 2023-08-30

## 2023-08-26 RX ORDER — CALCIUM GLUCONATE 100 MG/ML
2 VIAL (ML) INTRAVENOUS ONCE
Refills: 0 | Status: COMPLETED | OUTPATIENT
Start: 2023-08-26 | End: 2023-08-26

## 2023-08-26 RX ORDER — GABAPENTIN 400 MG/1
400 CAPSULE ORAL
Refills: 0 | Status: DISCONTINUED | OUTPATIENT
Start: 2023-08-26 | End: 2023-08-30

## 2023-08-26 RX ORDER — SODIUM CHLORIDE 9 MG/ML
1000 INJECTION INTRAMUSCULAR; INTRAVENOUS; SUBCUTANEOUS ONCE
Refills: 0 | Status: COMPLETED | OUTPATIENT
Start: 2023-08-26 | End: 2023-08-26

## 2023-08-26 RX ORDER — POTASSIUM CHLORIDE 20 MEQ
40 PACKET (EA) ORAL ONCE
Refills: 0 | Status: COMPLETED | OUTPATIENT
Start: 2023-08-26 | End: 2023-08-26

## 2023-08-26 RX ORDER — MEDROXYPROGESTERONE ACETATE 150 MG/ML
10 INJECTION, SUSPENSION, EXTENDED RELEASE INTRAMUSCULAR
Refills: 0 | Status: DISCONTINUED | OUTPATIENT
Start: 2023-08-26 | End: 2023-08-30

## 2023-08-26 RX ADMIN — Medication 1 MILLIGRAM(S): at 11:27

## 2023-08-26 RX ADMIN — SERTRALINE 100 MILLIGRAM(S): 25 TABLET, FILM COATED ORAL at 11:27

## 2023-08-26 RX ADMIN — TRANEXAMIC ACID 200 MILLIGRAM(S): 100 INJECTION, SOLUTION INTRAVENOUS at 05:14

## 2023-08-26 RX ADMIN — IRON SUCROSE 110 MILLIGRAM(S): 20 INJECTION, SOLUTION INTRAVENOUS at 14:19

## 2023-08-26 RX ADMIN — Medication 5 MILLIGRAM(S): at 18:02

## 2023-08-26 RX ADMIN — Medication 40 MILLIEQUIVALENT(S): at 10:57

## 2023-08-26 RX ADMIN — Medication 40 MILLIEQUIVALENT(S): at 14:18

## 2023-08-26 RX ADMIN — Medication 40 MILLIEQUIVALENT(S): at 18:02

## 2023-08-26 RX ADMIN — GABAPENTIN 400 MILLIGRAM(S): 400 CAPSULE ORAL at 21:17

## 2023-08-26 RX ADMIN — MEDROXYPROGESTERONE ACETATE 10 MILLIGRAM(S): 150 INJECTION, SUSPENSION, EXTENDED RELEASE INTRAMUSCULAR at 15:35

## 2023-08-26 RX ADMIN — MEDROXYPROGESTERONE ACETATE 10 MILLIGRAM(S): 150 INJECTION, SUSPENSION, EXTENDED RELEASE INTRAMUSCULAR at 06:49

## 2023-08-26 RX ADMIN — Medication 50 MILLIGRAM(S): at 21:17

## 2023-08-26 RX ADMIN — TRANEXAMIC ACID 1000 MILLIGRAM(S): 100 INJECTION, SOLUTION INTRAVENOUS at 05:44

## 2023-08-26 RX ADMIN — SODIUM CHLORIDE 1000 MILLILITER(S): 9 INJECTION INTRAMUSCULAR; INTRAVENOUS; SUBCUTANEOUS at 03:54

## 2023-08-26 RX ADMIN — Medication 100 GRAM(S): at 23:38

## 2023-08-26 RX ADMIN — Medication 1 TABLET(S): at 18:03

## 2023-08-26 RX ADMIN — Medication 100 GRAM(S): at 18:02

## 2023-08-26 RX ADMIN — SODIUM CHLORIDE 1000 MILLILITER(S): 9 INJECTION INTRAMUSCULAR; INTRAVENOUS; SUBCUTANEOUS at 02:54

## 2023-08-26 RX ADMIN — GABAPENTIN 400 MILLIGRAM(S): 400 CAPSULE ORAL at 10:56

## 2023-08-26 RX ADMIN — Medication 200 GRAM(S): at 10:52

## 2023-08-26 RX ADMIN — Medication 25 GRAM(S): at 19:34

## 2023-08-26 RX ADMIN — SODIUM CHLORIDE 100 MILLILITER(S): 9 INJECTION INTRAMUSCULAR; INTRAVENOUS; SUBCUTANEOUS at 11:40

## 2023-08-26 RX ADMIN — MEDROXYPROGESTERONE ACETATE 10 MILLIGRAM(S): 150 INJECTION, SUSPENSION, EXTENDED RELEASE INTRAMUSCULAR at 23:22

## 2023-08-26 RX ADMIN — MAGNESIUM OXIDE 400 MG ORAL TABLET 400 MILLIGRAM(S): 241.3 TABLET ORAL at 11:27

## 2023-08-26 RX ADMIN — SODIUM CHLORIDE 100 MILLILITER(S): 9 INJECTION INTRAMUSCULAR; INTRAVENOUS; SUBCUTANEOUS at 06:47

## 2023-08-26 RX ADMIN — Medication 100 GRAM(S): at 22:10

## 2023-08-26 NOTE — PROGRESS NOTE ADULT - SUBJECTIVE AND OBJECTIVE BOX
Patient is a 42y old  Female who presents with a chief complaint of Chronic anemia superimposed with an acute episode of DUB & Hgb=3.6 (26 Aug 2023 06:15)      BRIEF HOSPITAL COURSE-  43 y/o Female with PMH AFIB (Not on AC), Neuropathy, Iron Deficiency Anemia, Hyperthyroidism, Depression, Asthma/COPD, history of Dysfunctional Uterine Bleeding Secondary to Fibroids requiring prior transfusions presents to  ED complaining of vaginal bleeding x2 weeks. Notes heavy bleeding with clots, requiring multiple pad a day. She also notes syncopal episodes yesterday and today as well as chest tightness. In general, patient has had significant vaginal bleeding for 2-3 years. Recent admission at [facility] in  for vaginal bleeding, found to have posterior fibroid on US. She had referrals to follow up but she states the physicians do not accept her insurance.    Events last 24 hours:   Continues to have significant vaginal bleeding. Repeat H/H 5.2/ s/p 2U PRBC. Significant electrolytes abnormalities on admission. Noted to have ectopy on monitor, will give additional 1g Mg SO4. Repeat CBC and CMP @23:00.        Review of Systems:  CONSTITUTIONAL: +Fatigue. No fever or chills.  EYES: No eye pain, visual disturbances, or discharge.  ENMT:  No difficulty hearing, tinnitus, vertigo. No sinus or throat pain.  NECK: No pain or stiffness.  RESPIRATORY: +Shortness of breath. No cough, wheezing, chills or hemoptysis.  CARDIOVASCULAR: +Chest pain. No palpitations, dizziness, or leg swelling.  GASTROINTESTINAL: No abdominal or epigastric pain. No nausea, vomiting, or hematemesis. No diarrhea or constipation. No melena or hematochezia.  GENITOURINARY: No dysuria, frequency, hematuria, or incontinence.  NEUROLOGICAL: No headaches, memory loss, loss of strength, numbness, or tremors.  SKIN: No itching, burning, rashes, or lesions.   MUSCULOSKELETAL: No joint pain or swelling. No muscle, back, or extremity pain.  PSYCHIATRIC: No depression, anxiety, mood swings, or difficulty sleeping.        PAST MEDICAL & SURGICAL HISTORY-  Atrial fibrillation      History of Hyperthyroidism      Asthma      History of anemia      Depression, unspecified depression type      Smoker      S/P  Section  ,,      History of blood transfusion          Medications:    metoprolol succinate ER 50 milliGRAM(s) Oral daily    albuterol    90 MICROgram(s) HFA Inhaler 2 Puff(s) Inhalation every 6 hours PRN    diazepam    Tablet 5 milliGRAM(s) Oral two times a day  gabapentin 400 milliGRAM(s) Oral two times a day  ondansetron Injectable 4 milliGRAM(s) IV Push every 8 hours PRN  oxycodone    5 mG/acetaminophen 325 mG 1 Tablet(s) Oral every 4 hours PRN  oxycodone    5 mG/acetaminophen 325 mG 2 Tablet(s) Oral every 4 hours PRN  sertraline 100 milliGRAM(s) Oral daily  traZODone 50 milliGRAM(s) Oral two times a day      medroxyPROGESTERone 10 milliGRAM(s) Oral <User Schedule>  methimazole 10 milliGRAM(s) Oral daily    calcium carbonate   1250 mG (OsCal) 1 Tablet(s) Oral two times a day  calcium gluconate IVPB 1 Gram(s) IV Intermittent once  folic acid 1 milliGRAM(s) Oral daily  iron sucrose IVPB 200 milliGRAM(s) IV Intermittent every 24 hours  magnesium oxide 400 milliGRAM(s) Oral daily  magnesium sulfate  IVPB 1 Gram(s) IV Intermittent once  sodium chloride 0.9%. 1000 milliLiter(s) IV Continuous <Continuous>          ICU Vital Signs Last 24 Hrs  T(C): 37.1 (26 Aug 2023 18:09), Max: 37.4 (26 Aug 2023 10:40)  T(F): 98.7 (26 Aug 2023 18:09), Max: 99.3 (26 Aug 2023 10:40)  HR: 91 (26 Aug 2023 18:09) (91 - 101)  BP: 113/61 (26 Aug 2023 18:09) (93/53 - 113/61)  BP(mean): 76 (26 Aug 2023 18:09) (76 - 76)  ABP: --  ABP(mean): --  RR: 12 (26 Aug 2023 18:09) (12 - 18)  SpO2: 93% (26 Aug 2023 18:09) (93% - 99%)    O2 Parameters below as of 26 Aug 2023 18:09  Patient On (Oxygen Delivery Method): room air        I&O's Detail          LABS:                        5.2    5.47  )-----------( 106      ( 26 Aug 2023 16:22 )             18.0         142  |  105  |  6<L>  ----------------------------<  91  3.4<L>   |  27  |  0.49<L>    Ca    6.4<LL>      26 Aug 2023 16:22  Mg     .7         TPro  4.6<L>  /  Alb  2.2<L>  /  TBili  3.2<H>  /  DBili  x   /  AST  60<H>  /  ALT  21  /  AlkPhos  148<H>        CARDIAC MARKERS ( 26 Aug 2023 02:43 )  x     / x     / 79 U/L / x     / 2.0 ng/mL      CAPILLARY BLOOD GLUCOSE        PT/INR - ( 26 Aug 2023 02:43 )   PT: 15.3 sec;   INR: 1.42 ratio    PTT - ( 26 Aug 2023 02:43 )  PTT:27.9 sec        Urinalysis Basic - ( 26 Aug 2023 16:22 )  Color: x / Appearance: x / SG: x / pH: x  Gluc: 91 mg/dL / Ketone: x  / Bili: x / Urobili: x   Blood: x / Protein: x / Nitrite: x   Leuk Esterase: x / RBC: x / WBC x   Sq Epi: x / Non Sq Epi: x / Bacteria: x        CULTURES:        Physical Examination:  General: Middle aged female, well developed. In no acute distress.    HEENT: Pupils equal, reactive to light. Symmetric.  PULM: Clear to auscultation bilaterally, no adventitious sounds. No significant sputum production.  NECK: Supple, no lymphadenopathy, trachea midline  CVS: Regular rate and rhythm. No murmurs, rubs, or gallops. S1, S2 intact.   ABD: Soft, nondistended, diffuse tenderness, normoactive bowel sounds. No masses palpable.   EXT: No edema, nontender  SKIN: Warm and well perfused, no rashes noted.  NEURO: Alert, oriented, interactive, nonfocal.  DEVICES:       RADIOLOGY-    < from: US Transvaginal (23 @ 04:24) >    ACC: [id number] EXAM:  US TRANSVAGINAL   ORDERED BY: CASPER HASSAN     PROCEDURE DATE:  2023      INTERPRETATION:  CLINICAL INDICATION: dysfunctional uterine bleeding,   history of fibroids and , negative serum beta-hCG.    TECHNIQUE: Transabdominal ultrasound of the pelvis was performed.   Grayscale, color Doppler and spectral Doppler were utilized.    COMPARISON: 2023. 2017.    FINDINGS: This study was technically difficult due to patient's body   habitus and bowel gas. Patient was unable to tolerate transvaginal   examination.    Uterus: 9.8 x 5.3 x 5.4 cm. Postsurgical changes. A 2.2 x 2.2 x 2.2 cm   hypoechoic area posteriorly, which is difficult to assess but may be   related to a fibroid.  Endometrium: 1.8 cm. Heterogenous echotexture.  Right ovary: Not visualized.  Left ovary: Visualized transabdominally. 2.7 x 1.5 x 1.5 cm. Small   follicles. Flow present.  Additional: Trace free fluid.    IMPRESSION:    Myomatous uterus. Thickened, heterogeneous endometrium, which is   suboptimally assessed on this study. Recommend follow-up (sonohysterogram   and/or direct visualization) for further evaluation.    --- End of Report ---      AMANDA LAU MD; Attending Radiologist  This document has been electronically signed. Aug 26 2023  4:48AM    < end of copied text >          CRITICAL CARE TIME SPENT: 45 minutes assessing presenting problems of acute illness, which pose high probability of life threatening deterioration or end organ damage/dysfunction, as well as medical decision making including initiating plan of care, reviewing data, reviewing radiologic exams, discussing with multidisciplinary team,  discussing goals of care with patient/family, and writing this note.  Non-inclusive of procedures performed,   Patient is a 42y old  Female who presents with a chief complaint of Chronic anemia superimposed with an acute episode of DUB & Hgb=3.6 (26 Aug 2023 06:15)      BRIEF HOSPITAL COURSE-  43 y/o Female with PMH AFIB (Not on AC), Neuropathy, Iron Deficiency Anemia, Hyperthyroidism, Depression, Asthma/COPD, history of Dysfunctional Uterine Bleeding Secondary to Fibroids requiring prior transfusions presents to  ED complaining of vaginal bleeding x2 weeks. Notes heavy bleeding with clots, requiring multiple pad a day. She also notes syncopal episodes yesterday and today as well as chest tightness. In general, patient has had significant vaginal bleeding for 2-3 years. Recent admission at [facility] in  for vaginal bleeding, found to have posterior fibroid on US. She had referrals to follow up but she states the physicians do not accept her insurance.      Events last 24 hours:   Continues to have significant vaginal bleeding. Repeat H/H 5.2/ s/p 2U PRBC. Significant electrolytes abnormalities on admission. Noted to have ectopy on monitor, will give additional 1g Mg SO4. Repeat CBC and CMP @23:00.        Review of Systems:  CONSTITUTIONAL: +Fatigue. No fever or chills.  EYES: No eye pain, visual disturbances, or discharge.  ENMT:  No difficulty hearing, tinnitus, vertigo. No sinus or throat pain.  NECK: No pain or stiffness.  RESPIRATORY: +Shortness of breath. No cough, wheezing, chills or hemoptysis.  CARDIOVASCULAR: +Chest pain. No palpitations, dizziness, or leg swelling.  GASTROINTESTINAL: No abdominal or epigastric pain. No nausea, vomiting, or hematemesis. No diarrhea or constipation. No melena or hematochezia.  GENITOURINARY: No dysuria, frequency, hematuria, or incontinence.  NEUROLOGICAL: No headaches, memory loss, loss of strength, numbness, or tremors.  SKIN: No itching, burning, rashes, or lesions.   MUSCULOSKELETAL: No joint pain or swelling. No muscle, back, or extremity pain.  PSYCHIATRIC: No depression, anxiety, mood swings, or difficulty sleeping.        PAST MEDICAL & SURGICAL HISTORY-  Atrial fibrillation      History of Hyperthyroidism      Asthma      History of anemia      Depression, unspecified depression type      Smoker      S/P  Section  ,,      History of blood transfusion          Medications:    metoprolol succinate ER 50 milliGRAM(s) Oral daily    albuterol    90 MICROgram(s) HFA Inhaler 2 Puff(s) Inhalation every 6 hours PRN    diazepam    Tablet 5 milliGRAM(s) Oral two times a day  gabapentin 400 milliGRAM(s) Oral two times a day  ondansetron Injectable 4 milliGRAM(s) IV Push every 8 hours PRN  oxycodone    5 mG/acetaminophen 325 mG 1 Tablet(s) Oral every 4 hours PRN  oxycodone    5 mG/acetaminophen 325 mG 2 Tablet(s) Oral every 4 hours PRN  sertraline 100 milliGRAM(s) Oral daily  traZODone 50 milliGRAM(s) Oral two times a day      medroxyPROGESTERone 10 milliGRAM(s) Oral <User Schedule>  methimazole 10 milliGRAM(s) Oral daily    calcium carbonate   1250 mG (OsCal) 1 Tablet(s) Oral two times a day  calcium gluconate IVPB 1 Gram(s) IV Intermittent once  folic acid 1 milliGRAM(s) Oral daily  iron sucrose IVPB 200 milliGRAM(s) IV Intermittent every 24 hours  magnesium oxide 400 milliGRAM(s) Oral daily  magnesium sulfate  IVPB 1 Gram(s) IV Intermittent once  sodium chloride 0.9%. 1000 milliLiter(s) IV Continuous <Continuous>          ICU Vital Signs Last 24 Hrs  T(C): 37.1 (26 Aug 2023 18:09), Max: 37.4 (26 Aug 2023 10:40)  T(F): 98.7 (26 Aug 2023 18:09), Max: 99.3 (26 Aug 2023 10:40)  HR: 91 (26 Aug 2023 18:09) (91 - 101)  BP: 113/61 (26 Aug 2023 18:09) (93/53 - 113/61)  BP(mean): 76 (26 Aug 2023 18:09) (76 - 76)  ABP: --  ABP(mean): --  RR: 12 (26 Aug 2023 18:09) (12 - 18)  SpO2: 93% (26 Aug 2023 18:09) (93% - 99%)    O2 Parameters below as of 26 Aug 2023 18:09  Patient On (Oxygen Delivery Method): room air        I&O's Detail          LABS:                        5.2    5.47  )-----------( 106      ( 26 Aug 2023 16:22 )             18.0         142  |  105  |  6<L>  ----------------------------<  91  3.4<L>   |  27  |  0.49<L>    Ca    6.4<LL>      26 Aug 2023 16:22  Mg     .7         TPro  4.6<L>  /  Alb  2.2<L>  /  TBili  3.2<H>  /  DBili  x   /  AST  60<H>  /  ALT  21  /  AlkPhos  148<H>        CARDIAC MARKERS ( 26 Aug 2023 02:43 )  x     / x     / 79 U/L / x     / 2.0 ng/mL      CAPILLARY BLOOD GLUCOSE        PT/INR - ( 26 Aug 2023 02:43 )   PT: 15.3 sec;   INR: 1.42 ratio    PTT - ( 26 Aug 2023 02:43 )  PTT:27.9 sec        Urinalysis Basic - ( 26 Aug 2023 16:22 )  Color: x / Appearance: x / SG: x / pH: x  Gluc: 91 mg/dL / Ketone: x  / Bili: x / Urobili: x   Blood: x / Protein: x / Nitrite: x   Leuk Esterase: x / RBC: x / WBC x   Sq Epi: x / Non Sq Epi: x / Bacteria: x        CULTURES:        Physical Examination:  General: Middle aged female, well developed. In no acute distress.    HEENT: Pupils equal, reactive to light. Symmetric.  PULM: Clear to auscultation bilaterally, no adventitious sounds. No significant sputum production.  NECK: Supple, no lymphadenopathy, trachea midline  CVS: Regular rate and rhythm. No murmurs, rubs, or gallops. S1, S2 intact.   ABD: Soft, nondistended, diffuse tenderness, normoactive bowel sounds. No masses palpable.   EXT: No edema, nontender  SKIN: Warm and well perfused, no rashes noted.  NEURO: Alert, oriented, interactive, nonfocal.  DEVICES:         RADIOLOGY-    < from: US Transvaginal (23 @ 04:24) >    ACC: [id number] EXAM:  US TRANSVAGINAL   ORDERED BY: CASPER HASSAN     PROCEDURE DATE:  2023      INTERPRETATION:  CLINICAL INDICATION: dysfunctional uterine bleeding,   history of fibroids and , negative serum beta-hCG.    TECHNIQUE: Transabdominal ultrasound of the pelvis was performed.   Grayscale, color Doppler and spectral Doppler were utilized.    COMPARISON: 2023. 2017.    FINDINGS: This study was technically difficult due to patient's body   habitus and bowel gas. Patient was unable to tolerate transvaginal   examination.    Uterus: 9.8 x 5.3 x 5.4 cm. Postsurgical changes. A 2.2 x 2.2 x 2.2 cm   hypoechoic area posteriorly, which is difficult to assess but may be   related to a fibroid.  Endometrium: 1.8 cm. Heterogenous echotexture.  Right ovary: Not visualized.  Left ovary: Visualized transabdominally. 2.7 x 1.5 x 1.5 cm. Small   follicles. Flow present.  Additional: Trace free fluid.    IMPRESSION:    Myomatous uterus. Thickened, heterogeneous endometrium, which is   suboptimally assessed on this study. Recommend follow-up (sonohysterogram   and/or direct visualization) for further evaluation.    --- End of Report ---      AMANDA LAU MD; Attending Radiologist  This document has been electronically signed. Aug 26 2023  4:48AM    < end of copied text >          CRITICAL CARE TIME SPENT: 45 minutes assessing presenting problems of acute illness, which pose high probability of life threatening deterioration or end organ damage/dysfunction, as well as medical decision making including initiating plan of care, reviewing data, reviewing radiologic exams, discussing with multidisciplinary team,  discussing goals of care with patient/family, and writing this note.  Non-inclusive of procedures performed,

## 2023-08-26 NOTE — ED PROVIDER NOTE - PATIENT PORTAL LINK FT
You can access the FollowMyHealth Patient Portal offered by [facility] by registering at the following website: [email or web address]. By joining [employer]’s FollowMyHealth portal, you will also be able to view your health information using other applications (apps) compatible with our system.

## 2023-08-26 NOTE — ED PROVIDER NOTE - CLINICAL SUMMARY MEDICAL DECISION MAKING FREE TEXT BOX
Patient is a 42-year-old female presents to the emergency room with a chief complaint of vaginal bleeding.  Past medical history of asthma, A-fib on anticoagulants, anemia, hypothyroidism, history of dysfunctional uterine bleeding secondary to fibroids requiring prior transfusions.  Patient was last seen in the emergency room April 22, 2023 with chest tightness fatigue and dysfunctional uterine bleeding.  She was seen at outside hospital given TXA 1 g 1 unit of packed red blood cells and transferred for [facility] GYN evaluation.  It appears that prior to transfer the bleeding slowed.  Ultrasound did reveal a posterior fibroid.  Hemoglobin remained stable after transfer.  Patient was discharged on 1300 mg of TXA 3 times daily x5 days.  She is instructed to follow-up with the [facility] GYN clinic.  She was stabilized and discharged.  Reports that for the last 2 weeks she has been experiencing heavy vaginal bleeding with multiple clots.  She had to change her pad multiple times during the day too numerous to count.  Does report syncopal episode yesterday and 2 syncopal episodes today.  She further notes chest tightness.  Denies any fevers chills nausea vomiting shortness of breath.  States that she has follow-up with GYN outpatient but has not seen him or establish care yet. Patient is a 42-year-old female presents to the emergency room with a chief complaint of vaginal bleeding.  Past medical history of asthma, A-fib on anticoagulants, anemia, hypothyroidism, history of dysfunctional uterine bleeding secondary to fibroids requiring prior transfusions.  Patient was last seen in the emergency room April 22, 2023 with chest tightness fatigue and dysfunctional uterine bleeding.  She was seen at outside hospital given TXA 1 g 1 unit of packed red blood cells and transferred for [facility] GYN evaluation.  It appears that prior to transfer the bleeding slowed.  Ultrasound did reveal a posterior fibroid.  Hemoglobin remained stable after transfer.  Patient was discharged on 1300 mg of TXA 3 times daily x5 days.  She is instructed to follow-up with the [facility] GYN clinic.  She was stabilized and discharged.  Reports that for the last 2 weeks she has been experiencing heavy vaginal bleeding with multiple clots.  She had to change her pad multiple times during the day too numerous to count.  Does report syncopal episode yesterday and 2 syncopal episodes today.  She further notes chest tightness.  Denies any fevers chills nausea vomiting shortness of breath.  States that she has follow-up with GYN outpatient but has not seen him or establish care yet. On pelvic exam was able to visualize a tampon in the vaginal canal.  Removed.  Vaginal canal then swiped manually no additional retained foreign body.  Stable for discharge. Patient is a 42-year-old female presents to the emergency room with a chief complaint of vaginal bleeding.  Past medical history of asthma, A-fib on anticoagulants, anemia, hypothyroidism, history of dysfunctional uterine bleeding secondary to fibroids requiring prior transfusions.  Patient was last seen in the emergency room April 22, 2023 with chest tightness fatigue and dysfunctional uterine bleeding.  She was seen at outside hospital given TXA 1 g 1 unit of packed red blood cells and transferred for [facility] GYN evaluation.  It appears that prior to transfer the bleeding slowed.  Ultrasound did reveal a posterior fibroid.  Hemoglobin remained stable after transfer.  Patient was discharged on 1300 mg of TXA 3 times daily x5 days.  She is instructed to follow-up with the [facility] GYN clinic.  She was stabilized and discharged.  Reports that for the last 2 weeks she has been experiencing heavy vaginal bleeding with multiple clots.  She had to change her pad multiple times during the day too numerous to count.  Does report syncopal episode yesterday and 2 syncopal episodes today.  She further notes chest tightness.  Denies any fevers chills nausea vomiting shortness of breath.  States that she has follow-up with GYN outpatient but has not seen him or establish care yet. Patient is a 42-year-old female presents to the emergency room with a chief complaint of vaginal bleeding.  Past medical history of asthma, A-fib on anticoagulants, anemia, hypothyroidism, history of dysfunctional uterine bleeding secondary to fibroids requiring prior transfusions.  Patient was last seen in the emergency room April 22, 2023 with chest tightness fatigue and dysfunctional uterine bleeding.  She was seen at outside hospital given TXA 1 g 1 unit of packed red blood cells and transferred for [facility] GYN evaluation.  It appears that prior to transfer the bleeding slowed.  Ultrasound did reveal a posterior fibroid.  Hemoglobin remained stable after transfer.  Patient was discharged on 1300 mg of TXA 3 times daily x5 days.  She is instructed to follow-up with the [facility] GYN clinic.  She was stabilized and discharged.  Reports that for the last 2 weeks she has been experiencing heavy vaginal bleeding with multiple clots.  She had to change her pad multiple times during the day too numerous to count.  Does report syncopal episode yesterday and 2 syncopal episodes today.  She further notes chest tightness.  Denies any fevers chills nausea vomiting shortness of breath.  States that she has follow-up with GYN outpatient but has not seen him or establish care yet.    Indepednent review of EKG reveals NSR at 93 bpm Patient is a 42-year-old female presents to the emergency room with a chief complaint of vaginal bleeding.  Past medical history of asthma, A-fib not on anticoagulants, anemia, hypothyroidism, history of dysfunctional uterine bleeding secondary to fibroids requiring prior transfusions.  Patient was last seen in the emergency room April 22, 2023 with chest tightness fatigue and dysfunctional uterine bleeding.  She was seen at outside hospital given TXA 1 g 1 unit of packed red blood cells and transferred for [facility] GYN evaluation.  It appears that prior to transfer the bleeding slowed.  Ultrasound did reveal a posterior fibroid.  Hemoglobin remained stable after transfer.  Patient was discharged on 1300 mg of TXA 3 times daily x5 days.  She is instructed to follow-up with the [facility] GYN clinic.  She was stabilized and discharged.  Reports that for the last 2 weeks she has been experiencing heavy vaginal bleeding with multiple clots.  She had to change her pad multiple times during the day too numerous to count.  Does report syncopal episode yesterday and 2 syncopal episodes today.  She further notes chest tightness.  Denies any fevers chills nausea vomiting shortness of breath.  States that she has follow-up with GYN outpatient but has not seen him or establish care yet.    Indepednent review of EKG reveals NSR at 93 bpm

## 2023-08-26 NOTE — H&P ADULT - ASSESSMENT
Severe Blood Loss Anemia  Admit to Medicine  2u PRBC ordered  CBC Q6h  will likely need more PRBC  F/u GYN recs- Dr. Majano called by ED  Consider Venofer IV x5 days  transfusion started at the time of interview, unable to draw iron studies during transfusion, will ask lab to add on iron studies to pre-transfusion labs  pt had similar issue 5/2023 and was transferred to [facility] ED and discharged with script for TXA x5 days    Syncope due to blood loss  2u PRBC ordered  on remote tele  monitor vitals     Patient is a 41yo F, PMH AFIB not on AC, neuropathy, iron deficiency anemia, hyperthyroidism, depression, asthma/COPD, history of dysfunctional uterine bleeding secondary to fibroids requiring prior transfusions, presents to ED with vaginal bleeding x2 weeks.    Severe Blood Loss Anemia  Admit to Medicine  2u PRBC ordered  CBC Q6h  will likely need more PRBC  F/u GYN recs- Dr. Majano called by ED-Provera Q8h ordered  TXA ordered by ED  transfusion started at the time of interview, unable to draw iron studies during transfusion, will ask lab to add on iron studies to pre-transfusion labs  pt had similar issue 5/2023 and was transferred to [facility] ED and discharged with script for TXA x5 days    Syncope due to blood loss  2u PRBC ordered  on remote tele  monitor vitals    Iron Deficiency Anemia  iron studies added on to admission labs  Venofer 200mg IV daily x5 days ordered    Hyperthyroidism  Continue home Methimazole 10mg daily  f/u TFTs    AFIB, not on AC  continue home Metoprolol succ 50mg daily with hold parameters  SCDs    Neuropathy  continue home Gabapentin 400mg BID    Depression  continue home meds: Zoloft, Trazodone, Diazepam  f/u results    DVT ppx: SCDs  Dispo: home pending hospital course     Patient is a 41yo F, PMH AFIB not on AC, neuropathy, iron deficiency anemia, hyperthyroidism, depression, asthma/COPD, history of dysfunctional uterine bleeding secondary to fibroids requiring prior transfusions, presents to ED with vaginal bleeding x2 weeks.    Severe Blood Loss Anemia  Admit to Medicine  2u PRBC ordered  CBC Q6h  will likely need more PRBC  F/u GYN recs- Dr. Majano called by ED-Provera Q8h ordered  TXA ordered by ED  transfusion started at the time of interview, unable to draw iron studies during transfusion, will ask lab to add on iron studies to pre-transfusion labs  pt had similar issue 5/2023 and was transferred to [facility] ED and discharged with script for TXA x5 days    Syncope due to blood loss  2u PRBC ordered  on remote tele  monitor vitals    Iron Deficiency Anemia  iron studies added on to admission labs  Venofer 200mg IV daily x5 days ordered    Hypokalemia  KCl 40meq x2 doses  f/u AM labs to monitor electrolytes    Hyperthyroidism  Continue home Methimazole 10mg daily  f/u TFTs    AFIB, not on AC  continue home Metoprolol succ 50mg daily with hold parameters  SCDs    Neuropathy  continue home Gabapentin 400mg BID    Depression  continue home meds: Zoloft, Trazodone, Diazepam  f/u results    DVT ppx: SCDs  Dispo: home pending hospital course

## 2023-08-26 NOTE — H&P ADULT - NSHPPHYSICALEXAM_GEN_ALL_CORE
T(C): 36.8 (08-26-23 @ 04:45), Max: 36.9 (08-26-23 @ 01:51)  HR: 98 (08-26-23 @ 04:45) (98 - 101)  BP: 105/52 (08-26-23 @ 04:45) (96/55 - 105/67)  RR: 17 (08-26-23 @ 04:45) (16 - 17)  SpO2: 98% (08-26-23 @ 04:45) (98% - 98%)    General: No apparent distress  Head: normocephalic, atraumatic  Eyes: EOMI, anicteric  ENT: moist mucous membranes, no pharyngeal exudates  Heart: rrr, S1, S2, no murmurs  Chest: CTA b/l, no rales, rhonchi, or wheezes  Abd: BS+, soft, NT, ND  Extr: no edema or cyanosis  Neuro: AA&Ox3, no focal weakness, sensation to light touch intact  Psych: normal affect T(C): 36.8 (08-26-23 @ 04:45), Max: 36.9 (08-26-23 @ 01:51)  HR: 98 (08-26-23 @ 04:45) (98 - 101)  BP: 105/52 (08-26-23 @ 04:45) (96/55 - 105/67)  RR: 17 (08-26-23 @ 04:45) (16 - 17)  SpO2: 98% (08-26-23 @ 04:45) (98% - 98%)    General: No apparent distress  Head: normocephalic, atraumatic  Eyes: EOMI, anicteric  ENT: moist mucous membranes, no pharyngeal exudates  Heart: S1, S2 heard, tachycardic  Chest: CTA b/l, no rales, rhonchi, or wheezes  Abd: BS+, soft, NT, ND  Extr: no edema or cyanosis  Neuro: AA&Ox3, no focal weakness, sensation to light touch intact  Psych: normal affect

## 2023-08-26 NOTE — ED PROVIDER NOTE - CARE PLAN
1 Principal Discharge DX:	Retained tampon   Principal Discharge DX:	Anemia due to acute blood loss  Secondary Diagnosis:	Dysfunctional uterine bleeding  Secondary Diagnosis:	Fibroids

## 2023-08-26 NOTE — H&P ADULT - HISTORY OF PRESENT ILLNESS
Patient is a 43yo F, PMH AFIB not on AC, neuropathy, iron deficiency anemia, hyperthyroidism, depression, asthma/COPD, history of dysfunctional uterine bleeding secondary to fibroids requiring prior transfusions, presents to ED with vaginal bleeding x2 weeks. In general, patient has had vaginal bleeding for 2-3 years.  She notes heavy bleeding with clots, changing her pad multiple times a day. She also notes syncopal episodes yesterday and today as well as chest tightness.  In 5/23 she was transferred to [facility] ED and discharged with TXA x5 days with some relief but not complete relief. She was found to have posterior fibroid on US at that time. She had referrals to follow up but she states the physicians do not accept her insurance.

## 2023-08-26 NOTE — CHART NOTE - NSCHARTNOTEFT_GEN_A_CORE
Patient is s/p 2 PRBCs with stable vital signs and Hgb 5.2. Currently bleeding, but has decreased since Provera was started and no clots were identified. Patient was counseled on long term outpatient management. She was oriented about the importance of follow up with gynecologist to determine which management option is best for her. At this time, she will continue blood transfusions and recommend to continue Provera. All questions were answered. Patient counseled that if she is unable to follow up with her gynecologist, she may contact the practice for follow up:    [facility]  [phone]

## 2023-08-26 NOTE — ED ADULT NURSE NOTE - CHPI ED NUR SYMPTOMS NEG
no back pain/no coffee grounds emesis/no discharge/no fever/no nausea/no pain/no vaginal discharge/no vomiting

## 2023-08-26 NOTE — ED ADULT NURSE NOTE - NSHOSCREENINGQ1_ED_ALL_ED
Spoke to pt regarding normal EKG. Pt gave a verbal understanding      ----- Message from VINCE Veras sent at 9/18/2020  3:41 PM EDT -----  Normal EKG     No

## 2023-08-26 NOTE — PROGRESS NOTE ADULT - ASSESSMENT
43 y/o Female with PMH AFIB (Not on AC), Neuropathy, Iron Deficiency Anemia, Hyperthyroidism, Depression, Asthma/COPD, history of Dysfunctional Uterine Bleeding Secondary to Fibroids requiring prior transfusions presents to [facility] ED complaining of vaginal bleeding x2 weeks with:         1. Acute Metabolic Encephalopathy   2. Acute Blood Loss Anemia   3. Vaginal Bleeding   4. Hypomagnesemia   5. Hypokalemia   6. Hypocalcemia       43 y/o Female with PMH AFIB (Not on AC), Neuropathy, Iron Deficiency Anemia, Hyperthyroidism, Depression, Asthma/COPD, history of Dysfunctional Uterine Bleeding Secondary to Fibroids requiring prior transfusions presents to [facility] ED complaining of vaginal bleeding x2 weeks with:         1. Acute Metabolic Encephalopathy   2. Acute Blood Loss Anemia   3. Vaginal Bleeding   4. Hypomagnesemia   5. Hypokalemia   6. Hypocalcemia      Neuro: Alert and oriented, endorsing chest pain and headaches. Pain control PRN.   Resp: Maintaining O2 >93% on NC. Actively titrating FiO2 as tolerated.   CV: Hemodynamically stable, despite significant hypovolemia. C/W Metoprolol QD, hold for SBP <100. Monitor clinical and laboratory end points of perfusion, maintain MAP >65.   GI: Regular diet, tolerating well. GI prophylaxis with Protonix.   Renal: Significant electrolytes derangements on admission, s/p 40meq KCl, 2g Ca Gluconate and 3g MgSO4. Ectopy on monitor, will give additional 1g MgSO4. Repeat CMP @23:00. Trend labs replete lytes as needed to maintain K>4, Mg>3, and Phos >2.   Heme: Anemia on admission, H/H 3.6/14.1. s/p 2UPRBC H/H 5.2/18.0. Will give another 2U PRBC, repeat CBC @23:00. Serial CBCs Q6hrs. Keep active Type and screen. US Vaginal with Myomatous uterus. Thickened, heterogeneous endometrium, which is suboptimally assessed on this study. s/p TXA in ED. GYN consulted, started Provera 10mg TID. Continues to have significant vaginal bleeding. Will need outpatient follow up for IUD or endometrial ablation. Heme consulted, recommendations pending. Defer chemical DVT prophylaxis in setting of active bleeding. SCDs in place.

## 2023-08-26 NOTE — ED PROVIDER NOTE - OBJECTIVE STATEMENT
Patient is a 42-year-old female presents to the emergency room with a chief complaint of vaginal bleeding.  Past medical history of asthma, A-fib on anticoagulants, anemia, hypothyroidism, history of dysfunctional uterine bleeding secondary to fibroids requiring prior transfusions.  Patient was last seen in the emergency room April 22, 2023 with chest tightness fatigue and dysfunctional uterine bleeding.  She was seen at outside hospital given TXA 1 g 1 unit of packed red blood cells and transferred for [facility] GYN evaluation.  It appears that prior to transfer the bleeding slowed.  Ultrasound did reveal a posterior fibroid.  Hemoglobin remained stable after transfer.  Patient was discharged on 1300 mg of TXA 3 times daily x5 days.  She is instructed to follow-up with the [facility] GYN clinic.  She was stabilized and discharged.  Reports that for the last 2 weeks she has been experiencing heavy vaginal bleeding with multiple clots.  She had to change her pad multiple times during the day too numerous to count.  Does report syncopal episode yesterday and 2 syncopal episodes today.  She further notes chest tightness.  Denies any fevers chills nausea vomiting shortness of breath.  States that she has follow-up with GYN outpatient but has not seen him or establish care yet. Patient is a 42-year-old female presents to the emergency room with a chief complaint of vaginal bleeding.  Past medical history of asthma, A-fib not on anticoagulants, anemia, hypothyroidism, history of dysfunctional uterine bleeding secondary to fibroids requiring prior transfusions.  Patient was last seen in the emergency room April 22, 2023 with chest tightness fatigue and dysfunctional uterine bleeding.  She was seen at outside hospital given TXA 1 g 1 unit of packed red blood cells and transferred for [facility] GYN evaluation.  It appears that prior to transfer the bleeding slowed.  Ultrasound did reveal a posterior fibroid.  Hemoglobin remained stable after transfer.  Patient was discharged on 1300 mg of TXA 3 times daily x5 days.  She is instructed to follow-up with the [facility] GYN clinic.  She was stabilized and discharged.  Reports that for the last 2 weeks she has been experiencing heavy vaginal bleeding with multiple clots.  She had to change her pad multiple times during the day too numerous to count.  Does report syncopal episode yesterday and 2 syncopal episodes today.  She further notes chest tightness.  Denies any fevers chills nausea vomiting shortness of breath.  States that she has follow-up with GYN outpatient but has not seen him or establish care yet.

## 2023-08-26 NOTE — PROGRESS NOTE ADULT - SUBJECTIVE AND OBJECTIVE BOX
CHIEF COMPLAINt: acute blood loss anemia    Interval Events: Patient admitted overnight for acute blood loss anemia secondary to uterine bleeding. Ha prior history of dysfunctional uterine bleeding secondary to fibroids requiring prior transfusion. Patient seen in the ED, just had heavy bleeding with clots. Told me that she has syncopal episodes yesterday and thus her children called the EMS.     Initial hemoglobin 3.6, now s/p 1 u PRBC. 2nd packed red blood cell starting soon. Patient still endorsing lightheadedness and overall does not feel great. She also endorsing pain and requests her gabapentin.     REVIEW OF SYSTEMS:  Constitutional: No fever, or chills  HEENT: No dry eyes or eye irritation   CV: No chest pain, or palpitations   Resp: No cough, or sputum production   GI: No nausea or vomiting    Skin: No rash    Neurological: chronic neuorpathic pain       OBJECTIVE:  Vital Signs Last 24 Hrs  T(C): 36.8 (26 Aug 2023 04:45), Max: 36.9 (26 Aug 2023 01:51)  T(F): 98.2 (26 Aug 2023 04:45), Max: 98.5 (26 Aug 2023 01:51)  HR: 98 (26 Aug 2023 04:45) (98 - 101)  BP: 105/52 (26 Aug 2023 04:45) (96/55 - 105/67)  BP(mean): --  RR: 17 (26 Aug 2023 04:45) (16 - 17)  SpO2: 98% (26 Aug 2023 04:45) (98% - 98%)    Parameters below as of 26 Aug 2023 04:45  Patient On (Oxygen Delivery Method): room air        CAPILLARY BLOOD GLUCOSE          PHYSICAL EXAM:  General: Alert and cooperative. Not in acute stress.    Head: Normocephalic   Eyes: EOMI, no ptosis.    Neck:   no masses   Respiratory:  non labored breathing on room air   Cardiovascular:   There is no peripheral edema   Abdomen: Soft, non-tender, nondistended   Musculoskeletal: Adequately aligned spine   Extremities:  . No peripheral edema   Skin: Intact, no rash .  Neurological: AOAx4. No focal deficits  Psychiatry: The mental examination revealed the patient was oriented to person, place, and time     LINES:    HOSPITAL MEDICATIONS:      metoprolol succinate ER 50 milliGRAM(s) Oral daily    medroxyPROGESTERone 10 milliGRAM(s) Oral <User Schedule>  methimazole 10 milliGRAM(s) Oral daily    albuterol    90 MICROgram(s) HFA Inhaler 2 Puff(s) Inhalation every 6 hours PRN    diazepam    Tablet 5 milliGRAM(s) Oral two times a day  gabapentin 400 milliGRAM(s) Oral once  gabapentin 400 milliGRAM(s) Oral two times a day  ondansetron Injectable 4 milliGRAM(s) IV Push every 8 hours PRN  oxycodone    5 mG/acetaminophen 325 mG 1 Tablet(s) Oral every 4 hours PRN  oxycodone    5 mG/acetaminophen 325 mG 2 Tablet(s) Oral every 4 hours PRN  sertraline 100 milliGRAM(s) Oral daily  traZODone 50 milliGRAM(s) Oral two times a day          calcium carbonate   1250 mG (OsCal) 1 Tablet(s) Oral two times a day  calcium gluconate IVPB 2 Gram(s) IV Intermittent once  folic acid 1 milliGRAM(s) Oral daily  iron sucrose IVPB 200 milliGRAM(s) IV Intermittent every 24 hours  magnesium oxide 400 milliGRAM(s) Oral daily  potassium chloride    Tablet ER 40 milliEquivalent(s) Oral every 4 hours  sodium chloride 0.9%. 1000 milliLiter(s) IV Continuous <Continuous>            LABS:                        3.6    4.57  )-----------( 113      ( 26 Aug 2023 02:43 )             14.1     Hgb Trend: 3.6<--      142  |  103  |  5<L>  ----------------------------<  95  3.2<L>   |  27  |  0.73    Ca    6.6<L>      26 Aug 2023 02:43    TPro  5.0<L>  /  Alb  2.4<L>  /  TBili  1.6<H>  /  DBili  x   /  AST  89<H>  /  ALT  31  /  AlkPhos  166<H>      Creatinine Trend: 0.73<--  PT/INR - ( 26 Aug 2023 02:43 )   PT: 15.3 sec;   INR: 1.42 ratio         PTT - ( 26 Aug 2023 02:43 )  PTT:27.9 sec  Urinalysis Basic - ( 26 Aug 2023 02:43 )    Color: x / Appearance: x / SG: x / pH: x  Gluc: 95 mg/dL / Ketone: x  / Bili: x / Urobili: x   Blood: x / Protein: x / Nitrite: x   Leuk Esterase: x / RBC: x / WBC x   Sq Epi: x / Non Sq Epi: x / Bacteria: x         US Transvaginal:   	ACC: [id number] EXAM:  US TRANSVAGINAL   ORDERED BY: CASPER HASSAN   	  	PROCEDURE DATE:  2023    	  	  	  	INTERPRETATION:  CLINICAL INDICATION: dysfunctional uterine bleeding,   	history of fibroids and , negative serum beta-hCG.  	  	TECHNIQUE: Transabdominal ultrasound of the pelvis was performed.   	Grayscale, color Doppler and spectral Doppler were utilized.  	  	COMPARISON: 2023. 2017.  	  	FINDINGS: This study was technically difficult due to patient's body   	habitus and bowel gas. Patient was unable to tolerate transvaginal   	examination.  	  	Uterus: 9.8 x 5.3 x 5.4 cm. Postsurgical changes. A 2.2 x 2.2 x 2.2 cm   	hypoechoic area posteriorly, which is difficult to assess but may be   	related to a fibroid.  	Endometrium: 1.8 cm. Heterogenous echotexture.  	Right ovary: Not visualized.  	Left ovary: Visualized transabdominally. 2.7 x 1.5 x 1.5 cm. Small   	follicles. Flow present.  	Additional: Trace free fluid.  	  	IMPRESSION:  	  	Myomatous uterus. Thickened, heterogeneous endometrium, which is   	suboptimally assessed on this study. Recommend follow-up (sonohysterogram   	and/or direct visualization) for further evaluation.  	  	--- End of Report ---

## 2023-08-26 NOTE — ED PROVIDER NOTE - NSFOLLOWUPINSTRUCTIONS_ED_ALL_ED_FT
Return to the ED for any new or worsening symptoms  Take your medication as prescribed  Follow up with your PMD as scheduled   Avoid tampons for the next 24 hours   Advance activity as tolerated

## 2023-08-26 NOTE — ED PROVIDER NOTE - DIFFERENTIAL DIAGNOSIS
Differential Diagnosis Patient presenting with possible retained tampon. Will preform speculum exam and remove if present

## 2023-08-26 NOTE — ED ADULT NURSE NOTE - OBJECTIVE STATEMENT
Pt p/w heavy vaginal bleeding for the past 2 wks with multiple clots, with worsening chest tightness, pt last seen in ER in April 2023 Pt p/w heavy vaginal bleeding for the past 2 wks with multiple clots, with worsening chest tightness, pt last seen in ER in April 2023 for heavy vaginal bleeding as well, given tranexamic acid and blood transfusion, was told to f/u with GYN after dc, but pt hasn't established care with GYN yet

## 2023-08-26 NOTE — PATIENT PROFILE ADULT - FALL HARM RISK - RISK INTERVENTIONS

## 2023-08-26 NOTE — PROGRESS NOTE ADULT - ASSESSMENT
Patient is a 42 F with history of AFIB not on AC, chronic neuropathy, iron deficiency anemia, Graves' disease, depression, asthma/COPD, history of dysfunctional uterine bleeding secondary to fibroids requiring prior transfusions, presents to ED with vaginal bleeding x2 weeks.    # Severe Blood Loss Anemia  - Secondary to uterine bleeding, GYN recs appreciated, Provera Q8h ordered by GYN  - 2u PRBC ordered, repeat CBC after second unit   - CBC Q6h for now    - Heme consulted in the ED  - Monitor hemodynamics, continue with IVF for now   - Will need GYN follow up outpatient     # Syncope due to blood loss  - 2u PRBC ordered  - on remote tele  - monitor vitals  - IVF as above     # Iron Deficiency Anemia  - iron studies added on to admission labs  - Venofer 200mg IV daily x5 days ordered    # Hypokalemia  - KCl 40meq x2 doses  - Continue to monitor BMP     # Hyperthyroidism  - History of Graves' disease   - Continue home Methimazole 10mg daily  - Check TSH, FT4, TT3     # AFIB, not on AC  - continue home Metoprolol succ 50mg daily with hold parameters  SCDs    - Neuropathy  continue home Gabapentin 400mg BID    - Depression  continue home meds: Zoloft, Trazodone, Diazepam  f/u results    DVT ppx: SCDs  Dispo: home pending hospital course

## 2023-08-26 NOTE — PROVIDER CONTACT NOTE (EICU) - BACKGROUND
was e alerted by bedside team noting ectopy on monitor and low mg level of 0.7  patient already gottein 1 gram of mag and question if more required

## 2023-08-27 LAB
ALBUMIN SERPL ELPH-MCNC: 2.2 G/DL — LOW (ref 3.3–5)
ALP SERPL-CCNC: 125 U/L — HIGH (ref 30–120)
ALT FLD-CCNC: 25 U/L — SIGNIFICANT CHANGE UP (ref 10–60)
ANION GAP SERPL CALC-SCNC: 6 MMOL/L — SIGNIFICANT CHANGE UP (ref 5–17)
ANION GAP SERPL CALC-SCNC: 7 MMOL/L — SIGNIFICANT CHANGE UP (ref 5–17)
APTT BLD: 34.3 SEC — SIGNIFICANT CHANGE UP (ref 24.5–35.6)
AST SERPL-CCNC: 55 U/L — HIGH (ref 10–40)
BASOPHILS # BLD AUTO: 0.02 K/UL — SIGNIFICANT CHANGE UP (ref 0–0.2)
BASOPHILS NFR BLD AUTO: 0.3 % — SIGNIFICANT CHANGE UP (ref 0–2)
BILIRUB SERPL-MCNC: 2 MG/DL — HIGH (ref 0.2–1.2)
BUN SERPL-MCNC: 5 MG/DL — LOW (ref 7–23)
BUN SERPL-MCNC: 6 MG/DL — LOW (ref 7–23)
CALCIUM SERPL-MCNC: 6.6 MG/DL — LOW (ref 8.4–10.5)
CALCIUM SERPL-MCNC: 6.7 MG/DL — LOW (ref 8.4–10.5)
CHLORIDE SERPL-SCNC: 106 MMOL/L — SIGNIFICANT CHANGE UP (ref 96–108)
CHLORIDE SERPL-SCNC: 107 MMOL/L — SIGNIFICANT CHANGE UP (ref 96–108)
CO2 SERPL-SCNC: 27 MMOL/L — SIGNIFICANT CHANGE UP (ref 22–31)
CO2 SERPL-SCNC: 29 MMOL/L — SIGNIFICANT CHANGE UP (ref 22–31)
CREAT SERPL-MCNC: 0.41 MG/DL — LOW (ref 0.5–1.3)
CREAT SERPL-MCNC: 0.43 MG/DL — LOW (ref 0.5–1.3)
EGFR: 123 ML/MIN/1.73M2 — SIGNIFICANT CHANGE UP
EGFR: 124 ML/MIN/1.73M2 — SIGNIFICANT CHANGE UP
EOSINOPHIL # BLD AUTO: 0 K/UL — SIGNIFICANT CHANGE UP (ref 0–0.5)
EOSINOPHIL NFR BLD AUTO: 0 % — SIGNIFICANT CHANGE UP (ref 0–6)
GLUCOSE SERPL-MCNC: 94 MG/DL — SIGNIFICANT CHANGE UP (ref 70–99)
GLUCOSE SERPL-MCNC: 99 MG/DL — SIGNIFICANT CHANGE UP (ref 70–99)
HCT VFR BLD CALC: 21.3 % — LOW (ref 34.5–45)
HCT VFR BLD CALC: 22.1 % — LOW (ref 34.5–45)
HCT VFR BLD CALC: 22.6 % — LOW (ref 34.5–45)
HGB BLD-MCNC: 6.6 G/DL — CRITICAL LOW (ref 11.5–15.5)
HGB BLD-MCNC: 7 G/DL — CRITICAL LOW (ref 11.5–15.5)
HGB BLD-MCNC: 7 G/DL — CRITICAL LOW (ref 11.5–15.5)
IMM GRANULOCYTES NFR BLD AUTO: 0.3 % — SIGNIFICANT CHANGE UP (ref 0–0.9)
INR BLD: 1.25 RATIO — HIGH (ref 0.85–1.18)
LYMPHOCYTES # BLD AUTO: 0.61 K/UL — LOW (ref 1–3.3)
LYMPHOCYTES # BLD AUTO: 9.9 % — LOW (ref 13–44)
MAGNESIUM SERPL-MCNC: 1.7 MG/DL — SIGNIFICANT CHANGE UP (ref 1.6–2.6)
MAGNESIUM SERPL-MCNC: 2 MG/DL — SIGNIFICANT CHANGE UP (ref 1.6–2.6)
MCHC RBC-ENTMCNC: 25.3 PG — LOW (ref 27–34)
MCHC RBC-ENTMCNC: 25.5 PG — LOW (ref 27–34)
MCHC RBC-ENTMCNC: 26.5 PG — LOW (ref 27–34)
MCHC RBC-ENTMCNC: 31 GM/DL — LOW (ref 32–36)
MCHC RBC-ENTMCNC: 31 GM/DL — LOW (ref 32–36)
MCHC RBC-ENTMCNC: 31.7 GM/DL — LOW (ref 32–36)
MCV RBC AUTO: 81.6 FL — SIGNIFICANT CHANGE UP (ref 80–100)
MCV RBC AUTO: 82.5 FL — SIGNIFICANT CHANGE UP (ref 80–100)
MCV RBC AUTO: 83.7 FL — SIGNIFICANT CHANGE UP (ref 80–100)
MONOCYTES # BLD AUTO: 0.39 K/UL — SIGNIFICANT CHANGE UP (ref 0–0.9)
MONOCYTES NFR BLD AUTO: 6.3 % — SIGNIFICANT CHANGE UP (ref 2–14)
NEUTROPHILS # BLD AUTO: 5.15 K/UL — SIGNIFICANT CHANGE UP (ref 1.8–7.4)
NEUTROPHILS NFR BLD AUTO: 83.2 % — HIGH (ref 43–77)
NRBC # BLD: 0 /100 WBCS — SIGNIFICANT CHANGE UP (ref 0–0)
PHOSPHATE SERPL-MCNC: 2.3 MG/DL — LOW (ref 2.5–4.5)
PHOSPHATE SERPL-MCNC: 2.6 MG/DL — SIGNIFICANT CHANGE UP (ref 2.5–4.5)
PLATELET # BLD AUTO: 101 K/UL — LOW (ref 150–400)
PLATELET # BLD AUTO: 92 K/UL — LOW (ref 150–400)
PLATELET # BLD AUTO: 99 K/UL — LOW (ref 150–400)
POTASSIUM SERPL-MCNC: 3.9 MMOL/L — SIGNIFICANT CHANGE UP (ref 3.5–5.3)
POTASSIUM SERPL-MCNC: 4 MMOL/L — SIGNIFICANT CHANGE UP (ref 3.5–5.3)
POTASSIUM SERPL-SCNC: 3.9 MMOL/L — SIGNIFICANT CHANGE UP (ref 3.5–5.3)
POTASSIUM SERPL-SCNC: 4 MMOL/L — SIGNIFICANT CHANGE UP (ref 3.5–5.3)
PROT SERPL-MCNC: 4.6 G/DL — LOW (ref 6–8.3)
PROTHROM AB SERPL-ACNC: 13.5 SEC — HIGH (ref 9.5–13)
RBC # BLD: 2.61 M/UL — LOW (ref 3.8–5.2)
RBC # BLD: 2.64 M/UL — LOW (ref 3.8–5.2)
RBC # BLD: 2.74 M/UL — LOW (ref 3.8–5.2)
RBC # FLD: 16.2 % — HIGH (ref 10.3–14.5)
RBC # FLD: 16.2 % — HIGH (ref 10.3–14.5)
RBC # FLD: 16.3 % — HIGH (ref 10.3–14.5)
SODIUM SERPL-SCNC: 141 MMOL/L — SIGNIFICANT CHANGE UP (ref 135–145)
SODIUM SERPL-SCNC: 141 MMOL/L — SIGNIFICANT CHANGE UP (ref 135–145)
T3 SERPL-MCNC: 60 NG/DL — LOW (ref 80–200)
T4 FREE SERPL-MCNC: 1.1 NG/DL — SIGNIFICANT CHANGE UP (ref 0.9–1.8)
TSH SERPL-MCNC: 4.33 UIU/ML — HIGH (ref 0.27–4.2)
WBC # BLD: 5.97 K/UL — SIGNIFICANT CHANGE UP (ref 3.8–10.5)
WBC # BLD: 6.19 K/UL — SIGNIFICANT CHANGE UP (ref 3.8–10.5)
WBC # BLD: 6.67 K/UL — SIGNIFICANT CHANGE UP (ref 3.8–10.5)
WBC # FLD AUTO: 5.97 K/UL — SIGNIFICANT CHANGE UP (ref 3.8–10.5)
WBC # FLD AUTO: 6.19 K/UL — SIGNIFICANT CHANGE UP (ref 3.8–10.5)
WBC # FLD AUTO: 6.67 K/UL — SIGNIFICANT CHANGE UP (ref 3.8–10.5)

## 2023-08-27 PROCEDURE — 99232 SBSQ HOSP IP/OBS MODERATE 35: CPT

## 2023-08-27 RX ORDER — CALCIUM GLUCONATE 100 MG/ML
1 VIAL (ML) INTRAVENOUS ONCE
Refills: 0 | Status: COMPLETED | OUTPATIENT
Start: 2023-08-27 | End: 2023-08-27

## 2023-08-27 RX ORDER — TRANEXAMIC ACID 100 MG/ML
1000 INJECTION, SOLUTION INTRAVENOUS ONCE
Refills: 0 | Status: COMPLETED | OUTPATIENT
Start: 2023-08-27 | End: 2023-08-27

## 2023-08-27 RX ORDER — POLYETHYLENE GLYCOL 3350 17 G/17G
17 POWDER, FOR SOLUTION ORAL DAILY
Refills: 0 | Status: DISCONTINUED | OUTPATIENT
Start: 2023-08-27 | End: 2023-08-30

## 2023-08-27 RX ORDER — FERROUS SULFATE 325(65) MG
325 TABLET ORAL THREE TIMES A DAY
Refills: 0 | Status: DISCONTINUED | OUTPATIENT
Start: 2023-08-27 | End: 2023-08-30

## 2023-08-27 RX ORDER — MEDROXYPROGESTERONE ACETATE 150 MG/ML
150 INJECTION, SUSPENSION, EXTENDED RELEASE INTRAMUSCULAR ONCE
Refills: 0 | Status: COMPLETED | OUTPATIENT
Start: 2023-08-28 | End: 2023-08-27

## 2023-08-27 RX ADMIN — Medication 5 MILLIGRAM(S): at 18:01

## 2023-08-27 RX ADMIN — Medication 1 TABLET(S): at 18:01

## 2023-08-27 RX ADMIN — IRON SUCROSE 110 MILLIGRAM(S): 20 INJECTION, SOLUTION INTRAVENOUS at 16:05

## 2023-08-27 RX ADMIN — TRANEXAMIC ACID 200 MILLIGRAM(S): 100 INJECTION, SOLUTION INTRAVENOUS at 18:17

## 2023-08-27 RX ADMIN — SODIUM CHLORIDE 100 MILLILITER(S): 9 INJECTION INTRAMUSCULAR; INTRAVENOUS; SUBCUTANEOUS at 06:09

## 2023-08-27 RX ADMIN — MEDROXYPROGESTERONE ACETATE 10 MILLIGRAM(S): 150 INJECTION, SUSPENSION, EXTENDED RELEASE INTRAMUSCULAR at 16:04

## 2023-08-27 RX ADMIN — Medication 50 MILLIGRAM(S): at 22:25

## 2023-08-27 RX ADMIN — MEDROXYPROGESTERONE ACETATE 10 MILLIGRAM(S): 150 INJECTION, SUSPENSION, EXTENDED RELEASE INTRAMUSCULAR at 23:10

## 2023-08-27 RX ADMIN — Medication 1 TABLET(S): at 06:08

## 2023-08-27 RX ADMIN — SERTRALINE 100 MILLIGRAM(S): 25 TABLET, FILM COATED ORAL at 13:42

## 2023-08-27 RX ADMIN — GABAPENTIN 400 MILLIGRAM(S): 400 CAPSULE ORAL at 06:08

## 2023-08-27 RX ADMIN — MAGNESIUM OXIDE 400 MG ORAL TABLET 400 MILLIGRAM(S): 241.3 TABLET ORAL at 13:43

## 2023-08-27 RX ADMIN — Medication 50 MILLIGRAM(S): at 06:08

## 2023-08-27 RX ADMIN — GABAPENTIN 400 MILLIGRAM(S): 400 CAPSULE ORAL at 18:02

## 2023-08-27 RX ADMIN — Medication 1 MILLIGRAM(S): at 13:42

## 2023-08-27 RX ADMIN — MEDROXYPROGESTERONE ACETATE 150 MILLIGRAM(S): 150 INJECTION, SUSPENSION, EXTENDED RELEASE INTRAMUSCULAR at 23:39

## 2023-08-27 RX ADMIN — Medication 100 GRAM(S): at 08:51

## 2023-08-27 RX ADMIN — Medication 325 MILLIGRAM(S): at 22:25

## 2023-08-27 NOTE — CHART NOTE - NSCHARTNOTEFT_GEN_A_CORE
repeat Hb 7    Notified Dr. David     recommending to transfuse another unit of PRBC and give IV tranexamic acid 1000mg x 1.         Pt is currently stable    Vital Signs Last 24 Hrs  T(C): 36.7 (27 Aug 2023 15:51), Max: 37.1 (26 Aug 2023 18:09)  T(F): 98 (27 Aug 2023 15:51), Max: 98.7 (26 Aug 2023 18:09)  HR: 97 (27 Aug 2023 16:00) (87 - 109)  BP: 107/69 (27 Aug 2023 14:00) (89/48 - 116/68)  BP(mean): 81 (27 Aug 2023 14:00) (59 - 87)  RR: 17 (27 Aug 2023 16:00) (12 - 24)  SpO2: 95% (27 Aug 2023 16:00) (93% - 99%)    Parameters below as of 26 Aug 2023 19:00  Patient On (Oxygen Delivery Method): room air    feeling better than before but still feels weak  some abdominal cramping    1 unit of PRBC ordered  1000mg of transexamic acid IV ordered  Dr. Majano will follow up tomorrow.   Pt will likely need a hysterectomy

## 2023-08-27 NOTE — CONSULT NOTE ADULT - CONSULT REASON
Problem: Patient Care Overview  Goal: Plan of Care/Patient Progress Review  S-(situation): Patient discharged to [facility] @ 1400 with son.     B-(background): Observation goals met generalized weakness/ fall at home , elevated Na     A-(assessment): Na 141, up with assist of 1 with gait belt/ walker. A & O, VSS     R-(recommendations): Discharge instructions reviewed with report called to Jessica PARKS at [facility]   Listed belongings gathered and returned to patient.yes  Patient Education resolvedyes  New medications-Pt. Has been educated about reason of use and side effects n/a  Home and hospital acquired medications returned to patient n/a  Medication Bin checked and emptied on discharge yes             
JOANNA
anemia
41y/o female with DUB

## 2023-08-27 NOTE — PROVIDER CONTACT NOTE (CRITICAL VALUE NOTIFICATION) - ACTION/TREATMENT ORDERED:
awaiting call back from MD awaiting call back from MD Dr. Walters aware. will transfuse 1 unit, start TXA

## 2023-08-27 NOTE — PROCEDURE NOTE - ADDITIONAL PROCEDURE DETAILS
PIV placed in setting of abla, vaginal bleeding  Not included in cc time  tourniquet applied, vessel identified under ultrasound guidance, site cleaned/draped, IV placed, brisk blood return/flushes well, site dressed and secured with tegaderm/tape.

## 2023-08-27 NOTE — PROGRESS NOTE ADULT - ASSESSMENT
Patient is a 42 F with history of AFIB not on AC, chronic neuropathy, iron deficiency anemia, Graves' disease, depression, asthma/COPD, history of dysfunctional uterine bleeding secondary to fibroids requiring prior transfusions, presents to ED with vaginal bleeding x2 weeks.    # Severe Blood Loss Anemia  - Secondary to uterine bleeding, GYN recs appreciated, Provera ordered by GYN  - now s/p 4u PRBC transfused with improvement in Hb  - This morning Hb 6.6 now getting 1 additional unit of pRBC  - CBC Q6h for now    - Heme consulted in the ED  - Monitor hemodynamics, continue with IVF for now   - Will need GYN follow up outpatient or if vaginal bleeding persists, will need inpatient procedure     # Syncope due to blood loss  - 4u PRBC transfused, currently receiving the 5th   - monitor vitals  - IVF as above     # Iron Deficiency Anemia  - iron studies added on to admission labs  - Venofer 200mg IV daily x5 days ordered    # Hypokalemia  - Resolved   - Continue to monitor BMP     # Hypocalcemia  - Likely secondary to transfusion   - COntinue to monitor CMP  - Replete calcium as needed  - Continue with calcium carbonate BID     # Hyperthyroidism  - History of Graves' disease with proptosis   - Continue home Methimazole 10mg daily  - TFT wnl   - Follow up with endocrinology outpatient     # AFIB, not on AC  - continue home Metoprolol succ 50mg daily with hold parameters  SCDs    - Neuropathy  continue home Gabapentin 400mg BID    - Depression  continue home meds: Zoloft, Trazodone, Diazepam  f/u results    DVT ppx: SCDs  Dispo: home pending hospital course

## 2023-08-27 NOTE — CONSULT NOTE ADULT - SUBJECTIVE AND OBJECTIVE BOX
HPI: 43y/o female who [place] [facility] [place] episode of acute DUB. Pt has been seen in ER in April with Hgb=6.7 and transfused/given TXA and transferred to [facility].  Pt was supposed to F/U with GYN as outpatient but has yet to do so (supposedly has an appointment to be seen with a group soon). Had been using TXA and Cyclic Provera 10mg qd x 14days.  Experiencing episodes of HMB x 2 weeks and states she has had syncopal episodes x 2 over the past 1-2days.      MEDICATIONS  (STANDING):  medroxyPROGESTERone 10 milliGRAM(s) Oral TID  medroxyPROGESTERone depot Injectable 150 milliGRAM(s) IntraMuscular once  methimazole 10 milliGRAM(s) Oral daily  sodium chloride 0.9%. 1000 milliLiter(s) (100 mL/Hr) IV Continuous <Continuous>    MEDICATIONS  (PRN):  acetaminophen     Tablet .. 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  Longstanding DUB w/Anemia    FAMILY HISTORY:  Family history of diabetes mellitus (Sibling, Grandparent)    Family history of stomach cancer (Grandparent)        SOCIAL HISTORY:  +SMOKER	    Vital Signs Last 24 Hrs  T(C): 36.8 (26 Aug 2023 04:45), Max: 36.9 (26 Aug 2023 01:51)  T(F): 98.2 (26 Aug 2023 04:45), Max: 98.5 (26 Aug 2023 01:51)  HR: 98 (26 Aug 2023 04:45) (98 - 101)  BP: 105/52 (26 Aug 2023 04:45) (96/55 - 105/67)  BP(mean): --  RR: 17 (26 Aug 2023 04:45) (16 - 17)  SpO2: 98% (26 Aug 2023 04:45) (98% - 98%)    Parameters below as of 26 Aug 2023 04:45  Patient On (Oxygen Delivery Method): room air        PHYSICAL EXAM:    Genitourinary:   Obese female with HMB & passage of clots      LABS:             Quant Beta NEGATIVE             3.6    4.57  )-----------( 113      ( 26 Aug 2023 02:43 )             14.1         142  |  103  |  5<L>  ----------------------------<  95  3.2<L>   |  27  |  0.73    Ca    6.6<L>      26 Aug 2023 02:43    TPro  5.0<L>  /  Alb  2.4<L>  /  TBili  1.6<H>  /  DBili  x   /  AST  89<H>  /  ALT  31  /  AlkPhos  166<H>      I&O's Detail    PT/INR - ( 26 Aug 2023 02:43 )   PT: 15.3 sec;   INR: 1.42 ratio         PTT - ( 26 Aug 2023 02:43 )  PTT:27.9 sec  Urinalysis Basic - ( 26 Aug 2023 02:43 )    Color: x / Appearance: x / SG: x / pH: x  Gluc: 95 mg/dL / Ketone: x  / Bili: x / Urobili: x   Blood: x / Protein: x / Nitrite: x   Leuk Esterase: x / RBC: x / WBC x   Sq Epi: x / Non Sq Epi: x / Bacteria: x        RADIOLOGY & ADDITIONAL STUDIES:  SONO:  EMECHO=18mm  22mm Posterior, FIGO Type 4/5 Myoma  Small FF
Date/Time Patient Seen:  		  Referring MD:   Data Reviewed	       Patient is a 42y old  Female who presents with a chief complaint of Anemia (26 Aug 2023 20:00)      Subjective/HPI   42y old  Female who presents with a chief complaint of Chronic anemia superimposed with an acute episode  PAST MEDICAL & SURGICAL HISTORY:  Atrial fibrillation    History of Hyperthyroidism    Asthma    History of anemia    Depression, unspecified depression type    H/O blood transfusion reaction    Smoker    S/P  Section  ,,    History of blood transfusion    FAMILY HISTORY:  Sibling  Still living? Unknown  Family history of diabetes mellitus, Age at diagnosis: Age Unknown    Grandparent  Still living? Unknown  Family history of diabetes mellitus, Age at diagnosis: Age Unknown  Family history of stomach cancer, Age at diagnosis: Age Unknown.     Social History:  · Substance use	No     Tobacco Screening:  · Core Measure Site	No    Risk Assessment:    Present on Admission:  Deep Venous Thrombosis	no  Pulmonary Embolus	no     HIV Screening:  · In accordance with [place] State law, we offer every patient who comes to our ED an HIV test. Would you like to be tested today?	Opt out        Medication list         MEDICATIONS  (STANDING):  calcium carbonate   1250 mG (OsCal) 1 Tablet(s) Oral two times a day  calcium gluconate IVPB 1 Gram(s) IV Intermittent once  diazepam    Tablet 5 milliGRAM(s) Oral two times a day  folic acid 1 milliGRAM(s) Oral daily  gabapentin 400 milliGRAM(s) Oral two times a day  iron sucrose IVPB 200 milliGRAM(s) IV Intermittent every 24 hours  magnesium oxide 400 milliGRAM(s) Oral daily  medroxyPROGESTERone 10 milliGRAM(s) Oral <User Schedule>  methimazole 10 milliGRAM(s) Oral daily  metoprolol succinate ER 50 milliGRAM(s) Oral daily  sertraline 100 milliGRAM(s) Oral daily  sodium chloride 0.9%. 1000 milliLiter(s) (100 mL/Hr) IV Continuous <Continuous>  traZODone 50 milliGRAM(s) Oral two times a day    MEDICATIONS  (PRN):  albuterol    90 MICROgram(s) HFA Inhaler 2 Puff(s) Inhalation every 6 hours PRN for shortness of breath and/or wheezing  ondansetron Injectable 4 milliGRAM(s) IV Push every 8 hours PRN Nausea and/or Vomiting  oxycodone    5 mG/acetaminophen 325 mG 1 Tablet(s) Oral every 4 hours PRN Moderate Pain (4 - 6)  oxycodone    5 mG/acetaminophen 325 mG 2 Tablet(s) Oral every 4 hours PRN Severe Pain (7 - 10)         Vitals log        ICU Vital Signs Last 24 Hrs  T(C): 36.8 (27 Aug 2023 01:21), Max: 37.4 (26 Aug 2023 10:40)  T(F): 98.2 (27 Aug 2023 01:21), Max: 99.3 (26 Aug 2023 10:40)  HR: 88 (27 Aug 2023 08:00) (87 - 108)  BP: 105/40 (27 Aug 2023 08:00) (89/48 - 116/68)  BP(mean): 59 (27 Aug 2023 08:00) (59 - 87)  ABP: --  ABP(mean): --  RR: 18 (27 Aug 2023 08:00) (12 - 24)  SpO2: 95% (27 Aug 2023 08:00) (93% - 99%)    O2 Parameters below as of 26 Aug 2023 19:00  Patient On (Oxygen Delivery Method): room air                 Input and Output:  I&O's Detail    26 Aug 2023 07:01  -  27 Aug 2023 07:00  --------------------------------------------------------  IN:    IV PiggyBack: 805 mL    sodium chloride 0.9%: 600 mL  Total IN: 1405 mL    OUT:  Total OUT: 0 mL    Total NET: 1405 mL          Lab Data                        6.6    6.19  )-----------( 101      ( 27 Aug 2023 06:00 )             21.3     08-27    141  |  106  |  5<L>  ----------------------------<  94  3.9   |  29  |  0.41<L>    Ca    6.6<L>      27 Aug 2023 06:00  Phos  2.3     08-27  Mg     1.7     08    TPro  4.6<L>  /  Alb  2.2<L>  /  TBili  2.0<H>  /  DBili  x   /  AST  55<H>  /  ALT  25  /  AlkPhos  125<H>  08      CARDIAC MARKERS ( 26 Aug 2023 02:43 )  x     / x     / 79 U/L / x     / 2.0 ng/mL        Review of Systems	  weakness      Objective     Physical Examination    heart s1s2  lung dc BS    Pertinent Lab findings & Imaging      Norris:  NO   Adequate UO     I&O's Detail    26 Aug 2023 07:01  -  27 Aug 2023 07:00  --------------------------------------------------------  IN:    IV PiggyBack: 805 mL    sodium chloride 0.9%: 600 mL  Total IN: 1405 mL    OUT:  Total OUT: 0 mL    Total NET: 1405 mL               Discussed with:     Cultures:	        Radiology    ACC: [id number] EXAM:  US TRANSVAGINAL   ORDERED BY: CASPER HASSAN     PROCEDURE DATE:  2023          INTERPRETATION:  CLINICAL INDICATION: dysfunctional uterine bleeding,   history of fibroids and , negative serum beta-hCG.    TECHNIQUE: Transabdominal ultrasound of the pelvis was performed.   Grayscale, color Doppler and spectral Doppler were utilized.    COMPARISON: 2023. 2017.    FINDINGS: This study was technically difficult due to patient's body   habitus and bowel gas. Patient was unable to tolerate transvaginal   examination.    Uterus: 9.8 x 5.3 x 5.4 cm. Postsurgical changes. A 2.2 x 2.2 x 2.2 cm   hypoechoic area posteriorly, which is difficult to assess but may be   related to a fibroid.  Endometrium: 1.8 cm. Heterogenous echotexture.  Right ovary: Not visualized.  Left ovary: Visualized transabdominally. 2.7 x 1.5 x 1.5 cm. Small   follicles. Flow present.  Additional: Trace free fluid.    IMPRESSION:    Myomatous uterus. Thickened, heterogeneous endometrium, which is   suboptimally assessed on this study. Recommend follow-up (sonohysterogram   and/or direct visualization) for further evaluation.    --- End of Report ---            AMANDA LAU MD; Attending Radiologist  This document has been electronically signed. Aug 26 2023  4:48AM                          
Pt seen at bedside. Reports improved dizziness. Per pt and RN, pt had 2 episodes of heavy vaginal bleeding with clots overnight. Bleeding has improved since admission, but with occasional clots. Pt is currently on provera TID. Last Hgb 6.6, pt s/p 5 U PRBCs. CBC is pending currently. Management further d/w pt. Pt open to surgical management with hysterectomy, does not desire mirena IUD for now.    ICU Vital Signs Last 24 Hrs  T(C): 36.7 (27 Aug 2023 15:51), Max: 37.1 (26 Aug 2023 18:09)  T(F): 98 (27 Aug 2023 15:51), Max: 98.7 (26 Aug 2023 18:09)  HR: 97 (27 Aug 2023 16:00) (87 - 109)  BP: 107/69 (27 Aug 2023 14:00) (89/48 - 116/68)  BP(mean): 81 (27 Aug 2023 14:00) (59 - 87)  ABP: --  ABP(mean): --  RR: 17 (27 Aug 2023 16:00) (12 - 24)  SpO2: 95% (27 Aug 2023 16:00) (93% - 99%)    O2 Parameters below as of 26 Aug 2023 19:00  Patient On (Oxygen Delivery Method): room air                          6.6    6.19  )-----------( 101      ( 27 Aug 2023 06:00 )             21.3   08-27    141  |  106  |  5<L>  ----------------------------<  94  3.9   |  29  |  0.41<L>    Ca    6.6<L>      27 Aug 2023 06:00  Phos  2.3     08-27  Mg     1.7     08-27    TPro  4.6<L>  /  Alb  2.2<L>  /  TBili  2.0<H>  /  DBili  x   /  AST  55<H>  /  ALT  25  /  AlkPhos  125<H>  08-27

## 2023-08-27 NOTE — PROGRESS NOTE ADULT - SUBJECTIVE AND OBJECTIVE BOX
CHIEF COMPLAINt: acute blood loss anemia    Interval Events: Patient seen at the bedside this morning, still having vaginal bleeding with large, dark clots. This morning Hb 6.6, currently getting 1 u PRBC transfused.     REVIEW OF SYSTEMS:  Constitutional: No fever, or chills  HEENT: No dry eyes or eye irritation   CV: No chest pain, or palpitations   Resp: No cough, or sputum production   GI: No nausea or vomiting    Skin: No rash    Neurological: chronic neuorpathic pain       OBJECTIVE:   Vital Signs Last 24 Hrs  T(C): 36.9 (27 Aug 2023 10:00), Max: 37.1 (26 Aug 2023 18:09)  T(F): 98.5 (27 Aug 2023 10:00), Max: 98.7 (26 Aug 2023 18:09)  HR: 94 (27 Aug 2023 10:00) (87 - 108)  BP: 108/65 (27 Aug 2023 10:00) (89/48 - 116/68)  BP(mean): 77 (27 Aug 2023 10:00) (59 - 87)  RR: 18 (27 Aug 2023 10:00) (12 - 24)  SpO2: 95% (27 Aug 2023 10:00) (93% - 99%)    Parameters below as of 26 Aug 2023 19:00  Patient On (Oxygen Delivery Method): room air            CAPILLARY BLOOD GLUCOSE          PHYSICAL EXAM:  General: Alert and cooperative. Not in acute stress.    Head: Normocephalic   Eyes: EOMI, no ptosis.    Neck:   no masses   Respiratory:  non labored breathing on room air   Cardiovascular:   There is no peripheral edema   Abdomen: Soft, non-tender, nondistended   Musculoskeletal: Adequately aligned spine   Extremities:  . No peripheral edema   Skin: Intact, no rash .  Neurological: AOAx4. No focal deficits  Psychiatry: The mental examination revealed the patient was oriented to person, place, and time     LINES:    HOSPITAL MEDICATIONS:      metoprolol succinate ER 50 milliGRAM(s) Oral daily    medroxyPROGESTERone 10 milliGRAM(s) Oral <User Schedule>  methimazole 10 milliGRAM(s) Oral daily    albuterol    90 MICROgram(s) HFA Inhaler 2 Puff(s) Inhalation every 6 hours PRN    diazepam    Tablet 5 milliGRAM(s) Oral two times a day  gabapentin 400 milliGRAM(s) Oral once  gabapentin 400 milliGRAM(s) Oral two times a day  ondansetron Injectable 4 milliGRAM(s) IV Push every 8 hours PRN  oxycodone    5 mG/acetaminophen 325 mG 1 Tablet(s) Oral every 4 hours PRN  oxycodone    5 mG/acetaminophen 325 mG 2 Tablet(s) Oral every 4 hours PRN  sertraline 100 milliGRAM(s) Oral daily  traZODone 50 milliGRAM(s) Oral two times a day          calcium carbonate   1250 mG (OsCal) 1 Tablet(s) Oral two times a day  calcium gluconate IVPB 2 Gram(s) IV Intermittent once  folic acid 1 milliGRAM(s) Oral daily  iron sucrose IVPB 200 milliGRAM(s) IV Intermittent every 24 hours  magnesium oxide 400 milliGRAM(s) Oral daily  potassium chloride    Tablet ER 40 milliEquivalent(s) Oral every 4 hours  sodium chloride 0.9%. 1000 milliLiter(s) IV Continuous <Continuous>            LABS:                                             6.6    6.19  )-----------( 101      ( 27 Aug 2023 06:00 )             21.3         141  |  106  |  5<L>  ----------------------------<  94  3.9   |  29  |  0.41<L>    Ca    6.6<L>      27 Aug 2023 06:00  Phos  2.3       Mg     1.7         TPro  4.6<L>  /  Alb  2.2<L>  /  TBili  2.0<H>  /  DBili  x   /  AST  55<H>  /  ALT  25  /  AlkPhos  125<H>           PTT - ( 26 Aug 2023 02:43 )  PTT:27.9 sec  Urinalysis Basic - ( 26 Aug 2023 02:43 )    Color: x / Appearance: x / SG: x / pH: x  Gluc: 95 mg/dL / Ketone: x  / Bili: x / Urobili: x   Blood: x / Protein: x / Nitrite: x   Leuk Esterase: x / RBC: x / WBC x   Sq Epi: x / Non Sq Epi: x / Bacteria: x         US Transvaginal:   	ACC: [id number] EXAM:  US TRANSVAGINAL   ORDERED BY: CASPER HASSAN   	  	PROCEDURE DATE:  2023    	  	  	  	INTERPRETATION:  CLINICAL INDICATION: dysfunctional uterine bleeding,   	history of fibroids and , negative serum beta-hCG.  	  	TECHNIQUE: Transabdominal ultrasound of the pelvis was performed.   	Grayscale, color Doppler and spectral Doppler were utilized.  	  	COMPARISON: 2023. 2017.  	  	FINDINGS: This study was technically difficult due to patient's body   	habitus and bowel gas. Patient was unable to tolerate transvaginal   	examination.  	  	Uterus: 9.8 x 5.3 x 5.4 cm. Postsurgical changes. A 2.2 x 2.2 x 2.2 cm   	hypoechoic area posteriorly, which is difficult to assess but may be   	related to a fibroid.  	Endometrium: 1.8 cm. Heterogenous echotexture.  	Right ovary: Not visualized.  	Left ovary: Visualized transabdominally. 2.7 x 1.5 x 1.5 cm. Small   	follicles. Flow present.  	Additional: Trace free fluid.  	  	IMPRESSION:  	  	Myomatous uterus. Thickened, heterogeneous endometrium, which is   	suboptimally assessed on this study. Recommend follow-up (sonohysterogram   	and/or direct visualization) for further evaluation.  	  	--- End of Report ---

## 2023-08-27 NOTE — CONSULT NOTE ADULT - ASSESSMENT
43yo P3 with multiple comorbidities, admitted for AUB.   - f/u CBC  - continue provera TID  - consider transfusion as indicated  - management per primary Inpatient team  - f/u with GYN as an outpatient upon discharge
42y old  Female who presents with a chief complaint of Chronic anemia superimposed with an acute episode  PMH AFIB (Not on AC), Neuropathy, Iron Deficiency Anemia, Hyperthyroidism, Depression, Asthma/COPD, history of Dysfunctional Uterine Bleeding Secondary to Fibroids requiring prior transfusions    anemia  DUB  AF  neuropathy  fe def anemia  thyroid disease  depression  asthma  copd  fibroids    s/p PRBC  GYN note reviewed  Provera  proventil PRN - asthma copd  monitor VS and HD  serial Hgb  monitor HD  goal MAp > 60  I and O  emotional support  US GYN noted  lytes monitored  seq teds for DVT p    
A/P: 41y/o female with Chronic Anemia and acute episode of DUB  Pt is s/p 1g TXA in ER  D/W Dr. Shane and will Admit to Med for 4U of PRBCs  As pt is obese and smoker >36y/o, will defer any estrogenic Tx and augment the cyclic Provera she is already on by giving Depo-Provera 150mg IM x 1 plus Provera 10mg TID.  Heme Consultation   Once stablilized from PRBCs and Progesterone Tx, Pt can go home on Continuous Provera 10mg qd for office F/U visit and EMBx.  Pt would also need counseling on other options to manage HMB like PROGESTERONE IUD +/- ENDOMETRIAL ABLATION vs Hysterectomy  Wt loss and smoking cessation

## 2023-08-27 NOTE — PROCEDURE NOTE - NSPROCDETAILS_GEN_ALL_CORE
Patient Instructions:  It was a pleasure to see you in the cardiology clinic today.      If you have any questions, call  Juan A Sanchez LPN, at [phone].  Press Option #1 for the [facility], and then press Option #4  We are encouraging the use of [employer] to communicate with your HealthCare Provider    Note the new medications: Weigh yourself daily and take an extra torsemide (20 mg) when ever your weight is up 3 pounds in a day.    Stop the following medications: none    The results from today include: none  Please follow up with Dr. Cj Bagley in three months      If you have an urgent need after hours (8:00 am to 4:30 pm) please call [phone] and ask for the cardiology fellow on call.    
location identified, draped/prepped, sterile technique used/blood seen on insertion/dressing applied/flushes easily/secured in place/sterile technique, catheter placed

## 2023-08-27 NOTE — PROGRESS NOTE ADULT - SUBJECTIVE AND OBJECTIVE BOX
Patient is a 42y old  Female who presents with a chief complaint of Anemia (26 Aug 2023 20:00)      BRIEF HOSPITAL COURSE:   41 yo f pmhx afib (no AC), fibroid uterus with dysfunctional uterine bleeding, iron deficiency anemia, hyperthyroidism, depressions, asthma admitted with abla in setting of heavy vaginal bleeding requiring prbc and provera.     : 4U PRBC    Events last 24 hours:   Patient received 2U PRBC, repeat cbc pending with bmp.        PAST MEDICAL & SURGICAL HISTORY:  Atrial fibrillation  History of Hyperthyroidism  Asthma  History of anemia  Depression, unspecified depression type  Smoker  S/P  Section  ,,  History of blood transfusion      Allergies  Motrin (Hives)      FAMILY HISTORY:  Family history of diabetes mellitus (Sibling, Grandparent)  Family history of stomach cancer (Grandparent)      Social History:   from home       Review of Systems:  No active complaints      Physical Examination:    General: adult female, lying in bed, nad      HEENT: nc/at    PULM: cta b/l    CVS: rrr    ABD: Soft, nondistended, nontender, +bs    EXT: No edema, nontender    SKIN: Warm     NEURO: Alert, oriented, interactive      Medications:  metoprolol succinate ER 50 milliGRAM(s) Oral daily  albuterol    90 MICROgram(s) HFA Inhaler 2 Puff(s) Inhalation every 6 hours PRN  diazepam    Tablet 5 milliGRAM(s) Oral two times a day  gabapentin 400 milliGRAM(s) Oral two times a day  ondansetron Injectable 4 milliGRAM(s) IV Push every 8 hours PRN  oxycodone    5 mG/acetaminophen 325 mG 1 Tablet(s) Oral every 4 hours PRN  oxycodone    5 mG/acetaminophen 325 mG 2 Tablet(s) Oral every 4 hours PRN  sertraline 100 milliGRAM(s) Oral daily  traZODone 50 milliGRAM(s) Oral two times a day  polyethylene glycol 3350 17 Gram(s) Oral daily PRN  medroxyPROGESTERone 10 milliGRAM(s) Oral <User Schedule>  methimazole 10 milliGRAM(s) Oral daily  calcium carbonate   1250 mG (OsCal) 1 Tablet(s) Oral two times a day  ferrous    sulfate 325 milliGRAM(s) Oral three times a day  folic acid 1 milliGRAM(s) Oral daily  iron sucrose IVPB 200 milliGRAM(s) IV Intermittent every 24 hours  magnesium oxide 400 milliGRAM(s) Oral daily  sodium chloride 0.9%. 1000 milliLiter(s) IV Continuous <Continuous>      ICU Vital Signs Last 24 Hrs  T(C): 36.8 (27 Aug 2023 21:14), Max: 36.9 (27 Aug 2023 08:00)  T(F): 98.3 (27 Aug 2023 21:14), Max: 98.5 (27 Aug 2023 10:00)  HR: 96 (27 Aug 2023 18:00) (87 - 109)  BP: 108/57 (27 Aug 2023 18:00) (89/48 - 115/68)  BP(mean): 71 (27 Aug 2023 18:00) (59 - 90)  ABP: --  ABP(mean): --  RR: 19 (27 Aug 2023 18:00) (13 - 24)  SpO2: 94% (27 Aug 2023 18:00) (93% - 99%)      Vital Signs Last 24 Hrs  T(C): 36.8 (27 Aug 2023 21:14), Max: 36.9 (27 Aug 2023 08:00)  T(F): 98.3 (27 Aug 2023 21:14), Max: 98.5 (27 Aug 2023 10:00)  HR: 96 (27 Aug 2023 18:00) (87 - 109)  BP: 108/57 (27 Aug 2023 18:00) (89/48 - 115/68)  BP(mean): 71 (27 Aug 2023 18:00) (59 - 90)  RR: 19 (27 Aug 2023 18:00) (13 - 24)  SpO2: 94% (27 Aug 2023 18:00) (93% - 99%)      I&O's Detail    26 Aug 2023 07:01  -  27 Aug 2023 07:00  --------------------------------------------------------  IN:    IV PiggyBack: 805 mL    sodium chloride 0.9%: 600 mL  Total IN: 1405 mL    OUT:  Total OUT: 0 mL  Total NET: 1405 mL      27 Aug 2023 07:01  -  27 Aug 2023 21:21  --------------------------------------------------------  IN:    Oral Fluid: 640 mL    PRBCs (Packed Red Blood Cells): 360 mL    sodium chloride 0.9%: 1200 mL  Total IN: 2200 mL    OUT:  Total OUT: 0 mL  Total NET: 2200 mL      LABS:                        7.0    6.67  )-----------( 92       ( 27 Aug 2023 15:55 )             22.1         141  |  106  |  5<L>  ----------------------------<  94  3.9   |  29  |  0.41<L>    Ca    6.6<L>      27 Aug 2023 06:00  Phos  2.3       Mg     1.7         TPro  4.6<L>  /  Alb  2.2<L>  /  TBili  2.0<H>  /  DBili  x   /  AST  55<H>  /  ALT  25  /  AlkPhos  125<H>        CARDIAC MARKERS ( 26 Aug 2023 02:43 )  x     / x     / 79 U/L / x     / 2.0 ng/mL        PT/INR - ( 26 Aug 2023 02:43 )   PT: 15.3 sec;   INR: 1.42 ratio    PTT - ( 26 Aug 2023 02:43 )  PTT:27.9 sec      Urinalysis Basic - ( 27 Aug 2023 06:00 )  Color: x / Appearance: x / SG: x / pH: x  Gluc: 94 mg/dL / Ketone: x  / Bili: x / Urobili: x   Blood: x / Protein: x / Nitrite: x   Leuk Esterase: x / RBC: x / WBC x   Sq Epi: x / Non Sq Epi: x / Bacteria: x      RADIOLOGY:   < from: US Transvaginal (23 @ 04:24) >  ACC: [id number] EXAM:  US TRANSVAGINAL   ORDERED BY: CASPER HASSAN     PROCEDURE DATE:  2023        INTERPRETATION:  CLINICAL INDICATION: dysfunctional uterine bleeding,   history of fibroids and , negative serum beta-hCG.    TECHNIQUE: Transabdominal ultrasound of the pelvis was performed.   Grayscale, color Doppler and spectral Doppler were utilized.    COMPARISON: 2023. 2017.    FINDINGS: This study was technically difficult due to patient's body   habitus and bowel gas. Patient was unable to tolerate transvaginal   examination.    Uterus: 9.8 x 5.3 x 5.4 cm. Postsurgical changes. A 2.2 x 2.2 x 2.2 cm   hypoechoic area posteriorly, which is difficult to assess but may be   related to a fibroid.  Endometrium: 1.8 cm. Heterogenous echotexture.  Right ovary: Not visualized.  Left ovary: Visualized transabdominally. 2.7 x 1.5 x 1.5 cm. Small   follicles. Flow present.  Additional: Trace free fluid.    IMPRESSION:    Myomatous uterus. Thickened, heterogeneous endometrium, which is   suboptimally assessed on this study. Recommend follow-up (sonohysterogram   and/or direct visualization) for further evaluation.    --- End of Report ---    AMANDA LAU MD; Attending Radiologist  This document has been electronically signed. Aug 26 2023  4:48AM    < end of copied text >

## 2023-08-27 NOTE — PROGRESS NOTE ADULT - ASSESSMENT
41 yo f pmhx afib (no AC), fibroid uterus with dysfunctional uterine bleeding, iron deficiency anemia, hyperthyroidism, depressions, asthma admitted with abla in setting of heavy vaginal bleeding requiring prbc and provera.     NEURO: continue zoloft and trazodone. gabapentin and oxycodone for pain control   CV: lopressor for rate control. close hd monitoring  RESP: No active issues, encourage incentive spirometry   RENAL: monitor bun/cr, urine output and lytes, replace as needed  GI: Regular diet  ENDO: Methimazole for hyperthyroidism   ID: no active infectious process  HEME: abla in setting of vaginal bleeding, transfuse for hgb >7, trend cbc. marli on iron supplementation   DISPO: full code.

## 2023-08-28 LAB
ALBUMIN SERPL ELPH-MCNC: 1.9 G/DL — LOW (ref 3.3–5)
ALBUMIN SERPL ELPH-MCNC: 2.4 G/DL — LOW (ref 3.3–5)
ALP SERPL-CCNC: 115 U/L — SIGNIFICANT CHANGE UP (ref 30–120)
ALP SERPL-CCNC: 136 U/L — HIGH (ref 30–120)
ALT FLD-CCNC: 23 U/L — SIGNIFICANT CHANGE UP (ref 10–60)
ALT FLD-CCNC: 25 U/L — SIGNIFICANT CHANGE UP (ref 10–60)
ANION GAP SERPL CALC-SCNC: 4 MMOL/L — LOW (ref 5–17)
ANION GAP SERPL CALC-SCNC: 4 MMOL/L — LOW (ref 5–17)
AST SERPL-CCNC: 75 U/L — HIGH (ref 10–40)
AST SERPL-CCNC: 78 U/L — HIGH (ref 10–40)
BILIRUB SERPL-MCNC: 2.3 MG/DL — HIGH (ref 0.2–1.2)
BILIRUB SERPL-MCNC: 2.4 MG/DL — HIGH (ref 0.2–1.2)
BUN SERPL-MCNC: 5 MG/DL — LOW (ref 7–23)
BUN SERPL-MCNC: 5 MG/DL — LOW (ref 7–23)
CALCIUM SERPL-MCNC: 6.3 MG/DL — CRITICAL LOW (ref 8.4–10.5)
CALCIUM SERPL-MCNC: 7.5 MG/DL — LOW (ref 8.4–10.5)
CHLORIDE SERPL-SCNC: 106 MMOL/L — SIGNIFICANT CHANGE UP (ref 96–108)
CHLORIDE SERPL-SCNC: 109 MMOL/L — HIGH (ref 96–108)
CO2 SERPL-SCNC: 27 MMOL/L — SIGNIFICANT CHANGE UP (ref 22–31)
CO2 SERPL-SCNC: 29 MMOL/L — SIGNIFICANT CHANGE UP (ref 22–31)
CREAT SERPL-MCNC: 0.39 MG/DL — LOW (ref 0.5–1.3)
CREAT SERPL-MCNC: 0.43 MG/DL — LOW (ref 0.5–1.3)
EGFR: 123 ML/MIN/1.73M2 — SIGNIFICANT CHANGE UP
EGFR: 126 ML/MIN/1.73M2 — SIGNIFICANT CHANGE UP
GLUCOSE SERPL-MCNC: 110 MG/DL — HIGH (ref 70–99)
GLUCOSE SERPL-MCNC: 96 MG/DL — SIGNIFICANT CHANGE UP (ref 70–99)
HCT VFR BLD CALC: 22.4 % — LOW (ref 34.5–45)
HCT VFR BLD CALC: 30 % — LOW (ref 34.5–45)
HGB BLD-MCNC: 7 G/DL — CRITICAL LOW (ref 11.5–15.5)
HGB BLD-MCNC: 9.6 G/DL — LOW (ref 11.5–15.5)
INR BLD: 1.14 RATIO — SIGNIFICANT CHANGE UP (ref 0.85–1.18)
MAGNESIUM SERPL-MCNC: 1.4 MG/DL — LOW (ref 1.6–2.6)
MAGNESIUM SERPL-MCNC: 1.5 MG/DL — LOW (ref 1.6–2.6)
MCHC RBC-ENTMCNC: 26.2 PG — LOW (ref 27–34)
MCHC RBC-ENTMCNC: 26.3 PG — LOW (ref 27–34)
MCHC RBC-ENTMCNC: 31.3 GM/DL — LOW (ref 32–36)
MCHC RBC-ENTMCNC: 32 GM/DL — SIGNIFICANT CHANGE UP (ref 32–36)
MCV RBC AUTO: 82 FL — SIGNIFICANT CHANGE UP (ref 80–100)
MCV RBC AUTO: 84.2 FL — SIGNIFICANT CHANGE UP (ref 80–100)
NRBC # BLD: 0 /100 WBCS — SIGNIFICANT CHANGE UP (ref 0–0)
NRBC # BLD: 0 /100 WBCS — SIGNIFICANT CHANGE UP (ref 0–0)
PHOSPHATE SERPL-MCNC: 2 MG/DL — LOW (ref 2.5–4.5)
PHOSPHATE SERPL-MCNC: 2.2 MG/DL — LOW (ref 2.5–4.5)
PLATELET # BLD AUTO: 111 K/UL — LOW (ref 150–400)
PLATELET # BLD AUTO: 91 K/UL — LOW (ref 150–400)
POTASSIUM SERPL-MCNC: 3.6 MMOL/L — SIGNIFICANT CHANGE UP (ref 3.5–5.3)
POTASSIUM SERPL-MCNC: 4.1 MMOL/L — SIGNIFICANT CHANGE UP (ref 3.5–5.3)
POTASSIUM SERPL-SCNC: 3.6 MMOL/L — SIGNIFICANT CHANGE UP (ref 3.5–5.3)
POTASSIUM SERPL-SCNC: 4.1 MMOL/L — SIGNIFICANT CHANGE UP (ref 3.5–5.3)
PROT SERPL-MCNC: 4.2 G/DL — LOW (ref 6–8.3)
PROT SERPL-MCNC: 5 G/DL — LOW (ref 6–8.3)
PROTHROM AB SERPL-ACNC: 12.7 SEC — SIGNIFICANT CHANGE UP (ref 9.5–13)
RBC # BLD: 2.66 M/UL — LOW (ref 3.8–5.2)
RBC # BLD: 3.66 M/UL — LOW (ref 3.8–5.2)
RBC # FLD: 15.8 % — HIGH (ref 10.3–14.5)
RBC # FLD: 15.8 % — HIGH (ref 10.3–14.5)
SODIUM SERPL-SCNC: 139 MMOL/L — SIGNIFICANT CHANGE UP (ref 135–145)
SODIUM SERPL-SCNC: 140 MMOL/L — SIGNIFICANT CHANGE UP (ref 135–145)
WBC # BLD: 6.29 K/UL — SIGNIFICANT CHANGE UP (ref 3.8–10.5)
WBC # BLD: 7.04 K/UL — SIGNIFICANT CHANGE UP (ref 3.8–10.5)
WBC # FLD AUTO: 6.29 K/UL — SIGNIFICANT CHANGE UP (ref 3.8–10.5)
WBC # FLD AUTO: 7.04 K/UL — SIGNIFICANT CHANGE UP (ref 3.8–10.5)

## 2023-08-28 PROCEDURE — 99233 SBSQ HOSP IP/OBS HIGH 50: CPT

## 2023-08-28 RX ORDER — CALCIUM GLUCONATE 100 MG/ML
2 VIAL (ML) INTRAVENOUS ONCE
Refills: 0 | Status: COMPLETED | OUTPATIENT
Start: 2023-08-28 | End: 2023-08-28

## 2023-08-28 RX ORDER — SODIUM,POTASSIUM PHOSPHATES 278-250MG
1 POWDER IN PACKET (EA) ORAL ONCE
Refills: 0 | Status: COMPLETED | OUTPATIENT
Start: 2023-08-28 | End: 2023-08-28

## 2023-08-28 RX ORDER — MAGNESIUM SULFATE 500 MG/ML
2 VIAL (ML) INJECTION ONCE
Refills: 0 | Status: COMPLETED | OUTPATIENT
Start: 2023-08-28 | End: 2023-08-28

## 2023-08-28 RX ORDER — SODIUM,POTASSIUM PHOSPHATES 278-250MG
1 POWDER IN PACKET (EA) ORAL THREE TIMES A DAY
Refills: 0 | Status: COMPLETED | OUTPATIENT
Start: 2023-08-28 | End: 2023-08-29

## 2023-08-28 RX ADMIN — Medication 325 MILLIGRAM(S): at 06:04

## 2023-08-28 RX ADMIN — Medication 25 GRAM(S): at 14:17

## 2023-08-28 RX ADMIN — MEDROXYPROGESTERONE ACETATE 10 MILLIGRAM(S): 150 INJECTION, SUSPENSION, EXTENDED RELEASE INTRAMUSCULAR at 15:35

## 2023-08-28 RX ADMIN — SERTRALINE 100 MILLIGRAM(S): 25 TABLET, FILM COATED ORAL at 11:42

## 2023-08-28 RX ADMIN — IRON SUCROSE 110 MILLIGRAM(S): 20 INJECTION, SOLUTION INTRAVENOUS at 13:50

## 2023-08-28 RX ADMIN — Medication 50 MILLIGRAM(S): at 06:04

## 2023-08-28 RX ADMIN — GABAPENTIN 400 MILLIGRAM(S): 400 CAPSULE ORAL at 17:14

## 2023-08-28 RX ADMIN — SODIUM CHLORIDE 100 MILLILITER(S): 9 INJECTION INTRAMUSCULAR; INTRAVENOUS; SUBCUTANEOUS at 11:48

## 2023-08-28 RX ADMIN — SODIUM CHLORIDE 100 MILLILITER(S): 9 INJECTION INTRAMUSCULAR; INTRAVENOUS; SUBCUTANEOUS at 23:39

## 2023-08-28 RX ADMIN — Medication 1 TABLET(S): at 17:14

## 2023-08-28 RX ADMIN — MAGNESIUM OXIDE 400 MG ORAL TABLET 400 MILLIGRAM(S): 241.3 TABLET ORAL at 11:42

## 2023-08-28 RX ADMIN — Medication 1 TABLET(S): at 07:04

## 2023-08-28 RX ADMIN — Medication 5 MILLIGRAM(S): at 06:04

## 2023-08-28 RX ADMIN — Medication 25 GRAM(S): at 00:54

## 2023-08-28 RX ADMIN — MEDROXYPROGESTERONE ACETATE 10 MILLIGRAM(S): 150 INJECTION, SUSPENSION, EXTENDED RELEASE INTRAMUSCULAR at 23:40

## 2023-08-28 RX ADMIN — Medication 1 MILLIGRAM(S): at 11:42

## 2023-08-28 RX ADMIN — Medication 1 PACKET(S): at 21:44

## 2023-08-28 RX ADMIN — MEDROXYPROGESTERONE ACETATE 10 MILLIGRAM(S): 150 INJECTION, SUSPENSION, EXTENDED RELEASE INTRAMUSCULAR at 07:03

## 2023-08-28 RX ADMIN — Medication 5 MILLIGRAM(S): at 17:14

## 2023-08-28 RX ADMIN — GABAPENTIN 400 MILLIGRAM(S): 400 CAPSULE ORAL at 06:04

## 2023-08-28 RX ADMIN — Medication 325 MILLIGRAM(S): at 21:44

## 2023-08-28 RX ADMIN — Medication 200 GRAM(S): at 00:54

## 2023-08-28 RX ADMIN — Medication 1 PACKET(S): at 00:54

## 2023-08-28 RX ADMIN — Medication 50 MILLIGRAM(S): at 17:14

## 2023-08-28 RX ADMIN — Medication 325 MILLIGRAM(S): at 13:50

## 2023-08-28 NOTE — CARE COORDINATION ASSESSMENT. - NSDCPLANSERVICES_GEN_ALL_CORE
Per EMR MD notes: 42 F with history of A FIB not on AC, chronic neuropathy, iron deficiency anemia, Graves' disease, depression, asthma/COPD, history of dysfunctional uterine bleeding secondary to fibroids requiring prior transfusions, presents to ED with vaginal bleeding x2 weeks.    Per treatment team rounds pt. not ready for home as she is still acute actively bleeding and requiring blood Tx. s/p 8U PRBCs and 2U of FFP; tranexamic acid 1g IVPB x 2 for vaginal bleeding - Hg today  9.6    Provera ordered by GYN Dr. Majano .  Venofer 200mg IV daily - On day 3/5.    Await heme onc consult.  -------------------------    CM introduced self and role of CM and provided CM contact information.  Pt. states she lives with supportive family and when asked she said her 2 son's assist her when needed and transport her to appointments.  Her Mother also assist with shopping / meals and transport.  Mother works F/T.  Pt. does not anticipate any needed services when cleared.  Denies current home services.  Has a RW/WC that she was using PTA due to being so weak.      Pharmacy is [facility]  PCP - can't recall name but works at [facility] [employer] on [facility] - last seen 1.5 months ago. Was to start seeing a new GYN in that same office and first visit was supposed to be today.    CM found online :    [facility]  Phone  [phone] [address] [place], [place] [address]    d/c needs TBD.  CM team available if needed./Anticipated Needs Unclear at Present Per EMR MD notes: 42 F with history of A FIB not on AC, chronic neuropathy, iron deficiency anemia, Graves' disease, depression, asthma/COPD, history of dysfunctional uterine bleeding secondary to fibroids requiring prior transfusions, presents to ED with vaginal bleeding x2 weeks.    Per treatment team rounds pt. not ready for home as she is still acute actively bleeding and requiring blood Tx. s/p 8U PRBCs and 2U of FFP; tranexamic acid 1g IVPB x 2 for vaginal bleeding - Hg today  9.6    Provera ordered by GYN Dr. Majano .  Venofer 200mg IV daily - On day 3/5.    Await heme onc consult.  -------------------------    CM introduced self and role of CM and provided CM contact information.  Pt. states she lives with supportive family and when asked she said her 2 son's assist her when needed and transport her to appointments.  Her Mother also assist with shopping / meals and transport.  Mother works F/T.  Pt. does not anticipate any needed services when cleared.  Denies current home services.  Has a RW/WC that she was using PTA due to being so weak.    With pt. permission CM contacted pt. mother Zuri White [phone] - Confirmed above information and added that she works F/T from home since pandemic.  Pt. is not alone at home. States pt. also has a cane and they are going to get a railing for front entrance 2 steps.  She does not recall name of pt. MD's.  States pt. also gets Psych tele therapy regularly - can't recall name of the place/ therapist.  Mother states she feels pt. can use aides - has Medicaid told-can call for a eval.--  Pt's Cell  [phone]   Pharmacy is [facility]  PCP - can't recall name but works at [facility] [employer] on [facility] - last seen 1.5 months ago. Was to start seeing a new GYN in that same office and first visit was supposed to be today.  CM found online :    [facility]  Phone  [phone] [address], [place] [address]  d/c needs TBD.  CM team available if needed./Anticipated Needs Unclear at Present

## 2023-08-28 NOTE — DIETITIAN INITIAL EVALUATION ADULT - PERTINENT LABORATORY DATA
08-28    139  |  106  |  5<L>  ----------------------------<  110<H>  4.1   |  29  |  0.43<L>    Ca    7.5<L>      28 Aug 2023 12:30  Phos  2.2     08-28  Mg     1.5     08-28    TPro  5.0<L>  /  Alb  2.4<L>  /  TBili  2.3<H>  /  DBili  x   /  AST  78<H>  /  ALT  25  /  AlkPhos  136<H>  08-28

## 2023-08-28 NOTE — PROGRESS NOTE ADULT - SUBJECTIVE AND OBJECTIVE BOX
F/U NOTE:  Pt sleeping and VSS; HR=86/min  No significant bleeding episodes and blood that is seen on pad is dark red  Last Hgb=9.6  Will repeat CBC in AM with Ferritin level  Awaiting Heme consultation  Continue Provera 10mg TID  (pt is also s/p Depo Provera 150mg x 1)  If pt remains clinically stable through the night and into tomorrow, then will consider sending home on Provera 10mg TID and Iron Tx to F/U with GYN as outpatient  If any worsening of bleeding, will transfer pt to [place] for possible UAE or EMA or other options not possible at [facility]

## 2023-08-28 NOTE — PROGRESS NOTE ADULT - ASSESSMENT
Impression - Patient is a 41 y/o female w/PMHx of AFIB not on AC, neuropathy, iron deficiency anemia, hyperthyroidism, depression, asthma/COPD, history of dysfunctional uterine bleeding secondary to fibroids requiring prior transfusions, presents to ED with vaginal bleeding x 2 weeks. Patient s/p PRBC x 2 and FFP x 2, w/post-transfusion Hgb 9.6, remains hypophosphatemic at 2.2 despite PO repletion, and hypomagnesemic. Patient thus far has received PRBC x 8, FFP x 2, and TXA 2G x 1. No additional acute events reported throughout the day.    ABLA 2/2 Vaginal bleed   Hypophosphatemia   Hypomagnesemia     Neuro - Continue with daily zoloft and trazodone regimen, PRN and standing PO analgesia    CV - Currently in NSR high 80s, remains HD stable w/MAP>65, would consider transitioning from succinate to tartrate BID w/HOLD parameters for rate control, continue Q1 BPs   Pulm - SPO2 high 90s on RA  GI - Continue PO diet as tolerated, PRN B    RENAL: monitor bun/cr, urine output and lytes, replace as needed  ENDO: Methimazole for hyperthyroidism   ID: no active infectious process  HEME: abla in setting of vaginal bleeding, transfuse for hgb >7, trend cbc. marli on iron supplementation  Impression - Patient is a 41 y/o female w/PMHx of AFIB not on AC, neuropathy, iron deficiency anemia, hyperthyroidism, depression, asthma/COPD, history of dysfunctional uterine bleeding secondary to fibroids requiring prior transfusions, presents to ED with vaginal bleeding x 2 weeks. Patient s/p PRBC x 2 and FFP x 2, w/post-transfusion Hgb 9.6, remains hypophosphatemic at 2.2 despite PO repletion, and hypomagnesemic. Patient thus far has received PRBC x 8, FFP x 2, and TXA 2G x 1. No additional acute events reported throughout the day.    ABLA 2/2 Vaginal bleed   FE Deficiency Anemia   Hypophosphatemia   Hypomagnesemia     Neuro - Continue with daily zoloft and trazodone regimen, PRN and standing PO analgesia    CV - Currently in NSR high 80s, remains HD stable w/MAP>65, would consider transitioning from succinate to tartrate BID w/HOLD parameters for rate control, continue Q1 BPs   Pulm - SPO2 high 90s on RA  GI - Continue PO diet as tolerated, should have standing bowel regimen with ATC iron supplementation      Renal - Trend BUN/CTN, monitor I/Os, trend lytes, continue aggressive PO and IVPB Phos and Mg repletion, repeat in AM, monitor UOP,   Endo - Continue outpatient Methimazole regimen, maintain -180   Heme - ABLA 2/2 vaginal bleeding, trend CBC, maintain active T&S, most recent Hgb 9.6, continue to transfuse PRN for hgb >7, coags elevated, but now WNL, continue PO Iron supplementation, no further episodes of vaginal bleeding reported throughout the day, continue Provera regimen, GYN following and input appreciated   ID - No leukocytosis, afebrile thus far, continue to monitor for s/s of active infection

## 2023-08-28 NOTE — PROGRESS NOTE ADULT - SUBJECTIVE AND OBJECTIVE BOX
SURGERY PA NOTE ON BEHALF OF DR. MILES / FLAVIA:    S: Patient seen and examined at bedside.    Pt in bed, NAD. Currently with PRBC unit running.   Pt states she is feeling better than when she arrived. Still with some abd cramping but improves with pain medication.   Tolerated her breakfast, no n/v. States she is still passing clots and was changed multiple times this AM. States the amount of clots is slightly less compared to yesterday.   No other complaints at this time.      MEDICATIONS:  albuterol    90 MICROgram(s) HFA Inhaler 2 Puff(s) Inhalation every 6 hours PRN  calcium carbonate   1250 mG (OsCal) 1 Tablet(s) Oral two times a day  diazepam    Tablet 5 milliGRAM(s) Oral two times a day  ferrous    sulfate 325 milliGRAM(s) Oral three times a day  folic acid 1 milliGRAM(s) Oral daily  gabapentin 400 milliGRAM(s) Oral two times a day  iron sucrose IVPB 200 milliGRAM(s) IV Intermittent every 24 hours  magnesium oxide 400 milliGRAM(s) Oral daily  medroxyPROGESTERone 10 milliGRAM(s) Oral <User Schedule>  methimazole 10 milliGRAM(s) Oral daily  metoprolol succinate ER 50 milliGRAM(s) Oral daily  ondansetron Injectable 4 milliGRAM(s) IV Push every 8 hours PRN  oxycodone    5 mG/acetaminophen 325 mG 1 Tablet(s) Oral every 4 hours PRN  oxycodone    5 mG/acetaminophen 325 mG 2 Tablet(s) Oral every 4 hours PRN  polyethylene glycol 3350 17 Gram(s) Oral daily PRN  sertraline 100 milliGRAM(s) Oral daily  sodium chloride 0.9%. 1000 milliLiter(s) IV Continuous <Continuous>  traZODone 50 milliGRAM(s) Oral two times a day      O:  Vital Signs Last 24 Hrs  T(C): 37 (28 Aug 2023 08:24), Max: 37 (28 Aug 2023 08:24)  T(F): 98.6 (28 Aug 2023 08:24), Max: 98.6 (28 Aug 2023 08:24)  HR: 101 (28 Aug 2023 09:00) (90 - 109)  BP: 104/67 (28 Aug 2023 09:00) (82/56 - 120/84)  BP(mean): 78 (28 Aug 2023 09:00) (65 - 95)  RR: 18 (28 Aug 2023 09:00) (11 - 20)  SpO2: 98% (28 Aug 2023 09:00) (91% - 99%)  Parameters below as of 28 Aug 2023 09:00  Patient On (Oxygen Delivery Method): room air    I&O SUMMARY:  23 @ 07:01  -  23 @ 07:00  --------------------------------------------------------  IN: 3759 mL / OUT: 0 mL / NET: 3759 mL    PHYSICAL EXAM:  Lungs: unlabored breathing  Card: tachycardia  Abd: Soft, NT, ND. No rebound/guarding.    Ext: Calves soft, NT, without edema bilat    LABS:                        7.0    6.29  )-----------( 91       ( 27 Aug 2023 23:10 )             22.4         140  |  109<H>  |  5<L>  ----------------------------<  96  3.6   |  27  |  0.39<L>    Ca    6.3<LL>      27 Aug 2023 23:10  Phos  2.0       Mg     1.4         TPro  4.2<L>  /  Alb  1.9<L>  /  TBili  2.4<H>  /  DBili  x   /  AST  75<H>  /  ALT  23  /  AlkPhos  115    PT/INR - ( 27 Aug 2023 23:10 )   PT: 13.5 sec;   INR: 1.25 ratio    PTT - ( 27 Aug 2023 23:10 )  PTT:34.3 sec    RADIOLOGY:  < from: US Transvaginal (23 @ 04:24) >  ACC: [id number] EXAM:  US TRANSVAGINAL   ORDERED BY: CASPER HASSAN   PROCEDURE DATE:  2023    INTERPRETATION:  CLINICAL INDICATION: dysfunctional uterine bleeding,   history of fibroids and , negative serum beta-hCG.  TECHNIQUE: Transabdominal ultrasound of the pelvis was performed.   Grayscale, color Doppler and spectral Doppler were utilized.  COMPARISON: 2023. 2017.  FINDINGS: This study was technically difficult due to patient's body   habitus and bowel gas. Patient was unable to tolerate transvaginal   examination.  Uterus: 9.8 x 5.3 x 5.4 cm. Postsurgical changes. A 2.2 x 2.2 x 2.2 cm   hypoechoic area posteriorly, which is difficult to assess but may be   related to a fibroid.  Endometrium: 1.8 cm. Heterogenous echotexture.  Right ovary: Not visualized.  Left ovary: Visualized transabdominally. 2.7 x 1.5 x 1.5 cm. Small   follicles. Flow present.  Additional: Trace free fluid.    IMPRESSION:  Myomatous uterus. Thickened, heterogeneous endometrium, which is   suboptimally assessed on this study. Recommend follow-up (sonohysterogram   and/or direct visualization) for further evaluation.  < end of copied text >    A:  Patient is a 42 F with history of AFIB not on AC, chronic neuropathy, iron deficiency anemia, Graves' disease, depression, asthma/COPD, history of dysfunctional uterine bleeding secondary to fibroids requiring prior transfusions presents for AUB. Pt with PRBC running at time of interview. S/p multiple units of PRBC.   Some symptom improvement. Tachycardia, awaiting AM labs (delayed due to transfusion)     P:  Continue current care  Diet - regular  Serial abdominal exams  trend cbc  transfuse prn  GI/DVT prophylaxis  Analgesia prn  OOB/ambulation on the floor  Incentive spirometry/Cough/Deep breathing exercises  Consultant f/u noted    Case & plan to be discussed with Dr. Miles.       SURGERY PA NOTE ON BEHALF OF DR. MILES / FLAVIA:    S: Patient seen and examined at bedside.    Pt in bed, NAD. Currently with PRBC unit running.   Pt states she is feeling better than when she arrived. Still with some abd cramping but improves with pain medication.   Tolerated her breakfast, no n/v. States she is still passing clots and was changed multiple times this AM. States the amount of clots is slightly less compared to yesterday.   No other complaints at this time.      MEDICATIONS:  albuterol    90 MICROgram(s) HFA Inhaler 2 Puff(s) Inhalation every 6 hours PRN  calcium carbonate   1250 mG (OsCal) 1 Tablet(s) Oral two times a day  diazepam    Tablet 5 milliGRAM(s) Oral two times a day  ferrous    sulfate 325 milliGRAM(s) Oral three times a day  folic acid 1 milliGRAM(s) Oral daily  gabapentin 400 milliGRAM(s) Oral two times a day  iron sucrose IVPB 200 milliGRAM(s) IV Intermittent every 24 hours  magnesium oxide 400 milliGRAM(s) Oral daily  medroxyPROGESTERone 10 milliGRAM(s) Oral <User Schedule>  methimazole 10 milliGRAM(s) Oral daily  metoprolol succinate ER 50 milliGRAM(s) Oral daily  ondansetron Injectable 4 milliGRAM(s) IV Push every 8 hours PRN  oxycodone    5 mG/acetaminophen 325 mG 1 Tablet(s) Oral every 4 hours PRN  oxycodone    5 mG/acetaminophen 325 mG 2 Tablet(s) Oral every 4 hours PRN  polyethylene glycol 3350 17 Gram(s) Oral daily PRN  sertraline 100 milliGRAM(s) Oral daily  sodium chloride 0.9%. 1000 milliLiter(s) IV Continuous <Continuous>  traZODone 50 milliGRAM(s) Oral two times a day      O:  Vital Signs Last 24 Hrs  T(C): 37 (28 Aug 2023 08:24), Max: 37 (28 Aug 2023 08:24)  T(F): 98.6 (28 Aug 2023 08:24), Max: 98.6 (28 Aug 2023 08:24)  HR: 101 (28 Aug 2023 09:00) (90 - 109)  BP: 104/67 (28 Aug 2023 09:00) (82/56 - 120/84)  BP(mean): 78 (28 Aug 2023 09:00) (65 - 95)  RR: 18 (28 Aug 2023 09:00) (11 - 20)  SpO2: 98% (28 Aug 2023 09:00) (91% - 99%)  Parameters below as of 28 Aug 2023 09:00  Patient On (Oxygen Delivery Method): room air    I&O SUMMARY:  23 @ 07:01  -  23 @ 07:00  --------------------------------------------------------  IN: 3759 mL / OUT: 0 mL / NET: 3759 mL    PHYSICAL EXAM:  Lungs: unlabored breathing  Card: tachycardia  Abd: Soft, NT, ND. No rebound/guarding.    Ext: Calves soft, NT, without edema bilat    LABS:                        7.0    6.29  )-----------( 91       ( 27 Aug 2023 23:10 )             22.4         140  |  109<H>  |  5<L>  ----------------------------<  96  3.6   |  27  |  0.39<L>    Ca    6.3<LL>      27 Aug 2023 23:10  Phos  2.0       Mg     1.4         TPro  4.2<L>  /  Alb  1.9<L>  /  TBili  2.4<H>  /  DBili  x   /  AST  75<H>  /  ALT  23  /  AlkPhos  115    PT/INR - ( 27 Aug 2023 23:10 )   PT: 13.5 sec;   INR: 1.25 ratio    PTT - ( 27 Aug 2023 23:10 )  PTT:34.3 sec    RADIOLOGY:  < from: US Transvaginal (23 @ 04:24) >  ACC: [id number] EXAM:  US TRANSVAGINAL   ORDERED BY: CASPER HASSAN   PROCEDURE DATE:  2023    INTERPRETATION:  CLINICAL INDICATION: dysfunctional uterine bleeding,   history of fibroids and , negative serum beta-hCG.  TECHNIQUE: Transabdominal ultrasound of the pelvis was performed.   Grayscale, color Doppler and spectral Doppler were utilized.  COMPARISON: 2023. 2017.  FINDINGS: This study was technically difficult due to patient's body   habitus and bowel gas. Patient was unable to tolerate transvaginal   examination.  Uterus: 9.8 x 5.3 x 5.4 cm. Postsurgical changes. A 2.2 x 2.2 x 2.2 cm   hypoechoic area posteriorly, which is difficult to assess but may be   related to a fibroid.  Endometrium: 1.8 cm. Heterogenous echotexture.  Right ovary: Not visualized.  Left ovary: Visualized transabdominally. 2.7 x 1.5 x 1.5 cm. Small   follicles. Flow present.  Additional: Trace free fluid.    IMPRESSION:  Myomatous uterus. Thickened, heterogeneous endometrium, which is   suboptimally assessed on this study. Recommend follow-up (sonohysterogram   and/or direct visualization) for further evaluation.  < end of copied text >    A:  Patient is a 42 F with history of AFIB not on AC, chronic neuropathy, iron deficiency anemia, Graves' disease, depression, asthma/COPD, history of dysfunctional uterine bleeding secondary to fibroids requiring prior transfusions presents for AUB. Pt with PRBC running at time of interview. S/p multiple units of PRBC. tachycardia.   Some symptom improvement. Tachycardia, awaiting AM labs (delayed due to transfusion)     P:  Continue current care  Diet - regular  Provera TID  DepoProvera Im given  Serial abdominal exams  trend cbc  transfuse prn  GI/DVT prophylaxis  Analgesia prn  OOB/ambulation on the floor  Incentive spirometry/Cough/Deep breathing exercises  recommend Hem/onc consultation  Pt will need GYN out pt f/u upon d/c.     Case & plan to be discussed with Dr. Miles.

## 2023-08-28 NOTE — PROGRESS NOTE ADULT - SUBJECTIVE AND OBJECTIVE BOX
Hx and status reviewed.  Pt stating improvements in bleeding and how she is feeling.  Tolerating diet.  Mild tachycardia but VVS otherwise.  Pt on Provera 10mg TID and awaiting Provera 150mg Depo IM x 1 for long term augmentation of progestational therapy.  Pt is s/p 8U PRBCs and 2U of FFP; TXA 1g IVPB x 2 (on admit and last PM)  Pt is not a candidate for any E2 Tx due to her risk factors and medical comorbidities  Plan is to f/U on CBC post transfusion  F/U on Heme consultation for any recommendations hematologically and for out patient IV Iron Tx, PRN  If patient remains stable and improved, with Hgb>/=7, pt to go home on Provera 10mg ID continuously (& s/p Provera for BCM & cycle management long term) and F/U with her PVT GYN (pt originally had an appt today)  for discussion on other workup and treatment options (ex: In-office EMBx; discussion on EMA +/-MIRENA Prog IUD Tx; LASH).  For now, CPC

## 2023-08-28 NOTE — CARE COORDINATION ASSESSMENT. - NSCAREPROVIDERS_GEN_ALL_CORE_FT
CARE PROVIDERS:  Accepting Physician: Felipa Leonard  Access Services: Sana Tolbert  Administration: Giovanna Thibodeaux  Administration: Babita Tipton  Administration: Tae Walters  Admitting: Felipa Leonard  Attending: Felipa Leonard  Case Management: Nasima Azul  Case Management: Tory Metzger  Consultant: Ernst Melton  Consultant: Yoanna Rodney  Consultant: Rex Espana  Consultant: Adela Pool  Consultant: Kimberley Andrade  Consultant: Radha Perez  ED Attending: Tiara Kumar  ED Nurse: Nakul Jones  Infection Control: Juliana Oliveira  Nurse: Gaye Flower  Nurse: Adriana Hayes  Nurse: Rudy Kilgore  Nurse: Lamin Patricio  Outpatient Provider: Kacie David  PCA/Nursing Assistant: Allison Gonzalez  PCA/Nursing Assistant: Andres Hernandez  Primary Team: Chay Hill  Primary Team: Se Majano  Primary Team: Tamera Zaidi  Primary Team: JASON Evans  Registered Dietitian: Ольга Burton  Registered Dietitian: Bety Loaiza  : Bret Lam  Team: [facility]  Hospitalists, Team   CARE PROVIDERS:  Accepting Physician: Felipa Leonard  Access Services: Sana Tolbert  Administration: Giovanna Thibodeaux  Administration: Babita Tipton  Administration: Tae Walters  Admitting: Felipa Leonard  Attending: Felipa Leonard  Case Management: Nasima Azul  Case Management: Tory Metzger  Consultant: Ernst Melton  Consultant: Yoanna Rodney  Consultant: Rex Espana  Consultant: Adela Pool  Consultant: Radha Perez  Consultant: Kimberley Andrade  ED Attending: Tiara Kumar  ED Nurse: Nakul Jones  Infection Control: Juliana Oliveira  Nurse: Gaye Flower  Nurse: Rudy Kilgore  Nurse: Adriana Hayes  Nurse: Lamin Patricio  Outpatient Provider: Kacie David  PCA/Nursing Assistant: Andres Hernandez  Primary Team: Chay Hill  Primary Team: Bill Hurley  Primary Team: Se Majano  Primary Team: Tamera Zaidi  Primary Team: JASON Evans  Registered Dietitian: Ольга Burton  : Bret Lam  Team: [facility]  Hospitalists, Team

## 2023-08-28 NOTE — PROGRESS NOTE ADULT - ASSESSMENT
42F P3 with Graves' disease, depression, asthma/COPD, history of dysfunctional uterine bleeding secondary to uterine fibroids requiring prior transfusions admitted for syncopal episode in setting of vaginal bleeding x2 weeks    1. Symptomatic acute blood loss anemia  Symptomatic clinically and mildly tachycardic  s/p 8U PRBCs and 2U of FFP; TXA 1g IVPB x 2 - Hg today has improved to 9.6  If the patient continues to have clinical bleeding, she will need some type of definite or temporizing measure in form of IR guided embolization (if appropriate) vs other modalities ideally in a tertiary care setting   Plan for supportive and symptomatic management today and reassess further tomorrow   Continue provera 10mg TID   s/p depo provera 150mg IM   CBC daily   Transfuse to maintain Hg >7g/dL  GYN input appreciated     2. Syncope on presentation due to blood loss  Resolved.     3. Iron Deficiency Anemia  - iron studies added on to admission labs  - Venofer 200mg IV daily - On day 3/5     4 Hyperthyroidism  - History of Graves' disease with proptosis   - Continue home Methimazole 10mg daily  - TFT wnl   - Follow up with endocrinology outpatient     5. Afib not on AC  - continue home Metoprolol succ 50mg daily with hold parameters  SCDs    6. Neuropathy  continue home Gabapentin 400mg BID    7. Depression  continue home meds: Zoloft, Trazodone, Diazepam       DVT ppx: SCDs  D/w Dr. Majano  42F P3 with Graves' disease, depression, asthma/COPD, history of dysfunctional uterine bleeding secondary to uterine fibroids requiring prior transfusions admitted for syncopal episode in setting of vaginal bleeding x2 weeks    1. Symptomatic acute blood loss anemia  2. AUB   Symptomatic clinically and mildly tachycardic  s/p 8U PRBCs and 2U of FFP; TXA 1g IVPB x 2 - Hg today has improved to 9.6  If the patient continues to have clinical bleeding, she will need some type of definite or temporizing measure in form of IR guided embolization (if appropriate) vs other modalities ideally in a tertiary care setting   Plan for supportive and symptomatic management today and reassess further tomorrow   Continue provera 10mg TID   s/p depo provera 150mg IM   CBC daily   Transfuse to maintain Hg >7g/dL  GYN input appreciated     3. Syncope on presentation due to blood loss  Resolved.     4. Iron Deficiency Anemia  - iron studies added on to admission labs  - Venofer 200mg IV daily - On day 3/5     5 Hyperthyroidism  - History of Graves' disease with proptosis   - Continue home Methimazole 10mg daily  - TFT wnl   - Follow up with endocrinology outpatient     6. Afib not on AC  - continue home Metoprolol succ 50mg daily with hold parameters  SCDs    7. Neuropathy  continue home Gabapentin 400mg BID    8. Depression  continue home meds: Zoloft, Trazodone, Diazepam       DVT ppx: SCDs  D/w Dr. Majano  42F P3 with Graves' disease, depression, asthma/COPD, history of dysfunctional uterine bleeding secondary to uterine fibroids requiring prior transfusions admitted for syncopal episode in setting of vaginal bleeding x2 weeks    1. Symptomatic acute blood loss anemia  2. AUB   Symptomatic clinically and mildly tachycardic  s/p 8U PRBCs and 2U of FFP; TXA 1g IVPB x 2 - Hg today has improved to 9.6  If the patient continues to have clinical bleeding, she will need some type of definite or temporizing measure in form of IR guided embolization (if appropriate) vs other modalities ideally in a tertiary care setting   Plan for supportive and symptomatic management today and reassess further tomorrow   Continue provera 10mg TID   s/p depo provera 150mg IM   CBC daily   Transfuse to maintain Hg >7g/dL  GYN input appreciated     3. Syncope on presentation due to blood loss  Resolved.     4. Iron Deficiency Anemia  - iron studies added on to admission labs  - Venofer 200mg IV daily - On day 3/5     5 Hyperthyroidism  - History of Graves' disease with proptosis   - Continue home Methimazole 10mg daily  - TFT wnl   - Follow up with endocrinology outpatient     6. Afib not on AC  - continue home Metoprolol succ 50mg daily with hold parameters  SCDs    7. Neuropathy  continue home Gabapentin 400mg BID    8. Depression  continue home meds: Zoloft, Trazodone, Diazepam    7. FEN   HypoMg  HypoCa  Both repleted        DVT ppx: SCDs  D/w Dr. Majano

## 2023-08-28 NOTE — DIETITIAN INITIAL EVALUATION ADULT - REASON FOR ADMISSION
Per H&P "Patient is a 43yo F, PMH AFIB not on AC, neuropathy, iron deficiency anemia, hyperthyroidism, depression, asthma/COPD, history of dysfunctional uterine bleeding secondary to fibroids requiring prior transfusions, presents to ED with vaginal bleeding x2 weeks. In general, patient has had vaginal bleeding for 2-3 years.  She notes heavy bleeding with clots, changing her pad multiple times a day. She also notes syncopal episodes yesterday and today as well as chest tightness.  In 5/23 she was transferred to [facility] ED and discharged with TXA x5 days with some relief but not complete relief. She was found to have posterior fibroid on US at that time. She had referrals to follow up but she states the physicians do not accept her insurance."

## 2023-08-28 NOTE — DIETITIAN INITIAL EVALUATION ADULT - PERTINENT MEDS FT
MEDICATIONS  (STANDING):  calcium carbonate   1250 mG (OsCal) 1 Tablet(s) Oral two times a day  diazepam    Tablet 5 milliGRAM(s) Oral two times a day  ferrous    sulfate 325 milliGRAM(s) Oral three times a day  folic acid 1 milliGRAM(s) Oral daily  gabapentin 400 milliGRAM(s) Oral two times a day  iron sucrose IVPB 200 milliGRAM(s) IV Intermittent every 24 hours  magnesium oxide 400 milliGRAM(s) Oral daily  medroxyPROGESTERone 10 milliGRAM(s) Oral <User Schedule>  methimazole 10 milliGRAM(s) Oral daily  metoprolol succinate ER 50 milliGRAM(s) Oral daily  sertraline 100 milliGRAM(s) Oral daily  sodium chloride 0.9%. 1000 milliLiter(s) (100 mL/Hr) IV Continuous <Continuous>  traZODone 50 milliGRAM(s) Oral two times a day    MEDICATIONS  (PRN):  albuterol    90 MICROgram(s) HFA Inhaler 2 Puff(s) Inhalation every 6 hours PRN for shortness of breath and/or wheezing  ondansetron Injectable 4 milliGRAM(s) IV Push every 8 hours PRN Nausea and/or Vomiting  oxycodone    5 mG/acetaminophen 325 mG 1 Tablet(s) Oral every 4 hours PRN Moderate Pain (4 - 6)  oxycodone    5 mG/acetaminophen 325 mG 2 Tablet(s) Oral every 4 hours PRN Severe Pain (7 - 10)  polyethylene glycol 3350 17 Gram(s) Oral daily PRN Constipation

## 2023-08-28 NOTE — CARE COORDINATION ASSESSMENT. - NSDCPLANREVIEW_GEN_ALL_CORE
[address] [place] [address] PPD is good  Scheduled for this afternoon 
If that is what his job requires it is either PPD your blood test called interferon gold  Please ask him which 1 his job request   If it is the interferon gold I will order it 
Jt Abebe needs an annual TB test for his job  May I schedule him for a PPD?
oriented to person, place and time , normal sensation , short and long term memory intact
Patient

## 2023-08-28 NOTE — CARE COORDINATION ASSESSMENT. - PRO ARRIVE FROM
Pt. is SPCU getting a unit of PRBC this AM.    Pt. A&Ox4, states she lives at home with her mother Zuri and 3 sons Carlos 25y, Puma 20y, and Gus 16y.  Pvt home with 2 steps to enter then one level.  States she has not been working and is applying for disability.  States she does not currently drive due to neuropathy in foot.  States she does require help with getting in out shower at times and son Carlos and Puma help.  Family transport her to MD appointments and Mom does shopping.  Pt. owns a RW and   W/C that she uses when needed.  Had home care in the past several years ago and does not recall name of agency.  Pt. states she does not think she will need any home care services when cleared for home.  States she had a  bleeding about 4 months ago as well.   States " I think I will be fine"./home

## 2023-08-28 NOTE — DIETITIAN INITIAL EVALUATION ADULT - ORAL INTAKE PTA/DIET HISTORY
Reported not following any therapeutic diet at home, appetite/po intake was good. Pt was taking folic acid, Mg, Calcium, ferrous sulfate pta.

## 2023-08-28 NOTE — PROGRESS NOTE ADULT - SUBJECTIVE AND OBJECTIVE BOX
Pt still continues to pass large clots, feels fatigued, and gets dyspneic on exertion    REVIEW OF SYSTEMS:  Constitutional: No fever, or chills  HEENT: No dry eyes or eye irritation   CV: No chest pain, or palpitations   Resp: No cough, or sputum production   GI: No nausea or vomiting    Skin: No rash    Neurological: chronic neuorpathic pain       OBJECTIVE:   Vital Signs Last 24 Hrs  T(C): 36.9 (27 Aug 2023 10:00), Max: 37.1 (26 Aug 2023 18:09)  T(F): 98.5 (27 Aug 2023 10:00), Max: 98.7 (26 Aug 2023 18:09)  HR: 94 (27 Aug 2023 10:00) (87 - 108)  BP: 108/65 (27 Aug 2023 10:00) (89/48 - 116/68)  BP(mean): 77 (27 Aug 2023 10:00) (59 - 87)  RR: 18 (27 Aug 2023 10:00) (12 - 24)  SpO2: 95% (27 Aug 2023 10:00) (93% - 99%)    Parameters below as of 26 Aug 2023 19:00  Patient On (Oxygen Delivery Method): room air            CAPILLARY BLOOD GLUCOSE          PHYSICAL EXAM:  General: Weak appearing  Head: Normocephalic   Eyes: EOMI, no ptosis.    Neck:   no masses   Respiratory:  non labored breathing on room air. Lungs clear to auscultation   Cardiovascular:   There is no peripheral edema   Abdomen: Soft, non-tender, nondistended   Extremities:  . No peripheral edema   Skin: Intact, no rash .  Neurological: AOAx4. No focal deficits  Psychiatry: The mental examination revealed the patient was oriented to person, place, and time     LINES:    HOSPITAL MEDICATIONS:      metoprolol succinate ER 50 milliGRAM(s) Oral daily    medroxyPROGESTERone 10 milliGRAM(s) Oral <User Schedule>  methimazole 10 milliGRAM(s) Oral daily    albuterol    90 MICROgram(s) HFA Inhaler 2 Puff(s) Inhalation every 6 hours PRN    diazepam    Tablet 5 milliGRAM(s) Oral two times a day  gabapentin 400 milliGRAM(s) Oral once  gabapentin 400 milliGRAM(s) Oral two times a day  ondansetron Injectable 4 milliGRAM(s) IV Push every 8 hours PRN  oxycodone    5 mG/acetaminophen 325 mG 1 Tablet(s) Oral every 4 hours PRN  oxycodone    5 mG/acetaminophen 325 mG 2 Tablet(s) Oral every 4 hours PRN  sertraline 100 milliGRAM(s) Oral daily  traZODone 50 milliGRAM(s) Oral two times a day          calcium carbonate   1250 mG (OsCal) 1 Tablet(s) Oral two times a day  calcium gluconate IVPB 2 Gram(s) IV Intermittent once  folic acid 1 milliGRAM(s) Oral daily  iron sucrose IVPB 200 milliGRAM(s) IV Intermittent every 24 hours  magnesium oxide 400 milliGRAM(s) Oral daily  potassium chloride    Tablet ER 40 milliEquivalent(s) Oral every 4 hours  sodium chloride 0.9%. 1000 milliLiter(s) IV Continuous <Continuous>            LABS:                                             6.6    6.19  )-----------( 101      ( 27 Aug 2023 06:00 )             21.3     08-    141  |  106  |  5<L>  ----------------------------<  94  3.9   |  29  |  0.41<L>    Ca    6.6<L>      27 Aug 2023 06:00  Phos  2.3       Mg     1.7         TPro  4.6<L>  /  Alb  2.2<L>  /  TBili  2.0<H>  /  DBili  x   /  AST  55<H>  /  ALT  25  /  AlkPhos  125<H>           PTT - ( 26 Aug 2023 02:43 )  PTT:27.9 sec  Urinalysis Basic - ( 26 Aug 2023 02:43 )    Color: x / Appearance: x / SG: x / pH: x  Gluc: 95 mg/dL / Ketone: x  / Bili: x / Urobili: x   Blood: x / Protein: x / Nitrite: x   Leuk Esterase: x / RBC: x / WBC x   Sq Epi: x / Non Sq Epi: x / Bacteria: x         US Transvaginal:   	ACC: [id number] EXAM:  US TRANSVAGINAL   ORDERED BY: CASPER HASSAN   	  	PROCEDURE DATE:  2023    	  	  	  	INTERPRETATION:  CLINICAL INDICATION: dysfunctional uterine bleeding,   	history of fibroids and , negative serum beta-hCG.  	  	TECHNIQUE: Transabdominal ultrasound of the pelvis was performed.   	Grayscale, color Doppler and spectral Doppler were utilized.  	  	COMPARISON: 2023. 2017.  	  	FINDINGS: This study was technically difficult due to patient's body   	habitus and bowel gas. Patient was unable to tolerate transvaginal   	examination.  	  	Uterus: 9.8 x 5.3 x 5.4 cm. Postsurgical changes. A 2.2 x 2.2 x 2.2 cm   	hypoechoic area posteriorly, which is difficult to assess but may be   	related to a fibroid.  	Endometrium: 1.8 cm. Heterogenous echotexture.  	Right ovary: Not visualized.  	Left ovary: Visualized transabdominally. 2.7 x 1.5 x 1.5 cm. Small   	follicles. Flow present.  	Additional: Trace free fluid.  	  	IMPRESSION:  	  	Myomatous uterus. Thickened, heterogeneous endometrium, which is   	suboptimally assessed on this study. Recommend follow-up (sonohysterogram   	and/or direct visualization) for further evaluation.  	  	--- End of Report ---

## 2023-08-28 NOTE — PROGRESS NOTE ADULT - SUBJECTIVE AND OBJECTIVE BOX
BRIEF HOSPITAL COURSE: Patient is a 43 y/o female w/PMHx of AFIB not on AC, neuropathy, iron deficiency anemia, hyperthyroidism, depression, asthma/COPD, history of dysfunctional uterine bleeding secondary to fibroids requiring prior transfusions, presents to ED with vaginal bleeding x 2 weeks. In general, patient has had vaginal bleeding for 2-3 years.  She notes heavy bleeding with clots, changing her pad multiple times a day. She also notes syncopal episodes yesterday and today as well as chest tightness. Patient s/p PRBC x 4 on .     Events last 24 hours: Patient s/p PRBC x 2 and FFP x 2, w/post-transfusion Hgb 9.6, remains hypophosphatemic at 2.2,      PAST MEDICAL & SURGICAL HISTORY:  Atrial fibrillation      History of Hyperthyroidism      Asthma      History of anemia      Depression, unspecified depression type      Smoker      S/P  Section  ,,      History of blood transfusion            Medications:    metoprolol succinate ER 50 milliGRAM(s) Oral daily    albuterol    90 MICROgram(s) HFA Inhaler 2 Puff(s) Inhalation every 6 hours PRN    diazepam    Tablet 5 milliGRAM(s) Oral two times a day  gabapentin 400 milliGRAM(s) Oral two times a day  ondansetron Injectable 4 milliGRAM(s) IV Push every 8 hours PRN  oxycodone    5 mG/acetaminophen 325 mG 1 Tablet(s) Oral every 4 hours PRN  oxycodone    5 mG/acetaminophen 325 mG 2 Tablet(s) Oral every 4 hours PRN  sertraline 100 milliGRAM(s) Oral daily  traZODone 50 milliGRAM(s) Oral two times a day        polyethylene glycol 3350 17 Gram(s) Oral daily PRN      medroxyPROGESTERone 10 milliGRAM(s) Oral <User Schedule>  methimazole 10 milliGRAM(s) Oral daily    calcium carbonate   1250 mG (OsCal) 1 Tablet(s) Oral two times a day  ferrous    sulfate 325 milliGRAM(s) Oral three times a day  folic acid 1 milliGRAM(s) Oral daily  iron sucrose IVPB 200 milliGRAM(s) IV Intermittent every 24 hours  magnesium oxide 400 milliGRAM(s) Oral daily  sodium chloride 0.9%. 1000 milliLiter(s) IV Continuous <Continuous>                ICU Vital Signs Last 24 Hrs  T(C): 36.7 (28 Aug 2023 16:40), Max: 37 (28 Aug 2023 08:24)  T(F): 98 (28 Aug 2023 16:40), Max: 98.6 (28 Aug 2023 08:24)  HR: 92 (28 Aug 2023 18:00) (90 - 108)  BP: 90/62 (28 Aug 2023 18:00) (82/56 - 120/84)  BP(mean): 73 (28 Aug 2023 18:00) (65 - 95)  ABP: --  ABP(mean): --  RR: 12 (28 Aug 2023 18:00) (11 - 20)  SpO2: 96% (28 Aug 2023 18:00) (92% - 99%)    O2 Parameters below as of 28 Aug 2023 18:00  Patient On (Oxygen Delivery Method): room air                I&O's Detail    27 Aug 2023 07:01  -  28 Aug 2023 07:00  --------------------------------------------------------  IN:    IV PiggyBack: 150 mL    Oral Fluid: 640 mL    PRBCs (Packed Red Blood Cells): 1669 mL    sodium chloride 0.9%: 1300 mL  Total IN: 3759 mL    OUT:  Total OUT: 0 mL    Total NET: 3759 mL      28 Aug 2023 07:01  -  28 Aug 2023 20:05  --------------------------------------------------------  IN:    IV PiggyBack: 150 mL    PRBCs (Packed Red Blood Cells): 300 mL    sodium chloride 0.9%: 600 mL  Total IN: 1050 mL    OUT:  Total OUT: 0 mL    Total NET: 1050 mL            LABS:                        9.6    7.04  )-----------( 111      ( 28 Aug 2023 12:30 )             30.0     08-28    139  |  106  |  5<L>  ----------------------------<  110<H>  4.1   |  29  |  0.43<L>    Ca    7.5<L>      28 Aug 2023 12:30  Phos  2.2       Mg     1.5         TPro  5.0<L>  /  Alb  2.4<L>  /  TBili  2.3<H>  /  DBili  x   /  AST  78<H>  /  ALT  25  /  AlkPhos  136<H>            CAPILLARY BLOOD GLUCOSE        PT/INR - ( 28 Aug 2023 12:30 )   PT: 12.7 sec;   INR: 1.14 ratio         PTT - ( 27 Aug 2023 23:10 )  PTT:34.3 sec  Urinalysis Basic - ( 28 Aug 2023 12:30 )    Color: x / Appearance: x / SG: x / pH: x  Gluc: 110 mg/dL / Ketone: x  / Bili: x / Urobili: x   Blood: x / Protein: x / Nitrite: x   Leuk Esterase: x / RBC: x / WBC x   Sq Epi: x / Non Sq Epi: x / Bacteria: x      CULTURES:      Physical Examination:    General: No acute distress.      HEENT: Pupils equal, reactive to light.  Symmetric.    PULM: Clear to auscultation bilaterally, no significant sputum production    NECK: Supple, no lymphadenopathy, trachea midline    CVS: Regular rate and rhythm, no murmurs, rubs, or gallops    ABD: Soft, nondistended, nontender, normoactive bowel sounds, no masses    EXT: No edema, nontender    SKIN: Warm and well perfused, no rashes noted.    NEURO: Alert, oriented, interactive, nonfocal    DEVICES:     RADIOLOGY: ***    CRITICAL CARE TIME SPENT: ***     BRIEF HOSPITAL COURSE: Patient is a 43 y/o female w/PMHx of AFIB not on AC, neuropathy, iron deficiency anemia, hyperthyroidism, depression, asthma/COPD, history of dysfunctional uterine bleeding secondary to fibroids requiring prior transfusions, presents to ED with vaginal bleeding x 2 weeks. In general, patient has had vaginal bleeding for 2-3 years.  She notes heavy bleeding with clots, changing her pad multiple times a day. She also notes syncopal episodes yesterday and today as well as chest tightness. Patient s/p PRBC x 4 on .     Events last 24 hours: Patient s/p PRBC x 2 and FFP x 2, w/post-transfusion Hgb 9.6, remains hypophosphatemic at 2.2 despite PO repletion, and hypomagnesemic. Patient thus far has received PRBC x 8, FFP x 2, and TXA 2G x 1. No additional acute events reported throughout the day.    Remainder of ROS negative except as stated in HPI      PAST MEDICAL & SURGICAL HISTORY:  Atrial fibrillation      History of Hyperthyroidism      Asthma      History of anemia      Depression, unspecified depression type      Smoker      S/P  Section  ,,      History of blood transfusion            Medications:    metoprolol succinate ER 50 milliGRAM(s) Oral daily    albuterol    90 MICROgram(s) HFA Inhaler 2 Puff(s) Inhalation every 6 hours PRN    diazepam    Tablet 5 milliGRAM(s) Oral two times a day  gabapentin 400 milliGRAM(s) Oral two times a day  ondansetron Injectable 4 milliGRAM(s) IV Push every 8 hours PRN  oxycodone    5 mG/acetaminophen 325 mG 1 Tablet(s) Oral every 4 hours PRN  oxycodone    5 mG/acetaminophen 325 mG 2 Tablet(s) Oral every 4 hours PRN  sertraline 100 milliGRAM(s) Oral daily  traZODone 50 milliGRAM(s) Oral two times a day        polyethylene glycol 3350 17 Gram(s) Oral daily PRN      medroxyPROGESTERone 10 milliGRAM(s) Oral <User Schedule>  methimazole 10 milliGRAM(s) Oral daily    calcium carbonate   1250 mG (OsCal) 1 Tablet(s) Oral two times a day  ferrous    sulfate 325 milliGRAM(s) Oral three times a day  folic acid 1 milliGRAM(s) Oral daily  iron sucrose IVPB 200 milliGRAM(s) IV Intermittent every 24 hours  magnesium oxide 400 milliGRAM(s) Oral daily  sodium chloride 0.9%. 1000 milliLiter(s) IV Continuous <Continuous>                ICU Vital Signs Last 24 Hrs  T(C): 36.7 (28 Aug 2023 16:40), Max: 37 (28 Aug 2023 08:24)  T(F): 98 (28 Aug 2023 16:40), Max: 98.6 (28 Aug 2023 08:24)  HR: 92 (28 Aug 2023 18:00) (90 - 108)  BP: 90/62 (28 Aug 2023 18:00) (82/56 - 120/84)  BP(mean): 73 (28 Aug 2023 18:00) (65 - 95)  ABP: --  ABP(mean): --  RR: 12 (28 Aug 2023 18:00) (11 - 20)  SpO2: 96% (28 Aug 2023 18:00) (92% - 99%)    O2 Parameters below as of 28 Aug 2023 18:00  Patient On (Oxygen Delivery Method): room air                I&O's Detail    27 Aug 2023 07:01  -  28 Aug 2023 07:00  --------------------------------------------------------  IN:    IV PiggyBack: 150 mL    Oral Fluid: 640 mL    PRBCs (Packed Red Blood Cells): 1669 mL    sodium chloride 0.9%: 1300 mL  Total IN: 3759 mL    OUT:  Total OUT: 0 mL    Total NET: 3759 mL      28 Aug 2023 07:01  -  28 Aug 2023 20:05  --------------------------------------------------------  IN:    IV PiggyBack: 150 mL    PRBCs (Packed Red Blood Cells): 300 mL    sodium chloride 0.9%: 600 mL  Total IN: 1050 mL    OUT:  Total OUT: 0 mL    Total NET: 1050 mL            LABS:                        9.6    7.04  )-----------( 111      ( 28 Aug 2023 12:30 )             30.0         139  |  106  |  5<L>  ----------------------------<  110<H>  4.1   |  29  |  0.43<L>    Ca    7.5<L>      28 Aug 2023 12:30  Phos  2.2       Mg     1.5         TPro  5.0<L>  /  Alb  2.4<L>  /  TBili  2.3<H>  /  DBili  x   /  AST  78<H>  /  ALT  25  /  AlkPhos  136<H>            CAPILLARY BLOOD GLUCOSE        PT/INR - ( 28 Aug 2023 12:30 )   PT: 12.7 sec;   INR: 1.14 ratio         PTT - ( 27 Aug 2023 23:10 )  PTT:34.3 sec  Urinalysis Basic - ( 28 Aug 2023 12:30 )    Color: x / Appearance: x / SG: x / pH: x  Gluc: 110 mg/dL / Ketone: x  / Bili: x / Urobili: x   Blood: x / Protein: x / Nitrite: x   Leuk Esterase: x / RBC: x / WBC x   Sq Epi: x / Non Sq Epi: x / Bacteria: x      CULTURES:      Physical Examination:    General: No acute distress.      HEENT: EOMI, pupils equal, reactive to light.  Symmetric.    PULM: Clear to auscultation bilaterally    NECK: Supple, no lymphadenopathy, trachea midline    CVS: Regular rate and rhythm, no murmurs, rubs, or gallops    ABD: Soft, nondistended, nontender, normoactive bowel sounds, no masses    EXT: No edema, nontender    SKIN: Warm and well perfused, no rashes noted.    NEURO: Alert, oriented, interactive, nonfocal    DEVICES:     RADIOLOGY:   < from: US Transvaginal (23 @ 04:24) >    ACC: [id number] EXAM:  US TRANSVAGINAL   ORDERED BY: CASPER HASSAN     PROCEDURE DATE:  2023          INTERPRETATION:  CLINICAL INDICATION: dysfunctional uterine bleeding,   history of fibroids and , negative serum beta-hCG.    TECHNIQUE: Transabdominal ultrasound of the pelvis was performed.   Grayscale, color Doppler and spectral Doppler were utilized.    COMPARISON: 2023. 2017.    FINDINGS: This study was technically difficult due to patient's body   habitus and bowel gas. Patient was unable to tolerate transvaginal   examination.    Uterus: 9.8 x 5.3 x 5.4 cm. Postsurgical changes. A 2.2 x 2.2 x 2.2 cm   hypoechoic area posteriorly, which is difficult to assess but may be   related to a fibroid.  Endometrium: 1.8 cm. Heterogenous echotexture.  Right ovary: Not visualized.  Left ovary: Visualized transabdominally. 2.7 x 1.5 x 1.5 cm. Small   follicles. Flow present.  Additional: Trace free fluid.    IMPRESSION:    Myomatous uterus. Thickened, heterogeneous endometrium, which is   suboptimally assessed on this study. Recommend follow-up (sonohysterogram   and/or direct visualization) for further evaluation.    --- End of Report ---    AMANDA LAU MD; Attending Radiologist  This document has been electronically signed. Aug 26 2023  4:48AM

## 2023-08-28 NOTE — DIETITIAN INITIAL EVALUATION ADULT - ADD RECOMMEND
1. Continue with current diet order  2. Provide ONS pending %po intake  3. Encourage po intake as needed

## 2023-08-28 NOTE — DIETITIAN INITIAL EVALUATION ADULT - OTHER INFO
Visited patient in room, presents with good appetite/po intake, consuming >75% of meals. Denies n/v/d/c, last BM 8/27. No reported difficulty chewing or swallowing. NKFA. Reported # 1 year ago, current adm weight 236.5#, 5% wt loss in 1 year is not clinically significant, will continue to monitor weight trends as able.     Pertinent medications/nutrition labs reviewed; noted low Mg, calcium, Phos, received 2 units PRBC, IVF in house.     Educated on adequate protein/energy intake to prevent weight loss, prioritize protein at each meal, more nutrient dense foods. RD to continue to monitor nutrition status per protocol.

## 2023-08-29 LAB
ALBUMIN SERPL ELPH-MCNC: 2.3 G/DL — LOW (ref 3.3–5)
ALP SERPL-CCNC: 131 U/L — HIGH (ref 30–120)
ALT FLD-CCNC: 25 U/L — SIGNIFICANT CHANGE UP (ref 10–60)
ANION GAP SERPL CALC-SCNC: 6 MMOL/L — SIGNIFICANT CHANGE UP (ref 5–17)
AST SERPL-CCNC: 55 U/L — HIGH (ref 10–40)
BASOPHILS # BLD AUTO: 0.04 K/UL — SIGNIFICANT CHANGE UP (ref 0–0.2)
BASOPHILS NFR BLD AUTO: 0.5 % — SIGNIFICANT CHANGE UP (ref 0–2)
BILIRUB SERPL-MCNC: 1.1 MG/DL — SIGNIFICANT CHANGE UP (ref 0.2–1.2)
BUN SERPL-MCNC: 5 MG/DL — LOW (ref 7–23)
CALCIUM SERPL-MCNC: 7.8 MG/DL — LOW (ref 8.4–10.5)
CHLORIDE SERPL-SCNC: 106 MMOL/L — SIGNIFICANT CHANGE UP (ref 96–108)
CO2 SERPL-SCNC: 28 MMOL/L — SIGNIFICANT CHANGE UP (ref 22–31)
CREAT SERPL-MCNC: 0.5 MG/DL — SIGNIFICANT CHANGE UP (ref 0.5–1.3)
EGFR: 119 ML/MIN/1.73M2 — SIGNIFICANT CHANGE UP
EOSINOPHIL # BLD AUTO: 0 K/UL — SIGNIFICANT CHANGE UP (ref 0–0.5)
EOSINOPHIL NFR BLD AUTO: 0 % — SIGNIFICANT CHANGE UP (ref 0–6)
FERRITIN SERPL-MCNC: 474 NG/ML — HIGH (ref 15–150)
GLUCOSE SERPL-MCNC: 89 MG/DL — SIGNIFICANT CHANGE UP (ref 70–99)
HCT VFR BLD CALC: 25.1 % — LOW (ref 34.5–45)
HCT VFR BLD CALC: 25.3 % — LOW (ref 34.5–45)
HCT VFR BLD CALC: 28.3 % — LOW (ref 34.5–45)
HGB BLD-MCNC: 7.8 G/DL — LOW (ref 11.5–15.5)
HGB BLD-MCNC: 8.2 G/DL — LOW (ref 11.5–15.5)
HGB BLD-MCNC: 9.1 G/DL — LOW (ref 11.5–15.5)
IMM GRANULOCYTES NFR BLD AUTO: 0.4 % — SIGNIFICANT CHANGE UP (ref 0–0.9)
IRON SATN MFR SERPL: 158 UG/DL — SIGNIFICANT CHANGE UP (ref 30–160)
IRON SATN MFR SERPL: 60 % — HIGH (ref 14–50)
LYMPHOCYTES # BLD AUTO: 0.86 K/UL — LOW (ref 1–3.3)
LYMPHOCYTES # BLD AUTO: 10.4 % — LOW (ref 13–44)
MAGNESIUM SERPL-MCNC: 1.8 MG/DL — SIGNIFICANT CHANGE UP (ref 1.6–2.6)
MCHC RBC-ENTMCNC: 26.4 PG — LOW (ref 27–34)
MCHC RBC-ENTMCNC: 26.9 PG — LOW (ref 27–34)
MCHC RBC-ENTMCNC: 27.2 PG — SIGNIFICANT CHANGE UP (ref 27–34)
MCHC RBC-ENTMCNC: 31.1 GM/DL — LOW (ref 32–36)
MCHC RBC-ENTMCNC: 32.2 GM/DL — SIGNIFICANT CHANGE UP (ref 32–36)
MCHC RBC-ENTMCNC: 32.4 GM/DL — SIGNIFICANT CHANGE UP (ref 32–36)
MCV RBC AUTO: 83.7 FL — SIGNIFICANT CHANGE UP (ref 80–100)
MCV RBC AUTO: 83.8 FL — SIGNIFICANT CHANGE UP (ref 80–100)
MCV RBC AUTO: 85.1 FL — SIGNIFICANT CHANGE UP (ref 80–100)
MONOCYTES # BLD AUTO: 0.29 K/UL — SIGNIFICANT CHANGE UP (ref 0–0.9)
MONOCYTES NFR BLD AUTO: 3.5 % — SIGNIFICANT CHANGE UP (ref 2–14)
NEUTROPHILS # BLD AUTO: 7.08 K/UL — SIGNIFICANT CHANGE UP (ref 1.8–7.4)
NEUTROPHILS NFR BLD AUTO: 85.2 % — HIGH (ref 43–77)
NRBC # BLD: 0 /100 WBCS — SIGNIFICANT CHANGE UP (ref 0–0)
PHOSPHATE SERPL-MCNC: 2.3 MG/DL — LOW (ref 2.5–4.5)
PLATELET # BLD AUTO: 132 K/UL — LOW (ref 150–400)
PLATELET # BLD AUTO: 137 K/UL — LOW (ref 150–400)
PLATELET # BLD AUTO: 143 K/UL — LOW (ref 150–400)
POTASSIUM SERPL-MCNC: 4.1 MMOL/L — SIGNIFICANT CHANGE UP (ref 3.5–5.3)
POTASSIUM SERPL-SCNC: 4.1 MMOL/L — SIGNIFICANT CHANGE UP (ref 3.5–5.3)
PROT SERPL-MCNC: 5.1 G/DL — LOW (ref 6–8.3)
RBC # BLD: 2.95 M/UL — LOW (ref 3.8–5.2)
RBC # BLD: 3.02 M/UL — LOW (ref 3.8–5.2)
RBC # BLD: 3.38 M/UL — LOW (ref 3.8–5.2)
RBC # FLD: 17.2 % — HIGH (ref 10.3–14.5)
RBC # FLD: 17.7 % — HIGH (ref 10.3–14.5)
RBC # FLD: 17.7 % — HIGH (ref 10.3–14.5)
SODIUM SERPL-SCNC: 140 MMOL/L — SIGNIFICANT CHANGE UP (ref 135–145)
TIBC SERPL-MCNC: 262 UG/DL — SIGNIFICANT CHANGE UP (ref 220–430)
UIBC SERPL-MCNC: 104 UG/DL — LOW (ref 110–370)
WBC # BLD: 7.27 K/UL — SIGNIFICANT CHANGE UP (ref 3.8–10.5)
WBC # BLD: 8.3 K/UL — SIGNIFICANT CHANGE UP (ref 3.8–10.5)
WBC # BLD: 9.39 K/UL — SIGNIFICANT CHANGE UP (ref 3.8–10.5)
WBC # FLD AUTO: 7.27 K/UL — SIGNIFICANT CHANGE UP (ref 3.8–10.5)
WBC # FLD AUTO: 8.3 K/UL — SIGNIFICANT CHANGE UP (ref 3.8–10.5)
WBC # FLD AUTO: 9.39 K/UL — SIGNIFICANT CHANGE UP (ref 3.8–10.5)

## 2023-08-29 PROCEDURE — 99233 SBSQ HOSP IP/OBS HIGH 50: CPT

## 2023-08-29 PROCEDURE — 83020 HEMOGLOBIN ELECTROPHORESIS: CPT | Mod: 26

## 2023-08-29 RX ORDER — METOPROLOL TARTRATE 50 MG
25 TABLET ORAL DAILY
Refills: 0 | Status: DISCONTINUED | OUTPATIENT
Start: 2023-08-29 | End: 2023-08-30

## 2023-08-29 RX ADMIN — MEDROXYPROGESTERONE ACETATE 10 MILLIGRAM(S): 150 INJECTION, SUSPENSION, EXTENDED RELEASE INTRAMUSCULAR at 08:13

## 2023-08-29 RX ADMIN — SERTRALINE 100 MILLIGRAM(S): 25 TABLET, FILM COATED ORAL at 12:54

## 2023-08-29 RX ADMIN — Medication 1 PACKET(S): at 05:31

## 2023-08-29 RX ADMIN — MEDROXYPROGESTERONE ACETATE 10 MILLIGRAM(S): 150 INJECTION, SUSPENSION, EXTENDED RELEASE INTRAMUSCULAR at 14:48

## 2023-08-29 RX ADMIN — Medication 325 MILLIGRAM(S): at 13:00

## 2023-08-29 RX ADMIN — Medication 1 TABLET(S): at 05:30

## 2023-08-29 RX ADMIN — MEDROXYPROGESTERONE ACETATE 10 MILLIGRAM(S): 150 INJECTION, SUSPENSION, EXTENDED RELEASE INTRAMUSCULAR at 23:20

## 2023-08-29 RX ADMIN — IRON SUCROSE 110 MILLIGRAM(S): 20 INJECTION, SOLUTION INTRAVENOUS at 13:01

## 2023-08-29 RX ADMIN — Medication 50 MILLIGRAM(S): at 17:07

## 2023-08-29 RX ADMIN — MAGNESIUM OXIDE 400 MG ORAL TABLET 400 MILLIGRAM(S): 241.3 TABLET ORAL at 12:55

## 2023-08-29 RX ADMIN — Medication 325 MILLIGRAM(S): at 05:31

## 2023-08-29 RX ADMIN — GABAPENTIN 400 MILLIGRAM(S): 400 CAPSULE ORAL at 17:07

## 2023-08-29 RX ADMIN — Medication 1 TABLET(S): at 17:07

## 2023-08-29 RX ADMIN — Medication 1 MILLIGRAM(S): at 12:55

## 2023-08-29 RX ADMIN — SODIUM CHLORIDE 100 MILLILITER(S): 9 INJECTION INTRAMUSCULAR; INTRAVENOUS; SUBCUTANEOUS at 09:39

## 2023-08-29 RX ADMIN — GABAPENTIN 400 MILLIGRAM(S): 400 CAPSULE ORAL at 05:30

## 2023-08-29 RX ADMIN — SODIUM CHLORIDE 100 MILLILITER(S): 9 INJECTION INTRAMUSCULAR; INTRAVENOUS; SUBCUTANEOUS at 21:35

## 2023-08-29 RX ADMIN — Medication 50 MILLIGRAM(S): at 05:42

## 2023-08-29 RX ADMIN — Medication 1 PACKET(S): at 13:00

## 2023-08-29 RX ADMIN — Medication 325 MILLIGRAM(S): at 21:34

## 2023-08-29 NOTE — PROGRESS NOTE ADULT - ASSESSMENT
42F P3 with Graves' disease, depression, asthma,, history of dysfunctional uterine bleeding secondary to uterine fibroids requiring prior transfusions admitted for syncopal episode and severe anemia (Hg 3g/dL) in setting of heavy vaginal bleeding x2 weeks    1. Symptomatic acute blood loss anemia   2. AUB, uterine fibroids  Continues to be symptomatic passing large clots and mildly tachycardic but overall stable at this time. Hg stable ~9g/dL  Plan for D&C and hysteroscopy with Dr. Squries at [facility] tomorrow at 330PM. Given the limited availability of beds, our staff will coordinate shuttle transport for the procedure tomorrow and back to [facility] post procedure.   Given ongoing bleeding, repeat CBC and will plan to transfuse pRBCs according (thus far received 8U PRBCs and 2U of FFP; TXA 1g IVPB x 2 and s/p depo provera 150mg IM )  Continue provera 10mg TID  GYN input, Dr. Squires input greatly appreciated     3. Syncope on presentation due to blood loss  Resolved.     4. Iron Deficiency Anemia  - Continue Venofer 200mg IV daily - On day 4/5     5. Hyperthyroidism  - History of Graves' disease with proptosis   - Continue home Methimazole 10mg daily  - TFT wnl   - Follow up with endocrinology outpatient     6. Afib not on AC  - Decrease metoprolol succinate to 25mg given borderline low BP  - AC contraindicated in setting of blood loss     7. Neuropathy  continue home Gabapentin 400mg BID    8. Depression  continue home meds: Zoloft, Trazodone, Diazepam    9. FEN   Replete electrolytes PRN    DVT ppx: SCDs

## 2023-08-29 NOTE — CASE MANAGEMENT PROGRESS NOTE - NSCMPROGRESSNOTE_GEN_ALL_CORE
Update Communication Note: :  As per morning, post TSA 9.1/23.3 HX of afib ,patient still symptomatic. Patient still bleeding tomorrow, ? D/C tomorrow.  ? Another unit of blood. ? CBC?.  Bleeding is the issue. IVF Maintenance. Will  continue to follow patient.

## 2023-08-29 NOTE — PROGRESS NOTE ADULT - ASSESSMENT
42y old  Female who presents with a chief complaint of Chronic anemia superimposed with an acute episode  PMH AFIB (Not on AC), Neuropathy, Iron Deficiency Anemia, Hyperthyroidism, Depression, Asthma/COPD, history of Dysfunctional Uterine Bleeding Secondary to Fibroids requiring prior transfusions    anemia  DUB  AF  neuropathy  fe def anemia  thyroid disease  depression  asthma  copd  fibroids    IVF  vs noted  GYN f/u noted  poss OR for D and C    s/p PRBC  GYN note reviewed  Provera  proventil PRN - asthma copd  monitor VS and HD  serial Hgb  monitor HD  goal MAP > 60  I and O  emotional support  US GYN noted  lytes monitored  seq teds for DVT p

## 2023-08-29 NOTE — PROGRESS NOTE ADULT - SUBJECTIVE AND OBJECTIVE BOX
No acute overnight events    This morning, the patient states she feels slightly better but continues to pass large clots, is experiencing exertional dyspnea and profound fatigue    VS with borderline BP MAP 65-66 and intermittently tachycardic HR up to 105-106bpm    REVIEW OF SYSTEMS:  Constitutional: No fever, or chills  HEENT: No dry eyes or eye irritation   CV: +palpitations   Resp: No cough, or sputum production   GI: No nausea or vomiting    Skin: No rash    Neurological: chronic neuropathic pain  Endo: +fatigue       OBJECTIVE:   Vital Signs Last 24 Hrs  T(C): 36.9 (27 Aug 2023 10:00), Max: 37.1 (26 Aug 2023 18:09)  T(F): 98.5 (27 Aug 2023 10:00), Max: 98.7 (26 Aug 2023 18:09)  HR: 94 (27 Aug 2023 10:00) (87 - 108)  BP: 108/65 (27 Aug 2023 10:00) (89/48 - 116/68)  BP(mean): 77 (27 Aug 2023 10:00) (59 - 87)  RR: 18 (27 Aug 2023 10:00) (12 - 24)  SpO2: 95% (27 Aug 2023 10:00) (93% - 99%)    Parameters below as of 26 Aug 2023 19:00  Patient On (Oxygen Delivery Method): room air            CAPILLARY BLOOD GLUCOSE          PHYSICAL EXAM:  General: Weak appearing  Head: Normocephalic   Eyes: EOMI, no ptosis.    Neck:   no masses   Respiratory:  non labored breathing on room air. Lungs clear to auscultation   Cardiovascular:   There is no peripheral edema   Abdomen: Soft, non-tender, nondistended   Extremities:  . No peripheral edema   Skin: Intact, no rash .  Neurological: AOAx4. No focal deficits  Psychiatry: The mental examination revealed the patient was oriented to person, place, and time     LINES:    HOSPITAL MEDICATIONS:      metoprolol succinate ER 50 milliGRAM(s) Oral daily    medroxyPROGESTERone 10 milliGRAM(s) Oral <User Schedule>  methimazole 10 milliGRAM(s) Oral daily    albuterol    90 MICROgram(s) HFA Inhaler 2 Puff(s) Inhalation every 6 hours PRN    diazepam    Tablet 5 milliGRAM(s) Oral two times a day  gabapentin 400 milliGRAM(s) Oral once  gabapentin 400 milliGRAM(s) Oral two times a day  ondansetron Injectable 4 milliGRAM(s) IV Push every 8 hours PRN  oxycodone    5 mG/acetaminophen 325 mG 1 Tablet(s) Oral every 4 hours PRN  oxycodone    5 mG/acetaminophen 325 mG 2 Tablet(s) Oral every 4 hours PRN  sertraline 100 milliGRAM(s) Oral daily  traZODone 50 milliGRAM(s) Oral two times a day          calcium carbonate   1250 mG (OsCal) 1 Tablet(s) Oral two times a day  calcium gluconate IVPB 2 Gram(s) IV Intermittent once  folic acid 1 milliGRAM(s) Oral daily  iron sucrose IVPB 200 milliGRAM(s) IV Intermittent every 24 hours  magnesium oxide 400 milliGRAM(s) Oral daily  potassium chloride    Tablet ER 40 milliEquivalent(s) Oral every 4 hours  sodium chloride 0.9%. 1000 milliLiter(s) IV Continuous <Continuous>            LABS:                                             6.6    6.19  )-----------( 101      ( 27 Aug 2023 06:00 )             21.3         141  |  106  |  5<L>  ----------------------------<  94  3.9   |  29  |  0.41<L>    Ca    6.6<L>      27 Aug 2023 06:00  Phos  2.3       Mg     1.7         TPro  4.6<L>  /  Alb  2.2<L>  /  TBili  2.0<H>  /  DBili  x   /  AST  55<H>  /  ALT  25  /  AlkPhos  125<H>           PTT - ( 26 Aug 2023 02:43 )  PTT:27.9 sec  Urinalysis Basic - ( 26 Aug 2023 02:43 )    Color: x / Appearance: x / SG: x / pH: x  Gluc: 95 mg/dL / Ketone: x  / Bili: x / Urobili: x   Blood: x / Protein: x / Nitrite: x   Leuk Esterase: x / RBC: x / WBC x   Sq Epi: x / Non Sq Epi: x / Bacteria: x         US Transvaginal:   	ACC: [id number] EXAM:  US TRANSVAGINAL   ORDERED BY: CASPER K HASSAN   	  	PROCEDURE DATE:  2023    	  	  	  	INTERPRETATION:  CLINICAL INDICATION: dysfunctional uterine bleeding,   	history of fibroids and , negative serum beta-hCG.  	  	TECHNIQUE: Transabdominal ultrasound of the pelvis was performed.   	Grayscale, color Doppler and spectral Doppler were utilized.  	  	COMPARISON: 2023. 2017.  	  	FINDINGS: This study was technically difficult due to patient's body   	habitus and bowel gas. Patient was unable to tolerate transvaginal   	examination.  	  	Uterus: 9.8 x 5.3 x 5.4 cm. Postsurgical changes. A 2.2 x 2.2 x 2.2 cm   	hypoechoic area posteriorly, which is difficult to assess but may be   	related to a fibroid.  	Endometrium: 1.8 cm. Heterogenous echotexture.  	Right ovary: Not visualized.  	Left ovary: Visualized transabdominally. 2.7 x 1.5 x 1.5 cm. Small   	follicles. Flow present.  	Additional: Trace free fluid.  	  	IMPRESSION:  	  	Myomatous uterus. Thickened, heterogeneous endometrium, which is   	suboptimally assessed on this study. Recommend follow-up (sonohysterogram   	and/or direct visualization) for further evaluation.  	  	--- End of Report ---

## 2023-08-29 NOTE — PROGRESS NOTE ADULT - ASSESSMENT
41 y/o  LMP 2 weeks ago admitted to [facility] for Severe anemia and DUB. Patient is s/p 8u PRBC, 2 FFP, and TXA x 2. Patient continues to experience vaginal bleeding with passage of moderate sized clots, dizziness, and fatigue. Hgb stable at 9.1. DUB has been going on for 4 years for which she has not seen a GYN. Patient has been admitted to the hospital multiple times for AUB and need for blood transfusions but has not been able to see a GYN. She was scheduled outpatient this past week but was admitted prior to then. History significant for fibroid uterus and  section x 3.    Plan  - Appreciate excellent care from [facility]  - Continue to monitor vitals  - Consider another unit of PRBC for symptomatic anemia  - Discussed with patient to perform a D&C Hysteroscopy and suction for the continued bleeding despite Provera 10mg TID, s/p Depo Provera 150mg IM, and TXA.  - Patient will not be NPO until 4pm  - Called OR to schedule case tomorrow - awaiting call back 43 y/o  LMP 2 weeks ago admitted to [facility] for Severe anemia and DUB. Patient is s/p 8u PRBC, 2 FFP, and TXA x 2. Patient continues to experience vaginal bleeding with passage of moderate sized clots, dizziness, and fatigue. Hgb stable at 9.1. DUB has been going on for 4 years for which she has not seen a GYN. Patient has been admitted to the hospital multiple times for AUB and need for blood transfusions but has not been able to see a GYN. She was scheduled outpatient this past week but was admitted prior to then. History significant for fibroid uterus and  section x 3.    Plan  - Appreciate excellent care from [facility]  - Continue to monitor vitals  - Consider another unit of PRBC for symptomatic anemia  - Discussed with patient to perform a D&C Hysteroscopy and suction for the continued bleeding despite Provera 10mg TID, s/p Depo Provera 150mg IM, and TXA.  - Patient will not be NPO until 4pm  - Called OR to schedule case tomorrow - awaiting call back  - Spoke with Dr. Tipton and she discussed possible transfer to [facility] - but they are currently overwhelmed with patients. She also discussed that patient can be shuttled to [facility] for the procedure and back  - Patient not hemorrhaging at this time time and vitals are stable enough to safely perform the surgery tomorrow  - Spoke with patient regarding the surgery. Reviewed the risks, benefits, and alternatives. Risks include but not limited to bleeding, infection, and damage to nearby organs such as bowel, bladder and or vasculature. Also discussed risk of uterine perforation and possible need for diagnostic laparoscopy. All questions and concerns were addressed to the patient's satisfaction.     Update - Spoke with Tiffanie from the Main OR - Unfortunately the OR is not equipped to do a D&C Hysteroscopy and would recommend that surgery be performed at another hospital.   Called [facility] Elvis Ho and was able to have a place hold for the procedure at 330PM at [facility].  Notified Nursing Manager Tabitha Mckay regarding the case  Spoke with Hospitalist Dr. Evans regarding recommendations to proceed with D&C Hysteroscopy with suction and need for either Transfer to [facility] or to shuttle the patient.

## 2023-08-29 NOTE — PROGRESS NOTE ADULT - SUBJECTIVE AND OBJECTIVE BOX
INTERVAL HPI/OVERNIGHT EVENTS: Pt seen and examined at bedside.  Pt complains of fatigue and dizziness. She denies any shortness of breath or chest pain. Patient reprots she continues to experience heavy vaginal bleeding with slight improvement. Patient reports she is still passing clots.     MEDICATIONS  (STANDING):  calcium carbonate   1250 mG (OsCal) 1 Tablet(s) Oral two times a day  diazepam    Tablet 5 milliGRAM(s) Oral two times a day  ferrous    sulfate 325 milliGRAM(s) Oral three times a day  folic acid 1 milliGRAM(s) Oral daily  gabapentin 400 milliGRAM(s) Oral two times a day  iron sucrose IVPB 200 milliGRAM(s) IV Intermittent every 24 hours  magnesium oxide 400 milliGRAM(s) Oral daily  medroxyPROGESTERone 10 milliGRAM(s) Oral <User Schedule>  methimazole 10 milliGRAM(s) Oral daily  metoprolol succinate ER 50 milliGRAM(s) Oral daily  potassium phosphate / sodium phosphate Powder (PHOS-NaK) 1 Packet(s) Oral three times a day  sertraline 100 milliGRAM(s) Oral daily  sodium chloride 0.9%. 1000 milliLiter(s) (100 mL/Hr) IV Continuous <Continuous>  traZODone 50 milliGRAM(s) Oral two times a day    MEDICATIONS  (PRN):  albuterol    90 MICROgram(s) HFA Inhaler 2 Puff(s) Inhalation every 6 hours PRN for shortness of breath and/or wheezing  ondansetron Injectable 4 milliGRAM(s) IV Push every 8 hours PRN Nausea and/or Vomiting  oxycodone    5 mG/acetaminophen 325 mG 1 Tablet(s) Oral every 4 hours PRN Moderate Pain (4 - 6)  oxycodone    5 mG/acetaminophen 325 mG 2 Tablet(s) Oral every 4 hours PRN Severe Pain (7 - 10)  polyethylene glycol 3350 17 Gram(s) Oral daily PRN Constipation      Vital Signs Last 24 Hrs  T(C): 37.1 (29 Aug 2023 08:26), Max: 37.1 (29 Aug 2023 08:26)  T(F): 98.8 (29 Aug 2023 08:26), Max: 98.8 (29 Aug 2023 08:26)  HR: 103 (29 Aug 2023 08:00) (87 - 103)  BP: 123/62 (29 Aug 2023 08:00) (90/62 - 123/62)  BP(mean): 80 (29 Aug 2023 08:00) (67 - 88)  RR: 19 (29 Aug 2023 08:00) (8 - 21)  SpO2: 98% (29 Aug 2023 08:00) (93% - 99%)    Parameters below as of 29 Aug 2023 08:00  Patient On (Oxygen Delivery Method): room air        PHYSICAL EXAM:    GA: NAD, A+0 x 3  Abd: ( + ) BS, soft, nontender, nondistended, no rebound or guarding,   GYN: Diaper/Pad - 50% soaked with dark blood (of note patient also voiding in diaper) , + passage of quarter sized clots       LABS:                        9.1    8.30  )-----------( 132      ( 29 Aug 2023 06:00 )             28.3     08-29    140  |  106  |  5<L>  ----------------------------<  89  4.1   |  28  |  0.50    Ca    7.8<L>      29 Aug 2023 06:00  Phos  2.3     08-  Mg     1.8     08-29    TPro  5.1<L>  /  Alb  2.3<L>  /  TBili  1.1  /  DBili  x   /  AST  55<H>  /  ALT  25  /  AlkPhos  131<H>  08-29    PT/INR - ( 28 Aug 2023 12:30 )   PT: 12.7 sec;   INR: 1.14 ratio         PTT - ( 27 Aug 2023 23:10 )  PTT:34.3 sec  Urinalysis Basic - ( 29 Aug 2023 06:00 )    Color: x / Appearance: x / SG: x / pH: x  Gluc: 89 mg/dL / Ketone: x  / Bili: x / Urobili: x   Blood: x / Protein: x / Nitrite: x   Leuk Esterase: x / RBC: x / WBC x   Sq Epi: x / Non Sq Epi: x / Bacteria: x        RADIOLOGY & ADDITIONAL TESTS:  ACC: [id number] EXAM:  US TRANSVAGINAL   ORDERED BY: CASPER HASSAN     PROCEDURE DATE:  2023          INTERPRETATION:  CLINICAL INDICATION: dysfunctional uterine bleeding,   history of fibroids and , negative serum beta-hCG.    TECHNIQUE: Transabdominal ultrasound of the pelvis was performed.   Grayscale, color Doppler and spectral Doppler were utilized.    COMPARISON: 2023. 2017.    FINDINGS: This study was technically difficult due to patient's body   habitus and bowel gas. Patient was unable to tolerate transvaginal   examination.    Uterus: 9.8 x 5.3 x 5.4 cm. Postsurgical changes. A 2.2 x 2.2 x 2.2 cm   hypoechoic area posteriorly, which is difficult to assess but may be   related to a fibroid.  Endometrium: 1.8 cm. Heterogenous echotexture.  Right ovary: Not visualized.  Left ovary: Visualized transabdominally. 2.7 x 1.5 x 1.5 cm. Small   follicles. Flow present.  Additional: Trace free fluid.    IMPRESSION:    Myomatous uterus. Thickened, heterogeneous endometrium, which is   suboptimally assessed on this study. Recommend follow-up (sonohysterogram   and/or direct visualization) for further evaluation.    --- End of Report ---            AMANDA LAU MD; Attending Radiologist  This document has been electronically signed. Aug 26 2023  4:48AM

## 2023-08-29 NOTE — PROGRESS NOTE ADULT - SUBJECTIVE AND OBJECTIVE BOX
Date/Time Patient Seen:  		  Referring MD:   Data Reviewed	       Patient is a 42y old  Female who presents with a chief complaint of DUB with Chronic Anemia (28 Aug 2023 20:17)      Subjective/HPI     PAST MEDICAL & SURGICAL HISTORY:  Atrial fibrillation    History of Hyperthyroidism    Asthma    History of anemia    Depression, unspecified depression type    H/O blood transfusion reaction    Smoker    S/P  Section  ,,    History of blood transfusion          Medication list         MEDICATIONS  (STANDING):  calcium carbonate   1250 mG (OsCal) 1 Tablet(s) Oral two times a day  diazepam    Tablet 5 milliGRAM(s) Oral two times a day  ferrous    sulfate 325 milliGRAM(s) Oral three times a day  folic acid 1 milliGRAM(s) Oral daily  gabapentin 400 milliGRAM(s) Oral two times a day  iron sucrose IVPB 200 milliGRAM(s) IV Intermittent every 24 hours  magnesium oxide 400 milliGRAM(s) Oral daily  medroxyPROGESTERone 10 milliGRAM(s) Oral <User Schedule>  methimazole 10 milliGRAM(s) Oral daily  metoprolol succinate ER 50 milliGRAM(s) Oral daily  sertraline 100 milliGRAM(s) Oral daily  sodium chloride 0.9%. 1000 milliLiter(s) (100 mL/Hr) IV Continuous <Continuous>  traZODone 50 milliGRAM(s) Oral two times a day    MEDICATIONS  (PRN):  albuterol    90 MICROgram(s) HFA Inhaler 2 Puff(s) Inhalation every 6 hours PRN for shortness of breath and/or wheezing  ondansetron Injectable 4 milliGRAM(s) IV Push every 8 hours PRN Nausea and/or Vomiting  oxycodone    5 mG/acetaminophen 325 mG 1 Tablet(s) Oral every 4 hours PRN Moderate Pain (4 - 6)  oxycodone    5 mG/acetaminophen 325 mG 2 Tablet(s) Oral every 4 hours PRN Severe Pain (7 - 10)  polyethylene glycol 3350 17 Gram(s) Oral daily PRN Constipation         Vitals log        ICU Vital Signs Last 24 Hrs  T(C): 37.1 (29 Aug 2023 16:07), Max: 37.1 (29 Aug 2023 08:26)  T(F): 98.8 (29 Aug 2023 16:07), Max: 98.8 (29 Aug 2023 08:26)  HR: 94 (29 Aug 2023 17:00) (87 - 107)  BP: 104/63 (29 Aug 2023 17:00) (87/56 - 123/62)  BP(mean): 73 (29 Aug 2023 17:00) (65 - 98)  ABP: --  ABP(mean): --  RR: 13 (29 Aug 2023 17:00) (8 - 21)  SpO2: 95% (29 Aug 2023 17:00) (94% - 99%)    O2 Parameters below as of 29 Aug 2023 18:00  Patient On (Oxygen Delivery Method): room air                 Input and Output:  I&O's Detail    28 Aug 2023 07:01  -  29 Aug 2023 07:00  --------------------------------------------------------  IN:    IV PiggyBack: 150 mL    Oral Fluid: 730 mL    PRBCs (Packed Red Blood Cells): 300 mL    sodium chloride 0.9%: 1800 mL  Total IN: 2980 mL    OUT:  Total OUT: 0 mL    Total NET: 2980 mL      29 Aug 2023 07:01  -  29 Aug 2023 17:57  --------------------------------------------------------  IN:    IV PiggyBack: 100 mL    sodium chloride 0.9%: 1100 mL  Total IN: 1200 mL    OUT:  Total OUT: 0 mL    Total NET: 1200 mL          Lab Data                        8.2    9.39  )-----------( 137      ( 29 Aug 2023 13:54 )             25.3     08-29    140  |  106  |  5<L>  ----------------------------<  89  4.1   |  28  |  0.50    Ca    7.8<L>      29 Aug 2023 06:00  Phos  2.3     08-29  Mg     1.8     08-29    TPro  5.1<L>  /  Alb  2.3<L>  /  TBili  1.1  /  DBili  x   /  AST  55<H>  /  ALT  25  /  AlkPhos  131<H>              Review of Systems	      Objective     Physical Examination    heart s1s2  lung dc BS  head nc      Pertinent Lab findings & Imaging      Myrna:  NO   Adequate UO     I&O's Detail    28 Aug 2023 07:01  -  29 Aug 2023 07:00  --------------------------------------------------------  IN:    IV PiggyBack: 150 mL    Oral Fluid: 730 mL    PRBCs (Packed Red Blood Cells): 300 mL    sodium chloride 0.9%: 1800 mL  Total IN: 2980 mL    OUT:  Total OUT: 0 mL    Total NET: 2980 mL      29 Aug 2023 07:01  -  29 Aug 2023 17:57  --------------------------------------------------------  IN:    IV PiggyBack: 100 mL    sodium chloride 0.9%: 1100 mL  Total IN: 1200 mL    OUT:  Total OUT: 0 mL    Total NET: 1200 mL               Discussed with:     Cultures:	        Radiology

## 2023-08-29 NOTE — PROGRESS NOTE ADULT - PROBLEM SELECTOR PROBLEM 3
[employer] [facility] PODIATRY & FOOT SURGERY     INITIAL CLINIC EVALUATION      Subjective:         Patient is a 69 y.o. female who is being seen for a wound to the left foot. Patient has a hx of anxiety and arthritis. Patient states roughly 15 years prior she underwent a surgical intervention by a plastic surgeon to the left foot. States the surgery was botched and her toes are deformed. She states she subsequently developed a wound. States she has had intermittent wound care by local physicians. States the wound was closed but reopened without trauma. States she has seen a local orthopedist but was referred here for wound care. Denies any systemic signs of infection. Denies any other lower extremity complaints      Patient Active Problem List    Diagnosis Date Noted    Chronic foot ulcer with fat layer exposed, left ([address]) 08/29/2023     Past Medical History:   Diagnosis Date    Anxiety     Arthritis     H/O seasonal allergies       Past Surgical History:   Procedure Laterality Date    APPENDECTOMY  age 4    CATARACT REMOVAL Bilateral     COLONOSCOPY      DILATION AND CURETTAGE OF UTERUS        History reviewed. No pertinent family history.    Social History     Tobacco Use    Smoking status: Every Day     Packs/day: 0.50     Types: Cigarettes    Smokeless tobacco: Never   Substance Use Topics    Alcohol use: Yes     Current Outpatient Medications   Medication Sig Dispense Refill    Biotin 1 MG CAPS Take 1 capsule by mouth      Multiple Vitamins-Minerals (THERAPEUTIC MULTIVITAMIN-MINERALS) tablet Take 1 tablet by mouth daily      Estradiol-Norethindrone Acet 0.5-0.1 MG TABS Take 1 tablet by mouth daily      niacinamide 500 MG tablet Take 1 tablet by mouth 2 times daily (with meals)      citalopram (CELEXA) 20 MG tablet Take 1.5 tablets by mouth daily      diclofenac (VOLTAREN) 75 MG EC tablet Take by mouth 2 times daily      gabapentin (NEURONTIN) 300 MG capsule Take by mouth 3 times
Vitamin D deficiency
Vitamin D deficiency

## 2023-08-30 ENCOUNTER — INPATIENT (INPATIENT)
Facility: HOSPITAL | Age: 43
LOS: 2 days | Discharge: HOME | End: 2023-09-02
Attending: OBSTETRICS & GYNECOLOGY | Admitting: OBSTETRICS & GYNECOLOGY
Payer: MEDICAID

## 2023-08-30 ENCOUNTER — TRANSCRIPTION ENCOUNTER (OUTPATIENT)
Age: 43
End: 2023-08-30

## 2023-08-30 VITALS
OXYGEN SATURATION: 99 % | HEART RATE: 91 BPM | DIASTOLIC BLOOD PRESSURE: 61 MMHG | SYSTOLIC BLOOD PRESSURE: 96 MMHG | RESPIRATION RATE: 15 BRPM | TEMPERATURE: 99 F | WEIGHT: 243.39 LBS

## 2023-08-30 VITALS
HEART RATE: 94 BPM | RESPIRATION RATE: 17 BRPM | OXYGEN SATURATION: 97 % | SYSTOLIC BLOOD PRESSURE: 104 MMHG | DIASTOLIC BLOOD PRESSURE: 63 MMHG

## 2023-08-30 DIAGNOSIS — Z01.419 ENCOUNTER FOR GYNECOLOGICAL EXAMINATION (GENERAL) (ROUTINE) WITHOUT ABNORMAL FINDINGS: ICD-10-CM

## 2023-08-30 DIAGNOSIS — Z92.89 PERSONAL HISTORY OF OTHER MEDICAL TREATMENT: Chronic | ICD-10-CM

## 2023-08-30 LAB
ANION GAP SERPL CALC-SCNC: 4 MMOL/L — LOW (ref 5–17)
ANION GAP SERPL CALC-SCNC: 4 MMOL/L — LOW (ref 7–14)
APTT BLD: 35.4 SEC — SIGNIFICANT CHANGE UP (ref 24.5–35.6)
BLD GP AB SCN SERPL QL: NEGATIVE — SIGNIFICANT CHANGE UP
BLD GP AB SCN SERPL QL: SIGNIFICANT CHANGE UP
BUN SERPL-MCNC: 4 MG/DL — LOW (ref 7–23)
BUN SERPL-MCNC: 5 MG/DL — LOW (ref 7–23)
CALCIUM SERPL-MCNC: 7.7 MG/DL — LOW (ref 8.4–10.5)
CALCIUM SERPL-MCNC: 7.8 MG/DL — LOW (ref 8.4–10.5)
CHLORIDE SERPL-SCNC: 106 MMOL/L — SIGNIFICANT CHANGE UP (ref 96–108)
CHLORIDE SERPL-SCNC: 106 MMOL/L — SIGNIFICANT CHANGE UP (ref 98–107)
CO2 SERPL-SCNC: 26 MMOL/L — SIGNIFICANT CHANGE UP (ref 22–31)
CO2 SERPL-SCNC: 28 MMOL/L — SIGNIFICANT CHANGE UP (ref 22–31)
CREAT SERPL-MCNC: 0.32 MG/DL — LOW (ref 0.5–1.3)
CREAT SERPL-MCNC: 0.43 MG/DL — LOW (ref 0.5–1.3)
EGFR: 123 ML/MIN/1.73M2 — SIGNIFICANT CHANGE UP
EGFR: 132 ML/MIN/1.73M2 — SIGNIFICANT CHANGE UP
GLUCOSE BLDC GLUCOMTR-MCNC: 90 MG/DL — SIGNIFICANT CHANGE UP (ref 70–99)
GLUCOSE SERPL-MCNC: 77 MG/DL — SIGNIFICANT CHANGE UP (ref 70–99)
GLUCOSE SERPL-MCNC: 87 MG/DL — SIGNIFICANT CHANGE UP (ref 70–99)
HCG UR QL: NEGATIVE — SIGNIFICANT CHANGE UP
HCT VFR BLD CALC: 22.5 % — LOW (ref 34.5–45)
HCT VFR BLD CALC: 28.5 % — LOW (ref 34.5–45)
HCT VFR BLD CALC: 32 % — LOW (ref 34.5–45)
HEMOGLOBIN INTERPRETATION: SIGNIFICANT CHANGE UP
HGB A MFR BLD: 97.6 % — SIGNIFICANT CHANGE UP (ref 95.8–98)
HGB A2 MFR BLD: 2.4 % — SIGNIFICANT CHANGE UP (ref 2–3.2)
HGB BLD-MCNC: 10.6 G/DL — LOW (ref 11.5–15.5)
HGB BLD-MCNC: 7 G/DL — CRITICAL LOW (ref 11.5–15.5)
HGB BLD-MCNC: 9.3 G/DL — LOW (ref 11.5–15.5)
INR BLD: 1.05 RATIO — SIGNIFICANT CHANGE UP (ref 0.85–1.18)
MAGNESIUM SERPL-MCNC: 1.4 MG/DL — LOW (ref 1.6–2.6)
MCHC RBC-ENTMCNC: 26.7 PG — LOW (ref 27–34)
MCHC RBC-ENTMCNC: 28.1 PG — SIGNIFICANT CHANGE UP (ref 27–34)
MCHC RBC-ENTMCNC: 28.4 PG — SIGNIFICANT CHANGE UP (ref 27–34)
MCHC RBC-ENTMCNC: 31.1 GM/DL — LOW (ref 32–36)
MCHC RBC-ENTMCNC: 32.6 GM/DL — SIGNIFICANT CHANGE UP (ref 32–36)
MCHC RBC-ENTMCNC: 33.1 GM/DL — SIGNIFICANT CHANGE UP (ref 32–36)
MCV RBC AUTO: 85.8 FL — SIGNIFICANT CHANGE UP (ref 80–100)
MCV RBC AUTO: 85.9 FL — SIGNIFICANT CHANGE UP (ref 80–100)
MCV RBC AUTO: 86.1 FL — SIGNIFICANT CHANGE UP (ref 80–100)
NRBC # BLD: 0 /100 WBCS — SIGNIFICANT CHANGE UP (ref 0–0)
NRBC # FLD: 0.02 K/UL — HIGH (ref 0–0)
NRBC # FLD: 0.02 K/UL — HIGH (ref 0–0)
PHOSPHATE SERPL-MCNC: 2 MG/DL — LOW (ref 2.5–4.5)
PHOSPHATE SERPL-MCNC: 2.3 MG/DL — LOW (ref 2.5–4.5)
PLATELET # BLD AUTO: 141 K/UL — LOW (ref 150–400)
PLATELET # BLD AUTO: 148 K/UL — LOW (ref 150–400)
PLATELET # BLD AUTO: 160 K/UL — SIGNIFICANT CHANGE UP (ref 150–400)
POTASSIUM SERPL-MCNC: 4 MMOL/L — SIGNIFICANT CHANGE UP (ref 3.5–5.3)
POTASSIUM SERPL-MCNC: 4.4 MMOL/L — SIGNIFICANT CHANGE UP (ref 3.5–5.3)
POTASSIUM SERPL-SCNC: 4 MMOL/L — SIGNIFICANT CHANGE UP (ref 3.5–5.3)
POTASSIUM SERPL-SCNC: 4.4 MMOL/L — SIGNIFICANT CHANGE UP (ref 3.5–5.3)
PROTHROM AB SERPL-ACNC: 11.9 SEC — SIGNIFICANT CHANGE UP (ref 9.5–13)
RBC # BLD: 2.62 M/UL — LOW (ref 3.8–5.2)
RBC # BLD: 3.31 M/UL — LOW (ref 3.8–5.2)
RBC # BLD: 3.73 M/UL — LOW (ref 3.8–5.2)
RBC # FLD: 18 % — HIGH (ref 10.3–14.5)
RBC # FLD: 18 % — HIGH (ref 10.3–14.5)
RBC # FLD: 18.6 % — HIGH (ref 10.3–14.5)
RH IG SCN BLD-IMP: POSITIVE — SIGNIFICANT CHANGE UP
SODIUM SERPL-SCNC: 136 MMOL/L — SIGNIFICANT CHANGE UP (ref 135–145)
SODIUM SERPL-SCNC: 138 MMOL/L — SIGNIFICANT CHANGE UP (ref 135–145)
WBC # BLD: 5.61 K/UL — SIGNIFICANT CHANGE UP (ref 3.8–10.5)
WBC # BLD: 7.45 K/UL — SIGNIFICANT CHANGE UP (ref 3.8–10.5)
WBC # BLD: 9.71 K/UL — SIGNIFICANT CHANGE UP (ref 3.8–10.5)
WBC # FLD AUTO: 5.61 K/UL — SIGNIFICANT CHANGE UP (ref 3.8–10.5)
WBC # FLD AUTO: 7.45 K/UL — SIGNIFICANT CHANGE UP (ref 3.8–10.5)
WBC # FLD AUTO: 9.71 K/UL — SIGNIFICANT CHANGE UP (ref 3.8–10.5)

## 2023-08-30 PROCEDURE — 84443 ASSAY THYROID STIM HORMONE: CPT

## 2023-08-30 PROCEDURE — 80053 COMPREHEN METABOLIC PANEL: CPT

## 2023-08-30 PROCEDURE — 82553 CREATINE MB FRACTION: CPT

## 2023-08-30 PROCEDURE — 93005 ELECTROCARDIOGRAM TRACING: CPT

## 2023-08-30 PROCEDURE — 36430 TRANSFUSION BLD/BLD COMPNT: CPT

## 2023-08-30 PROCEDURE — 96365 THER/PROPH/DIAG IV INF INIT: CPT

## 2023-08-30 PROCEDURE — 84466 ASSAY OF TRANSFERRIN: CPT

## 2023-08-30 PROCEDURE — 85027 COMPLETE CBC AUTOMATED: CPT

## 2023-08-30 PROCEDURE — 84439 ASSAY OF FREE THYROXINE: CPT

## 2023-08-30 PROCEDURE — 84484 ASSAY OF TROPONIN QUANT: CPT

## 2023-08-30 PROCEDURE — 85025 COMPLETE CBC W/AUTO DIFF WBC: CPT

## 2023-08-30 PROCEDURE — 84480 ASSAY TRIIODOTHYRONINE (T3): CPT

## 2023-08-30 PROCEDURE — 86923 COMPATIBILITY TEST ELECTRIC: CPT

## 2023-08-30 PROCEDURE — 84100 ASSAY OF PHOSPHORUS: CPT

## 2023-08-30 PROCEDURE — P9017: CPT

## 2023-08-30 PROCEDURE — 83690 ASSAY OF LIPASE: CPT

## 2023-08-30 PROCEDURE — 83540 ASSAY OF IRON: CPT

## 2023-08-30 PROCEDURE — 36415 COLL VENOUS BLD VENIPUNCTURE: CPT

## 2023-08-30 PROCEDURE — 99285 EMERGENCY DEPT VISIT HI MDM: CPT | Mod: 25

## 2023-08-30 PROCEDURE — 99233 SBSQ HOSP IP/OBS HIGH 50: CPT

## 2023-08-30 PROCEDURE — 86900 BLOOD TYPING SEROLOGIC ABO: CPT

## 2023-08-30 PROCEDURE — 85610 PROTHROMBIN TIME: CPT

## 2023-08-30 PROCEDURE — P9040: CPT

## 2023-08-30 PROCEDURE — 76937 US GUIDE VASCULAR ACCESS: CPT | Mod: 26

## 2023-08-30 PROCEDURE — 82550 ASSAY OF CK (CPK): CPT

## 2023-08-30 PROCEDURE — 84436 ASSAY OF TOTAL THYROXINE: CPT

## 2023-08-30 PROCEDURE — 76830 TRANSVAGINAL US NON-OB: CPT

## 2023-08-30 PROCEDURE — 81025 URINE PREGNANCY TEST: CPT

## 2023-08-30 PROCEDURE — 82728 ASSAY OF FERRITIN: CPT

## 2023-08-30 PROCEDURE — 85730 THROMBOPLASTIN TIME PARTIAL: CPT

## 2023-08-30 PROCEDURE — 86901 BLOOD TYPING SEROLOGIC RH(D): CPT

## 2023-08-30 PROCEDURE — P9016: CPT

## 2023-08-30 PROCEDURE — 96361 HYDRATE IV INFUSION ADD-ON: CPT

## 2023-08-30 PROCEDURE — 37244 VASC EMBOLIZE/OCCLUDE BLEED: CPT

## 2023-08-30 PROCEDURE — 83020 HEMOGLOBIN ELECTROPHORESIS: CPT

## 2023-08-30 PROCEDURE — 83550 IRON BINDING TEST: CPT

## 2023-08-30 PROCEDURE — 80048 BASIC METABOLIC PNL TOTAL CA: CPT

## 2023-08-30 PROCEDURE — 84702 CHORIONIC GONADOTROPIN TEST: CPT

## 2023-08-30 PROCEDURE — 83735 ASSAY OF MAGNESIUM: CPT

## 2023-08-30 PROCEDURE — 86850 RBC ANTIBODY SCREEN: CPT

## 2023-08-30 PROCEDURE — 36247 INS CATH ABD/L-EXT ART 3RD: CPT

## 2023-08-30 RX ORDER — MAGNESIUM SULFATE 500 MG/ML
2 VIAL (ML) INJECTION ONCE
Refills: 0 | Status: COMPLETED | OUTPATIENT
Start: 2023-08-30 | End: 2023-08-30

## 2023-08-30 RX ORDER — DIPHENHYDRAMINE HCL 50 MG
50 CAPSULE ORAL EVERY 4 HOURS
Refills: 0 | Status: DISCONTINUED | OUTPATIENT
Start: 2023-08-30 | End: 2023-09-02

## 2023-08-30 RX ORDER — SODIUM,POTASSIUM PHOSPHATES 278-250MG
1 POWDER IN PACKET (EA) ORAL ONCE
Refills: 0 | Status: COMPLETED | OUTPATIENT
Start: 2023-08-30 | End: 2023-08-30

## 2023-08-30 RX ORDER — ACETAMINOPHEN 500 MG
1000 TABLET ORAL EVERY 6 HOURS
Refills: 0 | Status: DISCONTINUED | OUTPATIENT
Start: 2023-08-30 | End: 2023-08-31

## 2023-08-30 RX ORDER — HYDROMORPHONE HYDROCHLORIDE 2 MG/ML
0.5 INJECTION INTRAMUSCULAR; INTRAVENOUS; SUBCUTANEOUS
Refills: 0 | Status: DISCONTINUED | OUTPATIENT
Start: 2023-08-30 | End: 2023-08-31

## 2023-08-30 RX ORDER — MEDROXYPROGESTERONE ACETATE 150 MG/ML
10 INJECTION, SUSPENSION, EXTENDED RELEASE INTRAMUSCULAR
Refills: 0 | Status: DISCONTINUED | OUTPATIENT
Start: 2023-08-30 | End: 2023-08-31

## 2023-08-30 RX ORDER — DIAZEPAM 5 MG
5 TABLET ORAL
Refills: 0 | Status: DISCONTINUED | OUTPATIENT
Start: 2023-08-30 | End: 2023-09-02

## 2023-08-30 RX ORDER — DIAZEPAM 5 MG
1 TABLET ORAL
Refills: 0 | DISCHARGE

## 2023-08-30 RX ORDER — TRAZODONE HCL 50 MG
50 TABLET ORAL
Refills: 0 | Status: DISCONTINUED | OUTPATIENT
Start: 2023-08-30 | End: 2023-09-02

## 2023-08-30 RX ORDER — GABAPENTIN 400 MG/1
400 CAPSULE ORAL
Refills: 0 | DISCHARGE

## 2023-08-30 RX ORDER — FOLIC ACID 0.8 MG
1 TABLET ORAL DAILY
Refills: 0 | Status: DISCONTINUED | OUTPATIENT
Start: 2023-08-30 | End: 2023-09-02

## 2023-08-30 RX ORDER — HYDROMORPHONE HYDROCHLORIDE 2 MG/ML
30 INJECTION INTRAMUSCULAR; INTRAVENOUS; SUBCUTANEOUS
Refills: 0 | Status: DISCONTINUED | OUTPATIENT
Start: 2023-08-30 | End: 2023-08-31

## 2023-08-30 RX ORDER — CALCIUM GLUCONATE 100 MG/ML
2 VIAL (ML) INTRAVENOUS ONCE
Refills: 0 | Status: COMPLETED | OUTPATIENT
Start: 2023-08-30 | End: 2023-08-30

## 2023-08-30 RX ORDER — NALOXONE HYDROCHLORIDE 4 MG/.1ML
0.1 SPRAY NASAL
Refills: 0 | Status: DISCONTINUED | OUTPATIENT
Start: 2023-08-30 | End: 2023-08-31

## 2023-08-30 RX ORDER — SODIUM CHLORIDE 9 MG/ML
1000 INJECTION, SOLUTION INTRAVENOUS
Refills: 0 | Status: DISCONTINUED | OUTPATIENT
Start: 2023-08-30 | End: 2023-08-31

## 2023-08-30 RX ORDER — METHIMAZOLE 10 MG/1
0 TABLET ORAL
Qty: 0 | Refills: 0 | DISCHARGE

## 2023-08-30 RX ORDER — SODIUM,POTASSIUM PHOSPHATES 278-250MG
2 POWDER IN PACKET (EA) ORAL ONCE
Refills: 0 | Status: COMPLETED | OUTPATIENT
Start: 2023-08-30 | End: 2023-08-30

## 2023-08-30 RX ORDER — ALBUTEROL 90 UG/1
2 AEROSOL, METERED ORAL EVERY 6 HOURS
Refills: 0 | Status: DISCONTINUED | OUTPATIENT
Start: 2023-08-30 | End: 2023-09-02

## 2023-08-30 RX ORDER — ALBUTEROL 90 UG/1
2 AEROSOL, METERED ORAL
Qty: 0 | Refills: 0 | DISCHARGE

## 2023-08-30 RX ORDER — METOPROLOL TARTRATE 50 MG
1 TABLET ORAL
Refills: 0 | DISCHARGE

## 2023-08-30 RX ORDER — SERTRALINE 25 MG/1
100 TABLET, FILM COATED ORAL DAILY
Refills: 0 | Status: DISCONTINUED | OUTPATIENT
Start: 2023-08-30 | End: 2023-09-02

## 2023-08-30 RX ORDER — ONDANSETRON 8 MG/1
4 TABLET, FILM COATED ORAL EVERY 6 HOURS
Refills: 0 | Status: DISCONTINUED | OUTPATIENT
Start: 2023-08-30 | End: 2023-08-31

## 2023-08-30 RX ORDER — SERTRALINE 25 MG/1
1 TABLET, FILM COATED ORAL
Qty: 0 | Refills: 0 | DISCHARGE

## 2023-08-30 RX ADMIN — Medication 400 MILLIGRAM(S): at 23:58

## 2023-08-30 RX ADMIN — MEDROXYPROGESTERONE ACETATE 10 MILLIGRAM(S): 150 INJECTION, SUSPENSION, EXTENDED RELEASE INTRAMUSCULAR at 06:40

## 2023-08-30 RX ADMIN — Medication 1000 MILLIGRAM(S): at 15:15

## 2023-08-30 RX ADMIN — Medication 325 MILLIGRAM(S): at 06:40

## 2023-08-30 RX ADMIN — HYDROMORPHONE HYDROCHLORIDE 0.5 MILLIGRAM(S): 2 INJECTION INTRAMUSCULAR; INTRAVENOUS; SUBCUTANEOUS at 23:18

## 2023-08-30 RX ADMIN — SERTRALINE 100 MILLIGRAM(S): 25 TABLET, FILM COATED ORAL at 23:58

## 2023-08-30 RX ADMIN — Medication 2 TABLET(S): at 15:36

## 2023-08-30 RX ADMIN — HYDROMORPHONE HYDROCHLORIDE 30 MILLILITER(S): 2 INJECTION INTRAMUSCULAR; INTRAVENOUS; SUBCUTANEOUS at 23:41

## 2023-08-30 RX ADMIN — HYDROMORPHONE HYDROCHLORIDE 30 MILLILITER(S): 2 INJECTION INTRAMUSCULAR; INTRAVENOUS; SUBCUTANEOUS at 20:49

## 2023-08-30 RX ADMIN — MEDROXYPROGESTERONE ACETATE 10 MILLIGRAM(S): 150 INJECTION, SUSPENSION, EXTENDED RELEASE INTRAMUSCULAR at 22:50

## 2023-08-30 RX ADMIN — Medication 25 GRAM(S): at 15:34

## 2023-08-30 RX ADMIN — Medication 200 GRAM(S): at 15:34

## 2023-08-30 RX ADMIN — GABAPENTIN 400 MILLIGRAM(S): 400 CAPSULE ORAL at 06:39

## 2023-08-30 RX ADMIN — Medication 400 MILLIGRAM(S): at 14:46

## 2023-08-30 RX ADMIN — SODIUM CHLORIDE 100 MILLILITER(S): 9 INJECTION, SOLUTION INTRAVENOUS at 14:45

## 2023-08-30 RX ADMIN — Medication 1 MILLIGRAM(S): at 23:59

## 2023-08-30 RX ADMIN — Medication 1 TABLET(S): at 06:40

## 2023-08-30 RX ADMIN — Medication 1 PACKET(S): at 08:03

## 2023-08-30 RX ADMIN — SODIUM CHLORIDE 100 MILLILITER(S): 9 INJECTION, SOLUTION INTRAVENOUS at 22:50

## 2023-08-30 RX ADMIN — Medication 50 MILLIGRAM(S): at 06:42

## 2023-08-30 NOTE — CONSULT NOTE ADULT - ASSESSMENT
Interventional Radiology    Evaluate for Procedure: Uterine artery embolization    HPI: 42-year-old female hx of hyperthyroidism, peripheral neuropathy, depression, active smoker, presenting as a transfer from [place] for severe anemia in setting of vaginal bleeding. She then presented to [place] [facility] where she was transfused with 10uPRBC, 1uPlts, 2uFFP, TXA 1g x2, Depo-Provera inj, and is currently on Provera 10mg TID. The patient was transferred to [facility] for a D & C, as [facility] did not have the requisite equipment to perform the procedure.    Allergies: Motrin (Hives)    Medications (Abx/Cardiac/Anticoagulation/Blood Products)      Data:  165.1  110.4, 119.7  T(C): 37.3  HR: 86  BP: 97/67  RR: 18  SpO2: 100%    -WBC 7.45 / HgB 10.6 / Hct 32.0 / Plt 148  -Na 136 / Cl 106 / BUN 4 / Glucose 77  -K 4.4 / CO2 26 / Cr 0.43  -ALT -- / Alk Phos -- / T.Bili --  -INR 1.05 / PTT 35.4      Radiology:   Imaging reviewed    Assessment/Plan:   42-year-old female hx of hyperthyroidism, peripheral neuropathy, depression, active smoker, presenting as a transfer from [place] for severe anemia in setting of vaginal bleeding. She then presented to [facility] where she was transfused with 10uPRBC, 1uPlts, 2uFFP, TXA 1g x2, Depo-Provera inj, and is currently on Provera 10mg TID. The patient was transferred to [facility] for a D & C, as [facility] did not have the requisite equipment to perform the procedure. Plan per hysterectomy per Gyn. IR consulted for emergent uterine artery embolization.     -- Case reviewed with attending Dr. Barrera  -- Patient with last Hgb 10.6. Last transfusion this AM per primary team. Recommend continued medical management of uterine bleeding per primary team. Continue to continue trend cbc/vitals, transfuse prn per primary team.    -- No plan for emergent uterine artery embolization at this time.   -- If patient demonstrates acute decompensation/hemodynamic instability not responsive to fluid resuscitation, or if hemoglobin continues to drop significantly despite transfusion, please page IR  for re-evaluation for intervention. ([facility] [id number], [phone]).

## 2023-08-30 NOTE — PROVIDER CONTACT NOTE (CRITICAL VALUE NOTIFICATION) - BACKGROUND
admitted for uterine bleeding due to fibroid  since admission total 8units of prbc, 2units of ffp given

## 2023-08-30 NOTE — DISCHARGE NOTE PROVIDER - NSDCCPCAREPLAN_GEN_ALL_CORE_FT
PRINCIPAL DISCHARGE DIAGNOSIS  Diagnosis: Anemia due to acute blood loss  Assessment and Plan of Treatment:       SECONDARY DISCHARGE DIAGNOSES  Diagnosis: Dysfunctional uterine bleeding  Assessment and Plan of Treatment:     Diagnosis: Fibroids  Assessment and Plan of Treatment:

## 2023-08-30 NOTE — PATIENT PROFILE ADULT - NSPROLASTMENSTRUAL_GEN_A_NUR
ADULT NUTRITION INITIAL ASSESSMENT    Pt is at moderate nutrition risk. Pt meets malnutrition criteria.       CRITERIA FOR MALNUTRITION DIAGNOSIS:  Criteria for non-severe malnutrition diagnosis: chronic illness related to body fat mild depletion, muscle m 10/15/2017   • Depression    • Esophageal reflux 7/7/2008   • Essential hypertension, benign 7/7/2008   • [facility] 12/28/2010   • GERD    • Hearing impairment     hearing aids bilateral   • Hearing loss 6/7/2010    has hearing aids   • H 73.9 kg (163 lb)  06/15/19 : 72.6 kg (160 lb)  03/08/19 : 77.1 kg (170 lb)  03/08/19 : 77.1 kg (170 lb)  03/07/19 : 77.1 kg (170 lb)  03/04/19 : 78.5 kg (173 lb)  10/11/18 : 78.5 kg (173 lb)  10/05/18 : 78.5 kg (173 lb)  02/24/18 : 82.6 kg (182 lb)    ALVIN supplement greater than 75% of needs and support wound healing    DIETITIAN FOLLOW UP: RD to follow up within 7 days   [address], [address] [facility]  Ext [phone] 8/30/23

## 2023-08-30 NOTE — H&P ADULT - ATTENDING COMMENTS
Case reviewed w/GYN team.  Pt seen and evaluated.    43 yo P3 transferred from [place] for further management and potential surgical intervention of continued vaginal bleeding.   Pt managed at [place] w/transfussion of 10uPRBC, 1uPlts, 2uFFP, TXA 1g x2, Depo-Provera inj, and is currently on Provera 10mg TID. TVUS showing 1.8cm thickened endometrium with 2cm fibroid. Pt w/ongoing vaginal bleeding, soaking pads throughout the day.  Med hx noteworthy for asthma, a-fib, hyperthyroidism, obesity,  x 3, and smoking.    Plan as above  Would suggest UAE to temporize bleeding until pt medically optimized for definitive intervention.

## 2023-08-30 NOTE — CHART NOTE - NSCHARTNOTEFT_GEN_A_CORE
Patient seen at bedside overnight for routine evaluation due to being under primary care of surgical ICU. Patient is now s/p b/l UAE with IR, uncomplicated. Please see procedure note for more details. Pt is complaining of lower abdominal pain, she has PCA in place. Per SICU RN, patient's bleeding has decreased significantly.     Objective  T(C): 36.2 (23 @ 21:00), Max: 37.7 (23 @ 16:00)  HR: 83 (23 @ 23:00) (82 - 99)  BP: 110/74 (23 @ 23:00) (89/75 - 113/82)  RR: 12 (23 @ 23:00) (10 - 18)  SpO2: 99% (23 @ 23:00) (96% - 100%)  Wt(kg): --     @ 07:01  -   @ 23:31  --------------------------------------------------------  IN: 1150 mL / OUT: 900 mL / NET: 250 mL    Gen: NAD  Cardio: RRR  Respiratory: normal respiratory effort on room air  Abd: Soft NT  :  Ext: NT BL    acetaminophen   IVPB .. 1000 milliGRAM(s) IV Intermittent every 6 hours  albuterol    90 MICROgram(s) HFA Inhaler 2 Puff(s) Inhalation every 6 hours PRN  diazepam    Tablet 5 milliGRAM(s) Oral two times a day PRN  diphenhydrAMINE Injectable 50 milliGRAM(s) IV Push every 4 hours PRN  folic acid 1 milliGRAM(s) Oral daily  HYDROmorphone PCA (1 mG/mL) 30 milliLiter(s) PCA Continuous PCA Continuous  HYDROmorphone PCA (1 mG/mL) Rescue Clinician Bolus 0.5 milliGRAM(s) IV Push every 15 minutes PRN  lactated ringers. 1000 milliLiter(s) IV Continuous <Continuous>  medroxyPROGESTERone 10 milliGRAM(s) Oral <User Schedule>  methimazole 10 milliGRAM(s) Oral daily  naloxone Injectable 0.1 milliGRAM(s) IV Push every 3 minutes PRN  ondansetron Injectable 4 milliGRAM(s) IV Push every 6 hours PRN  sertraline 100 milliGRAM(s) Oral daily  traZODone 50 milliGRAM(s) Oral two times a day    43 y/o  LMP 2 weeks ago admitted as a transfer from [place] for heavy vaginal bleeding after transfusion of 10uPRBC, 1uPlts, 2uFFP, TXA 1g x2; patient now s/p successful b/l UAE with IR. Patient continues to be monitored under higher level of care in SICU.    -     Discussed with GYN service attending, Dr. Eleanor Desai PGY2 Patient seen at bedside overnight for routine evaluation due to being under primary care of surgical ICU. Patient is now s/p b/l UAE with IR, uncomplicated. Please see procedure note for more details. Pt is complaining of lower abdominal pain, she has PCA in place. Per SICU RN, patient's bleeding has decreased significantly.     Objective  T(C): 36.2 (23 @ 21:00), Max: 37.7 (23 @ 16:00)  HR: 83 (23 @ 23:00) (82 - 99)  BP: 110/74 (23 @ 23:00) (89/75 - 113/82)  RR: 12 (23 @ 23:00) (10 - 18)  SpO2: 99% (23 @ 23:00) (96% - 100%)  Wt(kg): --     @ 07:01  -   @ 23:31  --------------------------------------------------------  IN: 1150 mL / OUT: 900 mL / NET: 250 mL    Gen: NAD  Cardio: RRR  Respiratory: normal respiratory effort on room air  Abd: Soft NT  : no blood on pad, primafit in place with no blood in canister  Ext: NT BL    acetaminophen   IVPB .. 1000 milliGRAM(s) IV Intermittent every 6 hours  albuterol    90 MICROgram(s) HFA Inhaler 2 Puff(s) Inhalation every 6 hours PRN  diazepam    Tablet 5 milliGRAM(s) Oral two times a day PRN  diphenhydrAMINE Injectable 50 milliGRAM(s) IV Push every 4 hours PRN  folic acid 1 milliGRAM(s) Oral daily  HYDROmorphone PCA (1 mG/mL) 30 milliLiter(s) PCA Continuous PCA Continuous  HYDROmorphone PCA (1 mG/mL) Rescue Clinician Bolus 0.5 milliGRAM(s) IV Push every 15 minutes PRN  lactated ringers. 1000 milliLiter(s) IV Continuous <Continuous>  medroxyPROGESTERone 10 milliGRAM(s) Oral <User Schedule>  methimazole 10 milliGRAM(s) Oral daily  naloxone Injectable 0.1 milliGRAM(s) IV Push every 3 minutes PRN  ondansetron Injectable 4 milliGRAM(s) IV Push every 6 hours PRN  sertraline 100 milliGRAM(s) Oral daily  traZODone 50 milliGRAM(s) Oral two times a day    43 y/o  LMP 2 weeks ago admitted as a transfer from [place] for heavy vaginal bleeding after transfusion of 10uPRBC, 1uPlts, 2uFFP, TXA 1g x2; patient now s/p successful b/l UAE with IR. Patient continues to be monitored under higher level of care in SICU.    - Labs per SICU, CBC is currently specimen received, will f/u results  - Continue strict pad counts, patient currently with no vaginal bleeding  - Appreciate excellent SICU care    Discussed with GYN service attending, Dr. Eleanor Desai PGY2

## 2023-08-30 NOTE — PROCEDURE NOTE - PROCEDURE FINDINGS AND DETAILS
successful bilateral uterine artery embolization with avitene slurry   patient tolerated the procedure well  left radial hemostasis with TR band  Keep TR band inflated for 60 minutes (until 9:30 p.m.). After 60 minutes, deflate 1 mL every 5 minutes. If bleeding, reinflate by 2 mL and again deflate in another 10 minutes.

## 2023-08-30 NOTE — PRE-OP CHECKLIST - SELECT TESTS ORDERED
BMP/CBC/PT/PTT/INR/Type and Screen/Results in MD note BMP/CBC/PT/PTT/INR/Type and Screen/UCG/Results in MD note

## 2023-08-30 NOTE — PATIENT PROFILE ADULT - NSTRANSFERBELONGINGSDISPO_GEN_A_NUR
with patient Tranexamic Acid Counseling:  Patient advised of the small risk of bleeding problems with tranexamic acid. They were also instructed to call if they developed any nausea, vomiting or diarrhea. All of the patient's questions and concerns were addressed.

## 2023-08-30 NOTE — H&P ADULT - ASSESSMENT
41 y/o  LMP 2 weeks ago presenting as a transfer from [place] for further management and potential surgical intervention of continued vaginal bleeding. At [place] patient was transfused with 10uPRBC, 1uPlts, 2uFFP, TXA 1g x2, Depo-Provera inj, and is currently on Provera 10mg TID. TVUS showing 1.8cm thickened endometrium with 2cm fibroid. Patients labs currently within normal limits with H&H 10.6/32, vital signs stable at this time. Patient currently in SICU for hemodynamic monitoring in setting of severe anemia with continued heavy vaginal bleeding despite medical intervention.     - Patient w/ private Cardiologist Dr. Jose De Oliveira, consult placed for pre-surgical optimization   - IR to evaluate patient for potential UAE  - f/u hospitalist recommendations   - NPO for possible IR procedure   - Continue Provera 10mg TID  - Pad counts  - Transfuse prn for hgb <7  - Appreciate SICU recommendations    JENNIFER Castillo  Seen and examined with Dr. Webster

## 2023-08-30 NOTE — H&P ADULT - NSHPPHYSICALEXAM_GEN_ALL_CORE
Physical Exam:   General: sitting comfortably in bed, NAD   HEENT: neck supple, full ROM  CV: RRR  Lungs: CTA b/l, good air flow b/l   Back: No CVA tenderness  Abd: Soft, non-tender, non-distended.  Bowel sounds present.    :  50cc clot at introitus with 100cc brb on cale beneath patient   Speculum Exam: Deferred

## 2023-08-30 NOTE — PRE PROCEDURE NOTE - PRE PROCEDURE EVALUATION
Interventional Radiology    HPI: 42y Female with long standing hx of known uterine fibroids presenting as a transfer from [place] for severe anemia in the setting of vaginal bleeding. IR to perform bilateral uterine artery embolization.     Allergies: Motrin (Hives)    Medications (Abx/Cardiac/Anticoagulation/Blood Products)      Data:  165.1  110.4, 119.7  T(C): 36.2  HR: 88  BP: 103/68  RR: 17  SpO2: 100%    Exam  General: No acute distress  Chest: Non labored breathing  Abdomen: Non-distended  Extremities: No swelling, warm    -WBC 7.45 / HgB 10.6 / Hct 32.0 / Plt 148  -Na 136 / Cl 106 / BUN 4 / Glucose 77  -K 4.4 / CO2 26 / Cr 0.43  -INR1.05    Plan:   42y Female with long standing hx of known uterine fibroids presenting as a transfer from [place] for severe anemia in the setting of vaginal bleeding. IR to perform bilateral uterine artery embolization.   -- Relevant imaging and labs were reviewed.   -- Risks (including bleeding, stroke, radial artery occlusion, non-target embolization), benefits, and alternatives were explained to the patient and informed consent was obtained.

## 2023-08-30 NOTE — PATIENT PROFILE ADULT - OVER THE PAST TWO WEEKS, HAVE YOU FELT LITTLE INTEREST OR PLEASURE IN DOING THINGS?
CARDIOLOGY OFFICE NOTE        ORIGINAL REASON FOR CONSULT:  PRE OPERATIVE CLEARANCE (11/15/2016)  Best Garcia MD  [address]  [facility] [address]  Darrick Vasques Jr. is a 59 year old male with the following issues: CARDIAC INVESTIGATIONS/IMAGING   1. Age 59  2. HFpEF (Score 4)  3. Former smoker  4. Morbid Obesity, BMI 42  5. Left hip replacement (11/28/2016)  6. RENARD compliant with CPAP (Dr. Mcneil)    BNP: 7 (11/15/2016)  10 year ASCVD risk: 10%  NYHA FC: 2   1. ECHO (10/12/2018): LVEF 74%, E/e' 10, RVSP 27.8 mmHg  2. ECG (10/01/2018): SB, RBBB  3. NM STRESS TEST (11/21/2016): Negative  4. US BLE Venous Duplex (10/01/2018): Negative  5. US Carotids (10/10/2018): <50% stenosis of B/L ICAs  6. Sleep Study (01/04/2018): Severe RENARD  7. PFTs (10/31/2018): Normal     Mr. Vasques presents in follow up after 11/27/2018.  At our last visit, he reported dizziness/lightheadedness and dyspnea that had improved with the addition of lasix. We recommended increasing Lasix to 60 mg daily.   Since that visit, he has not been seen by any other physician.    Today, he states when he increased his furosemide he became jittery/anxious with SOB, however it has subsided. He will occasionally have chest discomfort under his right armpit that will be worse with moving his arm. He states that he had a cardiac cath around 1999 or 2000 at [place], which was normal. He would like more information related to his diastolic dysfunction. He would also like a work excuse for 4 months prior to his intermediate, so he can basically retire early. He alternates 325 mg aspirin with tylenol and ibuprofen for pain. He states his SOB has not been bothering him.     ACTIVITY LEVEL: Limited due to knee pain.             ECHO Trend  Date  LVEF  11/16/2016 72%  10/12/2018 74%    Pt was originally seen in consult for pre operative clearance for left hip arthoplasty.    Currently denies headache, fever, chills, weight loss, fatigue,change  in vision or hearing, epistaxis, sinus discharge or dysphagia, cough, hemoptysis and wheeze. Also denies chest pain, syncope, presyncope, orthopnea, PND. Patient also denies abdominal pain,  change in bowel habits, nausea, vomiting, diarrhea. Denies dysuria, hematuria, polyuria, rash, pruritus, lesions, jaundice, sensory or motor deficits, seizures, loss of consciousness, change in mental status.      PERSONAL/SOCIAL HISTORY:    Lives alone in [place]. Never .  Works at [employer] for 20 years. Plans to retire in middle of August  Recent smoker (Quit 7 months ago). Consumes 3-4 beers/day.    FAMILY HISTORY:    4 siblings with no reported heart diease.  Father alive, mother passed at 57 of CAD.    OBSTRUCTIVE SLEEP APNEA RISK SCORE: DIAGNOSED, severe RENARD    AAA SCREEN: NA (AGE)    CURRENT MEDICATIONS:  Current Outpatient Medications   Medication Sig Dispense Refill   • tamsulosin (FLOMAX) 0.4 MG Cap Take 1 capsule by mouth daily after a meal. 30 capsule 0   • fluticasone (FLONASE) 50 MCG/ACT nasal spray Spray 1 spray in each nostril 2 times daily. 16 g 3   • POTASSIUM PO      • furosemide (LASIX) 40 MG tablet Take 1.5 tablets by mouth daily. 45 tablet 11   • cetirizine (ZYRTEC) 10 MG tablet Take 1 tablet by mouth daily. 30 tablet 1   • zolpidem (AMBIEN) 10 MG tablet Take 1 tablet by mouth nightly as needed for Sleep. 30 tablet 2   • amoxicillin (AMOXIL) 500 MG capsule Take 4 capsules by mouth 1 hour prior to appointment. 16 capsule 0   • aspirin 325 MG tablet Take 1 tablet by mouth every 48 hours. Once Lovenox is complete 30 tablet 0   • azelastine (ASTELIN) 0.1 % nasal spray Spray 1 spray in each nostril 2 times daily. Use in each nostril as directed 1 Bottle 11   • Multiple Vitamins-Minerals (MULTIVITAMIN & MINERAL PO) Take 1 tablet by mouth daily.     • vitamin E 100 UNITS capsule Take 100 Units by mouth every 4 months.      • zinc sulfate (ZINCATE) 220 MG capsule Take 220 mg by  mouth every 48 hours.      • Ascorbic Acid (VITAMIN C) 100 MG tablet Take 100 mg by mouth daily.     • Saw Palmetto, Serenoa repens, 1000 MG CAPS Take 1 capsule by mouth every 48 hours.        No current facility-administered medications for this visit.       [facility] Extended Vitals 10/23/2018 10/23/2018 10/23/2018 11/27/2018 3/5/2019   /84 124/70 128/62 110/70 122/62   Pulse 89 - - 69 82   Resp - - - - 16   Temp - - - - -   Weight kg 126.372 kg - - 126.735 kg 129.275 kg   Height 5' 10\" - - 5' 9\" 5' 9\"   BMI 40.06 - - 41.35 42.09   Pulse Ox 98 - - 97 -   Patient Position - Supine Standing - -   BP Location - - - - -   Cuff Size - - - - -   Some recent data might be hidden     PHYSICAL EXAMINATION:  General : No acute distress  HEENT: PERRL, Normocephalic/atraumatic, clear oropharynx  Neck: Supple, FROM, No LAP/Thyromegaly. Trachea central. No JVD or carotid bruit. No HJR  CVS: S1, S2 normal. No M/R/G  Pulm: Clear to auscultation/percussion. No Wheeze/Rhonchi/Rales  Abd: Soft, nontender/non-distended. No bruits/peristalsis. No hepatosplenomegaly.  Ext: No cyanosis/clubbing/ 1+ edema  Skin: No rash/palpable nodules  Neuro: Cranial nerves II through XII are intact. Power is equal in all extremities. Gross sensory exam normal.      LABORATORY:  Recent Labs   Lab 09/24/18  1101   HGB 14.7   BUN 16   Creatinine 0.82   Potassium 4.6   CHOLESTEROL 142   HDL 41   CHOL/HDL 3.5   TRIGLYCERIDE 104   CALCULATED LDL 80   GFR Estimate, Non African American >90     ECHO (10/12/2018):  Normal left ventricular cavity size and wall thickness, hyperdynamic left ventricular systolic function.  Grade II/IV diastolic dysfunction, mild-moderately elevated filling pressures.  Normal right ventricular size and systolic function, RVSP 27.8 mmHg.  No significant valve abnormalities    CONSULTANTS:   Future Appointments   Date Time Provider Department Center   3/5/2019 10:45 AM MD ALLEN [facility]   10/10/2019 11:00 AM  EDG LAB [facility]   10/17/2019 11:00 AM Best Garcia MD [facility]     ASSESSMENT AND PLAN:    Darrick Vasques Jr. is a 59 year old male with the following vascular bed:    1. Age 59  2. Current smoker  3. Obesity     Middle aged man with high 10- year MACE Risk.  Initial ischemic risk stratification negative.  Now with diagnosis of RENARD.  He has responded to the increased dose of Lasix. He is now NYHA functional class II.   With continued dyspnea have offered cardiac catheterization; patient would still  like to wait.     At the present moment in time, the following recommendations are being made:    1. Decrease sodium in diet.  2. Increase activity as tolerated.  3. Patient would like to wait on hemodynamic cardiac cath at this time as he feels he is better.  4. Labs.    Follow up in 3 months    I, Clemente Warren, BSN, RN attest that I performed the duties of scribe in the presence of JENIFER Robles MD    The documentation recorded by the scribe accurately and completely reflects the service(s) I personally performed and the decisions made by me.     I, JENIFER Robles, have personally taken a history, performed a physical examination of the patient, reviewed the clinical data, labs, imagings, ecg. I have reviewed and edited the above note as needed. I have personally formulated the clinical impression and recommendations as stated.       JENIFER MICHELLE MD  [phone]       no yes

## 2023-08-30 NOTE — H&P ADULT - NSHPLABSRESULTS_GEN_ALL_CORE
LABS:  Pregnancy Profile, Urine: Negative (08-30-23 @ 07:59)                          10.6   7.45  )-----------( 148      ( 30 Aug 2023 13:55 )             32.0     08-30    136  |  106  |  4<L>  ----------------------------<  77  4.4   |  26  |  0.43<L>    Ca    7.8<L>      30 Aug 2023 13:55  Phos  2.0     08-30  Mg     1.40     08-30    TPro  5.1<L>  /  Alb  2.3<L>  /  TBili  1.1  /  DBili  x   /  AST  55<H>  /  ALT  25  /  AlkPhos  131<H>  08-29    I&O's Detail    30 Aug 2023 07:01  -  30 Aug 2023 16:36  --------------------------------------------------------  IN:    IV PiggyBack: 250 mL    Lactated Ringers: 300 mL  Total IN: 550 mL    OUT:    Voided (mL): 300 mL  Total OUT: 300 mL    Total NET: 250 mL      PT/INR - ( 30 Aug 2023 13:55 )   PT: 11.9 sec;   INR: 1.05 ratio       PTT - ( 30 Aug 2023 13:55 )  PTT:35.4 sec  Urinalysis Basic - ( 30 Aug 2023 13:55 )    Color: x / Appearance: x / SG: x / pH: x  Gluc: 77 mg/dL / Ketone: x  / Bili: x / Urobili: x   Blood: x / Protein: x / Nitrite: x   Leuk Esterase: x / RBC: x / WBC x   Sq Epi: x / Non Sq Epi: x / Bacteria: x        RADIOLOGY & ADDITIONAL STUDIES:

## 2023-08-30 NOTE — CHART NOTE - NSCHARTNOTEFT_GEN_A_CORE
Notified this morning by Dr. Bowie, Head of Anesthesia at [facility], that patient she should not be transferred to [facility] for surgery. Patient would benefit from going to a tertiary hospital as [facility] does not have UAE available in case of an emergency. He recommends that patient either stays at [place] for the D&C Hysteroscopy because she will continue to need blood transfusions or transferred to a tertiary hospital. Explained that the OR Nursing manager at [facility] also discussed with me yesterday that they do not feel comfortable with having this case done here at [facility] as they are not very familiar with GYN Cases. Dr. Harris, Head of GYN at [facility], also agrees patient should be transferred to a tertiary center.    Spoke with Dr. Evans that in the setting of severe symptomatic anemia, continuous vaginal bleeding, and need for additional blood on top of the 8 units she has already received. Patient would benefit from transferring to a tertiary center where she can receive additional blood and have surgery where additional interventions are available if needed.    D&C Hysteroscopy at [facility] is now cancelled and patient to receive 2 extra units of blood with Crypo. Fibrinogen to be drawn. Patient to be transferred to a tertiary center.

## 2023-08-30 NOTE — PROGRESS NOTE ADULT - SUBJECTIVE AND OBJECTIVE BOX
Date/Time Patient Seen:  		  Referring MD:   Data Reviewed	       Patient is a 42y old  Female who presents with a chief complaint of DUB with Chronic Anemia (28 Aug 2023 20:17)      Subjective/HPI     PAST MEDICAL & SURGICAL HISTORY:  Atrial fibrillation    History of Hyperthyroidism    Asthma    History of anemia    Depression, unspecified depression type    H/O blood transfusion reaction    Smoker    S/P  Section  ,,    History of blood transfusion          Medication list         MEDICATIONS  (STANDING):  calcium carbonate   1250 mG (OsCal) 1 Tablet(s) Oral two times a day  diazepam    Tablet 5 milliGRAM(s) Oral two times a day  ferrous    sulfate 325 milliGRAM(s) Oral three times a day  folic acid 1 milliGRAM(s) Oral daily  gabapentin 400 milliGRAM(s) Oral two times a day  iron sucrose IVPB 200 milliGRAM(s) IV Intermittent every 24 hours  magnesium oxide 400 milliGRAM(s) Oral daily  medroxyPROGESTERone 10 milliGRAM(s) Oral <User Schedule>  methimazole 10 milliGRAM(s) Oral daily  metoprolol succinate ER 25 milliGRAM(s) Oral daily  sertraline 100 milliGRAM(s) Oral daily  sodium chloride 0.9%. 1000 milliLiter(s) (100 mL/Hr) IV Continuous <Continuous>  traZODone 50 milliGRAM(s) Oral two times a day    MEDICATIONS  (PRN):  albuterol    90 MICROgram(s) HFA Inhaler 2 Puff(s) Inhalation every 6 hours PRN for shortness of breath and/or wheezing  ondansetron Injectable 4 milliGRAM(s) IV Push every 8 hours PRN Nausea and/or Vomiting  oxycodone    5 mG/acetaminophen 325 mG 1 Tablet(s) Oral every 4 hours PRN Moderate Pain (4 - 6)  oxycodone    5 mG/acetaminophen 325 mG 2 Tablet(s) Oral every 4 hours PRN Severe Pain (7 - 10)  polyethylene glycol 3350 17 Gram(s) Oral daily PRN Constipation         Vitals log        ICU Vital Signs Last 24 Hrs  T(C): 36.5 (30 Aug 2023 05:00), Max: 37.1 (29 Aug 2023 08:26)  T(F): 97.7 (30 Aug 2023 05:00), Max: 98.8 (29 Aug 2023 08:26)  HR: 90 (30 Aug 2023 04:12) (89 - 107)  BP: 91/50 (30 Aug 2023 04:12) (87/56 - 123/62)  BP(mean): 62 (30 Aug 2023 04:12) (60 - 98)  ABP: --  ABP(mean): --  RR: 16 (30 Aug 2023 04:12) (12 - 21)  SpO2: 99% (30 Aug 2023 04:12) (95% - 100%)    O2 Parameters below as of 29 Aug 2023 19:00  Patient On (Oxygen Delivery Method): room air                 Input and Output:  I&O's Detail    28 Aug 2023 07:01  -  29 Aug 2023 07:00  --------------------------------------------------------  IN:    IV PiggyBack: 150 mL    Oral Fluid: 730 mL    PRBCs (Packed Red Blood Cells): 300 mL    sodium chloride 0.9%: 1800 mL  Total IN: 2980 mL    OUT:  Total OUT: 0 mL    Total NET: 2980 mL      29 Aug 2023 07:01  -  30 Aug 2023 06:05  --------------------------------------------------------  IN:    IV PiggyBack: 100 mL    sodium chloride 0.9%: 2200 mL  Total IN: 2300 mL    OUT:  Total OUT: 0 mL    Total NET: 2300 mL          Lab Data                        7.8    7.27  )-----------( 143      ( 29 Aug 2023 20:32 )             25.1     08-29    140  |  106  |  5<L>  ----------------------------<  89  4.1   |  28  |  0.50    Ca    7.8<L>      29 Aug 2023 06:00  Phos  2.3     08-29  Mg     1.8     08-29    TPro  5.1<L>  /  Alb  2.3<L>  /  TBili  1.1  /  DBili  x   /  AST  55<H>  /  ALT  25  /  AlkPhos  131<H>  08-29            Review of Systems	      Objective     Physical Examination    heart s1s2  lung dc bS  head nc      Pertinent Lab findings & Imaging      Myrna:  NO   Adequate UO     I&O's Detail    28 Aug 2023 07:01  -  29 Aug 2023 07:00  --------------------------------------------------------  IN:    IV PiggyBack: 150 mL    Oral Fluid: 730 mL    PRBCs (Packed Red Blood Cells): 300 mL    sodium chloride 0.9%: 1800 mL  Total IN: 2980 mL    OUT:  Total OUT: 0 mL    Total NET: 2980 mL      29 Aug 2023 07:01  -  30 Aug 2023 06:05  --------------------------------------------------------  IN:    IV PiggyBack: 100 mL    sodium chloride 0.9%: 2200 mL  Total IN: 2300 mL    OUT:  Total OUT: 0 mL    Total NET: 2300 mL               Discussed with:     Cultures:	        Radiology

## 2023-08-30 NOTE — CASE MANAGEMENT PROGRESS NOTE - NSCMPROGRESSNOTE_GEN_ALL_CORE
Pt continues to have vaginal bleeding. Pt was going to be transferred to [place], but anesthesia felt the pt was not stable enough. Pt was transferred to [facility] where she will undergo a procedure and D&C for the heavy vaginal bleeding. Pt was on her 10th unit of blood since admission.

## 2023-08-30 NOTE — CONSULT NOTE ADULT - ASSESSMENT
ASSESSMENT:    PLAN:    Neuro:  - Assess neurological neurovascular status every  hours in the setting of  - Sedation while intubated w/  - Multimodal pain control w/    Resp:  - Out of bed to chair, ambulate as tolerated, and incentive spirometry to prevent atelectasis  - Mechanical ventilation for    CV:  - Will wean vasopressor support w/ goal MAP > 65 mmHg    GI:   -   - Bowel regimen with senna & Miralax  - Protonix for stress ulcer prophylaxis    Renal:  - Monitor I&Os and electrolytes w/ repletions as necessary    Heme:  - Monitor CBC and coags  - Lovenox for VTE prophylaxis    ID:   - Monitor for clinical evidence of active infection  - Monitor WBC, temperature, and procalcitonin  - Empiric antibiotics    Endo:   - Monitor glucose ; HgbA1C  - for HLD  - for hypothyroidism    Disposition: SICU ASSESSMENT:    PLAN:    Neuro:  - AO X4  - Multimodal pain control w/ Tylenol, oxycodone  - Gabapentin 400 tid for peripheral neuropathy     Resp:  - Out of bed to chair, ambulate as tolerated, and incentive spirometry to prevent atelectasis  - RA    CV:  - goal MAP > 65 mmHg  - Metoprolol for A-fib    GI:   -   - Bowel regimen with senna & Miralax  - Protonix for stress ulcer prophylaxis    Renal:  - Monitor I&Os and electrolytes w/ repletions as necessary    Heme:  - Monitor CBC and coags  - Hold DVT ppx for active bleeding    ID:   - Monitor for clinical evidence of active infection  - Monitor WBC, temperature, and procalcitonin  - Empiric antibiotics    Endo:   - Monitor glucose ; HgbA1C  - for HLD  - for hypothyroidism    Disposition: SICU ASSESSMENT:    PLAN:    Neuro:  - AO X4  - Multimodal pain control w/ Tylenol, oxycodone  - Continue home meds- zoloft and trazodone for depression  - Diazepam 5 bid prn for anxiety  - Gabapentin 400 tid for peripheral neuropathy     Resp:  - Out of bed to chair, ambulate as tolerated, and incentive spirometry to prevent atelectasis  - RA    CV:  - goal MAP > 65 mmHg  - Metoprolol for A-fib    GI:   - NPO/IVF  - Bowel regimen with senna & Miralax    Renal:  - Monitor I&Os and electrolytes w/ repletions as necessary  - LR @100  - Provera 10mg tid     Heme:  - S/p 10 pRBC, 2 FFP, 1 platelet   - Monitor CBC and coags  - Hold DVT ppx for active bleeding    ID:   - Monitor for clinical evidence of active infection  - Monitor WBC, temperature, and procalcitonin  - Empiric antibiotics    Endo:   - Monitor glucose on daily BMP  - Continue home methimazole for hyperthyroidism    Disposition: SICU 42-year-old female presenting as a transfer from [place] for severe anemia in setting of vaginal bleeding over the last three weeks, culminating in a fit of fainting with headstrike and loss of consciousness. She then presented to [place] where she was transfused with 10uPRBC, 1uPlts, 2uFFP, TXA 1g x2, Depo-Provera inj, and is currently on Provera 10mg TID. Tx to [facility] for further workup. SICU consulted for hemorrhage watch.     PLAN:    Neuro:  - AO X4  - Multimodal pain control w/ Tylenol, oxycodone  - Continue home meds- zoloft and trazodone for depression  - Diazepam 5 bid prn for anxiety  - Gabapentin 400 tid for peripheral neuropathy     Resp:  - Out of bed to chair, ambulate as tolerated, and incentive spirometry to prevent atelectasis  - RA    CV:  - goal MAP > 65 mmHg  - Metoprolol for A-fib    GI:   - NPO/IVF  - Bowel regimen with senna & Miralax    Renal:  - Monitor I&Os and electrolytes w/ repletions as necessary  - LR @100  - Provera 10mg tid     Heme:  - S/p 10 pRBC, 2 FFP, 1 platelet   - Monitor CBC and coags  - Hold DVT ppx for active bleeding  - Q4 CBC x3, if stable then bid    ID:   - Monitor for clinical evidence of active infection  - Monitor WBC, temperature, and procalcitonin  - Empiric antibiotics    Endo:   - Monitor glucose on daily BMP  - Continue home methimazole for hyperthyroidism    Disposition: SICU 42-year-old female presenting as a transfer from [place] for severe anemia in setting of vaginal bleeding over the last three weeks, culminating in a fit of fainting with headstrike and loss of consciousness. She then presented to [place] where she was transfused with 10uPRBC, 1uPlts, 2uFFP, TXA 1g x2, Depo-Provera inj, and is currently on Provera 10mg TID. Tx to [facility] for further workup. SICU consulted for hemorrhage watch.     PLAN:    Neuro:  - AO X4  - Multimodal pain control w/ Tylenol, oxycodone  - Continue home meds- zoloft and trazodone for depression  - Diazepam 5 bid prn for anxiety  - Gabapentin 400 tid for peripheral neuropathy     Resp:  - Out of bed to chair, ambulate as tolerated, and incentive spirometry to prevent atelectasis  - RA    CV:  - goal MAP > 65 mmHg  - Metoprolol for A-fib    GI:   - NPO/IVF  - Bowel regimen with senna & Miralax    Renal:  - Monitor I&Os and electrolytes w/ repletions as necessary  - LR @100  - Provera 10mg tid     Heme:  - S/p 10 pRBC, 2 FFP, 1 platelet   - Monitor CBC and coags  - Hold DVT ppx for active bleeding  - Q4 CBC x3, if stable then bid    ID:   - Monitor for clinical evidence of active infection  - Monitor WBC, temperature, and procalcitonin    Endo:   - Monitor glucose on daily BMP  - Continue home methimazole for hyperthyroidism    Disposition: SICU

## 2023-08-30 NOTE — PROGRESS NOTE ADULT - PROVIDER SPECIALTY LIST ADULT
GYN
Internal Medicine
Critical Care
Critical Care
GYN
Internal Medicine
Pulmonology
Pulmonology
Surgery
Critical Care
GYN
Hospitalist
Hospitalist

## 2023-08-30 NOTE — CHART NOTE - NSCHARTNOTEFT_GEN_A_CORE
PRE-INTERVENTIONAL RADIOLOGY PROCEDURE NOTE      Patient Age: 42    Patient Gender: F    Procedure: uterine artery embolization    Diagnosis/Indication: anemia 2/2 vaginal bleeding    Interventional Radiology Attending Physician: Bruce    Ordering Attending Physician: Eleanor    Pertinent Medical History: fibroid, Afib, Graves    Pertinent labs:                        10.6   7.45  )-----------( 148      ( 30 Aug 2023 13:55 )             32.0       08-30    136  |  106  |  4<L>  ----------------------------<  77  4.4   |  26  |  0.43<L>    Ca    7.8<L>      30 Aug 2023 13:55  Phos  2.0     08-30  Mg     1.40     08-30    TPro  5.1<L>  /  Alb  2.3<L>  /  TBili  1.1  /  DBili  x   /  AST  55<H>  /  ALT  25  /  AlkPhos  131<H>  08-29      PT/INR - ( 30 Aug 2023 13:55 )   PT: 11.9 sec;   INR: 1.05 ratio         PTT - ( 30 Aug 2023 13:55 )  PTT:35.4 sec        Patient and Family Aware ? Yes

## 2023-08-30 NOTE — H&P ADULT - HISTORY OF PRESENT ILLNESS
43 y/o  LMP 2 weeks ago presenting as a transfer from [place] for severe anemia in setting of vaginal bleeding. Patient states she has been experiencing heavy vaginal bleeding for the past two weeks in which she has been completely saturating through at least one diaper per hour. Patient states when she originally presented to [place] she had fainted at home prior to her presentation and had began to experience sx such as chest pain, SOB, palpitations and dizziness.   Per chart review, patient now s/p 10uPRBC, 1uPlts, 2uFFP, TXA 1g x2, Depo-Provera inj, and is currently on Provera 10mg TID. Patient was originally scheduled for D&C Hysteroscopy at [facility] today which was ultimately cancelled.  41 y/o  LMP 2 weeks ago presenting as a transfer from [place] for severe anemia in setting of vaginal bleeding. Patient states she has been experiencing heavy vaginal bleeding for the past two weeks in which she has been completely saturating through at least one diaper per hour. Patient states she had fainted at home prior to her presentation to [place] and began to experience sx such as chest pain, SOB, palpitations and dizziness. Patient noted to have severe anemia upon presentation with H&H of 3.6/14.1. Per chart review, patient now s/p 10uPRBC, 1uPlts, 2uFFP, TXA 1g x2, Depo-Provera inj, and is currently on Provera 10mg TID. Patient was originally scheduled for D&C Hysteroscopy at [facility] today which was ultimately cancelled prior to patient being transferred to [facility] for further management. Patient has long standing history of known uterine fibroids       Name of GYN Physician: Denies  OBHx: x3 c/s   GynHx: Hx fibroids. Denies cysts, endometriosis, STI's, Abnormal pap smears   PMH: A-Fib, hyperthyroidism, peripheral neuropathy, depression, active smoker  PSH: x3 c/s, denies other surgeries   Meds: Trazadone, sertraline, Metoprolol, Methimazole, Provera, Valium  Allx: Motrin- hives   Social History: Admits to daily smoking, 1/2 pack per day. Denies drug use &alcohol use.        Vital Signs Last 24 Hrs  T(C): 37.3 (30 Aug 2023 13:12), Max: 37.3 (30 Aug 2023 13:12)  T(F): 99.2 (30 Aug 2023 13:12), Max: 99.2 (30 Aug 2023 13:12)  HR: 86 (30 Aug 2023 16:00) (86 - 102)  BP: 97/67 (30 Aug 2023 16:00) (83/56 - 104/63)  BP(mean): 75 (30 Aug 2023 16:00) (60 - 88)  RR: 18 (30 Aug 2023 16:00) (11 - 21)  SpO2: 100% (30 Aug 2023 16:00) (95% - 100%)    Parameters below as of 30 Aug 2023 16:00  Patient On (Oxygen Delivery Method): room air

## 2023-08-30 NOTE — PATIENT PROFILE ADULT - FALL HARM RISK - HARM RISK INTERVENTIONS
Assistance with ambulation/Assistance OOB with selected safe patient handling equipment/Communicate Risk of Fall with Harm to all staff/Monitor gait and stability/Reinforce activity limits and safety measures with patient and family/Sit up slowly, dangle for a short time, stand at bedside before walking/Tailored Fall Risk Interventions/Visual Cue: Yellow wristband and red socks/Bed in lowest position, wheels locked, appropriate side rails in place/Call bell, personal items and telephone in reach/Instruct patient to call for assistance before getting out of bed or chair/Non-slip footwear when patient is out of bed/[place] to call system/Physically safe environment - no spills, clutter or unnecessary equipment/Purposeful Proactive Rounding/Room/bathroom lighting operational, light cord in reach

## 2023-08-30 NOTE — CONSULT NOTE ADULT - SUBJECTIVE AND OBJECTIVE BOX
HISTORY OF PRESENT ILLNESS:  42-year-old female presenting as a transfer from [place] for severe anemia in setting of vaginal bleeding. Patient states she has been experiencing heavy vaginal bleeding for the past two weeks in which she has been completely saturating through at least one diaper per hour. Patient states when she originally presented to [place] she had fainted at home prior to her presentation and had began to experience sx such as chest pain, SOB, palpitations and dizziness.   Per chart review, patient now s/p 10uPRBC, 1uPlts, 2uFFP, TXA 1g x2, Depo-Provera inj, and is currently on Provera 10mg TID. Patient was originally scheduled for D&C Hysteroscopy at [facility] today which was ultimately cancelled.       PAST MEDICAL HISTORY: Atrial fibrillation    History of Hyperthyroidism    Asthma    History of anemia    Depression, unspecified depression type    H/O blood transfusion reaction    Smoker      PAST SURGICAL HISTORY: S/P  Section    History of blood transfusion      HOME MEDICATIONS: Home Medications:    ALLERGIES: Motrin (Hives)    FAMILY HISTORY: No pertinent family history in first degree relatives    Family history of diabetes mellitus (Sibling, Grandparent)    Family history of stomach cancer (Grandparent)      SOCIAL HISTORY:  REVIEW OF SYSTEMS:  Negative except as in HPI above.    VITAL SIGNS:  ICU Vital Signs Last 24 Hrs  T(C): 36.8 (30 Aug 2023 07:35), Max: 37.1 (29 Aug 2023 16:07)  T(F): 98.3 (30 Aug 2023 07:35), Max: 98.8 (29 Aug 2023 16:07)  HR: 94 (30 Aug 2023 12:00) (89 - 102)  BP: 104/63 (30 Aug 2023 12:00) (83/56 - 104/63)  BP(mean): 74 (30 Aug 2023 12:00) (60 - 88)  ABP: --  ABP(mean): --  RR: 17 (30 Aug 2023 12:00) (12 - 21)  SpO2: 97% (30 Aug 2023 12:00) (95% - 100%)      PHYSICAL EXAMINATION:  General -   Neuro -   HEENT -   Lungs -   Heart -   Abdomen -   Extremities -   Skin -   LABS:                        10.6   7.45  )-----------( 148      ( 30 Aug 2023 13:55 )             32.0     08-30    138  |  106  |  5<L>  ----------------------------<  87  4.0   |  28  |  0.32<L>    Ca    7.7<L>      30 Aug 2023 06:00  Phos  2.3       Mg     1.8         TPro  5.1<L>  /  Alb  2.3<L>  /  TBili  1.1  /  DBili  x   /  AST  55<H>  /  ALT  25  /  AlkPhos  131<H>      PT/INR - ( 30 Aug 2023 13:55 )   PT: 11.9 sec;   INR: 1.05 ratio         PTT - ( 30 Aug 2023 13:55 )  PTT:35.4 sec      RECENT CULTURES:    CAPILLARY BLOOD GLUCOSE      POCT Blood Glucose.: 90 mg/dL (30 Aug 2023 13:48)    IMAGING STUDIES: HISTORY OF PRESENT ILLNESS:  42-year-old female hx of hyperthyroidism, depression, active smoker, presenting as a transfer from [place] for severe anemia in setting of vaginal bleeding. Patient states she has been experiencing heavy vaginal bleeding for the past three weeks in which she has been completely saturating through at least one diaper per hour. Reports the bleeding worsened week by week, and was accompanied by the passage of dark clots. About five days ago, the patient fainted at home, hitting her head on her kitchen table, and per the testimony of her mother, she had lost consciousness. She then presented to [place] where she was transfused with 10uPRBC, 1uPlts, 2uFFP, TXA 1g x2, Depo-Provera inj, and is currently on Provera 10mg TID. The patient was transferred to [facility] for a D & C, as [place] did not have the requisite equipment to perform the procedure.    SICU consulted for hemorrhage watch.    PAST MEDICAL HISTORY:     Atrial fibrillation    History of Hyperthyroidism    Asthma    History of anemia    Depression, unspecified depression type    H/O blood transfusion reaction    Smoker      PAST SURGICAL HISTORY:     S/P  Section x3 (, '02, )      HOME MEDICATIONS:     Metoprolol 50 bid    Gabapentin 400 tid    Methimazole 10 qd    Trazadone     Zoloft    Diazepam 5 bid prn    ALLERGIES: Motrin (Hives)    FAMILY HISTORY:     Family history of diabetes mellitus (Sibling, Grandparent)    Family history of stomach cancer (Grandparent)      SOCIAL HISTORY:  REVIEW OF SYSTEMS:  Negative except as in HPI above.    VITAL SIGNS:  ICU Vital Signs Last 24 Hrs  T(C): 36.8 (30 Aug 2023 07:35), Max: 37.1 (29 Aug 2023 16:07)  T(F): 98.3 (30 Aug 2023 07:35), Max: 98.8 (29 Aug 2023 16:07)  HR: 94 (30 Aug 2023 12:00) (89 - 102)  BP: 104/63 (30 Aug 2023 12:00) (83/56 - 104/63)  BP(mean): 74 (30 Aug 2023 12:00) (60 - 88)  ABP: --  ABP(mean): --  RR: 17 (30 Aug 2023 12:00) (12 - 21)  SpO2: 97% (30 Aug 2023 12:00) (95% - 100%)      PHYSICAL EXAMINATION:  General - no acute distress  Neuro - no focal deficits  HEENT - normocephalic, atraumatic  Respiratory - nonlabored respirations on room air  CV- pulse regularly present  Abdomen - soft, non-distended, tender in b/l lower quadrants  Extremities - full range of motion  Vaginal- no active bleeding, no bruising or cuts. Light red blood clot visualized in vaginal vault    LABS:                        10.6   7.45  )-----------( 148      ( 30 Aug 2023 13:55 )             32.0     08    138  |  106  |  5<L>  ----------------------------<  87  4.0   |  28  |  0.32<L>    Ca    7.7<L>      30 Aug 2023 06:00  Phos  2.3     30  Mg     1.8     08    TPro  5.1<L>  /  Alb  2.3<L>  /  TBili  1.1  /  DBili  x   /  AST  55<H>  /  ALT  25  /  AlkPhos  131<H>  08-29    PT/INR - ( 30 Aug 2023 13:55 )   PT: 11.9 sec;   INR: 1.05 ratio         PTT - ( 30 Aug 2023 13:55 )  PTT:35.4 sec      RECENT CULTURES:    CAPILLARY BLOOD GLUCOSE      POCT Blood Glucose.: 90 mg/dL (30 Aug 2023 13:48)    IMAGING STUDIES: HISTORY OF PRESENT ILLNESS:    42-year-old female hx of hyperthyroidism, peripheral neuropathy, depression, active smoker, presenting as a transfer from [place] for severe anemia in setting of vaginal bleeding. Patient states she has been experiencing heavy vaginal bleeding for the past three weeks in which she has been completely saturating through at least one diaper per hour. Reports the bleeding worsened week by week, and was accompanied by the passage of dark clots. About five days ago, the patient fainted at home, hitting her head on her kitchen table, and per the testimony of her mother, she had lost consciousness. She then presented to [place] where she was transfused with 10uPRBC, 1uPlts, 2uFFP, TXA 1g x2, Depo-Provera inj, and is currently on Provera 10mg TID. The patient was transferred to [facility] for a D & C, as [place] did not have the requisite equipment to perform the procedure.    SICU consulted for hemorrhage watch.    PAST MEDICAL HISTORY:     Atrial fibrillation    History of Hyperthyroidism    Asthma    History of anemia    Depression, unspecified depression type    H/O blood transfusion reaction    Smoker      PAST SURGICAL HISTORY:     S/P  Section x3 (, '02, )      HOME MEDICATIONS:     Metoprolol 50 bid    Gabapentin 400 tid    Methimazole 10 qd    Trazadone     Zoloft    Diazepam 5 bid prn    ALLERGIES: Motrin (Hives)    FAMILY HISTORY:     Family history of diabetes mellitus (Sibling, Grandparent)    Family history of stomach cancer (Grandparent)      SOCIAL HISTORY:  REVIEW OF SYSTEMS:  Negative except as in HPI above.    VITAL SIGNS:  ICU Vital Signs Last 24 Hrs  T(C): 36.8 (30 Aug 2023 07:35), Max: 37.1 (29 Aug 2023 16:07)  T(F): 98.3 (30 Aug 2023 07:35), Max: 98.8 (29 Aug 2023 16:07)  HR: 94 (30 Aug 2023 12:00) (89 - 102)  BP: 104/63 (30 Aug 2023 12:00) (83/56 - 104/63)  BP(mean): 74 (30 Aug 2023 12:00) (60 - 88)  ABP: --  ABP(mean): --  RR: 17 (30 Aug 2023 12:00) (12 - 21)  SpO2: 97% (30 Aug 2023 12:00) (95% - 100%)      PHYSICAL EXAMINATION:  General - no acute distress  Neuro - no focal deficits  HEENT - normocephalic, atraumatic  Respiratory - nonlabored respirations on room air  CV- pulse regularly present  Abdomen - soft, non-distended, tender in b/l lower quadrants  Extremities - full range of motion  Vaginal- no active bleeding, no bruising or cuts. Light red blood clot visualized in vaginal vault    LABS:                        10.6   7.45  )-----------( 148      ( 30 Aug 2023 13:55 )             32.0     0830    138  |  106  |  5<L>  ----------------------------<  87  4.0   |  28  |  0.32<L>    Ca    7.7<L>      30 Aug 2023 06:00  Phos  2.3     30  Mg     1.8     08    TPro  5.1<L>  /  Alb  2.3<L>  /  TBili  1.1  /  DBili  x   /  AST  55<H>  /  ALT  25  /  AlkPhos  131<H>  08-29    PT/INR - ( 30 Aug 2023 13:55 )   PT: 11.9 sec;   INR: 1.05 ratio         PTT - ( 30 Aug 2023 13:55 )  PTT:35.4 sec      RECENT CULTURES:    CAPILLARY BLOOD GLUCOSE      POCT Blood Glucose.: 90 mg/dL (30 Aug 2023 13:48)    IMAGING STUDIES:

## 2023-08-30 NOTE — DISCHARGE NOTE PROVIDER - HOSPITAL COURSE
42F P3 with Graves' disease, depression, asthma, history of dysfunctional uterine bleeding secondary to uterine fibroids who presented to [facility] on 8/26  with near syncope following 2 weeks of progressive vaginal bleeding and passing clots. On arrival, Hg 3.6g/dL. She was admitted to the ICU and over the course of the hospitalization she has remained symptomatic passing large clots, experiencing fatigue, exertional dyspnea and palpitations. BP has been borderline 90/50s, HR 85-107bpm over the past 48 hrs. She has received 10units of packed red blood cells        npo  dont with childbearing  no gyn       ontinues to be symptomatic passing large clots and mildly tachycardic but overall stable at this time. Hg stable ~9g/dL  Plan for D&C and hysteroscopy with Dr. Squires at [facility] tomorrow at 330PM. Given the limited availability of beds, our staff will coordinate shuttle transport for the procedure tomorrow and back to [facility] post procedure.   Given ongoing bleeding, repeat CBC and will plan to transfuse pRBCs according (thus far received 8U PRBCs and 2U of FFP; TXA 1g IVPB x 2 and s/p depo provera 150mg IM )  Continue provera 10mg TID  GYN input, Dr. Squires input greatly appreciated    42F P3 with Graves' disease, depression, asthma, history of dysfunctional uterine bleeding in setting of uterine fibroids presented to [facility] on 8/26  with near syncope following 2 weeks of progressive vaginal bleeding and passing clots. On arrival, Hg 3.6g/dL. She was admitted to the ICU and over the course of the hospitalization she has remained symptomatic with fatigue, exertional dyspnea, palpitations and passing large clots.   BP has been borderline 90/50s, HR 85-107bpm over the past 48 hrs. TVUS demonstrated myomatous uterus.   She has received 10units of packed red blood cells, 1unit of platelets, 2 units FFP, TXA 1gm x2, and depo provera 150mg IM) and is on provera 10mg TID.   Gynecology evaluation recommended D&C and hysteroscopy that was originally scheduled for today at [facility].   Given ongoing symptoms and ongoing need for more blood products, she will be best suited in a tertiary care facility equipped for more aggressive gynecological intervention, if needed.   The patient does not have an established gynecologist in the community and stated that she has completed childbearing and is open to hysterectomy, if needed.    Currently, she is receiving 2 units of packed red blood cells (for a cumulative total of 10units prbcs) and 1 unit of platelet.   Pt is NPO since midnight.   Respiratory status stable on room air.  Not on pressor support.   Urine pregnancy test negative    The patient has kindly been accepted to [facility] under Dr. Mckenna and accepting gynecologist is Dr. Webster.   I have discussed with the patient regarding ongoing care and all questions answered to patients satisfaction.     Discharge diagnosis:  Severe symptomatic acute blood anemia  Abnormal uterine bleeding  Uterine fibroids

## 2023-08-31 DIAGNOSIS — E05.00 THYROTOXICOSIS WITH DIFFUSE GOITER WITHOUT THYROTOXIC CRISIS OR STORM: ICD-10-CM

## 2023-08-31 DIAGNOSIS — E05.90 THYROTOXICOSIS, UNSPECIFIED WITHOUT THYROTOXIC CRISIS OR STORM: ICD-10-CM

## 2023-08-31 DIAGNOSIS — R00.2 PALPITATIONS: ICD-10-CM

## 2023-08-31 LAB
ANION GAP SERPL CALC-SCNC: 10 MMOL/L — SIGNIFICANT CHANGE UP (ref 7–14)
ANION GAP SERPL CALC-SCNC: 11 MMOL/L — SIGNIFICANT CHANGE UP (ref 7–14)
ANION GAP SERPL CALC-SCNC: 9 MMOL/L — SIGNIFICANT CHANGE UP (ref 7–14)
APTT BLD: 30 SEC — SIGNIFICANT CHANGE UP (ref 24.5–35.6)
BUN SERPL-MCNC: 4 MG/DL — LOW (ref 7–23)
BUN SERPL-MCNC: 5 MG/DL — LOW (ref 7–23)
BUN SERPL-MCNC: 5 MG/DL — LOW (ref 7–23)
CALCIUM SERPL-MCNC: 8 MG/DL — LOW (ref 8.4–10.5)
CALCIUM SERPL-MCNC: 8.3 MG/DL — LOW (ref 8.4–10.5)
CALCIUM SERPL-MCNC: 8.3 MG/DL — LOW (ref 8.4–10.5)
CHLORIDE SERPL-SCNC: 102 MMOL/L — SIGNIFICANT CHANGE UP (ref 98–107)
CHLORIDE SERPL-SCNC: 103 MMOL/L — SIGNIFICANT CHANGE UP (ref 98–107)
CHLORIDE SERPL-SCNC: 104 MMOL/L — SIGNIFICANT CHANGE UP (ref 98–107)
CO2 SERPL-SCNC: 21 MMOL/L — LOW (ref 22–31)
CO2 SERPL-SCNC: 21 MMOL/L — LOW (ref 22–31)
CO2 SERPL-SCNC: 24 MMOL/L — SIGNIFICANT CHANGE UP (ref 22–31)
CREAT SERPL-MCNC: 0.4 MG/DL — LOW (ref 0.5–1.3)
CREAT SERPL-MCNC: 0.41 MG/DL — LOW (ref 0.5–1.3)
CREAT SERPL-MCNC: 0.41 MG/DL — LOW (ref 0.5–1.3)
EGFR: 124 ML/MIN/1.73M2 — SIGNIFICANT CHANGE UP
EGFR: 124 ML/MIN/1.73M2 — SIGNIFICANT CHANGE UP
EGFR: 125 ML/MIN/1.73M2 — SIGNIFICANT CHANGE UP
GLUCOSE SERPL-MCNC: 100 MG/DL — HIGH (ref 70–99)
GLUCOSE SERPL-MCNC: 108 MG/DL — HIGH (ref 70–99)
GLUCOSE SERPL-MCNC: 91 MG/DL — SIGNIFICANT CHANGE UP (ref 70–99)
HCT VFR BLD CALC: 26 % — LOW (ref 34.5–45)
HCT VFR BLD CALC: 34.4 % — LOW (ref 34.5–45)
HGB BLD-MCNC: 10.8 G/DL — LOW (ref 11.5–15.5)
HGB BLD-MCNC: 8.4 G/DL — LOW (ref 11.5–15.5)
INR BLD: 1.07 RATIO — SIGNIFICANT CHANGE UP (ref 0.85–1.18)
MAGNESIUM SERPL-MCNC: 1.7 MG/DL — SIGNIFICANT CHANGE UP (ref 1.6–2.6)
MCHC RBC-ENTMCNC: 27.6 PG — SIGNIFICANT CHANGE UP (ref 27–34)
MCHC RBC-ENTMCNC: 27.7 PG — SIGNIFICANT CHANGE UP (ref 27–34)
MCHC RBC-ENTMCNC: 31.4 GM/DL — LOW (ref 32–36)
MCHC RBC-ENTMCNC: 32.3 GM/DL — SIGNIFICANT CHANGE UP (ref 32–36)
MCV RBC AUTO: 85.8 FL — SIGNIFICANT CHANGE UP (ref 80–100)
MCV RBC AUTO: 87.8 FL — SIGNIFICANT CHANGE UP (ref 80–100)
NRBC # BLD: 0 /100 WBCS — SIGNIFICANT CHANGE UP (ref 0–0)
NRBC # BLD: 0 /100 WBCS — SIGNIFICANT CHANGE UP (ref 0–0)
NRBC # FLD: 0 K/UL — SIGNIFICANT CHANGE UP (ref 0–0)
NRBC # FLD: 0 K/UL — SIGNIFICANT CHANGE UP (ref 0–0)
PHOSPHATE SERPL-MCNC: 3.7 MG/DL — SIGNIFICANT CHANGE UP (ref 2.5–4.5)
PHOSPHATE SERPL-MCNC: 4.4 MG/DL — SIGNIFICANT CHANGE UP (ref 2.5–4.5)
PHOSPHATE SERPL-MCNC: 4.6 MG/DL — HIGH (ref 2.5–4.5)
PLATELET # BLD AUTO: 178 K/UL — SIGNIFICANT CHANGE UP (ref 150–400)
PLATELET # BLD AUTO: 193 K/UL — SIGNIFICANT CHANGE UP (ref 150–400)
POTASSIUM SERPL-MCNC: 4.1 MMOL/L — SIGNIFICANT CHANGE UP (ref 3.5–5.3)
POTASSIUM SERPL-MCNC: 4.6 MMOL/L — SIGNIFICANT CHANGE UP (ref 3.5–5.3)
POTASSIUM SERPL-MCNC: 4.7 MMOL/L — SIGNIFICANT CHANGE UP (ref 3.5–5.3)
POTASSIUM SERPL-SCNC: 4.1 MMOL/L — SIGNIFICANT CHANGE UP (ref 3.5–5.3)
POTASSIUM SERPL-SCNC: 4.6 MMOL/L — SIGNIFICANT CHANGE UP (ref 3.5–5.3)
POTASSIUM SERPL-SCNC: 4.7 MMOL/L — SIGNIFICANT CHANGE UP (ref 3.5–5.3)
PROTHROM AB SERPL-ACNC: 12.1 SEC — SIGNIFICANT CHANGE UP (ref 9.5–13)
RBC # BLD: 3.03 M/UL — LOW (ref 3.8–5.2)
RBC # BLD: 3.92 M/UL — SIGNIFICANT CHANGE UP (ref 3.8–5.2)
RBC # FLD: 18.6 % — HIGH (ref 10.3–14.5)
RBC # FLD: 18.7 % — HIGH (ref 10.3–14.5)
SODIUM SERPL-SCNC: 134 MMOL/L — LOW (ref 135–145)
SODIUM SERPL-SCNC: 135 MMOL/L — SIGNIFICANT CHANGE UP (ref 135–145)
SODIUM SERPL-SCNC: 136 MMOL/L — SIGNIFICANT CHANGE UP (ref 135–145)
WBC # BLD: 12.02 K/UL — HIGH (ref 3.8–10.5)
WBC # BLD: 15.71 K/UL — HIGH (ref 3.8–10.5)
WBC # FLD AUTO: 12.02 K/UL — HIGH (ref 3.8–10.5)
WBC # FLD AUTO: 15.71 K/UL — HIGH (ref 3.8–10.5)

## 2023-08-31 PROCEDURE — 99222 1ST HOSP IP/OBS MODERATE 55: CPT

## 2023-08-31 PROCEDURE — 93010 ELECTROCARDIOGRAM REPORT: CPT

## 2023-08-31 PROCEDURE — 99221 1ST HOSP IP/OBS SF/LOW 40: CPT

## 2023-08-31 RX ORDER — OXYCODONE HYDROCHLORIDE 5 MG/1
5 TABLET ORAL EVERY 4 HOURS
Refills: 0 | Status: DISCONTINUED | OUTPATIENT
Start: 2023-08-31 | End: 2023-08-31

## 2023-08-31 RX ORDER — OXYCODONE HYDROCHLORIDE 5 MG/1
10 TABLET ORAL EVERY 4 HOURS
Refills: 0 | Status: DISCONTINUED | OUTPATIENT
Start: 2023-08-31 | End: 2023-08-31

## 2023-08-31 RX ORDER — LANOLIN ALCOHOL/MO/W.PET/CERES
9 CREAM (GRAM) TOPICAL AT BEDTIME
Refills: 0 | Status: DISCONTINUED | OUTPATIENT
Start: 2023-08-31 | End: 2023-09-02

## 2023-08-31 RX ORDER — ACETAMINOPHEN 500 MG
1000 TABLET ORAL EVERY 6 HOURS
Refills: 0 | Status: DISCONTINUED | OUTPATIENT
Start: 2023-08-31 | End: 2023-09-02

## 2023-08-31 RX ORDER — HYDROMORPHONE HYDROCHLORIDE 2 MG/ML
0.3 INJECTION INTRAMUSCULAR; INTRAVENOUS; SUBCUTANEOUS EVERY 6 HOURS
Refills: 0 | Status: DISCONTINUED | OUTPATIENT
Start: 2023-08-31 | End: 2023-09-01

## 2023-08-31 RX ORDER — OXYCODONE HYDROCHLORIDE 5 MG/1
10 TABLET ORAL
Refills: 0 | Status: DISCONTINUED | OUTPATIENT
Start: 2023-08-31 | End: 2023-08-31

## 2023-08-31 RX ORDER — OXYCODONE HYDROCHLORIDE 5 MG/1
5 TABLET ORAL ONCE
Refills: 0 | Status: DISCONTINUED | OUTPATIENT
Start: 2023-08-31 | End: 2023-08-31

## 2023-08-31 RX ORDER — LANOLIN ALCOHOL/MO/W.PET/CERES
10 CREAM (GRAM) TOPICAL AT BEDTIME
Refills: 0 | Status: DISCONTINUED | OUTPATIENT
Start: 2023-08-31 | End: 2023-08-31

## 2023-08-31 RX ORDER — MAGNESIUM SULFATE 500 MG/ML
2 VIAL (ML) INJECTION ONCE
Refills: 0 | Status: COMPLETED | OUTPATIENT
Start: 2023-08-31 | End: 2023-08-31

## 2023-08-31 RX ORDER — OXYCODONE HYDROCHLORIDE 5 MG/1
10 TABLET ORAL EVERY 4 HOURS
Refills: 0 | Status: DISCONTINUED | OUTPATIENT
Start: 2023-08-31 | End: 2023-09-01

## 2023-08-31 RX ORDER — METOPROLOL TARTRATE 50 MG
50 TABLET ORAL DAILY
Refills: 0 | Status: DISCONTINUED | OUTPATIENT
Start: 2023-08-31 | End: 2023-09-02

## 2023-08-31 RX ORDER — MAGNESIUM SULFATE 500 MG/ML
1 VIAL (ML) INJECTION ONCE
Refills: 0 | Status: COMPLETED | OUTPATIENT
Start: 2023-08-31 | End: 2023-08-31

## 2023-08-31 RX ORDER — OXYCODONE HYDROCHLORIDE 5 MG/1
5 TABLET ORAL EVERY 4 HOURS
Refills: 0 | Status: DISCONTINUED | OUTPATIENT
Start: 2023-08-31 | End: 2023-09-01

## 2023-08-31 RX ORDER — MEDROXYPROGESTERONE ACETATE 150 MG/ML
10 INJECTION, SUSPENSION, EXTENDED RELEASE INTRAMUSCULAR
Refills: 0 | Status: DISCONTINUED | OUTPATIENT
Start: 2023-08-31 | End: 2023-09-02

## 2023-08-31 RX ORDER — ACETAMINOPHEN 500 MG
650 TABLET ORAL EVERY 8 HOURS
Refills: 0 | Status: DISCONTINUED | OUTPATIENT
Start: 2023-08-31 | End: 2023-08-31

## 2023-08-31 RX ORDER — HYDROMORPHONE HYDROCHLORIDE 2 MG/ML
0.25 INJECTION INTRAMUSCULAR; INTRAVENOUS; SUBCUTANEOUS ONCE
Refills: 0 | Status: DISCONTINUED | OUTPATIENT
Start: 2023-08-31 | End: 2023-08-31

## 2023-08-31 RX ORDER — OXYCODONE HYDROCHLORIDE 5 MG/1
5 TABLET ORAL
Refills: 0 | Status: DISCONTINUED | OUTPATIENT
Start: 2023-08-31 | End: 2023-08-31

## 2023-08-31 RX ORDER — ACETAMINOPHEN 500 MG
1000 TABLET ORAL EVERY 6 HOURS
Refills: 0 | Status: DISCONTINUED | OUTPATIENT
Start: 2023-08-31 | End: 2023-08-31

## 2023-08-31 RX ORDER — METOPROLOL TARTRATE 50 MG
50 TABLET ORAL DAILY
Refills: 0 | Status: DISCONTINUED | OUTPATIENT
Start: 2023-08-31 | End: 2023-08-31

## 2023-08-31 RX ADMIN — Medication 5 MILLIGRAM(S): at 00:05

## 2023-08-31 RX ADMIN — OXYCODONE HYDROCHLORIDE 10 MILLIGRAM(S): 5 TABLET ORAL at 22:47

## 2023-08-31 RX ADMIN — Medication 50 MILLIGRAM(S): at 05:26

## 2023-08-31 RX ADMIN — Medication 100 GRAM(S): at 04:34

## 2023-08-31 RX ADMIN — Medication 9 MILLIGRAM(S): at 04:43

## 2023-08-31 RX ADMIN — OXYCODONE HYDROCHLORIDE 5 MILLIGRAM(S): 5 TABLET ORAL at 04:43

## 2023-08-31 RX ADMIN — Medication 400 MILLIGRAM(S): at 05:25

## 2023-08-31 RX ADMIN — Medication 25 GRAM(S): at 15:42

## 2023-08-31 RX ADMIN — OXYCODONE HYDROCHLORIDE 5 MILLIGRAM(S): 5 TABLET ORAL at 05:13

## 2023-08-31 RX ADMIN — MEDROXYPROGESTERONE ACETATE 10 MILLIGRAM(S): 150 INJECTION, SUSPENSION, EXTENDED RELEASE INTRAMUSCULAR at 05:26

## 2023-08-31 RX ADMIN — OXYCODONE HYDROCHLORIDE 10 MILLIGRAM(S): 5 TABLET ORAL at 23:47

## 2023-08-31 RX ADMIN — OXYCODONE HYDROCHLORIDE 10 MILLIGRAM(S): 5 TABLET ORAL at 17:54

## 2023-08-31 RX ADMIN — Medication 100 GRAM(S): at 02:06

## 2023-08-31 RX ADMIN — HYDROMORPHONE HYDROCHLORIDE 30 MILLILITER(S): 2 INJECTION INTRAMUSCULAR; INTRAVENOUS; SUBCUTANEOUS at 07:29

## 2023-08-31 RX ADMIN — Medication 5 MILLIGRAM(S): at 15:45

## 2023-08-31 RX ADMIN — Medication 9 MILLIGRAM(S): at 22:49

## 2023-08-31 RX ADMIN — MEDROXYPROGESTERONE ACETATE 10 MILLIGRAM(S): 150 INJECTION, SUSPENSION, EXTENDED RELEASE INTRAMUSCULAR at 17:54

## 2023-08-31 RX ADMIN — Medication 50 MILLIGRAM(S): at 17:54

## 2023-08-31 RX ADMIN — Medication 1 MILLIGRAM(S): at 12:38

## 2023-08-31 RX ADMIN — OXYCODONE HYDROCHLORIDE 10 MILLIGRAM(S): 5 TABLET ORAL at 13:35

## 2023-08-31 RX ADMIN — HYDROMORPHONE HYDROCHLORIDE 0.5 MILLIGRAM(S): 2 INJECTION INTRAMUSCULAR; INTRAVENOUS; SUBCUTANEOUS at 07:31

## 2023-08-31 RX ADMIN — OXYCODONE HYDROCHLORIDE 10 MILLIGRAM(S): 5 TABLET ORAL at 12:35

## 2023-08-31 RX ADMIN — SERTRALINE 100 MILLIGRAM(S): 25 TABLET, FILM COATED ORAL at 12:37

## 2023-08-31 NOTE — PROGRESS NOTE ADULT - ASSESSMENT
42y Female known uterine fibroids presents with severe anemia in the setting of vaginal bleeding s/p bilateral uterine artery embolization on 8/30 in Interventional Radiology.       Plan:  - Keep access site c/d/i  - Monitor H/H  - Reconsult PRN    [id number]

## 2023-08-31 NOTE — CONSULT NOTE ADULT - SUBJECTIVE AND OBJECTIVE BOX
MATTHEW Yoder MD  Cardiology Fellow  [phone]  All Cardiology service information can be found  on [email or web address], password: [email or web address]    Patient seen and evaluated at bedside    Chief Complaint:    HPI (per admitting provider):  41 y/o  LMP 2 weeks ago presenting as a transfer from [place] for severe anemia in setting of vaginal bleeding. Patient states she has been experiencing heavy vaginal bleeding for the past two weeks in which she has been completely saturating through at least one diaper per hour. Patient states she had fainted at home prior to her presentation to [place] and began to experience sx such as chest pain, SOB, palpitations and dizziness. Patient noted to have severe anemia upon presentation with H&H of 3.6/14.1. Per chart review, patient now s/p 10uPRBC, 1uPlts, 2uFFP, TXA 1g x2, Depo-Provera inj, and is currently on Provera 10mg TID. Patient was originally scheduled for D&C Hysteroscopy at [facility] today which was ultimately cancelled prior to patient being transferred to [facility] for further management. Patient has long standing history of known uterine fibroids     Name of GYN Physician: Denies  OBHx: x3 c/s   GynHx: Hx fibroids. Denies cysts, endometriosis, STI's, Abnormal pap smears   PMH: A-Fib, hyperthyroidism, peripheral neuropathy, depression, active smoker  PSH: x3 c/s, denies other surgeries   Meds: Trazadone, sertraline, Metoprolol, Methimazole, Provera, Valium  Allx: Motrin- hives   Social History: Admits to daily smoking, 1/2 pack per day. Denies drug use &alcohol use.        Patient had massive uterine bleed requiring MTP (10u pRBC), and ultimately a uterine AA embolization () after which bleeding stopped. Hgb now stable in 10s.    Cardiology called for management of remote history of afib. Patient states she has a hx of hyperthyroidism and while hyperthyroid developed afib years ago. After hyperthyroid was controlled she was no longer in afib at all and was told to stop her Xarelto (~) but was continued on Toprol for some reason, which she has been taking.        Vital Signs Last 24 Hrs  T(C): 37.3 (30 Aug 2023 13:12), Max: 37.3 (30 Aug 2023 13:12)  T(F): 99.2 (30 Aug 2023 13:12), Max: 99.2 (30 Aug 2023 13:12)  HR: 86 (30 Aug 2023 16:00) (86 - 102)  BP: 97/67 (30 Aug 2023 16:00) (83/56 - 104/63)  BP(mean): 75 (30 Aug 2023 16:00) (60 - 88)  RR: 18 (30 Aug 2023 16:00) (11 - 21)  SpO2: 100% (30 Aug 2023 16:00) (95% - 100%)    Parameters below as of 30 Aug 2023 16:00  Patient On (Oxygen Delivery Method): room air       (30 Aug 2023 14:30)      PMHx:   Atrial fibrillation    History of Hyperthyroidism    Asthma    History of anemia    Depression, unspecified depression type    H/O blood transfusion reaction    Smoker        PSHx:   S/P  Section    History of blood transfusion        Allergies:  Motrin (Hives)      Home Meds:    Current Medications:   acetaminophen     Tablet .. 1000 milliGRAM(s) Oral every 6 hours PRN  albuterol    90 MICROgram(s) HFA Inhaler 2 Puff(s) Inhalation every 6 hours PRN  diazepam    Tablet 5 milliGRAM(s) Oral two times a day PRN  diphenhydrAMINE Injectable 50 milliGRAM(s) IV Push every 4 hours PRN  folic acid 1 milliGRAM(s) Oral daily  HYDROmorphone  Injectable 0.3 milliGRAM(s) IV Push every 6 hours PRN  medroxyPROGESTERone 10 milliGRAM(s) Oral <User Schedule>  melatonin 9 milliGRAM(s) Oral at bedtime  methimazole 10 milliGRAM(s) Oral daily  metoprolol succinate ER 50 milliGRAM(s) Oral daily  oxyCODONE    IR 5 milliGRAM(s) Oral every 4 hours PRN  oxyCODONE    IR 10 milliGRAM(s) Oral every 4 hours PRN  sertraline 100 milliGRAM(s) Oral daily  traZODone 50 milliGRAM(s) Oral two times a day      FAMILY HISTORY:  Family history of diabetes mellitus (Sibling, Grandparent)    Family history of stomach cancer (Grandparent)        Social History:  Smoking History:  Alcohol Use:  Drug Use:    REVIEW OF SYSTEMS:  CONSTITUTIONAL: No weakness, fevers or chills  EYES/ENT: No visual changes;  No dysphagia  NECK: No pain or stiffness  RESPIRATORY: No cough, wheezing, hemoptysis; No shortness of breath  CARDIOVASCULAR: No chest pain or palpitations; No lower extremity edema  GASTROINTESTINAL: No abdominal or epigastric pain. No nausea, vomiting, or hematemesis; No diarrhea or constipation. No melena or hematochezia.  BACK: No back pain  GENITOURINARY: No dysuria, frequency or hematuria  NEUROLOGICAL: No numbness or weakness  SKIN: No itching, burning, rashes, or lesions   All other review of systems is negative unless indicated above.    Physical Exam:  T(F): 97.1 (), Max: 99.8 ()  HR: 98 () (81 - 99)  BP: 90/55 () (89/75 - 122/55)  RR: 14 ()  SpO2: 100% ()  GENERAL: No acute distress, well-developed  HEAD:  Atraumatic, Normocephalic  ENT: EOMI, PERRLA, conjunctiva and sclera clear, Neck supple, No JVD, moist mucosa  CHEST/LUNG: Clear to auscultation bilaterally; No wheeze, equal breath sounds bilaterally   BACK: No spinal tenderness  HEART: Regular rate and rhythm; No murmurs, rubs, or gallops  ABDOMEN: Soft, Nontender, Nondistended; Bowel sounds present  EXTREMITIES:  No clubbing, cyanosis, or edema  PSYCH: Nl behavior, nl affect  NEUROLOGY: AAOx3, non-focal, cranial nerves intact  SKIN: Normal color, No rashes or lesions  LINES:    Cardiovascular Diagnostic Testing:    ECG: Personally reviewed:    Echo: Personally reviewed:    Stress Testing:    Cath:    Imaging:    CXR: Personally reviewed    Labs: Personally reviewed                        10.8   12.02 )-----------( 178      ( 31 Aug 2023 04:13 )             34.4         135  |  104  |  4<L>  ----------------------------<  100<H>  4.6   |  21<L>  |  0.41<L>    Ca    8.3<L>      31 Aug 2023 02:00  Phos  4.4       Mg     1.70           PT/INR - ( 30 Aug 2023 23:43 )   PT: 12.1 sec;   INR: 1.07 ratio         PTT - ( 30 Aug 2023 23:43 )  PTT:30.0 sec    CARDIAC MARKERS ( 26 Aug 2023 02:43 )  x     / x     / x     / 79 U/L / x     / 2.0 ng/mL              Thyroid Stimulating Hormone, Serum: 4.33 uIU/mL ( @ 06:00)  Thyroid Stimulating Hormone, Serum: 3.32 uIU/mL ( @ 08:10)     Patient seen and evaluated at bedside    Chief Complaint:    HPI (per admitting provider):  41 y/o  LMP 2 weeks ago presenting as a transfer from [place] for severe anemia in setting of vaginal bleeding. Patient states she has been experiencing heavy vaginal bleeding for the past two weeks in which she has been completely saturating through at least one diaper per hour. Patient states she had fainted at home prior to her presentation to [place] and began to experience sx such as chest pain, SOB, palpitations and dizziness. Patient noted to have severe anemia upon presentation with H&H of 3.6/14.1. Per chart review, patient now s/p 10uPRBC, 1uPlts, 2uFFP, TXA 1g x2, Depo-Provera inj, and is currently on Provera 10mg TID. Patient was originally scheduled for D&C Hysteroscopy at [facility] today which was ultimately cancelled prior to patient being transferred to [facility] for further management. Patient has long standing history of known uterine fibroids     Name of GYN Physician: Denies  OBHx: x3 c/s   GynHx: Hx fibroids. Denies cysts, endometriosis, STI's, Abnormal pap smears   PMH: A-Fib, hyperthyroidism, peripheral neuropathy, depression, active smoker  PSH: x3 c/s, denies other surgeries   Meds: Trazadone, sertraline, Metoprolol, Methimazole, Provera, Valium  Allx: Motrin- hives   Social History: Admits to daily smoking, 1/2 pack per day. Denies drug use &alcohol use.        Patient had massive uterine bleed requiring MTP (10u pRBC), and ultimately a uterine AA embolization () after which bleeding stopped. Hgb now stable in 10s.    Cardiology called for management of remote history of afib. Patient states she has a hx of hyperthyroidism and while hyperthyroid developed afib years ago. After hyperthyroid was controlled she was no longer in afib at all and was told to stop her Xarelto (~) but was continued on Toprol for some reason, which she has been taking.        Vital Signs Last 24 Hrs  T(C): 37.3 (30 Aug 2023 13:12), Max: 37.3 (30 Aug 2023 13:12)  T(F): 99.2 (30 Aug 2023 13:12), Max: 99.2 (30 Aug 2023 13:12)  HR: 86 (30 Aug 2023 16:00) (86 - 102)  BP: 97/67 (30 Aug 2023 16:00) (83/56 - 104/63)  BP(mean): 75 (30 Aug 2023 16:00) (60 - 88)  RR: 18 (30 Aug 2023 16:00) (11 - 21)  SpO2: 100% (30 Aug 2023 16:00) (95% - 100%)    Parameters below as of 30 Aug 2023 16:00  Patient On (Oxygen Delivery Method): room air       (30 Aug 2023 14:30)      PMHx:   Atrial fibrillation    History of Hyperthyroidism    Asthma    History of anemia    Depression, unspecified depression type    H/O blood transfusion reaction    Smoker        PSHx:   S/P  Section    History of blood transfusion        Allergies:  Motrin (Hives)      Home Meds:    Current Medications:   acetaminophen     Tablet .. 1000 milliGRAM(s) Oral every 6 hours PRN  albuterol    90 MICROgram(s) HFA Inhaler 2 Puff(s) Inhalation every 6 hours PRN  diazepam    Tablet 5 milliGRAM(s) Oral two times a day PRN  diphenhydrAMINE Injectable 50 milliGRAM(s) IV Push every 4 hours PRN  folic acid 1 milliGRAM(s) Oral daily  HYDROmorphone  Injectable 0.3 milliGRAM(s) IV Push every 6 hours PRN  medroxyPROGESTERone 10 milliGRAM(s) Oral <User Schedule>  melatonin 9 milliGRAM(s) Oral at bedtime  methimazole 10 milliGRAM(s) Oral daily  metoprolol succinate ER 50 milliGRAM(s) Oral daily  oxyCODONE    IR 5 milliGRAM(s) Oral every 4 hours PRN  oxyCODONE    IR 10 milliGRAM(s) Oral every 4 hours PRN  sertraline 100 milliGRAM(s) Oral daily  traZODone 50 milliGRAM(s) Oral two times a day      FAMILY HISTORY:  Family history of diabetes mellitus (Sibling, Grandparent)    Family history of stomach cancer (Grandparent)        Social History:  Smoking History:  Alcohol Use:  Drug Use:    REVIEW OF SYSTEMS:  CONSTITUTIONAL: No weakness, fevers or chills  EYES/ENT: No visual changes;  No dysphagia  NECK: No pain or stiffness  RESPIRATORY: No cough, wheezing, hemoptysis; No shortness of breath  CARDIOVASCULAR: No chest pain or palpitations; No lower extremity edema  GASTROINTESTINAL: No abdominal or epigastric pain. No nausea, vomiting, or hematemesis; No diarrhea or constipation. No melena or hematochezia.  BACK: No back pain  GENITOURINARY: No dysuria, frequency or hematuria  NEUROLOGICAL: No numbness or weakness  SKIN: No itching, burning, rashes, or lesions   All other review of systems is negative unless indicated above.    Physical Exam:  T(F): 97.1 (), Max: 99.8 ()  HR: 98 () (81 - 99)  BP: 90/55 () (89/75 - 122/55)  RR: 14 ()  SpO2: 100% ()  GENERAL: No acute distress, well-developed  HEAD:  Atraumatic, Normocephalic  ENT: EOMI, PERRLA, conjunctiva and sclera clear, Neck supple, No JVD, moist mucosa  CHEST/LUNG: Clear to auscultation bilaterally; No wheeze, equal breath sounds bilaterally   BACK: No spinal tenderness  HEART: Regular rate and rhythm; No murmurs, rubs, or gallops  ABDOMEN: Soft, Nontender, Nondistended; Bowel sounds present  EXTREMITIES:  No clubbing, cyanosis, or edema  PSYCH: Nl behavior, nl affect  NEUROLOGY: AAOx3, non-focal, cranial nerves intact  SKIN: Normal color, No rashes or lesions  LINES:    Cardiovascular Diagnostic Testing:    ECG: Personally reviewed:    Echo: Personally reviewed:    Stress Testing:    Cath:    Imaging:    CXR: Personally reviewed    Labs: Personally reviewed                        10.8   12.02 )-----------( 178      ( 31 Aug 2023 04:13 )             34.4         135  |  104  |  4<L>  ----------------------------<  100<H>  4.6   |  21<L>  |  0.41<L>    Ca    8.3<L>      31 Aug 2023 02:00  Phos  4.4       Mg     1.70           PT/INR - ( 30 Aug 2023 23:43 )   PT: 12.1 sec;   INR: 1.07 ratio         PTT - ( 30 Aug 2023 23:43 )  PTT:30.0 sec    CARDIAC MARKERS ( 26 Aug 2023 02:43 )  x     / x     / x     / 79 U/L / x     / 2.0 ng/mL              Thyroid Stimulating Hormone, Serum: 4.33 uIU/mL ( @ 06:00)  Thyroid Stimulating Hormone, Serum: 3.32 uIU/mL ( @ 08:10)

## 2023-08-31 NOTE — PROGRESS NOTE ADULT - ASSESSMENT
43 y/o  LMP 2 weeks ago admitted as a transfer from [place] for heavy vaginal bleeding after transfusion of 10uPRBC, 1uPlts, 2uFFP, TXA 1g x2; patient now POD#1 s/p successful b/l UAE with IR. Patient continues to be monitored under higher level of care in SICU.  Neuro: PCA and IV Tylenol for pain control.   CV: Hemodynamically stable. 4am H/H stable. Labs per SICU  Pulm: Saturating well on RA. Increase incentive spirometry. Albuterol PRN  GI: NPO for surgical planning  : Voiding spontaneously. Minimal vaginal bleeding. Continue Provera  Heme: Venodynes for DVT ppx. Increase OOB.    FEN: LR@100. Labs per SICU  - Replete electrolytes PRN  ID: Afebrile overnight  Endo: Continue home methimazole  Psych: Continue home sertraline, trazadone, Valium PRN  Dispo: SICU    Appreciate excellent care by SICU team    Susu Johnson, PGY2

## 2023-08-31 NOTE — PROGRESS NOTE ADULT - SUBJECTIVE AND OBJECTIVE BOX
42y Female s/p bilateral uterine artery embolization on 8/30 in Interventional Radiology.     Patient seen and examined bedside. Patient report pain that's controlled with PCA pump and decreased vaginal bleeding with only spotting. H/H uptrending. VVS.    T(F): 97.1 (08-31-23 @ 08:00), Max: 99.8 (08-30-23 @ 16:00)  HR: 98 (08-31-23 @ 10:00) (81 - 99)  BP: 90/55 (08-31-23 @ 10:00) (89/75 - 122/55)  RR: 14 (08-31-23 @ 10:00) (10 - 24)  SpO2: 100% (08-31-23 @ 10:00) (93% - 100%)  Wt(kg): --    LABS:                        10.8   12.02 )-----------( 178      ( 31 Aug 2023 04:13 )             34.4     08-31    135  |  104  |  4<L>  ----------------------------<  100<H>  4.6   |  21<L>  |  0.41<L>    Ca    8.3<L>      31 Aug 2023 02:00  Phos  4.4     08-31  Mg     1.70     08-31      PT/INR - ( 30 Aug 2023 23:43 )   PT: 12.1 sec;   INR: 1.07 ratio         PTT - ( 30 Aug 2023 23:43 )  PTT:30.0 sec  I&O's Detail    30 Aug 2023 07:01  -  31 Aug 2023 07:00  --------------------------------------------------------  IN:    IV PiggyBack: 250 mL    Lactated Ringers: 1400 mL  Total IN: 1650 mL    OUT:    Voided (mL): 2400 mL  Total OUT: 2400 mL    Total NET: -750 mL      31 Aug 2023 07:01  -  31 Aug 2023 11:34  --------------------------------------------------------  IN:    Lactated Ringers: 200 mL  Total IN: 200 mL    OUT:    Voided (mL): 600 mL  Total OUT: 600 mL    Total NET: -400 mL            PHYSICAL EXAM:  General: Nontoxic, in NAD  Left radial access: c/d/i

## 2023-08-31 NOTE — CHART NOTE - NSCHARTNOTEFT_GEN_A_CORE
Patient refusing labs on 8/31 11:30AM. Will reattempt later in the day. Patient refusing labs on 8/31 11:30AM. Risk of forgoing explained to patient and patient expresses understanding. Will reattempt later in the day.

## 2023-08-31 NOTE — CONSULT NOTE ADULT - ASSESSMENT
43 y/o female with PMH hyperthyroid, AF (no longer in AF or on AC), presented for massive uterine bleed requiring MTP. Cardiology consulted for AF management.     Patient has been in sinus throughout admission.    Recommendations:  - No indication to start AC at this time if AF was transient in the setting of hyperthyroid that's now controlled  - If metoprolol was being used for AF rate-control, can stop it - if used from a thyroid standpoint, please check with endocrinology     Note incomplete until consigned by attending.    Pino Camacho, PGY-4  Cardiology Fellow    For all new consults  [email or web address]  Login: [email or web address] 43 y/o female with PMH hyperthyroid, AF (no longer in AF or on AC), presented for massive uterine bleed requiring MTP. Cardiology consulted for AF management.     Patient has been in sinus throughout admission.    Recommendations:  - No indication to start AC at this time if AF was transient in the setting of hyperthyroid that's now controlled  - Can continue home metop for now    Note incomplete until consigned by attending.    Pino Camacho, PGY-4  Cardiology Fellow    For all new consults  [email or web address]  Login: [email or web address]

## 2023-08-31 NOTE — PROGRESS NOTE ADULT - SUBJECTIVE AND OBJECTIVE BOX
Anesthesia Pain Management Service    SUBJECTIVE: Patient is doing well with IV PCA and no significant problems reported.  Patient states the IV PCA helped with her pain.  She does not have any pain really, just soreness in her lower abdomen.    Pain Scale Score	At rest: __ 	With Activity: ___ 	[X ] Refer to charted pain scores    THERAPY:    [ ] IV PCA Morphine		[ ] 5 mg/mL	[ ] 1 mg/mL  [X ] IV PCA Hydromorphone	[ ] 5 mg/mL	[X ] 1 mg/mL  [ ] IV PCA Fentanyl		[ ] 50 micrograms/mL    Demand dose __0.2_ lockout __6_ (minutes) Continuous Rate _0__ Total: _3.8__   mg used (in past 24 hrs)      MEDICATIONS  (STANDING):  folic acid 1 milliGRAM(s) Oral daily  medroxyPROGESTERone 10 milliGRAM(s) Oral <User Schedule>  melatonin 9 milliGRAM(s) Oral at bedtime  methimazole 10 milliGRAM(s) Oral daily  metoprolol succinate ER 50 milliGRAM(s) Oral daily  sertraline 100 milliGRAM(s) Oral daily  traZODone 50 milliGRAM(s) Oral two times a day    MEDICATIONS  (PRN):  acetaminophen     Tablet .. 1000 milliGRAM(s) Oral every 6 hours PRN Mild Pain (1 - 3)  albuterol    90 MICROgram(s) HFA Inhaler 2 Puff(s) Inhalation every 6 hours PRN Shortness of Breath and/or Wheezing  diazepam    Tablet 5 milliGRAM(s) Oral two times a day PRN Anxiety  diphenhydrAMINE Injectable 50 milliGRAM(s) IV Push every 4 hours PRN Pruritus  HYDROmorphone  Injectable 0.3 milliGRAM(s) IV Push every 6 hours PRN Severe breakthrough  Pain (7 - 10)  oxyCODONE    IR 5 milliGRAM(s) Oral every 4 hours PRN Moderate Pain (4 - 6)  oxyCODONE    IR 10 milliGRAM(s) Oral every 4 hours PRN Severe Pain (7 - 10)      OBJECTIVE:  Patient is sitting up in bed.     Sedation Score:	[ X] Alert	[ ] Drowsy 	[ ] Arousable	[ ] Asleep	[ ] Unresponsive    Side Effects:	[X ] None	[ ] Nausea	[ ] Vomiting	[ ] Pruritus  		[ ] Other:    Vital Signs Last 24 Hrs  T(C): 36.2 (31 Aug 2023 08:00), Max: 37.7 (30 Aug 2023 16:00)  T(F): 97.1 (31 Aug 2023 08:00), Max: 99.8 (30 Aug 2023 16:00)  HR: 98 (31 Aug 2023 10:00) (81 - 99)  BP: 90/55 (31 Aug 2023 10:00) (89/75 - 122/55)  BP(mean): 67 (31 Aug 2023 10:00) (66 - 92)  RR: 14 (31 Aug 2023 10:00) (10 - 24)  SpO2: 100% (31 Aug 2023 10:00) (93% - 100%)    Parameters below as of 31 Aug 2023 08:00  Patient On (Oxygen Delivery Method): room air        ASSESSMENT/ PLAN    Therapy to  be:	[ ] Continue   [ X] Discontinued   [X ] Change to prn Analgesics    Documentation and Verification of current medications:   [X] Done	[ ] Not done, not elligible    Comments: IV Dilaudid PCA discontinued. PRN Oral/IV opioids and/or Adjuvant non-opioid medication to be ordered at this point.    Progress Note written now but Patient was seen earlier.

## 2023-08-31 NOTE — PROGRESS NOTE ADULT - SUBJECTIVE AND OBJECTIVE BOX
POST ANESTHESIA EVALUATION    42y Female POSTOP DAY 1 S/P     MENTAL STATUS: Patient participation [x  ] Awake     [  ] Arousable     [  ] Sedated    AIRWAY PATENCY: [ x ] Satisfactory  [  ] Other:     Vital Signs Last 24 Hrs  T(C): 36.2 (31 Aug 2023 08:00), Max: 37.7 (30 Aug 2023 16:00)  T(F): 97.1 (31 Aug 2023 08:00), Max: 99.8 (30 Aug 2023 16:00)  HR: 98 (31 Aug 2023 10:00) (81 - 99)  BP: 90/55 (31 Aug 2023 10:00) (89/75 - 122/55)  BP(mean): 67 (31 Aug 2023 10:00) (66 - 92)  RR: 14 (31 Aug 2023 10:00) (10 - 24)  SpO2: 100% (31 Aug 2023 10:00) (93% - 100%)    Parameters below as of 31 Aug 2023 08:00  Patient On (Oxygen Delivery Method): room air      I&O's Summary    30 Aug 2023 07:01  -  31 Aug 2023 07:00  --------------------------------------------------------  IN: 1650 mL / OUT: 2400 mL / NET: -750 mL    31 Aug 2023 07:01  -  31 Aug 2023 11:34  --------------------------------------------------------  IN: 200 mL / OUT: 600 mL / NET: -400 mL          NAUSEA/ VOMITTING:  [ x ] NONE  [  ] CONTROLLED [  ] OTHER     PAIN: [  x] CONTROLLED WITH CURRENT REGIMEN  [  ] OTHER    [ x ] NO APPARENT ANESTHESIA COMPLICATIONS      Comments:

## 2023-08-31 NOTE — CONSULT NOTE ADULT - SUBJECTIVE AND OBJECTIVE BOX
HPI:  43 y/o  LMP 2 weeks ago presenting as a transfer from [facility] for severe anemia in setting of vaginal bleeding. Patient states she has been experiencing heavy vaginal bleeding for the past two weeks in which she has been completely saturating through at least one diaper per hour. Patient states she had fainted at home prior to her presentation to [facility] and began to experience sx such as chest pain, SOB, palpitations and dizziness. Patient noted to have severe anemia upon presentation with H&H of 3.6/14.1. Per chart review, patient now s/p 10uPRBC, 1uPlts, 2uFFP, TXA 1g x2, Depo-Provera inj, and is currently on Provera 10mg TID. Patient was originally scheduled for D&C Hysteroscopy at [facility] today which was ultimately cancelled prior to patient being transferred to [facility] for further management. Patient has long standing history of known uterine fibroids       Name of GYN Physician: Denies  OBHx: x3 c/s   GynHx: Hx fibroids. Denies cysts, endometriosis, STI's, Abnormal pap smears   PMH: A-Fib, hyperthyroidism, peripheral neuropathy, depression, active smoker  PSH: x3 c/s, denies other surgeries   Meds: Trazadone, sertraline, Metoprolol, Methimazole, Provera, Valium  Allx: Motrin- hives   Social History: Admits to daily smoking, 1/2 pack per day. Denies drug use &alcohol use.      Endocrine history:  Patient is a 42-year-old woman with history of hyperthyroidism secondary to Graves' disease, diagnosed in 2017 (per record, dating back to ).  Patient stated that she does not follow-up with an endocrinologist and she has been following with her primary care doctor for couple of years.  She recently establish care with a new primary care doctor about 1 year ago consistently.  Patient stated that she had been on methimazole treatment on and off for many years.  She is also taking a beta-blocker daily metoprolol 50 mg once daily.  Patient states that she is currently on methimazole 10 mg once daily and she has been adherent with her medication regimen.  She denies any side effects from medication.  She denies any history of skin reactions, liver function abnormalities on the medication.  Patient reported that she also has a history of Graves' ophthalmopathy.  She is a current smoker, about half a pack a day, thinking about quitting.    She stated that definitive treatment for Graves' disease has not been discussed in the past.    She reported that she had prior imaging done of her thyroid gland as well as uptake and scan in the past.  She denies any prior history of thyroid nodules.      Vital Signs Last 24 Hrs  T(C): 37.3 (30 Aug 2023 13:12), Max: 37.3 (30 Aug 2023 13:12)  T(F): 99.2 (30 Aug 2023 13:12), Max: 99.2 (30 Aug 2023 13:12)  HR: 86 (30 Aug 2023 16:00) (86 - 102)  BP: 97/67 (30 Aug 2023 16:00) (83/56 - 104/63)  BP(mean): 75 (30 Aug 2023 16:00) (60 - 88)  RR: 18 (30 Aug 2023 16:00) (11 - 21)  SpO2: 100% (30 Aug 2023 16:00) (95% - 100%)    Parameters below as of 30 Aug 2023 16:00  Patient On (Oxygen Delivery Method): room air       (30 Aug 2023 14:30)      PAST MEDICAL & SURGICAL HISTORY:  Atrial fibrillation  History of Hyperthyroidism  Asthma  History of anemia  Depression, unspecified depression type  Smoker  S/P  Section  ,,  History of blood transfusion    FAMILY HISTORY:  Family history of diabetes mellitus (Sibling, Grandparent)  Family history of stomach cancer (Grandparent)    Social History:  Patient is a current smoker, about half a pack a day.  Patient has 3 children age 25, 20 and 16, 3 boys.  Patient denies any alcohol use.  Occasional alcohol in a social setting only.  Patient denies any recreational drug use.    Home Medications:  Trazadone, sertraline, Metoprolol, Methimazole, Provera, Valium    MEDICATIONS  (STANDING):  folic acid 1 milliGRAM(s) Oral daily  medroxyPROGESTERone 10 milliGRAM(s) Oral <User Schedule>  melatonin 9 milliGRAM(s) Oral at bedtime  methimazole 10 milliGRAM(s) Oral daily  sertraline 100 milliGRAM(s) Oral daily  traZODone 50 milliGRAM(s) Oral two times a day    MEDICATIONS  (PRN):  acetaminophen     Tablet .. 1000 milliGRAM(s) Oral every 6 hours PRN Mild Pain (1 - 3)  albuterol    90 MICROgram(s) HFA Inhaler 2 Puff(s) Inhalation every 6 hours PRN Shortness of Breath and/or Wheezing  diazepam    Tablet 5 milliGRAM(s) Oral two times a day PRN Anxiety  diphenhydrAMINE Injectable 50 milliGRAM(s) IV Push every 4 hours PRN Pruritus  HYDROmorphone  Injectable 0.3 milliGRAM(s) IV Push every 6 hours PRN Severe breakthrough  Pain (7 - 10)  oxyCODONE    IR 5 milliGRAM(s) Oral every 4 hours PRN Moderate Pain (4 - 6)  oxyCODONE    IR 10 milliGRAM(s) Oral every 4 hours PRN Severe Pain (7 - 10)    Allergies    Motrin (Hives)    Intolerances    medroxyPROGESTERone   10 milliGRAM(s) Oral (23 @ 05:26)   10 milliGRAM(s) Oral (23 @ 22:50)    methimazole   10 milliGRAM(s) Oral (23 @ 12:36)   10 milliGRAM(s) Oral (23 @ 22:50)        Review of Systems:  Constitutional: No fever  Eyes: No blurry vision  Neuro: No tremors  HEENT: No pain  Cardiovascular: No chest pain, palpitations  Respiratory: No SOB, no cough  GI: No nausea, vomiting, abdominal pain  : No dysuria  Skin: no rash  Psych: no depression  Endocrine: no polyuria, polydipsia  Hem/lymph: no swelling  Osteoporosis: no fractures    ALL OTHER SYSTEMS REVIEWED AND NEGATIVE    PHYSICAL EXAM:  -----------------------------  VITALS: T(C): 36.7 (23 @ 16:00)  T(F): 98 (23 @ 16:00), Max: 98.5 (23 @ 04:00)  HR: 93 (23 @ 16:00) (81 - 99)  BP: 100/59 (23 @ 16:00) (90/55 - 122/55)  RR:  (10 - 24)  SpO2:  (93% - 100%)  Wt(kg): --  GENERAL: NAD, well-groomed, well-developed  EYES: No proptosis, no lid lag, anicteric  HEENT:  Atraumatic, Normocephalic, moist mucous membranes  THYROID: Normal size, no palpable nodules  RESPIRATORY: Clear to auscultation bilaterally; No rales, rhonchi, wheezing, or rubs  CARDIOVASCULAR: Regular rate and rhythm; No murmurs; no peripheral edema  GI: Soft, nontender, non distended, normal bowel sounds  SKIN: Dry, intact, No rashes or lesions  MUSCULOSKELETAL: Full range of motion, normal strength  NEURO: sensation intact, extraocular movements intact, no tremor, normal reflexes  PSYCH: Alert and oriented x 3, normal affect, normal mood  CUSHING'S SIGNS: no striae    POCT Blood Glucose.: 90 mg/dL (23 @ 13:48)                          8.4    15.71 )-----------( 193      ( 31 Aug 2023 14:35 )             26.0         136  |  103  |  5<L>  ----------------------------<  108<H>  4.1   |  24  |  0.41<L>    eGFR: 124    Ca    8.3<L>        Mg     1.70       Phos  4.6         TPro  5.1<L>  /  Alb  2.3<L>  /  TBili  1.1  /  DBili  x   /  AST  55<H>  /  ALT  25  /  AlkPhos  131<H>      Thyroid Function Tests:   @ 06:00 TSH 4.33 FreeT4 1.1 T3 60 Anti TPO -- Anti Thyroglobulin Ab -- TSI --   @ 08:10 TSH 3.32 FreeT4 1.0 T3 57 Anti TPO -- Anti Thyroglobulin Ab -- TSI --      Radiology:   ------------------------  < from: US Thyroid + Parathyroid (12 @ 10:47) >    EXAM:  THYROID PARATHYROID SONOGRAM                          PROCEDURE DATE:  2012   .      INTERPRETATION:  EXAM: Thyroid sonogram.    CLINICAL INFORMATION: Pain and swelling, hyperactive thyroid.    FINDINGS: Right lobe of thyroid gland measures 5.4 cm x 3.2 cm x 3.8 cm.   Left lobe of thyroid gland measures 5.6 cm x 2.6 cm x 2.7 cm in thyroid   isthmus measures 6 mm in thickness.    Diffuse heterogeneity is seen to the thyroid parenchyma bilaterally.   Multiple small hypoechoic foci appear scattered in a uniform fashion in   both lobes of thyroid gland. This is a nonspecific pattern but can be   seen in Graves' disease and clinical correlation is advised. No discreet   dominant nodule is identified.    IMPRESSION:  Findings suspicious for Graves' disease as discussed above.         MANJU GOODEN M.D., ATTENDING RADIOLOGIST    This examination was interpreted on:  2012 10:54A.  This document   has been electronically signed. 2012 10:56A.          < end of copied text >

## 2023-08-31 NOTE — PROGRESS NOTE ADULT - ASSESSMENT
43y/o female with PMHx afib (not on AC), anemia, hyperthyroidism, depression, anemia and uterine fibroids presented with severe anemia in setting of vaginal bleeding. Patient now s/p 10uPRBC, 1uPlts, 2uFFP, TXA 1g x2, Depo-Provera inj, and is currently on Provera 10mg TID. Patient was originally scheduled for D&C vs hysterectomy at  but transferred to [facility] for further care. SICU consulted for HD monitoring in setting of active vaginal bleeding. S/p IR embolization of bilateral uterine arteries on 8/30.    PLAN:    Neuro:  - AO X4  - PCA  - Continue home meds- zoloft and trazodone for depression  - Diazepam 5 bid prn for anxiety  - Gabapentin 400 tid for peripheral neuropathy     Resp:  - Out of bed to chair, ambulate as tolerated, and incentive spirometry to prevent atelectasis  - RA    CV:  - goal MAP > 65 mmHg  - Metoprolol for A-fib    GI:   - NPO/IVF  - Bowel regimen with senna & Miralax    Renal:  - Monitor I&Os and electrolytes w/ repletions as necessary  - LR @100  - Provera 10mg tid     Heme:  - S/p 10 pRBC, 2 FFP, 1 platelet   - Monitor CBC and coags  - Hold DVT ppx for active bleeding  - Q4 CBC x3, if stable then bid  - Emergent uterine artery embolization with IR    ID:   - Monitor for clinical evidence of active infection  - Monitor WBC, temperature, and procalcitonin    Endo:   - Monitor glucose on daily BMP  - Continue home methimazole for hyperthyroidism    Disposition: SICU

## 2023-08-31 NOTE — PROGRESS NOTE ADULT - SUBJECTIVE AND OBJECTIVE BOX
Anesthesia Pain Management Service- Attending Addendum    SUBJECTIVE: Patient's pain control adequate    Therapy:	  [ X] IV PCA	   [ ] Epidural           [ ] s/p Spinal Opoid              [ ] Postpartum infusion	  [ ] Patient controlled regional anesthesia (PCRA)    [ ] prn Analgesics    Allergies    Motrin (Hives)    Intolerances      MEDICATIONS  (STANDING):  folic acid 1 milliGRAM(s) Oral daily  medroxyPROGESTERone 10 milliGRAM(s) Oral <User Schedule>  melatonin 9 milliGRAM(s) Oral at bedtime  methimazole 10 milliGRAM(s) Oral daily  metoprolol succinate ER 50 milliGRAM(s) Oral daily  sertraline 100 milliGRAM(s) Oral daily  traZODone 50 milliGRAM(s) Oral two times a day    MEDICATIONS  (PRN):  acetaminophen     Tablet .. 1000 milliGRAM(s) Oral every 6 hours PRN Mild Pain (1 - 3)  albuterol    90 MICROgram(s) HFA Inhaler 2 Puff(s) Inhalation every 6 hours PRN Shortness of Breath and/or Wheezing  diazepam    Tablet 5 milliGRAM(s) Oral two times a day PRN Anxiety  diphenhydrAMINE Injectable 50 milliGRAM(s) IV Push every 4 hours PRN Pruritus  HYDROmorphone  Injectable 0.3 milliGRAM(s) IV Push every 6 hours PRN Severe breakthrough  Pain (7 - 10)  oxyCODONE    IR 10 milliGRAM(s) Oral every 4 hours PRN Severe Pain (7 - 10)  oxyCODONE    IR 5 milliGRAM(s) Oral every 4 hours PRN Moderate Pain (4 - 6)      OBJECTIVE:   [X] No new signs     [ ] Other:    Side Effects:  [X ] None			[ ] Other:      ASSESSMENT/PLAN  -Discontinue current therapy    [ ] Therapy changed to:    [ ] IV PCA       [ ] Epidural     [ X] prn Analgesics     Comments: Pain management per primary team, APS to sign off

## 2023-08-31 NOTE — CHART NOTE - NSCHARTNOTEFT_GEN_A_CORE
Patient is medically stable and no longer requires critical care needs per SICU team and SICU attending. Patient going to 938 on 9T. Patient signed out to GYN team PA/Resident. All questions answered.     [id number]  SICU

## 2023-08-31 NOTE — CONSULT NOTE ADULT - ASSESSMENT
Patient is a 42-year-old woman with history of hyperthyroidism secondary to Graves' disease, neuropathy, vaginal bleeding, presented to [facility] for severe anemia due to vaginal bleeding status post IR intervention.  Currently in the surgical ICU.  Endocrinology consulted for history of Graves' disease.    1.  Graves' disease  Appears to be diagnosed many years ago, currently on methimazole 10 mg once daily which patient reports that she has been adherent with.  TSH was slightly elevated to 4.32 on 8/27/2023, 1 day ago is in the normal range at 3.32 8/26/2023; may be a component of sick euthyroid in the setting of severe anemia; Free T4 level is in the normal range with a slightly low T3 level of 60.  For now we will recommend continuing with methimazole 10 mg once daily.  Recommend checking TSH receptor antibody and thyroid-stimulating immunoglobulin level.  If both of these levels are in normal range, can consider tapering off methimazole.  Recommend repeating TSH level, free T4 level, free T3 level and total T4 level in about 1 week 9/7/2023; if TSH is still elevated, then recommend tapering methimazole to 5 mg once daily and then repeating blood work again in about 14 days to 1 month after discharge.  Thyroid ultrasound had already been done in the past in 2012, physical exam did not reveal any thyroid nodules therefore do not need to repeat it at this admission.    Definitive treatment also discussed with patient.  She would not be a good candidate for radioactive iodine given history of Graves' ophthalmopathy.  Patient should continue this discussion with her outpatient endocrinologist.    Patient should call the office at [phone] to establish care with endocrinology when able to.    2. Palpitation  Continue with beta-blocker.    ENDOCRINE FOLLOW UP  CALL PATIENT ACCESS SERVICES  [phone]  For all [facility] phone numbers and locations throughout [place], [place], and [place].      If patient wishes to follow up with [facility] at [place]    [facility]  [address] [place] [address], [address], [place] [address]  [phone]   Please call to schedule appointment with CDE/Nutritionist and MD appointment next available

## 2023-08-31 NOTE — PROGRESS NOTE ADULT - SUBJECTIVE AND OBJECTIVE BOX
Gyn Progress Note POD#1 HD#2    Subjective:   Pt seen and examined at bedside. Overnight, patient underwent uterine artery embolization. Pain well controlled on PCA. From 3:30am patient was given 2 doses of PCA with 5 attempted doses. Patient ambulating. Voiding spontaneously with Primafit in place. Pt denies fever, chills, chest pain, SOB, nausea, vomiting, lightheadedness, dizziness.      Objective:  T(F): 98.5 (08-31-23 @ 04:00), Max: 99.8 (08-30-23 @ 16:00)  HR: 87 (08-31-23 @ 07:00) (81 - 99)  BP: 110/60 (08-31-23 @ 07:00) (89/75 - 122/55)  RR: 13 (08-31-23 @ 07:00) (10 - 24)  SpO2: 95% (08-31-23 @ 07:00) (93% - 100%)  Wt(kg): --  I&O's Summary    30 Aug 2023 07:01  -  31 Aug 2023 07:00  --------------------------------------------------------  IN: 1650 mL / OUT: 2400 mL / NET: -750 mL      CAPILLARY BLOOD GLUCOSE      POCT Blood Glucose.: 90 mg/dL (30 Aug 2023 13:48)      MEDICATIONS  (STANDING):  acetaminophen   IVPB .. 1000 milliGRAM(s) IV Intermittent every 6 hours  folic acid 1 milliGRAM(s) Oral daily  HYDROmorphone PCA (1 mG/mL) 30 milliLiter(s) PCA Continuous PCA Continuous  lactated ringers. 1000 milliLiter(s) (100 mL/Hr) IV Continuous <Continuous>  medroxyPROGESTERone 10 milliGRAM(s) Oral <User Schedule>  melatonin 9 milliGRAM(s) Oral at bedtime  methimazole 10 milliGRAM(s) Oral daily  sertraline 100 milliGRAM(s) Oral daily  traZODone 50 milliGRAM(s) Oral two times a day    MEDICATIONS  (PRN):  albuterol    90 MICROgram(s) HFA Inhaler 2 Puff(s) Inhalation every 6 hours PRN Shortness of Breath and/or Wheezing  diazepam    Tablet 5 milliGRAM(s) Oral two times a day PRN Anxiety  diphenhydrAMINE Injectable 50 milliGRAM(s) IV Push every 4 hours PRN Pruritus  HYDROmorphone PCA (1 mG/mL) Rescue Clinician Bolus 0.5 milliGRAM(s) IV Push every 15 minutes PRN for Pain Scale GREATER THAN 6  naloxone Injectable 0.1 milliGRAM(s) IV Push every 3 minutes PRN For ANY of the following changes in patient status:  A. RR LESS THAN 10 breaths per minute, B. Oxygen saturation LESS THAN 90%, C. Sedation score of 6  ondansetron Injectable 4 milliGRAM(s) IV Push every 6 hours PRN Nausea      Physical Exam:  Constitutional: NAD, A+O x3  CV: RRR  Lungs: clear to auscultation bilaterally  Abdomen: soft, nondistended, no guarding, no rebound, normal bowel sounds  : Primafit in place, pad soaked with urine with minimal blood  Extremities: no lower extremity edema or calf tenderness bilaterally      LABS:            10.8      12.02 )------------( 178        ( 08-31-23 @ 04:13 )            34.4            9.3      9.71 )------------( 160        ( 08-30-23 @ 23:43 )            28.5            10.6      7.45 )------------( 148        ( 08-30-23 @ 13:55 )            32.0    08-31    135    |  104    |  4<L>   ----------------------------<  100<H>  4.6     |  21<L>  |  0.41<L>  08-30    134<L>  |  102    |  5<L>   ----------------------------<  91     4.7     |  21<L>  |  0.40<L>  08-30    136    |  106    |  4<L>   ----------------------------<  77     4.4     |  26     |  0.43<L>  08-30    138    |  106    |  5<L>   ----------------------------<  87     4.0     |  28     |  0.32<L>    Ca    8.3<L>      31 Aug 2023 02:00  Ca    8.0<L>      30 Aug 2023 23:43  Ca    7.8<L>      30 Aug 2023 13:55  Ca    7.7<L>      30 Aug 2023 06:00  Phos  4.4       08-31  Phos  3.7       08-30  Phos  2.0       08-30  Phos  2.3       08-30  Mg     1.70      08-31  Mg     1.70      08-30  Mg     1.40      08-30          PT/INR - ( 30 Aug 2023 23:43 )   PT: 12.1 sec;   INR: 1.07 ratio         PTT - ( 30 Aug 2023 23:43 )  PTT:30.0 sec  Urinalysis Basic - ( 31 Aug 2023 02:00 )    Color: x / Appearance: x / SG: x / pH: x  Gluc: 100 mg/dL / Ketone: x  / Bili: x / Urobili: x   Blood: x / Protein: x / Nitrite: x   Leuk Esterase: x / RBC: x / WBC x   Sq Epi: x / Non Sq Epi: x / Bacteria: x

## 2023-08-31 NOTE — PROGRESS NOTE ADULT - SUBJECTIVE AND OBJECTIVE BOX
SICU Daily Progress Note  =====================================================  24-Hour Events:  - LUE accucath placed  - IR embolization of bilateral uterine arteries    ALLERGIES:  Motrin (Hives)      --------------------------------------------------------------------------------------    MEDICATIONS:    Neurologic Medications  acetaminophen   IVPB .. 1000 milliGRAM(s) IV Intermittent every 6 hours  diazepam    Tablet 5 milliGRAM(s) Oral two times a day PRN Anxiety  HYDROmorphone PCA (1 mG/mL) 30 milliLiter(s) PCA Continuous PCA Continuous  HYDROmorphone PCA (1 mG/mL) Rescue Clinician Bolus 0.5 milliGRAM(s) IV Push every 15 minutes PRN for Pain Scale GREATER THAN 6  ondansetron Injectable 4 milliGRAM(s) IV Push every 6 hours PRN Nausea  sertraline 100 milliGRAM(s) Oral daily  traZODone 50 milliGRAM(s) Oral two times a day    Respiratory Medications  albuterol    90 MICROgram(s) HFA Inhaler 2 Puff(s) Inhalation every 6 hours PRN Shortness of Breath and/or Wheezing  diphenhydrAMINE Injectable 50 milliGRAM(s) IV Push every 4 hours PRN Pruritus    Cardiovascular Medications    Gastrointestinal Medications  folic acid 1 milliGRAM(s) Oral daily  lactated ringers. 1000 milliLiter(s) IV Continuous <Continuous>    Genitourinary Medications    Hematologic/Oncologic Medications    Antimicrobial/Immunologic Medications    Endocrine/Metabolic Medications  medroxyPROGESTERone 10 milliGRAM(s) Oral <User Schedule>  methimazole 10 milliGRAM(s) Oral daily    Topical/Other Medications  naloxone Injectable 0.1 milliGRAM(s) IV Push every 3 minutes PRN For ANY of the following changes in patient status:  A. RR LESS THAN 10 breaths per minute, B. Oxygen saturation LESS THAN 90%, C. Sedation score of 6    --------------------------------------------------------------------------------------    VITAL SIGNS:  ICU Vital Signs Last 24 Hrs  T(C): 36.2 (30 Aug 2023 21:00), Max: 37.7 (30 Aug 2023 16:00)  T(F): 97.1 (30 Aug 2023 21:00), Max: 99.8 (30 Aug 2023 16:00)  HR: 83 (30 Aug 2023 23:00) (82 - 99)  BP: 110/74 (30 Aug 2023 23:00) (83/56 - 113/82)  BP(mean): 85 (30 Aug 2023 23:00) (62 - 92)  ABP: --  ABP(mean): --  RR: 12 (30 Aug 2023 23:00) (10 - 18)  SpO2: 99% (30 Aug 2023 23:00) (95% - 100%)    O2 Parameters below as of 30 Aug 2023 21:00  Patient On (Oxygen Delivery Method): room air    --------------------------------------------------------------------------------------    INS AND OUTS:  I&O's Detail    30 Aug 2023 07:01  -  31 Aug 2023 00:27  --------------------------------------------------------  IN:    IV PiggyBack: 250 mL    Lactated Ringers: 900 mL  Total IN: 1150 mL    OUT:    Voided (mL): 900 mL  Total OUT: 900 mL    Total NET: 250 mL    --------------------------------------------------------------------------------------    PHYSICAL EXAMINATION:  General - no acute distress  HEENT - normocephalic, atraumatic  Respiratory - nonlabored respirations on room air  CV- pulse regularly present  Abdomen - soft, non-distended, tender in b/l lower quadrants  Extremities - full range of motion  Vaginal- no active bleeding, no bruising or cuts. Light red blood clot visualized in vaginal vault    METABOLIC / FLUIDS / ELECTROLYTES  folic acid 1 milliGRAM(s) Oral daily  lactated ringers. 1000 milliLiter(s) IV Continuous <Continuous>      HEMATOLOGIC  [x] VTE Prophylaxis:   Transfusions:	[] PRBC	[] Platelets		[] FFP	[] Cryoprecipitate    INFECTIOUS DISEASE  Antimicrobials/Immunologic Medications:    Day #      of     ***    TUBES / LINES / DRAINS  ***  [x] Peripheral IV  [] Central Venous Line     	[] R	[] L	[] IJ	[] Fem	[] SC	Date Placed:   [] Arterial Line		[] R	[] L	[] Fem	[] Rad	[] Ax	Date Placed:   [] PICC		[] Midline		[] Mediport  [] Urinary Catheter		Date Placed:   [x] Necessity of urinary, arterial, and venous catheters discussed    --------------------------------------------------------------------------------------    LABS                          9.3    9.71  )-----------( 160      ( 30 Aug 2023 23:43 )             28.5     08-30    134<L>  |  102  |  5<L>  ----------------------------<  91  4.7   |  21<L>  |  0.40<L>    Ca    8.0<L>      30 Aug 2023 23:43  Phos  3.7     08-30  Mg     1.70     08-30    TPro  5.1<L>  /  Alb  2.3<L>  /  TBili  1.1  /  DBili  x   /  AST  55<H>  /  ALT  25  /  AlkPhos  131<H>  08-29    --------------------------------------------------------------------------------------    OTHER LABORATORY:     IMAGING STUDIES:   CXR:

## 2023-09-01 ENCOUNTER — RESULT REVIEW (OUTPATIENT)
Age: 43
End: 2023-09-01

## 2023-09-01 ENCOUNTER — TRANSCRIPTION ENCOUNTER (OUTPATIENT)
Age: 43
End: 2023-09-01

## 2023-09-01 DIAGNOSIS — F32.9 MAJOR DEPRESSIVE DISORDER, SINGLE EPISODE, UNSPECIFIED: ICD-10-CM

## 2023-09-01 DIAGNOSIS — I95.9 HYPOTENSION, UNSPECIFIED: ICD-10-CM

## 2023-09-01 DIAGNOSIS — Z29.9 ENCOUNTER FOR PROPHYLACTIC MEASURES, UNSPECIFIED: ICD-10-CM

## 2023-09-01 DIAGNOSIS — N93.9 ABNORMAL UTERINE AND VAGINAL BLEEDING, UNSPECIFIED: ICD-10-CM

## 2023-09-01 DIAGNOSIS — F41.9 ANXIETY DISORDER, UNSPECIFIED: ICD-10-CM

## 2023-09-01 DIAGNOSIS — D62 ACUTE POSTHEMORRHAGIC ANEMIA: ICD-10-CM

## 2023-09-01 DIAGNOSIS — I48.91 UNSPECIFIED ATRIAL FIBRILLATION: ICD-10-CM

## 2023-09-01 LAB
ALBUMIN SERPL ELPH-MCNC: 2.4 G/DL — LOW (ref 3.3–5)
ALP SERPL-CCNC: 116 U/L — SIGNIFICANT CHANGE UP (ref 40–120)
ALT FLD-CCNC: 12 U/L — SIGNIFICANT CHANGE UP (ref 4–33)
ANION GAP SERPL CALC-SCNC: 10 MMOL/L — SIGNIFICANT CHANGE UP (ref 7–14)
ANION GAP SERPL CALC-SCNC: 8 MMOL/L — SIGNIFICANT CHANGE UP (ref 7–14)
AST SERPL-CCNC: 22 U/L — SIGNIFICANT CHANGE UP (ref 4–32)
BILIRUB SERPL-MCNC: 0.7 MG/DL — SIGNIFICANT CHANGE UP (ref 0.2–1.2)
BUN SERPL-MCNC: 7 MG/DL — SIGNIFICANT CHANGE UP (ref 7–23)
BUN SERPL-MCNC: 7 MG/DL — SIGNIFICANT CHANGE UP (ref 7–23)
CALCIUM SERPL-MCNC: 8.4 MG/DL — SIGNIFICANT CHANGE UP (ref 8.4–10.5)
CALCIUM SERPL-MCNC: 8.4 MG/DL — SIGNIFICANT CHANGE UP (ref 8.4–10.5)
CHLORIDE SERPL-SCNC: 104 MMOL/L — SIGNIFICANT CHANGE UP (ref 98–107)
CHLORIDE SERPL-SCNC: 105 MMOL/L — SIGNIFICANT CHANGE UP (ref 98–107)
CO2 SERPL-SCNC: 25 MMOL/L — SIGNIFICANT CHANGE UP (ref 22–31)
CO2 SERPL-SCNC: 27 MMOL/L — SIGNIFICANT CHANGE UP (ref 22–31)
CREAT SERPL-MCNC: 0.54 MG/DL — SIGNIFICANT CHANGE UP (ref 0.5–1.3)
CREAT SERPL-MCNC: 0.54 MG/DL — SIGNIFICANT CHANGE UP (ref 0.5–1.3)
EGFR: 116 ML/MIN/1.73M2 — SIGNIFICANT CHANGE UP
EGFR: 116 ML/MIN/1.73M2 — SIGNIFICANT CHANGE UP
GLUCOSE SERPL-MCNC: 84 MG/DL — SIGNIFICANT CHANGE UP (ref 70–99)
GLUCOSE SERPL-MCNC: 84 MG/DL — SIGNIFICANT CHANGE UP (ref 70–99)
HCT VFR BLD CALC: 28.3 % — LOW (ref 34.5–45)
HGB BLD-MCNC: 9.1 G/DL — LOW (ref 11.5–15.5)
MAGNESIUM SERPL-MCNC: 1.8 MG/DL — SIGNIFICANT CHANGE UP (ref 1.6–2.6)
MCHC RBC-ENTMCNC: 28.2 PG — SIGNIFICANT CHANGE UP (ref 27–34)
MCHC RBC-ENTMCNC: 32.2 GM/DL — SIGNIFICANT CHANGE UP (ref 32–36)
MCV RBC AUTO: 87.6 FL — SIGNIFICANT CHANGE UP (ref 80–100)
NRBC # BLD: 0 /100 WBCS — SIGNIFICANT CHANGE UP (ref 0–0)
NRBC # FLD: 0 K/UL — SIGNIFICANT CHANGE UP (ref 0–0)
PHOSPHATE SERPL-MCNC: 4.3 MG/DL — SIGNIFICANT CHANGE UP (ref 2.5–4.5)
PLATELET # BLD AUTO: 198 K/UL — SIGNIFICANT CHANGE UP (ref 150–400)
POTASSIUM SERPL-MCNC: 4.2 MMOL/L — SIGNIFICANT CHANGE UP (ref 3.5–5.3)
POTASSIUM SERPL-MCNC: 4.2 MMOL/L — SIGNIFICANT CHANGE UP (ref 3.5–5.3)
POTASSIUM SERPL-SCNC: 4.2 MMOL/L — SIGNIFICANT CHANGE UP (ref 3.5–5.3)
POTASSIUM SERPL-SCNC: 4.2 MMOL/L — SIGNIFICANT CHANGE UP (ref 3.5–5.3)
PROT SERPL-MCNC: 5 G/DL — LOW (ref 6–8.3)
RBC # BLD: 3.23 M/UL — LOW (ref 3.8–5.2)
RBC # FLD: 19.6 % — HIGH (ref 10.3–14.5)
SODIUM SERPL-SCNC: 139 MMOL/L — SIGNIFICANT CHANGE UP (ref 135–145)
SODIUM SERPL-SCNC: 140 MMOL/L — SIGNIFICANT CHANGE UP (ref 135–145)
WBC # BLD: 8.6 K/UL — SIGNIFICANT CHANGE UP (ref 3.8–10.5)
WBC # FLD AUTO: 8.6 K/UL — SIGNIFICANT CHANGE UP (ref 3.8–10.5)

## 2023-09-01 PROCEDURE — 88305 TISSUE EXAM BY PATHOLOGIST: CPT | Mod: 26

## 2023-09-01 PROCEDURE — 99232 SBSQ HOSP IP/OBS MODERATE 35: CPT

## 2023-09-01 PROCEDURE — 99222 1ST HOSP IP/OBS MODERATE 55: CPT

## 2023-09-01 RX ORDER — TRAZODONE HCL 50 MG
1 TABLET ORAL
Qty: 0 | Refills: 0 | DISCHARGE
Start: 2023-09-01

## 2023-09-01 RX ORDER — METHIMAZOLE 10 MG/1
1 TABLET ORAL
Qty: 0 | Refills: 0 | DISCHARGE
Start: 2023-09-01

## 2023-09-01 RX ORDER — SERTRALINE 25 MG/1
1 TABLET, FILM COATED ORAL
Qty: 0 | Refills: 0 | DISCHARGE
Start: 2023-09-01

## 2023-09-01 RX ORDER — METOPROLOL TARTRATE 50 MG
1 TABLET ORAL
Qty: 0 | Refills: 0 | DISCHARGE
Start: 2023-09-01

## 2023-09-01 RX ORDER — SODIUM CHLORIDE 9 MG/ML
1000 INJECTION, SOLUTION INTRAVENOUS
Refills: 0 | Status: DISCONTINUED | OUTPATIENT
Start: 2023-09-01 | End: 2023-09-02

## 2023-09-01 RX ORDER — OXYCODONE HYDROCHLORIDE 5 MG/1
5 TABLET ORAL EVERY 4 HOURS
Refills: 0 | Status: DISCONTINUED | OUTPATIENT
Start: 2023-09-01 | End: 2023-09-02

## 2023-09-01 RX ADMIN — Medication 50 MILLIGRAM(S): at 18:19

## 2023-09-01 RX ADMIN — OXYCODONE HYDROCHLORIDE 5 MILLIGRAM(S): 5 TABLET ORAL at 15:09

## 2023-09-01 RX ADMIN — OXYCODONE HYDROCHLORIDE 5 MILLIGRAM(S): 5 TABLET ORAL at 10:24

## 2023-09-01 RX ADMIN — SERTRALINE 100 MILLIGRAM(S): 25 TABLET, FILM COATED ORAL at 12:40

## 2023-09-01 RX ADMIN — OXYCODONE HYDROCHLORIDE 5 MILLIGRAM(S): 5 TABLET ORAL at 16:09

## 2023-09-01 RX ADMIN — SODIUM CHLORIDE 125 MILLILITER(S): 9 INJECTION, SOLUTION INTRAVENOUS at 15:11

## 2023-09-01 RX ADMIN — MEDROXYPROGESTERONE ACETATE 10 MILLIGRAM(S): 150 INJECTION, SUSPENSION, EXTENDED RELEASE INTRAMUSCULAR at 18:20

## 2023-09-01 RX ADMIN — Medication 1000 MILLIGRAM(S): at 12:39

## 2023-09-01 RX ADMIN — OXYCODONE HYDROCHLORIDE 5 MILLIGRAM(S): 5 TABLET ORAL at 09:24

## 2023-09-01 RX ADMIN — Medication 50 MILLIGRAM(S): at 07:08

## 2023-09-01 RX ADMIN — Medication 1000 MILLIGRAM(S): at 13:39

## 2023-09-01 RX ADMIN — MEDROXYPROGESTERONE ACETATE 10 MILLIGRAM(S): 150 INJECTION, SUSPENSION, EXTENDED RELEASE INTRAMUSCULAR at 07:09

## 2023-09-01 RX ADMIN — Medication 1 MILLIGRAM(S): at 12:39

## 2023-09-01 NOTE — DISCHARGE NOTE PROVIDER - NSDCFUADDINST_GEN_ALL_CORE_FT
Discharge Instructions:  1. Diet: regular  2. Activity limited by:   - no running, heavy lifting, strenuous exercise until cleared by MD   - nothing in vagina (bath, swim, intercourse, douching, tampons) until cleared by MD   3. Medications:   - Senna to prevent constipation (please hold for loose stools)  - Ibuprofen 600mg every 6 hours as needed for pain  - Acetaminophen 500mg every 6 hours as needed for pain  - Oxycodone 5mg every 8 hours as needed for breakthrough pain.   4. Follow-up appointment - 2 weeks. Please call the office upon discharge to schedule your appointment if you do not have one already.   5. Precautions:  - Call the office or go to the ED if you have any of the followin) Fever >100.4 that does not resolve  2) Intractable pain  3) Heavy bleeding

## 2023-09-01 NOTE — PROGRESS NOTE ADULT - ASSESSMENT
43 y/o  LMP 2 weeks ago admitted as a transfer from [place] for heavy vaginal bleeding after transfusion of 10uPRBC, 1uPlts, 2uFFP, TXA 1g x2; patient now POD#1 s/p successful b/l UAE with IR. Patient is s/p SICU admission for hemodynamic monitoring. Patient was hypotensive to 90s/40s today.  Neuro: Continue Tylenol for pain. Decrease oxycodone to 5mg PRN for severe pain   CV: Hypotensive to 90s/40s overnight. f/u AM CBC  - Cardiology: Continue home metoprolol for atrial fibrillation. No need for anticoagulation  Pulm: Saturating well on RA. Increase incentive spirometry. Albuterol PRN  GI: Regular diet  : Voiding spontaneously. Discontinue primafit. Minimal vaginal bleeding. Continue Provera.   - Plan for pap smear/endometrial biopsy today  Heme: Venodynes for DVT ppx. Increase OOB.    FEN: f/u AM CMP  - Replete electrolytes PRN  ID: Afebrile overnight  Endo: Continue home methimazole  - Endo: Continue methimazole. Recommend checking TSH receptor antibody and thyroid-stimulating immunoglobulin level. Recommend repeating TSH level, free T4 level, free T3 level and total T4 level in about 1 week.  Psych: Continue home sertraline, trazadone, Valium PRN  Dispo: inpatient    Susu Johnson, PGY2

## 2023-09-01 NOTE — CONSULT NOTE ADULT - PROBLEM SELECTOR RECOMMENDATION 4
- endocrine consulted, recommending continue w/ methimazole 10mg qd   - per endo will need repeat TSH level, free T4 level, free T3 level and total T4 level on 9/7/2023;   - c/w metoprolol

## 2023-09-01 NOTE — PROCEDURE NOTE - ADDITIONAL PROCEDURE DETAILS
Speculum was placed in vagina. Pap smear was performed using spatula and endocervical brush. Tissue was sent to pathology in Thinprep. Cervix was cleaned with iodine in preparation for endometrial biopsy. Anterior lip of cervix was grasped with single tooth tenaculum. Endometrial biopsy was performed in two passes. Tissue was sent to pathology in formalin. Single tooth tenaculum was removed with good hemostasis noted. Patient tolerated the procedure well.     Susu Johnson, PGY2  Procedure performed with Dr. Webster

## 2023-09-01 NOTE — DISCHARGE NOTE PROVIDER - PROVIDER TOKENS
FREE:[LAST:[[facility]],PHONE:[(   )    -],FAX:[(   )    -],ADDRESS:[[place] [address]  [phone]],FOLLOWUP:[2 weeks]]

## 2023-09-01 NOTE — PROGRESS NOTE ADULT - SUBJECTIVE AND OBJECTIVE BOX
Cardiology Fellow Consult Progress Note    Subjective: no new concerns, denies CP, SOB, palpitations. Sinus on tele.    No acute events overnight. No significant tele events. ROS negative at the bedside.     REVIEW OF SYSTEMS:  All other review of systems is negative unless indicated above.    Physical Exam:  T(F): 98.8 (09-01), Max: 98.8 (09-01)  HR: 84 (09-01) (84 - 99)  BP: 94/48 (09-01) (90/55 - 105/63)  RR: 18 (09-01)  SpO2: 100% (09-01)  SpO2: 100% (08-31)  GENERAL: No acute distress, well-developed  HEAD:  Atraumatic, Normocephalic  ENT: EOMI, PERRLA, conjunctiva and sclera clear, Neck supple, No JVD, moist mucosa  CHEST/LUNG: Clear to auscultation bilaterally; No wheeze, equal breath sounds bilaterally   BACK: No spinal tenderness  HEART: Regular rate and rhythm; No murmurs, rubs, or gallops  ABDOMEN: Soft, Nontender, Nondistended; Bowel sounds present  EXTREMITIES:  No clubbing, cyanosis, or edema  PSYCH: Nl behavior, nl affect  NEUROLOGY: AAOx3, non-focal, cranial nerves intact  SKIN: Normal color, No rashes or lesions      Cardiovascular diagnostic testings: personally reviewed    Imaging diagnostic testings: personally reviewed    Labs: Personally reviewed                        9.1    8.60  )-----------( 198      ( 01 Sep 2023 06:35 )             28.3     09-01    139  |  104  |  7   ----------------------------<  84  4.2   |  27  |  0.54    Ca    8.4      01 Sep 2023 06:35  Phos  4.3     09-01  Mg     1.80     09-01    TPro  5.0<L>  /  Alb  2.4<L>  /  TBili  0.7  /  DBili  x   /  AST  22  /  ALT  12  /  AlkPhos  116  09-01    PT/INR - ( 30 Aug 2023 23:43 )   PT: 12.1 sec;   INR: 1.07 ratio         PTT - ( 30 Aug 2023 23:43 )  PTT:30.0 sec    CARDIAC MARKERS ( 26 Aug 2023 02:43 )  x     / x     / x     / 79 U/L / x     / 2.0 ng/mL              Thyroid Stimulating Hormone, Serum: 4.33 uIU/mL (08-27 @ 06:00)  Thyroid Stimulating Hormone, Serum: 3.32 uIU/mL (08-26 @ 08:10)

## 2023-09-01 NOTE — DISCHARGE NOTE PROVIDER - CARE PROVIDER_API CALL
[facility],   Oncology [facility], [facility]  [address] [place] [address]  [phone]  Phone: (   )    -  Fax: (   )    -  Follow Up Time: 2 weeks

## 2023-09-01 NOTE — CONSULT NOTE ADULT - SUBJECTIVE AND OBJECTIVE BOX
ROXANNA ISHAANLUCIANO  42y  Female      Patient is a 42y old  Female who presents with a chief complaint of vaginal bleeding, symptomatic anemia (01 Sep 2023 09:05)    41 y/o hyperthyroidism 2/2 to Graves disease, MDD/anxiety, Afib who was admitted as a transfer from [place] for heavy vaginal bleeding after transfusion of 10uPRBC, 1uPlts, 2uFFP, TXA 1g x2. Patient was transferred to [facility] and received uterine artery embolization on  with IR. Was initially in SICU for hemodynamic monitoring, then transferred to floors.     Patient seen and examine at bedside and feels okay overall. She states that she does have pain from procedure, but has not had any vaginal bleeding since UAE. She states that when she stands up she endorses increased dizziness. States that her BP is usually in 120s as outpatient. She denies any chest pain, SOB, fevers or chills.       PAST MEDICAL & SURGICAL HISTORY:  Atrial fibrillation      History of Hyperthyroidism      Asthma      History of anemia      Depression, unspecified depression type      Smoker      S/P  Section  ,,      History of blood transfusion          REVIEW OF SYSTEMS:  CONSTITUTIONAL: No fever, weight loss, + fatigue. + dizziness   EYES: No eye pain, visual disturbances, or discharge  RESPIRATORY: No cough, wheezing, chills or hemoptysis; No shortness of breath  CARDIOVASCULAR: No chest pain, palpitations, dizziness, or leg swelling  GASTROINTESTINAL: No abdominal or epigastric pain. No nausea, vomiting, or hematemesis; No diarrhea or constipation. No melena or hematochezia.  GENITOURINARY: No dysuria, frequency, hematuria, or incontinence  MUSCULOSKELETAL: No joint pain or swelling; No muscle, back, or extremity pain        T(C): 36.4 (23 @ 09:22), Max: 37.1 (23 @ 06:58)  HR: 89 (23 @ 09:22) (84 - 99)  BP: 99/53 (23 @ 09:22) (93/48 - 105/63)  RR: 18 (23 @ 09:22) (14 - 18)  SpO2: 100% (23 @ 09:22) (94% - 100%)  Wt(kg): --Vital Signs Last 24 Hrs  T(C): 36.4 (01 Sep 2023 09:22), Max: 37.1 (01 Sep 2023 06:58)  T(F): 97.6 (01 Sep 2023 09:22), Max: 98.8 (01 Sep 2023 06:58)  HR: 89 (01 Sep 2023 09:22) (84 - 99)  BP: 99/53 (01 Sep 2023 09:22) (93/48 - 105/63)  BP(mean): 72 (31 Aug 2023 16:00) (70 - 72)  RR: 18 (01 Sep 2023 09:22) (14 - 18)  SpO2: 100% (01 Sep 2023 09:) (94% - 100%)    Parameters below as of 01 Sep 2023 09:22  Patient On (Oxygen Delivery Method): room air        PHYSICAL EXAM:  GENERAL: NAD, well-groomed, well-developed  HEAD:  Atraumatic, Normocephalic  EYES: conjunctiva and sclera clear;   CHEST/LUNG: Clear to percussion bilaterally; No rales, rhonchi, wheezing, or rubs  HEART: Regular rate and rhythm; No murmurs, rubs, or gallops  ABDOMEN: Soft, Nontender, Nondistended; Bowel sounds present  EXTREMITIES:  2+ Peripheral Pulses, No clubbing, cyanosis, or edema  NERVOUS SYSTEM:  Alert & Oriented X3, Good concentration; Motor Strength 5/5 B/L upper and lower extremities;   SKIN: No rashes or lesions    Consultant(s) Notes Reviewed:  [x ] YES  [ ] NO  Care Discussed with Consultants/Other Providers [ x] YES  [ ] NO    LABS:  CBC   23 @ 06:35  Hematcorit 28.3  Hemoglobin 9.1  Mean Cell Hemoglobin 28.2  Platelet Count-Automated 198  RBC Count 3.23  Red Cell Distrib Width 19.6  Wbc Count 8.60  23 @ 14:35  Hematcorit 26.0  Hemoglobin 8.4  Mean Cell Hemoglobin 27.7  Platelet Count-Automated 193  RBC Count 3.03  Red Cell Distrib Width 18.7  Wbc Count 15.71      BMP  23 @ 06:35  Anion Gap. Serum 8  Blood Urea Nitrogen,Serm 7  Calcium, Total Serum 8.4  Carbon Dioxide, Serum 27  Chloride, Serum 104  Creatinine, Serum 0.54  eGFR in  --  eGFR in Non Afican American --  Gloucose, serum 84  Potassium, Serum 4.2  Sodium, Serum 139              23 @ 14:35  Anion Gap. Serum 9  Blood Urea Nitrogen,Serm 5  Calcium, Total Serum 8.3  Carbon Dioxide, Serum 24  Chloride, Serum 103  Creatinine, Serum 0.41  eGFR in  --  eGFR in Non Afican American --  Gloucose, serum 108  Potassium, Serum 4.1  Sodium, Serum 136              23 @ 02:00  Anion Gap. Serum 10  Blood Urea Nitrogen,Serm 4  Calcium, Total Serum 8.3  Carbon Dioxide, Serum 21  Chloride, Serum 104  Creatinine, Serum 0.41  eGFR in  --  eGFR in Non Afican American --  Gloucose, serum 100  Potassium, Serum 4.6  Sodium, Serum 135              23 @ 23:43  Anion Gap. Serum 11  Blood Urea Nitrogen,Serm 5  Calcium, Total Serum 8.0  Carbon Dioxide, Serum 21  Chloride, Serum 102  Creatinine, Serum 0.40  eGFR in  --  eGFR in Non Afican American --  Gloucose, serum 91  Potassium, Serum 4.7  Sodium, Serum 134              23 @ 13:55  Anion Gap. Serum 4  Blood Urea Nitrogen,Serm 4  Calcium, Total Serum 7.8  Carbon Dioxide, Serum 26  Chloride, Serum 106  Creatinine, Serum 0.43  eGFR in  --  eGFR in Non Afican American --  Gloucose, serum 77  Potassium, Serum 4.4  Sodium, Serum 136              23 @ 06:00  Anion Gap. Serum 4  Blood Urea Nitrogen,Serm 5  Calcium, Total Serum 7.7  Carbon Dioxide, Serum 28  Chloride, Serum 106  Creatinine, Serum 0.32  eGFR in  --  eGFR in Non Afican American --  Gloucose, serum 87  Potassium, Serum 4.0  Sodium, Serum 138                  CMP  23 @ 06:35  Nasreen Aminotransferase(ALT/SGPT)12  Albumin, Serum 2.4  Alkaline Phosphatase, Serum 116  Anion Gap, Serum 8  Aspartate Aminotransferase (AST/SGOT)22  Bilirubin Total, Serum 0.7  Blood Urea Nitrogen, Serum 7  Calcium,Total Serum 8.4  Carbon Dioxide, Serum 27  Chloride, Serum 104  Creatinine, Serum 0.54  eGFR if  --  eGFR if Non African American --  Glucose, Serum 84  Potassium, Serum 4.2  Protein Total, Serum 5.0  Sodium, Serum 139                      23 @ 14:35  Nasreen Aminotransferase(ALT/SGPT)--  Albumin, Serum --  Alkaline Phosphatase, Serum --  Anion Gap, Serum 9  Aspartate Aminotransferase (AST/SGOT)--  Bilirubin Total, Serum --  Blood Urea Nitrogen, Serum 5  Calcium,Total Serum 8.3  Carbon Dioxide, Serum 24  Chloride, Serum 103  Creatinine, Serum 0.41  eGFR if  --  eGFR if Non African American --  Glucose, Serum 108  Potassium, Serum 4.1  Protein Total, Serum --  Sodium, Serum 136                      23 @ 02:00  Nasreen Aminotransferase(ALT/SGPT)--  Albumin, Serum --  Alkaline Phosphatase, Serum --  Anion Gap, Serum 10  Aspartate Aminotransferase (AST/SGOT)--  Bilirubin Total, Serum --  Blood Urea Nitrogen, Serum 4  Calcium,Total Serum 8.3  Carbon Dioxide, Serum 21  Chloride, Serum 104  Creatinine, Serum 0.41  eGFR if  --  eGFR if Non African American --  Glucose, Serum 100  Potassium, Serum 4.6  Protein Total, Serum --  Sodium, Serum 135                      23 @ 23:43  Nasreen Aminotransferase(ALT/SGPT)--  Albumin, Serum --  Alkaline Phosphatase, Serum --  Anion Gap, Serum 11  Aspartate Aminotransferase (AST/SGOT)--  Bilirubin Total, Serum --  Blood Urea Nitrogen, Serum 5  Calcium,Total Serum 8.0  Carbon Dioxide, Serum 21  Chloride, Serum 102  Creatinine, Serum 0.40  eGFR if  --  eGFR if Non African American --  Glucose, Serum 91  Potassium, Serum 4.7  Protein Total, Serum --  Sodium, Serum 134                      23 @ 13:55  Nasreen Aminotransferase(ALT/SGPT)--  Albumin, Serum --  Alkaline Phosphatase, Serum --  Anion Gap, Serum 4  Aspartate Aminotransferase (AST/SGOT)--  Bilirubin Total, Serum --  Blood Urea Nitrogen, Serum 4  Calcium,Total Serum 7.8  Carbon Dioxide, Serum 26  Chloride, Serum 106  Creatinine, Serum 0.43  eGFR if  --  eGFR if Non African American --  Glucose, Serum 77  Potassium, Serum 4.4  Protein Total, Serum --  Sodium, Serum 136                      23 @ 06:00  Nasreen Aminotransferase(ALT/SGPT)--  Albumin, Serum --  Alkaline Phosphatase, Serum --  Anion Gap, Serum 4  Aspartate Aminotransferase (AST/SGOT)--  Bilirubin Total, Serum --  Blood Urea Nitrogen, Serum 5  Calcium,Total Serum 7.7  Carbon Dioxide, Serum 28  Chloride, Serum 106  Creatinine, Serum 0.32  eGFR if  --  eGFR if Non African American --  Glucose, Serum 87  Potassium, Serum 4.0  Protein Total, Serum --  Sodium, Serum 138                          PT/INR  PT/INR  23 @ 23:43  INR 1.07  Prothrombin Time Comment --  Prothrobin Time, Swhygh66.1  PT/INR  23 @ 13:55  INR 1.05  Prothrombin Time Comment --  Prothrobin Time, [id number]      Amylase/Lipase            RADIOLOGY & ADDITIONAL TESTS:    Imaging Personally Reviewed:  [ ] YES  [ ] NO

## 2023-09-01 NOTE — CHART NOTE - NSCHARTNOTEFT_GEN_A_CORE
Patient is a 42-year-old woman with history of hyperthyroidism secondary to Graves' disease, neuropathy, vaginal bleeding, presented to [facility] for severe anemia due to vaginal bleeding status post IR intervention.  Currently in the surgical ICU.  Endocrinology consulted for history of Graves' disease.    TSI and Trab specimens collected this AM, received by laboratory   Currently on methimazole 10 mg daily   Metoprolol 50mg daily (Held the past couple of days due to borderline low BP 96/66 this AM). HR 87      1.  Graves' disease  Appears to be diagnosed many years ago, currently on methimazole 10 mg once daily which patient reports that she has been adherent with.  TSH was slightly elevated to 4.32 on 8/27/2023, 1 day ago is in the normal range at 3.32 8/26/2023; may be a component of sick euthyroid in the setting of severe anemia; Free T4 level is in the normal range with a slightly low T3 level of 60.  For now we will recommend continuing with methimazole 10 mg once daily.  Ashish TSH receptor antibody and thyroid-stimulating immunoglobulin level.  If both of these levels are in normal range, can consider tapering off methimazole.  Recommend repeating TSH level, free T4 level, free T3 level and total T4 level in about 1 week 9/7/2023; if TSH is still elevated, then recommend tapering methimazole to 5 mg once daily and then repeating blood work again in about 14 days to 1 month after discharge.  Thyroid ultrasound had already been done in the past in 2012, physical exam did not reveal any thyroid nodules therefore do not need to repeat it at this admission.    Definitive treatment also discussed with patient.  She would not be a good candidate for radioactive iodine given history of Graves' ophthalmopathy.  Patient should continue this discussion with her outpatient endocrinologist.    Patient should call the office at [phone] to establish care with endocrinology when able to.    2. Palpitation  Continue with beta-blocker if BP tolerates     ENDOCRINE FOLLOW UP  CALL PATIENT ACCESS SERVICES  [phone]  For all [facility] Endocrine Practices phone numbers and locations throughout [facility], and [place].      If patient wishes to follow up with [facility] Endocrinology at [place]    [facility]  [address] [place] [address], [address], [place] [address]  [phone]   Please call to schedule appointment with CDE/Nutritionist and MD appointment next available No

## 2023-09-01 NOTE — CONSULT NOTE ADULT - PROBLEM SELECTOR RECOMMENDATION 9
- presents as transfer from [place] with profuse vaginal bleeding, received  10uPRBC, 1uPlts, 2uFFP, TXA 1g x2, Depo-Provera inj  - now s/p UAE with IR on 8/31, H/H 9.1/28.3 stable, patient denies any further vaginal bleeding  - care per primary team, plan for pap smear/endometrial biopsy today per GYN

## 2023-09-01 NOTE — CONSULT NOTE ADULT - REASON FOR ADMISSION
vaginal bleeding, symptomatic anemia

## 2023-09-01 NOTE — PROGRESS NOTE ADULT - ASSESSMENT
41 y/o female with PMH hyperthyroid, AF (no longer in AF or on AC), presented for massive uterine bleed requiring MTP. Cardiology consulted for AF management.     Patient has been in sinus throughout admission.    CHADSVASC 0    Recommendations:  - No indication to start AC at this time if AF was transient in the setting of hyperthyroid that's now controlled  - Can continue home metop for now    Note incomplete until consigned by attending.    Pino Camacho, PGY-4  Cardiology Fellow    For all new consults  [email or web address]  Login: [email or web address]

## 2023-09-01 NOTE — DISCHARGE NOTE PROVIDER - NSDCFUADDAPPT_GEN_ALL_CORE_FT
[facility]  Oncology Building, [facility]  [address] [place] [address] [phone]

## 2023-09-01 NOTE — DISCHARGE NOTE PROVIDER - NSDCCAREPROVSEEN_GEN_ALL_CORE_FT
[facility]  Oncology Building, [facility]  [address] [place] [address] [phone]

## 2023-09-01 NOTE — CONSULT NOTE ADULT - PROBLEM SELECTOR RECOMMENDATION 3
- received over 10uPRBC, 1uPlts, 2uFFP, TXA 1g x2  at [facility] Depo-Provera inj  - H/H stable 9.1/28.3, no further bleeding s/p UAE with IR   - continue CBC daily to monitor, maintain active T&S every 72 hours

## 2023-09-01 NOTE — DISCHARGE NOTE PROVIDER - NSDCMRMEDTOKEN_GEN_ALL_CORE_FT
methIMAzole 10 mg oral tablet: 1 tab(s) orally once a day  metoprolol succinate 50 mg oral tablet, extended release: 1 tab(s) orally once a day  sertraline 100 mg oral tablet: 1 tab(s) orally once a day  traZODone 50 mg oral tablet: 1 tab(s) orally 2 times a day   methIMAzole 10 mg oral tablet: 1 tab(s) orally once a day  metoprolol succinate 50 mg oral tablet, extended release: 1 tab(s) orally once a day  Provera 10 mg oral tablet: 1 tab(s) orally every 12 hours  sertraline 100 mg oral tablet: 1 tab(s) orally once a day  traZODone 50 mg oral tablet: 1 tab(s) orally 2 times a day

## 2023-09-01 NOTE — CONSULT NOTE ADULT - PROBLEM SELECTOR RECOMMENDATION 2
- BPs in low 90s, patient states that normal BPs in 120s   - likely 2/2 to massive blood loss from vaginal bleeding   - would recommend mIVF to increase intravascular volume, patient appears dehydrated   - monitor BPs closely, hold BP meds for BPs less than 90

## 2023-09-01 NOTE — DISCHARGE NOTE PROVIDER - HOSPITAL COURSE
43 y/o  LMP 2 weeks ago w/ PMH of atrial fibrillation and Graves disease presented as a transfer from [facility] for severe anemia in setting of vaginal bleeding. Patient states she has been experiencing heavy vaginal bleeding for the past two weeks in which she has been completely saturating through at least one diaper per hour. Patient states she had fainted at home prior to her presentation to [facility] and began to experience sx such as chest pain, SOB, palpitations and dizziness. Patient noted to have severe anemia upon presentation with H&H of 3.6/14.1. Per chart review, patient now s/p 10uPRBC, 1uPlts, 2uFFP, TXA 1g x2, Depo-Provera inj, and is currently on Provera 10mg TID. Patient was originally scheduled for D&C Hysteroscopy at [facility] today which was ultimately cancelled prior to patient being transferred to [facility] for further management. Patient has long standing history of known uterine fibroids     On HD#1, pt was admitted to the SICU for hemodynamic monitoring in setting of severe anemia with continued heavy vaginal bleeding despite medical intervention. H/H upon admission was 10.6/32.0. IR was consulted and patient underwent bilateral uterine artery embolization to control the vaginal bleeding.     On HD#2, pt's vaginal bleeding was improved s/p UAE. Patient's pain was well-controlled on PCA pump which was subsequently discontinued to be transitioned to PO pain meds. Patient was seen by cardiology and endocrinology, who recommended restarting patient's metoprolol and thyroid studies.     On HD#3, pt continued to have minimal bleeding and well-controlled pain on oral medications, but complained of dizziness. She was seen by internal medicine, who recommended mIVF for possible dehydration. She underwent endometrial biopsy and pap smear that were uncomplicated. Patient was able to ambulate to the bathroom.              On HD# patient was deemed stable for discharge as vaginal bleeding has improved, H/H was stable, patient was able to ambulate and pain was well-controlled on oral medications.   Patient was instructed to follow up in [facility] clinic in 2 weeks. 43 y/o  LMP 2 weeks ago w/ PMH of atrial fibrillation and Graves disease presented as a transfer from [place] for severe anemia in setting of vaginal bleeding. Patient states she has been experiencing heavy vaginal bleeding for the past two weeks in which she has been completely saturating through at least one diaper per hour. Patient states she had fainted at home prior to her presentation to [facility] and began to experience sx such as chest pain, SOB, palpitations and dizziness. Patient noted to have severe anemia upon presentation with H&H of 3.6/14.1. Per chart review, patient now s/p 10uPRBC, 1uPlts, 2uFFP, TXA 1g x2, Depo-Provera inj, and is currently on Provera 10mg TID. Patient was originally scheduled for D&C Hysteroscopy at [facility] today which was ultimately cancelled prior to patient being transferred to [facility] for further management. Patient has long standing history of known uterine fibroids     On HD#1, pt was admitted to the SICU for hemodynamic monitoring in setting of severe anemia with continued heavy vaginal bleeding despite medical intervention. H/H upon admission was 10.6/32.0. IR was consulted and patient underwent bilateral uterine artery embolization to control the vaginal bleeding.     On HD#2, pt's vaginal bleeding was improved s/p UAE. Patient's pain was well-controlled on PCA pump which was subsequently discontinued to be transitioned to PO pain meds. Patient was seen by cardiology and endocrinology, who recommended restarting patient's metoprolol and thyroid studies.     On HD#3, pt continued to have minimal bleeding and well-controlled pain on oral medications, but complained of dizziness. She was seen by internal medicine, who recommended mIVF for possible dehydration. She underwent endometrial biopsy and pap smear that were uncomplicated. Patient was able to ambulate to the bathroom.      On HD#4 patient was deemed stable for discharge as vaginal bleeding has improved, H/H was stable, patient was able to ambulate and pain was well-controlled on oral medications.   Patient was instructed to follow up in [facility] clinic in 2 weeks.

## 2023-09-01 NOTE — PROGRESS NOTE ADULT - SUBJECTIVE AND OBJECTIVE BOX
Gyn Progress Note POD#2 HD#3    Subjective:   Pt seen and examined at bedside. Patient reports that she had dizziness on standing both times when she tried to ambulate yesterday. She currently has a primafit in place to avoid standing to urinate. Pain well controlled. Passing flatus. Tolerating regular diet. Pt denies fever, chills, chest pain, SOB, nausea, vomiting.    Objective:  T(F): 98.8 (09-01-23 @ 06:58), Max: 98.8 (09-01-23 @ 06:58)  HR: 84 (09-01-23 @ 06:58) (83 - 99)  BP: 94/48 (09-01-23 @ 06:58) (90/55 - 105/63)  RR: 18 (09-01-23 @ 06:58) (13 - 19)  SpO2: 100% (09-01-23 @ 06:58) (94% - 100%)  Wt(kg): --  I&O's Summary    31 Aug 2023 07:01  -  01 Sep 2023 07:00  --------------------------------------------------------  IN: 1090 mL / OUT: 2000 mL / NET: -910 mL        MEDICATIONS  (STANDING):  folic acid 1 milliGRAM(s) Oral daily  medroxyPROGESTERone 10 milliGRAM(s) Oral <User Schedule>  melatonin 9 milliGRAM(s) Oral at bedtime  methimazole 10 milliGRAM(s) Oral daily  metoprolol succinate ER 50 milliGRAM(s) Oral daily  sertraline 100 milliGRAM(s) Oral daily  traZODone 50 milliGRAM(s) Oral two times a day    MEDICATIONS  (PRN):  acetaminophen     Tablet .. 1000 milliGRAM(s) Oral every 6 hours PRN Mild Pain (1 - 3)  albuterol    90 MICROgram(s) HFA Inhaler 2 Puff(s) Inhalation every 6 hours PRN Shortness of Breath and/or Wheezing  diazepam    Tablet 5 milliGRAM(s) Oral two times a day PRN Anxiety  diphenhydrAMINE Injectable 50 milliGRAM(s) IV Push every 4 hours PRN Pruritus  HYDROmorphone  Injectable 0.3 milliGRAM(s) IV Push every 6 hours PRN Severe breakthrough  Pain (7 - 10)  oxyCODONE    IR 5 milliGRAM(s) Oral every 4 hours PRN Moderate Pain (4 - 6)  oxyCODONE    IR 10 milliGRAM(s) Oral every 4 hours PRN Severe Pain (7 - 10)      Physical Exam:  Constitutional: NAD, A+O x3  CV: RRR  Lungs: clear to auscultation bilaterally  Abdomen: soft, nondistended, no guarding, no rebound, normal bowel sounds  : scant vaginal bleeding on pad, Primafit in place  Extremities: no lower extremity edema or calf tenderness bilaterally; venodynes in place        LABS:            8.4      15.71 )------------( 193        ( 08-31-23 @ 14:35 )            26.0    08-31    136    |  103    |  5<L>   ----------------------------<  108<H>  4.1     |  24     |  0.41<L>  08-31    135    |  104    |  4<L>   ----------------------------<  100<H>  4.6     |  21<L>  |  0.41<L>  08-30    134<L>  |  102    |  5<L>   ----------------------------<  91     4.7     |  21<L>  |  0.40<L>    Ca    8.3<L>      31 Aug 2023 14:35  Ca    8.3<L>      31 Aug 2023 02:00  Ca    8.0<L>      30 Aug 2023 23:43  Phos  4.6       08-31  Phos  4.4       08-31  Phos  3.7       08-30  Mg     1.70      08-31  Mg     1.70      08-31  Mg     1.70      08-30          PT/INR - ( 30 Aug 2023 23:43 )   PT: 12.1 sec;   INR: 1.07 ratio         PTT - ( 30 Aug 2023 23:43 )  PTT:30.0 sec  Urinalysis Basic - ( 31 Aug 2023 14:35 )    Color: x / Appearance: x / SG: x / pH: x  Gluc: 108 mg/dL / Ketone: x  / Bili: x / Urobili: x   Blood: x / Protein: x / Nitrite: x   Leuk Esterase: x / RBC: x / WBC x   Sq Epi: x / Non Sq Epi: x / Bacteria: x

## 2023-09-02 ENCOUNTER — TRANSCRIPTION ENCOUNTER (OUTPATIENT)
Age: 43
End: 2023-09-02

## 2023-09-02 VITALS
SYSTOLIC BLOOD PRESSURE: 109 MMHG | HEART RATE: 89 BPM | DIASTOLIC BLOOD PRESSURE: 63 MMHG | TEMPERATURE: 99 F | OXYGEN SATURATION: 100 % | RESPIRATION RATE: 18 BRPM

## 2023-09-02 PROCEDURE — 99232 SBSQ HOSP IP/OBS MODERATE 35: CPT

## 2023-09-02 RX ORDER — MEDROXYPROGESTERONE ACETATE 150 MG/ML
1 INJECTION, SUSPENSION, EXTENDED RELEASE INTRAMUSCULAR
Qty: 60 | Refills: 0
Start: 2023-09-02

## 2023-09-02 RX ADMIN — Medication 1000 MILLIGRAM(S): at 10:00

## 2023-09-02 RX ADMIN — OXYCODONE HYDROCHLORIDE 5 MILLIGRAM(S): 5 TABLET ORAL at 09:00

## 2023-09-02 RX ADMIN — Medication 1 MILLIGRAM(S): at 12:42

## 2023-09-02 RX ADMIN — OXYCODONE HYDROCHLORIDE 5 MILLIGRAM(S): 5 TABLET ORAL at 10:00

## 2023-09-02 RX ADMIN — OXYCODONE HYDROCHLORIDE 5 MILLIGRAM(S): 5 TABLET ORAL at 13:14

## 2023-09-02 RX ADMIN — OXYCODONE HYDROCHLORIDE 5 MILLIGRAM(S): 5 TABLET ORAL at 14:14

## 2023-09-02 RX ADMIN — Medication 1000 MILLIGRAM(S): at 09:00

## 2023-09-02 RX ADMIN — MEDROXYPROGESTERONE ACETATE 10 MILLIGRAM(S): 150 INJECTION, SUSPENSION, EXTENDED RELEASE INTRAMUSCULAR at 06:54

## 2023-09-02 RX ADMIN — SODIUM CHLORIDE 125 MILLILITER(S): 9 INJECTION, SOLUTION INTRAVENOUS at 07:03

## 2023-09-02 NOTE — PROGRESS NOTE ADULT - PROBLEM SELECTOR PLAN 3
- received over 10uPRBC, 1uPlts, 2uFFP, TXA 1g x2 at [facility] Depo-Provera inj  - H/H stable 9.1/28.3 on 9/1   - continue CBC daily to monitor, maintain active T&S every 72 hours

## 2023-09-02 NOTE — PROGRESS NOTE ADULT - ASSESSMENT
43 y/o hyperthyroidism 2/2 to Graves disease, MDD/anxiety, Afib who was admitted as a transfer from [place] for heavy vaginal bleeding after transfusion of 10uPRBC, 1uPlts, 2uFFP, TXA 1g x2. Now s/p UAE with IR on 8/30. Medicine consulted for medical management.

## 2023-09-02 NOTE — DISCHARGE NOTE NURSING/CASE MANAGEMENT/SOCIAL WORK - NSDCPNINST_GEN_ALL_CORE
Notify MD or go to ED if you have fever >100.4 that does not resolve, pain not relieved with pain meds, heavy Bleeding. Schedule follow up appointment in 2 weeks. No heavy lifting or straining.

## 2023-09-02 NOTE — PROGRESS NOTE ADULT - PROVIDER SPECIALTY LIST ADULT
GYN
Anesthesia
SICU
Cardiology
Intervent Radiology
Pain Medicine
Pain Medicine
GYN
GYN
Internal Medicine

## 2023-09-02 NOTE — PROGRESS NOTE ADULT - REASON FOR ADMISSION
vaginal bleeding, symptomatic anemia

## 2023-09-02 NOTE — PROGRESS NOTE ADULT - ASSESSMENT
A/P: 41 y/o  LMP 2 weeks ago admitted as a transfer from [place] for heavy vaginal bleeding after transfusion of 10uPRBC, 1uPlts, 2uFFP, TXA 1g x2; patient now POD#3 s/p successful b/l UAE with IR. Patient is s/p SICU admission for hemodynamic monitoring. Pt was hemodynamically and clinically stable overnight without acute events.    Neuro: Continue Tylenol. Oxy PRN for severe pain   CV: Hemodynamically stable. f/u AM CBC  - Cardiology: Continue home metoprolol for atrial fibrillation. No need for anticoagulation  Pulm: Saturating well on RA. Increase incentive spirometry. Albuterol PRN  GI: Regular diet  : Voiding spontaneously. Discontinue primafit. Minimal vaginal bleeding. Continue Provera.   - F/u Pap/EMB ()  Heme: Venodynes for DVT ppx. Increase OOB.    FEN: f/u AM CMP  - LR@125  - Replete electrolytes PRN  ID: Afebrile overnight  Endo: Continue home methimazole  - Endo: Continue methimazole. Recommend checking TSH receptor antibody and thyroid-stimulating immunoglobulin level. Recommend repeating TSH level, free T4 level, free T3 level and total T4 level in about 1 week.  - GHOS: IV hydration, trend CBC, c/w Metoprolol, Trazodone, Valium  Psych: Continue home sertraline, trazadone, Valium PRN  Dispo: inpatient    VTimmel PGY2 A/P: 41 y/o  LMP 2 weeks ago admitted as a transfer from [place] for heavy vaginal bleeding after transfusion of 10uPRBC, 1uPlts, 2uFFP, TXA 1g x2; patient now POD#3 s/p successful b/l UAE with IR. Patient is s/p SICU admission for hemodynamic monitoring. Pt was hemodynamically and clinically stable overnight without acute events.    Neuro: Continue Tylenol. Oxy PRN for severe pain   CV: Hemodynamically stable. f/u AM CBC  - Cardiology: Continue home metoprolol for atrial fibrillation. No need for anticoagulation  Pulm: Saturating well on RA. Increase incentive spirometry. Albuterol PRN  GI: Regular diet  : Minimal vaginal bleeding. Continue Provera.   - Notified RN to d/c Primafit this AM  - F/u Pap/EMB ()  Heme: Venodynes for DVT ppx. Increase OOB.    FEN: f/u AM CMP  - LR@125  - Replete electrolytes PRN  ID: Afebrile overnight  Endo: Continue home methimazole  - Endo: Continue methimazole. Recommend checking TSH receptor antibody and thyroid-stimulating immunoglobulin level. Recommend repeating TSH level, free T4 level, free T3 level and total T4 level in about 1 week.  - GHOS: IV hydration, trend CBC, c/w Metoprolol, Trazodone, Valium  Psych: Continue home sertraline, trazadone, Valium PRN  Dispo: discharge planning    Jesús PGY2

## 2023-09-02 NOTE — PROGRESS NOTE ADULT - SUBJECTIVE AND OBJECTIVE BOX
[facility] Division of [facility] Medicine  NAHOMIletitia Leesa ZACARIAS  Pager [phone]  Available via MS Teams    SUBJECTIVE / OVERNIGHT EVENTS: patient states that she has mild pain after endometrial biopsy done yesterday. Denies any vaginal bleeding. Has been passing gas and urinating.     ADDITIONAL REVIEW OF SYSTEMS:    MEDICATIONS  (STANDING):  folic acid 1 milliGRAM(s) Oral daily  lactated ringers. 1000 milliLiter(s) (125 mL/Hr) IV Continuous <Continuous>  medroxyPROGESTERone 10 milliGRAM(s) Oral <User Schedule>  melatonin 9 milliGRAM(s) Oral at bedtime  methimazole 10 milliGRAM(s) Oral daily  metoprolol succinate ER 50 milliGRAM(s) Oral daily  sertraline 100 milliGRAM(s) Oral daily  traZODone 50 milliGRAM(s) Oral two times a day    MEDICATIONS  (PRN):  acetaminophen     Tablet .. 1000 milliGRAM(s) Oral every 6 hours PRN Mild Pain (1 - 3)  albuterol    90 MICROgram(s) HFA Inhaler 2 Puff(s) Inhalation every 6 hours PRN Shortness of Breath and/or Wheezing  diazepam    Tablet 5 milliGRAM(s) Oral two times a day PRN Anxiety  diphenhydrAMINE Injectable 50 milliGRAM(s) IV Push every 4 hours PRN Pruritus  oxyCODONE    IR 5 milliGRAM(s) Oral every 4 hours PRN Severe Pain (7 - 10)      I&O's Summary    01 Sep 2023 07:01  -  02 Sep 2023 07:00  --------------------------------------------------------  IN: 0 mL / OUT: 1800 mL / NET: -1800 mL        PHYSICAL EXAM:  Vital Signs Last 24 Hrs  T(C): 36.4 (02 Sep 2023 09:43), Max: 37.2 (02 Sep 2023 01:24)  T(F): 97.5 (02 Sep 2023 09:43), Max: 99 (02 Sep 2023 01:24)  HR: 91 (02 Sep 2023 09:43) (87 - 92)  BP: 112/68 (02 Sep 2023 09:43) (91/54 - 119/76)  BP(mean): --  RR: 18 (02 Sep 2023 09:43) (18 - 18)  SpO2: 99% (02 Sep 2023 09:43) (98% - 100%)    Parameters below as of 02 Sep 2023 09:43  Patient On (Oxygen Delivery Method): room air      CONSTITUTIONAL: NAD, well-developed, well-groomed  EYEEs: conjunctiva and sclera clear  ENMT: Moist oral mucosa, no pharyngeal injection or exudates   RESPIRATORY: Normal respiratory effort; lungs are clear to auscultation bilaterally  CARDIOVASCULAR: Regular rate and rhythm, normal S1 and S2, no murmur/rub/gallop  ABDOMEN: Nontender to palpation, normoactive bowel sounds   MUSCULOSKELETAL: no clubbing or cyanosis of digits; no joint swelling or tenderness to palpation  PSYCH: A+O to person, place, and time; affect appropriate  NEUROLOGY: no gross sensory deficits   SKIN: No rashes; no palpable lesions    LABS:                        9.1    8.60  )-----------( 198      ( 01 Sep 2023 06:35 )             28.3     09-01    139  |  104  |  7   ----------------------------<  84  4.2   |  27  |  0.54    Ca    8.4      01 Sep 2023 06:35  Phos  4.3     09-01  Mg     1.80     09-01    TPro  5.0<L>  /  Alb  2.4<L>  /  TBili  0.7  /  DBili  x   /  AST  22  /  ALT  12  /  AlkPhos  116  09-01          Urinalysis Basic - ( 01 Sep 2023 06:35 )    Color: x / Appearance: x / SG: x / pH: x  Gluc: 84 mg/dL / Ketone: x  / Bili: x / Urobili: x   Blood: x / Protein: x / Nitrite: x   Leuk Esterase: x / RBC: x / WBC x   Sq Epi: x / Non Sq Epi: x / Bacteria: x        COVID-19 PCR: NotDetec (30 Mar 2023 22:24)      RADIOLOGY & ADDITIONAL TESTS:  New Results Reviewed Today: Yes

## 2023-09-02 NOTE — PROGRESS NOTE ADULT - PROBLEM SELECTOR PLAN 2
BPs initially low in the  90s, received IVFs now BP improved to 119   - likely 2/2 to massive blood loss from vaginal bleeding   - would continue fluids for 24 hours and encourage PO intake   - monitor BPs closely, hold BP meds for BPs less than 90.

## 2023-09-02 NOTE — DISCHARGE NOTE NURSING/CASE MANAGEMENT/SOCIAL WORK - NSDCFUADDAPPT_GEN_ALL_CORE_FT
[facility]  Oncology Building, [facility]  [phone] [place] [id number] [phone]

## 2023-09-02 NOTE — PROGRESS NOTE ADULT - SUBJECTIVE AND OBJECTIVE BOX
R2 GYN Progress Note    POD#3   HD#4    Patient seen and examined at bedside.  No acute events overnight. No acute complaints.  Pain well controlled.  Patient is ambulating and tolerating.....   Has not yet passed flatus vs. Patient is passing flatus.    Patient is voiding spontaneously vs. Norris is still in place.   Denies CP, SOB, N/V, fevers, and chills.    Vital Signs Last 24 Hours  T(C): 37.2 (09-02-23 @ 01:24), Max: 37.2 (09-02-23 @ 01:24)  HR: 91 (09-02-23 @ 01:24) (84 - 92)  BP: 91/54 (09-02-23 @ 01:24) (91/54 - 106/59)  RR: 18 (09-02-23 @ 01:24) (18 - 18)  SpO2: 98% (09-02-23 @ 01:24) (98% - 100%)    I&O's Summary    31 Aug 2023 07:01  -  01 Sep 2023 07:00  --------------------------------------------------------  IN: 1090 mL / OUT: 2000 mL / NET: -910 mL    01 Sep 2023 07:01  -  02 Sep 2023 05:44  --------------------------------------------------------  IN: 0 mL / OUT: 1800 mL / NET: -1800 mL        Physical Exam:  General: NAD  CV: RR  Lungs: CTA b/l  Abdomen: Soft, mildly tender, mildly distended, tympanic, normoactive bowel sounds  Incision: _ CDI  : no bleeding on pad  Ext: No pain or swelling in lower extremities bilaterally     Labs:                        9.1    8.60  )-----------( 198      ( 01 Sep 2023 06:35 )             28.3   baso x      eos x      imm gran x      lymph x      mono x      poly x                            8.4    15.71 )-----------( 193      ( 31 Aug 2023 14:35 )             26.0   baso x      eos x      imm gran x      lymph x      mono x      poly x                            10.8   12.02 )-----------( 178      ( 31 Aug 2023 04:13 )             34.4   baso x      eos x      imm gran x      lymph x      mono x      poly x                            9.3    9.71  )-----------( 160      ( 30 Aug 2023 23:43 )             28.5   baso x      eos x      imm gran x      lymph x      mono x      poly x                            10.6   7.45  )-----------( 148      ( 30 Aug 2023 13:55 )             32.0   baso x      eos x      imm gran x      lymph x      mono x      poly x                            7.0    5.61  )-----------( 141      ( 30 Aug 2023 06:00 )             22.5   baso x      eos x      imm gran x      lymph x      mono x      poly x          MEDICATIONS  (STANDING):  folic acid 1 milliGRAM(s) Oral daily  lactated ringers. 1000 milliLiter(s) (125 mL/Hr) IV Continuous <Continuous>  medroxyPROGESTERone 10 milliGRAM(s) Oral <User Schedule>  melatonin 9 milliGRAM(s) Oral at bedtime  methimazole 10 milliGRAM(s) Oral daily  metoprolol succinate ER 50 milliGRAM(s) Oral daily  sertraline 100 milliGRAM(s) Oral daily  traZODone 50 milliGRAM(s) Oral two times a day    MEDICATIONS  (PRN):  acetaminophen     Tablet .. 1000 milliGRAM(s) Oral every 6 hours PRN Mild Pain (1 - 3)  albuterol    90 MICROgram(s) HFA Inhaler 2 Puff(s) Inhalation every 6 hours PRN Shortness of Breath and/or Wheezing  diazepam    Tablet 5 milliGRAM(s) Oral two times a day PRN Anxiety  diphenhydrAMINE Injectable 50 milliGRAM(s) IV Push every 4 hours PRN Pruritus  oxyCODONE    IR 5 milliGRAM(s) Oral every 4 hours PRN Severe Pain (7 - 10)   R2 GYN Progress Note    POD#3   HD#4    Patient seen and examined at bedside.  No acute events overnight. Pt reports cramping pain.  Patient is ambulating and tolerating reg diet.  Patient is passing flatus.    Pt reports she requested the Primafit overnight because she did not want have to get up frequently to use the restroom.  Denies CP, SOB, N/V, fevers, and chills.    Vital Signs Last 24 Hours  T(C): 37.2 (09-02-23 @ 01:24), Max: 37.2 (09-02-23 @ 01:24)  HR: 91 (09-02-23 @ 01:24) (84 - 92)  BP: 91/54 (09-02-23 @ 01:24) (91/54 - 106/59)  RR: 18 (09-02-23 @ 01:24) (18 - 18)  SpO2: 98% (09-02-23 @ 01:24) (98% - 100%)    I&O's Summary    31 Aug 2023 07:01  -  01 Sep 2023 07:00  --------------------------------------------------------  IN: 1090 mL / OUT: 2000 mL / NET: -910 mL    01 Sep 2023 07:01  -  02 Sep 2023 05:44  --------------------------------------------------------  IN: 0 mL / OUT: 1800 mL / NET: -1800 mL        Physical Exam:  General: NAD  CV: RR  Lungs: CTA b/l  Abdomen: Soft, non-tender, mildly distended, tympanic, normoactive bowel sounds  : no bleeding on pad, Primafit in place  Ext: No pain or swelling in lower extremities bilaterally, venodynes in place    Labs:                        9.1    8.60  )-----------( 198      ( 01 Sep 2023 06:35 )             28.3   baso x      eos x      imm gran x      lymph x      mono x      poly x                            8.4    15.71 )-----------( 193      ( 31 Aug 2023 14:35 )             26.0   baso x      eos x      imm gran x      lymph x      mono x      poly x                            10.8   12.02 )-----------( 178      ( 31 Aug 2023 04:13 )             34.4   baso x      eos x      imm gran x      lymph x      mono x      poly x                            9.3    9.71  )-----------( 160      ( 30 Aug 2023 23:43 )             28.5   baso x      eos x      imm gran x      lymph x      mono x      poly x                            10.6   7.45  )-----------( 148      ( 30 Aug 2023 13:55 )             32.0   baso x      eos x      imm gran x      lymph x      mono x      poly x                            7.0    5.61  )-----------( 141      ( 30 Aug 2023 06:00 )             22.5   baso x      eos x      imm gran x      lymph x      mono x      poly x          MEDICATIONS  (STANDING):  folic acid 1 milliGRAM(s) Oral daily  lactated ringers. 1000 milliLiter(s) (125 mL/Hr) IV Continuous <Continuous>  medroxyPROGESTERone 10 milliGRAM(s) Oral <User Schedule>  melatonin 9 milliGRAM(s) Oral at bedtime  methimazole 10 milliGRAM(s) Oral daily  metoprolol succinate ER 50 milliGRAM(s) Oral daily  sertraline 100 milliGRAM(s) Oral daily  traZODone 50 milliGRAM(s) Oral two times a day    MEDICATIONS  (PRN):  acetaminophen     Tablet .. 1000 milliGRAM(s) Oral every 6 hours PRN Mild Pain (1 - 3)  albuterol    90 MICROgram(s) HFA Inhaler 2 Puff(s) Inhalation every 6 hours PRN Shortness of Breath and/or Wheezing  diazepam    Tablet 5 milliGRAM(s) Oral two times a day PRN Anxiety  diphenhydrAMINE Injectable 50 milliGRAM(s) IV Push every 4 hours PRN Pruritus  oxyCODONE    IR 5 milliGRAM(s) Oral every 4 hours PRN Severe Pain (7 - 10)

## 2023-09-02 NOTE — PROGRESS NOTE ADULT - PROBLEM SELECTOR PLAN 1
presents as transfer from [place] with profuse vaginal bleeding, received  10uPRBC, 1uPlts, 2uFFP, TXA 1g x2, Depo-Provera inj  - now s/p UAE with IR on 8/31, H/H 9.1/28.3 stable, patient denies any further vaginal bleeding  - care per primary team, s/p pap smear/endometrial biopsy today per GYN.

## 2023-09-02 NOTE — PROGRESS NOTE ADULT - PROBLEM SELECTOR PLAN 4
endocrine consulted, recommending continue w/ methimazole 10mg qd   - per endo will need repeat TSH level, free T4 level, free T3 level and total T4 level on 9/7/2023;   - c/w metoprolol.

## 2023-09-02 NOTE — DISCHARGE NOTE NURSING/CASE MANAGEMENT/SOCIAL WORK - PATIENT PORTAL LINK FT
You can access the FollowMyHealth Patient Portal offered by [facility] by registering at the following website: [email or web address]. By joining [facility]’s FollowMyHealth portal, you will also be able to view your health information using other applications (apps) compatible with our system.

## 2023-09-03 LAB — TSH RECEP AB FLD-ACNC: 4.54 IU/L — HIGH (ref 0–1.75)

## 2023-09-06 ENCOUNTER — NON-APPOINTMENT (OUTPATIENT)
Age: 43
End: 2023-09-06

## 2023-09-07 LAB — TSI ACT/NOR SER: 0.25 IU/L — SIGNIFICANT CHANGE UP (ref 0–0.55)

## 2023-09-09 LAB — SURGICAL PATHOLOGY STUDY: SIGNIFICANT CHANGE UP

## 2023-09-12 NOTE — CAREGIVER ENGAGEMENT NOTE - DISCHARGE DATE
Anticoagulation Summary  As of 9/12/2023      INR goal:  2.5-3.5   TTR:  76.6 % (1.3 y)   INR used for dosing:  3.50 (9/8/2023)   Warfarin maintenance plan:  3 mg (2 mg x 1.5) every day   Weekly warfarin total:  21 mg   Plan last modified:  Candice Breaux LPN (5/26/2713)   Next INR check:  9/22/2023   Target end date:   Indefinite    Indications    Long term (current) use of anticoagulants [Z79.01]  S/P AVR (aortic valve replacement) [Z95.2]                 Anticoagulation Episode Summary       INR check location:  Home Draw    Preferred lab:      Send INR reminders to:  [facility] CARDIOLOGY YANNA PT    Comments:            Anticoagulation Care Providers       Provider Role Specialty Phone number    Christiano Bernstein MD [facility] Cardiology [phone] 31-Aug-2023

## 2023-09-14 NOTE — CHART NOTE - NSCHARTNOTESELECT_GEN_ALL_CORE
IR Pre Procedure Note
Overnight eval - R2
SICU transfer
Endocrinology/Event Note
Event Note
R3 Update in Care

## 2023-09-14 NOTE — CHART NOTE - NSCHARTNOTEFT_GEN_A_CORE
Patient is currently s/p UAE (8/30) for heavy vaginal bleeding. Pathology from UAE showed fragment of secretory endometrium. Patient currently has a post op appointment on 9/26@ 5a.    As patient's pathology has resulted and patient has a post operative appointment, patient to be removed from [facility] GYN follow up list    discussed w Dr Trice Vail PGY3

## 2023-09-18 ENCOUNTER — EMERGENCY (EMERGENCY)
Facility: HOSPITAL | Age: 43
LOS: 1 days | Discharge: HOME | End: 2023-09-18
Attending: EMERGENCY MEDICINE | Admitting: EMERGENCY MEDICINE
Payer: MEDICAID

## 2023-09-18 VITALS
HEART RATE: 101 BPM | WEIGHT: 220.02 LBS | OXYGEN SATURATION: 95 % | TEMPERATURE: 98 F | HEIGHT: 65 IN | RESPIRATION RATE: 18 BRPM | SYSTOLIC BLOOD PRESSURE: 126 MMHG | DIASTOLIC BLOOD PRESSURE: 85 MMHG

## 2023-09-18 DIAGNOSIS — Z92.89 PERSONAL HISTORY OF OTHER MEDICAL TREATMENT: Chronic | ICD-10-CM

## 2023-09-18 LAB
ALBUMIN SERPL ELPH-MCNC: 2.9 G/DL — LOW (ref 3.3–5)
ALP SERPL-CCNC: 137 U/L — HIGH (ref 30–120)
ALT FLD-CCNC: 25 U/L — SIGNIFICANT CHANGE UP (ref 10–60)
ANION GAP SERPL CALC-SCNC: 12 MMOL/L — SIGNIFICANT CHANGE UP (ref 5–17)
APTT BLD: 36.3 SEC — HIGH (ref 24.5–35.6)
AST SERPL-CCNC: 51 U/L — HIGH (ref 10–40)
BASOPHILS # BLD AUTO: 0.03 K/UL — SIGNIFICANT CHANGE UP (ref 0–0.2)
BASOPHILS NFR BLD AUTO: 0.6 % — SIGNIFICANT CHANGE UP (ref 0–2)
BILIRUB SERPL-MCNC: 0.5 MG/DL — SIGNIFICANT CHANGE UP (ref 0.2–1.2)
BUN SERPL-MCNC: 5 MG/DL — LOW (ref 7–23)
CALCIUM SERPL-MCNC: 8.9 MG/DL — SIGNIFICANT CHANGE UP (ref 8.4–10.5)
CHLORIDE SERPL-SCNC: 111 MMOL/L — HIGH (ref 96–108)
CO2 SERPL-SCNC: 24 MMOL/L — SIGNIFICANT CHANGE UP (ref 22–31)
CREAT SERPL-MCNC: 0.41 MG/DL — LOW (ref 0.5–1.3)
EGFR: 124 ML/MIN/1.73M2 — SIGNIFICANT CHANGE UP
EOSINOPHIL # BLD AUTO: 0 K/UL — SIGNIFICANT CHANGE UP (ref 0–0.5)
EOSINOPHIL NFR BLD AUTO: 0 % — SIGNIFICANT CHANGE UP (ref 0–6)
ETHANOL SERPL-MCNC: 306 MG/DL — HIGH (ref 0–3)
GLUCOSE SERPL-MCNC: 87 MG/DL — SIGNIFICANT CHANGE UP (ref 70–99)
HCG SERPL-ACNC: 2 MIU/ML — SIGNIFICANT CHANGE UP
HCT VFR BLD CALC: 34.9 % — SIGNIFICANT CHANGE UP (ref 34.5–45)
HGB BLD-MCNC: 10.5 G/DL — LOW (ref 11.5–15.5)
IMM GRANULOCYTES NFR BLD AUTO: 0 % — SIGNIFICANT CHANGE UP (ref 0–0.9)
INR BLD: 1.03 RATIO — SIGNIFICANT CHANGE UP (ref 0.85–1.18)
LYMPHOCYTES # BLD AUTO: 1.52 K/UL — SIGNIFICANT CHANGE UP (ref 1–3.3)
LYMPHOCYTES # BLD AUTO: 28.5 % — SIGNIFICANT CHANGE UP (ref 13–44)
MAGNESIUM SERPL-MCNC: 1.6 MG/DL — SIGNIFICANT CHANGE UP (ref 1.6–2.6)
MCHC RBC-ENTMCNC: 26.5 PG — LOW (ref 27–34)
MCHC RBC-ENTMCNC: 30.1 GM/DL — LOW (ref 32–36)
MCV RBC AUTO: 88.1 FL — SIGNIFICANT CHANGE UP (ref 80–100)
MONOCYTES # BLD AUTO: 0.31 K/UL — SIGNIFICANT CHANGE UP (ref 0–0.9)
MONOCYTES NFR BLD AUTO: 5.8 % — SIGNIFICANT CHANGE UP (ref 2–14)
NEUTROPHILS # BLD AUTO: 3.47 K/UL — SIGNIFICANT CHANGE UP (ref 1.8–7.4)
NEUTROPHILS NFR BLD AUTO: 65.1 % — SIGNIFICANT CHANGE UP (ref 43–77)
NRBC # BLD: 0 /100 WBCS — SIGNIFICANT CHANGE UP (ref 0–0)
PLATELET # BLD AUTO: 229 K/UL — SIGNIFICANT CHANGE UP (ref 150–400)
POTASSIUM SERPL-MCNC: 3.1 MMOL/L — LOW (ref 3.5–5.3)
POTASSIUM SERPL-SCNC: 3.1 MMOL/L — LOW (ref 3.5–5.3)
PROT SERPL-MCNC: 6.8 G/DL — SIGNIFICANT CHANGE UP (ref 6–8.3)
PROTHROM AB SERPL-ACNC: 11.5 SEC — SIGNIFICANT CHANGE UP (ref 9.5–13)
RBC # BLD: 3.96 M/UL — SIGNIFICANT CHANGE UP (ref 3.8–5.2)
RBC # FLD: 19.3 % — HIGH (ref 10.3–14.5)
SODIUM SERPL-SCNC: 147 MMOL/L — HIGH (ref 135–145)
TROPONIN I, HIGH SENSITIVITY RESULT: 5.9 NG/L — SIGNIFICANT CHANGE UP
WBC # BLD: 5.33 K/UL — SIGNIFICANT CHANGE UP (ref 3.8–10.5)
WBC # FLD AUTO: 5.33 K/UL — SIGNIFICANT CHANGE UP (ref 3.8–10.5)

## 2023-09-18 PROCEDURE — 93010 ELECTROCARDIOGRAM REPORT: CPT

## 2023-09-18 PROCEDURE — 99284 EMERGENCY DEPT VISIT MOD MDM: CPT

## 2023-09-18 RX ORDER — SODIUM CHLORIDE 9 MG/ML
1000 INJECTION INTRAMUSCULAR; INTRAVENOUS; SUBCUTANEOUS ONCE
Refills: 0 | Status: COMPLETED | OUTPATIENT
Start: 2023-09-18 | End: 2023-09-18

## 2023-09-18 RX ORDER — POTASSIUM CHLORIDE 20 MEQ
40 PACKET (EA) ORAL ONCE
Refills: 0 | Status: COMPLETED | OUTPATIENT
Start: 2023-09-18 | End: 2023-09-18

## 2023-09-18 NOTE — ED ADULT TRIAGE NOTE - CHIEF COMPLAINT QUOTE
PT BIB EMS from home c/o vaginal bleeding and syncope today; pt had uterine fibroids and had surgery about 1 week ago

## 2023-09-18 NOTE — ED PROVIDER NOTE - CLINICAL SUMMARY MEDICAL DECISION MAKING FREE TEXT BOX
No they asked for the name of the chaperone here patient with episode of syncope having vaginal bleeding for the last few days.  Has history of anemia secondary to vaginal bleeding.  Will get labs and EKG.  Will transfuse if indicated by hemoglobin level.

## 2023-09-18 NOTE — ED PROVIDER NOTE - PATIENT PORTAL LINK FT
You can access the FollowMyHealth Patient Portal offered by [facility] by registering at the following website: [email or web address]. By joining [facility]’s FollowMyHealth portal, you will also be able to view your health information using other applications (apps) compatible with our system.

## 2023-09-18 NOTE — ED ADULT NURSE NOTE - NSFALLRISKINTERV_ED_ALL_ED

## 2023-09-18 NOTE — ED PROVIDER NOTE - OBJECTIVE STATEMENT
Patient presents to emergency department after an episode of syncope.  Patient states that she had a uterine ablation a week ago.  States she had heavy vaginal bleeding at the time and became very anemic.  Now states she has had recurrent bleeding for the past 3 days.  Tonight was having a gathering with friends and had a baljit and passed out.  Patient states that she passes out "all the time".  No injury when she passed out.  No nausea or vomiting.  EMS states that patient was a little dizzy when they stood her up.  No chest pain or palpitations.

## 2023-09-18 NOTE — ED ADULT NURSE NOTE - NS ED NURSE LEVEL OF CONSCIOUSNESS MENTAL STATUS
Awake/Alert Drysol Counseling:  I discussed with the patient the risks of drysol/aluminum chloride including but not limited to skin rash, itching, irritation, burning.

## 2023-09-18 NOTE — ED ADULT NURSE NOTE - OBJECTIVE STATEMENT
pt BIBA for evaluation of s/p syncopal episode at home, pt reports she had uterine ablation done one week ago. Tonight was at a party, admits drinking 1 baljit and passed out, denies SOB, CP, palpitation or dizziness. speech clear, GUTIERREZ, denies pain or discomfort,  c/o mild vaginal bleeding for the last 3 days.

## 2023-09-19 VITALS
TEMPERATURE: 98 F | OXYGEN SATURATION: 100 % | RESPIRATION RATE: 18 BRPM | DIASTOLIC BLOOD PRESSURE: 63 MMHG | HEART RATE: 93 BPM | SYSTOLIC BLOOD PRESSURE: 122 MMHG

## 2023-09-19 LAB — BLD GP AB SCN SERPL QL: SIGNIFICANT CHANGE UP

## 2023-09-19 PROCEDURE — 84484 ASSAY OF TROPONIN QUANT: CPT

## 2023-09-19 PROCEDURE — 86900 BLOOD TYPING SEROLOGIC ABO: CPT

## 2023-09-19 PROCEDURE — 80307 DRUG TEST PRSMV CHEM ANLYZR: CPT

## 2023-09-19 PROCEDURE — 80053 COMPREHEN METABOLIC PANEL: CPT

## 2023-09-19 PROCEDURE — 93005 ELECTROCARDIOGRAM TRACING: CPT

## 2023-09-19 PROCEDURE — 99284 EMERGENCY DEPT VISIT MOD MDM: CPT | Mod: 25

## 2023-09-19 PROCEDURE — 83735 ASSAY OF MAGNESIUM: CPT

## 2023-09-19 PROCEDURE — 85025 COMPLETE CBC W/AUTO DIFF WBC: CPT

## 2023-09-19 PROCEDURE — 86901 BLOOD TYPING SEROLOGIC RH(D): CPT

## 2023-09-19 PROCEDURE — 84702 CHORIONIC GONADOTROPIN TEST: CPT

## 2023-09-19 PROCEDURE — 85730 THROMBOPLASTIN TIME PARTIAL: CPT

## 2023-09-19 PROCEDURE — 36415 COLL VENOUS BLD VENIPUNCTURE: CPT

## 2023-09-19 PROCEDURE — 86850 RBC ANTIBODY SCREEN: CPT

## 2023-09-19 PROCEDURE — 85610 PROTHROMBIN TIME: CPT

## 2023-09-19 RX ADMIN — Medication 40 MILLIEQUIVALENT(S): at 00:07

## 2023-09-19 NOTE — ED ADULT NURSE REASSESSMENT NOTE - NS ED NURSE REASSESS COMMENT FT1
pt awake the whole time over the phone, steady gait, denies any complaints, VSS, pt reevaluated by MD, d/c home with f/u instructions,

## 2023-09-22 ENCOUNTER — TRANSCRIPTION ENCOUNTER (OUTPATIENT)
Age: 43
End: 2023-09-22

## 2023-09-26 ENCOUNTER — APPOINTMENT (OUTPATIENT)
Dept: OBGYN | Facility: HOSPITAL | Age: 43
End: 2023-09-26

## 2023-09-27 NOTE — ED PROVIDER NOTE - CLINICAL SUMMARY MEDICAL DECISION MAKING FREE TEXT BOX
Assessment/Plan:  1. Primary osteoarthritis of left knee  Large joint arthrocentesis      2. Chronic pain of left knee  Large joint arthrocentesis        Oksana Gan is a pleasant 78-year-old female presenting today for the first of 3 Euflexxa injections for active related left knee pain due to her underlying osteoarthritis. She consented to and underwent the injection as detailed below, which she tolerated well without difficulty or complication. Postinjection instructions were provided. We will plan to see her back next week and the week after to complete the series. All questions addressed    Large joint arthrocentesis: L knee  Universal Protocol:  Consent: Verbal consent obtained. Risks and benefits: risks, benefits and alternatives were discussed  Consent given by: patient  Time out: Immediately prior to procedure a "time out" was called to verify the correct patient, procedure, equipment, support staff and site/side marked as required. Timeout called at: 9/27/2023 2:05 PM.  Site marked: the operative site was marked  Patient identity confirmed: verbally with patient    Supporting Documentation  Indications: pain   Procedure Details  Location: knee - L knee  Preparation: Patient was prepped and draped in the usual sterile fashion  Needle size: 20 G  Ultrasound guidance: no  Approach: anterolateral  Medications administered: 20 mg Sodium Hyaluronate (Viscosup) 20 MG/2ML  Specialty Pharmacy Supplied: received medications from pharmacy  Patient tolerance: patient tolerated the procedure well with no immediate complications  Dressing:  Sterile dressing applied        Subjective: Left knee Euflexxa #1    Patient ID: Damaso Reyes is a 60 y.o. female presenting today to initiate viscosupplementation for her active related left knee pain and underlying osteoarthritis.   She denies new injury    Review of Systems      Past Medical History:   Diagnosis Date   • Allergic rhinitis    • Asthma    • Fibroids    • Physiological ovarian cysts    • Sleep apnea     denatl device worn       Past Surgical History:   Procedure Laterality Date   • COLONOSCOPY  2016    dr Regla George   • KNEE ARTHROSCOPY Left 2018    meniscectomy x2, first done in 2015   • MYOMECTOMY     • MD ARTHRP KNE CONDYLE&PLATU MEDIAL&LAT COMPARTMENTS Right 3/6/2023    Procedure: ARTHROPLASTY KNEE TOTAL W ROBOT - RIGHT - PRESS FIT - SAME DAY; Surgeon: Nereyda Horan DO;  Location: [facility];  Service: Orthopedics   • MD ARTHRS KNE SURG W/MENISCECTOMY MED/LAT W/SHVG Right 1/6/2022    Procedure: KNEE ARTHROSCOPY MENISCECTOMY MEDIAL;  Surgeon: Daphney Muniz MD;  Location: [facility];  Service: Orthopedics   • MD COLONOSCOPY FLX DX W/COLLJ SPEC WHEN PFRMD N/A 9/13/2018    Procedure: COLONOSCOPY;  Surgeon: Benito Chandler MD;  Location: [address] [place] [address] [address] [facility];   Service: Gastroenterology   • WRIST SURGERY Right        Family History   Problem Relation Age of Onset   • Osteoporosis Mother    • Thyroid disease Mother    • Colon cancer Mother 79   • Colon polyps Mother    • Cancer Mother         colon   • Alzheimer's disease Father    • Hypertension Father    • Dementia Father    • Heart disease Brother    • No Known Problems Daughter    • No Known Problems Daughter    • No Known Problems Maternal Aunt    • No Known Problems Maternal Aunt    • No Known Problems Paternal Aunt    • No Known Problems Paternal Aunt    • No Known Problems Paternal Aunt    • No Known Problems Paternal Aunt    • No Known Problems Paternal Aunt    • No Known Problems Paternal Aunt    • No Known Problems Paternal Aunt    • No Known Problems Paternal Aunt        Social History     Occupational History   • Not on file   Tobacco Use   • Smoking status: Never   • Smokeless tobacco: Never   Vaping Use   • Vaping Use: Never used   Substance and Sexual Activity   • Alcohol use: Yes     Comment: social - wine 2 x month   • Drug use: Never   • Sexual activity: Yes     Partners: Male     Birth control/protection: Condom Male         Current Outpatient Medications:   •  acetaminophen (TYLENOL) 325 mg tablet, Take 3 tablets (975 mg total) by mouth every 8 (eight) hours, Disp: , Rfl: 0  •  albuterol (2.5 mg/3 mL) 0.083 % nebulizer solution, Take 1 vial (2.5 mg total) by nebulization every 6 (six) hours as needed for wheezing or shortness of breath, Disp: 100 mL, Rfl: 0  •  Calcium Carb-Cholecalciferol 600-500 MG-UNIT CAPS, Take by mouth, Disp: , Rfl:   •  Fluticasone-Salmeterol (Advair Diskus) 500-50 mcg/dose inhaler, Inhale 1 puff 2 (two) times a day Rinse mouth after use., Disp: 180 blister, Rfl: 1  •  multivitamin (THERAGRAN) TABS, Take 1 tablet by mouth daily, Disp: , Rfl:   •  Respiratory Therapy Supplies (NEBULIZER/TUBING/MOUTHPIECE) KIT, by Does not apply route 4 (four) times a day, Disp: 1 each, Rfl: 0  •  valACYclovir (VALTREX) 1,000 mg tablet, 2 tabs at earliest onset of cold sore, repeat once in 12 hours. Take 500mg bid for 3 days for genital herpes flare up., Disp: 40 tablet, Rfl: 5    No Known Allergies    Objective: There were no vitals filed for this visit. There is no height or weight on file to calculate BMI. Ortho Exam    Physical Exam    This document was created using speech voice recognition software. Grammatical errors, random word insertions, pronoun errors, and incomplete sentences are an occasional consequence of this system due to software limitations, ambient noise, and hardware issues. Any formal questions or concerns about content, text, or information contained within the body of this dictation should be directly addressed to the provider for clarification. Acute CP, Dyspnea over past 2 days. check labs, XR, dimer, US

## 2023-10-18 NOTE — ED ADULT NURSE NOTE - GENITOURINARY ASSESSMENT
- - - Scc Moderately Differentiated Histology Text: There are irregular masses of epidermal cells in the upper dermis.  Within the tumor masses, there are varying proportions of normal squamous cells and anaplastic squamous cells.  The anaplastic cells have variation in size and shape with hyperplasia and hyperchromasia of the nuclei, absence of intracellular bridges and varying degrees of keratinization.  A moderately differentiated pattern is noted.

## 2023-11-10 NOTE — H&P ADULT - TIME BILLING
11/10/2023-PENDING    performing the following activities: Preparing to see the patient, Obtaining and/or reviewing separately obtained history, Performing a medically appropriate examination and/or evaluation, Documenting clinical information in the medical record, Care coordination , Counseling and educating the patient/family/caregiver, Ordering medications, tests, or procedures, Independently interpreting results and communicating results to the patient/family/caregiver , and Referring and communicating with other health care professionals. More than 50% of time spent with direct patient care

## 2023-11-30 NOTE — ED ADULT NURSE NOTE - ED COMFORT CARE
Chief complaint:   Chief Complaint   Patient presents with   • URI   • Fever     24       Vitals:  Visit Vitals  /78 (BP Location: RUE - Right upper extremity, Patient Position: Sitting)   Pulse 84   Temp 97.6 °F (36.4 °C) (Oral)   Resp (!) 20   Ht 5' 4\" (1.626 m)   Wt (!) 143.8 kg (317 lb)   LMP 01/01/2016   SpO2 98%   BMI 54.41 kg/m²       HISTORY OF PRESENT ILLNESS     52 year old female presents due to runny nose, nasal congestion, sinus pressure/pain, ear pain, and chills which started 3 days prior. States she has been feeling so fatigued she has spent the past couple of days in bed which is very out of character for her.    Reports fevers at the start of illness but since resolved. 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 Reports she is vaccinated for covid. Denies any OTC medication use. PMH GERD, HTN    Denies any known history of covid. Does smoke tobacco products.  Sinus Problem  This is a new problem. The current episode started in the past 7 days. The problem occurs constantly. The problem has been gradually worsening. Associated symptoms include chills, congestion, coughing, fatigue and a fever. Pertinent negatives include no abdominal pain, anorexia, arthralgias, change in bowel habit, chest pain, diaphoresis, headaches, joint swelling, myalgias, nausea, neck pain, numbness, rash, sore throat, swollen glands, urinary symptoms, vertigo, visual change, vomiting or weakness. Nothing aggravates the symptoms. She has tried nothing for the symptoms.       Other significant problems:  There are no problems to display for this patient.      PAST MEDICAL, FAMILY AND SOCIAL HISTORY     Medications:  Current Outpatient Medications   Medication Sig Dispense Refill   • cefdinir (OMNICEF) 300 MG capsule Take 1 capsule by mouth in the morning and 1 capsule in the evening. Do all this for 7 days. Start with 2 caps at the same time today. 14  capsule 0   • azithromycin (Zithromax Z-Nj) 250 MG tablet X 5 days. 2 tablets day one, then 1 tablet days 2-5 as instructed on packaging. 6 tablet 0   • albuterol 108 (90 Base) MCG/ACT inhaler Inhale 2 puffs into the lungs every 4 hours as needed for Other (cough). 1 each 0   • cetirizine (ZYRTEC) 10 MG tablet Take 10 mg by mouth daily.     • clobetasol (TEMOVATE) 0.05 % cream Apply topically 2 times daily.     • atorvastatin (LIPITOR) 20 MG tablet Take 20 mg by mouth daily.     • lisinopril (ZESTRIL) 20 MG tablet Take 20 mg by mouth daily.     • pantoprazole (PROTONIX) 40 MG tablet Take 40 mg by mouth daily.     • orlistat (XENICAL) 120 MG capsule Take 1 capsule by mouth 3 times daily (with meals). 90 capsule 3   • ATENOLOL TABS 50 MG PO 1 TAB PO  4     No current facility-administered medications for this visit.       Allergies:  ALLERGIES:   Allergen Reactions   • Penicillins      rx with family members   • Pseudoephedrine      increased BP       Past Medical  History/Surgeries:  Past Medical History:   Diagnosis Date   • Esophageal reflux    • Essential (primary) hypertension        Past Surgical History:   Procedure Laterality Date   • Cholecystectomy     • Lipoma resection         Family History:  No family history on file.    Social History:  Social History     Tobacco Use   • Smoking status: Every Day   • Smokeless tobacco: Never   Substance Use Topics   • Alcohol use: Not on file       REVIEW OF SYSTEMS     Review of Systems   Constitutional: Positive for chills, fatigue and fever. Negative for diaphoresis.   HENT: Positive for congestion, ear pain, sinus pressure and sinus pain. Negative for sore throat.    Respiratory: Positive for cough.    Cardiovascular: Negative for chest pain.   Gastrointestinal: Negative for abdominal pain, anorexia, change in bowel habit, nausea and vomiting.   Musculoskeletal: Negative for arthralgias, joint swelling, myalgias and neck pain.   Skin: Negative for rash.    Neurological: Negative for vertigo, weakness, numbness and headaches.   All other systems reviewed and are negative.      PHYSICAL EXAM     Physical Exam  Vitals and nursing note reviewed.   Constitutional:       General: She is not in acute distress.     Appearance: Normal appearance. She is obese. She is not ill-appearing, toxic-appearing or diaphoretic.   HENT:      Head: Normocephalic and atraumatic.      Right Ear: Tympanic membrane, ear canal and external ear normal. There is no impacted cerumen.      Left Ear: Tympanic membrane, ear canal and external ear normal. There is no impacted cerumen.      Nose: Congestion present.      Mouth/Throat:      Mouth: Mucous membranes are moist.      Pharynx: Oropharynx is clear. No oropharyngeal exudate or posterior oropharyngeal erythema.      Neck: Normal range of motion and neck supple. No rigidity.   Eyes:      General:         Right eye: No discharge.         Left eye: No discharge.      Extraocular Movements: Extraocular movements intact.      Conjunctiva/sclera: Conjunctivae normal.   Cardiovascular:      Rate and Rhythm: Normal rate and regular rhythm.      Pulses: Normal pulses.      Heart sounds: Normal heart sounds. No murmur heard.     No friction rub. No gallop.   Pulmonary:      Effort: Pulmonary effort is normal. No respiratory distress.      Breath sounds: Rales present. No wheezing or rhonchi.      Comments: Faint crackles present to bilateral posterior lung bases.  Chest:      Chest wall: No tenderness.   Abdominal:      General: Abdomen is flat. There is no distension.      Palpations: Abdomen is soft. There is no mass.      Tenderness: There is no abdominal tenderness. There is no guarding or rebound.      Hernia: No hernia is present.   Musculoskeletal:         General: No swelling, tenderness, deformity or signs of injury. Normal range of motion.      Right lower leg: No edema.      Left lower leg: No edema.   Skin:     General: Skin is warm and  dry.      Capillary Refill: Capillary refill takes less than 2 seconds.      Coloration: Skin is not jaundiced or pale.      Findings: No bruising, erythema, lesion or rash.   Neurological:      General: No focal deficit present.      Mental Status: She is alert and oriented to person, place, and time. Mental status is at baseline.      Gait: Gait normal.   Psychiatric:         Mood and Affect: Mood normal.         Behavior: Behavior normal.         Thought Content: Thought content normal.         Judgment: Judgment normal.       Study Result    Narrative & Impression   EXAM: XR CHEST PA AND LATERAL 2 VIEWS     CLINICAL INDICATION: Acute cough.     TECHNIQUE:  Frontal and lateral views of the chest were obtained.     COMPARISON: None available.     FINDINGS:     The cardiomediastinal silhouette appears to be within normal limits. The  hilar contours are normal. The pulmonary vasculature is normally  distributed. Mild patchy right lower lung airspace opacity. The lungs and  pleural spaces are otherwise clear.        IMPRESSION:     Mild patchy right lower lung airspace opacity, nonspecific but potentially  infectious or atelectatic in nature.     Electronically Signed by: Rudi Wells DO  Signed on: 11/30/2023 10:58 AM  Created on Workstation ID: [id number]  Signed on Workstation ID: [id number]     Results for orders placed or performed in visit on 11/30/23   SARS-COV-2/INFLUENZA BY PCR   Result Value    Rapid SARS-COV-2 by PCR Not Detected    Influenza A by PCR Not Detected    Influenza B by PCR Not Detected    Isolation Guidelines      Comment: Do not use this test result as the sole decision-maker for discontinuation of isolation.   Clinical evaluation should be considered for other respiratory illness requiring transmission-based isolation.    -    No fever (<99.0 F/37.2 C) for at least 24 hours without the use of fever-reducing medications    AND  -    Respiratory symptoms have improved or resolved (e.g.  cough, shortness of breath)     AND  -    COVID-19 negative test    See COVID-19 Deisolation Resource Guide    Procedural Comment      Comment: SARS-COV-2 nucleic acid has not been detected indicating the absence of COVID-19.    This test was performed using the [employer] Xpert Xpress SARS-CoV-2/Flu/RSV RT-PCR test that has been given Emergency Use Authorization (EUA) by the [place] Food and Drug Administration (FDA). These tests are considered definitive and do not need to be confirmed by another method.       ASSESSMENT/PLAN   52 year old female presents due to runny nose, nasal congestion, sinus pressure/pain, ear pain, and chills which started 3 days prior. Covid and influenza testing negative. CXR shows a right lower lung pneumonia. Patient declined a neb treatment while in the UC. Omnicef and azithromycin course prescribed. Albuterol PRN for symptomatic relief. The patient is non-toxic and in no respiratory distress. Discussed and provided in the discharge paperwork reasons to return to the ER/UC as well as symptomatic treatment measures. The patient states her grandmother had an anaphylaxis reaction to penicillin, her mother had hives. No known personal reaction to penicillins. Discussed the risks vs benefits of omnicef use. The patient verbalized understanding and agreement with the plan of care. DDX considered but not limited to or likely includes sepsis, ARDS, pneumothorax, CHF, PE, a sinus cavernous thrombosis, and raul-orbital/orbital cellulitis.      Yamileth was seen today for uri and fever.    Diagnoses and all orders for this visit:    Suspected COVID-19 virus infection  -     SARS-COV-2/INFLUENZA BY PCR    Flu-like symptoms  -     SARS-COV-2/INFLUENZA BY PCR    Acute cough  -     XR CHEST PA AND LATERAL 2 VIEWS; Future    Other orders  -     cefdinir (OMNICEF) 300 MG capsule; Take 1 capsule by mouth in the morning and 1 capsule in the evening. Do all this for 7 days. Start with 2 caps at the same  time today.  -     azithromycin (Zithromax Z-Nj) 250 MG tablet; X 5 days. 2 tablets day one, then 1 tablet days 2-5 as instructed on packaging.  -     albuterol 108 (90 Base) MCG/ACT inhaler; Inhale 2 puffs into the lungs every 4 hours as needed for Other (cough).        All questions answered and patient (parent if applicable) in agreement with treatment and discharge plan. Patient appropriately stable at time of discharge from urgent care clinic. The provisional diagnosis that the patient is discharged with today was based on the history taken, presenting symptoms, physical exam, and/or any ancillary testing. Patient (parent if applicable) states understanding that often times the diagnosis can change. If new symptoms occur or worsen, patient should seek immediate medical attention for re-evaluation. Follow up with Primary Care Provider as discussed in the after visit summary.    See the patient discharge instructions section for additional instructions, follow-up plans and/or ER precautions discussed with the patient.     Patient informed

## 2024-01-01 NOTE — ED ADULT TRIAGE NOTE - STATUS:
" History & Physical    Gender: male BW: 8 lb 1.2 oz (3663 g)   Age: 18 hours OB:    Gestational Age at Birth: Gestational Age: 40w3d Follow- Up Pediatrician:       Maternal Information:     Mother's Name: Lesley Sanchez    Age: 24 y.o.     Maternal Prenatal Labs -- transcribed from office records:   ABO Type   Date Value Ref Range Status   2024 B  Final     RH type   Date Value Ref Range Status   2024 Positive  Final     Antibody Screen   Date Value Ref Range Status   2024 Negative  Final      HIV DUO   Date Value Ref Range Status   2024 Non-Reactive Non-Reactive Final     External Strep Group B Ag   Date Value Ref Range Status   2024 POS  Final      No results found for: \"AMPHETSCREEN\", \"BARBITSCNUR\", \"LABBENZSCN\", \"LABMETHSCN\", \"PCPUR\", \"LABOPIASCN\", \"THCURSCR\", \"COCSCRUR\", \"PROPOXSCN\", \"BUPRENORSCNU\", \"OXYCODONESCN\", \"TRICYCLICSCN\", \"UDS\"      Information for the patient's mother:  Lesley Sanchez [[id number]]     Patient Active Problem List   Diagnosis    Encounter for induction of labor         Mother's Past Medical and Social History:      Maternal /Para:    Maternal PMH:  History reviewed. No pertinent past medical history.   Maternal Social History:    Social History     Socioeconomic History    Marital status:    Tobacco Use    Smoking status: Never     Passive exposure: Never    Smokeless tobacco: Never   Vaping Use    Vaping status: Never Used   Substance and Sexual Activity    Alcohol use: Never    Drug use: Never    Sexual activity: Yes          Mother's Current Medications     Information for the patient's mother:  Lesley Sanchez [[id number]]   docusate sodium, 100 mg, Oral, BID  prenatal vitamin, 1 tablet, Oral, Daily       Labor Events      labor: No Induction:  Oxytocin    Steroids?  None Reason for Induction:  Post-term Gestation    Complications:    Labor complications:  None  Additional complications:     Rupture type:  spontaneous " "rupture of membranes Rupture time:  7:27 PM   Fluid Color:  Clear Antibiotics during Labor?  Yes           Delivery Information for Mary Sanchez     YOB: 2026 Delivery Clinician:     Time of birth:  2:28 PM Delivery type:  Vaginal, Spontaneous   Rupture date:  2024      Rupture time:  7:27 PM       Presentation/position:          Observed Anomalies:   Delivery Complications:        APGAR Scores:  Totals: 9   9       Anesthesia     Method: None     Analgesics:          APGAR SCORES             APGARS  One minute Five minutes Ten minutes   Skin color: 1   1        Heart rate: 2   2        Grimace: 2   2        Muscle tone: 2   2        Breathin   2        Totals: 9   9          Resuscitation     Suction: bulb syringe   Catheter size:     Suction below cords:     Intensive:       Objective      Information     Vital Signs Temp:  [98.1 °F (36.7 °C)-99.1 °F (37.3 °C)] 99.1 °F (37.3 °C)  Pulse:  [118-150] 150  Resp:  [40-60] 57  BP: (61-62)/(27-33) 62/33   Admission Vital Signs: Vitals  Temp: 98.8 °F (37.1 °C)  Temp src: Axillary  Pulse: 132  Heart Rate Source: Apical  Resp: 60  Resp Rate Source: Stethoscope  BP: 61/27  Noninvasive MAP (mmHg): 37  BP Location: Right arm  BP Method: Automatic  Patient Position: Lying   Birth Weight: 3663 g (8 lb 1.2 oz)   Birth Length: 20   Birth Head circumference: Head Circumference: 13.58\" (34.5 cm)   Current Weight: Weight: 3623 g (7 lb 15.8 oz)   Change in weight since birth: -1%   [place] Scores:  [place] Scores (last day)       None           Cord Information: 3 vessels     Disposition of cord blood:      Blood gases sent? No  Complications: None   Nuchal intervention:        Nuchal cord description:     Cord around:     Number of loops:     Delayed cord clamping? Yes  Cord clamped date/time: 2024  2:29 PM  Stem cells collected by MD? No  Comments:       Medications     erythromycin (ROMYCIN) ophthalmic ointment 1 Application       Date " Action Dose Route User    2024 1633 Given 1 Application Both Eyes Mari Muhammad, RN          hepatitis B vaccine (recombinant) (ENGERIX-B) injection 0.5 mL       Date Action Dose Route User    2024 1632 Given 0.5 mL Intramuscular (Left Anterior Thigh) Mari Muhammad, RN          phytonadione (VITAMIN K) injection 1 mg       Date Action Dose Route User    2024 1632 Given 1 mg Intramuscular (Right Anterior Thigh) Mari Muhammad, RN            Physical Exam     General appearance Normal Term male   Skin  No rashes or petchiae.   Head AFSF.  No caput. No cephalohematoma. No nuchal folds   Eyes  + RR bilaterally   Ears, Nose, Throat  Normal ears.  No ear pits. No ear tags.  Palate intact.   Thorax  Normal and symmetrical   Lungs Clear to auscultation bilaterally, No distress.   Heart  Normal rate and rhythm.  No murmur, gallops. Peripheral pulses strong and equal in all 4 extremities.   Abdomen + BS.  Soft. NT. ND.  No mass/HSM   Genitalia  normal male, testes descended bilaterally, no inguinal hernia, no hydrocele   Anus Anus patent   Trunk and Spine Spine normal and intact.  No atypical dimpling,    Extremities  Clavicles intact.  No hip clicks/clunks. Simian crease on rt hand   Neuro + [place], grasp, suck.  Normal Tone       Intake and Output     Feeding: breastfeed    No intake/output data recorded.  No intake/output data recorded.    Feedings:   Formula Feeding Review (last day)       None          Breastfeeding Review (last day)       Date/Time Breastfeeding Time, Left (min) Breastfeeding Time, Right (min) Who    09/27/24 0815 10 -- HS    09/27/24 0615 10 -- MM    09/27/24 0245 15 -- MM    09/27/24 0115 -- 15 MM    09/26/24 2315 15 -- MM    09/26/24 2026 -- 19 MM    09/26/24 1500 30 30 MM            Labs and Radiology     Prenatal labs:  reviewed    Baby's Blood type:   ABO Type   Date Value Ref Range Status   2024 B  Final     RH type   Date Value Ref Range Status   2024 Positive  Final         Labs:   Recent Results (from the past 96 hour(s))   Cord Blood Evaluation    Collection Time: 24  2:28 PM    Specimen: Umbilical Cord; Cord Blood   Result Value Ref Range    ABO Type B     RH type Positive     [place] IgG Negative    Umbilical Cord Tissue Hold - Tissue, Umbilical Cord    Collection Time: 24  2:28 PM    Specimen: Umbilical Cord; Tissue   Result Value Ref Range    Extra Tube Hold for add-ons.      TCB Review (last 2 days)       None            TCI:       Xrays:  No orders to display       Assessment & Plan     Discharge planning     Congenital Heart Disease Screen:  Blood Pressure/O2 Saturation/Weights   Vitals (last 7 days)       Date/Time BP BP Location SpO2 Weight    24 2255 -- -- -- 3623 g (7 lb 15.8 oz)    24 1632 62/33 Left leg -- --    24 1630 61/27 Right arm -- --    24 1428 -- -- -- 3663 g (8 lb 1.2 oz)     Weight: Filed from Delivery Summary at 24 1428              [facility]     Car Seat Challenge Test     Hearing Screen      [place] Screen         Immunization History   Administered Date(s) Administered    Hep B, Adolescent or Pediatric 2024       Discharge Diagnosis:    Principal Problem:          Date of Discharge:  2024    Discharge Disposition      Discharge Medications     Discharge Medications      Patient Not Prescribed Medications Upon Discharge           Follow-up Appointments  No future appointments.    Test Results Pending at Discharge    Assessment and Plan     Pt stable after vag delivery yest aft.  Mom is 24yr , B+ serology neg, GBS+ but treated adequately with pcn.  Baby is 40wk, nursing well with good output, B+, hannah neg.  Exam is nl.  Cont  rnbc    Jeff Campa MD  2024  08:57 EDT        Applied

## 2024-02-18 ENCOUNTER — INPATIENT (INPATIENT)
Facility: HOSPITAL | Age: 44
LOS: 1 days | Discharge: LEFT BEFORE DISCHARGE | DRG: 392 | End: 2024-02-20
Attending: INTERNAL MEDICINE | Admitting: INTERNAL MEDICINE
Payer: MEDICAID

## 2024-02-18 VITALS
TEMPERATURE: 97 F | DIASTOLIC BLOOD PRESSURE: 74 MMHG | HEIGHT: 65 IN | HEART RATE: 104 BPM | SYSTOLIC BLOOD PRESSURE: 109 MMHG | WEIGHT: 220.02 LBS | RESPIRATION RATE: 18 BRPM | OXYGEN SATURATION: 99 %

## 2024-02-18 DIAGNOSIS — Z92.89 PERSONAL HISTORY OF OTHER MEDICAL TREATMENT: Chronic | ICD-10-CM

## 2024-02-18 DIAGNOSIS — K51.00 ULCERATIVE (CHRONIC) PANCOLITIS WITHOUT COMPLICATIONS: ICD-10-CM

## 2024-02-18 LAB
ALBUMIN SERPL ELPH-MCNC: 3.2 G/DL — LOW (ref 3.3–5)
ALP SERPL-CCNC: 87 U/L — SIGNIFICANT CHANGE UP (ref 30–120)
ALT FLD-CCNC: 14 U/L — SIGNIFICANT CHANGE UP (ref 10–60)
ANION GAP SERPL CALC-SCNC: 20 MMOL/L — HIGH (ref 5–17)
AST SERPL-CCNC: 74 U/L — HIGH (ref 10–40)
BASOPHILS # BLD AUTO: 0.02 K/UL — SIGNIFICANT CHANGE UP (ref 0–0.2)
BASOPHILS NFR BLD AUTO: 0.3 % — SIGNIFICANT CHANGE UP (ref 0–2)
BILIRUB SERPL-MCNC: 1.6 MG/DL — HIGH (ref 0.2–1.2)
BUN SERPL-MCNC: 6 MG/DL — LOW (ref 7–23)
CALCIUM SERPL-MCNC: 7.5 MG/DL — LOW (ref 8.4–10.5)
CHLORIDE SERPL-SCNC: 98 MMOL/L — SIGNIFICANT CHANGE UP (ref 96–108)
CO2 SERPL-SCNC: 23 MMOL/L — SIGNIFICANT CHANGE UP (ref 22–31)
CREAT SERPL-MCNC: 0.75 MG/DL — SIGNIFICANT CHANGE UP (ref 0.5–1.3)
EGFR: 100 ML/MIN/1.73M2 — SIGNIFICANT CHANGE UP
EOSINOPHIL # BLD AUTO: 0 K/UL — SIGNIFICANT CHANGE UP (ref 0–0.5)
EOSINOPHIL NFR BLD AUTO: 0 % — SIGNIFICANT CHANGE UP (ref 0–6)
FLUAV AG NPH QL: SIGNIFICANT CHANGE UP
FLUBV AG NPH QL: SIGNIFICANT CHANGE UP
GLUCOSE SERPL-MCNC: 95 MG/DL — SIGNIFICANT CHANGE UP (ref 70–99)
HCG SERPL-ACNC: 1 MIU/ML — SIGNIFICANT CHANGE UP
HCT VFR BLD CALC: 30.9 % — LOW (ref 34.5–45)
HGB BLD-MCNC: 9.7 G/DL — LOW (ref 11.5–15.5)
IMM GRANULOCYTES NFR BLD AUTO: 0.3 % — SIGNIFICANT CHANGE UP (ref 0–0.9)
LIDOCAIN IGE QN: 12 U/L — LOW (ref 16–77)
LYMPHOCYTES # BLD AUTO: 0.38 K/UL — LOW (ref 1–3.3)
LYMPHOCYTES # BLD AUTO: 6.1 % — LOW (ref 13–44)
MAGNESIUM SERPL-MCNC: 0.8 MG/DL — CRITICAL LOW (ref 1.6–2.6)
MCHC RBC-ENTMCNC: 26.4 PG — LOW (ref 27–34)
MCHC RBC-ENTMCNC: 31.4 GM/DL — LOW (ref 32–36)
MCV RBC AUTO: 84.2 FL — SIGNIFICANT CHANGE UP (ref 80–100)
MONOCYTES # BLD AUTO: 0.29 K/UL — SIGNIFICANT CHANGE UP (ref 0–0.9)
MONOCYTES NFR BLD AUTO: 4.7 % — SIGNIFICANT CHANGE UP (ref 2–14)
NEUTROPHILS # BLD AUTO: 5.49 K/UL — SIGNIFICANT CHANGE UP (ref 1.8–7.4)
NEUTROPHILS NFR BLD AUTO: 88.6 % — HIGH (ref 43–77)
NRBC # BLD: 0 /100 WBCS — SIGNIFICANT CHANGE UP (ref 0–0)
PLATELET # BLD AUTO: 210 K/UL — SIGNIFICANT CHANGE UP (ref 150–400)
POTASSIUM SERPL-MCNC: 2.8 MMOL/L — CRITICAL LOW (ref 3.5–5.3)
POTASSIUM SERPL-SCNC: 2.8 MMOL/L — CRITICAL LOW (ref 3.5–5.3)
PROT SERPL-MCNC: 6.8 G/DL — SIGNIFICANT CHANGE UP (ref 6–8.3)
RBC # BLD: 3.67 M/UL — LOW (ref 3.8–5.2)
RBC # FLD: 19.2 % — HIGH (ref 10.3–14.5)
RSV RNA NPH QL NAA+NON-PROBE: SIGNIFICANT CHANGE UP
SARS-COV-2 RNA SPEC QL NAA+PROBE: SIGNIFICANT CHANGE UP
SODIUM SERPL-SCNC: 141 MMOL/L — SIGNIFICANT CHANGE UP (ref 135–145)
WBC # BLD: 6.2 K/UL — SIGNIFICANT CHANGE UP (ref 3.8–10.5)
WBC # FLD AUTO: 6.2 K/UL — SIGNIFICANT CHANGE UP (ref 3.8–10.5)

## 2024-02-18 PROCEDURE — 99285 EMERGENCY DEPT VISIT HI MDM: CPT

## 2024-02-18 PROCEDURE — 74177 CT ABD & PELVIS W/CONTRAST: CPT | Mod: 26,MA

## 2024-02-18 PROCEDURE — 93010 ELECTROCARDIOGRAM REPORT: CPT

## 2024-02-18 RX ORDER — MAGNESIUM SULFATE 500 MG/ML
2 VIAL (ML) INJECTION ONCE
Refills: 0 | Status: COMPLETED | OUTPATIENT
Start: 2024-02-18 | End: 2024-02-18

## 2024-02-18 RX ORDER — MORPHINE SULFATE 50 MG/1
4 CAPSULE, EXTENDED RELEASE ORAL ONCE
Refills: 0 | Status: DISCONTINUED | OUTPATIENT
Start: 2024-02-18 | End: 2024-02-18

## 2024-02-18 RX ORDER — SODIUM CHLORIDE 9 MG/ML
1000 INJECTION INTRAMUSCULAR; INTRAVENOUS; SUBCUTANEOUS ONCE
Refills: 0 | Status: COMPLETED | OUTPATIENT
Start: 2024-02-18 | End: 2024-02-18

## 2024-02-18 RX ORDER — ONDANSETRON 8 MG/1
4 TABLET, FILM COATED ORAL ONCE
Refills: 0 | Status: COMPLETED | OUTPATIENT
Start: 2024-02-18 | End: 2024-02-18

## 2024-02-18 RX ORDER — POTASSIUM CHLORIDE 20 MEQ
10 PACKET (EA) ORAL
Refills: 0 | Status: COMPLETED | OUTPATIENT
Start: 2024-02-18 | End: 2024-02-19

## 2024-02-18 RX ORDER — HYDROMORPHONE HYDROCHLORIDE 2 MG/ML
0.5 INJECTION INTRAMUSCULAR; INTRAVENOUS; SUBCUTANEOUS ONCE
Refills: 0 | Status: DISCONTINUED | OUTPATIENT
Start: 2024-02-18 | End: 2024-02-18

## 2024-02-18 RX ADMIN — SODIUM CHLORIDE 1000 MILLILITER(S): 9 INJECTION INTRAMUSCULAR; INTRAVENOUS; SUBCUTANEOUS at 23:00

## 2024-02-18 RX ADMIN — Medication 100 MILLIEQUIVALENT(S): at 23:20

## 2024-02-18 RX ADMIN — HYDROMORPHONE HYDROCHLORIDE 0.5 MILLIGRAM(S): 2 INJECTION INTRAMUSCULAR; INTRAVENOUS; SUBCUTANEOUS at 22:56

## 2024-02-18 RX ADMIN — SODIUM CHLORIDE 1000 MILLILITER(S): 9 INJECTION INTRAMUSCULAR; INTRAVENOUS; SUBCUTANEOUS at 20:50

## 2024-02-18 RX ADMIN — SODIUM CHLORIDE 1000 MILLILITER(S): 9 INJECTION INTRAMUSCULAR; INTRAVENOUS; SUBCUTANEOUS at 23:59

## 2024-02-18 RX ADMIN — SODIUM CHLORIDE 1000 MILLILITER(S): 9 INJECTION INTRAMUSCULAR; INTRAVENOUS; SUBCUTANEOUS at 21:50

## 2024-02-18 RX ADMIN — ONDANSETRON 4 MILLIGRAM(S): 8 TABLET, FILM COATED ORAL at 20:48

## 2024-02-18 RX ADMIN — MORPHINE SULFATE 4 MILLIGRAM(S): 50 CAPSULE, EXTENDED RELEASE ORAL at 20:49

## 2024-02-18 RX ADMIN — MORPHINE SULFATE 4 MILLIGRAM(S): 50 CAPSULE, EXTENDED RELEASE ORAL at 23:13

## 2024-02-18 RX ADMIN — Medication 25 GRAM(S): at 22:31

## 2024-02-18 RX ADMIN — HYDROMORPHONE HYDROCHLORIDE 0.5 MILLIGRAM(S): 2 INJECTION INTRAMUSCULAR; INTRAVENOUS; SUBCUTANEOUS at 23:13

## 2024-02-18 NOTE — ED PROVIDER NOTE - CARE PLAN
1 Principal Discharge DX:	Pancolitis  Secondary Diagnosis:	Hypomagnesemia  Secondary Diagnosis:	Hypokalemia

## 2024-02-18 NOTE — ED PROVIDER NOTE - CLINICAL SUMMARY MEDICAL DECISION MAKING FREE TEXT BOX
attg note: 43y F with multiple chronic medical conditions presents with diarrhea/vomiting and diffuse abd pain and weakness, no fever/chills, no clear sick contact or known food source, no travel, no recent abx; on exam pt is wd, wn, appears tired but is awake; abd - soft, nd, mild diffuse ttp, no guarding/rebound; MDM iv, labs, fluids, severely dehydrated and requires parenteral elyte repletion, pancolitis on ct, will require admission and gi c/s, message left for service

## 2024-02-18 NOTE — ED PROVIDER NOTE - PHYSICAL EXAMINATION
Gen: Well appearing in NAD.   Head: atraumatic  Heart: s1/s2, RRR  Lung: CTA b/l,   Abd: soft, +diffuse Abd TTP, no guarding, NCVAT  Msk: no pedal edema  Neuro: AAO x3,  Skin: Normal for race.   Psych: Alert and oriented

## 2024-02-19 DIAGNOSIS — E83.42 HYPOMAGNESEMIA: ICD-10-CM

## 2024-02-19 DIAGNOSIS — E05.90 THYROTOXICOSIS, UNSPECIFIED WITHOUT THYROTOXIC CRISIS OR STORM: ICD-10-CM

## 2024-02-19 DIAGNOSIS — E87.29 OTHER ACIDOSIS: ICD-10-CM

## 2024-02-19 DIAGNOSIS — E87.6 HYPOKALEMIA: ICD-10-CM

## 2024-02-19 DIAGNOSIS — R10.9 UNSPECIFIED ABDOMINAL PAIN: ICD-10-CM

## 2024-02-19 DIAGNOSIS — E83.51 HYPOCALCEMIA: ICD-10-CM

## 2024-02-19 DIAGNOSIS — Z29.9 ENCOUNTER FOR PROPHYLACTIC MEASURES, UNSPECIFIED: ICD-10-CM

## 2024-02-19 DIAGNOSIS — R79.89 OTHER SPECIFIED ABNORMAL FINDINGS OF BLOOD CHEMISTRY: ICD-10-CM

## 2024-02-19 LAB
ALBUMIN SERPL ELPH-MCNC: 2.4 G/DL — LOW (ref 3.3–5)
ALP SERPL-CCNC: 58 U/L — SIGNIFICANT CHANGE UP (ref 30–120)
ALT FLD-CCNC: 13 U/L — SIGNIFICANT CHANGE UP (ref 10–60)
ANION GAP SERPL CALC-SCNC: 11 MMOL/L — SIGNIFICANT CHANGE UP (ref 5–17)
ANION GAP SERPL CALC-SCNC: 11 MMOL/L — SIGNIFICANT CHANGE UP (ref 5–17)
APPEARANCE UR: CLEAR — SIGNIFICANT CHANGE UP
AST SERPL-CCNC: 44 U/L — HIGH (ref 10–40)
BASOPHILS # BLD AUTO: 0.02 K/UL — SIGNIFICANT CHANGE UP (ref 0–0.2)
BASOPHILS NFR BLD AUTO: 0.4 % — SIGNIFICANT CHANGE UP (ref 0–2)
BILIRUB SERPL-MCNC: 0.9 MG/DL — SIGNIFICANT CHANGE UP (ref 0.2–1.2)
BILIRUB UR-MCNC: NEGATIVE — SIGNIFICANT CHANGE UP
BUN SERPL-MCNC: 3 MG/DL — LOW (ref 7–23)
BUN SERPL-MCNC: 3 MG/DL — LOW (ref 7–23)
C DIFF BY PCR RESULT: SIGNIFICANT CHANGE UP
CALCIUM SERPL-MCNC: 6.4 MG/DL — CRITICAL LOW (ref 8.4–10.5)
CALCIUM SERPL-MCNC: 6.5 MG/DL — CRITICAL LOW (ref 8.4–10.5)
CHLORIDE SERPL-SCNC: 100 MMOL/L — SIGNIFICANT CHANGE UP (ref 96–108)
CHLORIDE SERPL-SCNC: 101 MMOL/L — SIGNIFICANT CHANGE UP (ref 96–108)
CO2 SERPL-SCNC: 22 MMOL/L — SIGNIFICANT CHANGE UP (ref 22–31)
CO2 SERPL-SCNC: 24 MMOL/L — SIGNIFICANT CHANGE UP (ref 22–31)
COLOR SPEC: SIGNIFICANT CHANGE UP
CREAT SERPL-MCNC: 0.4 MG/DL — LOW (ref 0.5–1.3)
CREAT SERPL-MCNC: 0.4 MG/DL — LOW (ref 0.5–1.3)
DIFF PNL FLD: NEGATIVE — SIGNIFICANT CHANGE UP
EGFR: 124 ML/MIN/1.73M2 — SIGNIFICANT CHANGE UP
EGFR: 124 ML/MIN/1.73M2 — SIGNIFICANT CHANGE UP
EOSINOPHIL # BLD AUTO: 0 K/UL — SIGNIFICANT CHANGE UP (ref 0–0.5)
EOSINOPHIL NFR BLD AUTO: 0 % — SIGNIFICANT CHANGE UP (ref 0–6)
GLUCOSE SERPL-MCNC: 82 MG/DL — SIGNIFICANT CHANGE UP (ref 70–99)
GLUCOSE SERPL-MCNC: 85 MG/DL — SIGNIFICANT CHANGE UP (ref 70–99)
GLUCOSE UR QL: NEGATIVE MG/DL — SIGNIFICANT CHANGE UP
HCT VFR BLD CALC: 23.2 % — LOW (ref 34.5–45)
HCT VFR BLD CALC: 23.7 % — LOW (ref 34.5–45)
HGB BLD-MCNC: 7.4 G/DL — LOW (ref 11.5–15.5)
HGB BLD-MCNC: 7.5 G/DL — LOW (ref 11.5–15.5)
IMM GRANULOCYTES NFR BLD AUTO: 0.4 % — SIGNIFICANT CHANGE UP (ref 0–0.9)
KETONES UR-MCNC: NEGATIVE MG/DL — SIGNIFICANT CHANGE UP
LEUKOCYTE ESTERASE UR-ACNC: NEGATIVE — SIGNIFICANT CHANGE UP
LYMPHOCYTES # BLD AUTO: 0.53 K/UL — LOW (ref 1–3.3)
LYMPHOCYTES # BLD AUTO: 10.5 % — LOW (ref 13–44)
MAGNESIUM SERPL-MCNC: 1.5 MG/DL — LOW (ref 1.6–2.6)
MCHC RBC-ENTMCNC: 26.8 PG — LOW (ref 27–34)
MCHC RBC-ENTMCNC: 27 PG — SIGNIFICANT CHANGE UP (ref 27–34)
MCHC RBC-ENTMCNC: 31.6 GM/DL — LOW (ref 32–36)
MCHC RBC-ENTMCNC: 31.9 GM/DL — LOW (ref 32–36)
MCV RBC AUTO: 84.6 FL — SIGNIFICANT CHANGE UP (ref 80–100)
MCV RBC AUTO: 84.7 FL — SIGNIFICANT CHANGE UP (ref 80–100)
MONOCYTES # BLD AUTO: 0.23 K/UL — SIGNIFICANT CHANGE UP (ref 0–0.9)
MONOCYTES NFR BLD AUTO: 4.6 % — SIGNIFICANT CHANGE UP (ref 2–14)
NEUTROPHILS # BLD AUTO: 4.25 K/UL — SIGNIFICANT CHANGE UP (ref 1.8–7.4)
NEUTROPHILS NFR BLD AUTO: 84.1 % — HIGH (ref 43–77)
NITRITE UR-MCNC: POSITIVE
NRBC # BLD: 0 /100 WBCS — SIGNIFICANT CHANGE UP (ref 0–0)
NRBC # BLD: 0 /100 WBCS — SIGNIFICANT CHANGE UP (ref 0–0)
PH UR: 6 — SIGNIFICANT CHANGE UP (ref 5–8)
PLATELET # BLD AUTO: 150 K/UL — SIGNIFICANT CHANGE UP (ref 150–400)
PLATELET # BLD AUTO: 177 K/UL — SIGNIFICANT CHANGE UP (ref 150–400)
POTASSIUM SERPL-MCNC: 2.9 MMOL/L — CRITICAL LOW (ref 3.5–5.3)
POTASSIUM SERPL-MCNC: 2.9 MMOL/L — CRITICAL LOW (ref 3.5–5.3)
POTASSIUM SERPL-MCNC: 3.5 MMOL/L — SIGNIFICANT CHANGE UP (ref 3.5–5.3)
POTASSIUM SERPL-SCNC: 2.9 MMOL/L — CRITICAL LOW (ref 3.5–5.3)
POTASSIUM SERPL-SCNC: 2.9 MMOL/L — CRITICAL LOW (ref 3.5–5.3)
POTASSIUM SERPL-SCNC: 3.5 MMOL/L — SIGNIFICANT CHANGE UP (ref 3.5–5.3)
PROT SERPL-MCNC: 5.1 G/DL — LOW (ref 6–8.3)
PROT UR-MCNC: SIGNIFICANT CHANGE UP MG/DL
RBC # BLD: 2.74 M/UL — LOW (ref 3.8–5.2)
RBC # BLD: 2.8 M/UL — LOW (ref 3.8–5.2)
RBC # FLD: 19.3 % — HIGH (ref 10.3–14.5)
RBC # FLD: 19.4 % — HIGH (ref 10.3–14.5)
SODIUM SERPL-SCNC: 134 MMOL/L — LOW (ref 135–145)
SODIUM SERPL-SCNC: 135 MMOL/L — SIGNIFICANT CHANGE UP (ref 135–145)
SP GR SPEC: 1.03 — SIGNIFICANT CHANGE UP (ref 1–1.03)
TSH SERPL-MCNC: 3.4 UIU/ML — SIGNIFICANT CHANGE UP (ref 0.27–4.2)
UROBILINOGEN FLD QL: 1 MG/DL — SIGNIFICANT CHANGE UP (ref 0.2–1)
WBC # BLD: 4.32 K/UL — SIGNIFICANT CHANGE UP (ref 3.8–10.5)
WBC # BLD: 5.05 K/UL — SIGNIFICANT CHANGE UP (ref 3.8–10.5)
WBC # FLD AUTO: 4.32 K/UL — SIGNIFICANT CHANGE UP (ref 3.8–10.5)
WBC # FLD AUTO: 5.05 K/UL — SIGNIFICANT CHANGE UP (ref 3.8–10.5)

## 2024-02-19 PROCEDURE — 80053 COMPREHEN METABOLIC PANEL: CPT

## 2024-02-19 PROCEDURE — 84132 ASSAY OF SERUM POTASSIUM: CPT

## 2024-02-19 PROCEDURE — 85027 COMPLETE CBC AUTOMATED: CPT

## 2024-02-19 PROCEDURE — 87507 IADNA-DNA/RNA PROBE TQ 12-25: CPT

## 2024-02-19 PROCEDURE — 96365 THER/PROPH/DIAG IV INF INIT: CPT

## 2024-02-19 PROCEDURE — 99223 1ST HOSP IP/OBS HIGH 75: CPT

## 2024-02-19 PROCEDURE — 80048 BASIC METABOLIC PNL TOTAL CA: CPT

## 2024-02-19 PROCEDURE — 85025 COMPLETE CBC W/AUTO DIFF WBC: CPT

## 2024-02-19 PROCEDURE — 96361 HYDRATE IV INFUSION ADD-ON: CPT

## 2024-02-19 PROCEDURE — 36415 COLL VENOUS BLD VENIPUNCTURE: CPT

## 2024-02-19 PROCEDURE — 87637 SARSCOV2&INF A&B&RSV AMP PRB: CPT

## 2024-02-19 PROCEDURE — 99285 EMERGENCY DEPT VISIT HI MDM: CPT

## 2024-02-19 PROCEDURE — 87493 C DIFF AMPLIFIED PROBE: CPT

## 2024-02-19 PROCEDURE — 87077 CULTURE AEROBIC IDENTIFY: CPT

## 2024-02-19 PROCEDURE — 84702 CHORIONIC GONADOTROPIN TEST: CPT

## 2024-02-19 PROCEDURE — 81001 URINALYSIS AUTO W/SCOPE: CPT

## 2024-02-19 PROCEDURE — 87186 SC STD MICRODIL/AGAR DIL: CPT

## 2024-02-19 PROCEDURE — 87086 URINE CULTURE/COLONY COUNT: CPT

## 2024-02-19 PROCEDURE — 83690 ASSAY OF LIPASE: CPT

## 2024-02-19 PROCEDURE — 96375 TX/PRO/DX INJ NEW DRUG ADDON: CPT

## 2024-02-19 PROCEDURE — 84443 ASSAY THYROID STIM HORMONE: CPT

## 2024-02-19 PROCEDURE — 74177 CT ABD & PELVIS W/CONTRAST: CPT | Mod: MA

## 2024-02-19 PROCEDURE — 93005 ELECTROCARDIOGRAM TRACING: CPT

## 2024-02-19 PROCEDURE — 83735 ASSAY OF MAGNESIUM: CPT

## 2024-02-19 RX ORDER — MEDROXYPROGESTERONE ACETATE 150 MG/ML
10 INJECTION, SUSPENSION, EXTENDED RELEASE INTRAMUSCULAR DAILY
Refills: 0 | Status: DISCONTINUED | OUTPATIENT
Start: 2024-02-19 | End: 2024-02-20

## 2024-02-19 RX ORDER — SODIUM CHLORIDE 9 MG/ML
1000 INJECTION, SOLUTION INTRAVENOUS
Refills: 0 | Status: DISCONTINUED | OUTPATIENT
Start: 2024-02-19 | End: 2024-02-20

## 2024-02-19 RX ORDER — TRAZODONE HCL 50 MG
50 TABLET ORAL
Refills: 0 | Status: DISCONTINUED | OUTPATIENT
Start: 2024-02-19 | End: 2024-02-20

## 2024-02-19 RX ORDER — ONDANSETRON 8 MG/1
4 TABLET, FILM COATED ORAL EVERY 6 HOURS
Refills: 0 | Status: DISCONTINUED | OUTPATIENT
Start: 2024-02-19 | End: 2024-02-20

## 2024-02-19 RX ORDER — LACTOBACILLUS ACIDOPHILUS 100MM CELL
1 CAPSULE ORAL DAILY
Refills: 0 | Status: DISCONTINUED | OUTPATIENT
Start: 2024-02-19 | End: 2024-02-19

## 2024-02-19 RX ORDER — SODIUM CHLORIDE 9 MG/ML
1000 INJECTION INTRAMUSCULAR; INTRAVENOUS; SUBCUTANEOUS ONCE
Refills: 0 | Status: COMPLETED | OUTPATIENT
Start: 2024-02-19 | End: 2024-02-19

## 2024-02-19 RX ORDER — LACTOBACILLUS ACIDOPHILUS 100MM CELL
1 CAPSULE ORAL
Refills: 0 | Status: DISCONTINUED | OUTPATIENT
Start: 2024-02-19 | End: 2024-02-20

## 2024-02-19 RX ORDER — METOPROLOL TARTRATE 50 MG
50 TABLET ORAL DAILY
Refills: 0 | Status: DISCONTINUED | OUTPATIENT
Start: 2024-02-19 | End: 2024-02-20

## 2024-02-19 RX ORDER — MORPHINE SULFATE 50 MG/1
2 CAPSULE, EXTENDED RELEASE ORAL EVERY 4 HOURS
Refills: 0 | Status: DISCONTINUED | OUTPATIENT
Start: 2024-02-19 | End: 2024-02-20

## 2024-02-19 RX ORDER — ACETAMINOPHEN 500 MG
650 TABLET ORAL EVERY 6 HOURS
Refills: 0 | Status: DISCONTINUED | OUTPATIENT
Start: 2024-02-19 | End: 2024-02-20

## 2024-02-19 RX ORDER — SERTRALINE 25 MG/1
100 TABLET, FILM COATED ORAL DAILY
Refills: 0 | Status: DISCONTINUED | OUTPATIENT
Start: 2024-02-19 | End: 2024-02-20

## 2024-02-19 RX ORDER — MAGNESIUM SULFATE 500 MG/ML
1 VIAL (ML) INJECTION ONCE
Refills: 0 | Status: COMPLETED | OUTPATIENT
Start: 2024-02-19 | End: 2024-02-19

## 2024-02-19 RX ORDER — POTASSIUM CHLORIDE 20 MEQ
40 PACKET (EA) ORAL ONCE
Refills: 0 | Status: COMPLETED | OUTPATIENT
Start: 2024-02-19 | End: 2024-02-19

## 2024-02-19 RX ORDER — MORPHINE SULFATE 50 MG/1
4 CAPSULE, EXTENDED RELEASE ORAL EVERY 4 HOURS
Refills: 0 | Status: DISCONTINUED | OUTPATIENT
Start: 2024-02-19 | End: 2024-02-20

## 2024-02-19 RX ORDER — POTASSIUM CHLORIDE 20 MEQ
10 PACKET (EA) ORAL
Refills: 0 | Status: COMPLETED | OUTPATIENT
Start: 2024-02-19 | End: 2024-02-19

## 2024-02-19 RX ORDER — MAGNESIUM SULFATE 500 MG/ML
2 VIAL (ML) INJECTION ONCE
Refills: 0 | Status: COMPLETED | OUTPATIENT
Start: 2024-02-19 | End: 2024-02-19

## 2024-02-19 RX ORDER — INFLUENZA VIRUS VACCINE 15; 15; 15; 15 UG/.5ML; UG/.5ML; UG/.5ML; UG/.5ML
0.5 SUSPENSION INTRAMUSCULAR ONCE
Refills: 0 | Status: DISCONTINUED | OUTPATIENT
Start: 2024-02-19 | End: 2024-02-20

## 2024-02-19 RX ORDER — ENOXAPARIN SODIUM 100 MG/ML
40 INJECTION SUBCUTANEOUS EVERY 24 HOURS
Refills: 0 | Status: DISCONTINUED | OUTPATIENT
Start: 2024-02-19 | End: 2024-02-20

## 2024-02-19 RX ADMIN — Medication 1 TABLET(S): at 13:52

## 2024-02-19 RX ADMIN — SODIUM CHLORIDE 150 MILLILITER(S): 9 INJECTION, SOLUTION INTRAVENOUS at 04:45

## 2024-02-19 RX ADMIN — Medication 1 TABLET(S): at 18:05

## 2024-02-19 RX ADMIN — Medication 25 GRAM(S): at 04:20

## 2024-02-19 RX ADMIN — MORPHINE SULFATE 2 MILLIGRAM(S): 50 CAPSULE, EXTENDED RELEASE ORAL at 04:50

## 2024-02-19 RX ADMIN — SERTRALINE 100 MILLIGRAM(S): 25 TABLET, FILM COATED ORAL at 11:30

## 2024-02-19 RX ADMIN — Medication 100 MILLIEQUIVALENT(S): at 01:21

## 2024-02-19 RX ADMIN — Medication 100 GRAM(S): at 13:52

## 2024-02-19 RX ADMIN — Medication 100 MILLIEQUIVALENT(S): at 04:20

## 2024-02-19 RX ADMIN — ENOXAPARIN SODIUM 40 MILLIGRAM(S): 100 INJECTION SUBCUTANEOUS at 11:27

## 2024-02-19 RX ADMIN — MORPHINE SULFATE 2 MILLIGRAM(S): 50 CAPSULE, EXTENDED RELEASE ORAL at 18:35

## 2024-02-19 RX ADMIN — Medication 40 MILLIEQUIVALENT(S): at 11:25

## 2024-02-19 RX ADMIN — MORPHINE SULFATE 2 MILLIGRAM(S): 50 CAPSULE, EXTENDED RELEASE ORAL at 18:05

## 2024-02-19 RX ADMIN — Medication 2 GRAM(S): at 00:15

## 2024-02-19 RX ADMIN — SODIUM CHLORIDE 2000 MILLILITER(S): 9 INJECTION INTRAMUSCULAR; INTRAVENOUS; SUBCUTANEOUS at 04:19

## 2024-02-19 RX ADMIN — Medication 50 MILLIGRAM(S): at 05:45

## 2024-02-19 RX ADMIN — Medication 100 MILLIEQUIVALENT(S): at 00:13

## 2024-02-19 RX ADMIN — MORPHINE SULFATE 2 MILLIGRAM(S): 50 CAPSULE, EXTENDED RELEASE ORAL at 04:40

## 2024-02-19 RX ADMIN — Medication 100 MILLIEQUIVALENT(S): at 05:34

## 2024-02-19 RX ADMIN — SODIUM CHLORIDE 150 MILLILITER(S): 9 INJECTION, SOLUTION INTRAVENOUS at 15:15

## 2024-02-19 RX ADMIN — Medication 50 MILLIGRAM(S): at 21:25

## 2024-02-19 NOTE — H&P ADULT - NSHPPHYSICALEXAM_GEN_ALL_CORE
-    Vital Signs Last 24 Hrs  T(C): 36.6 (19 Feb 2024 04:36), Max: 36.7 (18 Feb 2024 23:13)  T(F): 97.9 (19 Feb 2024 04:36), Max: 98 (18 Feb 2024 23:13)  HR: 94 (19 Feb 2024 04:36) (87 - 104)  BP: 100/63 (19 Feb 2024 04:36) (95/62 - 109/74)  BP(mean): --  RR: 18 (19 Feb 2024 04:36) (18 - 20)  SpO2: 98% (19 Feb 2024 04:36) (98% - 100%)    Parameters below as of 19 Feb 2024 04:36  Patient On (Oxygen Delivery Method): room air        PHYSICAL EXAM:  		  GENERAL: NAD, well-groomed, well-developed.  HEAD:  Atraumatic, Norm cephalic.  EYES: PERRLA, conjunctiva clear.  ENMT: no nasal discharge, MMM.   NECK: Supple, No JVD.  NERVOUS SYSTEM:  Alert & oriented X3, neurologically intact grossly.  CHEST/LUNG: Good air entry B/L, no rales, rhonchi, or wheezing.  HEART: Normal S1 & S2, no murmurs, or extra sounds.  ABDOMEN: Soft, mildly-tender, non-distended; bowel sounds present, no palpable masses or organomegaly.  EXTREMITIES:  No clubbing, cyanosis, or lower extremity edema.  VASCULAR: 2+ radial & brachial equal pulses B/L.  SKIN: No rashes or lesions.  PSYCH: normal affect & behavior.

## 2024-02-19 NOTE — CARE COORDINATION ASSESSMENT. - OTHER PERTINENT DISCHARGE PLANNING INFORMATION:
ERP at bedside, PIV established, blood taken to the lab, and pt medicated per MAR   42 y/o F with PMH of PAF, Asthma, Hyperthyroidism, Neuropathy, Vaginal Bleeding with extensive blood transfusion s/p uterine ablation 4 months ago, Iron Deficiency Anemia, Obesity, and Depression presented with abdominal pain, diarrhea, nausea, and vomiting. Met patient at bedside.  Explained role of CM, verbalized understanding. Pt was made aware a CM will remain available through hospitalization.  Contact information given in discharge/ transitions resource folder.

## 2024-02-19 NOTE — H&P ADULT - NSHPREVIEWOFSYSTEMS_GEN_ALL_CORE
-    CONSTITUTIONAL: No fever or chills.  EYES: No eye pain, visual disturbances, or discharge.  ENMT:  No difficulty hearing, vertigo, sinus or throat pain.  NECK: No pain or stiffness.	  RESPIRATORY: No cough, wheezing, or hemoptysis; No shortness of breath.  CARDIOVASCULAR: No chest pain, palpitations, dizziness, or leg swelling.  GASTROINTESTINAL: (+) abdominal pain, nausea, vomiting, and diarrhea, no hematemesis, no melena or hematochezia.  GENITOURINARY: No dysuria, frequency, hematuria, or incontinence.  NEUROLOGICAL: No headaches, focal muscle weakness, numbness, or tremors.  SKIN: No itching, burning or rashes.  MUSCULOSKELETAL: No joint swelling or pain.  PSYCHIATRIC: No depression, anxiety, or agitation.  HEME/LYMPH: No easy bruising, bleeding gums, or nose bleed.  ALLERGY AND IMMUNOLOGIC: No hives or eczema.

## 2024-02-19 NOTE — CARE COORDINATION ASSESSMENT. - PRO ARRIVE FROM
DX:42 y/o F with PMH of PAF, Asthma, Hyperthyroidism, Neuropathy, Vaginal Bleeding with extensive blood transfusion s/p uterine ablation 4 months ago, Iron Deficiency Anemia, Obesity, and Depression presented with abdominal pain, diarrhea, nausea, and vomiting.      CM met with patient at bedside. Patient is alert times 3  Patient states she lives with her mother and children. Patient stated uses a wheelchair and cane. Patient stated has 3 steps to enter in the house and then everything on first floor.  Patient pending a PT consult.     CM verified:   PCP: Bozena Chandra [phone]  Pharmacy: [facility].  [place]: Patient stated no.   Insurance:  [employer].      CM met with patient and explained about homecare services with a verbal understanding. Patient needs a PT consult.  If services recommended patient wants to use [facility] homecare services./home

## 2024-02-19 NOTE — PATIENT PROFILE ADULT - HISTORY OF COVID-19 VACCINATION
Renal Medicine Progress Note                                Kathryn Banks MRN# [id number]   Age: 73 year old YOB: 1944   Date of Admission: 10/22/2017 Hospital LOS: 0                  Assessment/Plan:     Admitted with concern for SBO    Seen regarding management of ESRD    1.  ESRD   -MWF schedule   -ran here 10/18/17   -[facility] 10/20/17  2.  Initiated dialysis last admission   -tunneled line  3.  Anemia  4.  Secondary hyperparathyroidism  5.  SBO      Next scheduled dialysis 10/23/17        Interval History:     In bed.  NG to suction.  Seems comfortable    ROS:     GENERAL: NAD, No fever,chills  R: NEGATIVE for significant cough or SOB  CV: NEGATIVE for chest pain, palpitations  EXT: no change edema  ROS otherwise negative    Medications and Allergies:     Reviewed    Physical Exam:     Vitals were reviewed  Patient Vitals for the past 8 hrs:   BP Temp Temp src Pulse Resp SpO2   10/22/17 1027 102/63 98.1  F (36.7  C) Oral 90 18 98 %   10/22/17 0951 108/64 98.2  F (36.8  C) Oral 95 18 99 %   10/22/17 0933 131/79 - - - - -   10/22/17 0932 - - - - 18 -   10/22/17 0905 127/78 - - - - -   10/22/17 0745 - - - - - 97 %   10/22/17 0744 - - - - - 98 %   10/22/17 0743 108/71 - - - - -          Vitals:    10/22/17 0247   Weight: 65.8 kg (145 lb)         GENERAL: awake, alert, follows  HEENT: NC/AT, PERRLA, EOMI, non icteric, pharynx moist without lesion  RESP:  clear anteriorly  CV: RRR, normal S1 S2  ABDOMEN: distended  MS: no clubbing, cyanosis   SKIN: clear without significant rashes or lesions  EXT: warm, no edema    Data:       Recent Labs  Lab 10/22/17  0305 10/16/17  0545    143   POTASSIUM 4.1 4.3   CHLORIDE 96 111*   CO2 30 22   ANIONGAP 10 10   * 102*   BUN 21 26   CR 3.91* 4.51*   GFRESTIMATED 11* 9*   GFRESTBLACK 14* 11*   MALICK 8.2* 7.4*           Recent Labs  Lab 10/22/17  0305 10/16/17  1020 10/16/17  0545   PHOS  --   --  4.9*   HGB 10.4* 9.6*  --          BEATRIZ Clement  Adal    [facility] - Nephrology  [phone]   No

## 2024-02-19 NOTE — CONSULT NOTE ADULT - SUBJECTIVE AND OBJECTIVE BOX
Chief Complaint:  Patient is a 43y old  Female who presents with a chief complaint of Abdominal pain. (2024 03:26)      HPI:  42 y/o F with PMH of PAF, Asthma, Hyperthyroidism, Neuropathy, Vaginal Bleeding s/p uterine ablation 4 months ago Iron Deficiency Anemia, Obesity, and Depression admitted for pancolitis. Has been having abdominal pain, nausea, non bloody vomiting non bloody diarrhea x 8 days. Has never had EGD/Colon. No recent travle or abx use.     Allergies:  Motrin (Hives)      Medications:  acetaminophen     Tablet .. 650 milliGRAM(s) Oral every 6 hours PRN  enoxaparin Injectable 40 milliGRAM(s) SubCutaneous every 24 hours  influenza   Vaccine 0.5 milliLiter(s) IntraMuscular once  lactated ringers. 1000 milliLiter(s) IV Continuous <Continuous>  medroxyPROGESTERone 10 milliGRAM(s) Oral daily  methimazole 10 milliGRAM(s) Oral daily  metoprolol succinate ER 50 milliGRAM(s) Oral daily  morphine  - Injectable 2 milliGRAM(s) IV Push every 4 hours PRN  morphine  - Injectable 4 milliGRAM(s) IV Push every 4 hours PRN  ondansetron Injectable 4 milliGRAM(s) IV Push every 6 hours  potassium chloride   Powder 40 milliEquivalent(s) Oral once  sertraline 100 milliGRAM(s) Oral daily  traZODone 50 milliGRAM(s) Oral two times a day      PMHX/PSHX:  Atrial fibrillation    History of Hyperthyroidism    Asthma    History of anemia    Depression, unspecified depression type    H/O blood transfusion reaction    Smoker    S/P  Section    History of blood transfusion        Family history:  No pertinent family history in first degree relatives    Family history of diabetes mellitus (Sibling, Grandparent)    Family history of stomach cancer (Grandparent)        Social History:     ROS:     General:  No wt loss, fevers, chills, night sweats, fatigue,   Eyes:  Good vision, no reported pain  ENT:  No sore throat, pain, runny nose, dysphagia  CV:  No pain, palpitations, hypo/hypertension  Resp:  No dyspnea, cough, tachypnea, wheezing  GI:  No pain, No nausea, No vomiting, No diarrhea, No constipation, No weight loss, No fever, No pruritis, No rectal bleeding, No tarry stools, No dysphagia,  :  No pain, bleeding, incontinence, nocturia  Muscle:  No pain, weakness  Neuro:  No weakness, tingling, memory problems  Psych:  No fatigue, insomnia, mood problems, depression  Endocrine:  No polyuria, polydipsia, cold/heat intolerance  Heme:  No petechiae, ecchymosis, easy bruisability  Skin:  No rash, tattoos, scars, edema      PHYSICAL EXAM:   Vital Signs:  Vital Signs Last 24 Hrs  T(C): 36.7 (2024 10:20), Max: 36.7 (2024 23:13)  T(F): 98.1 (2024 10:20), Max: 98.1 (2024 10:20)  HR: 84 (2024 10:20) (84 - 104)  BP: 88/50 (2024 10:20) (88/50 - 109/74)  BP(mean): --  RR: 18 (2024 10:20) (18 - 20)  SpO2: 96% (2024 10:20) (96% - 100%)    Parameters below as of 2024 10:20  Patient On (Oxygen Delivery Method): room air      Daily Height in cm: 165.1 (2024 19:48)    Daily     GENERAL:  Appears stated age, well-groomed, well-nourished, no distress  HEENT:  NC/AT,  conjunctivae clear and pink, no thyromegaly, nodules, adenopathy, no JVD, sclera -anicteric  CHEST:  Full & symmetric excursion, no increased effort, breath sounds clear  HEART:  Regular rhythm, S1, S2, no murmur/rub/S3/S4, no abdominal bruit, no edema  ABDOMEN:  Soft, +tender, non-distended, normoactive bowel sounds,  no masses ,no hepato-splenomegaly, no signs of chronic liver disease  EXTEREMITIES:  no cyanosis,clubbing or edema  SKIN:  No rash/erythema/ecchymoses/petechiae/wounds/abscess/warm/dry  NEURO:  Alert, oriented, no asterixis, no tremor, no encephalopathy    LABS:                        7.4    5.05  )-----------( 177      ( 2024 07:03 )             23.2     02-    134<L>  |  101  |  3<L>  ----------------------------<  82  2.9<LL>   |  22  |  0.40<L>    Ca    6.4<LL>      2024 07:03  Mg     .8         TPro  5.1<L>  /  Alb  2.4<L>  /  TBili  0.9  /  DBili  x   /  AST  44<H>  /  ALT  13  /  AlkPhos  58      LIVER FUNCTIONS - ( 2024 07:03 )  Alb: 2.4 g/dL / Pro: 5.1 g/dL / ALK PHOS: 58 U/L / ALT: 13 U/L / AST: 44 U/L / GGT: x             Urinalysis Basic - ( 2024 07:03 )    Color: x / Appearance: x / SG: x / pH: x  Gluc: 82 mg/dL / Ketone: x  / Bili: x / Urobili: x   Blood: x / Protein: x / Nitrite: x   Leuk Esterase: x / RBC: x / WBC x   Sq Epi: x / Non Sq Epi: x / Bacteria: x      Amylase Serum--      Lipase serum12       Ammonia--      Imaging:

## 2024-02-19 NOTE — PATIENT PROFILE ADULT - SAFE PLACE TO LIVE
Follow-up (14 wk follow up ), elevate legs to help with lower extremity edema, possibly from amlodipine, elevate legs as directed  Get covid vaccination when available  HTN stable  Low cholesterol diet and low sugar diet encouraged and f-up with urology for hx of prostate cancer  no

## 2024-02-19 NOTE — CARE COORDINATION ASSESSMENT. - NSCAREPROVIDERS_GEN_ALL_CORE_FT
CARE PROVIDERS:  Accepting Physician: Susan Xavier  Administration: Estuardo Sol  Admitting: Susan Xavier  Attending: Susan Xavier  Consultant: Dveyn Whaley  ED ACP: Rebeca Pack  ED Attending: Linda Virk  ED Nurse: Sue Meza  Emergency Medicine: Tessie Joshi  Infection Control: Juliana Oliveira  Nurse: Dayna Reed  Nurse: Martín Lerma  Nurse: Esperanza Diallo  Ordered: Doctor, Unknown  Outpatient Provider: Kacie David  PCA/Nursing Assistant: Carina Crum  Primary Team: Beverly May  Primary Team: Linda Virk  Primary Team: Jack Sin  Registered Dietitian: Bety Loaiza  Team: LIDYA  Hospitalists, Team

## 2024-02-19 NOTE — H&P ADULT - PROBLEM SELECTOR PLAN 2
supplemented by ED team with 2 gm of magnesium sulfate IVPB, ordered an additional 2 gm dose, recheck serum magnesium level in am & supplement as needed.

## 2024-02-19 NOTE — H&P ADULT - ASSESSMENT
42 y/o F with PMH of PAF, Asthma, Hyperthyroidism, Neuropathy, Iron Deficiency Anemia, Obesity, and Depression presented with abdominal pain, diarrhea, nausea, and vomiting 44 y/o F with PMH of PAF, Asthma, Hyperthyroidism, Neuropathy, Vaginal Bleeding with extensive blood transfusion s/p uterine ablation 4 months ago, Iron Deficiency Anemia, Obesity, and Depression presented with abdominal pain, diarrhea, nausea, and vomiting

## 2024-02-19 NOTE — H&P ADULT - PROBLEM SELECTOR PLAN 1
with evidence suggestive of pancolitis at the CT, possible viral gastroenteritis versus food poisoning r/o c-diff colitis despite denying recent antibiotic therapy, admitted to telemetry, started on contact precautions, stool for C-Diff toxin PCR, and GI PCR panel, clear liquid diet, Zofran PRN, pain control, will hold off any antibiotic therapy for now & f/u the above work-up results, GI consult with Dr. Hyman was called.

## 2024-02-19 NOTE — H&P ADULT - PROBLEM SELECTOR PLAN 3
supplemented with 10 meq potassium chloride IVPB X3 doses by ED team, ordered additional 2 doses, recheck serum potassium level  in am, and supplement as needed.

## 2024-02-19 NOTE — PATIENT PROFILE ADULT - ARE SIGNIFICANT INDICATORS COMPLETE.
[de-identified] : 59 yo female with h/o hypothyroidism, HLD, following up post-hospital discharge for chest pressure pain and dizziness with cardiac catheterization on 4/23 at [facility] said to be a normal study. \par Pt was initially admitted to [facility] on 4/22-4/23, trops obtained were negative, EKG showed normal sinus rhythm with no EKG changes. Echo obtained showed impaired relaxation pattern of the LV filling, LVEF of 60%. Pt was transferred to [facility] for cardiac cath on 4/23, and discharged home on the same day.\par \par Pt reports that she has been doing well since discharge home, states that she has not had further episode of chest pain or dizziness. No other sxs. She as discharged on simvastatin 20mg. Pt however reports headache and dizziness with taking 20mg simvastatin in the past, therefore would like Rx for 10mg instead.\par \par Pt would also like her TSH level checked today. Last TSH was done on 10/2018 and was wnl.
Yes

## 2024-02-19 NOTE — H&P ADULT - HISTORY OF PRESENT ILLNESS
This is a 42 y/o F with PMH of PAF, Asthma, Hyperthyroidism, Neuropathy, Iron Deficiency Anemia, Obesity, and Depression who presented with moderate difuse abdominal pain over the past week associated with persistent water non bloody diarrhea, nausea, and unremarkable vomiting. No fever or chills.  This is a 42 y/o F with PMH of PAF, Asthma, Hyperthyroidism, Neuropathy, Vaginal Bleeding with extensive blood transfusion s/p uterine ablation 4 months ago, Iron Deficiency Anemia, Obesity, and Depression who presented with moderate diffuse abdominal pain over the past week associated with persistent watery non bloody diarrhea, nausea, and unremarkable vomiting. No fever or chills. Denies eating outside, no sick contacts, and no recent travel out of the country.

## 2024-02-19 NOTE — PROVIDER CONTACT NOTE (OTHER) - BACKGROUND
Pt admitted fir ulcerative chronic pancolitis. PO potassium given shortly before blood test was done

## 2024-02-19 NOTE — CONSULT NOTE ADULT - ASSESSMENT
nausea, vomiting, diarrhea    PLAN  follow up stool studies  bacid one tab tid  clear liquids for now  if stool negative consider imodium  replete electrolytes as needed   will need outpatient GI follow up for eventual EGD/Colon  will follow     Advanced care planning was discussed with patient and family.  Advanced care planning forms were reviewed and discussed.  Risks, benefits and alternatives of gastroenterologic procedures were discussed in detail and all questions were answered.    30 minutes spent.     Discussed with Dr. Jeffers.

## 2024-02-19 NOTE — CARE COORDINATION ASSESSMENT. - NSDCPLANSERVICES_GEN_ALL_CORE
DX:44 y/o F with PMH of PAF, Asthma, Hyperthyroidism, Neuropathy, Vaginal Bleeding with extensive blood transfusion s/p uterine ablation 4 months ago, Iron Deficiency Anemia, Obesity, and Depression presented with abdominal pain, diarrhea, nausea, and vomiting.      CM met with patient at bedside. Patient is alert times 3  Patient states she lives with her mother and children. Patient stated uses a wheelchair and cane. Patient stated has 3 steps to enter in the house and then everything on first floor.  Patient pending a PT consult.     CM verified:   PCP: Bozena Chandra [phone]  Pharmacy: [facility].  Midnight: Patient stated no.   Insurance:  [employer].      CM met with patient and explained about homecare services with a verbal understanding. Patient needs a PT consult.  If services recommended patient wants to use [facility] homecare services./Home Care

## 2024-02-19 NOTE — H&P ADULT - NSHPLABSRESULTS_GEN_ALL_CORE
-                          9.7    6.20  )-----------( 210      ( 18 Feb 2024 20:50 )             30.9       18 Feb 2024 20:50    141    |  98     |  6      ----------------------------<  95     2.8     |  23     |  0.75     Ca    7.5        18 Feb 2024 20:50  Mg     .8        18 Feb 2024 20:57    TPro  6.8    /  Alb  3.2    /  TBili  1.6    /  DBili  x      /  AST  74     /  ALT  14     /  AlkPhos  87     18 Feb 2024 20:50    LIVER FUNCTIONS - ( 18 Feb 2024 20:50 )  Alb: 3.2 g/dL / Pro: 6.8 g/dL / ALK PHOS: 87 U/L / ALT: 14 U/L / AST: 74 U/L / GGT: x               Urinalysis Basic - ( 18 Feb 2024 20:50 )  Color: x / Appearance: x / SG: x / pH: x  Gluc: 95 mg/dL / Ketone: x  / Bili: x / Urobili: x   Blood: x / Protein: x / Nitrite: x   Leuk Esterase: x / RBC: x / WBC x   Sq Epi: x / Non Sq Epi: x / Bacteria: x        Lipase (02.18.24 @ 20:50)   Lipase: 12: New Lipase reference range as of 08/24/2023.   New Ranges: 16-77 U/L   Old Range:  U/L U/L        Flu With COVID-19 By GLENNY (02.18.24 @ 21:34)   SARS-CoV-2 Result: Not Detected.  Influenza A Result: Not Detected.  Influenza B Result: Not Detected.  Resp Syn Virus Result: Not Detected.         CT ABDOMEN AND PELVIS IC    PROCEDURE DATE:  02/18/2024    INTERPRETATION:  CLINICAL INFORMATION: Diffuse abdominal pain  COMPARISON: 12/28/2017.  CONTRAST/COMPLICATIONS:  IV Contrast: Omnipaque 300  90 cc administered   10 cc discarded  Oral Contrast: NONE  Complications: None reported at time of study completion  PROCEDURE:  CT of the Abdomen and Pelvis was performed.  Sagittal and coronal reformats were performed.  FINDINGS:  LOWER CHEST: Within normal limits.  LIVER: Diffuse hepatic steatosis.  BILE DUCTS: Normal caliber.  GALLBLADDER: Within normal limits.  SPLEEN: Within normal limits.  PANCREAS: Within normal limits.  ADRENALS: Within normal limits.  KIDNEYS/URETERS: Within normal limits.  BLADDER: Minimally distended.  REPRODUCTIVE ORGANS: Uterus and adnexa within normal limits.  BOWEL: Diffuse colonic mural thickening with pericolonic fat stranding.   Appendix is normal.  PERITONEUM: No ascites.  VESSELS: Within normal limits.  RETROPERITONEUM/LYMPH NODES: No lymphadenopathy.  ABDOMINAL WALL: Within normal limits.  BONES: Within normal limits.  IMPRESSION:  Diffuse colonic mural thickening with pericolonic fat stranding, compatible with pancolitis.        EKG:    As per my review shows SR at 95/min, normal SD & prolonged QTc intervals, normal QRS voltage, duration, and axis (+30), with normal transition, no ST-T abnormality.    -

## 2024-02-20 ENCOUNTER — TRANSCRIPTION ENCOUNTER (OUTPATIENT)
Age: 44
End: 2024-02-20

## 2024-02-20 VITALS
DIASTOLIC BLOOD PRESSURE: 66 MMHG | SYSTOLIC BLOOD PRESSURE: 102 MMHG | HEART RATE: 88 BPM | OXYGEN SATURATION: 99 % | RESPIRATION RATE: 16 BRPM

## 2024-02-20 LAB — GI PCR PANEL: SIGNIFICANT CHANGE UP

## 2024-02-20 PROCEDURE — 99239 HOSP IP/OBS DSCHRG MGMT >30: CPT

## 2024-02-20 RX ADMIN — SODIUM CHLORIDE 150 MILLILITER(S): 9 INJECTION, SOLUTION INTRAVENOUS at 08:07

## 2024-02-20 NOTE — DISCHARGE NOTE PROVIDER - HOSPITAL COURSE
HPI:  This is a 44 y/o F with PMH of PAF, Asthma, Hyperthyroidism, Neuropathy, Vaginal Bleeding with extensive blood transfusion s/p uterine ablation 4 months ago, Iron Deficiency Anemia, Obesity, and Depression who presented with moderate diffuse abdominal pain over the past week associated with persistent watery non bloody diarrhea, nausea, and unremarkable vomiting. No fever or chills. Denies eating outside, no sick contacts, and no recent travel out of the country. (19 Feb 2024 03:26)      44 y/o F with PMH of PAF, Asthma, Hyperthyroidism, Neuropathy, Vaginal Bleeding with extensive blood transfusion s/p uterine ablation 4 months ago, Iron Deficiency Anemia, Obesity, and Depression presented with abdominal pain, diarrhea, nausea, and vomiting       Problem/Plan - 1:  ·  Problem: Abdominal pain.   ·  Plan: with evidence suggestive of pancolitis at the CT, possible viral gastroenteritis versus food poisoning r/o c-diff colitis despite denying recent antibiotic therapy, admitted to telemetry, started on contact precautions, stool for C-Diff toxin PCR, and GI PCR panel, clear liquid diet, Zofran PRN, pain control, will hold off any antibiotic therapy for now & f/u the above work-up results, GI consult with Dr. Hyman was called.  - seen by GI. Recommended Bacid TID  - imodium if stool study negative.   - outpatient EGD/colonoscopy  - follow up stool studies result  - follow GI recommendations  cdiff negative  GI pcr negative    Anemia  Hb 7.5  workup incomplete  repeat Hb not done today  pt wants to leave AMA  she says she was disrespected last night without going into any detail.     Advised her regarding risks of leaving without complete workup.  Patient is A and O x 3, understands the implications of leaving AMA, regarding worsening of her condition which can be fatal if not treated.     AMA paperwork signed     Problem/Plan - 2:  ·  Problem: Hypomagnesemia.   ·  Plan: supplemented by ED team with 2 gm of magnesium sulfate IVPB, ordered an additional 2 gm dose, recheck serum magnesium level in am & supplement as needed.     Problem/Plan - 3:  ·  Problem: Hypokalemia.   ·  Plan: supplemented with 10 meq potassium chloride IVPB X3 doses by ED team, ordered additional 2 doses, recheck serum potassium level  in am, and supplement as needed.     Problem/Plan - 4:  ·  Problem: Hypocalcemia.   ·  Plan: pseudo hypocalcemia , corrected serum calcium level is normal, monitor.     Problem/Plan - 5:  ·  Problem: Abnormal LFTs.   ·  Plan: unexplained, suspicious for alcoholic hepatitis     Problem/Plan - 6:  ·  Problem: Hyperthyroidism.   ·  Plan: on Methimazole 10 mg daily, continue at same dose, check TSH in am.

## 2024-02-20 NOTE — DISCHARGE NOTE PROVIDER - NSDCCPCAREPLAN_GEN_ALL_CORE_FT
PRINCIPAL DISCHARGE DIAGNOSIS  Diagnosis: Pancolitis  Assessment and Plan of Treatment: incomplete treatment, if you worsen, please return to hospital      SECONDARY DISCHARGE DIAGNOSES  Diagnosis: Hypomagnesemia  Assessment and Plan of Treatment:     Diagnosis: Hypokalemia  Assessment and Plan of Treatment:

## 2024-02-20 NOTE — PROGRESS NOTE ADULT - SUBJECTIVE AND OBJECTIVE BOX
Subjective    Patient seen and examined at bedside. Currently in no acute distress. Complains of abd pain. Denies any chest pain, shortness of breath, n/v/d or any other acute complaints.     ROS reviewed and is otherwise negative    MEDICATIONS  (STANDING):  enoxaparin Injectable 40 milliGRAM(s) SubCutaneous every 24 hours  influenza   Vaccine 0.5 milliLiter(s) IntraMuscular once  lactated ringers. 1000 milliLiter(s) (150 mL/Hr) IV Continuous <Continuous>  lactobacillus acidophilus 1 Tablet(s) Oral three times a day with meals  medroxyPROGESTERone 10 milliGRAM(s) Oral daily  methimazole 10 milliGRAM(s) Oral daily  metoprolol succinate ER 50 milliGRAM(s) Oral daily  ondansetron Injectable 4 milliGRAM(s) IV Push every 6 hours  sertraline 100 milliGRAM(s) Oral daily  traZODone 50 milliGRAM(s) Oral two times a day    MEDICATIONS  (PRN):  acetaminophen     Tablet .. 650 milliGRAM(s) Oral every 6 hours PRN Temp greater or equal to 38C (100.4F), Mild Pain (1 - 3)  morphine  - Injectable 4 milliGRAM(s) IV Push every 4 hours PRN Severe Pain (7 - 10)  morphine  - Injectable 2 milliGRAM(s) IV Push every 4 hours PRN Moderate Pain (4 - 6)      Allergies    Motrin (Hives)    Intolerances        Vital Signs Last 24 Hrs  T(C): 36.7 (19 Feb 2024 10:20), Max: 36.7 (18 Feb 2024 23:13)  T(F): 98.1 (19 Feb 2024 10:20), Max: 98.1 (19 Feb 2024 10:20)  HR: 84 (19 Feb 2024 10:20) (84 - 104)  BP: 88/50 (19 Feb 2024 10:20) (88/50 - 109/74)  BP(mean): --  RR: 18 (19 Feb 2024 10:20) (18 - 20)  SpO2: 96% (19 Feb 2024 10:20) (96% - 100%)    Parameters below as of 19 Feb 2024 10:20  Patient On (Oxygen Delivery Method): room air        PHYSICAL EXAM:  GENERAL: NAD, well-groomed, well-developed  HEAD:  Atraumatic, Normocephalic  ENMT: Moist mucous membranes,   NECK: Supple, No JVD, Normal thyroid  NERVOUS SYSTEM:  All 4 extremities mobile, no gross sensory deficits.   CHEST/LUNG: Clear to auscultation bilaterally; No rales, rhonchi, wheezing, or rubs  HEART: Regular rate and rhythm; No murmurs, rubs, or gallops  ABDOMEN: Soft, Nontender, Nondistended; Bowel sounds present  EXTREMITIES:  2+ Peripheral Pulses, No clubbing, cyanosis, or edema      LABS:                        7.4    5.05  )-----------( 177      ( 19 Feb 2024 07:03 )             23.2     19 Feb 2024 07:03    134    |  101    |  3      ----------------------------<  82     2.9     |  22     |  0.40     Ca    6.4        19 Feb 2024 07:03  Mg     .8        18 Feb 2024 20:57    TPro  5.1    /  Alb  2.4    /  TBili  0.9    /  DBili  x      /  AST  44     /  ALT  13     /  AlkPhos  58     19 Feb 2024 07:03      Urinalysis Basic - ( 19 Feb 2024 07:03 )    Color: x / Appearance: x / SG: x / pH: x  Gluc: 82 mg/dL / Ketone: x  / Bili: x / Urobili: x   Blood: x / Protein: x / Nitrite: x   Leuk Esterase: x / RBC: x / WBC x   Sq Epi: x / Non Sq Epi: x / Bacteria: x      CAPILLARY BLOOD GLUCOSE          RADIOLOGY & ADDITIONAL TESTS:    Imaging Personally Reviewed:  [ ] YES     Consultant(s) Notes Reviewed:      Care Discussed with Consultants/Other Providers:    Advanced Directives: [ ] DNR  [ ] No feeding tube  [ ] MOLST in chart  [ ] MOLST completed today  [ ] Unknown  
INTERVAL HPI/OVERNIGHT EVENTS:  HPI:  No new overnight event.  No N/V. Diarrhea is improving.  Tolerating diet.     MEDICATIONS  (STANDING):  enoxaparin Injectable 40 milliGRAM(s) SubCutaneous every 24 hours  influenza   Vaccine 0.5 milliLiter(s) IntraMuscular once  lactated ringers. 1000 milliLiter(s) (150 mL/Hr) IV Continuous <Continuous>  lactobacillus acidophilus 1 Tablet(s) Oral three times a day with meals  medroxyPROGESTERone 10 milliGRAM(s) Oral daily  methimazole 10 milliGRAM(s) Oral daily  metoprolol succinate ER 50 milliGRAM(s) Oral daily  ondansetron Injectable 4 milliGRAM(s) IV Push every 6 hours  sertraline 100 milliGRAM(s) Oral daily  traZODone 50 milliGRAM(s) Oral two times a day    MEDICATIONS  (PRN):  acetaminophen     Tablet .. 650 milliGRAM(s) Oral every 6 hours PRN Temp greater or equal to 38C (100.4F), Mild Pain (1 - 3)  morphine  - Injectable 4 milliGRAM(s) IV Push every 4 hours PRN Severe Pain (7 - 10)  morphine  - Injectable 2 milliGRAM(s) IV Push every 4 hours PRN Moderate Pain (4 - 6)      Allergies    Motrin (Hives)    Intolerances          General:  No wt loss, fevers, chills, night sweats, fatigue,   Eyes:  Good vision, no reported pain  ENT:  No sore throat, pain, runny nose, dysphagia  CV:  No pain, palpitations, hypo/hypertension  Resp:  No dyspnea, cough, tachypnea, wheezing  GI:  No pain, No nausea, No vomiting, No diarrhea, No constipation, No weight loss, No fever, No pruritis, No rectal bleeding, No tarry stools, No dysphagia,  :  No pain, bleeding, incontinence, nocturia  Muscle:  No pain, weakness  Neuro:  No weakness, tingling, memory problems  Psych:  No fatigue, insomnia, mood problems, depression  Endocrine:  No polyuria, polydipsia, cold/heat intolerance  Heme:  No petechiae, ecchymosis, easy bruisability  Skin:  No rash, tattoos, scars, edema      PHYSICAL EXAM:   Vital Signs:  Vital Signs Last 24 Hrs  T(C): 36.4 (20 Feb 2024 04:09), Max: 36.7 (19 Feb 2024 10:20)  T(F): 97.6 (20 Feb 2024 04:09), Max: 98.1 (19 Feb 2024 10:20)  HR: 88 (20 Feb 2024 06:29) (84 - 95)  BP: 102/66 (20 Feb 2024 06:29) (88/50 - 102/66)  BP(mean): --  RR: 16 (20 Feb 2024 06:29) (16 - 18)  SpO2: 99% (20 Feb 2024 06:29) (96% - 100%)    Parameters below as of 20 Feb 2024 06:29  Patient On (Oxygen Delivery Method): room air      Daily     Daily I&O's Summary      GENERAL:  Appears stated age, well-groomed, well-nourished, no distress  HEENT:  NC/AT,  conjunctivae clear and pink, no thyromegaly, nodules, adenopathy, no JVD, sclera -anicteric  CHEST:  Full & symmetric excursion, no increased effort, breath sounds clear  HEART:  Regular rhythm, S1, S2, no murmur/rub/S3/S4, no abdominal bruit, no edema  ABDOMEN:  Soft, non-tender, non-distended, normoactive bowel sounds,  no masses ,no hepato-splenomegaly, no signs of chronic liver disease  EXTEREMITIES:  no cyanosis,clubbing or edema  SKIN:  No rash/erythema/ecchymoses/petechiae/wounds/abscess/warm/dry  NEURO:  Alert, oriented, no asterixis, no tremor, no encephalopathy      LABS:                        7.5    4.32  )-----------( 150      ( 19 Feb 2024 12:00 )             23.7     02-19    x   |  x   |  x   ----------------------------<  x   3.5   |  x   |  x     Ca    6.5<LL>      19 Feb 2024 12:00  Mg     1.5     02-19    TPro  5.1<L>  /  Alb  2.4<L>  /  TBili  0.9  /  DBili  x   /  AST  44<H>  /  ALT  13  /  AlkPhos  58  02-19      Urinalysis Basic - ( 19 Feb 2024 12:00 )    Color: x / Appearance: x / SG: x / pH: x  Gluc: 85 mg/dL / Ketone: x  / Bili: x / Urobili: x   Blood: x / Protein: x / Nitrite: x   Leuk Esterase: x / RBC: x / WBC x   Sq Epi: x / Non Sq Epi: x / Bacteria: x      amylase   lipaseLipase: 12 U/L (02-18 @ 20:50)    RADIOLOGY & ADDITIONAL TESTS:

## 2024-02-20 NOTE — CHART NOTE - NSCHARTNOTEFT_GEN_A_CORE
patient wanted to leave AMA  said she was disrespected last night  she did not want to go into detail    AMA paperwork signed

## 2024-02-20 NOTE — PROGRESS NOTE ADULT - ASSESSMENT
nausea, vomiting, diarrhea  negative GI PCR, and Cdiff testing.     PLAN  bacid one tab tid  advance diet as tolerated  replete electrolytes as needed   will need outpatient GI follow up for eventual EGD/Colon  will follow     Discussed with Dr. Jeffers. 
42 y/o F with PMH of PAF, Asthma, Hyperthyroidism, Neuropathy, Vaginal Bleeding with extensive blood transfusion s/p uterine ablation 4 months ago, Iron Deficiency Anemia, Obesity, and Depression presented with abdominal pain, diarrhea, nausea, and vomiting       Problem/Plan - 1:  ·  Problem: Abdominal pain.   ·  Plan: with evidence suggestive of pancolitis at the CT, possible viral gastroenteritis versus food poisoning r/o c-diff colitis despite denying recent antibiotic therapy, admitted to telemetry, started on contact precautions, stool for C-Diff toxin PCR, and GI PCR panel, clear liquid diet, Zofran PRN, pain control, will hold off any antibiotic therapy for now & f/u the above work-up results, GI consult with Dr. Hyman was called.  - seen by GI. Recommended Bacid TID  - imodium if stool study negative.   - outpatient EGD/colonoscopy  - follow up stool studies result  - follow GI recommendations     Problem/Plan - 2:  ·  Problem: Hypomagnesemia.   ·  Plan: supplemented by ED team with 2 gm of magnesium sulfate IVPB, ordered an additional 2 gm dose, recheck serum magnesium level in am & supplement as needed.     Problem/Plan - 3:  ·  Problem: Hypokalemia.   ·  Plan: supplemented with 10 meq potassium chloride IVPB X3 doses by ED team, ordered additional 2 doses, recheck serum potassium level  in am, and supplement as needed.     Problem/Plan - 4:  ·  Problem: Hypocalcemia.   ·  Plan: pseudo hypocalcemia , corrected serum calcium level is normal, monitor.     Problem/Plan - 5:  ·  Problem: Abnormal LFTs.   ·  Plan: unexplained, suspicious for alcoholic hepatitis, repeat in am.     Problem/Plan - 6:  ·  Problem: Hyperthyroidism.   ·  Plan: on Methimazole 10 mg daily, continue at same dose, check TSH in am.     Problem/Plan - 7:  ·  Problem: Need for prophylactic measure.   ·  Plan: started her on LMWH Enoxaparin 40 mg sub Q daily for DVT prophylaxis.

## 2024-02-20 NOTE — DISCHARGE NOTE PROVIDER - NSDCMRMEDTOKEN_GEN_ALL_CORE_FT
methIMAzole 10 mg oral tablet: 1 tab(s) orally once a day  metoprolol succinate 50 mg oral tablet, extended release: 1 tab(s) orally once a day  Provera 10 mg oral tablet: 1 tab(s) orally every 12 hours  sertraline 100 mg oral tablet: 1 tab(s) orally once a day  traZODone 50 mg oral tablet: 1 tab(s) orally 2 times a day

## 2024-02-21 LAB
-  AMOXICILLIN/CLAVULANIC ACID: SIGNIFICANT CHANGE UP
-  AMOXICILLIN/CLAVULANIC ACID: SIGNIFICANT CHANGE UP
-  AMPICILLIN/SULBACTAM: SIGNIFICANT CHANGE UP
-  AMPICILLIN/SULBACTAM: SIGNIFICANT CHANGE UP
-  AMPICILLIN: SIGNIFICANT CHANGE UP
-  AMPICILLIN: SIGNIFICANT CHANGE UP
-  AZTREONAM: SIGNIFICANT CHANGE UP
-  AZTREONAM: SIGNIFICANT CHANGE UP
-  CEFAZOLIN: SIGNIFICANT CHANGE UP
-  CEFAZOLIN: SIGNIFICANT CHANGE UP
-  CEFEPIME: SIGNIFICANT CHANGE UP
-  CEFEPIME: SIGNIFICANT CHANGE UP
-  CEFOXITIN: SIGNIFICANT CHANGE UP
-  CEFOXITIN: SIGNIFICANT CHANGE UP
-  CEFTRIAXONE: SIGNIFICANT CHANGE UP
-  CEFTRIAXONE: SIGNIFICANT CHANGE UP
-  CEFUROXIME: SIGNIFICANT CHANGE UP
-  CEFUROXIME: SIGNIFICANT CHANGE UP
-  CIPROFLOXACIN: SIGNIFICANT CHANGE UP
-  CIPROFLOXACIN: SIGNIFICANT CHANGE UP
-  ERTAPENEM: SIGNIFICANT CHANGE UP
-  ERTAPENEM: SIGNIFICANT CHANGE UP
-  GENTAMICIN: SIGNIFICANT CHANGE UP
-  GENTAMICIN: SIGNIFICANT CHANGE UP
-  IMIPENEM: SIGNIFICANT CHANGE UP
-  IMIPENEM: SIGNIFICANT CHANGE UP
-  LEVOFLOXACIN: SIGNIFICANT CHANGE UP
-  LEVOFLOXACIN: SIGNIFICANT CHANGE UP
-  MEROPENEM: SIGNIFICANT CHANGE UP
-  MEROPENEM: SIGNIFICANT CHANGE UP
-  NITROFURANTOIN: SIGNIFICANT CHANGE UP
-  NITROFURANTOIN: SIGNIFICANT CHANGE UP
-  PIPERACILLIN/TAZOBACTAM: SIGNIFICANT CHANGE UP
-  PIPERACILLIN/TAZOBACTAM: SIGNIFICANT CHANGE UP
-  TOBRAMYCIN: SIGNIFICANT CHANGE UP
-  TOBRAMYCIN: SIGNIFICANT CHANGE UP
-  TRIMETHOPRIM/SULFAMETHOXAZOLE: SIGNIFICANT CHANGE UP
-  TRIMETHOPRIM/SULFAMETHOXAZOLE: SIGNIFICANT CHANGE UP
CULTURE RESULTS: ABNORMAL
METHOD TYPE: SIGNIFICANT CHANGE UP
METHOD TYPE: SIGNIFICANT CHANGE UP
ORGANISM # SPEC MICROSCOPIC CNT: ABNORMAL
SPECIMEN SOURCE: SIGNIFICANT CHANGE UP

## 2024-03-15 NOTE — ED ADULT NURSE NOTE - BREATH SOUNDS, MLM
Assessment/Plan     1. Primary osteoarthritis of right knee    2. Complex tear of lateral meniscus, current injury, right knee, initial encounter    3. Complex tear of medial meniscus of right knee as current injury, initial encounter      Orders Placed This Encounter   Procedures    Large joint arthrocentesis: R knee    Brace    XR knee 1 or 2 vw right    Ambulatory Referral to Physical Therapy       The patient has severe right knee arthritis.  We discussed treatment options as well as risks and benefits of treatment options.  Operative and nonoperative treatment options were discussed with patient at today's visit.  Nonoperative treatment options discussed was PT, bracing, OTC medication, steroid injection, gel injection.  Can take Tylenol 1,000mg by mouth every 8 hours as needed for pain.  Do not exceed 3,000mg per day.    PT was given to patient at today's visit.  Brace was given to patient today.   After a discussion of risks and benefits the patient elected to proceed with a Right knee steroid injection today.  Patient should ice and avoid strenuous activity for 1-2 days if needed.  Patient should avoid vaccines for 2 weeks if possible.  If patient is diabetic should also monitor glucose over the next 7 to 10 days.        Return in about 3 months (around 6/15/2024) for Recheck, repeat CSI.    I answered all of the patient's questions during the visit and provided education of the patient's condition during the visit.  The patient verbalized understanding of the information given and agrees with the plan.  This note was dictated using Esphion software.  It may contain errors including improperly dictated words.  Please contact physician directly for any questions.    History of Present Illness   Chief complaint:   Chief Complaint   Patient presents with    Right Knee - Pain, Swelling       HPI: Oanh Martinez is a 80 y.o. female that c/o right knee pain.    Length of time knee pain has been present: 10  years  Any falls or trauma associated with onset of pain: no roz   Location of pain: generalized knee pain and some burning sensation down the shin  Intermittent or constant: constant   Description of pain: sharp and stabbing pain. Burning sensation in shin  Aggravating factors: weightbearing activities.   Instability or locking: reports instability. No locking  Pain medication that has been tried: nothing,   Topical mediation that has been tried: yes  Has heat/ice/elevation been tried: yes  Can NSAIDs be taken?  If not why?: no because she is on blood thinners   Has PT or home exercises been tried?: no  Has bracing been tried? OTC or rx?  no  Have injections been tried?  Steroid/visco?: no  Any history of surgery on that knee?:  no       ROS:    See HPI for musculoskeletal review.   All other systems reviewed are negative     Historical Information   Past Medical History:   Diagnosis Date    Allergic reaction 09/27/2021    Allergic rhinitis     Arthritis     Atrial fibrillation ([facility])     Bleeding nose     Breast cancer ([facility])     Bug bite 05/21/2019    Cataracts, bilateral     Chronic headaches     pulsatile daily headaches    Chronic post-traumatic headache, not intractable 09/08/2020    Colitis     Coronary artery disease     Diabetes mellitus (HCC)     Disease of thyroid gland     Dizziness and giddiness     DJD (degenerative joint disease), cervical     Encounter for well adult exam with abnormal findings 10/14/2019    Facial neuralgia     left frontal facial post traumatic neuralgia    Fibromyalgia     Glaucoma     History of external beam radiation therapy     8/16/18 to 9/14/2018 Right breast    Hypertension     Hypokalemia     Hypovitaminosis D     Lupus ([facility]) 07/19/2018    Obesity     Osteoarthritis     Pre-op examination 02/25/2019    Pre-operative clearance 08/21/2018    Prediabetes     Rib pain on right side 02/25/2019    SDH (subdural hematoma) ([facility]) 01/31/2017    Sleep apnea     no cpap    Stage 3a  chronic kidney disease (HCC) 05/10/2022    Syncope and collapse 12/01/2018    Vertigo      Past Surgical History:   Procedure Laterality Date    BREAST BIOPSY      BREAST LUMPECTOMY      CHOLECYSTECTOMY      COLONOSCOPY  2013    HYSTERECTOMY      MAMMO NEEDLE LOCALIZATION RIGHT (ALL INC) Right 6/21/2018    MAMMO STEREOTACTIC BREAST BIOPSY RIGHT (ALL INC) Right 5/9/2018    THYROIDECTOMY N/A 11/21/2019    Procedure: THYROIDECTOMY, total;  Surgeon: Joshua Glover MD;  Location: [facility];  Service: Surgical Oncology    TONSILLECTOMY      US GUIDED BREAST BIOPSY RIGHT COMPLETE Right 5/9/2018    US GUIDED THYROID BIOPSY  6/12/2019    US GUIDED THYROID BIOPSY  9/13/2019     Social History   Social History     Substance and Sexual Activity   Alcohol Use Yes     Social History     Substance and Sexual Activity   Drug Use No     Social History     Tobacco Use   Smoking Status Never    Passive exposure: Never   Smokeless Tobacco Never   Tobacco Comments    no smoke exposure     Family History:   Family History   Problem Relation Age of Onset    Heart attack Sister     Hypertension Family     Diabetes Family     Stroke Family     Migraines Family     Breast cancer Daughter 35       Current Outpatient Medications on File Prior to Visit   Medication Sig Dispense Refill    albuterol (Ventolin HFA) 90 mcg/act inhaler Inhale 2 puffs every 6 (six) hours as needed for wheezing 18 g 0    allopurinol (ZYLOPRIM) 100 mg tablet Take 1 tablet (100 mg total) by mouth daily 90 tablet 0    apixaban (Eliquis) 5 mg Take 1 tablet (5 mg total) by mouth 2 (two) times a day Patient must schedule appointment for farther refills. 180 tablet 3    Atogepant (Qulipta) 10 MG TABS One tab daily. 90 tablet 0    atorvastatin (LIPITOR) 10 mg tablet Take 1 tablet (10 mg total) by mouth daily 90 tablet 1    cholecalciferol (VITAMIN D3) 1,000 units tablet Take 1 tablet (1,000 Units total) by mouth daily 90 tablet 1    Diclofenac Sodium (VOLTAREN) 1 % Apply 4  g topically 4 (four) times a day 100 g 0    gabapentin (NEURONTIN) 300 mg capsule Take 1 capsule (300 mg total) by mouth 2 (two) times a day 180 capsule 0    irbesartan-hydrochlorothiazide (AVALIDE) 300-12.5 MG per tablet Take 1 tablet by mouth daily 90 tablet 1    levothyroxine (Euthyrox) 75 mcg tablet Take 1 tablet (75 mcg total) by mouth daily in the early morning 90 tablet 3    metFORMIN (GLUCOPHAGE) 500 mg tablet Take 1 tablet (500 mg total) by mouth 2 (two) times a day with meals 180 tablet 1    metoprolol tartrate (LOPRESSOR) 25 mg tablet Take 1 tablet (25 mg total) by mouth every 12 (twelve) hours 180 tablet 0    spironolactone (ALDACTONE) 25 mg tablet Take 1 tablet (25 mg total) by mouth daily 30 tablet 5    vitamin B-12 (VITAMIN B-12) 1,000 mcg tablet Take 1 tablet (1,000 mcg total) by mouth daily 90 tablet 0     No current facility-administered medications on file prior to visit.     Allergies   Allergen Reactions    Procardia [Nifedipine] Edema       Current Outpatient Medications on File Prior to Visit   Medication Sig Dispense Refill    albuterol (Ventolin HFA) 90 mcg/act inhaler Inhale 2 puffs every 6 (six) hours as needed for wheezing 18 g 0    allopurinol (ZYLOPRIM) 100 mg tablet Take 1 tablet (100 mg total) by mouth daily 90 tablet 0    apixaban (Eliquis) 5 mg Take 1 tablet (5 mg total) by mouth 2 (two) times a day Patient must schedule appointment for farther refills. 180 tablet 3    Atogepant (Qulipta) 10 MG TABS One tab daily. 90 tablet 0    atorvastatin (LIPITOR) 10 mg tablet Take 1 tablet (10 mg total) by mouth daily 90 tablet 1    cholecalciferol (VITAMIN D3) 1,000 units tablet Take 1 tablet (1,000 Units total) by mouth daily 90 tablet 1    Diclofenac Sodium (VOLTAREN) 1 % Apply 4 g topically 4 (four) times a day 100 g 0    gabapentin (NEURONTIN) 300 mg capsule Take 1 capsule (300 mg total) by mouth 2 (two) times a day 180 capsule 0    irbesartan-hydrochlorothiazide (AVALIDE) 300-12.5 MG per  "tablet Take 1 tablet by mouth daily 90 tablet 1    levothyroxine (Euthyrox) 75 mcg tablet Take 1 tablet (75 mcg total) by mouth daily in the early morning 90 tablet 3    metFORMIN (GLUCOPHAGE) 500 mg tablet Take 1 tablet (500 mg total) by mouth 2 (two) times a day with meals 180 tablet 1    metoprolol tartrate (LOPRESSOR) 25 mg tablet Take 1 tablet (25 mg total) by mouth every 12 (twelve) hours 180 tablet 0    spironolactone (ALDACTONE) 25 mg tablet Take 1 tablet (25 mg total) by mouth daily 30 tablet 5    vitamin B-12 (VITAMIN B-12) 1,000 mcg tablet Take 1 tablet (1,000 mcg total) by mouth daily 90 tablet 0     No current facility-administered medications on file prior to visit.       Objective   Vitals: Blood pressure 136/78, pulse 66, height 5' 2\" (1.575 m), weight 75 kg (165 lb 6.4 oz), not currently breastfeeding.,Body mass index is 30.25 kg/m².    PE:  AAOx 3  WDWN  Hearing intact, no drainage from eyes  Regular rate  no audible wheezing  no abdominal distension  LE compartments soft, skin intact    rightknee:    Appearance:  no swelling   No ecchymosis  no obvious joint deformity   No effusion  Palpation/Tenderness:  +TTP over medial joint line  No TTP over lateral joint line   No TTP over patella  No TTP over patellar tendon  No TTP over pes anserine bursa  Active Range of Motion:  AROM: 2-115  Special Tests:  Medial Demar's Test:  Positive  Lateral Demar's Test:  Negative  Patellar grind:  Negative  Valgus Stress Test:  negative  Varus Stress Test:  negative     No ipsilateral hip pain with ROM    rightLE:    Sensation grossly intact  Palpable 2+ pulse  AT/GS/EHL intact    Imaging Studies: I have personally reviewed pertinent films in PACS  XR rightknee:  severe osteoarthritis right knee    Large joint arthrocentesis: R knee  Universal Protocol:  Consent: Verbal consent obtained.  Risks and benefits: risks, benefits and alternatives were discussed  Consent given by: patient  Patient understanding: " patient states understanding of the procedure being performed  Site marked: the operative site was marked  Patient identity confirmed: verbally with patient  Supporting Documentation  Indications: pain   Procedure Details  Location: knee - R knee  Needle size: 22 G  Ultrasound guidance: no  Approach: anterolateral  Medications administered: 3 mL lidocaine 1 %; 40 mg triamcinolone acetonide 40 mg/mL    Patient tolerance: patient tolerated the procedure well with no immediate complications  Dressing:  Sterile dressing applied            Scribe Attestation      I,:  Theo Baltazar am acting as a scribe while in the presence of the attending physician.:       I,:  Elida Alvarez DO personally performed the services described in this documentation    as scribed in my presence.:                   Clear

## 2024-03-25 NOTE — ED ADULT NURSE NOTE - NSICDXPASTSURGICALHX_GEN_ALL_CORE_FT
Admission Reconciliation is Not Complete  Discharge Reconciliation is Not Complete PAST SURGICAL HISTORY:  History of blood transfusion     S/P  Section ,,     Admission Reconciliation is Completed  Discharge Reconciliation is Not Complete Admission Reconciliation is Completed  Discharge Reconciliation is Completed

## 2024-04-25 NOTE — PATIENT PROFILE ADULT. - FUNCTIONAL SCREEN CURRENT LEVEL: AMBULATION, MLM
I have reviewed the chart of Monalisa Carson and collaborated with Gavin Sultana MD in the care of the patient who is hospitalized for the following:    Active Hospital Problems    Diagnosis    *Superior mesenteric vein thrombosis    History of stroke    S/P laparoscopic sleeve gastrectomy    Seizures    HLD (hyperlipidemia)    Controlled type 2 diabetes mellitus with stage 2 chronic kidney disease, without long-term current use of insulin    Essential hypertension, benign          I have reviewed Monalisa Carson with the multidisciplinary team during discharge huddle.       Roberta Sims PA-C  [facility]       (0) independent

## 2024-06-07 NOTE — ED PROVIDER NOTE - PSYCHIATRIC, MLM
"Patient is a 71-year-old male with past medical history significant for hypertension who presented to the ER with dizziness     Patient was placed on the stroke pathway initially in the ER  CT scan of the brain: No acute intracranial pathology.  Chronic microangiopathic.  Severe right ICA proximal stenosis  MRI of the brain: No acute infarct or acute intracranial hemorrhage\"  2D echocardiogram: No significant abnormality.  Ascending aorta mildly dilated.  Patient was evaluated by the neurology team: Patient with positionally dependent vertigo, consistent with BPV  No evidence of acute infarct  Not felt to be secondary to symptomatic carotid disease  Per neurology: As needed meclizine  Patient also following with vascular surgery for carotid artery stenosis  Pt still notes dizzy/veritgo with moving head, attempting to walk:  cont meclizine  " appears upset, cooperative

## 2024-06-08 ENCOUNTER — EMERGENCY (EMERGENCY)
Facility: HOSPITAL | Age: 44
LOS: 1 days | Discharge: HOME | End: 2024-06-08
Attending: EMERGENCY MEDICINE | Admitting: EMERGENCY MEDICINE
Payer: MEDICAID

## 2024-06-08 VITALS
DIASTOLIC BLOOD PRESSURE: 65 MMHG | HEIGHT: 65 IN | SYSTOLIC BLOOD PRESSURE: 94 MMHG | WEIGHT: 205.03 LBS | OXYGEN SATURATION: 99 % | RESPIRATION RATE: 16 BRPM | HEART RATE: 96 BPM | TEMPERATURE: 98 F

## 2024-06-08 DIAGNOSIS — Z92.89 PERSONAL HISTORY OF OTHER MEDICAL TREATMENT: Chronic | ICD-10-CM

## 2024-06-08 LAB
ALBUMIN SERPL ELPH-MCNC: 3.2 G/DL — LOW (ref 3.3–5)
ALP SERPL-CCNC: 101 U/L — SIGNIFICANT CHANGE UP (ref 30–120)
ALT FLD-CCNC: 26 U/L — SIGNIFICANT CHANGE UP (ref 10–60)
ANION GAP SERPL CALC-SCNC: 9 MMOL/L — SIGNIFICANT CHANGE UP (ref 5–17)
APTT BLD: 33.8 SEC — SIGNIFICANT CHANGE UP (ref 24.5–35.6)
AST SERPL-CCNC: 87 U/L — HIGH (ref 10–40)
BASOPHILS # BLD AUTO: 0.01 K/UL — SIGNIFICANT CHANGE UP (ref 0–0.2)
BASOPHILS NFR BLD AUTO: 0.2 % — SIGNIFICANT CHANGE UP (ref 0–2)
BILIRUB SERPL-MCNC: 0.8 MG/DL — SIGNIFICANT CHANGE UP (ref 0.2–1.2)
BUN SERPL-MCNC: 9 MG/DL — SIGNIFICANT CHANGE UP (ref 7–23)
CALCIUM SERPL-MCNC: 7.2 MG/DL — LOW (ref 8.4–10.5)
CHLORIDE SERPL-SCNC: 102 MMOL/L — SIGNIFICANT CHANGE UP (ref 96–108)
CO2 SERPL-SCNC: 30 MMOL/L — SIGNIFICANT CHANGE UP (ref 22–31)
CREAT SERPL-MCNC: 0.65 MG/DL — SIGNIFICANT CHANGE UP (ref 0.5–1.3)
D DIMER BLD IA.RAPID-MCNC: <150 NG/ML DDU — SIGNIFICANT CHANGE UP
EGFR: 111 ML/MIN/1.73M2 — SIGNIFICANT CHANGE UP
EOSINOPHIL # BLD AUTO: 0 K/UL — SIGNIFICANT CHANGE UP (ref 0–0.5)
EOSINOPHIL NFR BLD AUTO: 0 % — SIGNIFICANT CHANGE UP (ref 0–6)
GLUCOSE SERPL-MCNC: 112 MG/DL — HIGH (ref 70–99)
HCG SERPL-ACNC: <1 MIU/ML — SIGNIFICANT CHANGE UP
HCT VFR BLD CALC: 32.2 % — LOW (ref 34.5–45)
HGB BLD-MCNC: 10.3 G/DL — LOW (ref 11.5–15.5)
IMM GRANULOCYTES NFR BLD AUTO: 0.2 % — SIGNIFICANT CHANGE UP (ref 0–0.9)
INR BLD: 1.24 RATIO — HIGH (ref 0.85–1.18)
LIDOCAIN IGE QN: 16 U/L — SIGNIFICANT CHANGE UP (ref 16–77)
LYMPHOCYTES # BLD AUTO: 0.65 K/UL — LOW (ref 1–3.3)
LYMPHOCYTES # BLD AUTO: 16.1 % — SIGNIFICANT CHANGE UP (ref 13–44)
MAGNESIUM SERPL-MCNC: 0.9 MG/DL — CRITICAL LOW (ref 1.6–2.6)
MCHC RBC-ENTMCNC: 27.2 PG — SIGNIFICANT CHANGE UP (ref 27–34)
MCHC RBC-ENTMCNC: 32 GM/DL — SIGNIFICANT CHANGE UP (ref 32–36)
MCV RBC AUTO: 85.2 FL — SIGNIFICANT CHANGE UP (ref 80–100)
MONOCYTES # BLD AUTO: 0.5 K/UL — SIGNIFICANT CHANGE UP (ref 0–0.9)
MONOCYTES NFR BLD AUTO: 12.4 % — SIGNIFICANT CHANGE UP (ref 2–14)
NEUTROPHILS # BLD AUTO: 2.87 K/UL — SIGNIFICANT CHANGE UP (ref 1.8–7.4)
NEUTROPHILS NFR BLD AUTO: 71.1 % — SIGNIFICANT CHANGE UP (ref 43–77)
NRBC # BLD: 0 /100 WBCS — SIGNIFICANT CHANGE UP (ref 0–0)
NT-PROBNP SERPL-SCNC: 164 PG/ML — HIGH (ref 0–125)
PLATELET # BLD AUTO: 202 K/UL — SIGNIFICANT CHANGE UP (ref 150–400)
POTASSIUM SERPL-MCNC: 2.7 MMOL/L — CRITICAL LOW (ref 3.5–5.3)
POTASSIUM SERPL-SCNC: 2.7 MMOL/L — CRITICAL LOW (ref 3.5–5.3)
PROT SERPL-MCNC: 6.2 G/DL — SIGNIFICANT CHANGE UP (ref 6–8.3)
PROTHROM AB SERPL-ACNC: 13.8 SEC — HIGH (ref 9.5–13)
RBC # BLD: 3.78 M/UL — LOW (ref 3.8–5.2)
RBC # FLD: 15.1 % — HIGH (ref 10.3–14.5)
SODIUM SERPL-SCNC: 141 MMOL/L — SIGNIFICANT CHANGE UP (ref 135–145)
TROPONIN I, HIGH SENSITIVITY RESULT: 5.3 NG/L — SIGNIFICANT CHANGE UP
WBC # BLD: 4.04 K/UL — SIGNIFICANT CHANGE UP (ref 3.8–10.5)
WBC # FLD AUTO: 4.04 K/UL — SIGNIFICANT CHANGE UP (ref 3.8–10.5)

## 2024-06-08 PROCEDURE — 93010 ELECTROCARDIOGRAM REPORT: CPT

## 2024-06-08 PROCEDURE — 71045 X-RAY EXAM CHEST 1 VIEW: CPT | Mod: 26

## 2024-06-08 PROCEDURE — 99285 EMERGENCY DEPT VISIT HI MDM: CPT

## 2024-06-08 RX ORDER — OXYCODONE HYDROCHLORIDE 5 MG/1
5 TABLET ORAL ONCE
Refills: 0 | Status: DISCONTINUED | OUTPATIENT
Start: 2024-06-08 | End: 2024-06-08

## 2024-06-08 RX ORDER — POTASSIUM CHLORIDE 20 MEQ
40 PACKET (EA) ORAL ONCE
Refills: 0 | Status: COMPLETED | OUTPATIENT
Start: 2024-06-08 | End: 2024-06-08

## 2024-06-08 RX ORDER — MAGNESIUM SULFATE 500 MG/ML
2 VIAL (ML) INJECTION ONCE
Refills: 0 | Status: COMPLETED | OUTPATIENT
Start: 2024-06-08 | End: 2024-06-08

## 2024-06-08 RX ORDER — POTASSIUM CHLORIDE 20 MEQ
10 PACKET (EA) ORAL ONCE
Refills: 0 | Status: COMPLETED | OUTPATIENT
Start: 2024-06-08 | End: 2024-06-08

## 2024-06-08 RX ADMIN — OXYCODONE HYDROCHLORIDE 5 MILLIGRAM(S): 5 TABLET ORAL at 23:11

## 2024-06-08 RX ADMIN — Medication 40 MILLIEQUIVALENT(S): at 22:38

## 2024-06-08 RX ADMIN — Medication 25 GRAM(S): at 22:39

## 2024-06-08 NOTE — ED ADULT NURSE REASSESSMENT NOTE - NS ED NURSE REASSESS COMMENT FT1
Received pt aox4 lying on bed on cardiac monitor with ongoing mag sulfate infusion at left ac g20. monitored pt closely.
pt resting comfortably, improvement noted, in no acute distress. Respirations are even and unlabored. pt voices no complaints at this time. plan of care ongoing, no further concerns as of present, pt repositioned in bed, pt hemodynamically stable. no acute changes noted, needs attended to, safety maintained, call bell within reach.

## 2024-06-08 NOTE — ED PROVIDER NOTE - OBJECTIVE STATEMENT
43-year-old female with history of anemia (history of transfusions in the past due to heavy vaginal bleeding, patient is status post ablation), depression, asthma, history of atrial fibrillation in the past, history of hyperthyroidism in the past, and neuropathy in legs presents to emergency room with multiple medical complaints.  Patient reports feeling lightheaded the past 2 days.  Has chronic neuropathy in the legs.  Patient states she has increased lightheadedness with exertion and felt like she was going to pass out and had weakness in her legs.  Became short of breath last night with some heaviness in chest.  Also reports some lower abdominal discomfort.  Patient does report having an ablation due to heavy vaginal bleeding about 6 months ago.  Started have menstrual cycle again yesterday and passed small clot.  States bleeding very mild in nature.  States abdominal cramping may be related to menses.  Also reports history of colitis and C. difficile in February.  Was admitted but left AMA after a couple days. was seen by PCP and placed on antibiotics.  Patient states she stopped taking the antibiotics when she was feeling better.  Has small amount of diarrhea the past couple days.  Restarted the medication.  Patient uncertain of the name of the medication she was taking  PCP Debby Williamson

## 2024-06-08 NOTE — ED PROVIDER NOTE - PATIENT PORTAL LINK FT
You can access the FollowMyHealth Patient Portal offered by [facility] by registering at the following website: [email or web address]. By joining [employer]’s FollowMyHealth portal, you will also be able to view your health information using other applications (apps) compatible with our system.

## 2024-06-08 NOTE — ED PROVIDER NOTE - CARE PROVIDER_API CALL
Kecia Williamson  [facility]  [address] [place] [id number]  Phone: [phone]  Fax: [phone]  Follow Up Time: 1-3 Days

## 2024-06-08 NOTE — ED PROVIDER NOTE - CLINICAL SUMMARY MEDICAL DECISION MAKING FREE TEXT BOX
attg note: 43y F with h/o peripheral neuropathy, anemia (s/p ablation for menometrorrhagia), recently treated for c. diff diarrhea, stopped the abx as diarrhea was improved, but seemed to be coming back so restarted abx, pt reports feeling fatigue, some sob, increased numbness legs/arms from baseline, difficult to walk due to numbness; on exam pt wd, wn, nad; neuro - A&O, dec sensation over distal extremeties, FROM; msk +peripheral edema, normal tone; skin intact; MDM eval for worsening anemia, may require transfusion, trop to r/o acs, check elytes/cell counts, reassess for admission

## 2024-06-08 NOTE — ED PROVIDER NOTE - PROGRESS NOTE DETAILS
Patient stable.  Resting comfortably.  Hemoglobin stable at 10.3.  Potassium and magnesium low.  IV potassium magnesium ordered. Dr. Virk will assume care

## 2024-06-08 NOTE — ED ADULT NURSE NOTE - NS ED NOTE ABUSE SUSPICION NEGLECT YN
Bedside and Verbal shift change report given to Evie Ortega RN (oncoming nurse) by Melo Limon RN (offgoing nurse). Report included the following information SBAR, Kardex, Intake/Output, MAR, Recent Results, Med Rec Status and Cardiac Rhythm SR-ST. No

## 2024-06-08 NOTE — ED ADULT TRIAGE NOTE - GLASGOW COMA SCALE: EYE OPENING, MLM
Prior Authorization Approval    Authorization Effective Date: 11/1/2021  Authorization Expiration Date: 5/30/2022  Medication: Zarxio - approved  Approved Dose/Quantity: 1.5ML / 3 DS  Reference #: [id number]   Insurance Company: [employer] - Phone [phone] Fax [phone]  Expected CoPay:       CoPay Card Available:      Foundation Assistance Needed:    Which Pharmacy is filling the prescription (Not needed for infusion/clinic administered): [facility] [phone]  Pharmacy Notified: Yes  Patient Notified: Yes     (E4) spontaneous

## 2024-06-08 NOTE — ED ADULT NURSE NOTE - OBJECTIVE STATEMENT
pt comes to ed c/o sob and chest tightness x 2-3 days. hx of cdiff 2 months ago still c/o diarrhea. pt denies abd pain, nausea, vomiting, back pain, neck pain, recent travel, sick contacts, headache, numbness, tingling, blurred vision, sinus congestion, fevers, chills, body aches, hematuria, trouble speaking, trouble walking or any other complaints.

## 2024-06-08 NOTE — ED PROVIDER NOTE - NSFOLLOWUPINSTRUCTIONS_ED_ALL_ED_FT
Hypokalemia  Hypokalemia means that the amount of potassium in the blood is lower than normal. Potassium is a mineral (electrolyte) that helps regulate the amount of fluid in the body. It also stimulates muscle tightening (contraction) and helps nerves work properly.    Normally, most of the body's potassium is inside cells, and only a very small amount is in the blood. Because the amount in the blood is so small, minor changes to potassium levels in the blood can be life-threatening.    What are the causes?  This condition may be caused by:  Antibiotic medicine.  Diarrhea or vomiting. Taking too much of a medicine that helps you have a bowel movement (laxative) can cause diarrhea and lead to hypokalemia.  Chronic kidney disease (CKD).  Medicines that help the body get rid of excess fluid (diuretics).  Eating disorders, such as anorexia or bulimia.  Low magnesium levels in the body.  Sweating a lot.  What are the signs or symptoms?  Symptoms of this condition include:  Weakness.  Constipation.  Fatigue.  Muscle cramps.  Mental confusion.  Skipped heartbeats or irregular heartbeat (palpitations).  Tingling or numbness.  How is this diagnosed?  This condition is diagnosed with a blood test.    How is this treated?  This condition may be treated by:  Taking potassium supplements.  Adjusting the medicines that you take.  Eating more foods that contain a lot of potassium.  If your potassium level is very low, you may need to get potassium through an IV and be monitored in the hospital.    Follow these instructions at home:  Eating and drinking    A plate with examples of foods in a healthy diet.  Eat a healthy diet. A healthy diet includes fresh fruits and vegetables, whole grains, healthy fats, and lean proteins.  If told, eat more foods that contain a lot of potassium. These include:  Nuts, such as peanuts and pistachios.  Seeds, such as sunflower seeds and pumpkin seeds.  Peas, lentils, and lima beans.  Whole grain and bran cereals and breads.  Fresh fruits and vegetables, such as apricots, avocado, bananas, cantaloupe, kiwi, oranges, tomatoes, asparagus, and potatoes.  Juices, such as orange, tomato, and prune.  Lean meats, including fish.  Milk and milk products, such as yogurt.  General instructions    Take over-the-counter and prescription medicines only as told by your health care provider. This includes vitamins, natural food products, and supplements.  Keep all follow-up visits. This is important.  Contact a health care provider if:  You have weakness that gets worse.  You feel your heart pounding or racing.  You vomit.  You have diarrhea.  You have diabetes and you have trouble keeping your blood sugar in your target range.  Get help right away if:  You have chest pain.  You have shortness of breath.  You have vomiting or diarrhea that lasts for more than 2 days.  You faint.  These symptoms may be an emergency. Get help right away. Call 911.  Do not wait to see if the symptoms will go away.  Do not drive yourself to the hospital.  Summary  Hypokalemia means that the amount of potassium in the blood is lower than normal.  This condition is diagnosed with a blood test.  Hypokalemia may be treated by taking potassium supplements, adjusting the medicines that you take, or eating more foods that are high in potassium.  If your potassium level is very low, you may need to get potassium through an IV and be monitored in the hospital.  This information is not intended to replace advice given to you by your health care provider. Make sure you discuss any questions you have with your health care provider.        Hypomagnesemia  Hypomagnesemia is a condition in which the level of magnesium in the blood is too low. Magnesium is a mineral that is found in many foods. It is used in many different processes in the body. Hypomagnesemia can affect every organ in the body. In severe cases, it can cause life-threatening problems.    What are the causes?  This condition may be caused by:  Not getting enough magnesium in your diet or not having enough healthy foods to eat (malnutrition).  Problems with magnesium absorption in the intestines.  Dehydration.  Excessive use of alcohol.  Vomiting.  Severe or long-term (chronic) diarrhea.  Some medicines, including medicines that make you urinate more often (diuretics).  Certain diseases, such as kidney disease, diabetes, celiac disease, and overactive thyroid.  What are the signs or symptoms?  Symptoms of this condition include:  Loss of appetite, nausea, and vomiting.  Involuntary shaking or trembling of a body part (tremor).  Muscle weakness or tingling in the arms and legs.  Sudden tightening of muscles (muscle spasms).  Confusion.  Psychiatric issues, such as:  Depression and irritability.  Psychosis.  A feeling of fluttering of the heart (palpitations).  Seizures.  These symptoms are more severe if magnesium levels drop suddenly.    How is this diagnosed?  This condition may be diagnosed based on:  Your symptoms and medical history.  A physical exam.  Blood and urine tests.  How is this treated?  A sign showing that a person should not drink alcohol.  Treatment depends on the cause and the severity of the condition. It may be treated by:  Taking a magnesium supplement. This can be taken in pill form. If the condition is severe, magnesium is usually given through an IV.  Making changes to your diet. You may be directed to eat foods that have a lot of magnesium, such as green leafy vegetables, peas, beans, and nuts.  Not drinking alcohol. If you are struggling not to drink, ask your health care provider for help.  Follow these instructions at home:  Eating and drinking    Florian beans.   A bunch of spinach.   Make sure that your diet includes foods with magnesium. Foods that have a lot of magnesium in them include:  Green leafy vegetables, such as spinach and broccoli.  Beans and peas.  Nuts and seeds, such as almonds and sunflower seeds.  Whole grains, such as whole grain bread and fortified cereals.  Drink fluids that contain salts and minerals (electrolytes), such as sports drinks, when you are active.  Do not drink alcohol.  General instructions    Take over-the-counter and prescription medicines only as told by your health care provider.  Take magnesium supplements as directed if your health care provider tells you to take them.  Have your magnesium levels monitored as told by your health care provider.  Keep all follow-up visits. This is important.  Contact a health care provider if:  You get worse instead of better.  Your symptoms return.  Get help right away if:  You develop severe muscle weakness.  You have trouble breathing.  You feel that your heart is racing.  These symptoms may represent a serious problem that is an emergency. Do not wait to see if the symptoms will go away. Get medical help right away. Call your local emergency services (911 in the [place] Do not drive yourself to the hospital.    Summary  Hypomagnesemia is a condition in which the level of magnesium in the blood is too low.  Hypomagnesemia can affect every organ in the body.  Treatment may include eating more foods that contain magnesium, taking magnesium supplements, and not drinking alcohol.  Have your magnesium levels monitored as told by your health care provider.  This information is not intended to replace advice given to you by your health care provider. Make sure you discuss any questions you have with your health care provider.

## 2024-06-08 NOTE — ED PROVIDER NOTE - AGGRAVATING FACTORS
Pt states he had a tooth pulled at the dentist today and BP was 215/124 and was told to come to the ER.
exertion

## 2024-06-08 NOTE — ED PROVIDER NOTE - DIFFERENTIAL DIAGNOSIS
Differential Diagnosis Differentials include but not limited to anemia, dehydration, electrolyte abnormality, colitis, ACS, pulmonary embolism, infection

## 2024-06-08 NOTE — ED ADULT TRIAGE NOTE - HISTORY OF COVID-19 VACCINATION
Patient would like to visit with Dr Jackson (scheduled for 03/29/23)  prior to scheduling his Ultrasound and Consult Appointment with us. Patient will call us back to schedule.    No

## 2024-06-09 VITALS
DIASTOLIC BLOOD PRESSURE: 68 MMHG | SYSTOLIC BLOOD PRESSURE: 106 MMHG | RESPIRATION RATE: 16 BRPM | OXYGEN SATURATION: 97 % | HEART RATE: 91 BPM

## 2024-06-09 PROCEDURE — 36415 COLL VENOUS BLD VENIPUNCTURE: CPT

## 2024-06-09 PROCEDURE — 96365 THER/PROPH/DIAG IV INF INIT: CPT

## 2024-06-09 PROCEDURE — 93005 ELECTROCARDIOGRAM TRACING: CPT

## 2024-06-09 PROCEDURE — 85025 COMPLETE CBC W/AUTO DIFF WBC: CPT

## 2024-06-09 PROCEDURE — 85610 PROTHROMBIN TIME: CPT

## 2024-06-09 PROCEDURE — 83690 ASSAY OF LIPASE: CPT

## 2024-06-09 PROCEDURE — 83880 ASSAY OF NATRIURETIC PEPTIDE: CPT

## 2024-06-09 PROCEDURE — 83735 ASSAY OF MAGNESIUM: CPT

## 2024-06-09 PROCEDURE — 84484 ASSAY OF TROPONIN QUANT: CPT

## 2024-06-09 PROCEDURE — 80053 COMPREHEN METABOLIC PANEL: CPT

## 2024-06-09 PROCEDURE — 85379 FIBRIN DEGRADATION QUANT: CPT

## 2024-06-09 PROCEDURE — 96361 HYDRATE IV INFUSION ADD-ON: CPT

## 2024-06-09 PROCEDURE — 71045 X-RAY EXAM CHEST 1 VIEW: CPT

## 2024-06-09 PROCEDURE — 85730 THROMBOPLASTIN TIME PARTIAL: CPT

## 2024-06-09 PROCEDURE — 84702 CHORIONIC GONADOTROPIN TEST: CPT

## 2024-06-09 PROCEDURE — 96366 THER/PROPH/DIAG IV INF ADDON: CPT

## 2024-06-09 PROCEDURE — 99285 EMERGENCY DEPT VISIT HI MDM: CPT | Mod: 25

## 2024-06-09 RX ADMIN — Medication 2 GRAM(S): at 00:39

## 2024-06-09 RX ADMIN — Medication 10 MILLIEQUIVALENT(S): at 01:15

## 2024-06-09 RX ADMIN — OXYCODONE HYDROCHLORIDE 5 MILLIGRAM(S): 5 TABLET ORAL at 00:11

## 2024-06-09 RX ADMIN — Medication 100 MILLIEQUIVALENT(S): at 00:15

## 2024-06-12 ENCOUNTER — EMERGENCY (EMERGENCY)
Facility: HOSPITAL | Age: 44
LOS: 1 days | Discharge: ANOTHER HOSPITAL | End: 2024-06-12
Attending: EMERGENCY MEDICINE | Admitting: EMERGENCY MEDICINE
Payer: MEDICAID

## 2024-06-12 VITALS
DIASTOLIC BLOOD PRESSURE: 75 MMHG | SYSTOLIC BLOOD PRESSURE: 116 MMHG | TEMPERATURE: 98 F | OXYGEN SATURATION: 99 % | RESPIRATION RATE: 18 BRPM | HEART RATE: 88 BPM

## 2024-06-12 VITALS
HEIGHT: 65 IN | OXYGEN SATURATION: 100 % | WEIGHT: 210.1 LBS | TEMPERATURE: 99 F | SYSTOLIC BLOOD PRESSURE: 126 MMHG | RESPIRATION RATE: 20 BRPM | HEART RATE: 74 BPM | DIASTOLIC BLOOD PRESSURE: 72 MMHG

## 2024-06-12 DIAGNOSIS — Z92.89 PERSONAL HISTORY OF OTHER MEDICAL TREATMENT: Chronic | ICD-10-CM

## 2024-06-12 LAB
ANION GAP SERPL CALC-SCNC: 8 MMOL/L — SIGNIFICANT CHANGE UP (ref 5–17)
APTT BLD: 39.1 SEC — HIGH (ref 24.5–35.6)
BASOPHILS # BLD AUTO: 0.02 K/UL — SIGNIFICANT CHANGE UP (ref 0–0.2)
BASOPHILS NFR BLD AUTO: 0.5 % — SIGNIFICANT CHANGE UP (ref 0–2)
BUN SERPL-MCNC: 8 MG/DL — SIGNIFICANT CHANGE UP (ref 7–23)
CALCIUM SERPL-MCNC: 7.9 MG/DL — LOW (ref 8.4–10.5)
CHLORIDE SERPL-SCNC: 105 MMOL/L — SIGNIFICANT CHANGE UP (ref 96–108)
CO2 SERPL-SCNC: 31 MMOL/L — SIGNIFICANT CHANGE UP (ref 22–31)
CREAT SERPL-MCNC: 0.48 MG/DL — LOW (ref 0.5–1.3)
EGFR: 119 ML/MIN/1.73M2 — SIGNIFICANT CHANGE UP
EOSINOPHIL # BLD AUTO: 0 K/UL — SIGNIFICANT CHANGE UP (ref 0–0.5)
EOSINOPHIL NFR BLD AUTO: 0 % — SIGNIFICANT CHANGE UP (ref 0–6)
GLUCOSE SERPL-MCNC: 88 MG/DL — SIGNIFICANT CHANGE UP (ref 70–99)
HCT VFR BLD CALC: 33.2 % — LOW (ref 34.5–45)
HGB BLD-MCNC: 10.5 G/DL — LOW (ref 11.5–15.5)
IMM GRANULOCYTES NFR BLD AUTO: 0.2 % — SIGNIFICANT CHANGE UP (ref 0–0.9)
INR BLD: 1.25 RATIO — HIGH (ref 0.85–1.18)
LACTATE SERPL-SCNC: 1.5 MMOL/L — SIGNIFICANT CHANGE UP (ref 0.7–2)
LYMPHOCYTES # BLD AUTO: 0.64 K/UL — LOW (ref 1–3.3)
LYMPHOCYTES # BLD AUTO: 14.8 % — SIGNIFICANT CHANGE UP (ref 13–44)
MAGNESIUM SERPL-MCNC: 1.3 MG/DL — LOW (ref 1.6–2.6)
MCHC RBC-ENTMCNC: 26.9 PG — LOW (ref 27–34)
MCHC RBC-ENTMCNC: 31.6 GM/DL — LOW (ref 32–36)
MCV RBC AUTO: 84.9 FL — SIGNIFICANT CHANGE UP (ref 80–100)
MONOCYTES # BLD AUTO: 0.44 K/UL — SIGNIFICANT CHANGE UP (ref 0–0.9)
MONOCYTES NFR BLD AUTO: 10.2 % — SIGNIFICANT CHANGE UP (ref 2–14)
NEUTROPHILS # BLD AUTO: 3.22 K/UL — SIGNIFICANT CHANGE UP (ref 1.8–7.4)
NEUTROPHILS NFR BLD AUTO: 74.3 % — SIGNIFICANT CHANGE UP (ref 43–77)
NRBC # BLD: 0 /100 WBCS — SIGNIFICANT CHANGE UP (ref 0–0)
PHOSPHATE SERPL-MCNC: 3.8 MG/DL — SIGNIFICANT CHANGE UP (ref 2.5–4.5)
PLATELET # BLD AUTO: 202 K/UL — SIGNIFICANT CHANGE UP (ref 150–400)
POTASSIUM SERPL-MCNC: 2.9 MMOL/L — CRITICAL LOW (ref 3.5–5.3)
POTASSIUM SERPL-SCNC: 2.9 MMOL/L — CRITICAL LOW (ref 3.5–5.3)
PROTHROM AB SERPL-ACNC: 13.9 SEC — HIGH (ref 9.5–13)
RBC # BLD: 3.91 M/UL — SIGNIFICANT CHANGE UP (ref 3.8–5.2)
RBC # FLD: 16.1 % — HIGH (ref 10.3–14.5)
SODIUM SERPL-SCNC: 144 MMOL/L — SIGNIFICANT CHANGE UP (ref 135–145)
WBC # BLD: 4.33 K/UL — SIGNIFICANT CHANGE UP (ref 3.8–10.5)
WBC # FLD AUTO: 4.33 K/UL — SIGNIFICANT CHANGE UP (ref 3.8–10.5)

## 2024-06-12 PROCEDURE — 84100 ASSAY OF PHOSPHORUS: CPT

## 2024-06-12 PROCEDURE — 85610 PROTHROMBIN TIME: CPT

## 2024-06-12 PROCEDURE — 85730 THROMBOPLASTIN TIME PARTIAL: CPT

## 2024-06-12 PROCEDURE — 86900 BLOOD TYPING SEROLOGIC ABO: CPT

## 2024-06-12 PROCEDURE — 85025 COMPLETE CBC W/AUTO DIFF WBC: CPT

## 2024-06-12 PROCEDURE — 96361 HYDRATE IV INFUSION ADD-ON: CPT

## 2024-06-12 PROCEDURE — 83605 ASSAY OF LACTIC ACID: CPT

## 2024-06-12 PROCEDURE — 71045 X-RAY EXAM CHEST 1 VIEW: CPT

## 2024-06-12 PROCEDURE — 86850 RBC ANTIBODY SCREEN: CPT

## 2024-06-12 PROCEDURE — 80048 BASIC METABOLIC PNL TOTAL CA: CPT

## 2024-06-12 PROCEDURE — 96374 THER/PROPH/DIAG INJ IV PUSH: CPT

## 2024-06-12 PROCEDURE — 99285 EMERGENCY DEPT VISIT HI MDM: CPT

## 2024-06-12 PROCEDURE — 83735 ASSAY OF MAGNESIUM: CPT

## 2024-06-12 PROCEDURE — 96376 TX/PRO/DX INJ SAME DRUG ADON: CPT

## 2024-06-12 PROCEDURE — 93005 ELECTROCARDIOGRAM TRACING: CPT

## 2024-06-12 PROCEDURE — 36415 COLL VENOUS BLD VENIPUNCTURE: CPT

## 2024-06-12 PROCEDURE — 99285 EMERGENCY DEPT VISIT HI MDM: CPT | Mod: 25

## 2024-06-12 PROCEDURE — 86901 BLOOD TYPING SEROLOGIC RH(D): CPT

## 2024-06-12 PROCEDURE — 71045 X-RAY EXAM CHEST 1 VIEW: CPT | Mod: 26

## 2024-06-12 PROCEDURE — 93010 ELECTROCARDIOGRAM REPORT: CPT

## 2024-06-12 PROCEDURE — 96375 TX/PRO/DX INJ NEW DRUG ADDON: CPT

## 2024-06-12 RX ORDER — SODIUM CHLORIDE 9 MG/ML
500 INJECTION INTRAMUSCULAR; INTRAVENOUS; SUBCUTANEOUS ONCE
Refills: 0 | Status: COMPLETED | OUTPATIENT
Start: 2024-06-12 | End: 2024-06-12

## 2024-06-12 RX ORDER — SODIUM CHLORIDE 9 MG/ML
1000 INJECTION INTRAMUSCULAR; INTRAVENOUS; SUBCUTANEOUS
Refills: 0 | Status: DISCONTINUED | OUTPATIENT
Start: 2024-06-12 | End: 2024-06-16

## 2024-06-12 RX ORDER — POTASSIUM CHLORIDE 20 MEQ
10 PACKET (EA) ORAL
Refills: 0 | Status: COMPLETED | OUTPATIENT
Start: 2024-06-12 | End: 2024-06-12

## 2024-06-12 RX ORDER — MAGNESIUM SULFATE 500 MG/ML
2 VIAL (ML) INJECTION ONCE
Refills: 0 | Status: COMPLETED | OUTPATIENT
Start: 2024-06-12 | End: 2024-06-12

## 2024-06-12 RX ORDER — OXYCODONE HYDROCHLORIDE 5 MG/1
5 TABLET ORAL ONCE
Refills: 0 | Status: DISCONTINUED | OUTPATIENT
Start: 2024-06-12 | End: 2024-06-12

## 2024-06-12 RX ADMIN — Medication 100 MILLIEQUIVALENT(S): at 21:27

## 2024-06-12 RX ADMIN — SODIUM CHLORIDE 200 MILLILITER(S): 9 INJECTION INTRAMUSCULAR; INTRAVENOUS; SUBCUTANEOUS at 20:28

## 2024-06-12 RX ADMIN — Medication 25 GRAM(S): at 21:19

## 2024-06-12 RX ADMIN — OXYCODONE HYDROCHLORIDE 5 MILLIGRAM(S): 5 TABLET ORAL at 20:27

## 2024-06-12 RX ADMIN — Medication 100 MILLIEQUIVALENT(S): at 20:31

## 2024-06-12 RX ADMIN — Medication 100 MILLIEQUIVALENT(S): at 22:27

## 2024-06-12 RX ADMIN — OXYCODONE HYDROCHLORIDE 5 MILLIGRAM(S): 5 TABLET ORAL at 21:15

## 2024-06-12 RX ADMIN — SODIUM CHLORIDE 500 MILLILITER(S): 9 INJECTION INTRAMUSCULAR; INTRAVENOUS; SUBCUTANEOUS at 20:36

## 2024-06-12 RX ADMIN — SODIUM CHLORIDE 500 MILLILITER(S): 9 INJECTION INTRAMUSCULAR; INTRAVENOUS; SUBCUTANEOUS at 22:30

## 2024-06-12 RX ADMIN — Medication 10 MILLIEQUIVALENT(S): at 21:30

## 2024-06-12 NOTE — ED PROVIDER NOTE - NOTES
discussed case, advises transfer to tertiary center, will need further work up that cannot be done here

## 2024-06-12 NOTE — ED ADULT NURSE NOTE - NS ED NOTE ABUSE RESPONSE YN
Patient: Jie Guardado Date: 2018   : 1941 Attending: Lawson Moon MD   77 year old female      [facility]    PROCEDURE ASSESSMENT   (LOCAL ANESTHESIA - Not for use with Conscious Sedation)    Preop Diagnosis / Indications for Procedure: Left hip pain.     Planned Procedure: Fluoroscopic-guided therapeutic left hip injection.     Planned Anesthetic:  Local-1% lidocaine.      MEDICAL HISTORY / COMORBID CONDITIONS:  Significant medical / surgical history: no    Normal mental status:   yes       EXAMINATION PERTINENT TO PROCEDURE BEING PERFORMED  Evaluation of operative site WNL.      OTHER FINDINGS  Reviewed current medications and allergies yes      PERTINENT LAB / DIAGNOSTIC TESTS  NONE      INFORMED CONSENT  Consent obtained: yes    Risks / benefits, complications, and alternatives explained, questions answered and patient / personal representative agrees to:  anesthetic listed above  
Yes

## 2024-06-12 NOTE — ED PROVIDER NOTE - OBJECTIVE STATEMENT
43y F c/o numbness/pain in bilat LEs knees to toes, bilateral hands to wrists, lips/tongue - pt has h/o anemia related to menometrorrhagia (s/p ablation, had been receiving transfusions frequently prior to that), afib, depression, and peripheral neuropathy - pt was here a few days ago with some sob/lightheadedness and increased numbness in extremities - had labs showing hypomagnesemia and hypokalemia - had repletion and was feeling improvement, was discharged home - pt states since going home she's mostly been in bed, the numbness worsened again, pt can't walk as she can't feel her feet, pt's family assists, but she says they dropped her in the bathroom the other day, denies head or other injury, is able to move her extremities, just cant feel them (distribution as listed above), also biting cheeks when eats as she feels like her mouth is numb like going to the dentist, states she'd been on gabapentin previously, but did not react well to it mentally, since going home a few days ago has been drinking electrolyte drinks, but does not feel that anything has improved 43y F c/o numbness/pain in bilat LEs knees to toes, bilateral hands to wrists, lips/tongue - pt has h/o anemia related to menometrorrhagia (s/p ablation, had been receiving transfusions frequently prior to that), afib, depression, and peripheral neuropathy - pt was here a few days ago with some sob/lightheadedness and increased numbness in extremities - had labs showing hypomagnesemia and hypokalemia - had repletion and was feeling improvement, was discharged home - pt states since going home she's mostly been in bed, the numbness worsened again, pt can't walk as she can't feel her feet, pt's family assists, but she says they dropped her in the bathroom the other day, denies head or other injury, is able to move her extremities, just cant feel them (distribution as listed above), also biting cheeks when eats as she feels like her mouth is numb like going to the dentist, had the hand/feet tingling before, never so bad she couldn't feel her feet to stand, the mouth is new, states she'd been on gabapentin previously from pcp, but did not react well to it mentally, has never seen a neurologist, since going home a few days ago has been drinking electrolyte drinks, but does not feel that anything has improved

## 2024-06-12 NOTE — ED PROVIDER NOTE - CLINICAL SUMMARY MEDICAL DECISION MAKING FREE TEXT BOX
pt c/o worsening numbness associated with her peripheral neuropathy, hasn't been able to stand on her own in 3 days as she can't feel her feet at all, also now has numbness of mouth causing her to bite her cheeks when she eats, pt had hypomagnesemia and hypokalemia on last visit, repleted, will recheck labs, replete as needed, will not be safe for discharge home, will d/w neurology

## 2024-06-12 NOTE — ED ADULT NURSE NOTE - OBJECTIVE STATEMENT
Pt presents to the ED with reports of weakness, muscle twitching, and neuropathy of her feet. Pt was seen and treated in this ED for similar symptoms 3 days ago. Pt was discharged after improving in the ED. Pt states when she went home she has been unable to get out of bed since then. Pt reports loss of feeling in her limbs, but c/o severe pain throughout her body.

## 2024-06-12 NOTE — ED PROVIDER NOTE - NSICDXPASTMEDICALHX_GEN_ALL_CORE_FT
PAST MEDICAL HISTORY:  Asthma     Atrial fibrillation     Depression, unspecified depression type     History of anemia     History of Hyperthyroidism     Peripheral neuropathy     Smoker

## 2024-06-12 NOTE — ED PROVIDER NOTE - NEUROLOGICAL, MLM
intact A&O, moving extremities equally, speaking clearly, dense sensory loss hands and lower legs and lips, good sensation above knees and above wrists

## 2024-06-13 ENCOUNTER — INPATIENT (INPATIENT)
Facility: HOSPITAL | Age: 44
LOS: 8 days | Discharge: HOME HEALTH | End: 2024-06-22
Attending: STUDENT IN AN ORGANIZED HEALTH CARE EDUCATION/TRAINING PROGRAM | Admitting: STUDENT IN AN ORGANIZED HEALTH CARE EDUCATION/TRAINING PROGRAM
Payer: MEDICAID

## 2024-06-13 VITALS
OXYGEN SATURATION: 100 % | TEMPERATURE: 98 F | HEIGHT: 65 IN | SYSTOLIC BLOOD PRESSURE: 105 MMHG | RESPIRATION RATE: 16 BRPM | HEART RATE: 90 BPM | DIASTOLIC BLOOD PRESSURE: 68 MMHG | WEIGHT: 210.1 LBS

## 2024-06-13 DIAGNOSIS — G62.9 POLYNEUROPATHY, UNSPECIFIED: ICD-10-CM

## 2024-06-13 DIAGNOSIS — Z98.890 OTHER SPECIFIED POSTPROCEDURAL STATES: Chronic | ICD-10-CM

## 2024-06-13 DIAGNOSIS — Z92.89 PERSONAL HISTORY OF OTHER MEDICAL TREATMENT: Chronic | ICD-10-CM

## 2024-06-13 LAB
ALBUMIN SERPL ELPH-MCNC: 3.1 G/DL — LOW (ref 3.3–5)
ALP SERPL-CCNC: 84 U/L — SIGNIFICANT CHANGE UP (ref 40–120)
ALT FLD-CCNC: 17 U/L — SIGNIFICANT CHANGE UP (ref 4–33)
ANION GAP SERPL CALC-SCNC: 12 MMOL/L — SIGNIFICANT CHANGE UP (ref 7–14)
APTT BLD: 36.3 SEC — HIGH (ref 24.5–35.6)
AST SERPL-CCNC: 53 U/L — HIGH (ref 4–32)
BASE EXCESS BLDV CALC-SCNC: 1.3 MMOL/L — SIGNIFICANT CHANGE UP (ref -2–3)
BASOPHILS # BLD AUTO: 0.02 K/UL — SIGNIFICANT CHANGE UP (ref 0–0.2)
BASOPHILS NFR BLD AUTO: 0.5 % — SIGNIFICANT CHANGE UP (ref 0–2)
BILIRUB SERPL-MCNC: 0.7 MG/DL — SIGNIFICANT CHANGE UP (ref 0.2–1.2)
BLD GP AB SCN SERPL QL: NEGATIVE — SIGNIFICANT CHANGE UP
BLOOD GAS VENOUS COMPREHENSIVE RESULT: SIGNIFICANT CHANGE UP
BUN SERPL-MCNC: 8 MG/DL — SIGNIFICANT CHANGE UP (ref 7–23)
CALCIUM SERPL-MCNC: 7.5 MG/DL — LOW (ref 8.4–10.5)
CHLORIDE BLDV-SCNC: 105 MMOL/L — SIGNIFICANT CHANGE UP (ref 96–108)
CHLORIDE SERPL-SCNC: 105 MMOL/L — SIGNIFICANT CHANGE UP (ref 98–107)
CO2 BLDV-SCNC: 27.1 MMOL/L — HIGH (ref 22–26)
CO2 SERPL-SCNC: 25 MMOL/L — SIGNIFICANT CHANGE UP (ref 22–31)
CREAT SERPL-MCNC: 0.33 MG/DL — LOW (ref 0.5–1.3)
EGFR: 130 ML/MIN/1.73M2 — SIGNIFICANT CHANGE UP
EGFR: 130 ML/MIN/1.73M2 — SIGNIFICANT CHANGE UP
EOSINOPHIL # BLD AUTO: 0 K/UL — SIGNIFICANT CHANGE UP (ref 0–0.5)
EOSINOPHIL NFR BLD AUTO: 0 % — SIGNIFICANT CHANGE UP (ref 0–6)
GAS PNL BLDV: 139 MMOL/L — SIGNIFICANT CHANGE UP (ref 136–145)
GLUCOSE BLDV-MCNC: 66 MG/DL — LOW (ref 70–99)
GLUCOSE SERPL-MCNC: 70 MG/DL — SIGNIFICANT CHANGE UP (ref 70–99)
HCG SERPL-ACNC: <1 MIU/ML — SIGNIFICANT CHANGE UP
HCO3 BLDV-SCNC: 26 MMOL/L — SIGNIFICANT CHANGE UP (ref 22–29)
HCT VFR BLD CALC: 29 % — LOW (ref 34.5–45)
HCT VFR BLDA CALC: 30 % — LOW (ref 34.5–46.5)
HGB BLD CALC-MCNC: 9.9 G/DL — LOW (ref 11.7–16.1)
HGB BLD-MCNC: 9.5 G/DL — LOW (ref 11.5–15.5)
IANC: 2.46 K/UL — SIGNIFICANT CHANGE UP (ref 1.8–7.4)
IMM GRANULOCYTES NFR BLD AUTO: 0.3 % — SIGNIFICANT CHANGE UP (ref 0–0.9)
INR BLD: 1.21 RATIO — HIGH (ref 0.85–1.18)
LACTATE BLDV-MCNC: 1 MMOL/L — SIGNIFICANT CHANGE UP (ref 0.5–2)
LYMPHOCYTES # BLD AUTO: 0.81 K/UL — LOW (ref 1–3.3)
LYMPHOCYTES # BLD AUTO: 21.3 % — SIGNIFICANT CHANGE UP (ref 13–44)
MAGNESIUM SERPL-MCNC: 1.9 MG/DL — SIGNIFICANT CHANGE UP (ref 1.6–2.6)
MCHC RBC-ENTMCNC: 27 PG — SIGNIFICANT CHANGE UP (ref 27–34)
MCHC RBC-ENTMCNC: 32.8 GM/DL — SIGNIFICANT CHANGE UP (ref 32–36)
MCV RBC AUTO: 82.4 FL — SIGNIFICANT CHANGE UP (ref 80–100)
MONOCYTES # BLD AUTO: 0.5 K/UL — SIGNIFICANT CHANGE UP (ref 0–0.9)
MONOCYTES NFR BLD AUTO: 13.2 % — SIGNIFICANT CHANGE UP (ref 2–14)
NEUTROPHILS # BLD AUTO: 2.46 K/UL — SIGNIFICANT CHANGE UP (ref 1.8–7.4)
NEUTROPHILS NFR BLD AUTO: 64.7 % — SIGNIFICANT CHANGE UP (ref 43–77)
NRBC # BLD AUTO: 0 K/UL — SIGNIFICANT CHANGE UP (ref 0–0)
NRBC # BLD: 0 /100 WBCS — SIGNIFICANT CHANGE UP (ref 0–0)
NRBC # FLD: 0 K/UL — SIGNIFICANT CHANGE UP (ref 0–0)
NRBC BLD-RTO: 0 /100 WBCS — SIGNIFICANT CHANGE UP (ref 0–0)
PCO2 BLDV: 40 MMHG — SIGNIFICANT CHANGE UP (ref 39–52)
PH BLDV: 7.42 — SIGNIFICANT CHANGE UP (ref 7.32–7.43)
PHOSPHATE SERPL-MCNC: 3.2 MG/DL — SIGNIFICANT CHANGE UP (ref 2.5–4.5)
PLATELET # BLD AUTO: 194 K/UL — SIGNIFICANT CHANGE UP (ref 150–400)
PO2 BLDV: 56 MMHG — HIGH (ref 25–45)
POTASSIUM BLDV-SCNC: 2.8 MMOL/L — CRITICAL LOW (ref 3.5–5.1)
POTASSIUM SERPL-MCNC: 3 MMOL/L — LOW (ref 3.5–5.3)
POTASSIUM SERPL-SCNC: 3 MMOL/L — LOW (ref 3.5–5.3)
PROT SERPL-MCNC: 5.6 G/DL — LOW (ref 6–8.3)
PROTHROM AB SERPL-ACNC: 13.6 SEC — HIGH (ref 9.5–13)
RBC # BLD: 3.52 M/UL — LOW (ref 3.8–5.2)
RBC # FLD: 15.6 % — HIGH (ref 10.3–14.5)
RH IG SCN BLD-IMP: POSITIVE — SIGNIFICANT CHANGE UP
SAO2 % BLDV: 88.3 % — HIGH (ref 67–88)
SODIUM SERPL-SCNC: 142 MMOL/L — SIGNIFICANT CHANGE UP (ref 135–145)
TSH SERPL-MCNC: 2.56 UIU/ML — SIGNIFICANT CHANGE UP (ref 0.27–4.2)
VIT B12 SERPL-MCNC: 585 PG/ML — SIGNIFICANT CHANGE UP (ref 200–900)
WBC # BLD: 3.8 K/UL — SIGNIFICANT CHANGE UP (ref 3.8–10.5)
WBC # FLD AUTO: 3.8 K/UL — SIGNIFICANT CHANGE UP (ref 3.8–10.5)

## 2024-06-13 PROCEDURE — 76705 ECHO EXAM OF ABDOMEN: CPT | Mod: 26

## 2024-06-13 PROCEDURE — 99223 1ST HOSP IP/OBS HIGH 75: CPT

## 2024-06-13 PROCEDURE — 99222 1ST HOSP IP/OBS MODERATE 55: CPT

## 2024-06-13 PROCEDURE — 70450 CT HEAD/BRAIN W/O DYE: CPT | Mod: 26,MC

## 2024-06-13 PROCEDURE — 99285 EMERGENCY DEPT VISIT HI MDM: CPT

## 2024-06-13 RX ORDER — ACETAMINOPHEN 500 MG/5ML
650 LIQUID (ML) ORAL EVERY 6 HOURS
Refills: 0 | Status: DISCONTINUED | OUTPATIENT
Start: 2024-06-13 | End: 2024-06-22

## 2024-06-13 RX ORDER — MAGNESIUM SULFATE 500 MG/ML
1 SYRINGE (ML) INJECTION ONCE
Refills: 0 | Status: COMPLETED | OUTPATIENT
Start: 2024-06-13 | End: 2024-06-13

## 2024-06-13 RX ORDER — LORAZEPAM 4 MG/ML
1 VIAL (ML) INJECTION ONCE
Refills: 0 | Status: DISCONTINUED | OUTPATIENT
Start: 2024-06-13 | End: 2024-06-14

## 2024-06-13 RX ORDER — ACETAMINOPHEN 500 MG/5ML
1000 LIQUID (ML) ORAL ONCE
Refills: 0 | Status: COMPLETED | OUTPATIENT
Start: 2024-06-13 | End: 2024-06-13

## 2024-06-13 RX ORDER — DIAZEPAM 5 MG/1
5 TABLET ORAL
Refills: 0 | Status: DISCONTINUED | OUTPATIENT
Start: 2024-06-13 | End: 2024-06-20

## 2024-06-13 RX ORDER — SERTRALINE 100 MG/1
100 TABLET, FILM COATED ORAL DAILY
Refills: 0 | Status: DISCONTINUED | OUTPATIENT
Start: 2024-06-13 | End: 2024-06-22

## 2024-06-13 RX ORDER — MELATONIN 5 MG
3 TABLET ORAL AT BEDTIME
Refills: 0 | Status: DISCONTINUED | OUTPATIENT
Start: 2024-06-13 | End: 2024-06-22

## 2024-06-13 RX ORDER — CALCIUM GLUCONATE 20 MG/ML
2 INJECTION, SOLUTION INTRAVENOUS ONCE
Refills: 0 | Status: COMPLETED | OUTPATIENT
Start: 2024-06-13 | End: 2024-06-13

## 2024-06-13 RX ORDER — ENOXAPARIN SODIUM 100 MG/ML
40 INJECTION SUBCUTANEOUS EVERY 24 HOURS
Refills: 0 | Status: DISCONTINUED | OUTPATIENT
Start: 2024-06-13 | End: 2024-06-13

## 2024-06-13 RX ADMIN — Medication 400 MILLIGRAM(S): at 10:46

## 2024-06-13 RX ADMIN — Medication 50 MILLIEQUIVALENT(S): at 03:54

## 2024-06-13 RX ADMIN — Medication 100 GRAM(S): at 14:52

## 2024-06-13 RX ADMIN — Medication 50 MILLIEQUIVALENT(S): at 08:37

## 2024-06-13 RX ADMIN — Medication 400 MILLIGRAM(S): at 03:02

## 2024-06-13 RX ADMIN — SERTRALINE 100 MILLIGRAM(S): 100 TABLET, FILM COATED ORAL at 17:10

## 2024-06-13 RX ADMIN — Medication 1000 MILLILITER(S): at 04:39

## 2024-06-13 RX ADMIN — Medication 50 MILLIEQUIVALENT(S): at 06:15

## 2024-06-13 RX ADMIN — Medication 75 MILLILITER(S): at 23:40

## 2024-06-13 RX ADMIN — CALCIUM GLUCONATE 200 GRAM(S): 20 INJECTION, SOLUTION INTRAVENOUS at 03:54

## 2024-06-14 DIAGNOSIS — Z29.9 ENCOUNTER FOR PROPHYLACTIC MEASURES, UNSPECIFIED: ICD-10-CM

## 2024-06-14 DIAGNOSIS — K76.0 FATTY (CHANGE OF) LIVER, NOT ELSEWHERE CLASSIFIED: ICD-10-CM

## 2024-06-14 DIAGNOSIS — F41.9 ANXIETY DISORDER, UNSPECIFIED: ICD-10-CM

## 2024-06-14 DIAGNOSIS — R29.898 OTHER SYMPTOMS AND SIGNS INVOLVING THE MUSCULOSKELETAL SYSTEM: ICD-10-CM

## 2024-06-14 DIAGNOSIS — N39.0 URINARY TRACT INFECTION, SITE NOT SPECIFIED: ICD-10-CM

## 2024-06-14 DIAGNOSIS — E05.90 THYROTOXICOSIS, UNSPECIFIED WITHOUT THYROTOXIC CRISIS OR STORM: ICD-10-CM

## 2024-06-14 DIAGNOSIS — R63.4 ABNORMAL WEIGHT LOSS: ICD-10-CM

## 2024-06-14 DIAGNOSIS — D64.9 ANEMIA, UNSPECIFIED: ICD-10-CM

## 2024-06-14 LAB
ANION GAP SERPL CALC-SCNC: 9 MMOL/L — SIGNIFICANT CHANGE UP (ref 7–14)
APPEARANCE CSF: CLEAR — SIGNIFICANT CHANGE UP
APPEARANCE SPUN FLD: COLORLESS — SIGNIFICANT CHANGE UP
APPEARANCE UR: ABNORMAL
BACTERIA # UR AUTO: ABNORMAL /HPF
BACTERIAL AG PNL SER: 0 % — SIGNIFICANT CHANGE UP
BILIRUB UR-MCNC: NEGATIVE — SIGNIFICANT CHANGE UP
BUN SERPL-MCNC: 6 MG/DL — LOW (ref 7–23)
CALCIUM SERPL-MCNC: 8.1 MG/DL — LOW (ref 8.4–10.5)
CALCIUM UR-MCNC: 9 MG/DL — SIGNIFICANT CHANGE UP
CHLORIDE SERPL-SCNC: 107 MMOL/L — SIGNIFICANT CHANGE UP (ref 98–107)
CHLORIDE UR-SCNC: 201 MMOL/L — SIGNIFICANT CHANGE UP
CO2 SERPL-SCNC: 25 MMOL/L — SIGNIFICANT CHANGE UP (ref 22–31)
COLOR CSF: COLORLESS — SIGNIFICANT CHANGE UP
COLOR SPEC: YELLOW — SIGNIFICANT CHANGE UP
CREAT ?TM UR-MCNC: 86 MG/DL — SIGNIFICANT CHANGE UP
CREAT SERPL-MCNC: 0.33 MG/DL — LOW (ref 0.5–1.3)
DIFF PNL FLD: NEGATIVE — SIGNIFICANT CHANGE UP
EGFR: 130 ML/MIN/1.73M2 — SIGNIFICANT CHANGE UP
EGFR: 130 ML/MIN/1.73M2 — SIGNIFICANT CHANGE UP
EOSINOPHIL # CSF: 0 % — SIGNIFICANT CHANGE UP
FOLATE SERPL-MCNC: 3.6 NG/ML — SIGNIFICANT CHANGE UP (ref 3.1–17.5)
GLUCOSE CSF-MCNC: 45 MG/DL — SIGNIFICANT CHANGE UP (ref 40–70)
GLUCOSE SERPL-MCNC: 70 MG/DL — SIGNIFICANT CHANGE UP (ref 70–99)
GLUCOSE UR QL: NEGATIVE MG/DL — SIGNIFICANT CHANGE UP
GRAM STN FLD: SIGNIFICANT CHANGE UP
HIV 1+2 AB+HIV1 P24 AG SERPL QL IA: SIGNIFICANT CHANGE UP
KETONES UR-MCNC: NEGATIVE MG/DL — SIGNIFICANT CHANGE UP
LEUKOCYTE ESTERASE UR-ACNC: ABNORMAL
LYMPHOCYTES # CSF: 50 % — SIGNIFICANT CHANGE UP
MONOS+MACROS NFR CSF: 25 % — SIGNIFICANT CHANGE UP
NEUTROPHILS # CSF: 25 % — SIGNIFICANT CHANGE UP
NIGHT BLUE STAIN TISS: SIGNIFICANT CHANGE UP
NITRITE UR-MCNC: NEGATIVE — SIGNIFICANT CHANGE UP
NRBC NFR CSF: 0 CELLS/UL — SIGNIFICANT CHANGE UP (ref 0–5)
OTHER CELLS CSF MANUAL: 0 % — SIGNIFICANT CHANGE UP
PH UR: 6.5 — SIGNIFICANT CHANGE UP (ref 5–8)
POTASSIUM SERPL-MCNC: 3.1 MMOL/L — LOW (ref 3.5–5.3)
POTASSIUM SERPL-SCNC: 3.1 MMOL/L — LOW (ref 3.5–5.3)
POTASSIUM UR-SCNC: 16.6 MMOL/L — SIGNIFICANT CHANGE UP
PROT ?TM UR-MCNC: 20 MG/DL — SIGNIFICANT CHANGE UP
PROT ?TM UR-MCNC: 23 MG/DL — SIGNIFICANT CHANGE UP
PROT CSF-MCNC: 34 MG/DL — SIGNIFICANT CHANGE UP (ref 15–45)
PROT UR-MCNC: SIGNIFICANT CHANGE UP MG/DL
PROT/CREAT UR-RTO: 0.3 RATIO — HIGH (ref 0–0.2)
PTH-INTACT FLD-MCNC: 53 PG/ML — SIGNIFICANT CHANGE UP (ref 15–65)
RBC # CSF: 1 CELLS/UL — HIGH (ref 0–0)
RBC CASTS # UR COMP ASSIST: 2 /HPF — SIGNIFICANT CHANGE UP (ref 0–4)
SODIUM SERPL-SCNC: 141 MMOL/L — SIGNIFICANT CHANGE UP (ref 135–145)
SODIUM UR-SCNC: 219 MMOL/L — SIGNIFICANT CHANGE UP
SP GR SPEC: 1.02 — SIGNIFICANT CHANGE UP (ref 1–1.03)
SPECIMEN SOURCE: SIGNIFICANT CHANGE UP
SPECIMEN SOURCE: SIGNIFICANT CHANGE UP
SQUAMOUS # UR AUTO: 1 /HPF — SIGNIFICANT CHANGE UP (ref 0–5)
TOTAL CELLS COUNTED, SPINAL FLUID: 4 CELLS — SIGNIFICANT CHANGE UP
TUBE TYPE: SIGNIFICANT CHANGE UP
UROBILINOGEN FLD QL: 1 MG/DL — SIGNIFICANT CHANGE UP (ref 0.2–1)
WBC UR QL: 7 /HPF — HIGH (ref 0–5)

## 2024-06-14 PROCEDURE — 99233 SBSQ HOSP IP/OBS HIGH 50: CPT

## 2024-06-14 PROCEDURE — 84166 PROTEIN E-PHORESIS/URINE/CSF: CPT | Mod: 26

## 2024-06-14 PROCEDURE — 71260 CT THORAX DX C+: CPT | Mod: 26

## 2024-06-14 PROCEDURE — 74177 CT ABD & PELVIS W/CONTRAST: CPT | Mod: 26

## 2024-06-14 PROCEDURE — 99232 SBSQ HOSP IP/OBS MODERATE 35: CPT | Mod: GC

## 2024-06-14 PROCEDURE — 62328 DX LMBR SPI PNXR W/FLUOR/CT: CPT

## 2024-06-14 PROCEDURE — 86335 IMMUNFIX E-PHORSIS/URINE/CSF: CPT | Mod: 26

## 2024-06-14 RX ORDER — ACETAMINOPHEN 500 MG/5ML
650 LIQUID (ML) ORAL ONCE
Refills: 0 | Status: COMPLETED | OUTPATIENT
Start: 2024-06-14 | End: 2024-06-14

## 2024-06-14 RX ORDER — IMMUNE GLOBULIN (HUMAN) 10 G/100ML
30 INJECTION INTRAVENOUS; SUBCUTANEOUS
Refills: 0 | Status: DISCONTINUED | OUTPATIENT
Start: 2024-06-15 | End: 2024-06-15

## 2024-06-14 RX ORDER — IMMUNE GLOBULIN (HUMAN) 10 G/100ML
30 INJECTION INTRAVENOUS; SUBCUTANEOUS DAILY
Refills: 0 | Status: DISCONTINUED | OUTPATIENT
Start: 2024-06-14 | End: 2024-06-14

## 2024-06-14 RX ORDER — ACETAMINOPHEN 500 MG/5ML
1000 LIQUID (ML) ORAL ONCE
Refills: 0 | Status: COMPLETED | OUTPATIENT
Start: 2024-06-14 | End: 2024-06-14

## 2024-06-14 RX ORDER — GABAPENTIN 400 MG/1
300 CAPSULE ORAL
Refills: 0 | Status: DISCONTINUED | OUTPATIENT
Start: 2024-06-14 | End: 2024-06-14

## 2024-06-14 RX ORDER — DIPHENHYDRAMINE HCL 12.5MG/5ML
25 ELIXIR ORAL ONCE
Refills: 0 | Status: COMPLETED | OUTPATIENT
Start: 2024-06-14 | End: 2024-06-14

## 2024-06-14 RX ORDER — DIPHENHYDRAMINE HCL 12.5MG/5ML
25 ELIXIR ORAL
Refills: 0 | Status: COMPLETED | OUTPATIENT
Start: 2024-06-15 | End: 2024-06-18

## 2024-06-14 RX ORDER — CEFTRIAXONE 500 MG/1
1000 INJECTION, POWDER, FOR SOLUTION INTRAMUSCULAR; INTRAVENOUS EVERY 24 HOURS
Refills: 0 | Status: DISCONTINUED | OUTPATIENT
Start: 2024-06-14 | End: 2024-06-15

## 2024-06-14 RX ORDER — CYCLOBENZAPRINE HYDROCHLORIDE 15 MG/1
5 CAPSULE, EXTENDED RELEASE ORAL THREE TIMES A DAY
Refills: 0 | Status: DISCONTINUED | OUTPATIENT
Start: 2024-06-14 | End: 2024-06-22

## 2024-06-14 RX ADMIN — Medication 650 MILLIGRAM(S): at 02:21

## 2024-06-14 RX ADMIN — Medication 1000 MILLIGRAM(S): at 19:00

## 2024-06-14 RX ADMIN — Medication 400 MILLIGRAM(S): at 18:32

## 2024-06-14 RX ADMIN — Medication 1000 MILLIGRAM(S): at 13:00

## 2024-06-14 RX ADMIN — CEFTRIAXONE 100 MILLIGRAM(S): 500 INJECTION, POWDER, FOR SOLUTION INTRAMUSCULAR; INTRAVENOUS at 19:13

## 2024-06-14 RX ADMIN — SERTRALINE 100 MILLIGRAM(S): 100 TABLET, FILM COATED ORAL at 12:29

## 2024-06-14 RX ADMIN — Medication 650 MILLIGRAM(S): at 03:00

## 2024-06-14 RX ADMIN — Medication 400 MILLIGRAM(S): at 12:28

## 2024-06-14 RX ADMIN — IMMUNE GLOBULIN (HUMAN) 50 GRAM(S): 10 INJECTION INTRAVENOUS; SUBCUTANEOUS at 03:31

## 2024-06-14 RX ADMIN — DIAZEPAM 5 MILLIGRAM(S): 5 TABLET ORAL at 14:13

## 2024-06-14 RX ADMIN — Medication 40 MILLIEQUIVALENT(S): at 12:28

## 2024-06-14 RX ADMIN — Medication 25 MILLIGRAM(S): at 02:20

## 2024-06-15 ENCOUNTER — TRANSCRIPTION ENCOUNTER (OUTPATIENT)
Age: 44
End: 2024-06-15

## 2024-06-15 DIAGNOSIS — K62.89 OTHER SPECIFIED DISEASES OF ANUS AND RECTUM: ICD-10-CM

## 2024-06-15 LAB
ALBUMIN SERPL ELPH-MCNC: 3.1 G/DL — LOW (ref 3.3–5)
ALP SERPL-CCNC: 85 U/L — SIGNIFICANT CHANGE UP (ref 40–120)
ALT FLD-CCNC: 14 U/L — SIGNIFICANT CHANGE UP (ref 4–33)
ANION GAP SERPL CALC-SCNC: 11 MMOL/L — SIGNIFICANT CHANGE UP (ref 7–14)
AST SERPL-CCNC: 33 U/L — HIGH (ref 4–32)
B BURGDOR C6 AB SER-ACNC: ABNORMAL
B BURGDOR IGG+IGM SER-ACNC: 1.05 INDEX — HIGH (ref 0.01–0.89)
BILIRUB SERPL-MCNC: 0.6 MG/DL — SIGNIFICANT CHANGE UP (ref 0.2–1.2)
BILIRUB SERPL-MCNC: 0.6 MG/DL — SIGNIFICANT CHANGE UP (ref 0.2–1.2)
BUN SERPL-MCNC: 5 MG/DL — LOW (ref 7–23)
CALCIUM SERPL-MCNC: 8 MG/DL — LOW (ref 8.4–10.5)
CHLORIDE SERPL-SCNC: 107 MMOL/L — SIGNIFICANT CHANGE UP (ref 98–107)
CHOLEST SERPL-MCNC: 113 MG/DL — SIGNIFICANT CHANGE UP
CO2 SERPL-SCNC: 22 MMOL/L — SIGNIFICANT CHANGE UP (ref 22–31)
CREAT SERPL-MCNC: 0.32 MG/DL — LOW (ref 0.5–1.3)
CREAT SERPL-MCNC: 0.39 MG/DL — LOW (ref 0.5–1.3)
CRP SERPL-MCNC: 9.2 MG/L — HIGH
CSF PCR RESULT: SIGNIFICANT CHANGE UP
EGFR: 125 ML/MIN/1.73M2 — SIGNIFICANT CHANGE UP
EGFR: 125 ML/MIN/1.73M2 — SIGNIFICANT CHANGE UP
EGFR: 131 ML/MIN/1.73M2 — SIGNIFICANT CHANGE UP
EGFR: 131 ML/MIN/1.73M2 — SIGNIFICANT CHANGE UP
ERYTHROCYTE [SEDIMENTATION RATE] IN BLOOD: 35 MM/HR — HIGH (ref 4–25)
FERRITIN SERPL-MCNC: 182 NG/ML — HIGH (ref 15–150)
GLUCOSE BLDC GLUCOMTR-MCNC: 108 MG/DL — HIGH (ref 70–99)
GLUCOSE SERPL-MCNC: 91 MG/DL — SIGNIFICANT CHANGE UP (ref 70–99)
HCT VFR BLD CALC: 30.2 % — LOW (ref 34.5–45)
HDLC SERPL-MCNC: 33 MG/DL — LOW
HGB BLD-MCNC: 10.1 G/DL — LOW (ref 11.5–15.5)
INR BLD: 1.35 RATIO — HIGH (ref 0.85–1.18)
IRON SATN MFR SERPL: 22 % — SIGNIFICANT CHANGE UP (ref 14–50)
IRON SATN MFR SERPL: 37 UG/DL — SIGNIFICANT CHANGE UP (ref 30–160)
LDLC SERPL-MCNC: 60 MG/DL — SIGNIFICANT CHANGE UP
LIPID PNL WITH DIRECT LDL SERPL: 60 MG/DL — SIGNIFICANT CHANGE UP
LYME IGG AB: 2.72 INDEX — HIGH (ref 0.01–0.9)
LYME IGG INTERP: POSITIVE
LYME IGM AB: 0.11 INDEX — SIGNIFICANT CHANGE UP (ref 0.01–0.9)
LYME IGM INTERP: NEGATIVE — SIGNIFICANT CHANGE UP
MAGNESIUM SERPL-MCNC: 1.4 MG/DL — LOW (ref 1.6–2.6)
MCHC RBC-ENTMCNC: 27.5 PG — SIGNIFICANT CHANGE UP (ref 27–34)
MCHC RBC-ENTMCNC: 33.4 GM/DL — SIGNIFICANT CHANGE UP (ref 32–36)
MCV RBC AUTO: 82.3 FL — SIGNIFICANT CHANGE UP (ref 80–100)
MELD SCORE WITH DIALYSIS: 23 POINTS — SIGNIFICANT CHANGE UP
MELD SCORE WITHOUT DIALYSIS: 10 POINTS — SIGNIFICANT CHANGE UP
NONHDLC SERPL-MCNC: 80 MG/DL — SIGNIFICANT CHANGE UP
NRBC # BLD AUTO: 0 K/UL — SIGNIFICANT CHANGE UP (ref 0–0)
NRBC # BLD: 0 /100 WBCS — SIGNIFICANT CHANGE UP (ref 0–0)
NRBC # FLD: 0 K/UL — SIGNIFICANT CHANGE UP (ref 0–0)
NRBC BLD-RTO: 0 /100 WBCS — SIGNIFICANT CHANGE UP (ref 0–0)
PHOSPHATE SERPL-MCNC: 4 MG/DL — SIGNIFICANT CHANGE UP (ref 2.5–4.5)
PLATELET # BLD AUTO: 177 K/UL — SIGNIFICANT CHANGE UP (ref 150–400)
POTASSIUM SERPL-MCNC: 3.1 MMOL/L — LOW (ref 3.5–5.3)
POTASSIUM SERPL-SCNC: 3.1 MMOL/L — LOW (ref 3.5–5.3)
PROT SERPL-MCNC: 6.7 G/DL — SIGNIFICANT CHANGE UP (ref 6–8.3)
PROTHROM AB SERPL-ACNC: 15.1 SEC — HIGH (ref 9.5–13)
RBC # BLD: 3.67 M/UL — LOW (ref 3.8–5.2)
RBC # FLD: 15.1 % — HIGH (ref 10.3–14.5)
SODIUM SERPL-SCNC: 140 MMOL/L — SIGNIFICANT CHANGE UP (ref 135–145)
SODIUM SERPL-SCNC: 140 MMOL/L — SIGNIFICANT CHANGE UP (ref 135–145)
TIBC SERPL-MCNC: 170 UG/DL — LOW (ref 220–430)
TRIGL SERPL-MCNC: 100 MG/DL — SIGNIFICANT CHANGE UP
UIBC SERPL-MCNC: 133 UG/DL — SIGNIFICANT CHANGE UP (ref 110–370)
WBC # BLD: 2.3 K/UL — LOW (ref 3.8–10.5)
WBC # FLD AUTO: 2.3 K/UL — LOW (ref 3.8–10.5)

## 2024-06-15 PROCEDURE — 99233 SBSQ HOSP IP/OBS HIGH 50: CPT

## 2024-06-15 RX ORDER — LACTOBACILLUS ACIDOPHILUS/PECT 75 MM-100
1 CAPSULE ORAL
Refills: 0 | Status: DISCONTINUED | OUTPATIENT
Start: 2024-06-15 | End: 2024-06-22

## 2024-06-15 RX ORDER — PIPERACILLIN-TAZO-DEXTROSE,ISO 3.375G/5
3.38 IV SOLUTION, PIGGYBACK PREMIX FROZEN(ML) INTRAVENOUS ONCE
Refills: 0 | Status: COMPLETED | OUTPATIENT
Start: 2024-06-15 | End: 2024-06-15

## 2024-06-15 RX ORDER — MECLIZINE HCL 12.5 MG
12.5 TABLET ORAL ONCE
Refills: 0 | Status: COMPLETED | OUTPATIENT
Start: 2024-06-15 | End: 2024-06-15

## 2024-06-15 RX ORDER — ACETAMINOPHEN 500 MG/5ML
1000 LIQUID (ML) ORAL ONCE
Refills: 0 | Status: COMPLETED | OUTPATIENT
Start: 2024-06-15 | End: 2024-06-15

## 2024-06-15 RX ORDER — PIPERACILLIN-TAZO-DEXTROSE,ISO 3.375G/5
3.38 IV SOLUTION, PIGGYBACK PREMIX FROZEN(ML) INTRAVENOUS ONCE
Refills: 0 | Status: DISCONTINUED | OUTPATIENT
Start: 2024-06-16 | End: 2024-06-16

## 2024-06-15 RX ORDER — PIPERACILLIN-TAZO-DEXTROSE,ISO 3.375G/5
3.38 IV SOLUTION, PIGGYBACK PREMIX FROZEN(ML) INTRAVENOUS ONCE
Refills: 0 | Status: COMPLETED | OUTPATIENT
Start: 2024-06-16 | End: 2024-06-16

## 2024-06-15 RX ORDER — MAGNESIUM SULFATE 500 MG/ML
2 SYRINGE (ML) INJECTION ONCE
Refills: 0 | Status: COMPLETED | OUTPATIENT
Start: 2024-06-15 | End: 2024-06-15

## 2024-06-15 RX ORDER — PREGABALIN 50 MG/1
100 CAPSULE ORAL
Refills: 0 | Status: DISCONTINUED | OUTPATIENT
Start: 2024-06-15 | End: 2024-06-19

## 2024-06-15 RX ORDER — PIPERACILLIN-TAZO-DEXTROSE,ISO 3.375G/5
3.38 IV SOLUTION, PIGGYBACK PREMIX FROZEN(ML) INTRAVENOUS EVERY 8 HOURS
Refills: 0 | Status: DISCONTINUED | OUTPATIENT
Start: 2024-06-16 | End: 2024-06-16

## 2024-06-15 RX ORDER — LORAZEPAM 4 MG/ML
1 VIAL (ML) INJECTION ONCE
Refills: 0 | Status: DISCONTINUED | OUTPATIENT
Start: 2024-06-15 | End: 2024-06-17

## 2024-06-15 RX ADMIN — Medication 200 GRAM(S): at 15:08

## 2024-06-15 RX ADMIN — CYCLOBENZAPRINE HYDROCHLORIDE 5 MILLIGRAM(S): 15 CAPSULE, EXTENDED RELEASE ORAL at 19:34

## 2024-06-15 RX ADMIN — Medication 75 MILLILITER(S): at 02:05

## 2024-06-15 RX ADMIN — Medication 25 GRAM(S): at 21:21

## 2024-06-15 RX ADMIN — Medication 1 TABLET(S): at 18:30

## 2024-06-15 RX ADMIN — Medication 400 MILLIGRAM(S): at 05:43

## 2024-06-15 RX ADMIN — IMMUNE GLOBULIN (HUMAN) 16.67 GRAM(S): 10 INJECTION INTRAVENOUS; SUBCUTANEOUS at 06:43

## 2024-06-15 RX ADMIN — Medication 100 MILLIEQUIVALENT(S): at 23:22

## 2024-06-15 RX ADMIN — Medication 12.5 MILLIGRAM(S): at 22:32

## 2024-06-15 RX ADMIN — Medication 25 MILLIGRAM(S): at 05:43

## 2024-06-15 RX ADMIN — PREGABALIN 100 MILLIGRAM(S): 50 CAPSULE ORAL at 18:30

## 2024-06-15 RX ADMIN — Medication 25 GRAM(S): at 19:34

## 2024-06-16 LAB
ADD ON TEST-SPECIMEN IN LAB: SIGNIFICANT CHANGE UP
ALBUMIN SERPL ELPH-MCNC: 3 G/DL — LOW (ref 3.3–5)
ALP SERPL-CCNC: 81 U/L — SIGNIFICANT CHANGE UP (ref 40–120)
ALT FLD-CCNC: 17 U/L — SIGNIFICANT CHANGE UP (ref 4–33)
ANION GAP SERPL CALC-SCNC: 11 MMOL/L — SIGNIFICANT CHANGE UP (ref 7–14)
AST SERPL-CCNC: 39 U/L — HIGH (ref 4–32)
BILIRUB DIRECT SERPL-MCNC: <0.2 MG/DL — SIGNIFICANT CHANGE UP (ref 0–0.3)
BILIRUB INDIRECT FLD-MCNC: >0.4 MG/DL — SIGNIFICANT CHANGE UP (ref 0–1)
BILIRUB SERPL-MCNC: 0.6 MG/DL — SIGNIFICANT CHANGE UP (ref 0.2–1.2)
BUN SERPL-MCNC: 5 MG/DL — LOW (ref 7–23)
CALCIUM SERPL-MCNC: 8 MG/DL — LOW (ref 8.4–10.5)
CHLORIDE SERPL-SCNC: 106 MMOL/L — SIGNIFICANT CHANGE UP (ref 98–107)
CO2 SERPL-SCNC: 23 MMOL/L — SIGNIFICANT CHANGE UP (ref 22–31)
CREAT SERPL-MCNC: 0.42 MG/DL — LOW (ref 0.5–1.3)
EGFR: 123 ML/MIN/1.73M2 — SIGNIFICANT CHANGE UP
EGFR: 123 ML/MIN/1.73M2 — SIGNIFICANT CHANGE UP
GLUCOSE SERPL-MCNC: 69 MG/DL — LOW (ref 70–99)
HBV CORE AB SER-ACNC: SIGNIFICANT CHANGE UP
HBV SURFACE AB SER-ACNC: REACTIVE
HBV SURFACE AG SER-ACNC: SIGNIFICANT CHANGE UP
HCT VFR BLD CALC: 33.2 % — LOW (ref 34.5–45)
HCV AB S/CO SERPL IA: 0.15 S/CO — SIGNIFICANT CHANGE UP (ref 0–0.99)
HCV AB SERPL-IMP: SIGNIFICANT CHANGE UP
HGB BLD-MCNC: 10.4 G/DL — LOW (ref 11.5–15.5)
MAGNESIUM SERPL-MCNC: 1.6 MG/DL — SIGNIFICANT CHANGE UP (ref 1.6–2.6)
MCHC RBC-ENTMCNC: 26.7 PG — LOW (ref 27–34)
MCHC RBC-ENTMCNC: 31.3 GM/DL — LOW (ref 32–36)
MCV RBC AUTO: 85.3 FL — SIGNIFICANT CHANGE UP (ref 80–100)
NRBC # BLD AUTO: 0 K/UL — SIGNIFICANT CHANGE UP (ref 0–0)
NRBC # BLD: 0 /100 WBCS — SIGNIFICANT CHANGE UP (ref 0–0)
NRBC # FLD: 0 K/UL — SIGNIFICANT CHANGE UP (ref 0–0)
NRBC BLD-RTO: 0 /100 WBCS — SIGNIFICANT CHANGE UP (ref 0–0)
PHOSPHATE SERPL-MCNC: 3.6 MG/DL — SIGNIFICANT CHANGE UP (ref 2.5–4.5)
PLATELET # BLD AUTO: 175 K/UL — SIGNIFICANT CHANGE UP (ref 150–400)
POTASSIUM SERPL-MCNC: 3 MMOL/L — LOW (ref 3.5–5.3)
POTASSIUM SERPL-SCNC: 3 MMOL/L — LOW (ref 3.5–5.3)
PROT SERPL-MCNC: 6.3 G/DL — SIGNIFICANT CHANGE UP (ref 6–8.3)
RBC # BLD: 3.89 M/UL — SIGNIFICANT CHANGE UP (ref 3.8–5.2)
RBC # FLD: 15.7 % — HIGH (ref 10.3–14.5)
SODIUM SERPL-SCNC: 140 MMOL/L — SIGNIFICANT CHANGE UP (ref 135–145)
T PALLIDUM AB TITR SER: NEGATIVE — SIGNIFICANT CHANGE UP
WBC # BLD: 2.01 K/UL — LOW (ref 3.8–10.5)
WBC # FLD AUTO: 2.01 K/UL — LOW (ref 3.8–10.5)

## 2024-06-16 PROCEDURE — 99233 SBSQ HOSP IP/OBS HIGH 50: CPT

## 2024-06-16 RX ORDER — PIPERACILLIN-TAZO-DEXTROSE,ISO 3.375G/5
3.38 IV SOLUTION, PIGGYBACK PREMIX FROZEN(ML) INTRAVENOUS EVERY 8 HOURS
Refills: 0 | Status: COMPLETED | OUTPATIENT
Start: 2024-06-16 | End: 2024-06-20

## 2024-06-16 RX ORDER — PIPERACILLIN-TAZO-DEXTROSE,ISO 3.375G/5
3.38 IV SOLUTION, PIGGYBACK PREMIX FROZEN(ML) INTRAVENOUS ONCE
Refills: 0 | Status: COMPLETED | OUTPATIENT
Start: 2024-06-16 | End: 2024-06-16

## 2024-06-16 RX ADMIN — SERTRALINE 100 MILLIGRAM(S): 100 TABLET, FILM COATED ORAL at 12:10

## 2024-06-16 RX ADMIN — Medication 25 MILLIGRAM(S): at 06:23

## 2024-06-16 RX ADMIN — PREGABALIN 100 MILLIGRAM(S): 50 CAPSULE ORAL at 06:23

## 2024-06-16 RX ADMIN — Medication 1 TABLET(S): at 17:27

## 2024-06-16 RX ADMIN — Medication 100 MILLIEQUIVALENT(S): at 14:31

## 2024-06-16 RX ADMIN — Medication 100 MILLIEQUIVALENT(S): at 00:27

## 2024-06-16 RX ADMIN — Medication 650 MILLIGRAM(S): at 19:15

## 2024-06-16 RX ADMIN — Medication 25 GRAM(S): at 10:41

## 2024-06-16 RX ADMIN — Medication 25 GRAM(S): at 18:06

## 2024-06-16 RX ADMIN — PREGABALIN 100 MILLIGRAM(S): 50 CAPSULE ORAL at 17:28

## 2024-06-16 RX ADMIN — Medication 650 MILLIGRAM(S): at 18:15

## 2024-06-16 RX ADMIN — Medication 25 GRAM(S): at 03:29

## 2024-06-16 RX ADMIN — Medication 1 TABLET(S): at 12:10

## 2024-06-16 RX ADMIN — Medication 100 MILLIEQUIVALENT(S): at 15:43

## 2024-06-16 RX ADMIN — Medication 100 MILLIEQUIVALENT(S): at 16:51

## 2024-06-16 RX ADMIN — Medication 100 MILLIEQUIVALENT(S): at 01:31

## 2024-06-17 LAB
ALBUMIN CSF-MCNC: 21.2 MG/DL — SIGNIFICANT CHANGE UP (ref 14–25)
ALBUMIN SERPL ELPH-MCNC: 4010 MG/DL — SIGNIFICANT CHANGE UP (ref 3500–5200)
ANA PAT FLD IF-IMP: ABNORMAL
ANA TITR SER: ABNORMAL
ANION GAP SERPL CALC-SCNC: 9 MMOL/L — SIGNIFICANT CHANGE UP (ref 7–14)
BUN SERPL-MCNC: 5 MG/DL — LOW (ref 7–23)
C DIFF GDH STL QL: NEGATIVE — SIGNIFICANT CHANGE UP
C DIFF GDH STL QL: SIGNIFICANT CHANGE UP
CALCIUM SERPL-MCNC: 7.9 MG/DL — LOW (ref 8.4–10.5)
CHLORIDE SERPL-SCNC: 108 MMOL/L — HIGH (ref 98–107)
CO2 SERPL-SCNC: 24 MMOL/L — SIGNIFICANT CHANGE UP (ref 22–31)
CREAT SERPL-MCNC: 0.46 MG/DL — LOW (ref 0.5–1.3)
EGFR: 120 ML/MIN/1.73M2 — SIGNIFICANT CHANGE UP
EGFR: 120 ML/MIN/1.73M2 — SIGNIFICANT CHANGE UP
GI PCR PANEL: SIGNIFICANT CHANGE UP
GLUCOSE SERPL-MCNC: 81 MG/DL — SIGNIFICANT CHANGE UP (ref 70–99)
HCT VFR BLD CALC: 29.8 % — LOW (ref 34.5–45)
HGB BLD-MCNC: 9.7 G/DL — LOW (ref 11.5–15.5)
IGG CSF-MCNC: 2.4 MG/DL — SIGNIFICANT CHANGE UP
IGG FLD-MCNC: 1300 MG/DL — SIGNIFICANT CHANGE UP (ref 610–1660)
IGG SYNTH RATE SER+CSF CALC-MRATE: -8.2 MG/DAY — SIGNIFICANT CHANGE UP
IGG/ALB CLEAR SER+CSF-RTO: 0.3 — SIGNIFICANT CHANGE UP
IGG/ALB CSF: 0.11 RATIO — SIGNIFICANT CHANGE UP
IGG/ALB SER: 0.32 RATIO — SIGNIFICANT CHANGE UP
MAGNESIUM SERPL-MCNC: 1.5 MG/DL — LOW (ref 1.6–2.6)
MCHC RBC-ENTMCNC: 26.8 PG — LOW (ref 27–34)
MCHC RBC-ENTMCNC: 32.6 GM/DL — SIGNIFICANT CHANGE UP (ref 32–36)
MCV RBC AUTO: 82.3 FL — SIGNIFICANT CHANGE UP (ref 80–100)
NRBC # BLD AUTO: 0 K/UL — SIGNIFICANT CHANGE UP (ref 0–0)
NRBC # BLD: 0 /100 WBCS — SIGNIFICANT CHANGE UP (ref 0–0)
NRBC # FLD: 0 K/UL — SIGNIFICANT CHANGE UP (ref 0–0)
NRBC BLD-RTO: 0 /100 WBCS — SIGNIFICANT CHANGE UP (ref 0–0)
PHOSPHATE SERPL-MCNC: 3.8 MG/DL — SIGNIFICANT CHANGE UP (ref 2.5–4.5)
PLATELET # BLD AUTO: 155 K/UL — SIGNIFICANT CHANGE UP (ref 150–400)
POTASSIUM SERPL-MCNC: 2.8 MMOL/L — CRITICAL LOW (ref 3.5–5.3)
POTASSIUM SERPL-SCNC: 2.8 MMOL/L — CRITICAL LOW (ref 3.5–5.3)
RBC # BLD: 3.62 M/UL — LOW (ref 3.8–5.2)
RBC # FLD: 15 % — HIGH (ref 10.3–14.5)
SODIUM SERPL-SCNC: 141 MMOL/L — SIGNIFICANT CHANGE UP (ref 135–145)
VDRL CSF-TITR: SIGNIFICANT CHANGE UP
WBC # BLD: 2.28 K/UL — LOW (ref 3.8–10.5)
WBC # FLD AUTO: 2.28 K/UL — LOW (ref 3.8–10.5)
ZINC SERPL-MCNC: 64 UG/DL — SIGNIFICANT CHANGE UP (ref 44–115)

## 2024-06-17 PROCEDURE — 72156 MRI NECK SPINE W/O & W/DYE: CPT | Mod: 26

## 2024-06-17 PROCEDURE — 72157 MRI CHEST SPINE W/O & W/DYE: CPT | Mod: 26

## 2024-06-17 PROCEDURE — 99233 SBSQ HOSP IP/OBS HIGH 50: CPT

## 2024-06-17 PROCEDURE — 72158 MRI LUMBAR SPINE W/O & W/DYE: CPT | Mod: 26

## 2024-06-17 PROCEDURE — 70553 MRI BRAIN STEM W/O & W/DYE: CPT | Mod: 26

## 2024-06-17 PROCEDURE — 93010 ELECTROCARDIOGRAM REPORT: CPT

## 2024-06-17 RX ORDER — MAGNESIUM OXIDE 400 MG
400 TABLET ORAL
Refills: 0 | Status: COMPLETED | OUTPATIENT
Start: 2024-06-17 | End: 2024-06-17

## 2024-06-17 RX ORDER — LIDOCAINE HYDROCHLORIDE 20 MG/ML
2 JELLY TOPICAL THREE TIMES A DAY
Refills: 0 | Status: DISCONTINUED | OUTPATIENT
Start: 2024-06-17 | End: 2024-06-22

## 2024-06-17 RX ORDER — CALCIUM GLUCONATE 20 MG/ML
1 INJECTION, SOLUTION INTRAVENOUS ONCE
Refills: 0 | Status: COMPLETED | OUTPATIENT
Start: 2024-06-17 | End: 2024-06-17

## 2024-06-17 RX ORDER — LIDOCAINE HYDROCHLORIDE 20 MG/ML
1 JELLY TOPICAL THREE TIMES A DAY
Refills: 0 | Status: DISCONTINUED | OUTPATIENT
Start: 2024-06-17 | End: 2024-06-17

## 2024-06-17 RX ORDER — OXYCODONE HYDROCHLORIDE 30 MG/1
5 TABLET ORAL EVERY 4 HOURS
Refills: 0 | Status: DISCONTINUED | OUTPATIENT
Start: 2024-06-17 | End: 2024-06-22

## 2024-06-17 RX ORDER — MAGNESIUM SULFATE 500 MG/ML
1 SYRINGE (ML) INJECTION ONCE
Refills: 0 | Status: COMPLETED | OUTPATIENT
Start: 2024-06-17 | End: 2024-06-17

## 2024-06-17 RX ADMIN — Medication 100 MILLIEQUIVALENT(S): at 12:02

## 2024-06-17 RX ADMIN — Medication 100 MILLIEQUIVALENT(S): at 09:20

## 2024-06-17 RX ADMIN — Medication 1 TABLET(S): at 08:18

## 2024-06-17 RX ADMIN — Medication 100 MILLIEQUIVALENT(S): at 10:29

## 2024-06-17 RX ADMIN — OXYCODONE HYDROCHLORIDE 5 MILLIGRAM(S): 30 TABLET ORAL at 16:25

## 2024-06-17 RX ADMIN — Medication 1 TABLET(S): at 12:22

## 2024-06-17 RX ADMIN — Medication 40 MILLIEQUIVALENT(S): at 16:59

## 2024-06-17 RX ADMIN — Medication 25 GRAM(S): at 16:18

## 2024-06-17 RX ADMIN — Medication 400 MILLIGRAM(S): at 08:09

## 2024-06-17 RX ADMIN — PREGABALIN 100 MILLIGRAM(S): 50 CAPSULE ORAL at 18:04

## 2024-06-17 RX ADMIN — Medication 400 MILLIGRAM(S): at 12:22

## 2024-06-17 RX ADMIN — Medication 100 GRAM(S): at 08:09

## 2024-06-17 RX ADMIN — OXYCODONE HYDROCHLORIDE 5 MILLIGRAM(S): 30 TABLET ORAL at 16:55

## 2024-06-17 RX ADMIN — PREGABALIN 100 MILLIGRAM(S): 50 CAPSULE ORAL at 05:42

## 2024-06-17 RX ADMIN — Medication 25 MILLIGRAM(S): at 05:47

## 2024-06-17 RX ADMIN — CALCIUM GLUCONATE 100 GRAM(S): 20 INJECTION, SOLUTION INTRAVENOUS at 17:08

## 2024-06-17 RX ADMIN — Medication 1 MILLIGRAM(S): at 12:22

## 2024-06-17 RX ADMIN — Medication 400 MILLIGRAM(S): at 17:05

## 2024-06-17 RX ADMIN — Medication 25 GRAM(S): at 02:48

## 2024-06-17 RX ADMIN — Medication 25 GRAM(S): at 22:24

## 2024-06-17 RX ADMIN — Medication 40 MILLIEQUIVALENT(S): at 08:09

## 2024-06-17 RX ADMIN — Medication 1 TABLET(S): at 17:06

## 2024-06-17 RX ADMIN — SERTRALINE 100 MILLIGRAM(S): 100 TABLET, FILM COATED ORAL at 12:22

## 2024-06-18 LAB
% GAMMA, URINE: SIGNIFICANT CHANGE UP
ALBUMIN 24H MFR UR ELPH: PRESENT
ALPHA1 GLOB 24H MFR UR ELPH: SIGNIFICANT CHANGE UP
ALPHA2 GLOB 24H MFR UR ELPH: SIGNIFICANT CHANGE UP
ANION GAP SERPL CALC-SCNC: 10 MMOL/L — SIGNIFICANT CHANGE UP (ref 7–14)
B-GLOBULIN 24H MFR UR ELPH: SIGNIFICANT CHANGE UP
BASOPHILS # BLD AUTO: 0.02 K/UL — SIGNIFICANT CHANGE UP (ref 0–0.2)
BASOPHILS NFR BLD AUTO: 0.8 % — SIGNIFICANT CHANGE UP (ref 0–2)
BUN SERPL-MCNC: 6 MG/DL — LOW (ref 7–23)
CALCIUM SERPL-MCNC: 8.5 MG/DL — SIGNIFICANT CHANGE UP (ref 8.4–10.5)
CHLORIDE SERPL-SCNC: 109 MMOL/L — HIGH (ref 98–107)
CO2 SERPL-SCNC: 24 MMOL/L — SIGNIFICANT CHANGE UP (ref 22–31)
CREAT SERPL-MCNC: 0.44 MG/DL — LOW (ref 0.5–1.3)
CULTURE RESULTS: ABNORMAL
CULTURE RESULTS: SIGNIFICANT CHANGE UP
EGFR: 121 ML/MIN/1.73M2 — SIGNIFICANT CHANGE UP
EGFR: 121 ML/MIN/1.73M2 — SIGNIFICANT CHANGE UP
EOSINOPHIL # BLD AUTO: 0 K/UL — SIGNIFICANT CHANGE UP (ref 0–0.5)
EOSINOPHIL NFR BLD AUTO: 0 % — SIGNIFICANT CHANGE UP (ref 0–6)
GD1A GANGL IGG+IGM SER IA-ACNC: 12 IV — SIGNIFICANT CHANGE UP (ref 0–50)
GD1B GANGL IGG+IGM SER IA-ACNC: 10 IV — SIGNIFICANT CHANGE UP (ref 0–50)
GLUCOSE SERPL-MCNC: 71 MG/DL — SIGNIFICANT CHANGE UP (ref 70–99)
GM1 ASIALO IGG+IGM SER IA-ACNC: 69 IV — HIGH (ref 0–50)
GM1 GANGL IGG+IGM SER-ACNC: 18 IV — SIGNIFICANT CHANGE UP (ref 0–50)
GM2 GANGL IGG+IGM SER IA-ACNC: 15 IV — SIGNIFICANT CHANGE UP (ref 0–50)
GQ1B GANGL IGG+IGM SER IA-ACNC: 25 IV — SIGNIFICANT CHANGE UP (ref 0–50)
HCT VFR BLD CALC: 32.5 % — LOW (ref 34.5–45)
HGB BLD-MCNC: 10 G/DL — LOW (ref 11.5–15.5)
IANC: 1.54 K/UL — LOW (ref 1.8–7.4)
IMM GRANULOCYTES NFR BLD AUTO: 0 % — SIGNIFICANT CHANGE UP (ref 0–0.9)
LYME IGG LINE BLOT INTERP.: NEGATIVE — SIGNIFICANT CHANGE UP
LYME IGM LINE BLOT INTERP.: NEGATIVE — SIGNIFICANT CHANGE UP
LYMPHOCYTES # BLD AUTO: 0.73 K/UL — LOW (ref 1–3.3)
LYMPHOCYTES # BLD AUTO: 27.8 % — SIGNIFICANT CHANGE UP (ref 13–44)
M PROTEIN 24H UR ELPH-MRATE: SIGNIFICANT CHANGE UP
MAGNESIUM SERPL-MCNC: 1.7 MG/DL — SIGNIFICANT CHANGE UP (ref 1.6–2.6)
MCHC RBC-ENTMCNC: 25.9 PG — LOW (ref 27–34)
MCHC RBC-ENTMCNC: 30.8 GM/DL — LOW (ref 32–36)
MCV RBC AUTO: 84.2 FL — SIGNIFICANT CHANGE UP (ref 80–100)
MONOCYTES # BLD AUTO: 0.34 K/UL — SIGNIFICANT CHANGE UP (ref 0–0.9)
MONOCYTES NFR BLD AUTO: 12.9 % — SIGNIFICANT CHANGE UP (ref 2–14)
NEUTROPHILS # BLD AUTO: 1.54 K/UL — LOW (ref 1.8–7.4)
NEUTROPHILS NFR BLD AUTO: 58.5 % — SIGNIFICANT CHANGE UP (ref 43–77)
NRBC # BLD AUTO: 0 K/UL — SIGNIFICANT CHANGE UP (ref 0–0)
NRBC # BLD: 0 /100 WBCS — SIGNIFICANT CHANGE UP (ref 0–0)
NRBC # FLD: 0 K/UL — SIGNIFICANT CHANGE UP (ref 0–0)
NRBC BLD-RTO: 0 /100 WBCS — SIGNIFICANT CHANGE UP (ref 0–0)
P18 AB. IGG: SIGNIFICANT CHANGE UP
P23 AB. IGG: SIGNIFICANT CHANGE UP
P23 AB. IGM: SIGNIFICANT CHANGE UP
P28 AB. IGG: SIGNIFICANT CHANGE UP
P30 AB. IGG: SIGNIFICANT CHANGE UP
P39 AB. IGG: SIGNIFICANT CHANGE UP
P39 AB. IGM: SIGNIFICANT CHANGE UP
P41 AB. IGG: SIGNIFICANT CHANGE UP
P41 AB. IGM: SIGNIFICANT CHANGE UP
P45 AB. IGG: SIGNIFICANT CHANGE UP
P58 AB. IGG: SIGNIFICANT CHANGE UP
P66 AB. IGG: SIGNIFICANT CHANGE UP
P93 AB. IGG: SIGNIFICANT CHANGE UP
PHOSPHATE SERPL-MCNC: 4 MG/DL — SIGNIFICANT CHANGE UP (ref 2.5–4.5)
PLATELET # BLD AUTO: 162 K/UL — SIGNIFICANT CHANGE UP (ref 150–400)
POTASSIUM SERPL-MCNC: 3.6 MMOL/L — SIGNIFICANT CHANGE UP (ref 3.5–5.3)
POTASSIUM SERPL-SCNC: 3.6 MMOL/L — SIGNIFICANT CHANGE UP (ref 3.5–5.3)
PROT ?TM UR-MCNC: 20 MG/DL — SIGNIFICANT CHANGE UP
PROT PATTERN 24H UR ELPH-IMP: SIGNIFICANT CHANGE UP
RBC # BLD: 3.86 M/UL — SIGNIFICANT CHANGE UP (ref 3.8–5.2)
RBC # FLD: 14.9 % — HIGH (ref 10.3–14.5)
SODIUM SERPL-SCNC: 143 MMOL/L — SIGNIFICANT CHANGE UP (ref 135–145)
SPECIMEN SOURCE: SIGNIFICANT CHANGE UP
SPECIMEN SOURCE: SIGNIFICANT CHANGE UP
WBC # BLD: 2.63 K/UL — LOW (ref 3.8–10.5)
WBC # FLD AUTO: 2.63 K/UL — LOW (ref 3.8–10.5)

## 2024-06-18 PROCEDURE — 99233 SBSQ HOSP IP/OBS HIGH 50: CPT

## 2024-06-18 RX ORDER — MAGNESIUM SULFATE 500 MG/ML
1 SYRINGE (ML) INJECTION ONCE
Refills: 0 | Status: COMPLETED | OUTPATIENT
Start: 2024-06-18 | End: 2024-06-18

## 2024-06-18 RX ORDER — ENOXAPARIN SODIUM 100 MG/ML
40 INJECTION SUBCUTANEOUS EVERY 24 HOURS
Refills: 0 | Status: DISCONTINUED | OUTPATIENT
Start: 2024-06-18 | End: 2024-06-22

## 2024-06-18 RX ADMIN — Medication 40 MILLIEQUIVALENT(S): at 16:42

## 2024-06-18 RX ADMIN — Medication 25 GRAM(S): at 05:42

## 2024-06-18 RX ADMIN — PREGABALIN 100 MILLIGRAM(S): 50 CAPSULE ORAL at 05:41

## 2024-06-18 RX ADMIN — Medication 100 GRAM(S): at 16:52

## 2024-06-18 RX ADMIN — OXYCODONE HYDROCHLORIDE 5 MILLIGRAM(S): 30 TABLET ORAL at 06:30

## 2024-06-18 RX ADMIN — PREGABALIN 100 MILLIGRAM(S): 50 CAPSULE ORAL at 19:01

## 2024-06-18 RX ADMIN — ENOXAPARIN SODIUM 40 MILLIGRAM(S): 100 INJECTION SUBCUTANEOUS at 16:50

## 2024-06-18 RX ADMIN — Medication 25 GRAM(S): at 11:48

## 2024-06-18 RX ADMIN — OXYCODONE HYDROCHLORIDE 5 MILLIGRAM(S): 30 TABLET ORAL at 21:16

## 2024-06-18 RX ADMIN — Medication 25 MILLIGRAM(S): at 05:42

## 2024-06-18 RX ADMIN — Medication 1 TABLET(S): at 11:56

## 2024-06-18 RX ADMIN — OXYCODONE HYDROCHLORIDE 5 MILLIGRAM(S): 30 TABLET ORAL at 22:30

## 2024-06-18 RX ADMIN — DIAZEPAM 5 MILLIGRAM(S): 5 TABLET ORAL at 07:53

## 2024-06-18 RX ADMIN — SERTRALINE 100 MILLIGRAM(S): 100 TABLET, FILM COATED ORAL at 11:48

## 2024-06-18 RX ADMIN — Medication 1 TABLET(S): at 16:43

## 2024-06-18 RX ADMIN — OXYCODONE HYDROCHLORIDE 5 MILLIGRAM(S): 30 TABLET ORAL at 05:46

## 2024-06-18 RX ADMIN — Medication 25 GRAM(S): at 19:01

## 2024-06-19 LAB
ANION GAP SERPL CALC-SCNC: 9 MMOL/L — SIGNIFICANT CHANGE UP (ref 7–14)
B BURGDOR DNA SPEC QL NAA+PROBE: NEGATIVE — SIGNIFICANT CHANGE UP
B BURGDOR DNA SPEC QL NAA+PROBE: NEGATIVE — SIGNIFICANT CHANGE UP
BASOPHILS # BLD AUTO: 0.03 K/UL — SIGNIFICANT CHANGE UP (ref 0–0.2)
BASOPHILS NFR BLD AUTO: 0.9 % — SIGNIFICANT CHANGE UP (ref 0–2)
BUN SERPL-MCNC: 8 MG/DL — SIGNIFICANT CHANGE UP (ref 7–23)
CALCIUM SERPL-MCNC: 8.7 MG/DL — SIGNIFICANT CHANGE UP (ref 8.4–10.5)
CHLORIDE SERPL-SCNC: 109 MMOL/L — HIGH (ref 98–107)
CO2 SERPL-SCNC: 25 MMOL/L — SIGNIFICANT CHANGE UP (ref 22–31)
COPPER SERPL-MCNC: 82 UG/DL — SIGNIFICANT CHANGE UP (ref 80–158)
CREAT SERPL-MCNC: 0.48 MG/DL — LOW (ref 0.5–1.3)
CULTURE RESULTS: SIGNIFICANT CHANGE UP
EGFR: 119 ML/MIN/1.73M2 — SIGNIFICANT CHANGE UP
EGFR: 119 ML/MIN/1.73M2 — SIGNIFICANT CHANGE UP
EOSINOPHIL # BLD AUTO: 0.09 K/UL — SIGNIFICANT CHANGE UP (ref 0–0.5)
EOSINOPHIL NFR BLD AUTO: 2.6 % — SIGNIFICANT CHANGE UP (ref 0–6)
GLUCOSE SERPL-MCNC: 63 MG/DL — LOW (ref 70–99)
HCT VFR BLD CALC: 33.6 % — LOW (ref 34.5–45)
HGB BLD-MCNC: 10.4 G/DL — LOW (ref 11.5–15.5)
IANC: 2.16 K/UL — SIGNIFICANT CHANGE UP (ref 1.8–7.4)
IMM GRANULOCYTES NFR BLD AUTO: 0 % — SIGNIFICANT CHANGE UP (ref 0–0.9)
LYMPHOCYTES # BLD AUTO: 0.79 K/UL — LOW (ref 1–3.3)
LYMPHOCYTES # BLD AUTO: 23.2 % — SIGNIFICANT CHANGE UP (ref 13–44)
MAGNESIUM SERPL-MCNC: 1.9 MG/DL — SIGNIFICANT CHANGE UP (ref 1.6–2.6)
MBP CSF-MCNC: 5.2 NG/ML — HIGH (ref 0–3.7)
MCHC RBC-ENTMCNC: 26.3 PG — LOW (ref 27–34)
MCHC RBC-ENTMCNC: 31 GM/DL — LOW (ref 32–36)
MCV RBC AUTO: 85.1 FL — SIGNIFICANT CHANGE UP (ref 80–100)
MONOCYTES # BLD AUTO: 0.34 K/UL — SIGNIFICANT CHANGE UP (ref 0–0.9)
MONOCYTES NFR BLD AUTO: 10 % — SIGNIFICANT CHANGE UP (ref 2–14)
NEUTROPHILS # BLD AUTO: 2.16 K/UL — SIGNIFICANT CHANGE UP (ref 1.8–7.4)
NEUTROPHILS NFR BLD AUTO: 63.3 % — SIGNIFICANT CHANGE UP (ref 43–77)
NRBC # BLD AUTO: 0 K/UL — SIGNIFICANT CHANGE UP (ref 0–0)
NRBC # BLD: 0 /100 WBCS — SIGNIFICANT CHANGE UP (ref 0–0)
NRBC # FLD: 0 K/UL — SIGNIFICANT CHANGE UP (ref 0–0)
NRBC BLD-RTO: 0 /100 WBCS — SIGNIFICANT CHANGE UP (ref 0–0)
PHOSPHATE SERPL-MCNC: 3.6 MG/DL — SIGNIFICANT CHANGE UP (ref 2.5–4.5)
PLATELET # BLD AUTO: 181 K/UL — SIGNIFICANT CHANGE UP (ref 150–400)
POTASSIUM SERPL-MCNC: 4.3 MMOL/L — SIGNIFICANT CHANGE UP (ref 3.5–5.3)
POTASSIUM SERPL-SCNC: 4.3 MMOL/L — SIGNIFICANT CHANGE UP (ref 3.5–5.3)
RBC # BLD: 3.95 M/UL — SIGNIFICANT CHANGE UP (ref 3.8–5.2)
RBC # FLD: 15.2 % — HIGH (ref 10.3–14.5)
SODIUM SERPL-SCNC: 143 MMOL/L — SIGNIFICANT CHANGE UP (ref 135–145)
SPECIMEN SOURCE: SIGNIFICANT CHANGE UP
WBC # BLD: 3.41 K/UL — LOW (ref 3.8–10.5)
WBC # FLD AUTO: 3.41 K/UL — LOW (ref 3.8–10.5)
ZINC SERPL-MCNC: 54 UG/DL — SIGNIFICANT CHANGE UP (ref 44–115)

## 2024-06-19 PROCEDURE — 99233 SBSQ HOSP IP/OBS HIGH 50: CPT

## 2024-06-19 RX ORDER — PREGABALIN 50 MG/1
150 CAPSULE ORAL
Refills: 0 | Status: DISCONTINUED | OUTPATIENT
Start: 2024-06-19 | End: 2024-06-22

## 2024-06-19 RX ORDER — LOPERAMIDE HCL 1 MG/7.5ML
2 SOLUTION ORAL ONCE
Refills: 0 | Status: COMPLETED | OUTPATIENT
Start: 2024-06-19 | End: 2024-06-19

## 2024-06-19 RX ORDER — LOPERAMIDE HCL 1 MG/7.5ML
2 SOLUTION ORAL THREE TIMES A DAY
Refills: 0 | Status: DISCONTINUED | OUTPATIENT
Start: 2024-06-19 | End: 2024-06-22

## 2024-06-19 RX ADMIN — Medication 25 GRAM(S): at 18:20

## 2024-06-19 RX ADMIN — OXYCODONE HYDROCHLORIDE 5 MILLIGRAM(S): 30 TABLET ORAL at 08:48

## 2024-06-19 RX ADMIN — OXYCODONE HYDROCHLORIDE 5 MILLIGRAM(S): 30 TABLET ORAL at 18:50

## 2024-06-19 RX ADMIN — LOPERAMIDE HCL 2 MILLIGRAM(S): 1 SOLUTION ORAL at 11:50

## 2024-06-19 RX ADMIN — PREGABALIN 100 MILLIGRAM(S): 50 CAPSULE ORAL at 07:57

## 2024-06-19 RX ADMIN — OXYCODONE HYDROCHLORIDE 5 MILLIGRAM(S): 30 TABLET ORAL at 09:00

## 2024-06-19 RX ADMIN — OXYCODONE HYDROCHLORIDE 5 MILLIGRAM(S): 30 TABLET ORAL at 18:20

## 2024-06-19 RX ADMIN — SERTRALINE 100 MILLIGRAM(S): 100 TABLET, FILM COATED ORAL at 09:27

## 2024-06-19 RX ADMIN — PREGABALIN 150 MILLIGRAM(S): 50 CAPSULE ORAL at 19:47

## 2024-06-19 RX ADMIN — Medication 25 GRAM(S): at 10:49

## 2024-06-19 RX ADMIN — ENOXAPARIN SODIUM 40 MILLIGRAM(S): 100 INJECTION SUBCUTANEOUS at 18:22

## 2024-06-20 LAB
ANION GAP SERPL CALC-SCNC: 11 MMOL/L — SIGNIFICANT CHANGE UP (ref 7–14)
BASOPHILS # BLD AUTO: 0.04 K/UL — SIGNIFICANT CHANGE UP (ref 0–0.2)
BASOPHILS NFR BLD AUTO: 1.2 % — SIGNIFICANT CHANGE UP (ref 0–2)
BUN SERPL-MCNC: 9 MG/DL — SIGNIFICANT CHANGE UP (ref 7–23)
CALCIUM SERPL-MCNC: 8.7 MG/DL — SIGNIFICANT CHANGE UP (ref 8.4–10.5)
CHLORIDE SERPL-SCNC: 106 MMOL/L — SIGNIFICANT CHANGE UP (ref 98–107)
CO2 SERPL-SCNC: 23 MMOL/L — SIGNIFICANT CHANGE UP (ref 22–31)
CREAT SERPL-MCNC: 0.44 MG/DL — LOW (ref 0.5–1.3)
EGFR: 121 ML/MIN/1.73M2 — SIGNIFICANT CHANGE UP
EGFR: 121 ML/MIN/1.73M2 — SIGNIFICANT CHANGE UP
EOSINOPHIL # BLD AUTO: 0 K/UL — SIGNIFICANT CHANGE UP (ref 0–0.5)
EOSINOPHIL NFR BLD AUTO: 0 % — SIGNIFICANT CHANGE UP (ref 0–6)
GLUCOSE SERPL-MCNC: 66 MG/DL — LOW (ref 70–99)
HCT VFR BLD CALC: 35.1 % — SIGNIFICANT CHANGE UP (ref 34.5–45)
HGB BLD-MCNC: 11.1 G/DL — LOW (ref 11.5–15.5)
HOMOCYSTEINE LEVEL: 30 UMOL/L — HIGH (ref 0–14.5)
IANC: 2.07 K/UL — SIGNIFICANT CHANGE UP (ref 1.8–7.4)
IMM GRANULOCYTES NFR BLD AUTO: 0.3 % — SIGNIFICANT CHANGE UP (ref 0–0.9)
INTERPRETATION 24H UR IFE-IMP: SIGNIFICANT CHANGE UP
LYMPHOCYTES # BLD AUTO: 0.75 K/UL — LOW (ref 1–3.3)
LYMPHOCYTES # BLD AUTO: 23.3 % — SIGNIFICANT CHANGE UP (ref 13–44)
MAGNESIUM SERPL-MCNC: 1.7 MG/DL — SIGNIFICANT CHANGE UP (ref 1.6–2.6)
MCHC RBC-ENTMCNC: 26.3 PG — LOW (ref 27–34)
MCHC RBC-ENTMCNC: 31.6 GM/DL — LOW (ref 32–36)
MCV RBC AUTO: 83.2 FL — SIGNIFICANT CHANGE UP (ref 80–100)
METHYLMALONATE SERPL-SCNC: 113 NMOL/L — SIGNIFICANT CHANGE UP (ref 0–378)
MONOCYTES # BLD AUTO: 0.35 K/UL — SIGNIFICANT CHANGE UP (ref 0–0.9)
MONOCYTES NFR BLD AUTO: 10.9 % — SIGNIFICANT CHANGE UP (ref 2–14)
NEUTROPHILS # BLD AUTO: 2.07 K/UL — SIGNIFICANT CHANGE UP (ref 1.8–7.4)
NEUTROPHILS NFR BLD AUTO: 64.3 % — SIGNIFICANT CHANGE UP (ref 43–77)
NRBC # BLD AUTO: 0 K/UL — SIGNIFICANT CHANGE UP (ref 0–0)
NRBC # BLD: 0 /100 WBCS — SIGNIFICANT CHANGE UP (ref 0–0)
NRBC # FLD: 0 K/UL — SIGNIFICANT CHANGE UP (ref 0–0)
NRBC BLD-RTO: 0 /100 WBCS — SIGNIFICANT CHANGE UP (ref 0–0)
PHOSPHATE SERPL-MCNC: 4.3 MG/DL — SIGNIFICANT CHANGE UP (ref 2.5–4.5)
PLATELET # BLD AUTO: 186 K/UL — SIGNIFICANT CHANGE UP (ref 150–400)
POTASSIUM SERPL-MCNC: 3.7 MMOL/L — SIGNIFICANT CHANGE UP (ref 3.5–5.3)
POTASSIUM SERPL-SCNC: 3.7 MMOL/L — SIGNIFICANT CHANGE UP (ref 3.5–5.3)
PYRIDOXAL PHOS SERPL-MCNC: 1.8 UG/L — LOW (ref 3.4–65.2)
RBC # BLD: 4.22 M/UL — SIGNIFICANT CHANGE UP (ref 3.8–5.2)
RBC # FLD: 14.9 % — HIGH (ref 10.3–14.5)
SODIUM SERPL-SCNC: 140 MMOL/L — SIGNIFICANT CHANGE UP (ref 135–145)
WBC # BLD: 3.22 K/UL — LOW (ref 3.8–10.5)
WBC # FLD AUTO: 3.22 K/UL — LOW (ref 3.8–10.5)

## 2024-06-20 PROCEDURE — 90792 PSYCH DIAG EVAL W/MED SRVCS: CPT

## 2024-06-20 PROCEDURE — 93010 ELECTROCARDIOGRAM REPORT: CPT

## 2024-06-20 PROCEDURE — 99233 SBSQ HOSP IP/OBS HIGH 50: CPT

## 2024-06-20 RX ORDER — PYRIDOXINE HCL (VITAMIN B6) 25 MG
10 TABLET ORAL DAILY
Refills: 0 | Status: DISCONTINUED | OUTPATIENT
Start: 2024-06-20 | End: 2024-06-20

## 2024-06-20 RX ORDER — DIAZEPAM 5 MG/1
2.5 TABLET ORAL
Refills: 0 | Status: DISCONTINUED | OUTPATIENT
Start: 2024-06-21 | End: 2024-06-22

## 2024-06-20 RX ORDER — PYRIDOXINE HCL (VITAMIN B6) 25 MG
25 TABLET ORAL DAILY
Refills: 0 | Status: DISCONTINUED | OUTPATIENT
Start: 2024-06-20 | End: 2024-06-22

## 2024-06-20 RX ADMIN — OXYCODONE HYDROCHLORIDE 5 MILLIGRAM(S): 30 TABLET ORAL at 07:50

## 2024-06-20 RX ADMIN — Medication 25 GRAM(S): at 10:38

## 2024-06-20 RX ADMIN — OXYCODONE HYDROCHLORIDE 5 MILLIGRAM(S): 30 TABLET ORAL at 21:40

## 2024-06-20 RX ADMIN — ENOXAPARIN SODIUM 40 MILLIGRAM(S): 100 INJECTION SUBCUTANEOUS at 18:49

## 2024-06-20 RX ADMIN — SERTRALINE 100 MILLIGRAM(S): 100 TABLET, FILM COATED ORAL at 18:43

## 2024-06-20 RX ADMIN — OXYCODONE HYDROCHLORIDE 5 MILLIGRAM(S): 30 TABLET ORAL at 22:40

## 2024-06-20 RX ADMIN — LOPERAMIDE HCL 2 MILLIGRAM(S): 1 SOLUTION ORAL at 13:37

## 2024-06-20 RX ADMIN — Medication 25 GRAM(S): at 01:32

## 2024-06-20 RX ADMIN — PREGABALIN 150 MILLIGRAM(S): 50 CAPSULE ORAL at 06:49

## 2024-06-20 RX ADMIN — PREGABALIN 150 MILLIGRAM(S): 50 CAPSULE ORAL at 18:43

## 2024-06-20 RX ADMIN — DIAZEPAM 5 MILLIGRAM(S): 5 TABLET ORAL at 13:37

## 2024-06-20 RX ADMIN — Medication 25 GRAM(S): at 18:43

## 2024-06-20 RX ADMIN — OXYCODONE HYDROCHLORIDE 5 MILLIGRAM(S): 30 TABLET ORAL at 06:49

## 2024-06-21 ENCOUNTER — TRANSCRIPTION ENCOUNTER (OUTPATIENT)
Age: 44
End: 2024-06-21

## 2024-06-21 LAB
ANION GAP SERPL CALC-SCNC: 11 MMOL/L — SIGNIFICANT CHANGE UP (ref 7–14)
BUN SERPL-MCNC: 8 MG/DL — SIGNIFICANT CHANGE UP (ref 7–23)
CALCIUM SERPL-MCNC: 8.6 MG/DL — SIGNIFICANT CHANGE UP (ref 8.4–10.5)
CHLORIDE SERPL-SCNC: 106 MMOL/L — SIGNIFICANT CHANGE UP (ref 98–107)
CO2 SERPL-SCNC: 24 MMOL/L — SIGNIFICANT CHANGE UP (ref 22–31)
CREAT SERPL-MCNC: 0.43 MG/DL — LOW (ref 0.5–1.3)
EGFR: 122 ML/MIN/1.73M2 — SIGNIFICANT CHANGE UP
EGFR: 122 ML/MIN/1.73M2 — SIGNIFICANT CHANGE UP
GLUCOSE SERPL-MCNC: 86 MG/DL — SIGNIFICANT CHANGE UP (ref 70–99)
HCT VFR BLD CALC: 34.3 % — LOW (ref 34.5–45)
HGB BLD-MCNC: 10.5 G/DL — LOW (ref 11.5–15.5)
MAGNESIUM SERPL-MCNC: 1.6 MG/DL — SIGNIFICANT CHANGE UP (ref 1.6–2.6)
MCHC RBC-ENTMCNC: 25.8 PG — LOW (ref 27–34)
MCHC RBC-ENTMCNC: 30.6 GM/DL — LOW (ref 32–36)
MCV RBC AUTO: 84.3 FL — SIGNIFICANT CHANGE UP (ref 80–100)
NRBC # BLD AUTO: 0 K/UL — SIGNIFICANT CHANGE UP (ref 0–0)
NRBC # BLD: 0 /100 WBCS — SIGNIFICANT CHANGE UP (ref 0–0)
NRBC # FLD: 0 K/UL — SIGNIFICANT CHANGE UP (ref 0–0)
NRBC BLD-RTO: 0 /100 WBCS — SIGNIFICANT CHANGE UP (ref 0–0)
PHOSPHATE SERPL-MCNC: 4.4 MG/DL — SIGNIFICANT CHANGE UP (ref 2.5–4.5)
PLATELET # BLD AUTO: 196 K/UL — SIGNIFICANT CHANGE UP (ref 150–400)
POTASSIUM SERPL-MCNC: 3.6 MMOL/L — SIGNIFICANT CHANGE UP (ref 3.5–5.3)
POTASSIUM SERPL-SCNC: 3.6 MMOL/L — SIGNIFICANT CHANGE UP (ref 3.5–5.3)
RBC # BLD: 4.07 M/UL — SIGNIFICANT CHANGE UP (ref 3.8–5.2)
RBC # FLD: 14.6 % — HIGH (ref 10.3–14.5)
SODIUM SERPL-SCNC: 141 MMOL/L — SIGNIFICANT CHANGE UP (ref 135–145)
WBC # BLD: 3.01 K/UL — LOW (ref 3.8–10.5)
WBC # FLD AUTO: 3.01 K/UL — LOW (ref 3.8–10.5)

## 2024-06-21 PROCEDURE — 99233 SBSQ HOSP IP/OBS HIGH 50: CPT

## 2024-06-21 RX ADMIN — LOPERAMIDE HCL 2 MILLIGRAM(S): 1 SOLUTION ORAL at 12:27

## 2024-06-21 RX ADMIN — Medication 25 MILLIGRAM(S): at 12:24

## 2024-06-21 RX ADMIN — OXYCODONE HYDROCHLORIDE 5 MILLIGRAM(S): 30 TABLET ORAL at 11:00

## 2024-06-21 RX ADMIN — OXYCODONE HYDROCHLORIDE 5 MILLIGRAM(S): 30 TABLET ORAL at 10:10

## 2024-06-21 RX ADMIN — DIAZEPAM 2.5 MILLIGRAM(S): 5 TABLET ORAL at 22:31

## 2024-06-21 RX ADMIN — DIAZEPAM 2.5 MILLIGRAM(S): 5 TABLET ORAL at 14:16

## 2024-06-21 RX ADMIN — PREGABALIN 150 MILLIGRAM(S): 50 CAPSULE ORAL at 17:27

## 2024-06-21 RX ADMIN — OXYCODONE HYDROCHLORIDE 5 MILLIGRAM(S): 30 TABLET ORAL at 20:00

## 2024-06-21 RX ADMIN — ENOXAPARIN SODIUM 40 MILLIGRAM(S): 100 INJECTION SUBCUTANEOUS at 17:26

## 2024-06-21 RX ADMIN — PREGABALIN 150 MILLIGRAM(S): 50 CAPSULE ORAL at 07:08

## 2024-06-21 RX ADMIN — OXYCODONE HYDROCHLORIDE 5 MILLIGRAM(S): 30 TABLET ORAL at 19:15

## 2024-06-22 VITALS
DIASTOLIC BLOOD PRESSURE: 82 MMHG | TEMPERATURE: 99 F | OXYGEN SATURATION: 98 % | SYSTOLIC BLOOD PRESSURE: 118 MMHG | HEART RATE: 88 BPM | RESPIRATION RATE: 18 BRPM

## 2024-06-22 LAB
ANION GAP SERPL CALC-SCNC: 12 MMOL/L — SIGNIFICANT CHANGE UP (ref 7–14)
BASOPHILS # BLD AUTO: 0.03 K/UL — SIGNIFICANT CHANGE UP (ref 0–0.2)
BASOPHILS NFR BLD AUTO: 0.9 % — SIGNIFICANT CHANGE UP (ref 0–2)
BUN SERPL-MCNC: 9 MG/DL — SIGNIFICANT CHANGE UP (ref 7–23)
CALCIUM SERPL-MCNC: 8.8 MG/DL — SIGNIFICANT CHANGE UP (ref 8.4–10.5)
CHLORIDE SERPL-SCNC: 104 MMOL/L — SIGNIFICANT CHANGE UP (ref 98–107)
CO2 SERPL-SCNC: 23 MMOL/L — SIGNIFICANT CHANGE UP (ref 22–31)
CREAT SERPL-MCNC: 0.4 MG/DL — LOW (ref 0.5–1.3)
EGFR: 124 ML/MIN/1.73M2 — SIGNIFICANT CHANGE UP
EGFR: 124 ML/MIN/1.73M2 — SIGNIFICANT CHANGE UP
EOSINOPHIL # BLD AUTO: 0.01 K/UL — SIGNIFICANT CHANGE UP (ref 0–0.5)
EOSINOPHIL NFR BLD AUTO: 0.3 % — SIGNIFICANT CHANGE UP (ref 0–6)
GANGLIOSIDE GQ1B IGG ANTIBODY RESULT: SIGNIFICANT CHANGE UP TITER
GLUCOSE SERPL-MCNC: 76 MG/DL — SIGNIFICANT CHANGE UP (ref 70–99)
HCT VFR BLD CALC: 36.7 % — SIGNIFICANT CHANGE UP (ref 34.5–45)
HGB BLD-MCNC: 11.1 G/DL — LOW (ref 11.5–15.5)
IANC: 2.11 K/UL — SIGNIFICANT CHANGE UP (ref 1.8–7.4)
IMM GRANULOCYTES NFR BLD AUTO: 0.3 % — SIGNIFICANT CHANGE UP (ref 0–0.9)
LYMPHOCYTES # BLD AUTO: 0.94 K/UL — LOW (ref 1–3.3)
LYMPHOCYTES # BLD AUTO: 27.2 % — SIGNIFICANT CHANGE UP (ref 13–44)
MAGNESIUM SERPL-MCNC: 1.6 MG/DL — SIGNIFICANT CHANGE UP (ref 1.6–2.6)
MCHC RBC-ENTMCNC: 25.6 PG — LOW (ref 27–34)
MCHC RBC-ENTMCNC: 30.2 GM/DL — LOW (ref 32–36)
MCV RBC AUTO: 84.8 FL — SIGNIFICANT CHANGE UP (ref 80–100)
MONOCYTES # BLD AUTO: 0.35 K/UL — SIGNIFICANT CHANGE UP (ref 0–0.9)
MONOCYTES NFR BLD AUTO: 10.1 % — SIGNIFICANT CHANGE UP (ref 2–14)
NEUTROPHILS # BLD AUTO: 2.11 K/UL — SIGNIFICANT CHANGE UP (ref 1.8–7.4)
NEUTROPHILS NFR BLD AUTO: 61.2 % — SIGNIFICANT CHANGE UP (ref 43–77)
NRBC # BLD AUTO: 0 K/UL — SIGNIFICANT CHANGE UP (ref 0–0)
NRBC # BLD: 0 /100 WBCS — SIGNIFICANT CHANGE UP (ref 0–0)
NRBC # FLD: 0 K/UL — SIGNIFICANT CHANGE UP (ref 0–0)
NRBC BLD-RTO: 0 /100 WBCS — SIGNIFICANT CHANGE UP (ref 0–0)
PHOSPHATE SERPL-MCNC: 4.2 MG/DL — SIGNIFICANT CHANGE UP (ref 2.5–4.5)
PLATELET # BLD AUTO: 201 K/UL — SIGNIFICANT CHANGE UP (ref 150–400)
POTASSIUM SERPL-MCNC: 3.7 MMOL/L — SIGNIFICANT CHANGE UP (ref 3.5–5.3)
POTASSIUM SERPL-SCNC: 3.7 MMOL/L — SIGNIFICANT CHANGE UP (ref 3.5–5.3)
RBC # BLD: 4.33 M/UL — SIGNIFICANT CHANGE UP (ref 3.8–5.2)
RBC # FLD: 14.5 % — SIGNIFICANT CHANGE UP (ref 10.3–14.5)
SODIUM SERPL-SCNC: 139 MMOL/L — SIGNIFICANT CHANGE UP (ref 135–145)
WBC # BLD: 3.45 K/UL — LOW (ref 3.8–10.5)
WBC # FLD AUTO: 3.45 K/UL — LOW (ref 3.8–10.5)

## 2024-06-22 PROCEDURE — 99239 HOSP IP/OBS DSCHRG MGMT >30: CPT

## 2024-06-22 RX ORDER — MELATONIN 5 MG
1 TABLET ORAL
Qty: 0 | Refills: 0 | DISCHARGE
Start: 2024-06-22

## 2024-06-22 RX ORDER — DIAZEPAM 5 MG/1
1 TABLET ORAL
Refills: 0 | DISCHARGE

## 2024-06-22 RX ORDER — LACTOBACILLUS ACIDOPHILUS/PECT 75 MM-100
1 CAPSULE ORAL
Qty: 0 | Refills: 0 | DISCHARGE
Start: 2024-06-22

## 2024-06-22 RX ORDER — ACETAMINOPHEN 500 MG/5ML
2 LIQUID (ML) ORAL
Qty: 0 | Refills: 0 | DISCHARGE
Start: 2024-06-22

## 2024-06-22 RX ORDER — PYRIDOXINE HCL (VITAMIN B6) 25 MG
1 TABLET ORAL
Qty: 30 | Refills: 0
Start: 2024-06-22 | End: 2024-07-21

## 2024-06-22 RX ORDER — DIAZEPAM 5 MG/1
0.5 TABLET ORAL
Qty: 5 | Refills: 0
Start: 2024-06-22 | End: 2024-06-26

## 2024-06-22 RX ORDER — NALOXONE HYDROCHLORIDE 0.4 MG/ML
1 INJECTION, SOLUTION INTRAMUSCULAR; INTRAVENOUS; SUBCUTANEOUS
Qty: 1 | Refills: 0
Start: 2024-06-22

## 2024-06-22 RX ORDER — OXYCODONE HYDROCHLORIDE 30 MG/1
1 TABLET ORAL
Qty: 30 | Refills: 0
Start: 2024-06-22 | End: 2024-06-26

## 2024-06-22 RX ORDER — PREGABALIN 50 MG/1
1 CAPSULE ORAL
Qty: 60 | Refills: 0
Start: 2024-06-22 | End: 2024-07-21

## 2024-06-22 RX ADMIN — PREGABALIN 150 MILLIGRAM(S): 50 CAPSULE ORAL at 06:28

## 2024-06-22 RX ADMIN — Medication 1 TABLET(S): at 09:00

## 2024-06-22 RX ADMIN — Medication 1 TABLET(S): at 11:51

## 2024-06-22 RX ADMIN — Medication 25 MILLIGRAM(S): at 11:52

## 2024-06-22 RX ADMIN — SERTRALINE 100 MILLIGRAM(S): 100 TABLET, FILM COATED ORAL at 11:51

## 2024-06-23 LAB
CALPROTECTIN STL-MCNT: 44 UG/G — SIGNIFICANT CHANGE UP (ref 0–120)
OLIGOCLONAL BANDS CSF ELPH-IMP: SIGNIFICANT CHANGE UP
VIT B1 SERPL-MCNC: 64 NMOL/L — LOW (ref 66.5–200)

## 2024-07-13 LAB
CULTURE RESULTS: SIGNIFICANT CHANGE UP
SPECIMEN SOURCE: SIGNIFICANT CHANGE UP

## 2024-07-31 LAB
CULTURE RESULTS: SIGNIFICANT CHANGE UP
SPECIMEN SOURCE: SIGNIFICANT CHANGE UP

## 2024-10-14 PROBLEM — G62.9 POLYNEUROPATHY, UNSPECIFIED: Chronic | Status: ACTIVE | Noted: 2024-06-12

## 2024-10-23 ENCOUNTER — INPATIENT (INPATIENT)
Facility: HOSPITAL | Age: 44
LOS: 1 days | Discharge: HOME | DRG: 392 | End: 2024-10-25
Attending: INTERNAL MEDICINE | Admitting: INTERNAL MEDICINE
Payer: MEDICAID

## 2024-10-23 VITALS
TEMPERATURE: 98 F | WEIGHT: 205.03 LBS | HEART RATE: 111 BPM | HEIGHT: 65 IN | DIASTOLIC BLOOD PRESSURE: 79 MMHG | OXYGEN SATURATION: 92 % | SYSTOLIC BLOOD PRESSURE: 108 MMHG | RESPIRATION RATE: 20 BRPM

## 2024-10-23 DIAGNOSIS — K52.89 OTHER SPECIFIED NONINFECTIVE GASTROENTERITIS AND COLITIS: ICD-10-CM

## 2024-10-23 DIAGNOSIS — Z98.890 OTHER SPECIFIED POSTPROCEDURAL STATES: Chronic | ICD-10-CM

## 2024-10-23 DIAGNOSIS — Z92.89 PERSONAL HISTORY OF OTHER MEDICAL TREATMENT: Chronic | ICD-10-CM

## 2024-10-23 LAB
ALBUMIN SERPL ELPH-MCNC: 3.4 G/DL — SIGNIFICANT CHANGE UP (ref 3.3–5)
ALP SERPL-CCNC: 136 U/L — HIGH (ref 30–120)
ALT FLD-CCNC: 19 U/L — SIGNIFICANT CHANGE UP (ref 10–60)
ANION GAP SERPL CALC-SCNC: 13 MMOL/L — SIGNIFICANT CHANGE UP (ref 5–17)
AST SERPL-CCNC: 43 U/L — HIGH (ref 10–40)
BASOPHILS # BLD AUTO: 0.03 K/UL — SIGNIFICANT CHANGE UP (ref 0–0.2)
BASOPHILS NFR BLD AUTO: 0.3 % — SIGNIFICANT CHANGE UP (ref 0–2)
BILIRUB SERPL-MCNC: 1.7 MG/DL — HIGH (ref 0.2–1.2)
BUN SERPL-MCNC: 9 MG/DL — SIGNIFICANT CHANGE UP (ref 7–23)
CALCIUM SERPL-MCNC: 8.5 MG/DL — SIGNIFICANT CHANGE UP (ref 8.4–10.5)
CHLORIDE SERPL-SCNC: 100 MMOL/L — SIGNIFICANT CHANGE UP (ref 96–108)
CO2 SERPL-SCNC: 27 MMOL/L — SIGNIFICANT CHANGE UP (ref 22–31)
CREAT SERPL-MCNC: 0.5 MG/DL — SIGNIFICANT CHANGE UP (ref 0.5–1.3)
EGFR: 118 ML/MIN/1.73M2 — SIGNIFICANT CHANGE UP
EOSINOPHIL # BLD AUTO: 0 K/UL — SIGNIFICANT CHANGE UP (ref 0–0.5)
EOSINOPHIL NFR BLD AUTO: 0 % — SIGNIFICANT CHANGE UP (ref 0–6)
GLUCOSE SERPL-MCNC: 101 MG/DL — HIGH (ref 70–99)
HCT VFR BLD CALC: 35.5 % — SIGNIFICANT CHANGE UP (ref 34.5–45)
HGB BLD-MCNC: 11.4 G/DL — LOW (ref 11.5–15.5)
IMM GRANULOCYTES NFR BLD AUTO: 0.3 % — SIGNIFICANT CHANGE UP (ref 0–0.9)
LACTATE SERPL-SCNC: 3.1 MMOL/L — HIGH (ref 0.7–2)
LIDOCAIN IGE QN: 23 U/L — SIGNIFICANT CHANGE UP (ref 16–77)
LYMPHOCYTES # BLD AUTO: 0.38 K/UL — LOW (ref 1–3.3)
LYMPHOCYTES # BLD AUTO: 3.9 % — LOW (ref 13–44)
MAGNESIUM SERPL-MCNC: 1.3 MG/DL — LOW (ref 1.6–2.6)
MCHC RBC-ENTMCNC: 30.2 PG — SIGNIFICANT CHANGE UP (ref 27–34)
MCHC RBC-ENTMCNC: 32.1 G/DL — SIGNIFICANT CHANGE UP (ref 32–36)
MCV RBC AUTO: 93.9 FL — SIGNIFICANT CHANGE UP (ref 80–100)
MONOCYTES # BLD AUTO: 0.66 K/UL — SIGNIFICANT CHANGE UP (ref 0–0.9)
MONOCYTES NFR BLD AUTO: 6.8 % — SIGNIFICANT CHANGE UP (ref 2–14)
NEUTROPHILS # BLD AUTO: 8.67 K/UL — HIGH (ref 1.8–7.4)
NEUTROPHILS NFR BLD AUTO: 88.7 % — HIGH (ref 43–77)
NRBC # BLD: 0 /100 WBCS — SIGNIFICANT CHANGE UP (ref 0–0)
PHOSPHATE SERPL-MCNC: 4.7 MG/DL — HIGH (ref 2.5–4.5)
PLATELET # BLD AUTO: 284 K/UL — SIGNIFICANT CHANGE UP (ref 150–400)
POTASSIUM SERPL-MCNC: 3.5 MMOL/L — SIGNIFICANT CHANGE UP (ref 3.5–5.3)
POTASSIUM SERPL-SCNC: 3.5 MMOL/L — SIGNIFICANT CHANGE UP (ref 3.5–5.3)
PROT SERPL-MCNC: 6.9 G/DL — SIGNIFICANT CHANGE UP (ref 6–8.3)
RBC # BLD: 3.78 M/UL — LOW (ref 3.8–5.2)
RBC # FLD: 16.9 % — HIGH (ref 10.3–14.5)
SODIUM SERPL-SCNC: 140 MMOL/L — SIGNIFICANT CHANGE UP (ref 135–145)
WBC # BLD: 9.77 K/UL — SIGNIFICANT CHANGE UP (ref 3.8–10.5)
WBC # FLD AUTO: 9.77 K/UL — SIGNIFICANT CHANGE UP (ref 3.8–10.5)

## 2024-10-23 PROCEDURE — 99285 EMERGENCY DEPT VISIT HI MDM: CPT

## 2024-10-23 PROCEDURE — 74177 CT ABD & PELVIS W/CONTRAST: CPT | Mod: 26,MC

## 2024-10-23 RX ORDER — SODIUM CHLORIDE 9 MG/ML
1000 INJECTION, SOLUTION INTRAMUSCULAR; INTRAVENOUS; SUBCUTANEOUS ONCE
Refills: 0 | Status: COMPLETED | OUTPATIENT
Start: 2024-10-23 | End: 2024-10-23

## 2024-10-23 RX ORDER — DIAZEPAM 10 MG/1
5 FILM BUCCAL ONCE
Refills: 0 | Status: DISCONTINUED | OUTPATIENT
Start: 2024-10-23 | End: 2024-10-23

## 2024-10-23 RX ORDER — HYDROMORPHONE HCL/0.9% NACL/PF 6 MG/30 ML
1 PATIENT CONTROLLED ANALGESIA SYRINGE INTRAVENOUS ONCE
Refills: 0 | Status: DISCONTINUED | OUTPATIENT
Start: 2024-10-23 | End: 2024-10-23

## 2024-10-23 RX ORDER — MAGNESIUM SULFATE IN 0.9% NACL 2 G/50 ML
2 INTRAVENOUS SOLUTION, PIGGYBACK (ML) INTRAVENOUS
Refills: 0 | Status: COMPLETED | OUTPATIENT
Start: 2024-10-23 | End: 2024-10-24

## 2024-10-23 RX ORDER — MAGNESIUM SULFATE IN 0.9% NACL 2 G/50 ML
4 INTRAVENOUS SOLUTION, PIGGYBACK (ML) INTRAVENOUS ONCE
Refills: 0 | Status: DISCONTINUED | OUTPATIENT
Start: 2024-10-23 | End: 2024-10-23

## 2024-10-23 RX ADMIN — DIAZEPAM 5 MILLIGRAM(S): 10 FILM BUCCAL at 22:03

## 2024-10-23 RX ADMIN — Medication 25 GRAM(S): at 23:10

## 2024-10-23 RX ADMIN — Medication 1 MILLIGRAM(S): at 21:35

## 2024-10-23 RX ADMIN — SODIUM CHLORIDE 1000 MILLILITER(S): 9 INJECTION, SOLUTION INTRAMUSCULAR; INTRAVENOUS; SUBCUTANEOUS at 23:10

## 2024-10-23 RX ADMIN — Medication 1 MILLIGRAM(S): at 22:35

## 2024-10-23 RX ADMIN — SODIUM CHLORIDE 1000 MILLILITER(S): 9 INJECTION, SOLUTION INTRAMUSCULAR; INTRAVENOUS; SUBCUTANEOUS at 22:35

## 2024-10-23 RX ADMIN — SODIUM CHLORIDE 1000 MILLILITER(S): 9 INJECTION, SOLUTION INTRAMUSCULAR; INTRAVENOUS; SUBCUTANEOUS at 21:35

## 2024-10-23 NOTE — ED ADULT NURSE NOTE - CHPI ED NUR DURATION
BP well controlled. confirmed home meds with pharmacy and daughter  - d/c'ed amlodipine 5mg daily - was not taking at home  - resume home lisinopril 5mg daily (first dose 8/12) which is her home med  - daughter educated not to resume home ASA 81mg daily until cleared from NSGY standpoint
day(s)

## 2024-10-23 NOTE — ED ADULT NURSE NOTE - ABDOMEN
Gestational Diabetes Follow-up    Subjective/Objective:    Paulo Lema sent in blood glucose log for review. Last date of communication was: 9/27/17.    Gestational diabetes is being managed with Humulin/Novolin NPH Insulin 2 units at bedtime    Estimated Date of Delivery: Nov 18, 2017    BG/Food Log:     Hello,     Attached are my numbers since the last time I reported, which was on September 27. The highlighted numbers are blood sugar levels that are too high.     My bedtime snack on 9/28 was one piece of whole wheat toast with peanut butter and a Chobani strawberry yogurt around 9:30 pm with 2 units of insulin. 9/29 fasting number was 96.    My bedtime snack on 9/29 was 3 peanut butter cracker sandwiches with Chobani yogurt around 10:30 and 2 units of insulin. 9/30 fasting number was 101.    My bedtime snack 10/1 was around 8:00pm of 3 peanut butter cracker sandwiches with Chobani peach yogurt and 2 units of insulin. I went to bed much earlier than usual and had a fasting number of 101 when I woke up today.    Thank you,   Paulo      Assessment:  Fasting readings trending high.  Evening snack: yogurt (17-18g carb), bread 15g carb.  Ketones:not checking.   Fasting blood glucoses: 62% in target.  After breakfast: 88% in target.  After lunch: 85% in target.  After dinner: 100% in target.    Plan/Response:  Recommend increase to insulin - NPH 0-0-0-2 -->0-0-0-3.  Follow up in 3 days.    Email response to patient:  Dave Bates,    Thank you for sending in your readings and snack information.  It looks like your bedtime snacks are just fine, just likely stronger growth hormones during the early morning.  Please increase your insulin to 3 units at bedtime.  Email in your readings again after your fasting and/or after breakfast reading on 10/5/17.      Thanks!      Risa Beckford MS, RD, LAURA, CDE  [facility]  Diabetes & Nutrition Care  [email or web address]  Ph: [phone]    Risa Beckford MS, BRITTNY, LAURA,  CDE    Any diabetes medication dose changes were made via the CDE Protocol and Collaborative Practice Agreement with the patient's referring provider. A copy of this encounter was shared with the provider.       soft/nondistended/nontender

## 2024-10-23 NOTE — ED PROVIDER NOTE - NSICDXPASTSURGICALHX_GEN_ALL_CORE_FT
PAST SURGICAL HISTORY:  History of blood transfusion     S/P  Section ,,    Status post embolization of uterine artery

## 2024-10-23 NOTE — ED ADULT NURSE NOTE - NSFALLHARMRISKINTERV_ED_ALL_ED

## 2024-10-23 NOTE — ED PROVIDER NOTE - CLINICAL SUMMARY MEDICAL DECISION MAKING FREE TEXT BOX
43y F with complex medical history, bedbound since earlier this year due to severe peripheral neuropathy, now with worsening diarrhea, abd distention/firmness/ttp - iv, labs, elyte check and replete, will likely admit for further evaluation/treatment of GI issues and also for social work consult to further evaluate patient's access to care and best disposition once she is able to be discharged

## 2024-10-23 NOTE — ED ADULT TRIAGE NOTE - CHIEF COMPLAINT QUOTE
PT BIB EMS from home c/o lower abdominal pain, diarrhea and fecal incontinence; pt feels like she is having anal spasms and has no control; pt recently here and was treated for UTI with no relief

## 2024-10-23 NOTE — ED PROVIDER NOTE - OBJECTIVE STATEMENT
43y F c/o abd pain and diarrhea, pt is bed bound due to severe peripheral neuropathy 43y F c/o abd pain and diarrhea, pt is bed bound due to severe peripheral neuropathy (able to move her extremities, but due to loss of sensation unable to stand/walk), pt has been here previously for similar symptoms with low K and Mag previously, now 2-3 days increasing abd pain, feels bloated, states abdomen feels firm and distended now, reports liquid stools leaking out frequently, rectum hurts due to the irritation, family is helping care for her, but it's a lot to keep up with, no fever/chills, no vomiting    pt has been working with  as outpatient to try to help with medical follow ups and possible in house care, but pt reports no progress has been made and she hasn't been able to see a doctor, isn't sure what's happening with it

## 2024-10-24 LAB
BASE EXCESS BLDV CALC-SCNC: 0.4 MMOL/L — SIGNIFICANT CHANGE UP (ref -2–3)
HCO3 BLDV-SCNC: 25 MMOL/L — SIGNIFICANT CHANGE UP (ref 22–29)
LACTATE SERPL-SCNC: 1.9 MMOL/L — SIGNIFICANT CHANGE UP (ref 0.7–2)
PCO2 BLDV: 42 MMHG — SIGNIFICANT CHANGE UP (ref 39–42)
PH BLDV: 7.39 — SIGNIFICANT CHANGE UP (ref 7.32–7.43)
PO2 BLDV: 65 MMHG — HIGH (ref 25–45)
SAO2 % BLDV: 93.2 % — HIGH (ref 67–88)

## 2024-10-24 PROCEDURE — 93010 ELECTROCARDIOGRAM REPORT: CPT

## 2024-10-24 RX ORDER — SODIUM PHOSPHATE, DIBASIC AND SODIUM PHOSPHATE, MONOBASIC, UNSPECIFIED FORM 7; 19 G/118ML; G/118ML
1 ENEMA RECTAL DAILY
Refills: 0 | Status: DISCONTINUED | OUTPATIENT
Start: 2024-10-24 | End: 2024-10-25

## 2024-10-24 RX ORDER — INFLUENZ VIR VAC TV P-SURF2003 15MCG/.5ML
0.5 SYRINGE (ML) INTRAMUSCULAR ONCE
Refills: 0 | Status: DISCONTINUED | OUTPATIENT
Start: 2024-10-24 | End: 2024-10-25

## 2024-10-24 RX ORDER — ACETAMINOPHEN 500 MG
1000 TABLET ORAL ONCE
Refills: 0 | Status: COMPLETED | OUTPATIENT
Start: 2024-10-24 | End: 2024-10-24

## 2024-10-24 RX ORDER — ENOXAPARIN SODIUM 40MG/0.4ML
40 SYRINGE (ML) SUBCUTANEOUS EVERY 24 HOURS
Refills: 0 | Status: DISCONTINUED | OUTPATIENT
Start: 2024-10-24 | End: 2024-10-25

## 2024-10-24 RX ORDER — OXYCODONE HYDROCHLORIDE 30 MG/1
5 TABLET ORAL EVERY 4 HOURS
Refills: 0 | Status: DISCONTINUED | OUTPATIENT
Start: 2024-10-24 | End: 2024-10-25

## 2024-10-24 RX ORDER — SERTRALINE HYDROCHLORIDE 50 MG/1
100 TABLET, FILM COATED ORAL DAILY
Refills: 0 | Status: DISCONTINUED | OUTPATIENT
Start: 2024-10-24 | End: 2024-10-25

## 2024-10-24 RX ORDER — POLYETHYLENE GLYCOL 3350 17 G/17G
17 POWDER, FOR SOLUTION ORAL
Refills: 0 | Status: DISCONTINUED | OUTPATIENT
Start: 2024-10-24 | End: 2024-10-25

## 2024-10-24 RX ORDER — DIAZEPAM 10 MG/1
2.5 FILM BUCCAL
Refills: 0 | Status: DISCONTINUED | OUTPATIENT
Start: 2024-10-24 | End: 2024-10-25

## 2024-10-24 RX ORDER — ALBUTEROL 90 MCG
2 AEROSOL (GRAM) INHALATION EVERY 6 HOURS
Refills: 0 | Status: DISCONTINUED | OUTPATIENT
Start: 2024-10-24 | End: 2024-10-25

## 2024-10-24 RX ORDER — SENNA 187 MG
2 TABLET ORAL AT BEDTIME
Refills: 0 | Status: DISCONTINUED | OUTPATIENT
Start: 2024-10-24 | End: 2024-10-25

## 2024-10-24 RX ORDER — HYDROMORPHONE HCL/0.9% NACL/PF 6 MG/30 ML
0.5 PATIENT CONTROLLED ANALGESIA SYRINGE INTRAVENOUS ONCE
Refills: 0 | Status: DISCONTINUED | OUTPATIENT
Start: 2024-10-24 | End: 2024-10-24

## 2024-10-24 RX ADMIN — Medication 1000 MILLIGRAM(S): at 13:12

## 2024-10-24 RX ADMIN — Medication 75 MILLILITER(S): at 01:56

## 2024-10-24 RX ADMIN — Medication 40 MILLIGRAM(S): at 02:19

## 2024-10-24 RX ADMIN — Medication 75 MILLILITER(S): at 18:12

## 2024-10-24 RX ADMIN — OXYCODONE HYDROCHLORIDE 5 MILLIGRAM(S): 30 TABLET ORAL at 05:55

## 2024-10-24 RX ADMIN — OXYCODONE HYDROCHLORIDE 5 MILLIGRAM(S): 30 TABLET ORAL at 11:05

## 2024-10-24 RX ADMIN — OXYCODONE HYDROCHLORIDE 5 MILLIGRAM(S): 30 TABLET ORAL at 12:05

## 2024-10-24 RX ADMIN — Medication 400 MILLIGRAM(S): at 12:12

## 2024-10-24 RX ADMIN — Medication 400 MILLIGRAM(S): at 20:24

## 2024-10-24 RX ADMIN — Medication 0.5 MILLIGRAM(S): at 00:38

## 2024-10-24 RX ADMIN — Medication 1000 MILLIGRAM(S): at 20:54

## 2024-10-24 RX ADMIN — OXYCODONE HYDROCHLORIDE 5 MILLIGRAM(S): 30 TABLET ORAL at 16:21

## 2024-10-24 RX ADMIN — SODIUM CHLORIDE 1000 MILLILITER(S): 9 INJECTION, SOLUTION INTRAMUSCULAR; INTRAVENOUS; SUBCUTANEOUS at 00:36

## 2024-10-24 RX ADMIN — OXYCODONE HYDROCHLORIDE 5 MILLIGRAM(S): 30 TABLET ORAL at 22:09

## 2024-10-24 RX ADMIN — DIAZEPAM 2.5 MILLIGRAM(S): 10 FILM BUCCAL at 22:09

## 2024-10-24 RX ADMIN — SERTRALINE HYDROCHLORIDE 100 MILLIGRAM(S): 50 TABLET, FILM COATED ORAL at 11:05

## 2024-10-24 RX ADMIN — Medication 0.5 MILLIGRAM(S): at 00:44

## 2024-10-24 RX ADMIN — POLYETHYLENE GLYCOL 3350 17 GRAM(S): 17 POWDER, FOR SOLUTION ORAL at 02:22

## 2024-10-24 RX ADMIN — POLYETHYLENE GLYCOL 3350 17 GRAM(S): 17 POWDER, FOR SOLUTION ORAL at 18:00

## 2024-10-24 RX ADMIN — Medication 10 MILLIGRAM(S): at 02:23

## 2024-10-24 RX ADMIN — OXYCODONE HYDROCHLORIDE 5 MILLIGRAM(S): 30 TABLET ORAL at 22:39

## 2024-10-24 RX ADMIN — Medication 75 MILLILITER(S): at 05:56

## 2024-10-24 RX ADMIN — Medication 10 MILLIGRAM(S): at 18:00

## 2024-10-24 RX ADMIN — DIAZEPAM 2.5 MILLIGRAM(S): 10 FILM BUCCAL at 05:56

## 2024-10-24 RX ADMIN — Medication 25 GRAM(S): at 00:22

## 2024-10-24 RX ADMIN — OXYCODONE HYDROCHLORIDE 5 MILLIGRAM(S): 30 TABLET ORAL at 06:11

## 2024-10-24 NOTE — CARE COORDINATION ASSESSMENT. - OTHER PERTINENT DISCHARGE PLANNING INFORMATION:
43y F c/o abd pain and diarrhea, pt is bed bound due to severe peripheral neuropathy (able to move her extremities, but due to loss of sensation unable to stand/walk), pt has been here previously for similar symptoms with low K and Mag previously, now 2-3 days increasing abd pain, feels bloated, states abdomen feels firm and distended now, reports liquid stools leaking out frequently, rectum hurts due to the irritation, family is helping care for her, but it's a lot to keep up with, no fever/chills, no vomiting. Met patient at bedside.  Explained role of CM, verbalized understanding. Pt was made aware a CM will remain available through hospitalization.  Contact information given in discharge/ transitions resource folder.

## 2024-10-24 NOTE — CARE COORDINATION ASSESSMENT. - NSCAREPROVIDERS_GEN_ALL_CORE_FT
CARE PROVIDERS:  Accepting Physician: Erika Mcgraw  Administration: Belkis Sykes  Administration: Ernst Melton  Administration: Massimo Taylor  Admitting: Erika Mcgraw  Attending: Erika Mcgraw  Consultant: Ernst Melton  ED Attending: Linda Virk  ED Nurse: Joslyn King  Emergency Medicine: Rodolfo Cadet  Nurse: Christine Mccord  Nurse: Esperanza Diallo  Nurse: Joslyn King  Outpatient Provider: Magdaleno Jeffers  Outpatient Provider: Kacie David  PCA/Nursing Assistant: Fauzia Reinoso  Registered Dietitian: Bety Loaiza

## 2024-10-24 NOTE — PATIENT CHOICE NOTE. - NSPTCHOICESTATE_GEN_ALL_CORE

## 2024-10-24 NOTE — H&P ADULT - HISTORY OF PRESENT ILLNESS
43y F c/o abd pain and diarrhea, pt is bed bound due to severe peripheral neuropathy (able to move her extremities, but due to loss of sensation unable to stand/walk), pt has been here previously for similar symptoms with low K and Mag previously, now 2-3 days increasing abd pain, feels bloated, states abdomen feels firm and distended now, reports liquid stools leaking out frequently, rectum hurts due to the irritation, family is helping care for her, but it's a lot to keep up with, no fever/chills, no vomiting

## 2024-10-24 NOTE — CONSULT NOTE ADULT - ASSESSMENT
43y F c/o abd pain and diarrhea, pt is bed bound due to severe peripheral neuropathy (able to move her extremities, but due to loss of sensation unable to stand/walk),    seasonal asthma  smoker  nicotine dep  colitis  weakness  ataxic gait  neuropathy  meds - hilar calcification     ct abd reviewed  GI eval  hydration  pain relief  serial ABD exam  monitor VS and HD and Sat  I yahaira  anxiolysis and emotional support  proventil PRN  NRT  smoking cess ed and counseling  PFT as outpatient  replete lytes  labs and imaging reviewed with the patient

## 2024-10-24 NOTE — PATIENT PROFILE ADULT - FALL HARM RISK - HARM RISK INTERVENTIONS

## 2024-10-24 NOTE — CARE COORDINATION ASSESSMENT. - NSDCPLANSERVICES_GEN_ALL_CORE
DX:43y F c/o abd pain and diarrhea, pt is bed bound due to severe peripheral neuropathy (able to move her extremities, but due to loss of sensation unable to stand/walk), pt has been here previously for similar symptoms with low K and Mag previously, now 2-3 days increasing abd pain, feels bloated, states abdomen feels firm and distended now, reports liquid stools leaking out frequently, rectum hurts due to the irritation, family is helping care for her, but it's a lot to keep up with, no fever/chills, no vomiting.     Pending PT Consult.      CM met with patient at bedside. Patient alert times 3. Patient states live with mother and kids. Patient stated her Son does CDPAP through [employer] phone number [phone].  CM called [employer] [facility] and spoken with Twan and stated he was sending a message to the Engagement representative department and FAX D/C to [phone]. Patient states owns walker, wheelchair, and commode at home.     CM verified:     PCP: Patient PCP on file is not the one she uses.   Pharmacy: [facility] in [place] phone number [phone].  Insurance: [employer]   : NO.    CM explained about homecare services with a verbal understanding. Patient choice [facility] agency [phone] will reach out to you within 24-72 hours of your discharge to schedule home care visit/eval appointment with you. Please call agency for any queries regarding home care services.  CM will continue to follow case. CM will set up referral and continue to follow case./Home Care

## 2024-10-24 NOTE — CONSULT NOTE ADULT - ASSESSMENT
Stercoral colitis    Recommendations:  - s/p SMOG enema x1 with mild relief. Can repeat tmw if not having BM  - Miralax BID  - Dulcolax 10mg PO QD  - Fleet enema daily  - Clear liquid diet  - Serial abdominal exams  - Replete lytes  - Will follow with you    Elvis Alberto M.D.  [facility]  [phone]   Stercoral colitis    Recommendations:  - s/p SMOG enema x1 with mild relief. Can repeat tmw if not having BM  - Miralax BID  - Dulcolax 10mg PO QD  - dc opiates if possible  - Fleet enema daily  - Clear liquid diet  - Serial abdominal exams  - Replete lytes  - Will follow with you    Elvis Alberto M.D.  [facility]  [phone]

## 2024-10-24 NOTE — CONSULT NOTE ADULT - SUBJECTIVE AND OBJECTIVE BOX
[facility]    Magdaleno Aury Matos NP    [address] [place] [address] [phone]      Chief Complaint:  Patient is a 43y old  Female who presents with a chief complaint of     HPI:, 43y (1980) F w/ PMHx significant for anemia related to menometrorrhagia s/p ablation for which she receives transfusions frequently, afib 2/2 hyperthyroidism (resolved, not on AC), depression, anxiety, and peripheral neuropathy ), pt has been here previously for similar symptoms with low K and Mag previously, now 2-3 days increasing abd pain, feels bloated, states abdomen feels firm and distended now, reports liquid stools leaking out frequently, rectum hurts due to the irritation, family is helping care for her, but it's a lot to keep up with, no fever/chills, no vomiting CTAP with stercoral colitis      Allergies:  Motrin (Hives)      Medications:  albuterol    90 MICROgram(s) HFA Inhaler 2 Puff(s) Inhalation every 6 hours PRN  diazepam    Tablet 2.5 milliGRAM(s) Oral two times a day PRN  enoxaparin Injectable 40 milliGRAM(s) SubCutaneous every 24 hours  lactated ringers. 1000 milliLiter(s) IV Continuous <Continuous>  oxyCODONE    IR 5 milliGRAM(s) Oral every 4 hours PRN  polyethylene glycol 3350 17 Gram(s) Oral two times a day  senna 2 Tablet(s) Oral at bedtime  sertraline 100 milliGRAM(s) Oral daily      PMHX/PSHX:  Atrial fibrillation    History of Hyperthyroidism    Asthma    History of anemia    Depression, unspecified depression type    H/O blood transfusion reaction    Smoker    Peripheral neuropathy    S/P  Section    History of blood transfusion    Status post embolization of uterine artery        Family history:  No pertinent family history in first degree relatives    Family history of diabetes mellitus (Sibling, Grandparent)    Family history of stomach cancer (Grandparent)        Social History:     ROS:     General:  No wt loss, fevers, chills, night sweats, fatigue,   Eyes:  Good vision, no reported pain  ENT:  No sore throat, pain, runny nose, dysphagia  CV:  No pain, palpitations, hypo/hypertension  Resp:  No dyspnea, cough, tachypnea, wheezing  GI:  No pain, No nausea, No vomiting, No diarrhea, No constipation, No weight loss, No fever, No pruritis, No rectal bleeding, No tarry stools, No dysphagia,  :  No pain, bleeding, incontinence, nocturia  Muscle:  No pain, weakness  Neuro:  No weakness, tingling, memory problems  Psych:  No fatigue, insomnia, mood problems, depression  Endocrine:  No polyuria, polydipsia, cold/heat intolerance  Heme:  No petechiae, ecchymosis, easy bruisability  Skin:  No rash, tattoos, scars, edema      PHYSICAL EXAM:   Vital Signs:  Vital Signs Last 24 Hrs  T(C): 36.7 (24 Oct 2024 05:49), Max: 36.8 (23 Oct 2024 21:15)  T(F): 98 (24 Oct 2024 05:49), Max: 98.3 (23 Oct 2024 21:15)  HR: 95 (24 Oct 2024 05:49) (95 - 111)  BP: 108/71 (24 Oct 2024 05:49) (108/71 - 161/74)  BP(mean): --  RR: 18 (24 Oct 2024 05:49) (18 - 20)  SpO2: 96% (24 Oct 2024 05:49) (92% - 97%)    Parameters below as of 23 Oct 2024 21:15  Patient On (Oxygen Delivery Method): room air      Daily Height in cm: 165.1 (24 Oct 2024 11:11)    Daily     GENERAL:  Appears stated age, well-groomed, well-nourished, no distress  HEENT:  NC/AT,  conjunctivae clear and pink, no thyromegaly, nodules, adenopathy, no JVD, sclera -anicteric  CHEST:  Full & symmetric excursion, no increased effort, breath sounds clear  HEART:  Regular rhythm, S1, S2, no murmur/rub/S3/S4, no abdominal bruit, no edema  ABDOMEN:  Soft, non-tender, non-distended, normoactive bowel sounds,  no masses ,no hepato-splenomegaly, no signs of chronic liver disease  EXTEREMITIES:  no cyanosis,clubbing or edema  SKIN:  No rash/erythema/ecchymoses/petechiae/wounds/abscess/warm/dry  NEURO:  Alert, oriented, no asterixis, no tremor, no encephalopathy    LABS:                        11.4   9.77  )-----------( 284      ( 23 Oct 2024 21:22 )             35.5     10-23    140  |  100  |  9   ----------------------------<  101[H]  3.5   |  27  |  0.50    Ca    8.5      23 Oct 2024 21:22  Phos  4.7     10-23  Mg     1.3     10-23    TPro  6.9  /  Alb  3.4  /  TBili  1.7[H]  /  DBili  x   /  AST  43[H]  /  ALT  19  /  AlkPhos  136[H]  10-23    LIVER FUNCTIONS - ( 23 Oct 2024 21:22 )  Alb: 3.4 g/dL / Pro: 6.9 g/dL / ALK PHOS: 136 U/L / ALT: 19 U/L / AST: 43 U/L / GGT: x             Urinalysis Basic - ( 23 Oct 2024 21:22 )    Color: x / Appearance: x / SG: x / pH: x  Gluc: 101 mg/dL / Ketone: x  / Bili: x / Urobili: x   Blood: x / Protein: x / Nitrite: x   Leuk Esterase: x / RBC: x / WBC x   Sq Epi: x / Non Sq Epi: x / Bacteria: x      Amylase Serum--      Lipase serum23       Ammonia--      Imaging:    < from: CT Abdomen and Pelvis w/ IV Cont (10.23.24 @ 22:59) >  IMPRESSION:    Findings concerning for stercoral colitis. Mild generalized colonic   distention without obstruction.        --- End of Report ---      < end of copied text >            
Date/Time Patient Seen:  		  Referring MD:   Data Reviewed	       Patient is a 43y old  Female who presents with a chief complaint of     Subjective/HPI    Child Abuse Assessment (patients less than 13 yrs):  Chief Complaint: abdominal pain.    · Chief Complaint: The patient is a 43y Female complaining of  · HPI Objective Statement: 43y F c/o abd pain and diarrhea, pt is bed bound due to severe peripheral neuropathy (able to move her extremities, but due to loss of sensation unable to stand/walk), pt has been here previously for similar symptoms with low K and Mag previously, now 2-3 days increasing abd pain, feels bloated, states abdomen feels firm and distended now, reports liquid stools leaking out frequently, rectum hurts due to the irritation, family is helping care for her, but it's a lot to keep up with, no fever/chills, no vomiting    	pt has been working with  as outpatient to try to help with medical follow ups and possible in house care, but pt reports no progress has been made and she hasn't been able to see a doctor, isn't sure what's happening with it      PAST MEDICAL & SURGICAL HISTORY:  Atrial fibrillation    History of Hyperthyroidism    Asthma    History of anemia    Depression, unspecified depression type    H/O blood transfusion reaction    Smoker    Peripheral neuropathy    S/P  Section  ,,    History of blood transfusion    Status post embolization of uterine artery    HIV:    HIV Test Questions:  · In accordance with [place] State law, we offer every patient who comes to our ED an HIV test. Would you like to be tested today?	Previously Negative (within the last year)    HEPATITIS C TEST QUESTIONS:    Hepatitis C Test Questions:  · In accordance with [place] State Law, we offer every patient a Hepatitis C test. Would you like to be tested today?	Previously negative    PAST MEDICAL/SURGICAL/FAMILY/SOCIAL HISTORY:    Past Medical, Past Surgical, and Family History:  PAST MEDICAL HISTORY:  Asthma     Atrial fibrillation     Depression, unspecified depression type     History of anemia     History of Hyperthyroidism     Peripheral neuropathy     Smoker.     PAST SURGICAL HISTORY:  History of blood transfusion     S/P  Section ,,    Status post embolization of uterine artery.     FAMILY HISTORY:  Sibling  Still living? Unknown  Family history of diabetes mellitus, Age at diagnosis: Age Unknown    Grandparent  Still living? Unknown  Family history of diabetes mellitus, Age at diagnosis: Age Unknown  Family history of stomach cancer, Age at diagnosis: Age Unknown.     Tobacco Usage:  · Tobacco Usage	Former smoker    ALLERGIES AND HOME MEDICATIONS:   Allergies:        Allergies:  	Motrin: Drug, Hives    Home Medications:   * Patient Currently Takes Medications as of 2024 08:32 documented in Structured Notes  · 	cefuroxime 500 mg oral tablet: 1 tab(s) orally 2 times a day  · 	potassium chloride 20 mEq oral powder for reconstitution: 1 each orally once a day  · 	oxyCODONE 5 mg oral tablet: 1 tab(s) orally every 6 hours as needed for  severe pain MDD: #4  · 	Narcan 4 mg/0.1 mL nasal spray: 1 spray(s) intranasally once as needed for respiratory depression/ams  · 	diazePAM 5 mg oral tablet: 0.5 tab(s) orally 2 times a day as needed for  anxiety MDD: 5mg  · 	melatonin 3 mg oral tablet: 1 tab(s) orally once a day (at bedtime) As needed Insomnia  · 	pregabalin 150 mg oral capsule: 1 cap(s) orally 2 times a day MDD: 300  · 	oxyCODONE 5 mg oral tablet: 1 tab(s) orally every 4 hours as needed for Severe Pain (7 - 10) MDD: 30mg  · 	pyridoxine 25 mg oral tablet: 1 tab(s) orally once a day  · 	acetaminophen 325 mg oral tablet: 2 tab(s) orally every 6 hours As needed Temp greater or equal to 38C (100.4F), Mild Pain (1 - 3)  · 	lactobacillus acidophilus oral capsule: 1 cap(s) orally 3 times a day  · 	sertraline 100 mg oral tablet: 1 tab(s) orally once a day    REVIEW OF SYSTEMS:    Review of Systems:  · CONSTITUTIONAL: no fever and no chills.  · GASTROINTESTINAL: - - -  · Gastrointestinal [+]: ABDOMINAL PAIN, DIARRHEA  · ROS STATEMENT: all other ROS negative except as per HPI        Medication list         MEDICATIONS  (STANDING):  acetaminophen   IVPB .. 1000 milliGRAM(s) IV Intermittent once  enoxaparin Injectable 40 milliGRAM(s) SubCutaneous every 24 hours  lactated ringers. 1000 milliLiter(s) (75 mL/Hr) IV Continuous <Continuous>  polyethylene glycol 3350 17 Gram(s) Oral two times a day  senna 2 Tablet(s) Oral at bedtime  sertraline 100 milliGRAM(s) Oral daily    MEDICATIONS  (PRN):  diazepam    Tablet 2.5 milliGRAM(s) Oral two times a day PRN for anxiety  oxyCODONE    IR 5 milliGRAM(s) Oral every 4 hours PRN Severe Pain (7 - 10)         Vitals log        ICU Vital Signs Last 24 Hrs  T(C): 36.7 (24 Oct 2024 05:49), Max: 36.8 (23 Oct 2024 21:15)  T(F): 98 (24 Oct 2024 05:49), Max: 98.3 (23 Oct 2024 21:15)  HR: 95 (24 Oct 2024 05:49) (95 - 111)  BP: 108/71 (24 Oct 2024 05:49) (108/71 - 161/74)  BP(mean): --  ABP: --  ABP(mean): --  RR: 18 (24 Oct 2024 05:49) (18 - 20)  SpO2: 96% (24 Oct 2024 05:49) (92% - 97%)    O2 Parameters below as of 23 Oct 2024 21:15  Patient On (Oxygen Delivery Method): room air                 Input and Output:  I&O's Detail      Lab Data                        11.4   9.77  )-----------( 284      ( 23 Oct 2024 21:22 )             35.5     10-    140  |  100  |  9   ----------------------------<  101[H]  3.5   |  27  |  0.50    Ca    8.5      23 Oct 2024 21:22  Phos  4.7     10-  Mg     1.3     10-    TPro  6.9  /  Alb  3.4  /  TBili  1.7[H]  /  DBili  x   /  AST  43[H]  /  ALT  19  /  AlkPhos  136[H]  10-23            Review of Systems	  weakness  anxious  pain  ataxic gait  all systems reviewed      Objective     Physical Examination    heart s1s2  lung dc BS  head nc  head at  abd soft  cn grossly int      Pertinent Lab findings & Imaging      Norris:  NO   Adequate UO     I&O's Detail           Discussed with:     Cultures:	        Radiology      ACC: [id number] EXAM:  CT ABDOMEN AND PELVIS IC   ORDERED BY: ARNAV FELDMAN     PROCEDURE DATE:  10/23/2024          INTERPRETATION:  CLINICAL INFORMATION: Abdominal distention and diarrhea.    COMPARISON: CT abdomen and pelvis 10/14/2024..    CONTRAST/COMPLICATIONS:  IV Contrast: Omnipaque 350  90 cc administered   10 cc discarded  Oral Contrast: NONE  Complications: None reported at time of study completion    PROCEDURE:  CT of the Abdomen and Pelvis was performed.  Sagittal and coronal reformats were performed.    FINDINGS:  LOWER CHEST: No consolidation or pleural effusion. Partially imaged   mediastinal and right hilar calcifications, likely sequela of prior   granulomatous disease.    LIVER: Steatosis. Scattered coarse hepatic calcifications.  BILE DUCTS: Normal caliber.  GALLBLADDER: Within normal limits.  SPLEEN: Subcentimeter splenic calcifications.  PANCREAS: Within normal limits.  ADRENALS: Within normal limits.  KIDNEYS/URETERS: Within normal limits.    BLADDER: Within normal limits.  REPRODUCTIVE ORGANS: Uterus and adnexa within normal limits.    BOWEL: No bowel obstruction. Abundant fecal material within mildly   distended rectum with mild wall thickening and presacral edema. Recommend   clinical correlation to exclude stercoral colitis. Mild diverticulosis   coli. Mild generalized colonic distention. Normal appendix.  PERITONEUM/RETROPERITONEUM: Within normal limits.  VESSELS: Atherosclerotic changes.  LYMPH NODES: No lymphadenopathy.  ABDOMINAL WALL: Tiny umbilical hernia containing fat.  BONES: Degenerative changes.    IMPRESSION:    Findings concerning for stercoral colitis. Mild generalized colonic   distention without obstruction.        --- End of Report ---            LIS ASHTON MD; Attending Radiologist  This document has been electronically signed. Oct 23 2024 11:31PM

## 2024-10-24 NOTE — CARE COORDINATION ASSESSMENT. - PRO ARRIVE FROM
DX:43y F c/o abd pain and diarrhea, pt is bed bound due to severe peripheral neuropathy (able to move her extremities, but due to loss of sensation unable to stand/walk), pt has been here previously for similar symptoms with low K and Mag previously, now 2-3 days increasing abd pain, feels bloated, states abdomen feels firm and distended now, reports liquid stools leaking out frequently, rectum hurts due to the irritation, family is helping care for her, but it's a lot to keep up with, no fever/chills, no vomiting.     Pending PT Consult.      CM met with patient at bedside. Patient alert times 3. Patient states live with mother and kids. Patient stated her Son does CDPAP through [facility] phone number [phone].  CM called [facility] and spoken with Twan and stated he was sending a message to the Engagement representative department and FAX D/C to [phone]. Patient states owns walker, wheelchair, and commode at home.     CM verified:     PCP: Patient PCP on file is not the one she uses.   Pharmacy: [facility] in [place] phone number [phone].  Insurance: [employer]   : NO.    CM explained about homecare services with a verbal understanding. Patient choice [facility] care agency [phone] will reach out to you within 24-72 hours of your discharge to schedule home care visit/eval appointment with you. Please call agency for any queries regarding home care services.  CM will continue to follow case. CM will set up referral and continue to follow case./home

## 2024-10-24 NOTE — PATIENT CHOICE NOTE. - NSPTCHOICENOTES_GEN_ALL_CORE
[facility] care agency [phone] will reach out to you within 24-72 hours of your discharge to schedule home care visit/eval appointment with you. Please call agency for any queries regarding home care services.

## 2024-10-24 NOTE — H&P ADULT - ASSESSMENT
43y F c/o abd pain and diarrhea, pt is bed bound due to severe peripheral neuropathy (able to move her extremities, but due to loss of sensation unable to stand/walk), pt has been here previously for similar symptoms with low K and Mag previously, now 2-3 days increasing abd pain, feels bloated, states abdomen feels firm and distended now, reports liquid stools leaking out frequently, rectum hurts due to the irritation, family is helping care for her, but it's a lot to keep up with, no fever/chills, no vomiting        Stercoral Colitis     GI consulted   - s/p SMOG enema   Continue with Miralax - Dulcolax 10mg PO QD  - Fleet enema daily  - Clear liquid diet  - Serial abdominal exams  - Replete lytes    Peripheral Neuropathy   Home meds       Anxiety Continue with Valium

## 2024-10-24 NOTE — CARE COORDINATION ASSESSMENT. - NSPASTMEDSURGHISTORY_GEN_ALL_CORE_FT
PAST MEDICAL & SURGICAL HISTORY:  History of Hyperthyroidism      Atrial fibrillation      S/P  Section  ,,      History of anemia      Asthma      Depression, unspecified depression type      Smoker      History of blood transfusion      Peripheral neuropathy      Status post embolization of uterine artery

## 2024-10-25 VITALS
SYSTOLIC BLOOD PRESSURE: 104 MMHG | RESPIRATION RATE: 18 BRPM | DIASTOLIC BLOOD PRESSURE: 77 MMHG | TEMPERATURE: 98 F | HEART RATE: 91 BPM | OXYGEN SATURATION: 100 %

## 2024-10-25 LAB
ANION GAP SERPL CALC-SCNC: 10 MMOL/L — SIGNIFICANT CHANGE UP (ref 5–17)
ANION GAP SERPL CALC-SCNC: 9 MMOL/L — SIGNIFICANT CHANGE UP (ref 5–17)
BASOPHILS # BLD AUTO: 0.02 K/UL — SIGNIFICANT CHANGE UP (ref 0–0.2)
BASOPHILS NFR BLD AUTO: 0.4 % — SIGNIFICANT CHANGE UP (ref 0–2)
BUN SERPL-MCNC: 3 MG/DL — LOW (ref 7–23)
BUN SERPL-MCNC: 5 MG/DL — LOW (ref 7–23)
CALCIUM SERPL-MCNC: 8.2 MG/DL — LOW (ref 8.4–10.5)
CALCIUM SERPL-MCNC: 8.3 MG/DL — LOW (ref 8.4–10.5)
CHLORIDE SERPL-SCNC: 101 MMOL/L — SIGNIFICANT CHANGE UP (ref 96–108)
CHLORIDE SERPL-SCNC: 103 MMOL/L — SIGNIFICANT CHANGE UP (ref 96–108)
CO2 SERPL-SCNC: 25 MMOL/L — SIGNIFICANT CHANGE UP (ref 22–31)
CO2 SERPL-SCNC: 26 MMOL/L — SIGNIFICANT CHANGE UP (ref 22–31)
CREAT SERPL-MCNC: 0.34 MG/DL — LOW (ref 0.5–1.3)
CREAT SERPL-MCNC: 0.4 MG/DL — LOW (ref 0.5–1.3)
EGFR: 124 ML/MIN/1.73M2 — SIGNIFICANT CHANGE UP
EGFR: 129 ML/MIN/1.73M2 — SIGNIFICANT CHANGE UP
EOSINOPHIL # BLD AUTO: 0 K/UL — SIGNIFICANT CHANGE UP (ref 0–0.5)
EOSINOPHIL NFR BLD AUTO: 0 % — SIGNIFICANT CHANGE UP (ref 0–6)
GLUCOSE BLDC GLUCOMTR-MCNC: 81 MG/DL — SIGNIFICANT CHANGE UP (ref 70–99)
GLUCOSE BLDC GLUCOMTR-MCNC: 82 MG/DL — SIGNIFICANT CHANGE UP (ref 70–99)
GLUCOSE BLDC GLUCOMTR-MCNC: 93 MG/DL — SIGNIFICANT CHANGE UP (ref 70–99)
GLUCOSE SERPL-MCNC: 68 MG/DL — LOW (ref 70–99)
GLUCOSE SERPL-MCNC: 76 MG/DL — SIGNIFICANT CHANGE UP (ref 70–99)
HCT VFR BLD CALC: 27.7 % — LOW (ref 34.5–45)
HGB BLD-MCNC: 8.9 G/DL — LOW (ref 11.5–15.5)
IMM GRANULOCYTES NFR BLD AUTO: 0.4 % — SIGNIFICANT CHANGE UP (ref 0–0.9)
LYMPHOCYTES # BLD AUTO: 0.59 K/UL — LOW (ref 1–3.3)
LYMPHOCYTES # BLD AUTO: 10.4 % — LOW (ref 13–44)
MAGNESIUM SERPL-MCNC: 1.4 MG/DL — LOW (ref 1.6–2.6)
MCHC RBC-ENTMCNC: 30.5 PG — SIGNIFICANT CHANGE UP (ref 27–34)
MCHC RBC-ENTMCNC: 32.1 G/DL — SIGNIFICANT CHANGE UP (ref 32–36)
MCV RBC AUTO: 94.9 FL — SIGNIFICANT CHANGE UP (ref 80–100)
MONOCYTES # BLD AUTO: 0.45 K/UL — SIGNIFICANT CHANGE UP (ref 0–0.9)
MONOCYTES NFR BLD AUTO: 8 % — SIGNIFICANT CHANGE UP (ref 2–14)
NEUTROPHILS # BLD AUTO: 4.58 K/UL — SIGNIFICANT CHANGE UP (ref 1.8–7.4)
NEUTROPHILS NFR BLD AUTO: 80.8 % — HIGH (ref 43–77)
NRBC # BLD: 0 /100 WBCS — SIGNIFICANT CHANGE UP (ref 0–0)
PLATELET # BLD AUTO: 230 K/UL — SIGNIFICANT CHANGE UP (ref 150–400)
POTASSIUM SERPL-MCNC: 3 MMOL/L — LOW (ref 3.5–5.3)
POTASSIUM SERPL-MCNC: 3.7 MMOL/L — SIGNIFICANT CHANGE UP (ref 3.5–5.3)
POTASSIUM SERPL-SCNC: 3 MMOL/L — LOW (ref 3.5–5.3)
POTASSIUM SERPL-SCNC: 3.7 MMOL/L — SIGNIFICANT CHANGE UP (ref 3.5–5.3)
RBC # BLD: 2.92 M/UL — LOW (ref 3.8–5.2)
RBC # FLD: 16.6 % — HIGH (ref 10.3–14.5)
SODIUM SERPL-SCNC: 136 MMOL/L — SIGNIFICANT CHANGE UP (ref 135–145)
SODIUM SERPL-SCNC: 138 MMOL/L — SIGNIFICANT CHANGE UP (ref 135–145)
TSH SERPL-MCNC: 3.24 UIU/ML — SIGNIFICANT CHANGE UP (ref 0.27–4.2)
WBC # BLD: 5.66 K/UL — SIGNIFICANT CHANGE UP (ref 3.8–10.5)
WBC # FLD AUTO: 5.66 K/UL — SIGNIFICANT CHANGE UP (ref 3.8–10.5)

## 2024-10-25 PROCEDURE — 83605 ASSAY OF LACTIC ACID: CPT

## 2024-10-25 PROCEDURE — 97161 PT EVAL LOW COMPLEX 20 MIN: CPT

## 2024-10-25 PROCEDURE — 86900 BLOOD TYPING SEROLOGIC ABO: CPT

## 2024-10-25 PROCEDURE — 85025 COMPLETE CBC W/AUTO DIFF WBC: CPT

## 2024-10-25 PROCEDURE — 96374 THER/PROPH/DIAG INJ IV PUSH: CPT

## 2024-10-25 PROCEDURE — 86850 RBC ANTIBODY SCREEN: CPT

## 2024-10-25 PROCEDURE — 96375 TX/PRO/DX INJ NEW DRUG ADDON: CPT

## 2024-10-25 PROCEDURE — 93005 ELECTROCARDIOGRAM TRACING: CPT

## 2024-10-25 PROCEDURE — 80048 BASIC METABOLIC PNL TOTAL CA: CPT

## 2024-10-25 PROCEDURE — 82803 BLOOD GASES ANY COMBINATION: CPT

## 2024-10-25 PROCEDURE — 83690 ASSAY OF LIPASE: CPT

## 2024-10-25 PROCEDURE — 36415 COLL VENOUS BLD VENIPUNCTURE: CPT

## 2024-10-25 PROCEDURE — 82962 GLUCOSE BLOOD TEST: CPT

## 2024-10-25 PROCEDURE — 84443 ASSAY THYROID STIM HORMONE: CPT

## 2024-10-25 PROCEDURE — 99285 EMERGENCY DEPT VISIT HI MDM: CPT | Mod: 25

## 2024-10-25 PROCEDURE — 83735 ASSAY OF MAGNESIUM: CPT

## 2024-10-25 PROCEDURE — 80053 COMPREHEN METABOLIC PANEL: CPT

## 2024-10-25 PROCEDURE — 74177 CT ABD & PELVIS W/CONTRAST: CPT | Mod: MC

## 2024-10-25 PROCEDURE — 84100 ASSAY OF PHOSPHORUS: CPT

## 2024-10-25 PROCEDURE — 86901 BLOOD TYPING SEROLOGIC RH(D): CPT

## 2024-10-25 PROCEDURE — 82164 ANGIOTENSIN I ENZYME TEST: CPT

## 2024-10-25 RX ORDER — MAGNESIUM OXIDE 400 MG/1
400 TABLET ORAL
Refills: 0 | Status: DISCONTINUED | OUTPATIENT
Start: 2024-10-25 | End: 2024-10-25

## 2024-10-25 RX ORDER — POTASSIUM CHLORIDE 10 MEQ
40 TABLET, EXTENDED RELEASE ORAL ONCE
Refills: 0 | Status: COMPLETED | OUTPATIENT
Start: 2024-10-25 | End: 2024-10-25

## 2024-10-25 RX ORDER — NYSTATIN 100000 U/G
1 POWDER TOPICAL THREE TIMES A DAY
Refills: 0 | Status: DISCONTINUED | OUTPATIENT
Start: 2024-10-25 | End: 2024-10-25

## 2024-10-25 RX ORDER — LACTOBACILLUS ACIDOPHILUS 25MM CELL
1 CAPSULE ORAL
Refills: 0 | Status: DISCONTINUED | OUTPATIENT
Start: 2024-10-25 | End: 2024-10-25

## 2024-10-25 RX ADMIN — Medication 40 MILLIEQUIVALENT(S): at 09:42

## 2024-10-25 RX ADMIN — OXYCODONE HYDROCHLORIDE 5 MILLIGRAM(S): 30 TABLET ORAL at 07:06

## 2024-10-25 RX ADMIN — OXYCODONE HYDROCHLORIDE 5 MILLIGRAM(S): 30 TABLET ORAL at 06:36

## 2024-10-25 RX ADMIN — NYSTATIN 1 APPLICATION(S): 100000 POWDER TOPICAL at 13:03

## 2024-10-25 RX ADMIN — DIAZEPAM 2.5 MILLIGRAM(S): 10 FILM BUCCAL at 06:36

## 2024-10-25 RX ADMIN — Medication 40 MILLIGRAM(S): at 06:36

## 2024-10-25 RX ADMIN — OXYCODONE HYDROCHLORIDE 5 MILLIGRAM(S): 30 TABLET ORAL at 11:52

## 2024-10-25 RX ADMIN — OXYCODONE HYDROCHLORIDE 5 MILLIGRAM(S): 30 TABLET ORAL at 12:40

## 2024-10-25 RX ADMIN — SERTRALINE HYDROCHLORIDE 100 MILLIGRAM(S): 50 TABLET, FILM COATED ORAL at 11:53

## 2024-10-25 NOTE — DISCHARGE NOTE NURSING/CASE MANAGEMENT/SOCIAL WORK - FINANCIAL ASSISTANCE
[facility] provides services at a reduced cost to those who are determined to be eligible through [facility]’s financial assistance program. Information regarding [facility]’s financial assistance program can be found by going to [email or web address] or by calling [phone].

## 2024-10-25 NOTE — PROGRESS NOTE ADULT - ASSESSMENT
Stercoral colitis    Recommendations:  - s/p SMOG enema x1 with mild relief. Can repeat todayif not having BM  - Miralax BID  - Dulcolax 10mg PO QD  - dc opiates if possible  - Fleet enema daily  - Clear liquid diet  - Serial abdominal exams  - Replete lytes  - Will follow with you    Elvis Alberto M.D.  [facility]  [phone]

## 2024-10-25 NOTE — CASE MANAGEMENT PROGRESS NOTE - NSCMPROGRESSNOTE_GEN_ALL_CORE
RN/CM noted pt's case discussed during Interdisciplinary rounds, awaiting ID consult. Pt is from home, has walker, wheelchair, commode and consumer directed personal assistance program (CDPAP) aide services 36.75 H/week thru  [facility] phone number [phone]/FAX [phone] being managed by [facility]term [phone]/ fax [phone]. CM has spoken to Yenny IVORY of [facility] who stated that pt's aide coordinator is Becka PINON DC pending hospital course. CM remains available and continues to follow case.

## 2024-10-25 NOTE — DISCHARGE NOTE NURSING/CASE MANAGEMENT/SOCIAL WORK - NSDCPEFALRISK_GEN_ALL_CORE
For information on Fall & Injury Prevention, visit: [email or web address] OR  [email or web address] OR  https://www.cdc.gov/steadi/patient.html

## 2024-10-25 NOTE — PROGRESS NOTE ADULT - SUBJECTIVE AND OBJECTIVE BOX
Date/Time Patient Seen:  		  Referring MD:   Data Reviewed	       Patient is a 43y old  Female who presents with a chief complaint of Abdominal Pian x 2-3 days (24 Oct 2024 11:11)      Subjective/HPI     PAST MEDICAL & SURGICAL HISTORY:  Atrial fibrillation    History of Hyperthyroidism    Asthma    History of anemia    Depression, unspecified depression type    H/O blood transfusion reaction    Smoker    Peripheral neuropathy    S/P  Section  ,,    History of blood transfusion    Status post embolization of uterine artery          Medication list         MEDICATIONS  (STANDING):  bisacodyl 10 milliGRAM(s) Oral daily  enoxaparin Injectable 40 milliGRAM(s) SubCutaneous every 24 hours  influenza   Vaccine 0.5 milliLiter(s) IntraMuscular once  lactated ringers. 1000 milliLiter(s) (75 mL/Hr) IV Continuous <Continuous>  polyethylene glycol 3350 17 Gram(s) Oral two times a day  saline laxative (FLEET) Rectal Enema 1 Enema Rectal daily  senna 2 Tablet(s) Oral at bedtime  sertraline 100 milliGRAM(s) Oral daily    MEDICATIONS  (PRN):  albuterol    90 MICROgram(s) HFA Inhaler 2 Puff(s) Inhalation every 6 hours PRN Shortness of Breath and/or Wheezing  diazepam    Tablet 2.5 milliGRAM(s) Oral two times a day PRN for anxiety  oxyCODONE    IR 5 milliGRAM(s) Oral every 4 hours PRN Severe Pain (7 - 10)         Vitals log        ICU Vital Signs Last 24 Hrs  T(C): 36.4 (25 Oct 2024 05:05), Max: 36.7 (24 Oct 2024 05:49)  T(F): 97.6 (25 Oct 2024 05:05), Max: 98 (24 Oct 2024 05:49)  HR: 87 (25 Oct 2024 05:05) (87 - 95)  BP: 97/68 (25 Oct 2024 05:05) (97/68 - 126/86)  BP(mean): 83 (24 Oct 2024 11:11) (83 - 83)  ABP: --  ABP(mean): --  RR: 18 (25 Oct 2024 05:05) (16 - 18)  SpO2: 98% (25 Oct 2024 05:05) (96% - 100%)    O2 Parameters below as of 25 Oct 2024 05:05  Patient On (Oxygen Delivery Method): room air                 Input and Output:  I&O's Detail      Lab Data                        11.4   9.77  )-----------( 284      ( 23 Oct 2024 21:22 )             35.5     10-23    140  |  100  |  9   ----------------------------<  101[H]  3.5   |  27  |  0.50    Ca    8.5      23 Oct 2024 21:22  Phos  4.7     10-23  Mg     1.3     10-23    TPro  6.9  /  Alb  3.4  /  TBili  1.7[H]  /  DBili  x   /  AST  43[H]  /  ALT  19  /  AlkPhos  136[H]  10-23            Review of Systems	      Objective     Physical Examination  heart s1s2  lung dc BS  head nc  head at        Pertinent Lab findings & Imaging      Myrna:  NO   Adequate UO     I&O's Detail           Discussed with:     Cultures:	        Radiology                            
[facility]  Chin Zarate PA-C  [address] [place], [place] [address] [phone]      INTERVAL HPI/OVERNIGHT EVENTS: no acute events     MEDICATIONS  (STANDING):  bisacodyl 10 milliGRAM(s) Oral daily  enoxaparin Injectable 40 milliGRAM(s) SubCutaneous every 24 hours  influenza   Vaccine 0.5 milliLiter(s) IntraMuscular once  lactated ringers. 1000 milliLiter(s) (75 mL/Hr) IV Continuous <Continuous>  nystatin Powder 1 Application(s) Topical three times a day  polyethylene glycol 3350 17 Gram(s) Oral two times a day  saline laxative (FLEET) Rectal Enema 1 Enema Rectal daily  senna 2 Tablet(s) Oral at bedtime  sertraline 100 milliGRAM(s) Oral daily    MEDICATIONS  (PRN):  albuterol    90 MICROgram(s) HFA Inhaler 2 Puff(s) Inhalation every 6 hours PRN Shortness of Breath and/or Wheezing  diazepam    Tablet 2.5 milliGRAM(s) Oral two times a day PRN for anxiety  oxyCODONE    IR 5 milliGRAM(s) Oral every 4 hours PRN Severe Pain (7 - 10)      Allergies    Motrin (Hives)    Intolerances        ROS:   General:  No  fevers, chills, night sweats, fatigue,   Eyes:  Good vision, no reported pain  ENT:  No sore throat, pain, runny nose, dysphagia  CV:  No pain, palpitations, hypo/hypertension  Resp:  No dyspnea, cough, tachypnea, wheezing  GI:  No pain, No nausea, No vomiting, No diarrhea, No constipation, No weight loss, No fever, No pruritis, No rectal bleeding, No tarry stools, No dysphagia,  :  No pain, bleeding, incontinence, nocturia  Muscle:  No pain, weakness  Neuro:  No weakness, tingling, memory problems  Psych:  No fatigue, insomnia, mood problems, depression  Endocrine:  No polyuria, polydipsia, cold/heat intolerance  Heme:  No petechiae, ecchymosis, easy bruisability  Skin:  No rash, tattoos, scars, edema      PHYSICAL EXAM:   Vital Signs:  Vital Signs Last 24 Hrs  T(C): 36.4 (25 Oct 2024 05:05), Max: 36.4 (24 Oct 2024 11:11)  T(F): 97.6 (25 Oct 2024 05:05), Max: 97.6 (25 Oct 2024 05:05)  HR: 87 (25 Oct 2024 05:05) (87 - 93)  BP: 97/68 (25 Oct 2024 05:05) (97/68 - 126/86)  BP(mean): 83 (24 Oct 2024 11:11) (83 - 83)  RR: 18 (25 Oct 2024 05:05) (16 - 18)  SpO2: 98% (25 Oct 2024 05:05) (98% - 100%)    Parameters below as of 25 Oct 2024 05:05  Patient On (Oxygen Delivery Method): room air      Daily Height in cm: 165.1 (24 Oct 2024 11:11)    Daily     GENERAL:  Appears stated age,   HEENT:  NC/AT,    CHEST:  Full & symmetric excursion,   HEART:  Regular rhythm,  ABDOMEN:  Soft, non-tender, non-distended,  EXTEREMITIES:  no cyanosis  SKIN:  No rash  NEURO:  Alert,       LABS:                        8.9    5.66  )-----------( 230      ( 25 Oct 2024 08:02 )             27.7     10-25    136  |  101  |  5[L]  ----------------------------<  68[L]  3.0[L]   |  25  |  0.34[L]    Ca    8.2[L]      25 Oct 2024 08:02  Phos  4.7     10-23  Mg     1.3     10-23    TPro  6.9  /  Alb  3.4  /  TBili  1.7[H]  /  DBili  x   /  AST  43[H]  /  ALT  19  /  AlkPhos  136[H]  10-23      Urinalysis Basic - ( 25 Oct 2024 08:02 )    Color: x / Appearance: x / SG: x / pH: x  Gluc: 68 mg/dL / Ketone: x  / Bili: x / Urobili: x   Blood: x / Protein: x / Nitrite: x   Leuk Esterase: x / RBC: x / WBC x   Sq Epi: x / Non Sq Epi: x / Bacteria: x        RADIOLOGY & ADDITIONAL TESTS:

## 2024-10-25 NOTE — PROGRESS NOTE ADULT - ASSESSMENT
43y F c/o abd pain and diarrhea, pt is bed bound due to severe peripheral neuropathy (able to move her extremities, but due to loss of sensation unable to stand/walk),    seasonal asthma  smoker  nicotine dep  colitis  weakness  ataxic gait  neuropathy  meds - hilar calcification     IVF  ABX  GI eval noted  VS noted    ct abd reviewed  GI eval  hydration  pain relief  serial ABD exam  monitor VS and HD and Sat  I yahaira  anxiolysis and emotional support  proventil PRN  NRT  smoking cess ed and counseling  PFT as outpatient  kinza tracy  labs and imaging reviewed with the patient 43y F c/o abd pain and diarrhea, pt is bed bound due to severe peripheral neuropathy (able to move her extremities, but due to loss of sensation unable to stand/walk),    seasonal asthma  smoker  nicotine dep  colitis  weakness  ataxic gait  neuropathy  meds - hilar calcification     IVF  GI eval noted  VS noted    ct abd reviewed  GI eval  hydration  pain relief  serial ABD exam  monitor VS and HD and Sat  I yahaira  anxiolysis and emotional support  proventil PRN  NRT  smoking cess ed and counseling  PFT as outpatient  replclarisa tracy  labs and imaging reviewed with the patient

## 2024-10-25 NOTE — DISCHARGE NOTE NURSING/CASE MANAGEMENT/SOCIAL WORK - PATIENT PORTAL LINK FT
You can access the FollowMyHealth Patient Portal offered by [facility] by registering at the following website: [email or web address]. By joining [facility]’s FollowMyHealth portal, you will also be able to view your health information using other applications (apps) compatible with our system.

## 2024-10-29 LAB — ACE SERPL-CCNC: 39 U/L — SIGNIFICANT CHANGE UP (ref 14–82)

## 2024-11-07 ENCOUNTER — INPATIENT (INPATIENT)
Facility: HOSPITAL | Age: 44
LOS: 4 days | Discharge: HOME | DRG: 392 | End: 2024-11-12
Attending: INTERNAL MEDICINE | Admitting: INTERNAL MEDICINE
Payer: MEDICAID

## 2024-11-07 VITALS
WEIGHT: 205.03 LBS | DIASTOLIC BLOOD PRESSURE: 83 MMHG | TEMPERATURE: 98 F | OXYGEN SATURATION: 100 % | HEIGHT: 65 IN | SYSTOLIC BLOOD PRESSURE: 114 MMHG | HEART RATE: 110 BPM | RESPIRATION RATE: 16 BRPM

## 2024-11-07 DIAGNOSIS — K52.9 NONINFECTIVE GASTROENTERITIS AND COLITIS, UNSPECIFIED: ICD-10-CM

## 2024-11-07 DIAGNOSIS — Z98.890 OTHER SPECIFIED POSTPROCEDURAL STATES: Chronic | ICD-10-CM

## 2024-11-07 DIAGNOSIS — Z92.89 PERSONAL HISTORY OF OTHER MEDICAL TREATMENT: Chronic | ICD-10-CM

## 2024-11-07 LAB
ALBUMIN SERPL ELPH-MCNC: 3.1 G/DL — LOW (ref 3.3–5)
ALP SERPL-CCNC: 121 U/L — HIGH (ref 30–120)
ALT FLD-CCNC: 23 U/L — SIGNIFICANT CHANGE UP (ref 10–60)
ANION GAP SERPL CALC-SCNC: 12 MMOL/L — SIGNIFICANT CHANGE UP (ref 5–17)
APTT BLD: 34 SEC — SIGNIFICANT CHANGE UP (ref 24.5–35.6)
AST SERPL-CCNC: 79 U/L — HIGH (ref 10–40)
BASOPHILS # BLD AUTO: 0.04 K/UL — SIGNIFICANT CHANGE UP (ref 0–0.2)
BASOPHILS NFR BLD AUTO: 0.8 % — SIGNIFICANT CHANGE UP (ref 0–2)
BILIRUB SERPL-MCNC: 1.5 MG/DL — HIGH (ref 0.2–1.2)
BUN SERPL-MCNC: 6 MG/DL — LOW (ref 7–23)
CALCIUM SERPL-MCNC: 8.1 MG/DL — LOW (ref 8.4–10.5)
CHLORIDE SERPL-SCNC: 99 MMOL/L — SIGNIFICANT CHANGE UP (ref 96–108)
CO2 SERPL-SCNC: 30 MMOL/L — SIGNIFICANT CHANGE UP (ref 22–31)
CREAT SERPL-MCNC: 0.75 MG/DL — SIGNIFICANT CHANGE UP (ref 0.5–1.3)
EGFR: 100 ML/MIN/1.73M2 — SIGNIFICANT CHANGE UP
EOSINOPHIL # BLD AUTO: 0.01 K/UL — SIGNIFICANT CHANGE UP (ref 0–0.5)
EOSINOPHIL NFR BLD AUTO: 0.2 % — SIGNIFICANT CHANGE UP (ref 0–6)
GLUCOSE SERPL-MCNC: 109 MG/DL — HIGH (ref 70–99)
HCG SERPL-ACNC: 1 MIU/ML — SIGNIFICANT CHANGE UP
HCT VFR BLD CALC: 35.8 % — SIGNIFICANT CHANGE UP (ref 34.5–45)
HGB BLD-MCNC: 11.2 G/DL — LOW (ref 11.5–15.5)
IMM GRANULOCYTES NFR BLD AUTO: 0.4 % — SIGNIFICANT CHANGE UP (ref 0–0.9)
INR BLD: 1.28 RATIO — HIGH (ref 0.85–1.16)
LACTATE SERPL-SCNC: 6.7 MMOL/L — CRITICAL HIGH (ref 0.7–2)
LIDOCAIN IGE QN: 24 U/L — SIGNIFICANT CHANGE UP (ref 16–77)
LYMPHOCYTES # BLD AUTO: 0.29 K/UL — LOW (ref 1–3.3)
LYMPHOCYTES # BLD AUTO: 5.7 % — LOW (ref 13–44)
MAGNESIUM SERPL-MCNC: 1 MG/DL — CRITICAL LOW (ref 1.6–2.6)
MCHC RBC-ENTMCNC: 28.7 PG — SIGNIFICANT CHANGE UP (ref 27–34)
MCHC RBC-ENTMCNC: 31.3 G/DL — LOW (ref 32–36)
MCV RBC AUTO: 91.8 FL — SIGNIFICANT CHANGE UP (ref 80–100)
MONOCYTES # BLD AUTO: 0.38 K/UL — SIGNIFICANT CHANGE UP (ref 0–0.9)
MONOCYTES NFR BLD AUTO: 7.5 % — SIGNIFICANT CHANGE UP (ref 2–14)
NEUTROPHILS # BLD AUTO: 4.33 K/UL — SIGNIFICANT CHANGE UP (ref 1.8–7.4)
NEUTROPHILS NFR BLD AUTO: 85.4 % — HIGH (ref 43–77)
NRBC # BLD: 0 /100 WBCS — SIGNIFICANT CHANGE UP (ref 0–0)
PHOSPHATE SERPL-MCNC: 4.3 MG/DL — SIGNIFICANT CHANGE UP (ref 2.5–4.5)
PLATELET # BLD AUTO: 207 K/UL — SIGNIFICANT CHANGE UP (ref 150–400)
POTASSIUM SERPL-MCNC: 3.4 MMOL/L — LOW (ref 3.5–5.3)
POTASSIUM SERPL-SCNC: 3.4 MMOL/L — LOW (ref 3.5–5.3)
PROT SERPL-MCNC: 6.8 G/DL — SIGNIFICANT CHANGE UP (ref 6–8.3)
PROTHROM AB SERPL-ACNC: 15.1 SEC — HIGH (ref 9.9–13.4)
RBC # BLD: 3.9 M/UL — SIGNIFICANT CHANGE UP (ref 3.8–5.2)
RBC # FLD: 15.3 % — HIGH (ref 10.3–14.5)
SODIUM SERPL-SCNC: 141 MMOL/L — SIGNIFICANT CHANGE UP (ref 135–145)
WBC # BLD: 5.07 K/UL — SIGNIFICANT CHANGE UP (ref 3.8–10.5)
WBC # FLD AUTO: 5.07 K/UL — SIGNIFICANT CHANGE UP (ref 3.8–10.5)

## 2024-11-07 PROCEDURE — 74177 CT ABD & PELVIS W/CONTRAST: CPT | Mod: 26,MC

## 2024-11-07 PROCEDURE — 99285 EMERGENCY DEPT VISIT HI MDM: CPT

## 2024-11-07 RX ORDER — MORPHINE SULFATE 30 MG/1
4 TABLET, EXTENDED RELEASE ORAL ONCE
Refills: 0 | Status: DISCONTINUED | OUTPATIENT
Start: 2024-11-07 | End: 2024-11-07

## 2024-11-07 RX ORDER — ACETAMINOPHEN 500 MG
1000 TABLET ORAL ONCE
Refills: 0 | Status: DISCONTINUED | OUTPATIENT
Start: 2024-11-07 | End: 2024-11-07

## 2024-11-07 RX ORDER — MORPHINE SULFATE 30 MG/1
2 TABLET, EXTENDED RELEASE ORAL EVERY 6 HOURS
Refills: 0 | Status: DISCONTINUED | OUTPATIENT
Start: 2024-11-07 | End: 2024-11-11

## 2024-11-07 RX ORDER — SODIUM CHLORIDE 9 MG/ML
1000 INJECTION, SOLUTION INTRAMUSCULAR; INTRAVENOUS; SUBCUTANEOUS ONCE
Refills: 0 | Status: COMPLETED | OUTPATIENT
Start: 2024-11-07 | End: 2024-11-07

## 2024-11-07 RX ORDER — SODIUM CHLORIDE 9 MG/ML
1000 INJECTION, SOLUTION INTRAMUSCULAR; INTRAVENOUS; SUBCUTANEOUS
Refills: 0 | Status: DISCONTINUED | OUTPATIENT
Start: 2024-11-07 | End: 2024-11-08

## 2024-11-07 RX ORDER — ONDANSETRON HYDROCHLORIDE 2 MG/ML
4 INJECTION, SOLUTION INTRAMUSCULAR; INTRAVENOUS ONCE
Refills: 0 | Status: COMPLETED | OUTPATIENT
Start: 2024-11-07 | End: 2024-11-07

## 2024-11-07 RX ORDER — MAGNESIUM SULFATE IN 0.9% NACL 2 G/50 ML
2 INTRAVENOUS SOLUTION, PIGGYBACK (ML) INTRAVENOUS ONCE
Refills: 0 | Status: COMPLETED | OUTPATIENT
Start: 2024-11-07 | End: 2024-11-07

## 2024-11-07 RX ORDER — PIPERACILLIN AND TAZOBACTAM .5; 4 G/20ML; G/20ML
3.38 INJECTION, POWDER, LYOPHILIZED, FOR SOLUTION INTRAVENOUS ONCE
Refills: 0 | Status: COMPLETED | OUTPATIENT
Start: 2024-11-07 | End: 2024-11-07

## 2024-11-07 RX ORDER — ACETAMINOPHEN 500 MG
650 TABLET ORAL EVERY 6 HOURS
Refills: 0 | Status: DISCONTINUED | OUTPATIENT
Start: 2024-11-07 | End: 2024-11-12

## 2024-11-07 RX ADMIN — ONDANSETRON HYDROCHLORIDE 4 MILLIGRAM(S): 2 INJECTION, SOLUTION INTRAMUSCULAR; INTRAVENOUS at 18:05

## 2024-11-07 RX ADMIN — MORPHINE SULFATE 4 MILLIGRAM(S): 30 TABLET, EXTENDED RELEASE ORAL at 18:12

## 2024-11-07 RX ADMIN — Medication 25 GRAM(S): at 20:12

## 2024-11-07 RX ADMIN — SODIUM CHLORIDE 1000 MILLILITER(S): 9 INJECTION, SOLUTION INTRAMUSCULAR; INTRAVENOUS; SUBCUTANEOUS at 18:04

## 2024-11-07 RX ADMIN — MORPHINE SULFATE 4 MILLIGRAM(S): 30 TABLET, EXTENDED RELEASE ORAL at 18:05

## 2024-11-07 RX ADMIN — SODIUM CHLORIDE 1000 MILLILITER(S): 9 INJECTION, SOLUTION INTRAMUSCULAR; INTRAVENOUS; SUBCUTANEOUS at 20:11

## 2024-11-07 RX ADMIN — PIPERACILLIN AND TAZOBACTAM 200 GRAM(S): .5; 4 INJECTION, POWDER, LYOPHILIZED, FOR SOLUTION INTRAVENOUS at 20:12

## 2024-11-07 NOTE — ED PROVIDER NOTE - CLINICAL SUMMARY MEDICAL DECISION MAKING FREE TEXT BOX
Acute abdominal pain with persistent diarrhea over the past 2 to 3 weeks.  Will check labs, x-ray, CT, IV fluids, meds, reeval

## 2024-11-07 NOTE — ED PROVIDER NOTE - DIFFERENTIAL DIAGNOSIS
Rule out acute abdominal pathology, electrolyte abnormality, leukocytosis, other acute pathology Differential Diagnosis

## 2024-11-07 NOTE — ED PROVIDER NOTE - OBJECTIVE STATEMENT
43-year-old female with a history of chronic peripheral neuropathy secondary to previous multiple transfusions to the prior bleeding issues presents with having some abdominal discomfort and diarrhea for the past 2 to 3 weeks.  Patient was recently in the hospital on October 23 through October 25.  However the patient ended up leaving [place], due to family issues.  The patient is been having persistent diarrhea at home, and is now feeling increased abdominal pain, generalized fatigue and weakness.  No acute chest pain.  No shortness of breath.  No known fever or chills.  No dysuria/hematuria.  No vaginal bleeding or discharge.  No aggravating or alleviating factors otherwise noted.  No other acute complaints

## 2024-11-07 NOTE — ED ADULT NURSE NOTE - PLAN OF CARE
Explanation of exam/test [Alert] : alert [No Acute Distress] : no acute distress [Normocephalic] : normocephalic [Anterior Bristow Closed] : anterior fontanelle closed [Red Reflex Bilateral] : red reflex bilateral [PERRL] : PERRL [Normally Placed Ears] : normally placed ears [Auricles Well Formed] : auricles well formed [Clear Tympanic membranes with present light reflex and bony landmarks] : clear tympanic membranes with present light reflex and bony landmarks [No Discharge] : no discharge [Nares Patent] : nares patent [Palate Intact] : palate intact [Uvula Midline] : uvula midline [Tooth Eruption] : tooth eruption  [Supple, full passive range of motion] : supple, full passive range of motion [No Palpable Masses] : no palpable masses [Symmetric Chest Rise] : symmetric chest rise [Clear to Auscultation Bilaterally] : clear to auscultation bilaterally [Regular Rate and Rhythm] : regular rate and rhythm [S1, S2 present] : S1, S2 present [No Murmurs] : no murmurs [+2 Femoral Pulses] : +2 femoral pulses [Soft] : soft [NonTender] : non tender [Non Distended] : non distended [Normoactive Bowel Sounds] : normoactive bowel sounds [No Hepatomegaly] : no hepatomegaly [No Splenomegaly] : no splenomegaly [Central Urethral Opening] : central urethral opening [Testicles Descended Bilaterally] : testicles descended bilaterally [Patent] : patent [Normally Placed] : normally placed [No Abnormal Lymph Nodes Palpated] : no abnormal lymph nodes palpated [Symmetric Buttocks Creases] : symmetric buttocks creases [NoTuft of Hair] : no tuft of hair [Cranial Nerves Grossly Intact] : cranial nerves grossly intact [No Rash or Lesions] : no rash or lesions

## 2024-11-08 LAB
ALBUMIN SERPL ELPH-MCNC: 2.4 G/DL — LOW (ref 3.3–5)
ALP SERPL-CCNC: 95 U/L — SIGNIFICANT CHANGE UP (ref 30–120)
ALT FLD-CCNC: 17 U/L — SIGNIFICANT CHANGE UP (ref 10–60)
ANION GAP SERPL CALC-SCNC: 9 MMOL/L — SIGNIFICANT CHANGE UP (ref 5–17)
ANION GAP SERPL CALC-SCNC: 9 MMOL/L — SIGNIFICANT CHANGE UP (ref 5–17)
AST SERPL-CCNC: 46 U/L — HIGH (ref 10–40)
BILIRUB SERPL-MCNC: 1.1 MG/DL — SIGNIFICANT CHANGE UP (ref 0.2–1.2)
BUN SERPL-MCNC: 2 MG/DL — LOW (ref 7–23)
BUN SERPL-MCNC: 3 MG/DL — LOW (ref 7–23)
C DIFF GDH STL QL: SIGNIFICANT CHANGE UP
C DIFF GDH STL QL: SIGNIFICANT CHANGE UP
CALCIUM SERPL-MCNC: 7 MG/DL — LOW (ref 8.4–10.5)
CALCIUM SERPL-MCNC: 7 MG/DL — LOW (ref 8.4–10.5)
CHLORIDE SERPL-SCNC: 106 MMOL/L — SIGNIFICANT CHANGE UP (ref 96–108)
CHLORIDE SERPL-SCNC: 106 MMOL/L — SIGNIFICANT CHANGE UP (ref 96–108)
CO2 SERPL-SCNC: 27 MMOL/L — SIGNIFICANT CHANGE UP (ref 22–31)
CO2 SERPL-SCNC: 29 MMOL/L — SIGNIFICANT CHANGE UP (ref 22–31)
CREAT SERPL-MCNC: 0.51 MG/DL — SIGNIFICANT CHANGE UP (ref 0.5–1.3)
CREAT SERPL-MCNC: 0.55 MG/DL — SIGNIFICANT CHANGE UP (ref 0.5–1.3)
EGFR: 115 ML/MIN/1.73M2 — SIGNIFICANT CHANGE UP
EGFR: 117 ML/MIN/1.73M2 — SIGNIFICANT CHANGE UP
GLUCOSE SERPL-MCNC: 71 MG/DL — SIGNIFICANT CHANGE UP (ref 70–99)
GLUCOSE SERPL-MCNC: 82 MG/DL — SIGNIFICANT CHANGE UP (ref 70–99)
HCT VFR BLD CALC: 28.7 % — LOW (ref 34.5–45)
HGB BLD-MCNC: 8.9 G/DL — LOW (ref 11.5–15.5)
LACTATE SERPL-SCNC: 2.5 MMOL/L — HIGH (ref 0.7–2)
LACTATE SERPL-SCNC: 3.4 MMOL/L — HIGH (ref 0.7–2)
MAGNESIUM SERPL-MCNC: 1.1 MG/DL — LOW (ref 1.6–2.6)
MAGNESIUM SERPL-MCNC: 1.5 MG/DL — LOW (ref 1.6–2.6)
MCHC RBC-ENTMCNC: 29.1 PG — SIGNIFICANT CHANGE UP (ref 27–34)
MCHC RBC-ENTMCNC: 31 G/DL — LOW (ref 32–36)
MCV RBC AUTO: 93.8 FL — SIGNIFICANT CHANGE UP (ref 80–100)
NRBC # BLD: 0 /100 WBCS — SIGNIFICANT CHANGE UP (ref 0–0)
PCP SPEC-MCNC: SIGNIFICANT CHANGE UP
PHOSPHATE SERPL-MCNC: 3.7 MG/DL — SIGNIFICANT CHANGE UP (ref 2.5–4.5)
PLATELET # BLD AUTO: 179 K/UL — SIGNIFICANT CHANGE UP (ref 150–400)
POTASSIUM SERPL-MCNC: 3.2 MMOL/L — LOW (ref 3.5–5.3)
POTASSIUM SERPL-MCNC: 3.5 MMOL/L — SIGNIFICANT CHANGE UP (ref 3.5–5.3)
POTASSIUM SERPL-SCNC: 3.2 MMOL/L — LOW (ref 3.5–5.3)
POTASSIUM SERPL-SCNC: 3.5 MMOL/L — SIGNIFICANT CHANGE UP (ref 3.5–5.3)
PROT SERPL-MCNC: 5 G/DL — LOW (ref 6–8.3)
RBC # BLD: 3.06 M/UL — LOW (ref 3.8–5.2)
RBC # FLD: 15.4 % — HIGH (ref 10.3–14.5)
SODIUM SERPL-SCNC: 142 MMOL/L — SIGNIFICANT CHANGE UP (ref 135–145)
SODIUM SERPL-SCNC: 144 MMOL/L — SIGNIFICANT CHANGE UP (ref 135–145)
WBC # BLD: 3.75 K/UL — LOW (ref 3.8–10.5)
WBC # FLD AUTO: 3.75 K/UL — LOW (ref 3.8–10.5)

## 2024-11-08 RX ORDER — PREGABALIN 150 MG/1
150 CAPSULE ORAL
Refills: 0 | Status: DISCONTINUED | OUTPATIENT
Start: 2024-11-08 | End: 2024-11-12

## 2024-11-08 RX ORDER — NYSTATIN 100000 U/G
1 POWDER TOPICAL
Refills: 0 | Status: DISCONTINUED | OUTPATIENT
Start: 2024-11-08 | End: 2024-11-12

## 2024-11-08 RX ORDER — SODIUM CHLORIDE 9 MG/ML
1000 INJECTION, SOLUTION INTRAMUSCULAR; INTRAVENOUS; SUBCUTANEOUS
Refills: 0 | Status: DISCONTINUED | OUTPATIENT
Start: 2024-11-08 | End: 2024-11-10

## 2024-11-08 RX ORDER — HEPARIN SODIUM 10000 [USP'U]/ML
5000 INJECTION INTRAVENOUS; SUBCUTANEOUS EVERY 8 HOURS
Refills: 0 | Status: DISCONTINUED | OUTPATIENT
Start: 2024-11-08 | End: 2024-11-12

## 2024-11-08 RX ORDER — DIAZEPAM 10 MG/1
5 FILM BUCCAL ONCE
Refills: 0 | Status: DISCONTINUED | OUTPATIENT
Start: 2024-11-08 | End: 2024-11-08

## 2024-11-08 RX ORDER — MAGNESIUM SULFATE IN 0.9% NACL 2 G/50 ML
2 INTRAVENOUS SOLUTION, PIGGYBACK (ML) INTRAVENOUS ONCE
Refills: 0 | Status: COMPLETED | OUTPATIENT
Start: 2024-11-08 | End: 2024-11-08

## 2024-11-08 RX ORDER — DIAZEPAM 10 MG/1
5 FILM BUCCAL
Refills: 0 | Status: DISCONTINUED | OUTPATIENT
Start: 2024-11-08 | End: 2024-11-12

## 2024-11-08 RX ORDER — POTASSIUM CHLORIDE 10 MEQ
10 TABLET, EXTENDED RELEASE ORAL
Refills: 0 | Status: COMPLETED | OUTPATIENT
Start: 2024-11-08 | End: 2024-11-08

## 2024-11-08 RX ORDER — SERTRALINE HYDROCHLORIDE 50 MG/1
100 TABLET, FILM COATED ORAL DAILY
Refills: 0 | Status: DISCONTINUED | OUTPATIENT
Start: 2024-11-08 | End: 2024-11-12

## 2024-11-08 RX ORDER — ALBUTEROL 90 MCG
2 AEROSOL (GRAM) INHALATION EVERY 6 HOURS
Refills: 0 | Status: DISCONTINUED | OUTPATIENT
Start: 2024-11-08 | End: 2024-11-12

## 2024-11-08 RX ADMIN — Medication 100 MILLIEQUIVALENT(S): at 11:37

## 2024-11-08 RX ADMIN — DIAZEPAM 5 MILLIGRAM(S): 10 FILM BUCCAL at 17:52

## 2024-11-08 RX ADMIN — PREGABALIN 150 MILLIGRAM(S): 150 CAPSULE ORAL at 17:52

## 2024-11-08 RX ADMIN — MORPHINE SULFATE 2 MILLIGRAM(S): 30 TABLET, EXTENDED RELEASE ORAL at 20:12

## 2024-11-08 RX ADMIN — MORPHINE SULFATE 2 MILLIGRAM(S): 30 TABLET, EXTENDED RELEASE ORAL at 13:45

## 2024-11-08 RX ADMIN — SODIUM CHLORIDE 100 MILLILITER(S): 9 INJECTION, SOLUTION INTRAMUSCULAR; INTRAVENOUS; SUBCUTANEOUS at 11:37

## 2024-11-08 RX ADMIN — Medication 25 GRAM(S): at 20:49

## 2024-11-08 RX ADMIN — MORPHINE SULFATE 2 MILLIGRAM(S): 30 TABLET, EXTENDED RELEASE ORAL at 07:04

## 2024-11-08 RX ADMIN — MORPHINE SULFATE 2 MILLIGRAM(S): 30 TABLET, EXTENDED RELEASE ORAL at 20:27

## 2024-11-08 RX ADMIN — Medication 100 MILLIEQUIVALENT(S): at 14:56

## 2024-11-08 RX ADMIN — SODIUM CHLORIDE 100 MILLILITER(S): 9 INJECTION, SOLUTION INTRAMUSCULAR; INTRAVENOUS; SUBCUTANEOUS at 09:09

## 2024-11-08 RX ADMIN — MORPHINE SULFATE 2 MILLIGRAM(S): 30 TABLET, EXTENDED RELEASE ORAL at 13:34

## 2024-11-08 RX ADMIN — Medication 100 GRAM(S): at 09:09

## 2024-11-08 RX ADMIN — HEPARIN SODIUM 5000 UNIT(S): 10000 INJECTION INTRAVENOUS; SUBCUTANEOUS at 20:13

## 2024-11-08 RX ADMIN — Medication 100 MILLIEQUIVALENT(S): at 13:16

## 2024-11-08 RX ADMIN — DIAZEPAM 5 MILLIGRAM(S): 10 FILM BUCCAL at 01:14

## 2024-11-08 RX ADMIN — MORPHINE SULFATE 2 MILLIGRAM(S): 30 TABLET, EXTENDED RELEASE ORAL at 07:34

## 2024-11-08 RX ADMIN — MORPHINE SULFATE 2 MILLIGRAM(S): 30 TABLET, EXTENDED RELEASE ORAL at 00:53

## 2024-11-08 RX ADMIN — MORPHINE SULFATE 2 MILLIGRAM(S): 30 TABLET, EXTENDED RELEASE ORAL at 00:23

## 2024-11-08 NOTE — CONSULT NOTE ADULT - SUBJECTIVE AND OBJECTIVE BOX
[facility]    Magdaleno Aury Matos NP    [address] [place] [address] [phone]      Chief Complaint:  Patient is a 43y old  Female who presents with a chief complaint of     HPI:   43-year-old female with a history of chronic peripheral neuropathy secondary to previous multiple transfusions to the prior bleeding issues presents with having some abdominal discomfort and diarrhea for the past 2 to 3 weeks.  Patient was recently in the hospital on  through .  However the patient ended up leaving [facility], due to family issues.  The patient is been having persistent diarrhea at home, and is now feeling increased abdominal pain, generalized fatigue and weakness.  No acute chest pain.  No shortness of breath.  No known fever or chills.  No dysuria/hematuria.  No vaginal bleeding or discharge.  No aggravating or alleviating factors otherwise noted.  No other acute complaints      Allergies:  Motrin (Hives)      Medications:  acetaminophen     Tablet .. 650 milliGRAM(s) Oral every 6 hours PRN  diazepam    Tablet 5 milliGRAM(s) Oral two times a day PRN  magnesium sulfate  IVPB 2 Gram(s) IV Intermittent once  morphine  - Injectable 2 milliGRAM(s) IV Push every 6 hours PRN  pregabalin 150 milliGRAM(s) Oral two times a day  sertraline 100 milliGRAM(s) Oral daily  sodium chloride 0.9%. 1000 milliLiter(s) IV Continuous <Continuous>      PMHX/PSHX:  Atrial fibrillation    History of Hyperthyroidism    Asthma    History of anemia    Depression, unspecified depression type    H/O blood transfusion reaction    Smoker    Peripheral neuropathy    S/P  Section    History of blood transfusion    Status post embolization of uterine artery        Family history:  No pertinent family history in first degree relatives    Family history of diabetes mellitus (Sibling, Grandparent)    Family history of stomach cancer (Grandparent)        Social History:     ROS:     General:  No wt loss, fevers, chills, night sweats, fatigue,   Eyes:  Good vision, no reported pain  ENT:  No sore throat, pain, runny nose, dysphagia  CV:  No pain, palpitations, hypo/hypertension  Resp:  No dyspnea, cough, tachypnea, wheezing  GI:  No pain, No nausea, No vomiting, No diarrhea, No constipation, No weight loss, No fever, No pruritis, No rectal bleeding, No tarry stools, No dysphagia,  :  No pain, bleeding, incontinence, nocturia  Muscle:  No pain, weakness  Neuro:  No weakness, tingling, memory problems  Psych:  No fatigue, insomnia, mood problems, depression  Endocrine:  No polyuria, polydipsia, cold/heat intolerance  Heme:  No petechiae, ecchymosis, easy bruisability  Skin:  No rash, tattoos, scars, edema      PHYSICAL EXAM:   Vital Signs:  Vital Signs Last 24 Hrs  T(C): 36.6 (2024 05:58), Max: 36.8 (2024 17:34)  T(F): 97.9 (2024 05:58), Max: 98.3 (2024 17:34)  HR: 83 (2024 05:58) (83 - 110)  BP: 104/73 (2024 05:58) (101/69 - 114/83)  BP(mean): --  RR: 18 (2024 05:58) (16 - 18)  SpO2: 99% (2024 05:58) (98% - 100%)    Parameters below as of 2024 05:58  Patient On (Oxygen Delivery Method): room air      Daily Height in cm: 165.1 (2024 17:34)    Daily     GENERAL:  Appears stated age, well-groomed, well-nourished, no distress  HEENT:  NC/AT,  conjunctivae clear and pink, no thyromegaly, nodules, adenopathy, no JVD, sclera -anicteric  CHEST:  Full & symmetric excursion, no increased effort, breath sounds clear  HEART:  Regular rhythm, S1, S2, no murmur/rub/S3/S4, no abdominal bruit, no edema  ABDOMEN:  Soft, non-tender, non-distended, normoactive bowel sounds,  no masses ,no hepato-splenomegaly, no signs of chronic liver disease  EXTEREMITIES:  no cyanosis,clubbing or edema  SKIN:  No rash/erythema/ecchymoses/petechiae/wounds/abscess/warm/dry  NEURO:  Alert, oriented, no asterixis, no tremor, no encephalopathy    LABS:                        8.9    3.75  )-----------( 179      ( 2024 07:47 )             28.7         144  |  106  |  3[L]  ----------------------------<  71  3.2[L]   |  29  |  0.55    Ca    7.0[L]      2024 07:47  Phos  3.7       Mg     1.1         TPro  5.0[L]  /  Alb  2.4[L]  /  TBili  1.1  /  DBili  x   /  AST  46[H]  /  ALT  17  /  AlkPhos  95      LIVER FUNCTIONS - ( 2024 07:47 )  Alb: 2.4 g/dL / Pro: 5.0 g/dL / ALK PHOS: 95 U/L / ALT: 17 U/L / AST: 46 U/L / GGT: x           PT/INR - ( 2024 17:57 )   PT: 15.1 sec;   INR: 1.28 ratio         PTT - ( 2024 17:57 )  PTT:34.0 sec  Urinalysis Basic - ( 2024 07:47 )    Color: x / Appearance: x / SG: x / pH: x  Gluc: 71 mg/dL / Ketone: x  / Bili: x / Urobili: x   Blood: x / Protein: x / Nitrite: x   Leuk Esterase: x / RBC: x / WBC x   Sq Epi: x / Non Sq Epi: x / Bacteria: x      Amylase Serum--      Lipase serum24       Ammonia--      Imaging:    < from: CT Abdomen and Pelvis w/ IV Cont (24 @ 19:33) >  PANCREAS: Within normal limits.  ADRENALS: Within normal limits.  KIDNEYS/URETERS: No bowel obstruction or inflammation.    BLADDER: Minimally distended.  REPRODUCTIVE ORGANS: Uterus and adnexa within normal limits.    BOWEL: No bowel obstruction. Appendix is normal. Diffuse colonic wall   thickening with surrounding fat stranding involving the ascending colon   to the rectum. Scattered colonic diverticulosis.  PERITONEUM/RETROPERITONEUM: Within normal limits.  VESSELS: Atherosclerotic changes.  LYMPHNODES: No lymphadenopathy.  ABDOMINAL WALL: Within normal limits.  BONES: Within normal limits.    IMPRESSION:  Pancolitis.    < end of copied text >

## 2024-11-08 NOTE — CARE COORDINATION ASSESSMENT. - NSCAREPROVIDERS_GEN_ALL_CORE_FT
CARE PROVIDERS:  Accepting Physician: Erika Mcgraw  Administration: Ernst Melton  Administration: Trenton José  Administration: Kecia Shipley  Admitting: Erika Mcgraw  Attending: Erika Mcgraw  Case Management: Kimberly Mcleod  Consultant: Jose De Oliveira  Consultant: Elvis Alberto  Consultant: Luis Alberto Farley  Consultant: Ernst Melton  ED Attending: Karthikeyan Dupree  ED Attending2: Samir Maravilla  ED Nurse: Dayna Reed  Infection Control: Juliana Oliveira  Nurse: Vielka Carpenter  Nurse: Adrienne Samson  Nurse: Dayna Reed  Nurse: Aleisha Eldridge  Nurse: Elvis Camp  Nurse: Rosey Anderson  Outpatient Provider: Kacie David  Outpatient Provider: Magdaleno Jeffers  Override: Elvis Camp  Primary Team: Jasper Ceja  Registered Dietitian: Ольга Burton  : Sandi Mccormick// Supp. Assoc.: Adrienne Amin   CARE PROVIDERS:  Accepting Physician: Erika Mcgraw  Administration: Ernst Melton  Administration: Trenton José  Administration: Kecia Shipley  Admitting: Erika Mcgraw  Attending: Erika Mcgraw  Case Management: Kimberly Mcleod  Case Management: Lisbeth Butterfield  Consultant: Luis Alberto Farley  Consultant: Jose De Oliveira  Consultant: Elvis Alberto  Consultant: Ernst Melton  ED Attending: Karthikeyan Dupree  ED Attending2: Samir Maravilla  ED Nurse: Dayna Reed  Infection Control: Juliana Oliveira  Nurse: Vielka Carpenter  Nurse: Adrienne Samson  Nurse: Rosey Anderson  Nurse: Aleisha Eldridge  Outpatient Provider: Magdaleno Jeffers  Outpatient Provider: Kacie David  Override: Elvis Camp  Primary Team: Jasper Ceja  Registered Dietitian: Ольга Burton  : Sandi Mccormick// Supp. Assoc.: Adrienne Amin   CARE PROVIDERS:  Accepting Physician: Erika Mcgraw  Administration: Ernst Melton  Administration: Trenton José  Administration: Kecia Shipley  Admitting: Erika Mcgraw  Attending: Erika Mcgraw  Case Management: Kimberly Mcleod  Case Management: Lisbeth Butterfield  Consultant: Luis Alberto Farley  Consultant: Jose De Oliveira  Consultant: Elvis Alberto  Consultant: Ernst Melton  ED Attending: Karthikeyan Dupree  ED Attending2: Samir Maravilla  ED Nurse: Dayna Reed  Infection Control: Juliana Oliveira  Nurse: Vielka Carpenter  Nurse: Rosey Anderson  Nurse: Aleisha Eldridge  Outpatient Provider: Magdaleno Jeffers  Outpatient Provider: Kacie David  Override: Elvis Camp  Primary Team: Jasper Ceja  Registered Dietitian: Ольга Burton  : Sandi Mccormick// Supp. Assoc.: Adrienne Amin

## 2024-11-08 NOTE — CARE COORDINATION ASSESSMENT. - CURRENT OCCUPATION, OR IF RETIRED, PREVIOUS OCCUPATION OF PATIENT
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

## 2024-11-08 NOTE — PROVIDER CONTACT NOTE (CRITICAL VALUE NOTIFICATION) - RECOMMENDATIONS
Potassium and magnesium repletion to initiate. No further action required for lactate, trending downward.

## 2024-11-08 NOTE — H&P ADULT - HISTORY OF PRESENT ILLNESS
43-year-old female bed bound due to severe peripheral neuropathy (able to move her extremities, but due to loss of sensation unable to stand/walk), multiple transfusions to the prior bleeding issues presents with having some abdominal discomfort and diarrhea for the past 2 to 3 weeks. Patient was recently in the hospital on October 23 through October 25 for Stercoral Colitis and signed out AMA .    Previous History   Hospitalized Feb 2024 for colitis and in Aug 2023 in the SICU for acute blood loss anemia (requiring massive transfusion protocol) from menometrorrhagia from fibroids s/p UAE.

## 2024-11-08 NOTE — PROVIDER CONTACT NOTE (CRITICAL VALUE NOTIFICATION) - ASSESSMENT
AxO x4. NSR on tele. Afebrile. Initial lactate 6.7, repeat lactate 3.4 at midnight. Received 3L NS boluses in ED, now on continuous NS @ 100 mL/hr. Hypoactive bowel sounds this AM.

## 2024-11-08 NOTE — PATIENT PROFILE ADULT - FALL HARM RISK - HARM RISK INTERVENTIONS

## 2024-11-08 NOTE — CARE COORDINATION ASSESSMENT. - NSDCPLANSERVICES_GEN_ALL_CORE
RN/CM met with pt, explained role of CM. Pt is alert, self-directing. Pt gave permission that family @ bedside be privy to all info. Pt resides with family in a private home with approx 3 steps to enter, no steps inside for pt to navigate. Pt has wheelchair, walker and commode @ home. Pt stated her community MD is DR Kecia Williamson @ [place]. Pt has  consumer directed personal assistance program (CDPAP) aide services 36.75 H/week thru  [facility] Agency phone number [phone]/FAX [phone] being managed by [facility] Managed Longterm [phone]/ fax [phone]. Pt requested list of visiting MD resources and CM provided pt with same. DC pending hospital course. CM remains available and continues to follow case. RN/CM met with pt, explained role of CM. Pt is alert, self-directing. Pt gave permission that family @ bedside be privy to all info. Pt gave her cell [phone]. Pt resides with family (mother, Zuri and son, Carlos) in a private home with approx 2-3 steps to enter, no steps inside for pt to navigate. Pt has wheelchair, walker and commode @ home. Pt stated her community MD is DR Kecia Williamson @ [place]. Pt has  consumer directed personal assistance program (CDPAP) aide services 36.75 H/week thru  [facility] Agency phone number [phone]/FAX [phone] being managed by [facility]term [phone]/ fax [phone]. Pt requested list of visiting MD resources and CM provided pt with same. Pt requested Medicaid ambulette transport upon DC. DC pending hospital course. CM remains available and continues to follow case. RN/CM met with pt, explained role of CM. Pt is alert, self-directing. Pt gave permission that family @ bedside be privy to all info. Pt gave her cell [phone]. Pt resides with family (mother, Zuri and son, Carlos) in a private home with approx 2-3 steps to enter, no steps inside for pt to navigate. Pt has wheelchair, walker and commode @ home. Pt stated her community MD is DR Kecia Williamson @ [place] [phone]. Pt has  consumer directed personal assistance program (CDPAP) aide services 36.75 H/week thru  [facility] Agency phone number [phone]/FAX [phone] being managed by [facility] Longterm [phone]/ fax [phone]. Pt requested list of visiting MD resources and CM provided pt with same. Pt requested Medicaid ambulette transport upon DC. Pt is not anticipating any skilled needs, opting to resume  consumer directed personal assistance program (CDPAP) aide services upon DC, stated that her son, Carlos is her  consumer directed personal assistance program (CDPAP). DC pending hospital course. CM remains available and continues to follow case. RN/CM met with pt, explained role of CM. Pt is alert, self-directing. Pt gave permission that family @ bedside be privy to all info. Pt gave her cell [phone]. Pt resides with family (mother, Zuri and son, Carlos) in a private home with approx 2-3 steps to enter, no steps inside for pt to navigate. Pt has wheelchair, walker and commode @ home. Pt stated her community MD is DR Kecia Williamson @ [place] [phone]. Pt has  consumer directed personal assistance program (CDPAP) aide services 36.75 H/week thru  [facility] Agency phone number [phone]/FAX [phone] being managed by [facility] Longterm [phone]/ fax [phone]. CM reached out to [place] with call ref# [id number], spoke to Lacey who confirmed above  consumer directed personal assistance program (CDPAP) approved hours and advised this CM that for services to be reinstated, DC summary to be faxed to [phone], Managed Longterm Care  is Chanda Andrews [phone] @ [place] [phone].  Pt requested list of visiting MD resources and CM provided pt with same. Pt requested Medicaid ambulette transport upon DC. Pt is not anticipating any skilled needs, opting to resume  consumer directed personal assistance program (CDPAP) aide services upon DC, stated that her son, Carlos is her  consumer directed personal assistance program (CDPAP). DC pending hospital course. CM remains available and continues to follow case.

## 2024-11-08 NOTE — H&P ADULT - ASSESSMENT
43-year-old female with a history of chronic peripheral neuropathy secondary to previous multiple transfusions to the prior bleeding issues presents with having some abdominal discomfort and diarrhea for the past 2 to 3 weeks.       PANCOLITIS   -  Stool studies including C diff, GI PCR, stool cultures, O&P  - antiemetics and pain control per primary team  - low residue diet    Electrolyte Imbalance   Supplement   Tele       Abdominal Pain   Avoid Narcotics       Anxiety and MDD  -Taking diazepam 5 mg BID prn for anxiety, sertraline 100 mg daily  -Cont diazepam and sertraline

## 2024-11-08 NOTE — CONSULT NOTE ADULT - SUBJECTIVE AND OBJECTIVE BOX
History of Present Illness: The patient is a 43 year old female with a history of anemia, paroxysmal atrial fibrillation, hyperthyroidism who presents with diarrhea. The patient has been having diffuse abdominal discomfort and frequent diarrhea. No chest pain, shortness of breath, palpitations.    Past Medical/Surgical History:  anemia, paroxysmal atrial fibrillation, hyperthyroidism    Medications:  Home Medications:  acetaminophen 325 mg oral tablet: 2 tab(s) orally every 6 hours As needed Temp greater or equal to 38C (100.4F), Mild Pain (1 - 3) (07 Nov 2024 17:48)  melatonin 3 mg oral tablet: 1 tab(s) orally once a day (at bedtime) As needed Insomnia (07 Nov 2024 17:48)  sertraline 100 mg oral tablet: 1 tab(s) orally once a day (07 Nov 2024 17:48)      Family History: Non-contributory family history of premature cardiovascular atherosclerotic disease    Social History: No tobacco, alcohol or drug use    Review of Systems:  General: No fevers, chills, weight gain  Skin: No rashes, color changes  Cardiovascular: No chest pain, orthopnea  Respiratory: No shortness of breath, cough  Gastrointestinal: No nausea, abdominal pain  Genitourinary: No incontinence, pain with urination  Musculoskeletal: No pain, swelling, decreased range of motion  Neurological: No headache, weakness  Psychiatric: No depression, anxiety  Endocrine: No weight gain, increased thirst  All other systems are comprehensively negative.    Physical Exam:  Vitals:        Vital Signs Last 24 Hrs  T(C): 36.6 (08 Nov 2024 09:25), Max: 36.8 (07 Nov 2024 17:34)  T(F): 97.8 (08 Nov 2024 09:25), Max: 98.3 (07 Nov 2024 17:34)  HR: 83 (08 Nov 2024 09:25) (83 - 110)  BP: 102/69 (08 Nov 2024 09:25) (101/69 - 114/83)  BP(mean): --  RR: 18 (08 Nov 2024 09:25) (16 - 18)  SpO2: 96% (08 Nov 2024 09:25) (96% - 100%)  Parameters below as of 08 Nov 2024 09:25  Patient On (Oxygen Delivery Method): room air  General: NAD  HEENT: MMM  Neck: No JVD, no carotid bruit  Lungs: CTAB  CV: RRR, nl S1/S2, no M/R/G  Abdomen: S/NT/ND, +BS  Extremities: No LE edema, no cyanosis  Neuro: AAOx3, non-focal  Skin: No rash    Labs:                        8.9    3.75  )-----------( 179      ( 08 Nov 2024 07:47 )             28.7     11-08    144  |  106  |  3[L]  ----------------------------<  71  3.2[L]   |  29  |  0.55    Ca    7.0[L]      08 Nov 2024 07:47  Phos  3.7     11-08  Mg     1.1     11-08    TPro  5.0[L]  /  Alb  2.4[L]  /  TBili  1.1  /  DBili  x   /  AST  46[H]  /  ALT  17  /  AlkPhos  95  11-08        PT/INR - ( 07 Nov 2024 17:57 )   PT: 15.1 sec;   INR: 1.28 ratio         PTT - ( 07 Nov 2024 17:57 )  PTT:34.0 sec    ECG/Telemetry: Not done

## 2024-11-08 NOTE — CONSULT NOTE ADULT - SUBJECTIVE AND OBJECTIVE BOX
Date/Time Patient Seen:  		  Referring MD:   Data Reviewed	       Patient is a 43y old  Female who presents with a chief complaint of Abdominal Pain x 2-3 weeks (2024 11:57)      Subjective/HPI   42YO F PMH severe peripheral neuropathy (able to move her extremities, but due to loss of sensation unable to stand/walk) bed bound hx of colitis multiple transfusions sec anemia who presented to the hospital with c/o abdominal discomfort and diarrhea for the past 2 to 3 weeks. Patient was recently in the hospital on  through  for Stercoral Colitis and signed out AMA   PAST MEDICAL & SURGICAL HISTORY:  Atrial fibrillation    History of Hyperthyroidism    Asthma    History of anemia    Depression, unspecified depression type    H/O blood transfusion reaction    Smoker    Peripheral neuropathy    S/P  Section  ,,    History of blood transfusion    Status post embolization of uterine artery  Past Medical & Surgical Hx:  PAST MEDICAL & SURGICAL HISTORY:  Atrial fibrillation  History of Hyperthyroidism  Asthma  History of anemia  Depression, unspecified depression type  Smoker  Peripheral neuropathy  S/P  Section  ,,  History of blood transfusion  Status post embolization of uterine artery      Social History--  EtOH: denies  Tobacco: denies  Drug Use: denies    FAMILY HISTORY:  Family history of diabetes mellitus (Sibling, Grandparent)    Family history of stomach cancer (Grandparent)    Allergies  Motrin (Hives)    Intolerances  NONE        Medication list         MEDICATIONS  (STANDING):  heparin   Injectable 5000 Unit(s) SubCutaneous every 8 hours  pregabalin 150 milliGRAM(s) Oral two times a day  sertraline 100 milliGRAM(s) Oral daily  sodium chloride 0.9%. 1000 milliLiter(s) (100 mL/Hr) IV Continuous <Continuous>    MEDICATIONS  (PRN):  acetaminophen     Tablet .. 650 milliGRAM(s) Oral every 6 hours PRN Temp greater or equal to 38C (100.4F), Mild Pain (1 - 3)  diazepam    Tablet 5 milliGRAM(s) Oral two times a day PRN for anxiety  morphine  - Injectable 2 milliGRAM(s) IV Push every 6 hours PRN Severe Pain (7 - 10)         Vitals log        ICU Vital Signs Last 24 Hrs  T(C): 36.7 (2024 13:21), Max: 36.8 (2024 17:34)  T(F): 98.1 (2024 13:21), Max: 98.3 (2024 17:34)  HR: 89 (2024 13:21) (83 - 110)  BP: 102/71 (2024 13:21) (101/69 - 114/83)  BP(mean): 80 (2024 09:40) (80 - 80)  ABP: --  ABP(mean): --  RR: 18 (2024 13:21) (16 - 18)  SpO2: 99% (2024 13:21) (96% - 100%)    O2 Parameters below as of 2024 13:21  Patient On (Oxygen Delivery Method): room air                 Input and Output:  I&O's Detail      Lab Data                        8.9    3.75  )-----------( 179      ( 2024 07:47 )             28.7     11-08    144  |  106  |  3[L]  ----------------------------<  71  3.2[L]   |  29  |  0.55    Ca    7.0[L]      2024 07:47  Phos  3.7     11-08  Mg     1.1     11-08    TPro  5.0[L]  /  Alb  2.4[L]  /  TBili  1.1  /  DBili  x   /  AST  46[H]  /  ALT  17  /  AlkPhos  95  11-08            Review of Systems	  weakness  pain  anxious  alert  verbal  all systems reviewed      Objective     Physical Examination    heart s1s2  lung dc BS  head nc  head at  abd dec bS  cn grossly int  moves all extr      Pertinent Lab findings & Imaging      Norris:  NO   Adequate UO     I&O's Detail           Discussed with:     Cultures:	        Radiology        ACC: [id number] EXAM:  CT ABDOMEN AND PELVIS IC   ORDERED BY: VAHE JONES     PROCEDURE DATE:  2024          INTERPRETATION:  CLINICAL INFORMATION: Abdominal pain and persistent   diarrhea x2 weeks.    COMPARISON: CT abdomen/pelvis 10/23/2024    CONTRAST/COMPLICATIONS:  IV Contrast: Omnipaque 350  90 cc administered   10 cc discarded  Oral Contrast: NONE  Complications: None reported at time of study completion    PROCEDURE:  CT of the Abdomen and Pelvis was performed.  Sagittal and coronal reformats were performed.    FINDINGS:  LOWER CHEST: Within normal limits.    LIVER: Steatosis. Liver is enlarged and measures 23 cm in length.  BILE DUCTS: Normal caliber.  GALLBLADDER: Within normal limits.  SPLEEN: Within normal limits.  PANCREAS: Within normal limits.  ADRENALS: Within normal limits.  KIDNEYS/URETERS: No bowel obstruction or inflammation.    BLADDER: Minimally distended.  REPRODUCTIVE ORGANS: Uterus and adnexa within normal limits.    BOWEL: No bowel obstruction. Appendix is normal. Diffuse colonic wall   thickening with surrounding fat stranding involving the ascending colon   to the rectum. Scattered colonic diverticulosis.  PERITONEUM/RETROPERITONEUM: Within normal limits.  VESSELS: Atherosclerotic changes.  LYMPH NODES: No lymphadenopathy.  ABDOMINAL WALL: Within normal limits.  BONES: Within normal limits.    IMPRESSION:  Pancolitis.        --- End of Report ---             LAVERN KHAN MD; Attending Radiologist  This document has been electronically signed. 2024  7:50PM

## 2024-11-08 NOTE — CARE COORDINATION ASSESSMENT. - READMITTED REASON YN
43-year-old female with a history of chronic peripheral neuropathy secondary to previous multiple transfusions to the prior bleeding issues presents with having some abdominal discomfort and diarrhea for the past 2 to 3 weeks.  Patient was recently in the hospital on October 23 through October 25.  However the patient ended up leaving AMA, due to family issues.  The patient is been having persistent diarrhea at home, and is now feeling increased abdominal pain, generalized fatigue and weakness./Yes

## 2024-11-08 NOTE — CONSULT NOTE ADULT - SUBJECTIVE AND OBJECTIVE BOX
HPI:  42YO F PMH severe peripheral neuropathy (able to move her extremities, but due to loss of sensation unable to stand/walk) bed bound hx of colitis multiple transfusions sec anemia who presented to the hospital with c/o abdominal discomfort and diarrhea for the past 2 to 3 weeks. Patient was recently in the hospital on  through  for Stercoral Colitis and signed out AMA . In ED afebrile wbc wnl CT AP showed pancolitis. No recent antibiotic use No sick contacts or recent travel.     Infectious Disease consult was called to evaluate pt and for antibiotic management.      Past Medical & Surgical Hx:  PAST MEDICAL & SURGICAL HISTORY:  Atrial fibrillation  History of Hyperthyroidism  Asthma  History of anemia  Depression, unspecified depression type  Smoker  Peripheral neuropathy  S/P  Section  ,,  History of blood transfusion  Status post embolization of uterine artery      Social History--  EtOH: denies  Tobacco: denies  Drug Use: denies    FAMILY HISTORY:  Family history of diabetes mellitus (Sibling, Grandparent)    Family history of stomach cancer (Grandparent)    Allergies  Motrin (Hives)    Intolerances  NONE      Home Medications:  acetaminophen 325 mg oral tablet: 2 tab(s) orally every 6 hours As needed Temp greater or equal to 38C (100.4F), Mild Pain (1 - 3) (2024 17:48)  melatonin 3 mg oral tablet: 1 tab(s) orally once a day (at bedtime) As needed Insomnia (2024 17:48)  sertraline 100 mg oral tablet: 1 tab(s) orally once a day (2024 17:48)      Current Inpatient Medications :    ANTIBIOTICS:       OTHER RELEVANT MEDICATIONS :  acetaminophen     Tablet .. 650 milliGRAM(s) Oral every 6 hours PRN  diazepam    Tablet 5 milliGRAM(s) Oral two times a day PRN  morphine  - Injectable 2 milliGRAM(s) IV Push every 6 hours PRN  potassium chloride  10 mEq/100 mL IVPB 10 milliEquivalent(s) IV Intermittent every 1 hour  pregabalin 150 milliGRAM(s) Oral two times a day  sertraline 100 milliGRAM(s) Oral daily  sodium chloride 0.9%. 1000 milliLiter(s) IV Continuous <Continuous>    ROS:  CONSTITUTIONAL:  Negative fever or chills  EYES:  Negative  blurry vision or double vision  CARDIOVASCULAR:  Negative for chest pain or palpitations  RESPIRATORY:  Negative for cough, wheezing, or SOB   GASTROINTESTINAL:  Negative for nausea, vomiting, constipation, + abdominal pain diarrhea,  GENITOURINARY:  Negative frequency, urgency , dysuria or hematuria   NEUROLOGIC:  No headache, confusion, dizziness, lightheadedness  All other systems were reviewed and are negative    Physical Exam:  Vital Signs Last 24 Hrs  T(C): 36.7 (2024 09:40), Max: 36.8 (2024 17:34)  T(F): 98 (:40), Max: 98.3 (2024 17:34)  HR: 83 (2024 09:40) (83 - 110)  BP: 102/69 (:40) (101/69 - 114/83)  BP(mean): 80 (:40) (80 - 80)  RR: 18 (:40) (16 - 18)  SpO2: 96% (:40) (96% - 100%)    Parameters below as of 2024 09:40  Patient On (Oxygen Delivery Method): room air      Height (cm): 165.1 ( @ 09:40)  Weight (kg): 93 ( @ 09:40)  BMI (kg/m2): 34.1 ( @ 09:40)  BSA (m2): 2 ( @ 09:40)    General: no acute distress  Neck: supple, trachea midline  Lungs: clear, no wheeze/rhonchi  Cardiovascular: regular rate and rhythm, S1 S2  Abdomen: soft, nontender, ND, bowel sounds normal  Neurological:  alert and oriented x3  Skin: no rash  Extremities: no edema    Labs:                         8.9    3.75  )-----------( 179      ( 2024 07:47 )             28.7       144  |  106  |  3[L]  ----------------------------<  71  3.2[L]   |  29  |  0.55    Ca    7.0[L]      2024 07:47  Phos  3.7       Mg     1.1         TPro  5.0[L]  /  Alb  2.4[L]  /  TBili  1.1  /  DBili  x   /  AST  46[H]  /  ALT  17  /  AlkPhos  95  11-    RECENT CULTURES:        RADIOLOGY & ADDITIONAL STUDIES:    ACC: [id number] EXAM:  CT ABDOMEN AND PELVIS IC   ORDERED BY: VAHE JONES     PROCEDURE DATE:  2024          INTERPRETATION:  CLINICAL INFORMATION: Abdominal pain and persistent   diarrhea x2 weeks.    COMPARISON: CT abdomen/pelvis 10/23/2024    CONTRAST/COMPLICATIONS:  IV Contrast: Omnipaque 350  90 cc administered   10 cc discarded  Oral Contrast: NONE  Complications: None reported at time of study completion    PROCEDURE:  CT of the Abdomen and Pelvis was performed.  Sagittal and coronal reformats were performed.    FINDINGS:  LOWER CHEST: Within normal limits.    LIVER: Steatosis. Liver is enlarged and measures 23 cm in length.  BILE DUCTS: Normal caliber.  GALLBLADDER: Within normal limits.  SPLEEN: Within normal limits.  PANCREAS: Within normal limits.  ADRENALS: Within normal limits.  KIDNEYS/URETERS: No bowel obstruction or inflammation.    BLADDER: Minimally distended.  REPRODUCTIVE ORGANS: Uterus and adnexa within normal limits.    BOWEL: No bowel obstruction. Appendix is normal. Diffuse colonic wall   thickening with surrounding fat stranding involving the ascending colon   to the rectum. Scattered colonic diverticulosis.  PERITONEUM/RETROPERITONEUM: Within normal limits.  VESSELS: Atherosclerotic changes.  LYMPHNODES: No lymphadenopathy.  ABDOMINAL WALL: Within normal limits.  BONES: Within normal limits.    IMPRESSION:  Pancolitis.    Assessment :   42YO F PMH severe peripheral neuropathy (able to move her extremities, but due to loss of sensation unable to stand/walk) bed bound hx of colitis multiple transfusions sec anemia who presented  with c/o abdominal discomfort and diarrhea for the past 2 to 3 weeks admitted with pancolitis. No recent antibiotic use No sick contacts or recent travel.   Patient was recently in the hospital on  through  for Stercoral Colitis and signed out AMA . In ED afebrile wbc wnl     Plan :   Hold antibiotics for now  Stool studies including C diff, GI PCR, stool cultures, O&P  GI on case  Fu cultures  Trend temps and cbc  Serial abd exams    Continue with present regiment .  Approptiate use of antibiotics and adverse effects reviewed.      I have discussed the above plan of care with patient/family in detail. They expressed understanding of the treatment plan . Risks, benefits and alternatives discussed in detail. I have asked if they have any questions or concerns and appropriately addressed them to the best of my ability .      > 45 minutes spent in direct patient care reviewing  the notes, lab data/ imaging , discussion with multidisciplinary team. All questions were addressed and answered to the best of my capacity .    Thank you for allowing me to participate in the care of your patient .      Luis Alberto Farley MD  Infectious Disease  [phone]

## 2024-11-08 NOTE — PATIENT PROFILE ADULT - NSTRANSFERBELONGINGSDISPO_GEN_A_NUR
with patient Olumiant Pregnancy And Lactation Text: Based on animal studies, Olumiant may cause embryo-fetal harm when administered to pregnant women.  The medication should not be used in pregnancy.  Breastfeeding is not recommended during treatment.

## 2024-11-08 NOTE — PATIENT CHOICE NOTE. - NSPTCHOICESTATE_GEN_ALL_CORE

## 2024-11-09 LAB
ANION GAP SERPL CALC-SCNC: 10 MMOL/L — SIGNIFICANT CHANGE UP (ref 5–17)
ANION GAP SERPL CALC-SCNC: 13 MMOL/L — SIGNIFICANT CHANGE UP (ref 5–17)
BASOPHILS # BLD AUTO: 0.02 K/UL — SIGNIFICANT CHANGE UP (ref 0–0.2)
BASOPHILS NFR BLD AUTO: 0.6 % — SIGNIFICANT CHANGE UP (ref 0–2)
BUN SERPL-MCNC: 2 MG/DL — LOW (ref 7–23)
BUN SERPL-MCNC: 2 MG/DL — LOW (ref 7–23)
CALCIUM SERPL-MCNC: 6.9 MG/DL — LOW (ref 8.4–10.5)
CALCIUM SERPL-MCNC: 7.1 MG/DL — LOW (ref 8.4–10.5)
CHLORIDE SERPL-SCNC: 104 MMOL/L — SIGNIFICANT CHANGE UP (ref 96–108)
CHLORIDE SERPL-SCNC: 105 MMOL/L — SIGNIFICANT CHANGE UP (ref 96–108)
CO2 SERPL-SCNC: 24 MMOL/L — SIGNIFICANT CHANGE UP (ref 22–31)
CO2 SERPL-SCNC: 27 MMOL/L — SIGNIFICANT CHANGE UP (ref 22–31)
CREAT SERPL-MCNC: 0.37 MG/DL — LOW (ref 0.5–1.3)
CREAT SERPL-MCNC: 0.65 MG/DL — SIGNIFICANT CHANGE UP (ref 0.5–1.3)
CRP SERPL-MCNC: 20 MG/L — HIGH
EGFR: 111 ML/MIN/1.73M2 — SIGNIFICANT CHANGE UP
EGFR: 127 ML/MIN/1.73M2 — SIGNIFICANT CHANGE UP
EOSINOPHIL # BLD AUTO: 0 K/UL — SIGNIFICANT CHANGE UP (ref 0–0.5)
EOSINOPHIL NFR BLD AUTO: 0 % — SIGNIFICANT CHANGE UP (ref 0–6)
GI PCR PANEL: SIGNIFICANT CHANGE UP
GLUCOSE SERPL-MCNC: 71 MG/DL — SIGNIFICANT CHANGE UP (ref 70–99)
GLUCOSE SERPL-MCNC: 83 MG/DL — SIGNIFICANT CHANGE UP (ref 70–99)
HCT VFR BLD CALC: 30.3 % — LOW (ref 34.5–45)
HGB BLD-MCNC: 9.7 G/DL — LOW (ref 11.5–15.5)
IMM GRANULOCYTES NFR BLD AUTO: 0.3 % — SIGNIFICANT CHANGE UP (ref 0–0.9)
LYMPHOCYTES # BLD AUTO: 0.58 K/UL — LOW (ref 1–3.3)
LYMPHOCYTES # BLD AUTO: 17.6 % — SIGNIFICANT CHANGE UP (ref 13–44)
MAGNESIUM SERPL-MCNC: 1.2 MG/DL — LOW (ref 1.6–2.6)
MCHC RBC-ENTMCNC: 29.5 PG — SIGNIFICANT CHANGE UP (ref 27–34)
MCHC RBC-ENTMCNC: 32 G/DL — SIGNIFICANT CHANGE UP (ref 32–36)
MCV RBC AUTO: 92.1 FL — SIGNIFICANT CHANGE UP (ref 80–100)
MONOCYTES # BLD AUTO: 0.31 K/UL — SIGNIFICANT CHANGE UP (ref 0–0.9)
MONOCYTES NFR BLD AUTO: 9.4 % — SIGNIFICANT CHANGE UP (ref 2–14)
NEUTROPHILS # BLD AUTO: 2.37 K/UL — SIGNIFICANT CHANGE UP (ref 1.8–7.4)
NEUTROPHILS NFR BLD AUTO: 72.1 % — SIGNIFICANT CHANGE UP (ref 43–77)
NRBC # BLD: 0 /100 WBCS — SIGNIFICANT CHANGE UP (ref 0–0)
PLATELET # BLD AUTO: 183 K/UL — SIGNIFICANT CHANGE UP (ref 150–400)
POTASSIUM SERPL-MCNC: 3 MMOL/L — LOW (ref 3.5–5.3)
POTASSIUM SERPL-MCNC: 3.4 MMOL/L — LOW (ref 3.5–5.3)
POTASSIUM SERPL-SCNC: 3 MMOL/L — LOW (ref 3.5–5.3)
POTASSIUM SERPL-SCNC: 3.4 MMOL/L — LOW (ref 3.5–5.3)
RBC # BLD: 3.29 M/UL — LOW (ref 3.8–5.2)
RBC # FLD: 15.3 % — HIGH (ref 10.3–14.5)
SODIUM SERPL-SCNC: 141 MMOL/L — SIGNIFICANT CHANGE UP (ref 135–145)
SODIUM SERPL-SCNC: 142 MMOL/L — SIGNIFICANT CHANGE UP (ref 135–145)
WBC # BLD: 3.29 K/UL — LOW (ref 3.8–10.5)
WBC # FLD AUTO: 3.29 K/UL — LOW (ref 3.8–10.5)

## 2024-11-09 RX ORDER — VANCOMYCIN HYDROCHLORIDE 50 MG/ML
125 KIT ORAL EVERY 6 HOURS
Refills: 0 | Status: DISCONTINUED | OUTPATIENT
Start: 2024-11-09 | End: 2024-11-12

## 2024-11-09 RX ORDER — POTASSIUM CHLORIDE 10 MEQ
40 TABLET, EXTENDED RELEASE ORAL ONCE
Refills: 0 | Status: COMPLETED | OUTPATIENT
Start: 2024-11-09 | End: 2024-11-09

## 2024-11-09 RX ORDER — POTASSIUM CHLORIDE 10 MEQ
10 TABLET, EXTENDED RELEASE ORAL
Refills: 0 | Status: COMPLETED | OUTPATIENT
Start: 2024-11-09 | End: 2024-11-09

## 2024-11-09 RX ORDER — MAGNESIUM SULFATE IN 0.9% NACL 2 G/50 ML
2 INTRAVENOUS SOLUTION, PIGGYBACK (ML) INTRAVENOUS ONCE
Refills: 0 | Status: COMPLETED | OUTPATIENT
Start: 2024-11-09 | End: 2024-11-09

## 2024-11-09 RX ORDER — VANCOMYCIN HYDROCHLORIDE 50 MG/ML
125 KIT ORAL EVERY 6 HOURS
Refills: 0 | Status: DISCONTINUED | OUTPATIENT
Start: 2024-11-09 | End: 2024-11-09

## 2024-11-09 RX ORDER — NYSTATIN 10B UNIT
500000 POWDER (EA) MISCELLANEOUS
Refills: 0 | Status: DISCONTINUED | OUTPATIENT
Start: 2024-11-09 | End: 2024-11-09

## 2024-11-09 RX ADMIN — NYSTATIN 1 APPLICATION(S): 100000 POWDER TOPICAL at 06:47

## 2024-11-09 RX ADMIN — DIAZEPAM 5 MILLIGRAM(S): 10 FILM BUCCAL at 13:07

## 2024-11-09 RX ADMIN — PREGABALIN 150 MILLIGRAM(S): 150 CAPSULE ORAL at 08:21

## 2024-11-09 RX ADMIN — HEPARIN SODIUM 5000 UNIT(S): 10000 INJECTION INTRAVENOUS; SUBCUTANEOUS at 21:35

## 2024-11-09 RX ADMIN — VANCOMYCIN HYDROCHLORIDE 125 MILLIGRAM(S): KIT at 23:18

## 2024-11-09 RX ADMIN — VANCOMYCIN HYDROCHLORIDE 125 MILLIGRAM(S): KIT at 17:12

## 2024-11-09 RX ADMIN — Medication 100 MILLIEQUIVALENT(S): at 15:34

## 2024-11-09 RX ADMIN — PREGABALIN 150 MILLIGRAM(S): 150 CAPSULE ORAL at 17:12

## 2024-11-09 RX ADMIN — Medication 25 GRAM(S): at 21:35

## 2024-11-09 RX ADMIN — Medication 40 MILLIEQUIVALENT(S): at 08:21

## 2024-11-09 RX ADMIN — Medication 40 MILLIEQUIVALENT(S): at 12:53

## 2024-11-09 RX ADMIN — MORPHINE SULFATE 2 MILLIGRAM(S): 30 TABLET, EXTENDED RELEASE ORAL at 19:45

## 2024-11-09 RX ADMIN — MORPHINE SULFATE 2 MILLIGRAM(S): 30 TABLET, EXTENDED RELEASE ORAL at 02:50

## 2024-11-09 RX ADMIN — MORPHINE SULFATE 2 MILLIGRAM(S): 30 TABLET, EXTENDED RELEASE ORAL at 12:10

## 2024-11-09 RX ADMIN — Medication 100 MILLIEQUIVALENT(S): at 14:19

## 2024-11-09 RX ADMIN — DIAZEPAM 5 MILLIGRAM(S): 10 FILM BUCCAL at 02:20

## 2024-11-09 RX ADMIN — MORPHINE SULFATE 2 MILLIGRAM(S): 30 TABLET, EXTENDED RELEASE ORAL at 11:35

## 2024-11-09 RX ADMIN — HEPARIN SODIUM 5000 UNIT(S): 10000 INJECTION INTRAVENOUS; SUBCUTANEOUS at 14:29

## 2024-11-09 RX ADMIN — NYSTATIN 1 APPLICATION(S): 100000 POWDER TOPICAL at 17:14

## 2024-11-09 RX ADMIN — MORPHINE SULFATE 2 MILLIGRAM(S): 30 TABLET, EXTENDED RELEASE ORAL at 19:27

## 2024-11-09 RX ADMIN — SERTRALINE HYDROCHLORIDE 100 MILLIGRAM(S): 50 TABLET, FILM COATED ORAL at 11:35

## 2024-11-09 RX ADMIN — MORPHINE SULFATE 2 MILLIGRAM(S): 30 TABLET, EXTENDED RELEASE ORAL at 02:20

## 2024-11-09 RX ADMIN — Medication 100 MILLIEQUIVALENT(S): at 12:53

## 2024-11-09 RX ADMIN — HEPARIN SODIUM 5000 UNIT(S): 10000 INJECTION INTRAVENOUS; SUBCUTANEOUS at 06:46

## 2024-11-09 NOTE — CONSULT NOTE ADULT - CONSULT REASON
Atrial fibrillation
colitis
Pancolitis
H/o Neuropathy
abd pain  colitis  smoker  nicotine dep  Asthma

## 2024-11-09 NOTE — CONSULT NOTE ADULT - ASSESSMENT
Pan colitis    Recommendations:  - check, CRP and fecal calprotectin  - Stool studies including C diff, GI PCR, stool cultures, O&P  - antiemetics and pain control per primary team  - low residue diet  - Daily CBC, CRP  - DVT PPx  - Colonoscopy as outpt  - Will follow with you    Elvis Alberto M.D.  [facility]  [phone]      
The patient is a 43 year old female with a history of anemia, paroxysmal atrial fibrillation, hyperthyroidism who presents with diarrhea.    Plan:  - ECG from 10/24/24 with sinus rhythm and mildly prolonged QTc  - The patient is not on anticoagulation for atrial fibrillation due to low thromboembolic risk, prior bleed, and remaining in sinus rhythm  - Replete electrolytes  - Continue IV fluids as needed  - CT A/P with pancolitis  - C. diff pending  - GI and ID evals
44YO F PMH severe peripheral neuropathy (able to move her extremities, but due to loss of sensation unable to stand/walk) bed bound hx of colitis multiple transfusions sec anemia who presented to the hospital with c/o abdominal discomfort and diarrhea for the past 2 to 3 weeks. Patient was recently in the hospital on October 23 through October 25 for Stercoral Colitis and signed out AMA       colitis  abd pain  weakness  asthma  smoker  nicotine dep  neuropathy  anxious  anemia hx  Liver Steatosis    studies in progress  GI - Cardio and ID eval noted  hydration  nutritional assessment  SBIRT documentation  NRT  smoking cess ed  tox screen  proventil PRN  anxiolysis  Labs reviewed  CT imaging reviewed  I yahaira  mobilize  emotional support  monitor VS and HD and Sat  dvt p  replete lytes  
43-year-old female bed bound due to severe peripheral neuropathy (able to move her extremities, but due to loss of sensation unable to stand/walk), multiple transfusions to the prior bleeding issues presents with having some abdominal discomfort and diarrhea for the past 2 to 3 weeks. Patient was recently in the hospital on October 23 through October 25 for Stercoral Colitis and signed out AMA .    Hospitalized Feb 2024 for colitis and in Aug 2023 in the SICU for acute blood loss anemia (requiring massive transfusion protocol) from menometrorrhagia from fibroids s/p UAE. (08 Nov 2024 09:40)    H/o neuropathy. On Lyrica 150 mg BID - to continue  Would check Vit B12/Folate levels.    PT/ROM  Fall/safety precautions.    D/w pt  D/w Dr. Mcgraw    Would Follow.

## 2024-11-09 NOTE — PROGRESS NOTE ADULT - ASSESSMENT
43-year-old female with a history of chronic peripheral neuropathy secondary to previous multiple transfusions to the prior bleeding issues presents with having some abdominal discomfort and diarrhea for the past 2 to 3 weeks.       PANCOLITIS     C. Diff positive   Started patient on po Vanco   -  Stool studies including C diff, GI PCR, stool cultures, O&P  - antiemetics and pain control per primary team  - low residue diet    Electrolyte Imbalance  Severe Hypomagnesemia   Hypokalemia    Supplement   Tele       Abdominal Pain   Avoid Narcotics     H/O Neuropathy. On Lyrica 150 mg BID - to continue  Would check Vit B12/Folate levels.        Anxiety and MDD  -Taking diazepam 5 mg BID prn for anxiety, sertraline 100 mg daily  -Cont diazepam and sertraline

## 2024-11-09 NOTE — PROGRESS NOTE ADULT - ASSESSMENT
44YO F PMH severe peripheral neuropathy (able to move her extremities, but due to loss of sensation unable to stand/walk) bed bound hx of colitis multiple transfusions sec anemia who presented to the hospital with c/o abdominal discomfort and diarrhea for the past 2 to 3 weeks. Patient was recently in the hospital on October 23 through October 25 for Stercoral Colitis and signed out AMA       colitis  abd pain  weakness  asthma  smoker  nicotine dep  neuropathy  anxious  anemia hx  Liver Steatosis    PO clears  vs noted    studies in progress  GI - Cardio and ID eval noted  hydration  nutritional assessment  SBIRT documentation  NRT  smoking cess ed  tox screen  proventil PRN  anxiolysis  Labs reviewed  CT imaging reviewed  I yahaira  mobilize  emotional support  monitor VS and HD and Sat  dvt p  replete lytes

## 2024-11-09 NOTE — CONSULT NOTE ADULT - SUBJECTIVE AND OBJECTIVE BOX
Patient is a 43y old  Female who presents with a chief complaint of Abdominal Pain x 2-3 weeks (2024 05:27)    HPI: 43-year-old female bed bound due to severe peripheral neuropathy (able to move her extremities, but due to loss of sensation unable to stand/walk), multiple transfusions to the prior bleeding issues presents with having some abdominal discomfort and diarrhea for the past 2 to 3 weeks. Patient was recently in the hospital on  through  for Stercoral Colitis and signed out AMA .    Previous History     Hospitalized 2024 for colitis and in Aug 2023 in the SICU for acute blood loss anemia (requiring massive transfusion protocol) from menometrorrhagia from fibroids s/p UAE. (2024 09:40)    DRAFT    PAST MEDICAL & SURGICAL HISTORY:    Atrial fibrillation    History of Hyperthyroidism    Asthma    History of anemia    Depression, unspecified depression type    Smoker    Peripheral neuropathy    S/P  Section  ,,    History of blood transfusion    Status post embolization of uterine artery    MEDICATIONS  (STANDING):    heparin   Injectable 5000 Unit(s) SubCutaneous every 8 hours  nystatin Powder 1 Application(s) Topical two times a day  potassium chloride  10 mEq/100 mL IVPB 10 milliEquivalent(s) IV Intermittent every 1 hour  pregabalin 150 milliGRAM(s) Oral two times a day  sertraline 100 milliGRAM(s) Oral daily  sodium chloride 0.9%. 1000 milliLiter(s) (100 mL/Hr) IV Continuous <Continuous>    MEDICATIONS  (PRN):    acetaminophen     Tablet .. 650 milliGRAM(s) Oral every 6 hours PRN Temp greater or equal to 38C (100.4F), Mild Pain (1 - 3)  albuterol    90 MICROgram(s) HFA Inhaler 2 Puff(s) Inhalation every 6 hours PRN Shortness of Breath and/or Wheezing  diazepam    Tablet 5 milliGRAM(s) Oral two times a day PRN for anxiety  morphine  - Injectable 2 milliGRAM(s) IV Push every 6 hours PRN Severe Pain (7 - 10)      Allergies    Motrin (Hives)    SOCIAL HISTORY:    Pt smokes.  Pt does drink socially.  Pt drinks alcohol heavily.    FAMILY HISTORY:  Family history of diabetes mellitus (Sibling, Grandparent)    Family history of stomach cancer (Grandparent)    REVIEW OF SYSTEMS:    CONSTITUTIONAL: No fever  EYES: No eye pain,   ENMT:  No sinus or throat pain  NECK: No pain or stiffness  RESPIRATORY: No cough, No hemoptysis; No shortness of breath  CARDIOVASCULAR: No acute chest pain, palpitations,  or leg swelling  GASTROINTESTINAL: No abdominal pain. No nausea, vomiting, or hematemesis;  No melena or hematochezia.  GENITOURINARY: No  hematuria, or incontinence  MUSCULOSKELETAL: No joint swelling; No extremity pain  SKIN: No itching, rashes, or lesions   LYMPH NODES: No enlarged glands  NEUROLOGICAL: No headaches, memory loss,   PSYCHIATRIC: No depression, anxiety, mood swings, or difficulty sleeping  ENDOCRINE: No heat or cold intolerance;   HEME/LYMPH: No easy bruising, or bleeding gums  Allergy/Immunology. No medication allergy. No seasonal allergies.    PHYSICAL EXAM:  Vital Signs Last 24 Hrs  T(F): 97.9 (24 @ 09:33)  HR: 88 (24 @ 09:33)  BP: 104/72 (24 @ 09:33)  RR: 18 (24 @ 09:33)    GENERAL: NAD, well-groomed, well-developed  HEAD:  Atraumatic, Normocephalic  EYES: EOMI, PERRLA, conjunctiva and sclera clear  NECK: Supple, No JVD, thyroid non-palpable    On Neurological Examination:    Mental Status - Pt is alert, awake, oriented X3. Higher functions are intact. Pt. does have mild poor cognition. Follows commands well and able to answer questions appropriately.    Speech -  Normal. Slurred. Pt has no aphasia.    Cranial Nerves - Pupils 3 mm equal and reactive to light, extraocular eye movements intact. Pt has no visual field deficit.  Pt has no right left facial asymmetry. Tongue - is in midline.    Motor Exam - 4 plus/5 all over, No drift. No shaking or tremors.  Muscle tone - is normal all over. Moves all extremities equally. No asymmetry is seen.      Sensory Exam - Pin prick, temperature, joint position and vibration are intact on either side. Pt withdraws all extremities equally on stimulation. No asymmetry seen. No complaints of tingling, numbness.    Gait - Able to stand and walk unassisted. Pt is able to stand up with holding my hands and is able to walk for few feet around the bed. Not falling to either side.    Deep tendon Reflexes - 2 plus all over.    Coordination - Fine finger movements are normal on both sides. Finger to nose is also normal on both sides.       Romberg - Negative.    Neck Supple -  Yes.    LABS:                        9.7    3.29  )-----------( 183      ( 2024 06:00 )             30.3     11    142  |  105  |  2[L]  ----------------------------<  71  3.0[L]   |  27  |  0.37[L]    Ca    6.9[L]      2024 06:00  Phos  3.7     11-  Mg     1.5     -    TPro  5.0[L]  /  Alb  2.4[L]  /  TBili  1.1  /  DBili  x   /  AST  46[H]  /  ALT  17  /  AlkPhos  95  11-08    PT/INR - ( 2024 17:57 )   PT: 15.1 sec;   INR: 1.28 ratio         PTT - ( 2024 17:57 )  PTT:34.0 sec  Urinalysis Basic - ( 2024 06:00 )    Color: x / Appearance: x / SG: x / pH: x  Gluc: 71 mg/dL / Ketone: x  / Bili: x / Urobili: x   Blood: x / Protein: x / Nitrite: x   Leuk Esterase: x / RBC: x / WBC x   Sq Epi: x / Non Sq Epi: x / Bacteria: x        RADIOLOGY & ADDITIONAL STUDIES:       Patient is a 43y old  Female who presents with a chief complaint of Abdominal Pain x 2-3 weeks (2024 05:27)    HPI: 43-year-old female bed bound due to severe peripheral neuropathy (able to move her extremities, but due to loss of sensation unable to stand/walk), multiple transfusions to the prior bleeding issues presents with having some abdominal discomfort and diarrhea for the past 2 to 3 weeks. Patient was recently in the hospital on  through  for Stercoral Colitis and signed out AMA .    Previous History     Hospitalized 2024 for colitis and in Aug 2023 in the SICU for acute blood loss anemia (requiring massive transfusion protocol) from menometrorrhagia from fibroids s/p UAE. (2024 09:40)      PAST MEDICAL & SURGICAL HISTORY:    Atrial fibrillation    History of Hyperthyroidism    Asthma    History of anemia    Depression, unspecified depression type    Smoker    Peripheral neuropathy    S/P  Section  ,,    History of blood transfusion    Status post embolization of uterine artery    MEDICATIONS  (STANDING):    heparin   Injectable 5000 Unit(s) SubCutaneous every 8 hours  nystatin Powder 1 Application(s) Topical two times a day  potassium chloride  10 mEq/100 mL IVPB 10 milliEquivalent(s) IV Intermittent every 1 hour  pregabalin 150 milliGRAM(s) Oral two times a day  sertraline 100 milliGRAM(s) Oral daily  sodium chloride 0.9%. 1000 milliLiter(s) (100 mL/Hr) IV Continuous <Continuous>    MEDICATIONS  (PRN):    acetaminophen     Tablet .. 650 milliGRAM(s) Oral every 6 hours PRN Temp greater or equal to 38C (100.4F), Mild Pain (1 - 3)  albuterol    90 MICROgram(s) HFA Inhaler 2 Puff(s) Inhalation every 6 hours PRN Shortness of Breath and/or Wheezing  diazepam    Tablet 5 milliGRAM(s) Oral two times a day PRN for anxiety  morphine  - Injectable 2 milliGRAM(s) IV Push every 6 hours PRN Severe Pain (7 - 10)      Allergies    Motrin (Hives)    SOCIAL HISTORY:    Pt smokes.    FAMILY HISTORY:  Family history of diabetes mellitus (Sibling, Grandparent)    Family history of stomach cancer (Grandparent)    REVIEW OF SYSTEMS:    CONSTITUTIONAL: No fever  EYES: No eye pain,   ENMT:  No sinus or throat pain  NECK: No pain or stiffness  RESPIRATORY: No cough, No hemoptysis; No shortness of breath  CARDIOVASCULAR: No acute chest pain, palpitations,  or leg swelling  GASTROINTESTINAL: No abdominal pain. No nausea, vomiting, or hematemesis;  No melena or hematochezia.  GENITOURINARY: No  hematuria, or incontinence  MUSCULOSKELETAL: No joint swelling; No extremity pain  SKIN: No itching, rashes, or lesions   LYMPH NODES: No enlarged glands  NEUROLOGICAL: No headaches, memory loss,   PSYCHIATRIC: No depression, anxiety, mood swings, or difficulty sleeping  ENDOCRINE: No heat or cold intolerance;   HEME/LYMPH: No easy bruising, or bleeding gums  Allergy/Immunology. No medication allergy. No seasonal allergies.    PHYSICAL EXAM:  Vital Signs Last 24 Hrs  T(F): 97.9 (24 @ 09:33)  HR: 88 (24 @ 09:33)  BP: 104/72 (24 @ 09:33)  RR: 18 (24 @ 09:33)    GENERAL: NAD, well-groomed, well-developed  HEAD:  Atraumatic, Normocephalic  EYES: EOMI, PERRLA, conjunctiva and sclera clear  NECK: Supple, No JVD    On Neurological Examination:    Mental Status - Pt is alert, awake, oriented X3. Higher functions are intact.  Follows commands well and able to answer questions appropriately.    Speech -  Normal. Pt has no aphasia.    Cranial Nerves - Pupils 3 mm equal and reactive to light, extraocular eye movements intact. Pt has no visual field deficit.  No facial asymmetry. Tongue - is in midline.    Motor Exam - 3+/5 all over, No drift. No shaking or tremors.    Sensory Exam -  Pt withdraws all extremities equally on stimulation.    Gait - Non ambulatory    Deep tendon Reflexes - 2 plus all over.    Neck Supple -  Yes.    LABS:                        9.7    3.29  )-----------( 183      ( 2024 06:00 )             30.3         142  |  105  |  2[L]  ----------------------------<  71  3.0[L]   |  27  |  0.37[L]    Ca    6.9[L]      2024 06:00  Phos  3.7       Mg     1.5         TPro  5.0[L]  /  Alb  2.4[L]  /  TBili  1.1  /  DBili  x   /  AST  46[H]  /  ALT  17  /  AlkPhos  95  11-08    PT/INR - ( 2024 17:57 )   PT: 15.1 sec;   INR: 1.28 ratio       PTT - ( 2024 17:57 )  PTT:34.0 sec  Urinalysis Basic - ( 2024 06:00 )    Color: x / Appearance: x / SG: x / pH: x  Gluc: 71 mg/dL / Ketone: x  / Bili: x / Urobili: x   Blood: x / Protein: x / Nitrite: x   Leuk Esterase: x / RBC: x / WBC x   Sq Epi: x / Non Sq Epi: x / Bacteria: x        RADIOLOGY & ADDITIONAL STUDIES:

## 2024-11-10 LAB
ANION GAP SERPL CALC-SCNC: 9 MMOL/L — SIGNIFICANT CHANGE UP (ref 5–17)
BASOPHILS # BLD AUTO: 0.03 K/UL — SIGNIFICANT CHANGE UP (ref 0–0.2)
BASOPHILS NFR BLD AUTO: 1.1 % — SIGNIFICANT CHANGE UP (ref 0–2)
BUN SERPL-MCNC: 1 MG/DL — LOW (ref 7–23)
CALCIUM SERPL-MCNC: 7.2 MG/DL — LOW (ref 8.4–10.5)
CHLORIDE SERPL-SCNC: 106 MMOL/L — SIGNIFICANT CHANGE UP (ref 96–108)
CO2 SERPL-SCNC: 27 MMOL/L — SIGNIFICANT CHANGE UP (ref 22–31)
CREAT SERPL-MCNC: 0.42 MG/DL — LOW (ref 0.5–1.3)
EGFR: 123 ML/MIN/1.73M2 — SIGNIFICANT CHANGE UP
EOSINOPHIL # BLD AUTO: 0 K/UL — SIGNIFICANT CHANGE UP (ref 0–0.5)
EOSINOPHIL NFR BLD AUTO: 0 % — SIGNIFICANT CHANGE UP (ref 0–6)
GLUCOSE SERPL-MCNC: 73 MG/DL — SIGNIFICANT CHANGE UP (ref 70–99)
HCT VFR BLD CALC: 28.5 % — LOW (ref 34.5–45)
HGB BLD-MCNC: 8.9 G/DL — LOW (ref 11.5–15.5)
IMM GRANULOCYTES NFR BLD AUTO: 0.4 % — SIGNIFICANT CHANGE UP (ref 0–0.9)
LYMPHOCYTES # BLD AUTO: 0.56 K/UL — LOW (ref 1–3.3)
LYMPHOCYTES # BLD AUTO: 20.2 % — SIGNIFICANT CHANGE UP (ref 13–44)
MCHC RBC-ENTMCNC: 28.7 PG — SIGNIFICANT CHANGE UP (ref 27–34)
MCHC RBC-ENTMCNC: 31.2 G/DL — LOW (ref 32–36)
MCV RBC AUTO: 91.9 FL — SIGNIFICANT CHANGE UP (ref 80–100)
MONOCYTES # BLD AUTO: 0.28 K/UL — SIGNIFICANT CHANGE UP (ref 0–0.9)
MONOCYTES NFR BLD AUTO: 10.1 % — SIGNIFICANT CHANGE UP (ref 2–14)
NEUTROPHILS # BLD AUTO: 1.89 K/UL — SIGNIFICANT CHANGE UP (ref 1.8–7.4)
NEUTROPHILS NFR BLD AUTO: 68.2 % — SIGNIFICANT CHANGE UP (ref 43–77)
NRBC # BLD: 0 /100 WBCS — SIGNIFICANT CHANGE UP (ref 0–0)
PLATELET # BLD AUTO: 198 K/UL — SIGNIFICANT CHANGE UP (ref 150–400)
POTASSIUM SERPL-MCNC: 3 MMOL/L — LOW (ref 3.5–5.3)
POTASSIUM SERPL-SCNC: 3 MMOL/L — LOW (ref 3.5–5.3)
RBC # BLD: 3.1 M/UL — LOW (ref 3.8–5.2)
RBC # FLD: 15.1 % — HIGH (ref 10.3–14.5)
SODIUM SERPL-SCNC: 142 MMOL/L — SIGNIFICANT CHANGE UP (ref 135–145)
WBC # BLD: 2.77 K/UL — LOW (ref 3.8–10.5)
WBC # FLD AUTO: 2.77 K/UL — LOW (ref 3.8–10.5)

## 2024-11-10 RX ORDER — POTASSIUM CHLORIDE 10 MEQ
10 TABLET, EXTENDED RELEASE ORAL
Refills: 0 | Status: COMPLETED | OUTPATIENT
Start: 2024-11-10 | End: 2024-11-10

## 2024-11-10 RX ORDER — POTASSIUM CHLORIDE 10 MEQ
40 TABLET, EXTENDED RELEASE ORAL ONCE
Refills: 0 | Status: COMPLETED | OUTPATIENT
Start: 2024-11-10 | End: 2024-11-10

## 2024-11-10 RX ADMIN — HEPARIN SODIUM 5000 UNIT(S): 10000 INJECTION INTRAVENOUS; SUBCUTANEOUS at 14:06

## 2024-11-10 RX ADMIN — VANCOMYCIN HYDROCHLORIDE 125 MILLIGRAM(S): KIT at 05:57

## 2024-11-10 RX ADMIN — HEPARIN SODIUM 5000 UNIT(S): 10000 INJECTION INTRAVENOUS; SUBCUTANEOUS at 22:11

## 2024-11-10 RX ADMIN — DIAZEPAM 5 MILLIGRAM(S): 10 FILM BUCCAL at 00:50

## 2024-11-10 RX ADMIN — Medication 40 MILLIEQUIVALENT(S): at 10:14

## 2024-11-10 RX ADMIN — PREGABALIN 150 MILLIGRAM(S): 150 CAPSULE ORAL at 17:27

## 2024-11-10 RX ADMIN — MORPHINE SULFATE 2 MILLIGRAM(S): 30 TABLET, EXTENDED RELEASE ORAL at 07:00

## 2024-11-10 RX ADMIN — Medication 100 MILLIEQUIVALENT(S): at 11:31

## 2024-11-10 RX ADMIN — SODIUM CHLORIDE 100 MILLILITER(S): 9 INJECTION, SOLUTION INTRAMUSCULAR; INTRAVENOUS; SUBCUTANEOUS at 06:41

## 2024-11-10 RX ADMIN — VANCOMYCIN HYDROCHLORIDE 125 MILLIGRAM(S): KIT at 17:29

## 2024-11-10 RX ADMIN — Medication 100 MILLIEQUIVALENT(S): at 10:14

## 2024-11-10 RX ADMIN — MORPHINE SULFATE 2 MILLIGRAM(S): 30 TABLET, EXTENDED RELEASE ORAL at 14:45

## 2024-11-10 RX ADMIN — NYSTATIN 1 APPLICATION(S): 100000 POWDER TOPICAL at 06:04

## 2024-11-10 RX ADMIN — VANCOMYCIN HYDROCHLORIDE 125 MILLIGRAM(S): KIT at 11:32

## 2024-11-10 RX ADMIN — SERTRALINE HYDROCHLORIDE 100 MILLIGRAM(S): 50 TABLET, FILM COATED ORAL at 11:32

## 2024-11-10 RX ADMIN — HEPARIN SODIUM 5000 UNIT(S): 10000 INJECTION INTRAVENOUS; SUBCUTANEOUS at 05:58

## 2024-11-10 RX ADMIN — Medication 100 MILLIEQUIVALENT(S): at 12:29

## 2024-11-10 RX ADMIN — NYSTATIN 1 APPLICATION(S): 100000 POWDER TOPICAL at 17:27

## 2024-11-10 RX ADMIN — PREGABALIN 150 MILLIGRAM(S): 150 CAPSULE ORAL at 05:58

## 2024-11-10 RX ADMIN — MORPHINE SULFATE 2 MILLIGRAM(S): 30 TABLET, EXTENDED RELEASE ORAL at 06:42

## 2024-11-10 RX ADMIN — MORPHINE SULFATE 2 MILLIGRAM(S): 30 TABLET, EXTENDED RELEASE ORAL at 14:08

## 2024-11-10 NOTE — PROGRESS NOTE ADULT - ASSESSMENT
The patient is a 43 year old female with a history of anemia, paroxysmal atrial fibrillation, hyperthyroidism who presents with diarrhea.    11/10/24  Seen at [facility] telemetry  Sister at bedside  No cardiac complaints  Doing better  Potassium-3.0    Plan:  - ECG from 10/24/24 with sinus rhythm and mildly prolonged QTc  - The patient is not on anticoagulation for atrial fibrillation due to low thromboembolic risk, prior bleed, and remaining in sinus rhythm  - Replete electrolytes  - Follow labs  - Continue IV fluids as needed  - CT A/P with pancolitis  - C. diff-negative  - On vancomycin PO  - Being followed by ID, Pulmonary, Neurology  - GI and ID evals

## 2024-11-10 NOTE — PROGRESS NOTE ADULT - ASSESSMENT
42YO F PMH severe peripheral neuropathy (able to move her extremities, but due to loss of sensation unable to stand/walk) bed bound hx of colitis multiple transfusions sec anemia who presented to the hospital with c/o abdominal discomfort and diarrhea for the past 2 to 3 weeks. Patient was recently in the hospital on October 23 through October 25 for Stercoral Colitis and signed out AMA       colitis  abd pain  weakness  asthma  smoker  nicotine dep  neuropathy  anxious  anemia hx  Liver Steatosis    vanco - c diff  vs noted  meds reviewed  NEURO eval noted    GI - Cardio and ID eval noted  hydration  nutritional assessment  SBIRT documentation  NRT  smoking cess ed  tox screen  proventil PRN  anxiolysis  Labs reviewed  CT imaging reviewed  I yahaira  mobilize  emotional support  monitor VS and HD and Sat  dvt p  replete lytes

## 2024-11-10 NOTE — PROGRESS NOTE ADULT - ASSESSMENT
43-year-old female with a history of chronic peripheral neuropathy secondary to previous multiple transfusions to the prior bleeding issues presents with having some abdominal discomfort and diarrhea for the past 2 to 3 weeks.       PANCOLITIS     C. Diff positive   Started patient on po Vanco   -  Stool studies including C diff, GI PCR, stool cultures, O&P  - antiemetics and pain control per primary team  - low residue diet    Electrolyte Imbalance  Severe Hypomagnesemia   Hypokalemia    Supplement   Tele no events    Cards,  ID, Neuro following     Abdominal Pain   Avoid Narcotics     H/O Neuropathy. On Lyrica 150 mg BID - to continue  Would check Vit B12/Folate levels.        Anxiety and MDD  -Taking diazepam 5 mg BID prn for anxiety, sertraline 100 mg daily  -Cont diazepam and sertraline

## 2024-11-10 NOTE — PROGRESS NOTE ADULT - ASSESSMENT
43-year-old female bed bound due to severe peripheral neuropathy (able to move her extremities, but due to loss of sensation unable to stand/walk), multiple transfusions to the prior bleeding issues presents with having some abdominal discomfort and diarrhea for the past 2 to 3 weeks. Patient was recently in the hospital on October 23 through October 25 for Stercoral Colitis and signed out AMA .    Hospitalized Feb 2024 for colitis and in Aug 2023 in the SICU for acute blood loss anemia (requiring massive transfusion protocol) from menometrorrhagia from fibroids s/p UAE. (08 Nov 2024 09:40)    H/o neuropathy. On Lyrica 150 mg BID - to continue  Vit B12/Folate levels are pending    PT/ROM  Fall/safety precautions.    D/w pt/mother ans son    Would Follow.

## 2024-11-11 LAB
ANION GAP SERPL CALC-SCNC: 9 MMOL/L — SIGNIFICANT CHANGE UP (ref 5–17)
BUN SERPL-MCNC: 2 MG/DL — LOW (ref 7–23)
CALCIUM SERPL-MCNC: 7.8 MG/DL — LOW (ref 8.4–10.5)
CHLORIDE SERPL-SCNC: 107 MMOL/L — SIGNIFICANT CHANGE UP (ref 96–108)
CO2 SERPL-SCNC: 27 MMOL/L — SIGNIFICANT CHANGE UP (ref 22–31)
CREAT SERPL-MCNC: 0.39 MG/DL — LOW (ref 0.5–1.3)
EGFR: 125 ML/MIN/1.73M2 — SIGNIFICANT CHANGE UP
FOLATE SERPL-MCNC: 2.1 NG/ML — LOW
GLUCOSE SERPL-MCNC: 77 MG/DL — SIGNIFICANT CHANGE UP (ref 70–99)
MAGNESIUM SERPL-MCNC: 1.2 MG/DL — LOW (ref 1.6–2.6)
PHOSPHATE SERPL-MCNC: 2.3 MG/DL — LOW (ref 2.5–4.5)
POTASSIUM SERPL-MCNC: 3.7 MMOL/L — SIGNIFICANT CHANGE UP (ref 3.5–5.3)
POTASSIUM SERPL-SCNC: 3.7 MMOL/L — SIGNIFICANT CHANGE UP (ref 3.5–5.3)
SODIUM SERPL-SCNC: 143 MMOL/L — SIGNIFICANT CHANGE UP (ref 135–145)
VIT B12 SERPL-MCNC: 618 PG/ML — SIGNIFICANT CHANGE UP (ref 232–1245)

## 2024-11-11 RX ORDER — FOLIC ACID 1 MG/1
1 TABLET ORAL DAILY
Refills: 0 | Status: DISCONTINUED | OUTPATIENT
Start: 2024-11-11 | End: 2024-11-12

## 2024-11-11 RX ORDER — MAGNESIUM SULFATE IN 0.9% NACL 2 G/50 ML
2 INTRAVENOUS SOLUTION, PIGGYBACK (ML) INTRAVENOUS ONCE
Refills: 0 | Status: COMPLETED | OUTPATIENT
Start: 2024-11-11 | End: 2024-11-11

## 2024-11-11 RX ADMIN — HEPARIN SODIUM 5000 UNIT(S): 10000 INJECTION INTRAVENOUS; SUBCUTANEOUS at 20:42

## 2024-11-11 RX ADMIN — HEPARIN SODIUM 5000 UNIT(S): 10000 INJECTION INTRAVENOUS; SUBCUTANEOUS at 14:09

## 2024-11-11 RX ADMIN — PREGABALIN 150 MILLIGRAM(S): 150 CAPSULE ORAL at 17:29

## 2024-11-11 RX ADMIN — NYSTATIN 1 APPLICATION(S): 100000 POWDER TOPICAL at 17:29

## 2024-11-11 RX ADMIN — MORPHINE SULFATE 2 MILLIGRAM(S): 30 TABLET, EXTENDED RELEASE ORAL at 01:02

## 2024-11-11 RX ADMIN — VANCOMYCIN HYDROCHLORIDE 125 MILLIGRAM(S): KIT at 12:26

## 2024-11-11 RX ADMIN — NYSTATIN 1 APPLICATION(S): 100000 POWDER TOPICAL at 06:07

## 2024-11-11 RX ADMIN — MORPHINE SULFATE 2 MILLIGRAM(S): 30 TABLET, EXTENDED RELEASE ORAL at 12:29

## 2024-11-11 RX ADMIN — MORPHINE SULFATE 2 MILLIGRAM(S): 30 TABLET, EXTENDED RELEASE ORAL at 13:00

## 2024-11-11 RX ADMIN — VANCOMYCIN HYDROCHLORIDE 125 MILLIGRAM(S): KIT at 06:07

## 2024-11-11 RX ADMIN — Medication 25 GRAM(S): at 10:10

## 2024-11-11 RX ADMIN — VANCOMYCIN HYDROCHLORIDE 125 MILLIGRAM(S): KIT at 00:32

## 2024-11-11 RX ADMIN — SERTRALINE HYDROCHLORIDE 100 MILLIGRAM(S): 50 TABLET, FILM COATED ORAL at 12:26

## 2024-11-11 RX ADMIN — VANCOMYCIN HYDROCHLORIDE 125 MILLIGRAM(S): KIT at 17:28

## 2024-11-11 RX ADMIN — VANCOMYCIN HYDROCHLORIDE 125 MILLIGRAM(S): KIT at 22:46

## 2024-11-11 RX ADMIN — DIAZEPAM 5 MILLIGRAM(S): 10 FILM BUCCAL at 06:07

## 2024-11-11 RX ADMIN — HEPARIN SODIUM 5000 UNIT(S): 10000 INJECTION INTRAVENOUS; SUBCUTANEOUS at 06:07

## 2024-11-11 RX ADMIN — MORPHINE SULFATE 2 MILLIGRAM(S): 30 TABLET, EXTENDED RELEASE ORAL at 00:32

## 2024-11-11 RX ADMIN — PREGABALIN 150 MILLIGRAM(S): 150 CAPSULE ORAL at 06:07

## 2024-11-11 NOTE — PHYSICAL THERAPY INITIAL EVALUATION ADULT - LONG TERM MEMORY, REHAB EVAL
Percocet should not be taken with Ativan. Benzo's prescription requires and appointment at least 4 months, no 8 months ago.   intact

## 2024-11-11 NOTE — DIETITIAN INITIAL EVALUATION ADULT - PERTINENT LABORATORY DATA
11-11    143  |  107  |  2[L]  ----------------------------<  77  3.7   |  27  |  0.39[L]    Ca    7.8[L]      11 Nov 2024 07:30  Phos  2.3     11-11  Mg     1.2     11-11    A1C with Estimated Average Glucose Result: 4.5 % (06-13-24 @ 01:15)

## 2024-11-11 NOTE — PROGRESS NOTE ADULT - ASSESSMENT
44YO F PMH severe peripheral neuropathy (able to move her extremities, but due to loss of sensation unable to stand/walk) bed bound hx of colitis multiple transfusions sec anemia who presented to the hospital with c/o abdominal discomfort and diarrhea for the past 2 to 3 weeks. Patient was recently in the hospital on October 23 through October 25 for Stercoral Colitis and signed out AMA       colitis  abd pain  weakness  asthma  smoker  nicotine dep  neuropathy  anxious  anemia hx  Liver Steatosis    vanco - c diff  vs noted  meds reviewed  NEURO eval noted    GI - Cardio and ID eval noted  hydration  nutritional assessment  SBIRT documentation  NRT  smoking cess ed  tox screen  proventil PRN  anxiolysis  Labs reviewed  CT imaging reviewed  I yahaira  mobilize  emotional support  monitor VS and HD and Sat  dvt p  replete lytes

## 2024-11-11 NOTE — DIETITIAN NUTRITION RISK NOTIFICATION - TREATMENT: THE FOLLOWING DIET HAS BEEN RECOMMENDED
Diet, Regular:   Fiber/Residue Restricted  Banatrol TF     Qty per Day:  Banatrol Plus PO TID  Supplement Feeding Modality:  Oral  Ensure Clear Cans or Servings Per Day:  1       Frequency:  Two Times a day (11-11-24 @ 11:46) [Pending Verification By Attending]  Diet, Regular:   Fiber/Residue Restricted (11-10-24 @ 09:40) [Active]

## 2024-11-11 NOTE — PHYSICAL THERAPY INITIAL EVALUATION ADULT - ADDITIONAL COMMENTS
Pt owns WC, RW, commode. Pt lives in private home with son who is her CDPAP. Pt owns WC, RW, commode. Pt states she does not ambulate but does t/f oob to wheelchair with 1 person assist. Pt was receiving PT services PTA

## 2024-11-11 NOTE — DIETITIAN INITIAL EVALUATION ADULT - NUTRITIONGOAL OUTCOME1
pt to low fiber diet with >50% of most meals/supplements; improvement in gi distress; deter addtl wt loss

## 2024-11-11 NOTE — DIETITIAN INITIAL EVALUATION ADULT - ORAL NUTRITION SUPPLEMENTS
What Type Of Note Output Would You Prefer (Optional)?: Standard Output How Severe Is Your Rash?: mild Is This A New Presentation, Or A Follow-Up?: Rash banatrol plus tid, ensure clear bid - md notified via [employer]

## 2024-11-11 NOTE — PROGRESS NOTE ADULT - NUTRITIONAL ASSESSMENT
This patient has been assessed with a concern for Malnutrition and has been determined to have a diagnosis/diagnoses of Moderate protein-calorie malnutrition.    This patient is being managed with:   Diet Regular-  Fiber/Residue Restricted  Entered: Nov 10 2024  9:40AM    The following pending diet order is being considered for treatment of Moderate protein-calorie malnutrition:  Diet Regular-  Fiber/Residue Restricted  Banatrol TF     Qty per Day:  Banatrol Plus PO TID  Supplement Feeding Modality:  Oral  Ensure Clear Cans or Servings Per Day:  1       Frequency:  Two Times a day  Entered: Nov 11 2024 11:46AM

## 2024-11-11 NOTE — PROGRESS NOTE ADULT - ASSESSMENT
43-year-old female with a history of chronic peripheral neuropathy secondary to previous multiple transfusions to the prior bleeding issues presents with having some abdominal discomfort and diarrhea for the past 2 to 3 weeks.       PANCOLITIS     C. Diff positive   Started patient on po Vanco   -  Stool studies including C diff, GI PCR, stool cultures, O&P  - antiemetics and pain control per primary team  - low residue diet    Electrolyte Imbalance  Severe Hypomagnesemia   Hypokalemia    Supplement   Tele no events    Cards,  ID, Neuro following     Abdominal Pain   Avoid Narcotics     H/O Neuropathy. On Lyrica 150 mg BID - to continue  Would check Vit B12/Folate levels.    B12 supplementation as per neuro        Anxiety and MDD  -Taking diazepam 5 mg BID prn for anxiety, sertraline 100 mg daily  -Cont diazepam and sertraline

## 2024-11-11 NOTE — PROGRESS NOTE ADULT - ASSESSMENT
Pan colitis  Cdiff     Recommendations:  - PO Vancomycin 125mg Q6h for CDI  - antiemetics and pain control per primary team  - low residue diet  - Daily CBC, CRP  - DVT PPx  - Colonoscopy as outpt  - Will follow with you    Elvis Alberto M.D.  [facility]  [phone]

## 2024-11-11 NOTE — DIETITIAN INITIAL EVALUATION ADULT - OTHER INFO
Per H&P, "43-year-old female bed bound due to severe peripheral neuropathy (able to move her extremities, but due to loss of sensation unable to stand/walk), multiple transfusions to the prior bleeding issues presents with having some abdominal discomfort and diarrhea for the past 2 to 3 weeks. Patient was recently in the hospital on October 23 through October 25 for Stercoral Colitis and signed out AMA." Also noted - "Previous History   Hospitalized Feb 2024 for colitis and in Aug 2023 in the SICU for acute blood loss anemia (requiring massive transfusion protocol) from menometrorrhagia from fibroids s/p UAE."    Pt admitted with abd pain x2-3 weeks and associated diarrhea (found to have + C.diff; dx with pancolitis).  Pt started on clear liquids on admission, tolerated well x 4 days and diet progressed to low fiber yesterday.  Pt reports consuming small amounts of solids, continues to experiences multiple episodes of diarrhea.  Provided verbal and written education on low fiber MNT.  PTA pt reports she was living at home with her mother and son.  PO intake has been signficantly decreased as she is partially fearful of having to use the bathroom - important to note, pt has severe peripheral neuropathy and generally bed/wheelchair bound.  PTA usual intake is 1x meal per day (1/2 grilled cheese and soup, or steak or liver and salad, likes fruit), was not consuming nutrition supplements.  Pt endorses unintentional weight loss related to decreased intake and multiple hospitalizations.  States about a year ago she weighed ~250#, most recent wt to her knowledge was ~205# (bed scale taken at time of visit 91.4kg/201#), suggesting ~45# wt loss/18% wt loss x 1 year.  Reinforced importance of consuming and prioritizing protein rich food to preserve LBM.  Tolerated ensure clear while on liquids, requesting going forward on low fiber diet.  Based on </ 75% of estimated energy requirement >/1 month, unintentional wt loss, moderate subcutaneous fat loss and muscle wasting.  RD to follow up and will continue to monitor pt's nutrition status.

## 2024-11-11 NOTE — PROGRESS NOTE ADULT - ASSESSMENT
The patient is a 43 year old female with a history of anemia, paroxysmal atrial fibrillation, hyperthyroidism who presents with diarrhea.    11/11/24  Seen at [facility] telemetry  Semi-erect  No cardiac complaints  Complaining of recent increased frequency of diarrhea  Mag-1.2    Plan:  - ECG from 10/24/24 with sinus rhythm and mildly prolonged QTc  - The patient is not on anticoagulation for atrial fibrillation due to low thromboembolic risk, prior bleed, and remaining in sinus rhythm  - Replete electrolytes  - Follow labs  - Continue IV fluids as needed  - CT A/P with pancolitis  - C. diff-negative  - On vancomycin PO  - Being followed by ID, Pulmonary, Neurology  - GI f/u The patient is a 43 year old female with a history of anemia, paroxysmal atrial fibrillation, hyperthyroidism who presents with diarrhea.    11/11/24  Seen at [facility] telemetry  Semi-erect  No cardiac complaints  Complaining of recent increased frequency of diarrhea  Mag-1.2    Plan:  - ECG from 10/24/24 with sinus rhythm and mildly prolonged QTc  - The patient is not on anticoagulation for atrial fibrillation due to low thromboembolic risk, prior bleed, and remaining in sinus rhythm  - Replete magnesium  - Follow labs  - Continue IV fluids as needed  - CT A/P with pancolitis  - C. diff-negative  - On vancomycin PO  - Being followed by ID, Pulmonary, Neurology  - GI f/u

## 2024-11-11 NOTE — PROGRESS NOTE ADULT - ASSESSMENT
43-year-old female bed bound due to severe peripheral neuropathy (able to move her extremities, but due to loss of sensation unable to stand/walk), multiple transfusions to the prior bleeding issues presents with having some abdominal discomfort and diarrhea for the past 2 to 3 weeks. Patient was recently in the hospital on October 23 through October 25 for Stercoral Colitis and signed out AMA .    Hospitalized Feb 2024 for colitis and in Aug 2023 in the SICU for acute blood loss anemia (requiring massive transfusion protocol) from menometrorrhagia from fibroids s/p UAE. (08 Nov 2024 09:40)    H/o neuropathy. On Lyrica 150 mg BID - to continue    Vit B12 is WNL at [phone] ([phone])  Folate level is low at 2.1 (> 4.7)    Would begin Folic acid 1 mg PO daily    PT/ROM  Fall/safety precautions.    D/w pt. Questions answered.    Would Follow.

## 2024-11-11 NOTE — DIETITIAN INITIAL EVALUATION ADULT - PERTINENT MEDS FT
MEDICATIONS  (STANDING):  heparin   Injectable 5000 Unit(s) SubCutaneous every 8 hours  nystatin Powder 1 Application(s) Topical two times a day  pregabalin 150 milliGRAM(s) Oral two times a day  sertraline 100 milliGRAM(s) Oral daily  vancomycin Capsule. 125 milliGRAM(s) Oral every 6 hours    MEDICATIONS  (PRN):  acetaminophen     Tablet .. 650 milliGRAM(s) Oral every 6 hours PRN Temp greater or equal to 38C (100.4F), Mild Pain (1 - 3)  albuterol    90 MICROgram(s) HFA Inhaler 2 Puff(s) Inhalation every 6 hours PRN Shortness of Breath and/or Wheezing  diazepam    Tablet 5 milliGRAM(s) Oral two times a day PRN for anxiety  morphine  - Injectable 2 milliGRAM(s) IV Push every 6 hours PRN Severe Pain (7 - 10)  
[id number]

## 2024-11-11 NOTE — PHYSICAL THERAPY INITIAL EVALUATION ADULT - PERTINENT HX OF CURRENT PROBLEM, REHAB EVAL
43-year-old female bed bound due to severe peripheral neuropathy (able to move her extremities, but due to loss of sensation unable to stand/walk), multiple transfusions to the prior bleeding issues presents with having some abdominal discomfort and diarrhea for the past 2 to 3 weeks. Patient was recently in the hospital on October 23 through October 25 for Stercoral Colitis and signed out AMA .

## 2024-11-11 NOTE — CASE MANAGEMENT PROGRESS NOTE - NSCMPROGRESSNOTE_GEN_ALL_CORE
Discussed during rounds. Pt. remains acute. No cleared for discharge. CM remains available throughout stay.

## 2024-11-12 ENCOUNTER — TRANSCRIPTION ENCOUNTER (OUTPATIENT)
Age: 44
End: 2024-11-12

## 2024-11-12 VITALS
RESPIRATION RATE: 18 BRPM | DIASTOLIC BLOOD PRESSURE: 66 MMHG | TEMPERATURE: 98 F | SYSTOLIC BLOOD PRESSURE: 96 MMHG | OXYGEN SATURATION: 98 % | HEART RATE: 92 BPM

## 2024-11-12 LAB
MAGNESIUM SERPL-MCNC: 1.4 MG/DL — LOW (ref 1.6–2.6)
PHOSPHATE SERPL-MCNC: 2.3 MG/DL — LOW (ref 2.5–4.5)

## 2024-11-12 PROCEDURE — 36415 COLL VENOUS BLD VENIPUNCTURE: CPT

## 2024-11-12 PROCEDURE — 85025 COMPLETE CBC W/AUTO DIFF WBC: CPT

## 2024-11-12 PROCEDURE — 86140 C-REACTIVE PROTEIN: CPT

## 2024-11-12 PROCEDURE — 99285 EMERGENCY DEPT VISIT HI MDM: CPT | Mod: 25

## 2024-11-12 PROCEDURE — 87507 IADNA-DNA/RNA PROBE TQ 12-25: CPT

## 2024-11-12 PROCEDURE — 97161 PT EVAL LOW COMPLEX 20 MIN: CPT

## 2024-11-12 PROCEDURE — 83735 ASSAY OF MAGNESIUM: CPT

## 2024-11-12 PROCEDURE — 83605 ASSAY OF LACTIC ACID: CPT

## 2024-11-12 PROCEDURE — 82746 ASSAY OF FOLIC ACID SERUM: CPT

## 2024-11-12 PROCEDURE — 83690 ASSAY OF LIPASE: CPT

## 2024-11-12 PROCEDURE — 84702 CHORIONIC GONADOTROPIN TEST: CPT

## 2024-11-12 PROCEDURE — 80053 COMPREHEN METABOLIC PANEL: CPT

## 2024-11-12 PROCEDURE — 82607 VITAMIN B-12: CPT

## 2024-11-12 PROCEDURE — 85610 PROTHROMBIN TIME: CPT

## 2024-11-12 PROCEDURE — 80307 DRUG TEST PRSMV CHEM ANLYZR: CPT

## 2024-11-12 PROCEDURE — 84100 ASSAY OF PHOSPHORUS: CPT

## 2024-11-12 PROCEDURE — 96374 THER/PROPH/DIAG INJ IV PUSH: CPT

## 2024-11-12 PROCEDURE — 87324 CLOSTRIDIUM AG IA: CPT

## 2024-11-12 PROCEDURE — 74177 CT ABD & PELVIS W/CONTRAST: CPT | Mod: MC

## 2024-11-12 PROCEDURE — 96375 TX/PRO/DX INJ NEW DRUG ADDON: CPT

## 2024-11-12 PROCEDURE — 85027 COMPLETE CBC AUTOMATED: CPT

## 2024-11-12 PROCEDURE — 80048 BASIC METABOLIC PNL TOTAL CA: CPT

## 2024-11-12 PROCEDURE — 87449 NOS EACH ORGANISM AG IA: CPT

## 2024-11-12 PROCEDURE — 87493 C DIFF AMPLIFIED PROBE: CPT

## 2024-11-12 PROCEDURE — 85730 THROMBOPLASTIN TIME PARTIAL: CPT

## 2024-11-12 RX ORDER — SERTRALINE HYDROCHLORIDE 50 MG/1
1 TABLET, FILM COATED ORAL
Qty: 0 | Refills: 0 | DISCHARGE
Start: 2024-11-12

## 2024-11-12 RX ORDER — DIBASIC SODIUM PHOSPHATE, MONOBASIC POTASSIUM PHOSPHATE AND MONOBASIC SODIUM PHOSPHATE 852; 155; 130 MG/1; MG/1; MG/1
1 TABLET ORAL
Refills: 0 | Status: DISCONTINUED | OUTPATIENT
Start: 2024-11-12 | End: 2024-11-12

## 2024-11-12 RX ORDER — VANCOMYCIN HYDROCHLORIDE 50 MG/ML
1 KIT ORAL
Qty: 40 | Refills: 0
Start: 2024-11-12 | End: 2024-11-21

## 2024-11-12 RX ORDER — FOLIC ACID 1 MG/1
1 TABLET ORAL
Qty: 0 | Refills: 0 | DISCHARGE
Start: 2024-11-12

## 2024-11-12 RX ORDER — OXYCODONE HYDROCHLORIDE 30 MG/1
1 TABLET ORAL
Qty: 12 | Refills: 0
Start: 2024-11-12 | End: 2024-11-14

## 2024-11-12 RX ORDER — DIBASIC SODIUM PHOSPHATE, MONOBASIC POTASSIUM PHOSPHATE AND MONOBASIC SODIUM PHOSPHATE 852; 155; 130 MG/1; MG/1; MG/1
1 TABLET ORAL
Qty: 12 | Refills: 0
Start: 2024-11-12 | End: 2024-11-14

## 2024-11-12 RX ORDER — MAGNESIUM OXIDE 400 MG/1
1 TABLET ORAL
Qty: 12 | Refills: 0
Start: 2024-11-12 | End: 2024-11-15

## 2024-11-12 RX ADMIN — VANCOMYCIN HYDROCHLORIDE 125 MILLIGRAM(S): KIT at 11:47

## 2024-11-12 RX ADMIN — PREGABALIN 150 MILLIGRAM(S): 150 CAPSULE ORAL at 06:10

## 2024-11-12 RX ADMIN — SERTRALINE HYDROCHLORIDE 100 MILLIGRAM(S): 50 TABLET, FILM COATED ORAL at 11:47

## 2024-11-12 RX ADMIN — NYSTATIN 1 APPLICATION(S): 100000 POWDER TOPICAL at 06:11

## 2024-11-12 RX ADMIN — HEPARIN SODIUM 5000 UNIT(S): 10000 INJECTION INTRAVENOUS; SUBCUTANEOUS at 06:10

## 2024-11-12 RX ADMIN — FOLIC ACID 1 MILLIGRAM(S): 1 TABLET ORAL at 11:47

## 2024-11-12 RX ADMIN — VANCOMYCIN HYDROCHLORIDE 125 MILLIGRAM(S): KIT at 06:10

## 2024-11-12 NOTE — DISCHARGE NOTE PROVIDER - NSDCMRMEDTOKEN_GEN_ALL_CORE_FT
acetaminophen 325 mg oral tablet: 2 tab(s) orally every 6 hours As needed Temp greater or equal to 38C (100.4F), Mild Pain (1 - 3)  diazePAM 5 mg oral tablet: 0.5 tab(s) orally 2 times a day as needed for  anxiety MDD: 5mg  folic acid 1 mg oral tablet: 1 tab(s) orally once a day  melatonin 3 mg oral tablet: 1 tab(s) orally once a day (at bedtime) As needed Insomnia  Narcan 4 mg/0.1 mL nasal spray: 1 spray(s) intranasally once as needed for respiratory depression/ams  oxyCODONE 5 mg oral tablet: 1 tab(s) orally every 6 hours as needed for  severe pain MDD: #4  pregabalin 150 mg oral capsule: 1 cap(s) orally 2 times a day MDD: 300  pyridoxine 25 mg oral tablet: 1 tab(s) orally once a day  sertraline 100 mg oral tablet: 1 tab(s) orally once a day  vancomycin 125 mg oral capsule: 1 cap(s) orally every 6 hours

## 2024-11-12 NOTE — DISCHARGE NOTE PROVIDER - CARE PROVIDERS DIRECT ADDRESSES
[email or web address]

## 2024-11-12 NOTE — PROGRESS NOTE ADULT - ASSESSMENT
The patient is a 43 year old female with a history of anemia, paroxysmal atrial fibrillation, hyperthyroidism who presents with diarrhea.    Plan:  - ECG from 10/24/24 with sinus rhythm and mildly prolonged QTc  - The patient is not on anticoagulation for atrial fibrillation due to low thromboembolic risk, prior bleed, and remaining in sinus rhythm  - Replete electrolytes  - Continue IV fluids as needed  - CT A/P with pancolitis  - C. diff positive  - GI and ID follow-up

## 2024-11-12 NOTE — PROGRESS NOTE ADULT - ASSESSMENT
44YO F PMH severe peripheral neuropathy (able to move her extremities, but due to loss of sensation unable to stand/walk) bed bound hx of colitis multiple transfusions sec anemia who presented to the hospital with c/o abdominal discomfort and diarrhea for the past 2 to 3 weeks. Patient was recently in the hospital on October 23 through October 25 for Stercoral Colitis and signed out AMA       colitis  abd pain  weakness  asthma  smoker  nicotine dep  neuropathy  anxious  anemia hx  Liver Steatosis    vanco - c diff  vs noted  meds reviewed  NEURO eval noted  CM f/u    GI - Cardio and ID eval noted  hydration  nutritional assessment  SBIRT documentation  NRT  smoking cess ed  tox screen  proventil PRN  anxiolysis  Labs reviewed  CT imaging reviewed  I yahaira  mobilize  emotional support  monitor VS and HD and Sat  dvt p  replete lytes

## 2024-11-12 NOTE — DISCHARGE NOTE NURSING/CASE MANAGEMENT/SOCIAL WORK - PATIENT PORTAL LINK FT
You can access the FollowMyHealth Patient Portal offered by [facility] by registering at the following website: [email or web address]. By joining [employer]’s FollowMyHealth portal, you will also be able to view your health information using other applications (apps) compatible with our system.

## 2024-11-12 NOTE — PROGRESS NOTE ADULT - ASSESSMENT
Pan colitis  Cdiff     Recommendations:  - PO Vancomycin 125mg Q6h for CDI  - antiemetics and pain control per primary team  - low residue diet  - Daily CBC, CRP  - DVT PPx  - Colonoscopy as outpt  - Will follow with you    Elvis Alberto M.D.  [facility]  [phone]

## 2024-11-12 NOTE — CASE MANAGEMENT PROGRESS NOTE - NSCMPROGRESSNOTE_GEN_ALL_CORE
CM faxed d/c summary to [facility] via ACM to resume CDPAP services. CM remains available throughout stay.

## 2024-11-12 NOTE — PROGRESS NOTE ADULT - PROVIDER SPECIALTY LIST ADULT
Gastroenterology
Hospitalist
Hospitalist
Infectious Disease
Infectious Disease
Neurology
Pulmonology
Cardiology
Gastroenterology
Infectious Disease
Pulmonology
Cardiology
Hospitalist
Neurology
Cardiology
Neurology

## 2024-11-12 NOTE — PROGRESS NOTE ADULT - REASON FOR ADMISSION
Abdominal Pain x 2-3 weeks

## 2024-11-12 NOTE — DISCHARGE NOTE NURSING/CASE MANAGEMENT/SOCIAL WORK - FINANCIAL ASSISTANCE
[facility] provides services at a reduced cost to those who are determined to be eligible through [facility]’s financial assistance program. Information regarding [facility]’s financial assistance program can be found by going to [email or web address] or by calling [phone].

## 2024-11-12 NOTE — CASE MANAGEMENT PROGRESS NOTE - NSCMPROGRESSNOTE_GEN_ALL_CORE
Per MD, patient is medically cleared for discharge home today.  CM met with patient to discus discharge disposition. Pt. has no skilled needs. Discharge notice signed and copy given to patient. Patient stated her son and CDPAP aide will transport her home. Patient verbalized understanding of the transition plan and is in agreement.  CM remains available throughout hospital stay.

## 2024-11-12 NOTE — PROGRESS NOTE ADULT - ASSESSMENT
43-year-old female bed bound due to severe peripheral neuropathy (able to move her extremities, but due to loss of sensation unable to stand/walk), multiple transfusions to the prior bleeding issues presents with having some abdominal discomfort and diarrhea for the past 2 to 3 weeks. Patient was recently in the hospital on October 23 through October 25 for Stercoral Colitis and signed out AMA .    Hospitalized Feb 2024 for colitis and in Aug 2023 in the SICU for acute blood loss anemia (requiring massive transfusion protocol) from menometrorrhagia from fibroids s/p [place]. (08 Nov 2024 09:40)    H/o neuropathy. On Lyrica 150 mg BID - to continue    Vit B12 is WNL at [phone] ([phone])  Folate level is low at 2.1 (> 4.7)    Started on Folic acid 1 mg PO daily    D/w pt. Questions answered.    DC planning is in progress.  F/up with Neurologist under her plan.

## 2024-11-12 NOTE — PROGRESS NOTE ADULT - SUBJECTIVE AND OBJECTIVE BOX
Date/Time Patient Seen:  		  Referring MD:   Data Reviewed	       Patient is a 43y old  Female who presents with a chief complaint of Abdominal Pain x 2-3 weeks (10 Nov 2024 16:19)      Subjective/HPI     PAST MEDICAL & SURGICAL HISTORY:  Atrial fibrillation    History of Hyperthyroidism    Asthma    History of anemia    Depression, unspecified depression type    H/O blood transfusion reaction    Smoker    Peripheral neuropathy    S/P  Section  ,,    History of blood transfusion    Status post embolization of uterine artery          Medication list         MEDICATIONS  (STANDING):  heparin   Injectable 5000 Unit(s) SubCutaneous every 8 hours  nystatin Powder 1 Application(s) Topical two times a day  pregabalin 150 milliGRAM(s) Oral two times a day  sertraline 100 milliGRAM(s) Oral daily  vancomycin Capsule. 125 milliGRAM(s) Oral every 6 hours    MEDICATIONS  (PRN):  acetaminophen     Tablet .. 650 milliGRAM(s) Oral every 6 hours PRN Temp greater or equal to 38C (100.4F), Mild Pain (1 - 3)  albuterol    90 MICROgram(s) HFA Inhaler 2 Puff(s) Inhalation every 6 hours PRN Shortness of Breath and/or Wheezing  diazepam    Tablet 5 milliGRAM(s) Oral two times a day PRN for anxiety  morphine  - Injectable 2 milliGRAM(s) IV Push every 6 hours PRN Severe Pain (7 - 10)         Vitals log        ICU Vital Signs Last 24 Hrs  T(C): 36.6 (2024 05:14), Max: 36.7 (10 Nov 2024 20:46)  T(F): 97.8 (2024 05:14), Max: 98.1 (10 Nov 2024 20:46)  HR: 88 (2024 05:14) (82 - 100)  BP: 107/74 (2024 05:14) (90/61 - 110/71)  BP(mean): --  ABP: --  ABP(mean): --  RR: 18 (2024 05:14) (16 - 18)  SpO2: 99% (2024 05:14) (95% - 100%)    O2 Parameters below as of 2024 05:14  Patient On (Oxygen Delivery Method): room air                 Input and Output:  I&O's Detail      Lab Data                        8.9    2.77  )-----------( 198      ( 10 Nov 2024 06:00 )             28.5     11-10    142  |  106  |  1[L]  ----------------------------<  73  3.0[L]   |  27  |  0.42[L]    Ca    7.2[L]      10 Nov 2024 06:00  Mg     1.2     11-09              Review of Systems	      Objective     Physical Examination    heart s1s2  lung dc bS  head nc  head at      Pertinent Lab findings & Imaging      Myrna:  NO   Adequate UO     I&O's Detail           Discussed with:     Cultures:	        Radiology                            
Date/Time Patient Seen:  		  Referring MD:   Data Reviewed	       Patient is a 43y old  Female who presents with a chief complaint of Abdominal Pain x 2-3 weeks (2024 21:23)      Subjective/HPI     PAST MEDICAL & SURGICAL HISTORY:  Atrial fibrillation    History of Hyperthyroidism    Asthma    History of anemia    Depression, unspecified depression type    H/O blood transfusion reaction    Smoker    Peripheral neuropathy    S/P  Section  ,,    History of blood transfusion    Status post embolization of uterine artery          Medication list         MEDICATIONS  (STANDING):  heparin   Injectable 5000 Unit(s) SubCutaneous every 8 hours  nystatin Powder 1 Application(s) Topical two times a day  pregabalin 150 milliGRAM(s) Oral two times a day  sertraline 100 milliGRAM(s) Oral daily  sodium chloride 0.9%. 1000 milliLiter(s) (100 mL/Hr) IV Continuous <Continuous>  vancomycin Capsule. 125 milliGRAM(s) Oral every 6 hours    MEDICATIONS  (PRN):  acetaminophen     Tablet .. 650 milliGRAM(s) Oral every 6 hours PRN Temp greater or equal to 38C (100.4F), Mild Pain (1 - 3)  albuterol    90 MICROgram(s) HFA Inhaler 2 Puff(s) Inhalation every 6 hours PRN Shortness of Breath and/or Wheezing  diazepam    Tablet 5 milliGRAM(s) Oral two times a day PRN for anxiety  morphine  - Injectable 2 milliGRAM(s) IV Push every 6 hours PRN Severe Pain (7 - 10)         Vitals log        ICU Vital Signs Last 24 Hrs  T(C): 36.9 (10 Nov 2024 05:04), Max: 36.9 (10 Nov 2024 05:04)  T(F): 98.5 (10 Nov 2024 05:04), Max: 98.5 (10 Nov 2024 05:04)  HR: 87 (10 Nov 2024 05:04) (84 - 95)  BP: 99/67 (10 Nov 2024 05:04) (91/55 - 109/74)  BP(mean): --  ABP: --  ABP(mean): --  RR: 18 (10 Nov 2024 05:04) (18 - 18)  SpO2: 98% (10 Nov 2024 05:04) (97% - 100%)    O2 Parameters below as of 10 Nov 2024 05:04  Patient On (Oxygen Delivery Method): room air                 Input and Output:  I&O's Detail      Lab Data                        9.7    3.29  )-----------( 183      ( 2024 06:00 )             30.3         141  |  104  |  2[L]  ----------------------------<  83  3.4[L]   |  24  |  0.65    Ca    7.1[L]      2024 16:20  Phos  3.7       Mg     1.2         TPro  5.0[L]  /  Alb  2.4[L]  /  TBili  1.1  /  DBili  x   /  AST  46[H]  /  ALT  17  /  AlkPhos  95              Review of Systems	      Objective     Physical Examination    heart s1s2  lung dc BS  head nc  head at      Pertinent Lab findings & Imaging      Myrna:  NO   Adequate UO     I&O's Detail           Discussed with:     Cultures:	        Radiology                            
Patient is a 43y Female with a known history of :    HPI:   43-year-old female bed bound due to severe peripheral neuropathy (able to move her extremities, but due to loss of sensation unable to stand/walk), multiple transfusions to the prior bleeding issues presents with having some abdominal discomfort and diarrhea for the past 2 to 3 weeks. Patient was recently in the hospital on October 23 through October 25 for Stercoral Colitis and signed out AMA .    Previous History   Hospitalized Feb 2024 for colitis and in Aug 2023 in the SICU for acute blood loss anemia (requiring massive transfusion protocol) from menometrorrhagia from fibroids s/p UAE. (08 Nov 2024 09:40)      REVIEW OF SYSTEMS:    CONSTITUTIONAL: No fever, weight loss, or fatigue  EYES: No eye pain, visual disturbances, or discharge  ENMT:  No difficulty hearing, tinnitus, vertigo; No sinus or throat pain  NECK: No pain or stiffness  BREASTS: No pain, masses, or nipple discharge  RESPIRATORY: No cough, wheezing, chills or hemoptysis; No shortness of breath  CARDIOVASCULAR: No chest pain, palpitations, dizziness, or leg swelling  GASTROINTESTINAL: No abdominal or epigastric pain. No nausea, vomiting, or hematemesis; No diarrhea or constipation. No melena or hematochezia.  GENITOURINARY: No dysuria, frequency, hematuria, or incontinence  NEUROLOGICAL: No headaches, memory loss, loss of strength, numbness, or tremors  SKIN: No itching, burning, rashes, or lesions   LYMPH NODES: No enlarged glands  ENDOCRINE: No heat or cold intolerance; No hair loss  MUSCULOSKELETAL: No joint pain or swelling; No muscle, back, or extremity pain  PSYCHIATRIC: No depression, anxiety, mood swings, or difficulty sleeping  HEME/LYMPH: No easy bruising, or bleeding gums  ALLERGY AND IMMUNOLOGIC: No hives or eczema    MEDICATIONS  (STANDING):  heparin   Injectable 5000 Unit(s) SubCutaneous every 8 hours  nystatin Powder 1 Application(s) Topical two times a day  pregabalin 150 milliGRAM(s) Oral two times a day  sertraline 100 milliGRAM(s) Oral daily  vancomycin Capsule. 125 milliGRAM(s) Oral every 6 hours    MEDICATIONS  (PRN):  acetaminophen     Tablet .. 650 milliGRAM(s) Oral every 6 hours PRN Temp greater or equal to 38C (100.4F), Mild Pain (1 - 3)  albuterol    90 MICROgram(s) HFA Inhaler 2 Puff(s) Inhalation every 6 hours PRN Shortness of Breath and/or Wheezing  diazepam    Tablet 5 milliGRAM(s) Oral two times a day PRN for anxiety  morphine  - Injectable 2 milliGRAM(s) IV Push every 6 hours PRN Severe Pain (7 - 10)      ALLERGIES: Motrin (Hives)      FAMILY HISTORY:  Family history of diabetes mellitus (Sibling, Grandparent)    Family history of stomach cancer (Grandparent)        Social history:  Alochol:   Smoking:   Drug Use:   Marital Status:     PHYSICAL EXAMINATION:  -----------------------------  T(C): 36.6 (11-10-24 @ 14:46), Max: 36.9 (11-10-24 @ 05:04)  HR: 83 (11-10-24 @ 14:46) (83 - 90)  BP: 96/60 (11-10-24 @ 14:46) (91/55 - 110/71)  RR: 18 (11-10-24 @ 14:46) (16 - 18)  SpO2: 97% (11-10-24 @ 14:46) (97% - 100%)  Wt(kg): --        Constitutional: well developed, normal appearance, well groomed, well nourished, no deformities and no acute distress.   Eyes: the conjunctiva exhibited no abnormalities and the eyelids demonstrated no xanthelasmas.   HEENT: normal oral mucosa, no oral pallor and no oral cyanosis.   Neck: normal jugular venous A waves present, normal jugular venous V waves present and no jugular venous blackwood A waves.   Pulmonary: no respiratory distress, normal respiratory rhythm and effort, no accessory muscle use and lungs were clear to auscultation bilaterally.   Cardiovascular: heart rate and rhythm were normal, normal S1 and S2 and no murmur, gallop, rub, heave or thrill are present.   Musculoskeletal: the gait could not be assessed.   Extremities: no clubbing of the fingernails, no localized cyanosis, no petechial hemorrhages and no ischemic changes.   Skin: normal skin color and pigmentation, no rash, no venous stasis, no skin lesions, no skin ulcer and no xanthoma was observed.   Psychiatric: oriented to person, place, and time, the affect was normal, the mood was normal and not feeling anxious.     LABS:   --------  11-10    142  |  106  |  1[L]  ----------------------------<  73  3.0[L]   |  27  |  0.42[L]    Ca    7.2[L]      10 Nov 2024 06:00  Mg     1.2     11-09                           8.9    2.77  )-----------( 198      ( 10 Nov 2024 06:00 )             28.5                   Radiology:    
Patient is a 43y Female with a known history of :    HPI:   43-year-old female bed bound due to severe peripheral neuropathy (able to move her extremities, but due to loss of sensation unable to stand/walk), multiple transfusions to the prior bleeding issues presents with having some abdominal discomfort and diarrhea for the past 2 to 3 weeks. Patient was recently in the hospital on October 23 through October 25 for Stercoral Colitis and signed out AMA .    Previous History   Hospitalized Feb 2024 for colitis and in Aug 2023 in the SICU for acute blood loss anemia (requiring massive transfusion protocol) from menometrorrhagia from fibroids s/p UAE. (08 Nov 2024 09:40)      REVIEW OF SYSTEMS:    CONSTITUTIONAL: No fever, weight loss, or fatigue  EYES: No eye pain, visual disturbances, or discharge  ENMT:  No difficulty hearing, tinnitus, vertigo; No sinus or throat pain  NECK: No pain or stiffness  BREASTS: No pain, masses, or nipple discharge  RESPIRATORY: No cough, wheezing, chills or hemoptysis; No shortness of breath  CARDIOVASCULAR: No chest pain, palpitations, dizziness, or leg swelling  GASTROINTESTINAL: No abdominal or epigastric pain. No nausea, vomiting, or hematemesis; No diarrhea or constipation. No melena or hematochezia.  GENITOURINARY: No dysuria, frequency, hematuria, or incontinence  NEUROLOGICAL: No headaches, memory loss, loss of strength, numbness, or tremors  SKIN: No itching, burning, rashes, or lesions   LYMPH NODES: No enlarged glands  ENDOCRINE: No heat or cold intolerance; No hair loss  MUSCULOSKELETAL: No joint pain or swelling; No muscle, back, or extremity pain  PSYCHIATRIC: No depression, anxiety, mood swings, or difficulty sleeping  HEME/LYMPH: No easy bruising, or bleeding gums  ALLERGY AND IMMUNOLOGIC: No hives or eczema    MEDICATIONS  (STANDING):  heparin   Injectable 5000 Unit(s) SubCutaneous every 8 hours  nystatin Powder 1 Application(s) Topical two times a day  pregabalin 150 milliGRAM(s) Oral two times a day  sertraline 100 milliGRAM(s) Oral daily  vancomycin Capsule. 125 milliGRAM(s) Oral every 6 hours    MEDICATIONS  (PRN):  acetaminophen     Tablet .. 650 milliGRAM(s) Oral every 6 hours PRN Temp greater or equal to 38C (100.4F), Mild Pain (1 - 3)  albuterol    90 MICROgram(s) HFA Inhaler 2 Puff(s) Inhalation every 6 hours PRN Shortness of Breath and/or Wheezing  diazepam    Tablet 5 milliGRAM(s) Oral two times a day PRN for anxiety  morphine  - Injectable 2 milliGRAM(s) IV Push every 6 hours PRN Severe Pain (7 - 10)      ALLERGIES: Motrin (Hives)      FAMILY HISTORY:  Family history of diabetes mellitus (Sibling, Grandparent)    Family history of stomach cancer (Grandparent)        Social history:  Alochol:   Smoking:   Drug Use:   Marital Status:     PHYSICAL EXAMINATION:  -----------------------------  T(C): 36.6 (11-11-24 @ 09:02), Max: 36.7 (11-10-24 @ 20:46)  HR: 89 (11-11-24 @ 09:02) (82 - 100)  BP: 97/60 (11-11-24 @ 09:02) (90/61 - 107/74)  RR: 18 (11-11-24 @ 09:02) (18 - 18)  SpO2: 95% (11-11-24 @ 09:02) (95% - 100%)  Wt(kg): --        Constitutional: well developed, normal appearance, well groomed, well nourished, no deformities and no acute distress.   Eyes: the conjunctiva exhibited no abnormalities and the eyelids demonstrated no xanthelasmas.   HEENT: normal oral mucosa, no oral pallor and no oral cyanosis.   Neck: normal jugular venous A waves present, normal jugular venous V waves present and no jugular venous blackwood A waves.   Pulmonary: no respiratory distress, normal respiratory rhythm and effort, no accessory muscle use and lungs were clear to auscultation bilaterally.   Cardiovascular: heart rate and rhythm were normal, normal S1 and S2 and no murmur, gallop, rub, heave or thrill are present.   Musculoskeletal: the gait could not be assessed.   Extremities: no clubbing of the fingernails, no localized cyanosis, no petechial hemorrhages and no ischemic changes.   Skin: normal skin color and pigmentation, no rash, no venous stasis, no skin lesions, no skin ulcer and no xanthoma was observed.   Psychiatric: oriented to person, place, and time, the affect was normal, the mood was normal and not feeling anxious.     LABS:   --------  11-11    143  |  107  |  2[L]  ----------------------------<  77  3.7   |  27  |  0.39[L]    Ca    7.8[L]      11 Nov 2024 07:30  Phos  2.3     11-11  Mg     1.2     11-11                           8.9    2.77  )-----------( 198      ( 10 Nov 2024 06:00 )             28.5                   Radiology:    
Patient is a 43y old  Female who presents with a chief complaint of Abdominal Pain x 2-3 weeks (09 Nov 2024 05:27)    HPI: 43-year-old female bed bound due to severe peripheral neuropathy (able to move her extremities, but due to loss of sensation unable to stand/walk), multiple transfusions to the prior bleeding issues presents with having some abdominal discomfort and diarrhea for the past 2 to 3 weeks. Patient was recently in the hospital on October 23 through October 25 for Stercoral Colitis and signed out AMA .    Previous History     Hospitalized Feb 2024 for colitis and in Aug 2023 in the SICU for acute blood loss anemia (requiring massive transfusion protocol) from menometrorrhagia from fibroids s/p UAE. (08 Nov 2024 09:40)      Interval history  -    Feels better    MEDICATIONS  (STANDING):    heparin   Injectable 5000 Unit(s) SubCutaneous every 8 hours  nystatin Powder 1 Application(s) Topical two times a day  potassium chloride  10 mEq/100 mL IVPB 10 milliEquivalent(s) IV Intermittent every 1 hour  pregabalin 150 milliGRAM(s) Oral two times a day  sertraline 100 milliGRAM(s) Oral daily  sodium chloride 0.9%. 1000 milliLiter(s) (100 mL/Hr) IV Continuous <Continuous>    MEDICATIONS  (PRN):    acetaminophen     Tablet .. 650 milliGRAM(s) Oral every 6 hours PRN Temp greater or equal to 38C (100.4F), Mild Pain (1 - 3)  albuterol    90 MICROgram(s) HFA Inhaler 2 Puff(s) Inhalation every 6 hours PRN Shortness of Breath and/or Wheezing  diazepam    Tablet 5 milliGRAM(s) Oral two times a day PRN for anxiety  morphine  - Injectable 2 milliGRAM(s) IV Push every 6 hours PRN Severe Pain (7 - 10)    Allergies    Motrin (Hives)    REVIEW OF SYSTEMS:    CONSTITUTIONAL: No fever  EYES: No eye pain,   ENMT:  No sinus or throat pain  NECK: No pain or stiffness  RESPIRATORY: No cough, No hemoptysis; No shortness of breath  CARDIOVASCULAR: No acute chest pain, palpitations,  or leg swelling  GASTROINTESTINAL: No abdominal pain. No nausea, vomiting, or hematemesis;  No melena or hematochezia.  GENITOURINARY: No  hematuria, or incontinence  MUSCULOSKELETAL: No joint swelling; No extremity pain  SKIN: No itching, rashes, or lesions   LYMPH NODES: No enlarged glands  NEUROLOGICAL: No headaches, memory loss,   PSYCHIATRIC: No depression, anxiety, mood swings, or difficulty sleeping  ENDOCRINE: No heat or cold intolerance;   HEME/LYMPH: No easy bruising, or bleeding gums  Allergy/Immunology. No medication allergy. No seasonal allergies.    PHYSICAL EXAM:    Vital Signs Last 24 Hrs    T(C): 36.5 (12 Nov 2024 10:09), Max: 36.7 (11 Nov 2024 18:03)  T(F): 97.7 (12 Nov 2024 10:09), Max: 98 (11 Nov 2024 18:03)  HR: 96 (12 Nov 2024 10:09) (83 - 96)  BP: 92/64 (12 Nov 2024 10:09) (92/64 - 112/81)  BP(mean): --  RR: 18 (12 Nov 2024 10:09) (18 - 19)  SpO2: 100% (12 Nov 2024 10:09) (99% - 100%)    Parameters below as of 12 Nov 2024 10:09  Patient On (Oxygen Delivery Method): room air    GENERAL: NAD, well-groomed, well-developed  HEAD:  Atraumatic, Normocephalic  EYES: EOMI, PERRLA, conjunctiva and sclera clear  NECK: Supple, No JVD    On Neurological Examination:    Mental Status - Pt is alert, awake, oriented X3. Higher functions are intact.  Follows commands well and able to answer questions appropriately.    Speech -  Normal. Pt has no aphasia.    Cranial Nerves - Pupils 3 mm equal and reactive to light, extraocular eye movements intact. Pt has no visual field deficit.  No facial asymmetry. Tongue - is in midline.    Motor Exam - 3+/5 all over, No drift. No shaking or tremors.    Sensory Exam -  Pt withdraws all extremities equally on stimulation.    Gait - Non ambulatory    Deep tendon Reflexes - 2 plus all over.    Neck Supple -  Yes.    LABS:    CBC Full  -  ( 10 Nov 2024 06:00 )  WBC Count : 2.77 K/uL  RBC Count : 3.10 M/uL  Hemoglobin : 8.9 g/dL  Hematocrit : 28.5 %  Platelet Count - Automated : 198 K/uL  Mean Cell Volume : 91.9 fL  Mean Cell Hemoglobin : 28.7 pg  Mean Cell Hemoglobin Concentration : 31.2 g/dL  Auto Neutrophil # : 1.89 K/uL  Auto Lymphocyte # : 0.56 K/uL  Auto Monocyte # : 0.28 K/uL  Auto Eosinophil # : 0.00 K/uL  Auto Basophil # : 0.03 K/uL  Auto Neutrophil % : 68.2 %  Auto Lymphocyte % : 20.2 %  Auto Monocyte % : 10.1 %  Auto Eosinophil % : 0.0 %  Auto Basophil % : 1.1 %    11-10    142  |  106  |  1[L]  ----------------------------<  73  3.0[L]   |  27  |  0.42[L]    Ca    7.2[L]      10 Nov 2024 06:00  Mg     1.2     11-09      RADIOLOGY & ADDITIONAL STUDIES
Date/Time Patient Seen:  		  Referring MD:   Data Reviewed	       Patient is a 43y old  Female who presents with a chief complaint of Abdominal Pain x 2-3 weeks (2024 16:36)      Subjective/HPI     PAST MEDICAL & SURGICAL HISTORY:  Atrial fibrillation    History of Hyperthyroidism    Asthma    History of anemia    Depression, unspecified depression type    H/O blood transfusion reaction    Smoker    Peripheral neuropathy    S/P  Section  ,,    History of blood transfusion    Status post embolization of uterine artery          Medication list         MEDICATIONS  (STANDING):  heparin   Injectable 5000 Unit(s) SubCutaneous every 8 hours  nystatin    Suspension 722246 Unit(s) Oral two times a day  nystatin Powder 1 Application(s) Topical two times a day  pregabalin 150 milliGRAM(s) Oral two times a day  sertraline 100 milliGRAM(s) Oral daily  sodium chloride 0.9%. 1000 milliLiter(s) (100 mL/Hr) IV Continuous <Continuous>    MEDICATIONS  (PRN):  acetaminophen     Tablet .. 650 milliGRAM(s) Oral every 6 hours PRN Temp greater or equal to 38C (100.4F), Mild Pain (1 - 3)  albuterol    90 MICROgram(s) HFA Inhaler 2 Puff(s) Inhalation every 6 hours PRN Shortness of Breath and/or Wheezing  diazepam    Tablet 5 milliGRAM(s) Oral two times a day PRN for anxiety  morphine  - Injectable 2 milliGRAM(s) IV Push every 6 hours PRN Severe Pain (7 - 10)         Vitals log        ICU Vital Signs Last 24 Hrs  T(C): 36.5 (2024 01:08), Max: 36.7 (2024 09:40)  T(F): 97.7 (2024 01:08), Max: 98.1 (2024 13:21)  HR: 96 (2024 02:51) (83 - 96)  BP: 108/60 (2024 02:51) (93/58 - 108/60)  BP(mean): 80 (2024 09:40) (80 - 80)  ABP: --  ABP(mean): --  RR: 18 (2024 01:08) (18 - 18)  SpO2: 97% (2024 01:08) (96% - 100%)    O2 Parameters below as of 2024 01:08  Patient On (Oxygen Delivery Method): room air                 Input and Output:  I&O's Detail      Lab Data                        8.9    3.75  )-----------( 179      ( 2024 07:47 )             28.7         142  |  106  |  2[L]  ----------------------------<  82  3.5   |  27  |  0.51    Ca    7.0[L]      2024 18:34  Phos  3.7       Mg     1.5         TPro  5.0[L]  /  Alb  2.4[L]  /  TBili  1.1  /  DBili  x   /  AST  46[H]  /  ALT  17  /  AlkPhos  95              Review of Systems	      Objective     Physical Examination    heart s1s2  lung dc bS  head nc  head at      Pertinent Lab findings & Imaging      Myrna:  NO   Adequate UO     I&O's Detail           Discussed with:     Cultures:	        Radiology                            
Patient is a 43y old  Female who presents with a chief complaint of Abdominal Pain x 2-3 weeks (09 Nov 2024 05:27)    HPI: 43-year-old female bed bound due to severe peripheral neuropathy (able to move her extremities, but due to loss of sensation unable to stand/walk), multiple transfusions to the prior bleeding issues presents with having some abdominal discomfort and diarrhea for the past 2 to 3 weeks. Patient was recently in the hospital on October 23 through October 25 for Stercoral Colitis and signed out AMA .    Previous History     Hospitalized Feb 2024 for colitis and in Aug 2023 in the SICU for acute blood loss anemia (requiring massive transfusion protocol) from menometrorrhagia from fibroids s/p UAE. (08 Nov 2024 09:40)      Interval history  -    Feels better    MEDICATIONS  (STANDING):    heparin   Injectable 5000 Unit(s) SubCutaneous every 8 hours  nystatin Powder 1 Application(s) Topical two times a day  potassium chloride  10 mEq/100 mL IVPB 10 milliEquivalent(s) IV Intermittent every 1 hour  pregabalin 150 milliGRAM(s) Oral two times a day  sertraline 100 milliGRAM(s) Oral daily  sodium chloride 0.9%. 1000 milliLiter(s) (100 mL/Hr) IV Continuous <Continuous>    MEDICATIONS  (PRN):    acetaminophen     Tablet .. 650 milliGRAM(s) Oral every 6 hours PRN Temp greater or equal to 38C (100.4F), Mild Pain (1 - 3)  albuterol    90 MICROgram(s) HFA Inhaler 2 Puff(s) Inhalation every 6 hours PRN Shortness of Breath and/or Wheezing  diazepam    Tablet 5 milliGRAM(s) Oral two times a day PRN for anxiety  morphine  - Injectable 2 milliGRAM(s) IV Push every 6 hours PRN Severe Pain (7 - 10)    Allergies    Motrin (Hives)    REVIEW OF SYSTEMS:    CONSTITUTIONAL: No fever  EYES: No eye pain,   ENMT:  No sinus or throat pain  NECK: No pain or stiffness  RESPIRATORY: No cough, No hemoptysis; No shortness of breath  CARDIOVASCULAR: No acute chest pain, palpitations,  or leg swelling  GASTROINTESTINAL: No abdominal pain. No nausea, vomiting, or hematemesis;  No melena or hematochezia.  GENITOURINARY: No  hematuria, or incontinence  MUSCULOSKELETAL: No joint swelling; No extremity pain  SKIN: No itching, rashes, or lesions   LYMPH NODES: No enlarged glands  NEUROLOGICAL: No headaches, memory loss,   PSYCHIATRIC: No depression, anxiety, mood swings, or difficulty sleeping  ENDOCRINE: No heat or cold intolerance;   HEME/LYMPH: No easy bruising, or bleeding gums  Allergy/Immunology. No medication allergy. No seasonal allergies.    PHYSICAL EXAM:    Vital Signs Last 24 Hrs    T(C): 36.6 (11 Nov 2024 13:03), Max: 36.7 (10 Nov 2024 20:46)  T(F): 97.9 (11 Nov 2024 13:03), Max: 98.1 (10 Nov 2024 20:46)  HR: 90 (11 Nov 2024 13:03) (82 - 100)  BP: 114/70 (11 Nov 2024 13:03) (96/64 - 114/70)  BP(mean): --  RR: 18 (11 Nov 2024 13:03) (18 - 18)  SpO2: 99% (11 Nov 2024 13:03) (95% - 100%)    Parameters below as of 11 Nov 2024 05:14  Patient On (Oxygen Delivery Method): room air    GENERAL: NAD, well-groomed, well-developed  HEAD:  Atraumatic, Normocephalic  EYES: EOMI, PERRLA, conjunctiva and sclera clear  NECK: Supple, No JVD    On Neurological Examination:    Mental Status - Pt is alert, awake, oriented X3. Higher functions are intact.  Follows commands well and able to answer questions appropriately.    Speech -  Normal. Pt has no aphasia.    Cranial Nerves - Pupils 3 mm equal and reactive to light, extraocular eye movements intact. Pt has no visual field deficit.  No facial asymmetry. Tongue - is in midline.    Motor Exam - 3+/5 all over, No drift. No shaking or tremors.    Sensory Exam -  Pt withdraws all extremities equally on stimulation.    Gait - Non ambulatory    Deep tendon Reflexes - 2 plus all over.    Neck Supple -  Yes.    LABS:    CBC Full  -  ( 10 Nov 2024 06:00 )  WBC Count : 2.77 K/uL  RBC Count : 3.10 M/uL  Hemoglobin : 8.9 g/dL  Hematocrit : 28.5 %  Platelet Count - Automated : 198 K/uL  Mean Cell Volume : 91.9 fL  Mean Cell Hemoglobin : 28.7 pg  Mean Cell Hemoglobin Concentration : 31.2 g/dL  Auto Neutrophil # : 1.89 K/uL  Auto Lymphocyte # : 0.56 K/uL  Auto Monocyte # : 0.28 K/uL  Auto Eosinophil # : 0.00 K/uL  Auto Basophil # : 0.03 K/uL  Auto Neutrophil % : 68.2 %  Auto Lymphocyte % : 20.2 %  Auto Monocyte % : 10.1 %  Auto Eosinophil % : 0.0 %  Auto Basophil % : 1.1 %    11-10    142  |  106  |  1[L]  ----------------------------<  73  3.0[L]   |  27  |  0.42[L]    Ca    7.2[L]      10 Nov 2024 06:00  Mg     1.2     11-09      RADIOLOGY & ADDITIONAL STUDIES
Patient is a 43y old  Female who presents with a chief complaint of Abdominal Pain x 2-3 weeks (09 Nov 2024 05:27)    HPI: 43-year-old female bed bound due to severe peripheral neuropathy (able to move her extremities, but due to loss of sensation unable to stand/walk), multiple transfusions to the prior bleeding issues presents with having some abdominal discomfort and diarrhea for the past 2 to 3 weeks. Patient was recently in the hospital on October 23 through October 25 for Stercoral Colitis and signed out AMA .    Previous History     Hospitalized Feb 2024 for colitis and in Aug 2023 in the SICU for acute blood loss anemia (requiring massive transfusion protocol) from menometrorrhagia from fibroids s/p UAE. (08 Nov 2024 09:40)      Interval history  -    Mother and son are at bedside.    MEDICATIONS  (STANDING):    heparin   Injectable 5000 Unit(s) SubCutaneous every 8 hours  nystatin Powder 1 Application(s) Topical two times a day  potassium chloride  10 mEq/100 mL IVPB 10 milliEquivalent(s) IV Intermittent every 1 hour  pregabalin 150 milliGRAM(s) Oral two times a day  sertraline 100 milliGRAM(s) Oral daily  sodium chloride 0.9%. 1000 milliLiter(s) (100 mL/Hr) IV Continuous <Continuous>    MEDICATIONS  (PRN):    acetaminophen     Tablet .. 650 milliGRAM(s) Oral every 6 hours PRN Temp greater or equal to 38C (100.4F), Mild Pain (1 - 3)  albuterol    90 MICROgram(s) HFA Inhaler 2 Puff(s) Inhalation every 6 hours PRN Shortness of Breath and/or Wheezing  diazepam    Tablet 5 milliGRAM(s) Oral two times a day PRN for anxiety  morphine  - Injectable 2 milliGRAM(s) IV Push every 6 hours PRN Severe Pain (7 - 10)    Allergies    Motrin (Hives)    REVIEW OF SYSTEMS:    CONSTITUTIONAL: No fever  EYES: No eye pain,   ENMT:  No sinus or throat pain  NECK: No pain or stiffness  RESPIRATORY: No cough, No hemoptysis; No shortness of breath  CARDIOVASCULAR: No acute chest pain, palpitations,  or leg swelling  GASTROINTESTINAL: No abdominal pain. No nausea, vomiting, or hematemesis;  No melena or hematochezia.  GENITOURINARY: No  hematuria, or incontinence  MUSCULOSKELETAL: No joint swelling; No extremity pain  SKIN: No itching, rashes, or lesions   LYMPH NODES: No enlarged glands  NEUROLOGICAL: No headaches, memory loss,   PSYCHIATRIC: No depression, anxiety, mood swings, or difficulty sleeping  ENDOCRINE: No heat or cold intolerance;   HEME/LYMPH: No easy bruising, or bleeding gums  Allergy/Immunology. No medication allergy. No seasonal allergies.    PHYSICAL EXAM:    Vital Signs Last 24 Hrs    T(C): 36.6 (10 Nov 2024 14:46), Max: 36.9 (10 Nov 2024 05:04)  T(F): 97.9 (10 Nov 2024 14:46), Max: 98.5 (10 Nov 2024 05:04)  HR: 83 (10 Nov 2024 14:46) (83 - 90)  BP: 96/60 (10 Nov 2024 14:46) (91/55 - 110/71)  BP(mean): --  RR: 18 (10 Nov 2024 14:46) (16 - 18)  SpO2: 97% (10 Nov 2024 14:46) (97% - 100%)    Parameters below as of 10 Nov 2024 14:46  Patient On (Oxygen Delivery Method): room air    GENERAL: NAD, well-groomed, well-developed  HEAD:  Atraumatic, Normocephalic  EYES: EOMI, PERRLA, conjunctiva and sclera clear  NECK: Supple, No JVD    On Neurological Examination:    Mental Status - Pt is alert, awake, oriented X3. Higher functions are intact.  Follows commands well and able to answer questions appropriately.    Speech -  Normal. Pt has no aphasia.    Cranial Nerves - Pupils 3 mm equal and reactive to light, extraocular eye movements intact. Pt has no visual field deficit.  No facial asymmetry. Tongue - is in midline.    Motor Exam - 3+/5 all over, No drift. No shaking or tremors.    Sensory Exam -  Pt withdraws all extremities equally on stimulation.    Gait - Non ambulatory    Deep tendon Reflexes - 2 plus all over.    Neck Supple -  Yes.    LABS:                                   8.9    2.77  )-----------( 198      ( 10 Nov 2024 06:00 )             28.5     11-10    142  |  106  |  1[L]  ----------------------------<  73  3.0[L]   |  27  |  0.42[L]    Ca    7.2[L]      10 Nov 2024 06:00  Mg     1.2     11-09      RADIOLOGY & ADDITIONAL STUDIES
     ROBBIE MCKNIGHT is a 43yFemale , patient examined and chart reviewed.    INTERVAL HPI/ OVERNIGHT EVENTS:   Diarrhea better.  Afebrile wbc wnl    PAST MEDICAL & SURGICAL HISTORY:  Atrial fibrillation  History of Hyperthyroidism  Asthma  History of anemia  Depression, unspecified depression type  Smoker  Peripheral neuropathy  S/P  Section  ,,  History of blood transfusion  Status post embolization of uterine artery    For details regarding the patient's social history, family history, and other miscellaneous elements, please refer the initial infectious diseases consultation and/or the admitting history and physical examination for this admission    ROS:  CONSTITUTIONAL:  Negative fever or chills  EYES:  Negative  blurry vision or double vision  CARDIOVASCULAR:  Negative for chest pain or palpitations  RESPIRATORY:  Negative for cough, wheezing, or SOB   GASTROINTESTINAL:  Negative for nausea, vomiting, constipation, or abdominal pain +diarrhea  GENITOURINARY:  Negative frequency, urgency or dysuria  NEUROLOGIC:  No headache, confusion, dizziness, lightheadedness  All other systems were reviewed and are negative     Motrin (Hives)      Current inpatient medications :    ANTIBIOTICS/RELEVANT:  vancomycin Capsule. 125 milliGRAM(s) Oral every 6 hours    MEDICATIONS  (STANDING):  folic acid 1 milliGRAM(s) Oral daily  heparin   Injectable 5000 Unit(s) SubCutaneous every 8 hours  nystatin Powder 1 Application(s) Topical two times a day  pregabalin 150 milliGRAM(s) Oral two times a day  sertraline 100 milliGRAM(s) Oral daily    MEDICATIONS  (PRN):  acetaminophen     Tablet .. 650 milliGRAM(s) Oral every 6 hours PRN Temp greater or equal to 38C (100.4F), Mild Pain (1 - 3)  albuterol    90 MICROgram(s) HFA Inhaler 2 Puff(s) Inhalation every 6 hours PRN Shortness of Breath and/or Wheezing  diazepam    Tablet 5 milliGRAM(s) Oral two times a day PRN for anxiety  morphine  - Injectable 2 milliGRAM(s) IV Push every 6 hours PRN Severe Pain (7 - 10)      Objective:    T(C): 36.6 (24 @ 09:33), Max: 36.7 (24 @ 05:30)  HR: 88 (24 @ 09:33) (84 - 96)  BP: 104/72 (24 @ 09:33) (93/58 - 108/60)  RR: 18 (24 @ 09:33) (18 - 18)  SpO2: 100% (24 @ 09:33) (97% - 100%)      Physical Exam:  General: no acute distress  Neck: supple, trachea midline  Lungs: clear, no wheeze/rhonchi  Cardiovascular: regular rate and rhythm, S1 S2  Abdomen: soft, nontender,  bowel sounds normal  Neurological: alert and oriented x3  Skin: no rash  Extremities: no edema      LABS:                        8.9    2.77  )-----------( 198      ( 10 Nov 2024 06:00 )             28.5       143  |  107  |  2[L]  ----------------------------<  77  3.7   |  27  |  0.39[L]    Ca    7.8[L]      2024 07:30  Phos  2.3       Mg     1.2          MICROBIOLOGY:  Clostridium difficile Toxin by PCR (24 @ 15:22)    C Diff by PCR Result: Detected    GI PCR Panel Stool (24 @ 15:22)    GI PCR Panel: [facility]    RADIOLOGY & ADDITIONAL STUDIES:    ACC: [id number] EXAM:  CT ABDOMEN AND PELVIS IC   ORDERED BY: VAHE JONES     PROCEDURE DATE:  2024          INTERPRETATION:  CLINICAL INFORMATION: Abdominal pain and persistent   diarrhea x2 weeks.    COMPARISON: CT abdomen/pelvis 10/23/2024    CONTRAST/COMPLICATIONS:  IV Contrast: Omnipaque 350  90 cc administered   10 cc discarded  Oral Contrast: NONE  Complications: None reported at time of study completion    PROCEDURE:  CT of the Abdomen and Pelvis was performed.  Sagittal and coronal reformats were performed.    FINDINGS:  LOWER CHEST: Within normal limits.    LIVER: Steatosis. Liver is enlarged and measures 23 cm in length.  BILE DUCTS: Normal caliber.  GALLBLADDER: Within normal limits.  SPLEEN: Within normal limits.  PANCREAS: Within normal limits.  ADRENALS: Within normal limits.  KIDNEYS/URETERS: No bowel obstruction or inflammation.    BLADDER: Minimally distended.  REPRODUCTIVE ORGANS: Uterus and adnexa within normal limits.    BOWEL: No bowel obstruction. Appendix is normal. Diffuse colonic wall   thickening with surrounding fat stranding involving the ascending colon   to the rectum. Scattered colonic diverticulosis.  PERITONEUM/RETROPERITONEUM: Within normal limits.  VESSELS: Atherosclerotic changes.  LYMPHNODES: No lymphadenopathy.  ABDOMINAL WALL: Within normal limits.  BONES: Within normal limits.    IMPRESSION:  Pancolitis.    Assessment :   44YO F PMH severe peripheral neuropathy (able to move her extremities, but due to loss of sensation unable to stand/walk) bed bound hx of colitis multiple transfusions sec anemia admitted with abdominal discomfort and diarrhea for the past 2 to 3 weeks admitted with pancolitis sec Cdiff +  Diarrhea better    Plan :   Cont po Vanc x 10 days  GI on case  Trend temps and cbc  Serial abd exams  Stable from ID standpoint    Continue with present regiment.  Appropriate use of antibiotics and adverse effects reviewed.      I have discussed the above plan of care with patient in detail. He expressed understanding of the  treatment plan . Risks, benefits and alternatives discussed in detail. I have asked if they have any questions or concerns and appropriately addressed them to the best of my ability .    > 35 minutes were spent in direct patient care reviewing notes, medications ,labs data/ imaging , discussion with multidisciplinary team.    Thank you for allowing me to participate in care of your patient .    Luis Alberto Farley MD  Infectious Disease  [phone]
     ROBBIE MCKNIGHT is a 43yFemale , patient examined and chart reviewed.    INTERVAL HPI/ OVERNIGHT EVENTS:   Diarrhea improving  Afebrile wbc wnl    PAST MEDICAL & SURGICAL HISTORY:  Atrial fibrillation  History of Hyperthyroidism  Asthma  History of anemia  Depression, unspecified depression type  Smoker  Peripheral neuropathy  S/P  Section  ,,  History of blood transfusion  Status post embolization of uterine artery    For details regarding the patient's social history, family history, and other miscellaneous elements, please refer the initial infectious diseases consultation and/or the admitting history and physical examination for this admission    ROS:  CONSTITUTIONAL:  Negative fever or chills  EYES:  Negative  blurry vision or double vision  CARDIOVASCULAR:  Negative for chest pain or palpitations  RESPIRATORY:  Negative for cough, wheezing, or SOB   GASTROINTESTINAL:  Negative for nausea, vomiting, constipation, or abdominal pain +diarrhea  GENITOURINARY:  Negative frequency, urgency or dysuria  NEUROLOGIC:  No headache, confusion, dizziness, lightheadedness  All other systems were reviewed and are negative     Motrin (Hives)      Current inpatient medications :    ANTIBIOTICS/RELEVANT:  vancomycin Capsule. 125 milliGRAM(s) Oral every 6 hours    MEDICATIONS  (STANDING):  folic acid 1 milliGRAM(s) Oral daily  heparin   Injectable 5000 Unit(s) SubCutaneous every 8 hours  nystatin Powder 1 Application(s) Topical two times a day  pregabalin 150 milliGRAM(s) Oral two times a day  sertraline 100 milliGRAM(s) Oral daily    MEDICATIONS  (PRN):  acetaminophen     Tablet .. 650 milliGRAM(s) Oral every 6 hours PRN Temp greater or equal to 38C (100.4F), Mild Pain (1 - 3)  albuterol    90 MICROgram(s) HFA Inhaler 2 Puff(s) Inhalation every 6 hours PRN Shortness of Breath and/or Wheezing  diazepam    Tablet 5 milliGRAM(s) Oral two times a day PRN for anxiety  morphine  - Injectable 2 milliGRAM(s) IV Push every 6 hours PRN Severe Pain (7 - 10)      Objective:    T(C): 36.6 (24 @ 09:33), Max: 36.7 (24 @ 05:30)  HR: 88 (24 @ 09:33) (84 - 96)  BP: 104/72 (24 @ 09:33) (93/58 - 108/60)  RR: 18 (24 @ 09:33) (18 - 18)  SpO2: 100% (24 @ 09:33) (97% - 100%)      Physical Exam:  General: no acute distress  Neck: supple, trachea midline  Lungs: clear, no wheeze/rhonchi  Cardiovascular: regular rate and rhythm, S1 S2  Abdomen: soft, nontender,  bowel sounds normal  Neurological: alert and oriented x3  Skin: no rash  Extremities: no edema      LABS:                        8.9    2.77  )-----------( 198      ( 10 Nov 2024 06:00 )             28.5       143  |  107  |  2[L]  ----------------------------<  77  3.7   |  27  |  0.39[L]    Ca    7.8[L]      2024 07:30  Phos  2.3       Mg     1.2          MICROBIOLOGY:  Clostridium difficile Toxin by PCR (24 @ 15:22)    C Diff by PCR Result: Detected    GI PCR Panel Stool (24 @ 15:22)    GI PCR Panel: [facility]    RADIOLOGY & ADDITIONAL STUDIES:    ACC: [id number] EXAM:  CT ABDOMEN AND PELVIS IC   ORDERED BY: VAHE JONES     PROCEDURE DATE:  2024          INTERPRETATION:  CLINICAL INFORMATION: Abdominal pain and persistent   diarrhea x2 weeks.    COMPARISON: CT abdomen/pelvis 10/23/2024    CONTRAST/COMPLICATIONS:  IV Contrast: Omnipaque 350  90 cc administered   10 cc discarded  Oral Contrast: NONE  Complications: None reported at time of study completion    PROCEDURE:  CT of the Abdomen and Pelvis was performed.  Sagittal and coronal reformats were performed.    FINDINGS:  LOWER CHEST: Within normal limits.    LIVER: Steatosis. Liver is enlarged and measures 23 cm in length.  BILE DUCTS: Normal caliber.  GALLBLADDER: Within normal limits.  SPLEEN: Within normal limits.  PANCREAS: Within normal limits.  ADRENALS: Within normal limits.  KIDNEYS/URETERS: No bowel obstruction or inflammation.    BLADDER: Minimally distended.  REPRODUCTIVE ORGANS: Uterus and adnexa within normal limits.    BOWEL: No bowel obstruction. Appendix is normal. Diffuse colonic wall   thickening with surrounding fat stranding involving the ascending colon   to the rectum. Scattered colonic diverticulosis.  PERITONEUM/RETROPERITONEUM: Within normal limits.  VESSELS: Atherosclerotic changes.  LYMPHNODES: No lymphadenopathy.  ABDOMINAL WALL: Within normal limits.  BONES: Within normal limits.    IMPRESSION:  Pancolitis.    Assessment :   44YO F PMH severe peripheral neuropathy (able to move her extremities, but due to loss of sensation unable to stand/walk) bed bound hx of colitis multiple transfusions sec anemia admitted with abdominal discomfort and diarrhea for the past 2 to 3 weeks admitted with pancolitis sec Cdiff +  Diarrhea resolved  Clinically better    Plan :   Cont po Vanc x 10 days  GI on case  Trend temps and cbc  Serial abd exams  Stable from ID standpoint  Dc planning per primary team    Continue with present regiment.  Appropriate use of antibiotics and adverse effects reviewed.      > 35 minutes were spent in direct patient care reviewing notes, medications ,labs data/ imaging , discussion with multidisciplinary team.    Thank you for allowing me to participate in care of your patient .    Luis Alberto Farley MD  Infectious Disease  [phone]
     ROBBIE MCKNIGHT is a 43yFemale , patient examined and chart reviewed.    INTERVAL HPI/ OVERNIGHT EVENTS:   Still with diarrhea - foul smelling per nurse.  Afebrile wbc wnl  Cdiff indeterminate. PCR +    PAST MEDICAL & SURGICAL HISTORY:  Atrial fibrillation  History of Hyperthyroidism  Asthma  History of anemia  Depression, unspecified depression type  Smoker  Peripheral neuropathy  S/P  Section  ,,  History of blood transfusion  Status post embolization of uterine artery    For details regarding the patient's social history, family history, and other miscellaneous elements, please refer the initial infectious diseases consultation and/or the admitting history and physical examination for this admission    ROS:  CONSTITUTIONAL:  Negative fever or chills  EYES:  Negative  blurry vision or double vision  CARDIOVASCULAR:  Negative for chest pain or palpitations  RESPIRATORY:  Negative for cough, wheezing, or SOB   GASTROINTESTINAL:  Negative for nausea, vomiting, constipation, or abdominal pain +diarrhea  GENITOURINARY:  Negative frequency, urgency or dysuria  NEUROLOGIC:  No headache, confusion, dizziness, lightheadedness  All other systems were reviewed and are negative     Motrin (Hives)      Current inpatient medications :    ANTIBIOTICS/RELEVANT:      acetaminophen     Tablet .. 650 milliGRAM(s) Oral every 6 hours PRN  albuterol    90 MICROgram(s) HFA Inhaler 2 Puff(s) Inhalation every 6 hours PRN  diazepam    Tablet 5 milliGRAM(s) Oral two times a day PRN  heparin   Injectable 5000 Unit(s) SubCutaneous every 8 hours  morphine  - Injectable 2 milliGRAM(s) IV Push every 6 hours PRN  nystatin Powder 1 Application(s) Topical two times a day  potassium chloride  10 mEq/100 mL IVPB 10 milliEquivalent(s) IV Intermittent every 1 hour  pregabalin 150 milliGRAM(s) Oral two times a day  sertraline 100 milliGRAM(s) Oral daily  sodium chloride 0.9%. 1000 milliLiter(s) IV Continuous <Continuous>      Objective:    T(C): 36.6 (24 @ 09:33), Max: 36.7 (24 @ 05:30)  HR: 88 (24 @ 09:33) (84 - 96)  BP: 104/72 (24 @ 09:33) (93/58 - 108/60)  RR: 18 (24 @ 09:33) (18 - 18)  SpO2: 100% (24 @ 09:33) (97% - 100%)      Physical Exam:  General: no acute distress  Neck: supple, trachea midline  Lungs: clear, no wheeze/rhonchi  Cardiovascular: regular rate and rhythm, S1 S2  Abdomen: soft, nontender,  bowel sounds normal  Neurological: alert and oriented x3  Skin: no rash  Extremities: no edema      LABS:                        9.7    3.29  )-----------( 183      ( 2024 06:00 )             30.3           142  |  105  |  2[L]  ----------------------------<  71  3.0[L]   |  27  |  0.37[L]    Ca    6.9[L]      2024 06:00  Phos  3.7       Mg     1.5         TPro  5.0[L]  /  Alb  2.4[L]  /  TBili  1.1  /  DBili  x   /  AST  46[H]  /  ALT  17  /  AlkPhos  95  -08      PT/INR - ( 2024 17:57 )   PT: 15.1 sec;   INR: 1.28 ratio         PTT - ( 2024 17:57 )  PTT:34.0 sec    MICROBIOLOGY:  Clostridium difficile Toxin by PCR (24 @ 15:22)    C Diff by PCR Result: Detected    GI PCR Panel Stool (24 @ 15:22)    GI PCR Panel: [facility]    RADIOLOGY & ADDITIONAL STUDIES:    ACC: [id number] EXAM:  CT ABDOMEN AND PELVIS IC   ORDERED BY: VAHE JONES     PROCEDURE DATE:  2024          INTERPRETATION:  CLINICAL INFORMATION: Abdominal pain and persistent   diarrhea x2 weeks.    COMPARISON: CT abdomen/pelvis 10/23/2024    CONTRAST/COMPLICATIONS:  IV Contrast: Omnipaque 350  90 cc administered   10 cc discarded  Oral Contrast: NONE  Complications: None reported at time of study completion    PROCEDURE:  CT of the Abdomen and Pelvis was performed.  Sagittal and coronal reformats were performed.    FINDINGS:  LOWER CHEST: Within normal limits.    LIVER: Steatosis. Liver is enlarged and measures 23 cm in length.  BILE DUCTS: Normal caliber.  GALLBLADDER: Within normal limits.  SPLEEN: Within normal limits.  PANCREAS: Within normal limits.  ADRENALS: Within normal limits.  KIDNEYS/URETERS: No bowel obstruction or inflammation.    BLADDER: Minimally distended.  REPRODUCTIVE ORGANS: Uterus and adnexa within normal limits.    BOWEL: No bowel obstruction. Appendix is normal. Diffuse colonic wall   thickening with surrounding fat stranding involving the ascending colon   to the rectum. Scattered colonic diverticulosis.  PERITONEUM/RETROPERITONEUM: Within normal limits.  VESSELS: Atherosclerotic changes.  LYMPHNODES: No lymphadenopathy.  ABDOMINAL WALL: Within normal limits.  BONES: Within normal limits.    IMPRESSION:  Pancolitis.    Assessment :   44YO F PMH severe peripheral neuropathy (able to move her extremities, but due to loss of sensation unable to stand/walk) bed bound hx of colitis multiple transfusions sec anemia who presented  with c/o abdominal discomfort and diarrhea for the past 2 to 3 weeks admitted with pancolitis. No recent antibiotic use No sick contacts or recent travel.   Patient was recently in the hospital on  through  for Stercoral Colitis and signed out AMA. In ED afebrile wbc wnl   Cdiff +    Plan :   Start po Vanc  GI on case  Fu cultures  Trend temps and cbc  Serial abd exams    D/w Dr Mcgraw    Continue with present regiment.  Appropriate use of antibiotics and adverse effects reviewed.      I have discussed the above plan of care with patient in detail. He expressed understanding of the  treatment plan . Risks, benefits and alternatives discussed in detail. I have asked if they have any questions or concerns and appropriately addressed them to the best of my ability .    > 35 minutes were spent in direct patient care reviewing notes, medications ,labs data/ imaging , discussion with multidisciplinary team.    Thank you for allowing me to participate in care of your patient .    Luis Alberto Farley MD  Infectious Disease  [phone]
Chief Complaint: Diarrhea    Interval Events: No events overnight.    Review of Systems:  General: No fevers, chills, weight gain  Skin: No rashes, color changes  Cardiovascular: No chest pain, orthopnea  Respiratory: No shortness of breath, cough  Gastrointestinal: No nausea, abdominal pain  Genitourinary: No incontinence, pain with urination  Musculoskeletal: No pain, swelling, decreased range of motion  Neurological: No headache, weakness  Psychiatric: No depression, anxiety  Endocrine: No weight gain, increased thirst  All other systems are comprehensively negative.    Physical Exam:  Vitals:        Vital Signs Last 24 Hrs  T(C): 36.5 (12 Nov 2024 10:09), Max: 36.7 (11 Nov 2024 18:03)  T(F): 97.7 (12 Nov 2024 10:09), Max: 98 (11 Nov 2024 18:03)  HR: 96 (12 Nov 2024 10:09) (83 - 96)  BP: 92/64 (12 Nov 2024 10:09) (92/64 - 114/70)  BP(mean): --  RR: 18 (12 Nov 2024 10:09) (18 - 19)  SpO2: 100% (12 Nov 2024 10:09) (99% - 100%)  Parameters below as of 12 Nov 2024 10:09  Patient On (Oxygen Delivery Method): room air  General: NAD  HEENT: MMM  Neck: No JVD, no carotid bruit  Lungs: CTAB  CV: RRR, nl S1/S2, no M/R/G  Abdomen: S/NT/ND, +BS  Extremities: No LE edema, no cyanosis  Neuro: AAOx3, non-focal  Skin: No rash    Labs:    11-11    143  |  107  |  2[L]  ----------------------------<  77  3.7   |  27  |  0.39[L]    Ca    7.8[L]      11 Nov 2024 07:30  Phos  2.3     11-12  Mg     1.4     11-12            
Date/Time Patient Seen:  		  Referring MD:   Data Reviewed	       Patient is a 43y old  Female who presents with a chief complaint of Abdominal Pain x 2-3 weeks (2024 18:21)      Subjective/HPI     PAST MEDICAL & SURGICAL HISTORY:  Atrial fibrillation    History of Hyperthyroidism    Asthma    History of anemia    Depression, unspecified depression type    H/O blood transfusion reaction    Smoker    Peripheral neuropathy    S/P  Section  ,,    History of blood transfusion    Status post embolization of uterine artery          Medication list         MEDICATIONS  (STANDING):  folic acid 1 milliGRAM(s) Oral daily  heparin   Injectable 5000 Unit(s) SubCutaneous every 8 hours  nystatin Powder 1 Application(s) Topical two times a day  pregabalin 150 milliGRAM(s) Oral two times a day  sertraline 100 milliGRAM(s) Oral daily  vancomycin Capsule. 125 milliGRAM(s) Oral every 6 hours    MEDICATIONS  (PRN):  acetaminophen     Tablet .. 650 milliGRAM(s) Oral every 6 hours PRN Temp greater or equal to 38C (100.4F), Mild Pain (1 - 3)  albuterol    90 MICROgram(s) HFA Inhaler 2 Puff(s) Inhalation every 6 hours PRN Shortness of Breath and/or Wheezing  diazepam    Tablet 5 milliGRAM(s) Oral two times a day PRN for anxiety  oxycodone    5 mG/acetaminophen 325 mG 1 Tablet(s) Oral every 4 hours PRN Severe Pain (7 - 10)         Vitals log        ICU Vital Signs Last 24 Hrs  T(C): 36.7 (2024 05:14), Max: 36.7 (2024 18:03)  T(F): 98 (2024 05:14), Max: 98 (2024 18:03)  HR: 83 (2024 05:14) (83 - 91)  BP: 105/70 (2024 05:14) (92/66 - 114/70)  BP(mean): --  ABP: --  ABP(mean): --  RR: 18 (2024 05:14) (18 - 19)  SpO2: 99% (2024 05:14) (95% - 100%)    O2 Parameters below as of 2024 05:14  Patient On (Oxygen Delivery Method): room air                 Input and Output:  I&O's Detail      Lab Data                        8.9    2.77  )-----------( 198      ( 10 Nov 2024 06:00 )             28.5     11-11    143  |  107  |  2[L]  ----------------------------<  77  3.7   |  27  |  0.39[L]    Ca    7.8[L]      2024 07:30  Phos  2.3       Mg     1.2                   Review of Systems	      Objective     Physical Examination    heart s1s2  lung dec BS  head nc  head at      Pertinent Lab findings & Imaging      Myrna:  NO   Adequate UO     I&O's Detail           Discussed with:     Cultures:	        Radiology                            
Patient is a 43y old  Female who presents with a chief complaint of Abdominal Pain x 2-3 weeks (09 Nov 2024 13:35)      SUBJECTIVE / OVERNIGHT EVENTS: no events      T(C): 36.4 (11-10-24 @ 16:56), Max: 36.6 (11-10-24 @ 14:46)  HR: 87 (11-10-24 @ 16:56) (83 - 87)  BP: 90/61 (11-10-24 @ 16:56) (90/61 - 110/71)  RR: 18 (11-10-24 @ 16:56) (18 - 18)  SpO2: 95% (11-10-24 @ 16:56) (95% - 99%)        MEDICATIONS  (STANDING):  heparin   Injectable 5000 Unit(s) SubCutaneous every 8 hours  nystatin Powder 1 Application(s) Topical two times a day  pregabalin 150 milliGRAM(s) Oral two times a day  sertraline 100 milliGRAM(s) Oral daily  vancomycin Capsule. 125 milliGRAM(s) Oral every 6 hours    MEDICATIONS  (PRN):  acetaminophen     Tablet .. 650 milliGRAM(s) Oral every 6 hours PRN Temp greater or equal to 38C (100.4F), Mild Pain (1 - 3)  albuterol    90 MICROgram(s) HFA Inhaler 2 Puff(s) Inhalation every 6 hours PRN Shortness of Breath and/or Wheezing  diazepam    Tablet 5 milliGRAM(s) Oral two times a day PRN for anxiety  morphine  - Injectable 2 milliGRAM(s) IV Push every 6 hours PRN Severe Pain (7 - 10)  PHYSICAL EXAM:  GENERAL: NAD, well-developed  HEAD:  Atraumatic, Normocephalic  EYES: EOMI, conjunctiva and sclera clear  NECK: Supple, No JVD  CHEST/LUNG: Clear to auscultation bilaterally; No wheeze  HEART: Regular rate and rhythm; No murmurs, rubs, or gallops  ABDOMEN: Soft, Nontender, Nondistended; Bowel sounds present  EXTREMITIES:  2+ Peripheral Pulses, No clubbing, cyanosis, or edema  PSYCH: AAOx3  NEUROLOGY: non-focal  SKIN: No rashes or lesions                                                8.9    2.77  )-----------( 198      ( 10 Nov 2024 06:00 )             28.5                 RADIOLOGY & ADDITIONAL TESTS:    Imaging Personally Reviewed:    Consultant(s) Notes Reviewed:      Care Discussed with Consultants/Other Providers:  
Patient is a 43y old  Female who presents with a chief complaint of Abdominal Pain x 2-3 weeks (09 Nov 2024 13:35)      SUBJECTIVE / OVERNIGHT EVENTS: no events    MEDICATIONS  (STANDING):  heparin   Injectable 5000 Unit(s) SubCutaneous every 8 hours  magnesium sulfate  IVPB 2 Gram(s) IV Intermittent once  nystatin Powder 1 Application(s) Topical two times a day  pregabalin 150 milliGRAM(s) Oral two times a day  sertraline 100 milliGRAM(s) Oral daily  sodium chloride 0.9%. 1000 milliLiter(s) (100 mL/Hr) IV Continuous <Continuous>  vancomycin Capsule. 125 milliGRAM(s) Oral every 6 hours    MEDICATIONS  (PRN):  acetaminophen     Tablet .. 650 milliGRAM(s) Oral every 6 hours PRN Temp greater or equal to 38C (100.4F), Mild Pain (1 - 3)  albuterol    90 MICROgram(s) HFA Inhaler 2 Puff(s) Inhalation every 6 hours PRN Shortness of Breath and/or Wheezing  diazepam    Tablet 5 milliGRAM(s) Oral two times a day PRN for anxiety  morphine  - Injectable 2 milliGRAM(s) IV Push every 6 hours PRN Severe Pain (7 - 10)      CAPILLARY BLOOD GLUCOSE        I&O's Summary    T(C): 36.6 (11-09-24 @ 16:39), Max: 36.6 (11-09-24 @ 09:33)  HR: 86 (11-09-24 @ 19:27) (86 - 95)  BP: 100/71 (11-09-24 @ 19:27) (93/69 - 109/74)  RR: 18 (11-09-24 @ 19:27) (18 - 18)  SpO2: 99% (11-09-24 @ 19:27) (99% - 100%)    PHYSICAL EXAM:  GENERAL: NAD, well-developed  HEAD:  Atraumatic, Normocephalic  EYES: EOMI, conjunctiva and sclera clear  NECK: Supple, No JVD  CHEST/LUNG: Clear to auscultation bilaterally; No wheeze  HEART: Regular rate and rhythm; No murmurs, rubs, or gallops  ABDOMEN: Soft, Nontender, Nondistended; Bowel sounds present  EXTREMITIES:  2+ Peripheral Pulses, No clubbing, cyanosis, or edema  PSYCH: AAOx3  NEUROLOGY: non-focal  SKIN: No rashes or lesions                          9.7    3.29  )-----------( 183      ( 09 Nov 2024 06:00 )             30.3               RADIOLOGY & ADDITIONAL TESTS:    Imaging Personally Reviewed:    Consultant(s) Notes Reviewed:      Care Discussed with Consultants/Other Providers:  
[facility]  Chin Zarate PA-C  [address] [place] [address]  [phone]      INTERVAL HPI/OVERNIGHT EVENTS: no acute events     MEDICATIONS  (STANDING):  heparin   Injectable 5000 Unit(s) SubCutaneous every 8 hours  nystatin Powder 1 Application(s) Topical two times a day  pregabalin 150 milliGRAM(s) Oral two times a day  sertraline 100 milliGRAM(s) Oral daily  vancomycin Capsule. 125 milliGRAM(s) Oral every 6 hours    MEDICATIONS  (PRN):  acetaminophen     Tablet .. 650 milliGRAM(s) Oral every 6 hours PRN Temp greater or equal to 38C (100.4F), Mild Pain (1 - 3)  albuterol    90 MICROgram(s) HFA Inhaler 2 Puff(s) Inhalation every 6 hours PRN Shortness of Breath and/or Wheezing  diazepam    Tablet 5 milliGRAM(s) Oral two times a day PRN for anxiety  morphine  - Injectable 2 milliGRAM(s) IV Push every 6 hours PRN Severe Pain (7 - 10)      Allergies    Motrin (Hives)    Intolerances        ROS:   General:  No  fevers, chills, night sweats, fatigue,   Eyes:  Good vision, no reported pain  ENT:  No sore throat, pain, runny nose, dysphagia  CV:  No pain, palpitations, hypo/hypertension  Resp:  No dyspnea, cough, tachypnea, wheezing  GI:  No pain, No nausea, No vomiting, No diarrhea, No constipation, No weight loss, No fever, No pruritis, No rectal bleeding, No tarry stools, No dysphagia,  :  No pain, bleeding, incontinence, nocturia  Muscle:  No pain, weakness  Neuro:  No weakness, tingling, memory problems  Psych:  No fatigue, insomnia, mood problems, depression  Endocrine:  No polyuria, polydipsia, cold/heat intolerance  Heme:  No petechiae, ecchymosis, easy bruisability  Skin:  No rash, tattoos, scars, edema      PHYSICAL EXAM:   Vital Signs:  Vital Signs Last 24 Hrs  T(C): 36.6 (11 Nov 2024 05:14), Max: 36.7 (10 Nov 2024 20:46)  T(F): 97.8 (11 Nov 2024 05:14), Max: 98.1 (10 Nov 2024 20:46)  HR: 88 (11 Nov 2024 05:14) (82 - 100)  BP: 107/74 (11 Nov 2024 05:14) (90/61 - 110/71)  BP(mean): --  RR: 18 (11 Nov 2024 05:14) (18 - 18)  SpO2: 99% (11 Nov 2024 05:14) (95% - 100%)    Parameters below as of 11 Nov 2024 05:14  Patient On (Oxygen Delivery Method): room air      Daily     Daily     GENERAL:  Appears stated age,   HEENT:  NC/AT,    CHEST:  Full & symmetric excursion,   HEART:  Regular rhythm,  ABDOMEN:  Soft, non-tender, non-distended,  EXTEREMITIES:  no cyanosis  SKIN:  No rash  NEURO:  Alert,       LABS:                        8.9    2.77  )-----------( 198      ( 10 Nov 2024 06:00 )             28.5     11-11    143  |  107  |  2[L]  ----------------------------<  77  3.7   |  27  |  0.39[L]    Ca    7.8[L]      11 Nov 2024 07:30  Phos  2.3     11-11  Mg     1.2     11-11        Urinalysis Basic - ( 11 Nov 2024 07:30 )    Color: x / Appearance: x / SG: x / pH: x  Gluc: 77 mg/dL / Ketone: x  / Bili: x / Urobili: x   Blood: x / Protein: x / Nitrite: x   Leuk Esterase: x / RBC: x / WBC x   Sq Epi: x / Non Sq Epi: x / Bacteria: x        RADIOLOGY & ADDITIONAL TESTS:  
[facility]  Chin Zarate PA-C  [address] [place] [address]  [phone]      INTERVAL HPI/OVERNIGHT EVENTS: Cdiff PCR positive     MEDICATIONS  (STANDING):  heparin   Injectable 5000 Unit(s) SubCutaneous every 8 hours  nystatin Powder 1 Application(s) Topical two times a day  pregabalin 150 milliGRAM(s) Oral two times a day  sertraline 100 milliGRAM(s) Oral daily  vancomycin Capsule. 125 milliGRAM(s) Oral every 6 hours    MEDICATIONS  (PRN):  acetaminophen     Tablet .. 650 milliGRAM(s) Oral every 6 hours PRN Temp greater or equal to 38C (100.4F), Mild Pain (1 - 3)  albuterol    90 MICROgram(s) HFA Inhaler 2 Puff(s) Inhalation every 6 hours PRN Shortness of Breath and/or Wheezing  diazepam    Tablet 5 milliGRAM(s) Oral two times a day PRN for anxiety  morphine  - Injectable 2 milliGRAM(s) IV Push every 6 hours PRN Severe Pain (7 - 10)      Allergies    Motrin (Hives)    Intolerances        ROS:   General:  No  fevers, chills, night sweats, fatigue,   Eyes:  Good vision, no reported pain  ENT:  No sore throat, pain, runny nose, dysphagia  CV:  No pain, palpitations, hypo/hypertension  Resp:  No dyspnea, cough, tachypnea, wheezing  GI:  No pain, No nausea, No vomiting, No diarrhea, No constipation, No weight loss, No fever, No pruritis, No rectal bleeding, No tarry stools, No dysphagia,  :  No pain, bleeding, incontinence, nocturia  Muscle:  No pain, weakness  Neuro:  No weakness, tingling, memory problems  Psych:  No fatigue, insomnia, mood problems, depression  Endocrine:  No polyuria, polydipsia, cold/heat intolerance  Heme:  No petechiae, ecchymosis, easy bruisability  Skin:  No rash, tattoos, scars, edema      PHYSICAL EXAM:   Vital Signs:  Vital Signs Last 24 Hrs  T(C): 36.6 (11 Nov 2024 05:14), Max: 36.7 (10 Nov 2024 20:46)  T(F): 97.8 (11 Nov 2024 05:14), Max: 98.1 (10 Nov 2024 20:46)  HR: 88 (11 Nov 2024 05:14) (82 - 100)  BP: 107/74 (11 Nov 2024 05:14) (90/61 - 110/71)  BP(mean): --  RR: 18 (11 Nov 2024 05:14) (18 - 18)  SpO2: 99% (11 Nov 2024 05:14) (95% - 100%)    Parameters below as of 11 Nov 2024 05:14  Patient On (Oxygen Delivery Method): room air      Daily     Daily     GENERAL:  Appears stated age,   HEENT:  NC/AT,    CHEST:  Full & symmetric excursion,   HEART:  Regular rhythm,  ABDOMEN:  Soft, non-tender, non-distended,  EXTEREMITIES:  no cyanosis  SKIN:  No rash  NEURO:  Alert,       LABS:                        8.9    2.77  )-----------( 198      ( 10 Nov 2024 06:00 )             28.5     11-11    143  |  107  |  2[L]  ----------------------------<  77  3.7   |  27  |  0.39[L]    Ca    7.8[L]      11 Nov 2024 07:30  Phos  2.3     11-11  Mg     1.2     11-11        Urinalysis Basic - ( 11 Nov 2024 07:30 )    Color: x / Appearance: x / SG: x / pH: x  Gluc: 77 mg/dL / Ketone: x  / Bili: x / Urobili: x   Blood: x / Protein: x / Nitrite: x   Leuk Esterase: x / RBC: x / WBC x   Sq Epi: x / Non Sq Epi: x / Bacteria: x        RADIOLOGY & ADDITIONAL TESTS:  
Patient is a 43y old  Female who presents with a chief complaint of Abdominal Pain x 2-3 weeks (09 Nov 2024 13:35)      SUBJECTIVE / OVERNIGHT EVENTS: no events    T(C): 36.7 (11-11-24 @ 18:03), Max: 36.7 (11-11-24 @ 18:03)  HR: 89 (11-11-24 @ 18:03) (89 - 90)  BP: 92/66 (11-11-24 @ 18:03) (92/66 - 114/70)  RR: 19 (11-11-24 @ 18:03) (18 - 19)  SpO2: 100% (11-11-24 @ 18:03) (95% - 100%)      MEDICATIONS  (STANDING):  folic acid 1 milliGRAM(s) Oral daily  heparin   Injectable 5000 Unit(s) SubCutaneous every 8 hours  nystatin Powder 1 Application(s) Topical two times a day  pregabalin 150 milliGRAM(s) Oral two times a day  sertraline 100 milliGRAM(s) Oral daily  vancomycin Capsule. 125 milliGRAM(s) Oral every 6 hours    MEDICATIONS  (PRN):  acetaminophen     Tablet .. 650 milliGRAM(s) Oral every 6 hours PRN Temp greater or equal to 38C (100.4F), Mild Pain (1 - 3)  albuterol    90 MICROgram(s) HFA Inhaler 2 Puff(s) Inhalation every 6 hours PRN Shortness of Breath and/or Wheezing  diazepam    Tablet 5 milliGRAM(s) Oral two times a day PRN for anxiety  oxycodone    5 mG/acetaminophen 325 mG 1 Tablet(s) Oral every 4 hours PRN Severe Pain (7 - 10)  PHYSICAL EXAM:  GENERAL: NAD, well-developed  HEAD:  Atraumatic, Normocephalic  EYES: EOMI, conjunctiva and sclera clear  NECK: Supple, No JVD  CHEST/LUNG: Clear to auscultation bilaterally; No wheeze  HEART: Regular rate and rhythm; No murmurs, rubs, or gallops  ABDOMEN: Soft, Nontender, Nondistended; Bowel sounds present  EXTREMITIES:  2+ Peripheral Pulses, No clubbing, cyanosis, or edema  PSYCH: AAOx3  NEUROLOGY: non-focal  SKIN: No rashes or lesions                                                8.9    2.77  )-----------( 198      ( 10 Nov 2024 06:00 )             28.5                 RADIOLOGY & ADDITIONAL TESTS:    Imaging Personally Reviewed:    Consultant(s) Notes Reviewed:      Care Discussed with Consultants/Other Providers:

## 2024-11-12 NOTE — DISCHARGE NOTE PROVIDER - NSDCCPCAREPLAN_GEN_ALL_CORE_FT
PRINCIPAL DISCHARGE DIAGNOSIS  Diagnosis: Colitis  Assessment and Plan of Treatment: C diff colitis   Vanco po

## 2024-11-12 NOTE — DISCHARGE NOTE NURSING/CASE MANAGEMENT/SOCIAL WORK - NSDCPEFALRISK_GEN_ALL_CORE
For information on Fall & Injury Prevention, visit: [email or web address] OR  [email or web address] OR  https://www.cdc.gov/steadi/patient.html

## 2024-11-19 ENCOUNTER — EMERGENCY (EMERGENCY)
Facility: HOSPITAL | Age: 44
LOS: 1 days | Discharge: HOME | End: 2024-11-19
Attending: EMERGENCY MEDICINE | Admitting: EMERGENCY MEDICINE
Payer: MEDICAID

## 2024-11-19 VITALS
DIASTOLIC BLOOD PRESSURE: 79 MMHG | HEIGHT: 65 IN | WEIGHT: 201.94 LBS | SYSTOLIC BLOOD PRESSURE: 110 MMHG | HEART RATE: 66 BPM | RESPIRATION RATE: 18 BRPM | OXYGEN SATURATION: 99 % | TEMPERATURE: 98 F

## 2024-11-19 VITALS
SYSTOLIC BLOOD PRESSURE: 94 MMHG | TEMPERATURE: 97 F | RESPIRATION RATE: 16 BRPM | OXYGEN SATURATION: 96 % | DIASTOLIC BLOOD PRESSURE: 68 MMHG | HEART RATE: 95 BPM

## 2024-11-19 DIAGNOSIS — Z98.890 OTHER SPECIFIED POSTPROCEDURAL STATES: Chronic | ICD-10-CM

## 2024-11-19 DIAGNOSIS — Z92.89 PERSONAL HISTORY OF OTHER MEDICAL TREATMENT: Chronic | ICD-10-CM

## 2024-11-19 LAB
ALBUMIN SERPL ELPH-MCNC: 3.1 G/DL — LOW (ref 3.3–5)
ALP SERPL-CCNC: 148 U/L — HIGH (ref 30–120)
ALT FLD-CCNC: 47 U/L — SIGNIFICANT CHANGE UP (ref 10–60)
ANION GAP SERPL CALC-SCNC: 12 MMOL/L — SIGNIFICANT CHANGE UP (ref 5–17)
AST SERPL-CCNC: 90 U/L — HIGH (ref 10–40)
BASOPHILS # BLD AUTO: 0.05 K/UL — SIGNIFICANT CHANGE UP (ref 0–0.2)
BASOPHILS NFR BLD AUTO: 0.8 % — SIGNIFICANT CHANGE UP (ref 0–2)
BILIRUB SERPL-MCNC: 0.3 MG/DL — SIGNIFICANT CHANGE UP (ref 0.2–1.2)
BUN SERPL-MCNC: 7 MG/DL — SIGNIFICANT CHANGE UP (ref 7–23)
CALCIUM SERPL-MCNC: 9 MG/DL — SIGNIFICANT CHANGE UP (ref 8.4–10.5)
CHLORIDE SERPL-SCNC: 106 MMOL/L — SIGNIFICANT CHANGE UP (ref 96–108)
CO2 SERPL-SCNC: 28 MMOL/L — SIGNIFICANT CHANGE UP (ref 22–31)
CREAT SERPL-MCNC: 0.52 MG/DL — SIGNIFICANT CHANGE UP (ref 0.5–1.3)
EGFR: 117 ML/MIN/1.73M2 — SIGNIFICANT CHANGE UP
EOSINOPHIL # BLD AUTO: 0.2 K/UL — SIGNIFICANT CHANGE UP (ref 0–0.5)
EOSINOPHIL NFR BLD AUTO: 3.4 % — SIGNIFICANT CHANGE UP (ref 0–6)
GLUCOSE SERPL-MCNC: 103 MG/DL — HIGH (ref 70–99)
HCG SERPL-ACNC: 1 MIU/ML — SIGNIFICANT CHANGE UP
HCT VFR BLD CALC: 32.2 % — LOW (ref 34.5–45)
HGB BLD-MCNC: 10.2 G/DL — LOW (ref 11.5–15.5)
IMM GRANULOCYTES NFR BLD AUTO: 0.3 % — SIGNIFICANT CHANGE UP (ref 0–0.9)
LACTATE SERPL-SCNC: 2.6 MMOL/L — HIGH (ref 0.7–2)
LACTATE SERPL-SCNC: 2.9 MMOL/L — HIGH (ref 0.7–2)
LACTATE SERPL-SCNC: 3.2 MMOL/L — HIGH (ref 0.7–2)
LYMPHOCYTES # BLD AUTO: 1.7 K/UL — SIGNIFICANT CHANGE UP (ref 1–3.3)
LYMPHOCYTES # BLD AUTO: 28.5 % — SIGNIFICANT CHANGE UP (ref 13–44)
MCHC RBC-ENTMCNC: 28.3 PG — SIGNIFICANT CHANGE UP (ref 27–34)
MCHC RBC-ENTMCNC: 31.7 G/DL — LOW (ref 32–36)
MCV RBC AUTO: 89.4 FL — SIGNIFICANT CHANGE UP (ref 80–100)
MONOCYTES # BLD AUTO: 0.4 K/UL — SIGNIFICANT CHANGE UP (ref 0–0.9)
MONOCYTES NFR BLD AUTO: 6.7 % — SIGNIFICANT CHANGE UP (ref 2–14)
NEUTROPHILS # BLD AUTO: 3.6 K/UL — SIGNIFICANT CHANGE UP (ref 1.8–7.4)
NEUTROPHILS NFR BLD AUTO: 60.3 % — SIGNIFICANT CHANGE UP (ref 43–77)
NRBC # BLD: 0 /100 WBCS — SIGNIFICANT CHANGE UP (ref 0–0)
NT-PROBNP SERPL-SCNC: 149 PG/ML — HIGH (ref 0–125)
PLATELET # BLD AUTO: 251 K/UL — SIGNIFICANT CHANGE UP (ref 150–400)
POTASSIUM SERPL-MCNC: 3.7 MMOL/L — SIGNIFICANT CHANGE UP (ref 3.5–5.3)
POTASSIUM SERPL-SCNC: 3.7 MMOL/L — SIGNIFICANT CHANGE UP (ref 3.5–5.3)
PROT SERPL-MCNC: 6.9 G/DL — SIGNIFICANT CHANGE UP (ref 6–8.3)
RBC # BLD: 3.6 M/UL — LOW (ref 3.8–5.2)
RBC # FLD: 16.2 % — HIGH (ref 10.3–14.5)
SODIUM SERPL-SCNC: 146 MMOL/L — HIGH (ref 135–145)
WBC # BLD: 5.97 K/UL — SIGNIFICANT CHANGE UP (ref 3.8–10.5)
WBC # FLD AUTO: 5.97 K/UL — SIGNIFICANT CHANGE UP (ref 3.8–10.5)

## 2024-11-19 PROCEDURE — 84702 CHORIONIC GONADOTROPIN TEST: CPT

## 2024-11-19 PROCEDURE — 83605 ASSAY OF LACTIC ACID: CPT

## 2024-11-19 PROCEDURE — 83880 ASSAY OF NATRIURETIC PEPTIDE: CPT

## 2024-11-19 PROCEDURE — 80053 COMPREHEN METABOLIC PANEL: CPT

## 2024-11-19 PROCEDURE — 96361 HYDRATE IV INFUSION ADD-ON: CPT

## 2024-11-19 PROCEDURE — 36415 COLL VENOUS BLD VENIPUNCTURE: CPT

## 2024-11-19 PROCEDURE — 99285 EMERGENCY DEPT VISIT HI MDM: CPT

## 2024-11-19 PROCEDURE — 85025 COMPLETE CBC W/AUTO DIFF WBC: CPT

## 2024-11-19 PROCEDURE — 99284 EMERGENCY DEPT VISIT MOD MDM: CPT | Mod: 25

## 2024-11-19 PROCEDURE — 74177 CT ABD & PELVIS W/CONTRAST: CPT | Mod: 26,MC

## 2024-11-19 PROCEDURE — 74177 CT ABD & PELVIS W/CONTRAST: CPT | Mod: MC

## 2024-11-19 PROCEDURE — 96360 HYDRATION IV INFUSION INIT: CPT | Mod: XU

## 2024-11-19 RX ORDER — LIDOCAINE/PRILOCAINE 2.5 %-2.5%
1 CREAM (GRAM) TOPICAL ONCE
Refills: 0 | Status: COMPLETED | OUTPATIENT
Start: 2024-11-19 | End: 2024-11-19

## 2024-11-19 RX ORDER — OXYCODONE HYDROCHLORIDE 30 MG/1
5 TABLET ORAL ONCE
Refills: 0 | Status: DISCONTINUED | OUTPATIENT
Start: 2024-11-19 | End: 2024-11-19

## 2024-11-19 RX ORDER — LIDOCAINE HYDROCHLORIDE 40 MG/ML
1 SOLUTION TOPICAL
Qty: 1 | Refills: 0
Start: 2024-11-19

## 2024-11-19 RX ORDER — HYDROCORTISONE 1 %
1 OINTMENT (GRAM) TOPICAL ONCE
Refills: 0 | Status: COMPLETED | OUTPATIENT
Start: 2024-11-19 | End: 2024-11-19

## 2024-11-19 RX ORDER — SODIUM CHLORIDE 9 MG/ML
1000 INJECTION, SOLUTION INTRAMUSCULAR; INTRAVENOUS; SUBCUTANEOUS ONCE
Refills: 0 | Status: COMPLETED | OUTPATIENT
Start: 2024-11-19 | End: 2024-11-19

## 2024-11-19 RX ORDER — LIDOCAINE HYDROCHLORIDE 40 MG/ML
1 SOLUTION TOPICAL ONCE
Refills: 0 | Status: DISCONTINUED | OUTPATIENT
Start: 2024-11-19 | End: 2024-11-19

## 2024-11-19 RX ORDER — HYDROCORTISONE 1 %
1 OINTMENT (GRAM) TOPICAL
Qty: 1 | Refills: 0
Start: 2024-11-19

## 2024-11-19 RX ORDER — LIDOCAINE/PRILOCAINE 2.5 %-2.5%
1 CREAM (GRAM) TOPICAL ONCE
Refills: 0 | Status: DISCONTINUED | OUTPATIENT
Start: 2024-11-19 | End: 2024-11-19

## 2024-11-19 RX ADMIN — Medication 1000 MILLILITER(S): at 10:24

## 2024-11-19 RX ADMIN — OXYCODONE HYDROCHLORIDE 5 MILLIGRAM(S): 30 TABLET ORAL at 04:59

## 2024-11-19 RX ADMIN — SODIUM CHLORIDE 1000 MILLILITER(S): 9 INJECTION, SOLUTION INTRAMUSCULAR; INTRAVENOUS; SUBCUTANEOUS at 04:36

## 2024-11-19 RX ADMIN — Medication 1000 MILLILITER(S): at 11:17

## 2024-11-19 RX ADMIN — Medication 1 APPLICATION(S): at 04:39

## 2024-11-19 RX ADMIN — Medication 1000 MILLILITER(S): at 08:45

## 2024-11-19 RX ADMIN — OXYCODONE HYDROCHLORIDE 5 MILLIGRAM(S): 30 TABLET ORAL at 04:28

## 2024-11-19 RX ADMIN — SODIUM CHLORIDE 1000 MILLILITER(S): 9 INJECTION, SOLUTION INTRAMUSCULAR; INTRAVENOUS; SUBCUTANEOUS at 05:32

## 2024-11-19 RX ADMIN — Medication 1000 MILLILITER(S): at 07:45

## 2024-11-19 NOTE — ED ADULT NURSE NOTE - NSFALLUNIVINTERV_ED_ALL_ED
Bed/Stretcher in lowest position, wheels locked, appropriate side rails in place/Call bell, personal items and telephone in reach/Instruct patient to call for assistance before getting out of bed/chair/stretcher/Non-slip footwear applied when patient is off stretcher/[place] to call system/Physically safe environment - no spills, clutter or unnecessary equipment/Purposeful proactive rounding/Room/bathroom lighting operational, light cord in reach

## 2024-11-19 NOTE — ED PROVIDER NOTE - PROGRESS NOTE DETAILS
CT unremarkable  Rpt [place] pending Pt wishes to stay to speak with SW regarding her living situation. SW consult requested. Pt will be signed out to Dr. Araujo to follow. lactate elevated, prior admission reviewed- seems to have chronic elevations. case discussed with hospitalist and Infectious disease. No need for admission, can get further hydration and discharge. patient seen by social work and can be discharged.

## 2024-11-19 NOTE — CARE COORDINATION ASSESSMENT. - EMPLOYMENT/FINANCIAL CONCERNS
A:  -patient endorses still having an appetite, though decreased  -she recently had dental surgery and can only tolerate soft foods and liquids so overall PO intake is decreased until healed from surgery  P:  -encouraged liquids and soft foods at each meal and add back solid food as denture surgery pain remits    No

## 2024-11-19 NOTE — ED PROVIDER NOTE - CLINICAL SUMMARY MEDICAL DECISION MAKING FREE TEXT BOX
43-year-old female with rectal burning and pain.  There is no fissures or hemorrhoids or erythema or bleeding in the area.  Will get labs, CT to reassess for colitis this patient is complaining that her diarrhea is coming back.  Will give pain management.  Will reassess.

## 2024-11-19 NOTE — ED PROVIDER NOTE - NSFOLLOWUPINSTRUCTIONS_ED_ALL_ED_FT
Follow up with your PCP and gastrointestinal doctor.  Apply the creams as prescribed  Keep the area clean and dry.    Rectal Pain    WHAT YOU NEED TO KNOW:    Rectal pain can be caused by a number of conditions, such as hemorrhoids, an abscess, trauma, or anal tear. Infection, muscle spasms, or anal intercourse can also cause rectal pain.    DISCHARGE INSTRUCTIONS:    Medicines: You may need any of the following:    NSAIDs, such as ibuprofen, help decrease swelling, pain, and fever. This medicine is available with or without a doctor's order. NSAIDs can cause stomach bleeding or kidney problems in certain people. If you take blood thinner medicine, always ask your healthcare provider if NSAIDs are safe for you. Always read the medicine label and follow directions.    Prescription pain medicine may be given. Ask how to take this medicine safely.    Antibiotics help treat or prevent a bacterial infection.    Bowel movement softeners help soften your bowel movement. They help prevent straining and more damage to the area.    Take your medicine as directed. Contact your healthcare provider if you think your medicine is not helping or if you have side effects. Tell your provider if you are allergic to any medicine. Keep a list of the medicines, vitamins, and herbs you take. Include the amounts, and when and why you take them. Bring the list or the pill bottles to follow-up visits. Carry your medicine list with you in case of an emergency.  Return to the emergency department if:    You have severe pain.    Contact your healthcare provider if:    Your pain does not decrease after 1 to 2 days of treatment.    You cannot take the medicine prescribed for your condition.    You have questions or concerns about your condition or care.  Take a sitz bath: Fill a bathtub with 4 to 6 inches of warm water. You may also use a sitz bath pan that fits over a toilet. Sit in the sitz bath for 20 minutes. Do this 2 to 3 times a day, or as directed. The warm water can help decrease pain, muscle spasms, or swelling.     Apply heat: Apply a warm, moist compress on your anus for 20 to 30 minutes every 2 hours for as many days as directed. Heat helps decrease pain and muscle spasms.    Eat high-fiber foods: This will help prevent constipation and soften your bowel movements. High-fiber foods include fruit, vegetables, whole-grain breads and cereals, and beans. A dietitian or healthcare provider can help you create a high-fiber meal plan.    Drink liquids as directed: You may need to drink more liquid than usual to help soften your bowel movements. Ask how much liquid to drink each day and which liquids are best for you.    Follow up with your healthcare provider as directed: Write down your questions so you remember to ask them during your visits.

## 2024-11-19 NOTE — CARE COORDINATION ASSESSMENT. - REASON FOR CONSULT
pt was in [facility] from 11/7-11/12 due to abdominal pain/community resources/coordination of care/discharge planning/legal issue: eviction/patient meets high risk criteria (i.e.: STARS admit., readmit w/in 30 days)
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
## 2024-11-19 NOTE — CARE COORDINATION ASSESSMENT. - LIVES WITH, PROFILE
Patient continues to refuse IVFs, IV antibiotics, and IV ketorolac. Reinforced education to patient about importance of following plan of care. Still refusing, will continue to monitor.     Patient refused lab draw this morning. MD notified.   children/parents

## 2024-11-19 NOTE — CARE COORDINATION ASSESSMENT. - OTHER PERTINENT REFERRAL INFORMATION
Sw received consult due to concerns about her living situation: Pt reports her mother and son care for her excellently and she has no concerns over her care. Pt reports an extended family member has been staying with the family recently and she is not comfortable with this person in the home. She states pt will be leaving the house in 2 days. Pt reports if she is not admitted to the hospital then she will return home and address her concerns with community therapist. Family is not aware of her concerns with this person and does not want the hospital to speak of it with her mother or son. Pt states her son will transport her home.         readmission 11/7-11/12 abdominal pain return with rectal pain on 11/19

## 2024-11-19 NOTE — ED PROVIDER NOTE - OBJECTIVE STATEMENT
43-year-old female presents complaining of rectal pain.  Patient has a history of neuropathy, chronic anemia requiring multiple blood transfusions secondary to menorrhagia which has been ablated, recent admission for diarrhea and diagnosed with C. difficile on p.o. Vanco.  Patient reports that diarrhea stopped for 5 days and started again today.  Reports burning discomfort in her perirectal region.  Denies fever or vomiting.

## 2024-11-19 NOTE — CARE COORDINATION ASSESSMENT. - NSCAREPROVIDERS_GEN_ALL_CORE_FT
CARE PROVIDERS:  Admitting: Orlando Lou  Attending: Orlando Lou  Cardiology Technician: Carlos Vega  Covering Team: Eduin Chairez  ED Attending: Orlando Lou  ED Nurse: Lamin Tejada  Emergency Medicine: Shaheed Araujo  Nurse: Christine Knapp  Nurse: Lamin Tejada  Nurse: Christine Mccord  Outpatient Provider: Magdaleno Jeffers  Outpatient Provider: Kacie David  Primary Team: Bean La  : Liudmila Graves

## 2024-11-19 NOTE — ED PROVIDER NOTE - PATIENT PORTAL LINK FT
You can access the FollowMyHealth Patient Portal offered by [facility] by registering at the following website: [email or web address]. By joining [facility]’s FollowMyHealth portal, you will also be able to view your health information using other applications (apps) compatible with our system.

## 2024-11-19 NOTE — CARE COORDINATION ASSESSMENT. - OTHER PERTINENT DISCHARGE PLANNING INFORMATION:
[facility] md: DR Kecia Williamson  [place]  CDPAP aide services 36.75 H/week through [facility]  F   DC summary needs to be faxed to [phone] [facility] CM is Chanda Andrews  ext [phone]  Carlos, son is CDPAP Aide  Pt reports her son will transport her home on dc.   DME: Wheelchair, commode, walker [facility] md: DR Kecia Williamson  [place]  CDPAP aide services 36.75 H/week through [facility]  F  being managed by [facility] longterm [phone] fax   for services to be reinstated will need to send DC summary via faxed to [phone] [facility] CM is Chanda Andrews  ext [phone]  Carlos, son is CDPAP Aide  Pt reports her son will transport her home on dc.   DME: Wheelchair, commode, walker

## 2024-11-19 NOTE — PATIENT CHOICE NOTE. - NSPTCHOICESTATE_GEN_ALL_CORE

## 2024-11-19 NOTE — ED ADULT NURSE NOTE - PLAN OF CARE DISCUSSED WITH:
normal appearance , without tenderness upon palpation , no deformities , trachea midline , Thyroid normal size , no thyroid nodules , no masses , no JVD , thyroid nontender
Patient

## 2024-11-19 NOTE — ED PROVIDER NOTE - CONSTITUTIONAL, MLM
[facility] ambulatory encounter  FAMILY PRACTICE OFFICE VISIT    CHIEF COMPLAINT:    Chief Complaint   Patient presents with   • Throat Problem     sore throat- patient states that his entire mouth and throat hurts. He is also complaining of chest pain.       SUBJECTIVE:  Spencer Ocampo is a 83 year old male who presented requesting evaluation for sore throat. Patient has history of reflux. Currently on omeprazole 40 mg in the morning. Complains of sore throat in neck region as well as upper chest. No earache. No fever. No cough. Suspect laryngeal reflux will increase PPI to twice daily. Last hemoglobin A1c was controlled. Patient due for diabetic testing. Patient would like refill of metoprolol. Medication be switched from metoprolol 25 mg twice daily to metoprolol XL 50 mg daily. It also like to change the dosing of metformin. No history of diarrhea with regular strength formula. Currently has been taking metformin extended release 500 mg tablets 2 tablets twice daily. Switch to thousand milligrams twice daily of metformin.    Review of systems:   All other systems are reviewed and are negative except as documented in the history of present illness.     OBJECTIVE:  PROBLEM LIST:   Patient Active Problem List   Diagnosis   • Calculus of gallbladder without mention of cholecystitis or obstruction   • Type 2 diabetes, controlled, with neuropathy (CMS/HCC)   • Deep venous thrombosis (CMS/HCC)   • Essential hypertension, benign   • Pure hypercholesterolemia   • Polyneuropathy in diabetes (CMS/HCC)   • Rhinitis   • Dyspnea   • Corns and callosities   • Dermatophytosis of nail   • Pain in limb   • Urine cytology abnormal   • Tinea cruris   • Retrograde ejaculation   • Renal insufficiency syndrome   • Peripheral neuropathy   • Denise's neuroma   • Male erectile disorder   • Hyperlipemia   • Esophageal stricture   • Epididymitis   • Constipation   • Chronic rhinitis   • CAD (coronary artery disease)   • BPH (benign  prostatic hypertrophy)   • Benign neoplasm of bronchus and lung   • Allergic contact dermatitis   • Plantar fascial fibromatosis   • GERD (gastroesophageal reflux disease)   • Primary localized osteoarthrosis, shoulder region   • CKD (chronic kidney disease), stage III   • History of DVT of lower extremity   • Sensorineural hearing loss, bilateral   • History of prostate cancer   • OAB (overactive bladder)   • Seborrheic dermatitis       PAST HISTORIES:   I have reviewed the past medical history, family history, social history, medications and allergies listed in the medical record as obtained by my nursing staff and support staff and agree with their documentation.    PHYSICAL EXAM:   HEENT tympanic membranes clear throat with no erythema mild postnasal drainage no sinus tenderness. Lungs were clear heart was regular abdomen soft with no epigastric tenderness.    LAB RESULTS:   All pertinent laboratory results were reviewed.    ASSESSMENT:   1. Sore throat    2. Type 2 diabetes, controlled, with neuropathy (CMS/[facility])    3. Essential hypertension, benign    4. Pure hypercholesterolemia        PLAN:   Orders Placed This Encounter   • STREP GROUP A SCREEN RAPID WITH REFLEX TO CULTURE   • Glycohemoglobin   • Strep Group A Culture   • omeprazole (PRILOSEC) 40 MG capsule twice daily half hour before breakfast and supper    • metoPROLOL (TOPROL-XL) 50 MG 24 hr tablet daily #90 3 refills    • metformin (GLUCOPHAGE) 1000 MG tablet twice daily #180 with 3 refills            Return in about 3 months (around 11/29/2017) for Diabetes.    Instructions provided as documented in the after visit summary.    The patient indicated understanding of the diagnosis and agreed with the plan of care.        - - -

## 2024-11-19 NOTE — CARE COORDINATION ASSESSMENT. - NSPSYCHTREATPROV_GEN_ALL_CORE
Pt reports she has a therapist and psychiatrist in the community. Pt reports she prefers to reach out to therapist on her own and declined SW intervention./No

## 2024-12-03 NOTE — ED PROVIDER NOTE - RESPIRATORY [+], MLM
Detail Level: Detailed Show Applicator Variable?: Yes Number Of Freeze-Thaw Cycles: 1 freeze-thaw cycle Render Note In Bullet Format When Appropriate: No Duration Of Freeze Thaw-Cycle (Seconds): 5 Application Tool (Optional): Liquid Nitrogen Sprayer Consent: The patient's consent was obtained including but not limited to risks of crusting, scabbing, blistering, scarring, darker or lighter pigmentary change, recurrence, incomplete removal and infection. Post-Care Instructions: I reviewed with the patient in detail post-care instructions. Patient is to wear sunprotection, and avoid picking at any of the treated lesions. Pt may apply Vaseline to crusted or scabbing areas. SHORTNESS OF BREATH

## 2025-01-03 ENCOUNTER — EMERGENCY (EMERGENCY)
Facility: HOSPITAL | Age: 45
LOS: 1 days | Discharge: HOME | End: 2025-01-03
Attending: EMERGENCY MEDICINE | Admitting: EMERGENCY MEDICINE
Payer: MEDICAID

## 2025-01-03 VITALS
TEMPERATURE: 99 F | SYSTOLIC BLOOD PRESSURE: 115 MMHG | DIASTOLIC BLOOD PRESSURE: 88 MMHG | RESPIRATION RATE: 18 BRPM | HEIGHT: 65 IN | OXYGEN SATURATION: 97 % | HEART RATE: 98 BPM

## 2025-01-03 VITALS
TEMPERATURE: 98 F | OXYGEN SATURATION: 98 % | DIASTOLIC BLOOD PRESSURE: 65 MMHG | SYSTOLIC BLOOD PRESSURE: 109 MMHG | HEART RATE: 85 BPM | RESPIRATION RATE: 18 BRPM

## 2025-01-03 DIAGNOSIS — Z92.89 PERSONAL HISTORY OF OTHER MEDICAL TREATMENT: Chronic | ICD-10-CM

## 2025-01-03 DIAGNOSIS — Z98.890 OTHER SPECIFIED POSTPROCEDURAL STATES: Chronic | ICD-10-CM

## 2025-01-03 LAB
ALBUMIN SERPL ELPH-MCNC: 3.2 G/DL — LOW (ref 3.3–5)
ALP SERPL-CCNC: 108 U/L — SIGNIFICANT CHANGE UP (ref 30–120)
ALT FLD-CCNC: 25 U/L — SIGNIFICANT CHANGE UP (ref 10–60)
ANION GAP SERPL CALC-SCNC: 14 MMOL/L — SIGNIFICANT CHANGE UP (ref 5–17)
APPEARANCE UR: CLEAR — SIGNIFICANT CHANGE UP
AST SERPL-CCNC: 62 U/L — HIGH (ref 10–40)
BASOPHILS # BLD AUTO: 0.03 K/UL — SIGNIFICANT CHANGE UP (ref 0–0.2)
BASOPHILS NFR BLD AUTO: 0.7 % — SIGNIFICANT CHANGE UP (ref 0–2)
BILIRUB SERPL-MCNC: 0.8 MG/DL — SIGNIFICANT CHANGE UP (ref 0.2–1.2)
BILIRUB UR-MCNC: NEGATIVE — SIGNIFICANT CHANGE UP
BUN SERPL-MCNC: 3 MG/DL — LOW (ref 7–23)
CALCIUM SERPL-MCNC: 8.5 MG/DL — SIGNIFICANT CHANGE UP (ref 8.4–10.5)
CHLORIDE SERPL-SCNC: 105 MMOL/L — SIGNIFICANT CHANGE UP (ref 96–108)
CO2 SERPL-SCNC: 24 MMOL/L — SIGNIFICANT CHANGE UP (ref 22–31)
COLOR SPEC: YELLOW — SIGNIFICANT CHANGE UP
CREAT SERPL-MCNC: 0.43 MG/DL — LOW (ref 0.5–1.3)
DIFF PNL FLD: NEGATIVE — SIGNIFICANT CHANGE UP
EGFR: 121 ML/MIN/1.73M2 — SIGNIFICANT CHANGE UP
EOSINOPHIL # BLD AUTO: 0.12 K/UL — SIGNIFICANT CHANGE UP (ref 0–0.5)
EOSINOPHIL NFR BLD AUTO: 2.9 % — SIGNIFICANT CHANGE UP (ref 0–6)
GLUCOSE SERPL-MCNC: 95 MG/DL — SIGNIFICANT CHANGE UP (ref 70–99)
GLUCOSE UR QL: NEGATIVE MG/DL — SIGNIFICANT CHANGE UP
HCG UR QL: NEGATIVE — SIGNIFICANT CHANGE UP
HCT VFR BLD CALC: 40.2 % — SIGNIFICANT CHANGE UP (ref 34.5–45)
HGB BLD-MCNC: 12.6 G/DL — SIGNIFICANT CHANGE UP (ref 11.5–15.5)
HIV 1 & 2 AB SERPL IA.RAPID: SIGNIFICANT CHANGE UP
IMM GRANULOCYTES NFR BLD AUTO: 0.2 % — SIGNIFICANT CHANGE UP (ref 0–0.9)
KETONES UR-MCNC: NEGATIVE MG/DL — SIGNIFICANT CHANGE UP
LEUKOCYTE ESTERASE UR-ACNC: NEGATIVE — SIGNIFICANT CHANGE UP
LIDOCAIN IGE QN: 27 U/L — SIGNIFICANT CHANGE UP (ref 16–77)
LYMPHOCYTES # BLD AUTO: 1.41 K/UL — SIGNIFICANT CHANGE UP (ref 1–3.3)
LYMPHOCYTES # BLD AUTO: 33.8 % — SIGNIFICANT CHANGE UP (ref 13–44)
MCHC RBC-ENTMCNC: 26 PG — LOW (ref 27–34)
MCHC RBC-ENTMCNC: 31.3 G/DL — LOW (ref 32–36)
MCV RBC AUTO: 82.9 FL — SIGNIFICANT CHANGE UP (ref 80–100)
MONOCYTES # BLD AUTO: 0.36 K/UL — SIGNIFICANT CHANGE UP (ref 0–0.9)
MONOCYTES NFR BLD AUTO: 8.6 % — SIGNIFICANT CHANGE UP (ref 2–14)
NEUTROPHILS # BLD AUTO: 2.24 K/UL — SIGNIFICANT CHANGE UP (ref 1.8–7.4)
NEUTROPHILS NFR BLD AUTO: 53.8 % — SIGNIFICANT CHANGE UP (ref 43–77)
NITRITE UR-MCNC: NEGATIVE — SIGNIFICANT CHANGE UP
NRBC # BLD: 0 /100 WBCS — SIGNIFICANT CHANGE UP (ref 0–0)
PH UR: 6.5 — SIGNIFICANT CHANGE UP (ref 5–8)
PLATELET # BLD AUTO: 207 K/UL — SIGNIFICANT CHANGE UP (ref 150–400)
POTASSIUM SERPL-MCNC: 4 MMOL/L — SIGNIFICANT CHANGE UP (ref 3.5–5.3)
POTASSIUM SERPL-SCNC: 4 MMOL/L — SIGNIFICANT CHANGE UP (ref 3.5–5.3)
PROT SERPL-MCNC: 7.6 G/DL — SIGNIFICANT CHANGE UP (ref 6–8.3)
PROT UR-MCNC: NEGATIVE MG/DL — SIGNIFICANT CHANGE UP
RBC # BLD: 4.85 M/UL — SIGNIFICANT CHANGE UP (ref 3.8–5.2)
RBC # FLD: 15.9 % — HIGH (ref 10.3–14.5)
SODIUM SERPL-SCNC: 143 MMOL/L — SIGNIFICANT CHANGE UP (ref 135–145)
SP GR SPEC: 1.01 — SIGNIFICANT CHANGE UP (ref 1–1.03)
UROBILINOGEN FLD QL: 1 MG/DL — SIGNIFICANT CHANGE UP (ref 0.2–1)
WBC # BLD: 4.17 K/UL — SIGNIFICANT CHANGE UP (ref 3.8–10.5)
WBC # FLD AUTO: 4.17 K/UL — SIGNIFICANT CHANGE UP (ref 3.8–10.5)

## 2025-01-03 PROCEDURE — 86703 HIV-1/HIV-2 1 RESULT ANTBDY: CPT

## 2025-01-03 PROCEDURE — 81003 URINALYSIS AUTO W/O SCOPE: CPT

## 2025-01-03 PROCEDURE — 96365 THER/PROPH/DIAG IV INF INIT: CPT

## 2025-01-03 PROCEDURE — 81025 URINE PREGNANCY TEST: CPT

## 2025-01-03 PROCEDURE — 96361 HYDRATE IV INFUSION ADD-ON: CPT

## 2025-01-03 PROCEDURE — 36415 COLL VENOUS BLD VENIPUNCTURE: CPT

## 2025-01-03 PROCEDURE — 99284 EMERGENCY DEPT VISIT MOD MDM: CPT | Mod: 25

## 2025-01-03 PROCEDURE — 80053 COMPREHEN METABOLIC PANEL: CPT

## 2025-01-03 PROCEDURE — 85025 COMPLETE CBC W/AUTO DIFF WBC: CPT

## 2025-01-03 PROCEDURE — 83690 ASSAY OF LIPASE: CPT

## 2025-01-03 PROCEDURE — 99284 EMERGENCY DEPT VISIT MOD MDM: CPT

## 2025-01-03 RX ORDER — SODIUM CHLORIDE 9 MG/ML
1000 INJECTION, SOLUTION INTRAMUSCULAR; INTRAVENOUS; SUBCUTANEOUS ONCE
Refills: 0 | Status: COMPLETED | OUTPATIENT
Start: 2025-01-03 | End: 2025-01-03

## 2025-01-03 RX ORDER — ACETAMINOPHEN 80 MG/.8ML
1000 SOLUTION/ DROPS ORAL ONCE
Refills: 0 | Status: COMPLETED | OUTPATIENT
Start: 2025-01-03 | End: 2025-01-03

## 2025-01-03 RX ADMIN — ACETAMINOPHEN 1000 MILLIGRAM(S): 80 SOLUTION/ DROPS ORAL at 02:20

## 2025-01-03 RX ADMIN — ACETAMINOPHEN 400 MILLIGRAM(S): 80 SOLUTION/ DROPS ORAL at 01:47

## 2025-01-03 RX ADMIN — SODIUM CHLORIDE 1000 MILLILITER(S): 9 INJECTION, SOLUTION INTRAMUSCULAR; INTRAVENOUS; SUBCUTANEOUS at 01:47

## 2025-01-03 RX ADMIN — SODIUM CHLORIDE 1000 MILLILITER(S): 9 INJECTION, SOLUTION INTRAMUSCULAR; INTRAVENOUS; SUBCUTANEOUS at 03:00

## 2025-01-03 NOTE — ED PROVIDER NOTE - PROGRESS NOTE DETAILS
Patient states she does not believe she will be able to give a stool sample tonight as she rarely moves her bowels at night.  Her diarrhea is mostly during the daytime.  Patient states that she will go home and I will provide GI follow-up.

## 2025-01-03 NOTE — ED PROVIDER NOTE - CARE PROVIDER_API CALL
Magdaleno Jeffers  Gastroenterology  [address] [place] [id number]  Phone: [phone]  Fax: [phone]  Follow Up Time:

## 2025-01-03 NOTE — ED ADULT NURSE NOTE - CAS EDP DISCH TYPE
21 y.o. male presented to the ER c/o Right foot pain after "twisting" foot earlier today while moving a stretcher at work.  Pt denies extremity weakness/paresthesias.  No other complaints. Home

## 2025-01-03 NOTE — ED PROVIDER NOTE - OBJECTIVE STATEMENT
Patient presents with complaints of diarrhea for 2 to 3 weeks and dark-colored urine for 5 days.  Patient states she has not seen her doctor for either of these problems as she has to wait for a ramp to be built at her residence.  Patient complains of pain in the buttocks and vaginal area.  States it burns when she urinates.  Some lower abdominal pain.  Patient has a history of C. difficile and expresses concern that she has it again.  No fever noted.  No URI symptoms.

## 2025-01-03 NOTE — ED ADULT NURSE NOTE - NSFALLHARMRISKINTERV_ED_ALL_ED

## 2025-01-03 NOTE — ED PROVIDER NOTE - PATIENT PORTAL LINK FT
You can access the FollowMyHealth Patient Portal offered by [facility] by registering at the following website: [email or web address]. By joining [employer]’s FollowMyHealth portal, you will also be able to view your health information using other applications (apps) compatible with our system.

## 2025-01-03 NOTE — ED PROVIDER NOTE - CLINICAL SUMMARY MEDICAL DECISION MAKING FREE TEXT BOX
patient with diarrhea for weeks and dark urine for 5 days.  Complains of pain to perianal  area and perineum as a result.  Will check labs and get stool specimen.  Will check urine.  Will give IV fluids.  Treatment to be based on findings.

## 2025-02-14 NOTE — ED ADULT NURSE NOTE - CCCP TRG CHIEF CMPLNT
Called patient to follow up regarding repeat lab work. No answer. Explained that lab orders were faxed to [facility] lab. They should have on file. Patient can get repeat on Monday is fine. Will follow up on Monday.    chest pressure

## 2025-02-15 NOTE — PROCEDURE NOTE - NSSITEPREP_SKIN_A_CORE
chlorhexidine
dec aerobic capacity/endurance/impaired balance/decreased flexibility/pain/impaired postural control/decreased strength

## 2025-02-21 NOTE — ED PROVIDER NOTE - NS ED MD DISPO DIVISION
Tess presents today in follow-up, status post biopsy of a left pretibial lesion from 2/7/2025.  Biopsy is read as spongiotic psoriasiform dermatitis, consistent with nummular/contact dermatitis (preferred).  The site is healing appropriately, the sutures were removed.  I suggested that she arrange for dermatology follow-up, in the interim, she is to utilize topical hydrocortisone to the site, and we will reevaluate in 1 to 2 weeks.  
[facility]

## 2025-03-04 NOTE — DISCHARGE NOTE NURSING/CASE MANAGEMENT/SOCIAL WORK - NSDCCRNAME_GEN_ALL_CORE_FT
prior to admission you have  consumer directed personal assistance program (CDPAP) aide services thru  [facility] phone number [phone].  prior to admission you have  consumer directed personal assistance program (CDPAP) aide services 36.75 H/week thru  [facility] phone number [phone]/FAX [phone] being managed by [facility] [phone]/ fax [phone]  Home

## 2025-03-07 ENCOUNTER — INPATIENT (INPATIENT)
Facility: HOSPITAL | Age: 45
LOS: 3 days | Discharge: HOME | DRG: 392 | End: 2025-03-11
Attending: STUDENT IN AN ORGANIZED HEALTH CARE EDUCATION/TRAINING PROGRAM | Admitting: INTERNAL MEDICINE
Payer: MEDICAID

## 2025-03-07 VITALS
DIASTOLIC BLOOD PRESSURE: 68 MMHG | WEIGHT: 205.03 LBS | OXYGEN SATURATION: 97 % | HEIGHT: 65 IN | RESPIRATION RATE: 20 BRPM | HEART RATE: 97 BPM | TEMPERATURE: 98 F | SYSTOLIC BLOOD PRESSURE: 108 MMHG

## 2025-03-07 DIAGNOSIS — Z92.89 PERSONAL HISTORY OF OTHER MEDICAL TREATMENT: Chronic | ICD-10-CM

## 2025-03-07 DIAGNOSIS — K52.9 NONINFECTIVE GASTROENTERITIS AND COLITIS, UNSPECIFIED: ICD-10-CM

## 2025-03-07 DIAGNOSIS — Z98.890 OTHER SPECIFIED POSTPROCEDURAL STATES: Chronic | ICD-10-CM

## 2025-03-07 LAB
ALBUMIN SERPL ELPH-MCNC: 2.7 G/DL — LOW (ref 3.3–5)
ALP SERPL-CCNC: 96 U/L — SIGNIFICANT CHANGE UP (ref 30–120)
ALT FLD-CCNC: 31 U/L — SIGNIFICANT CHANGE UP (ref 10–60)
ANION GAP SERPL CALC-SCNC: 10 MMOL/L — SIGNIFICANT CHANGE UP (ref 5–17)
AST SERPL-CCNC: 143 U/L — HIGH (ref 10–40)
BASOPHILS # BLD AUTO: 0.02 K/UL — SIGNIFICANT CHANGE UP (ref 0–0.2)
BASOPHILS NFR BLD AUTO: 0.3 % — SIGNIFICANT CHANGE UP (ref 0–2)
BILIRUB SERPL-MCNC: 1.4 MG/DL — HIGH (ref 0.2–1.2)
BUN SERPL-MCNC: 2 MG/DL — LOW (ref 7–23)
CALCIUM SERPL-MCNC: 6.9 MG/DL — LOW (ref 8.4–10.5)
CHLORIDE SERPL-SCNC: 98 MMOL/L — SIGNIFICANT CHANGE UP (ref 96–108)
CO2 SERPL-SCNC: 32 MMOL/L — HIGH (ref 22–31)
CREAT SERPL-MCNC: 0.5 MG/DL — SIGNIFICANT CHANGE UP (ref 0.5–1.3)
EGFR: 117 ML/MIN/1.73M2 — SIGNIFICANT CHANGE UP
EGFR: 117 ML/MIN/1.73M2 — SIGNIFICANT CHANGE UP
EOSINOPHIL # BLD AUTO: 0.25 K/UL — SIGNIFICANT CHANGE UP (ref 0–0.5)
EOSINOPHIL NFR BLD AUTO: 3.5 % — SIGNIFICANT CHANGE UP (ref 0–6)
GLUCOSE SERPL-MCNC: 111 MG/DL — HIGH (ref 70–99)
HCG SERPL-ACNC: 1 MIU/ML — SIGNIFICANT CHANGE UP
HCT VFR BLD CALC: 30.9 % — LOW (ref 34.5–45)
HGB BLD-MCNC: 9.9 G/DL — LOW (ref 11.5–15.5)
IMM GRANULOCYTES NFR BLD AUTO: 0.6 % — SIGNIFICANT CHANGE UP (ref 0–0.9)
LIDOCAIN IGE QN: 11 U/L — LOW (ref 16–77)
LYMPHOCYTES # BLD AUTO: 0.45 K/UL — LOW (ref 1–3.3)
LYMPHOCYTES # BLD AUTO: 6.3 % — LOW (ref 13–44)
MAGNESIUM SERPL-MCNC: 0.9 MG/DL — CRITICAL LOW (ref 1.6–2.6)
MCHC RBC-ENTMCNC: 27 PG — SIGNIFICANT CHANGE UP (ref 27–34)
MCHC RBC-ENTMCNC: 32 G/DL — SIGNIFICANT CHANGE UP (ref 32–36)
MCV RBC AUTO: 84.4 FL — SIGNIFICANT CHANGE UP (ref 80–100)
MONOCYTES # BLD AUTO: 0.36 K/UL — SIGNIFICANT CHANGE UP (ref 0–0.9)
MONOCYTES NFR BLD AUTO: 5.1 % — SIGNIFICANT CHANGE UP (ref 2–14)
NEUTROPHILS # BLD AUTO: 5.98 K/UL — SIGNIFICANT CHANGE UP (ref 1.8–7.4)
NEUTROPHILS NFR BLD AUTO: 84.2 % — HIGH (ref 43–77)
NRBC BLD AUTO-RTO: 0 /100 WBCS — SIGNIFICANT CHANGE UP (ref 0–0)
PLATELET # BLD AUTO: 255 K/UL — SIGNIFICANT CHANGE UP (ref 150–400)
POTASSIUM SERPL-MCNC: 2.3 MMOL/L — CRITICAL LOW (ref 3.5–5.3)
POTASSIUM SERPL-SCNC: 2.3 MMOL/L — CRITICAL LOW (ref 3.5–5.3)
PROT SERPL-MCNC: 6.1 G/DL — SIGNIFICANT CHANGE UP (ref 6–8.3)
RBC # BLD: 3.66 M/UL — LOW (ref 3.8–5.2)
RBC # FLD: 19 % — HIGH (ref 10.3–14.5)
SODIUM SERPL-SCNC: 140 MMOL/L — SIGNIFICANT CHANGE UP (ref 135–145)
WBC # BLD: 7.1 K/UL — SIGNIFICANT CHANGE UP (ref 3.8–10.5)
WBC # FLD AUTO: 7.1 K/UL — SIGNIFICANT CHANGE UP (ref 3.8–10.5)

## 2025-03-07 PROCEDURE — 99285 EMERGENCY DEPT VISIT HI MDM: CPT

## 2025-03-07 PROCEDURE — 74177 CT ABD & PELVIS W/CONTRAST: CPT | Mod: 26

## 2025-03-07 PROCEDURE — 99222 1ST HOSP IP/OBS MODERATE 55: CPT

## 2025-03-07 PROCEDURE — 93010 ELECTROCARDIOGRAM REPORT: CPT

## 2025-03-07 RX ORDER — OXYCODONE HYDROCHLORIDE 30 MG/1
5 TABLET ORAL EVERY 6 HOURS
Refills: 0 | Status: DISCONTINUED | OUTPATIENT
Start: 2025-03-07 | End: 2025-03-11

## 2025-03-07 RX ORDER — DIAZEPAM 2 MG/1
2 TABLET ORAL
Refills: 0 | Status: DISCONTINUED | OUTPATIENT
Start: 2025-03-07 | End: 2025-03-11

## 2025-03-07 RX ORDER — MAGNESIUM SULFATE 500 MG/ML
4 SYRINGE (ML) INJECTION ONCE
Refills: 0 | Status: COMPLETED | OUTPATIENT
Start: 2025-03-07 | End: 2025-03-07

## 2025-03-07 RX ORDER — PREGABALIN 50 MG/1
150 CAPSULE ORAL
Refills: 0 | Status: DISCONTINUED | OUTPATIENT
Start: 2025-03-07 | End: 2025-03-11

## 2025-03-07 RX ORDER — ACETAMINOPHEN 500 MG/5ML
650 LIQUID (ML) ORAL EVERY 6 HOURS
Refills: 0 | Status: DISCONTINUED | OUTPATIENT
Start: 2025-03-07 | End: 2025-03-11

## 2025-03-07 RX ORDER — ONDANSETRON HCL/PF 4 MG/2 ML
4 VIAL (ML) INJECTION ONCE
Refills: 0 | Status: COMPLETED | OUTPATIENT
Start: 2025-03-07 | End: 2025-03-07

## 2025-03-07 RX ORDER — ONDANSETRON HCL/PF 4 MG/2 ML
4 VIAL (ML) INJECTION EVERY 8 HOURS
Refills: 0 | Status: DISCONTINUED | OUTPATIENT
Start: 2025-03-07 | End: 2025-03-11

## 2025-03-07 RX ORDER — PYRIDOXINE HCL (VITAMIN B6) 25 MG
25 TABLET ORAL DAILY
Refills: 0 | Status: DISCONTINUED | OUTPATIENT
Start: 2025-03-07 | End: 2025-03-11

## 2025-03-07 RX ORDER — CALCIUM GLUCONATE 20 MG/ML
1 INJECTION, SOLUTION INTRAVENOUS ONCE
Refills: 0 | Status: COMPLETED | OUTPATIENT
Start: 2025-03-07 | End: 2025-03-07

## 2025-03-07 RX ORDER — FOLIC ACID 1 MG/1
1 TABLET ORAL DAILY
Refills: 0 | Status: DISCONTINUED | OUTPATIENT
Start: 2025-03-07 | End: 2025-03-11

## 2025-03-07 RX ORDER — NYSTATIN 100000 [USP'U]/G
1 CREAM TOPICAL
Refills: 0 | Status: DISCONTINUED | OUTPATIENT
Start: 2025-03-07 | End: 2025-03-09

## 2025-03-07 RX ORDER — MAGNESIUM, ALUMINUM HYDROXIDE 200-200 MG
30 TABLET,CHEWABLE ORAL EVERY 4 HOURS
Refills: 0 | Status: DISCONTINUED | OUTPATIENT
Start: 2025-03-07 | End: 2025-03-11

## 2025-03-07 RX ORDER — POTASSIUM CHLORIDE, DEXTROSE MONOHYDRATE AND SODIUM CHLORIDE 150; 5; 900 MG/100ML; G/100ML; MG/100ML
1000 INJECTION, SOLUTION INTRAVENOUS
Refills: 0 | Status: DISCONTINUED | OUTPATIENT
Start: 2025-03-07 | End: 2025-03-11

## 2025-03-07 RX ORDER — MAGNESIUM OXIDE 400 MG
400 TABLET ORAL
Refills: 0 | Status: DISCONTINUED | OUTPATIENT
Start: 2025-03-07 | End: 2025-03-11

## 2025-03-07 RX ADMIN — Medication 100 MILLIEQUIVALENT(S): at 20:02

## 2025-03-07 RX ADMIN — Medication 1000 MILLILITER(S): at 18:28

## 2025-03-07 RX ADMIN — Medication 1000 MILLILITER(S): at 17:18

## 2025-03-07 RX ADMIN — Medication 2 MILLIGRAM(S): at 17:34

## 2025-03-07 RX ADMIN — Medication 100 MILLIEQUIVALENT(S): at 21:23

## 2025-03-07 RX ADMIN — OXYCODONE HYDROCHLORIDE 5 MILLIGRAM(S): 30 TABLET ORAL at 20:56

## 2025-03-07 RX ADMIN — Medication 4 MILLIGRAM(S): at 17:18

## 2025-03-07 RX ADMIN — Medication 2 MILLIGRAM(S): at 18:28

## 2025-03-07 RX ADMIN — Medication 25 GRAM(S): at 20:48

## 2025-03-07 RX ADMIN — Medication 100 MILLIEQUIVALENT(S): at 18:20

## 2025-03-07 RX ADMIN — CALCIUM GLUCONATE 100 GRAM(S): 20 INJECTION, SOLUTION INTRAVENOUS at 23:51

## 2025-03-07 RX ADMIN — POTASSIUM CHLORIDE, DEXTROSE MONOHYDRATE AND SODIUM CHLORIDE 100 MILLILITER(S): 150; 5; 900 INJECTION, SOLUTION INTRAVENOUS at 23:50

## 2025-03-07 RX ADMIN — Medication 40 MILLIEQUIVALENT(S): at 18:20

## 2025-03-07 NOTE — ED PROVIDER NOTE - OBJECTIVE STATEMENT
44-year-old female with history of peripheral neuropathy, depression, anemia, asthma, hypothyroidism, atrial fibrillation, and history of C. difficile brought in by ambulance for nausea, lower abdominal pain, and diarrhea the past 8 days.  Patient reports 5 episodes of diarrhea today.  States diarrhea is mucus-like, foul-smelling, nonformed.  Patient reports history of C. difficile colitis about 6 months ago.  States symptoms similar in quality and character as when she had C. difficile colitis.  No recent antibiotic use.  Does report she had antibiotics last episode when she had C. difficile colitis.  No fevers.  no current PCP 44-year-old female with history of peripheral neuropathy, depression, anemia, asthma, hypothyroidism, atrial fibrillation, and history of C. difficile brought in by ambulance for nausea, lower abdominal pain, and diarrhea the past 8 days.  Patient reports 5 episodes of diarrhea today.  States diarrhea is mucus-like, foul-smelling, non-formed.  Patient reports history of C. difficile colitis about 6 months ago.  States symptoms similar in quality and character as when she had C. difficile colitis.  No recent antibiotic use.  Does report she had antibiotics last episode when she had C. difficile colitis.  No fevers.  no current PCP

## 2025-03-07 NOTE — ED ADULT NURSE NOTE - NSFALLRISKINTERV_ED_ALL_ED

## 2025-03-07 NOTE — ED ADULT TRIAGE NOTE - CHIEF COMPLAINT QUOTE
nausea vomiting and diarrhea and abd pain for 8 days. no h/o antibiotic use denies fever or chills  ems gave tylenol iv en route

## 2025-03-07 NOTE — ED PROVIDER NOTE - DIFFERENTIAL DIAGNOSIS
Differential Diagnosis Differentials include but not limited to C. difficile colitis, enteritis, diverticulitis, colitis, dehydration, electrolyte abnormality, anemia

## 2025-03-07 NOTE — H&P ADULT - ASSESSMENT
44-year-old female with history of peripheral neuropathy not able to ambulate due to pain, depression, uterine fibroid, anemia, asthma, hypothyroidism, atrial fibrillation not on AC given previous hx of severe bleed requiring massive transfusion  history of C. difficile in Nov  of last year who presented to ED sec to poor PO intake, nausea and diarrhea:    Colitis-likely Non severe CDI  -Awaiting stool GI PCR. to be started on PO vanco 125 mg q6h if positive  -isolation  -non recurrent given previous episode >2 months ago with treatment and resolution  -ID Consult  -ADAT    Hypokalemia/hypomagnesemia:  -From diarrhea/poor Oral intake   -K 2.3 S/p 40 meq PO and 30 MEQ (3x chelly's in ED)  -Start NS 20meq KCL @ 100 cc/hr  -M.9- give 4 gm mag sulfate in ED  -F/u r/p in AM    Hypocalcemia:  -corrected 7.9  -Will order ionized ca in AM. Correct with 1 gm calcium gluconate given qtc 510 ms  -monitor on tele  -holding off SSRI     Neuropathic pain:  -C/w lyrica 150 mg bid     Afib:  -in NSR  -Not on AC given bleeding risk  -Monitor on tele     Full code    44-year-old female with history of peripheral neuropathy not able to ambulate due to pain, depression, uterine fibroid, anemia, asthma, hypothyroidism, atrial fibrillation not on AC given previous hx of severe bleed requiring massive transfusion  history of C. difficile in Nov  of last year who presented to ED sec to poor PO intake, nausea and diarrhea:    Colitis-likely Non severe CDI  -Awaiting stool GI PCR. to be started on PO vanco 125 mg q6h if positive  -isolation  -non recurrent given previous episode >2 months ago with treatment and resolution  -ID Consult  -ADAT    Hypokalemia/hypomagnesemia:  -From diarrhea/poor Oral intake   -K 2.3 S/p 40 meq PO and 30 MEQ (3x chelly's in ED)  -Start NS 20meq KCL @ 100 cc/hr  -M.9- give 4 gm mag sulfate in ED  -F/u r/p in AM    Hypocalcemia:  -corrected 7.9  -Will order ionized ca in AM. Correct with 1 gm calcium gluconate given qtc 510 ms and ongoing hydration   -monitor on tele  -holding off SSRI     Neuropathic pain:  -C/w lyrica 150 mg bid     Afib:  -in NSR  -Not on AC given bleeding risk  -Monitor on tele     Full code

## 2025-03-07 NOTE — ED PROVIDER NOTE - CLINICAL SUMMARY MEDICAL DECISION MAKING FREE TEXT BOX
44-year-old female with history of peripheral neuropathy, depression, anemia, asthma, hypothyroidism, atrial fibrillation, and history of C. difficile brought in by ambulance for nausea, lower abdominal pain, and diarrhea the past 8 days.  Patient reports 5 episodes of diarrhea today.  States diarrhea is mucus-like, foul-smelling, nonformed.  Patient reports history of C. difficile colitis about 6 months ago.  States symptoms similar in quality and character as when she had C. difficile colitis.  No recent antibiotic use.  Does report she had antibiotics last episode when she had C. difficile colitis.  No fevers.  no current PCP    VSS Afebrile, NAD  HEENT - clear  PERRL EOMI  Neck supple  lungs clear  Cor S1S2 RR - MGR  Abd soft diffuse lower abd tenderness, no mass or HSM, no rebound  Ext FROM intact, no edema  Neuro Intact, no deficits.  Skin Warm and dry no rash.  Imp- Abd pain  Plan - labs, CT abd, IVF, reassess.

## 2025-03-07 NOTE — H&P ADULT - HISTORY OF PRESENT ILLNESS
44-year-old female with history of peripheral neuropathy not able to ambulate due to pain, depression, uterine fibroid, anemia, asthma, hypothyroidism, atrial fibrillation not on AC given previous hx of severe bleed requiring massive transfusion  history of C. difficile in Nov  of last year who presented to ED to be evaluated for Nausea associated with loose BM starting 8 days ago. pt mentions that her abdominal pain is mostly located in B/L Lower quadrant. states that her symptoms are very similar when she had previous C Diff. pt describes that she has not been able  to eat or drink in last 5 days due to her pain and her stool noted to be turning mucoid. denies any fever, chills, diaphoresis hx of sick contact or recent travel. states last she had taken antibiotics was back in nov when she last diagnosed with C Diff.

## 2025-03-07 NOTE — H&P ADULT - NSHPPHYSICALEXAM_GEN_ALL_CORE
T(C): 36.5 (03-07-25 @ 16:41), Max: 36.5 (03-07-25 @ 16:41)  HR: 97 (03-07-25 @ 16:41) (97 - 97)  BP: 108/68 (03-07-25 @ 16:41) (108/68 - 108/68)  RR: 20 (03-07-25 @ 16:41) (20 - 20)  SpO2: 97% (03-07-25 @ 16:41) (97% - 97%)  Wt(kg): --Vital Signs Last 24 Hrs  T(C): 36.5 (07 Mar 2025 16:41), Max: 36.5 (07 Mar 2025 16:41)  T(F): 97.7 (07 Mar 2025 16:41), Max: 97.7 (07 Mar 2025 16:41)  HR: 97 (07 Mar 2025 16:41) (97 - 97)  BP: 108/68 (07 Mar 2025 16:41) (108/68 - 108/68)  BP(mean): --  RR: 20 (07 Mar 2025 16:41) (20 - 20)  SpO2: 97% (07 Mar 2025 16:41) (97% - 97%)    Parameters below as of 07 Mar 2025 16:41  Patient On (Oxygen Delivery Method): room air        PHYSICAL EXAM:  GENERAL: NAD  HENT:  Atraumatic, Normocephalic; No tonsillar erythema, exudates, or enlargement; Moist mucous membranes;   EYES: EOMI, PERRLA, conjunctiva and sclera clear, no lid-lag  NECK: Supple, No JVD, Normal thyroid  NERVOUS SYSTEM:  CN II - XII intact; Sensation intact; Motor Strength 5/5 B/L upper and lower extremities  CHEST/LUNG: Clear to percussion bilaterally; No rales, rhonchi, wheezing, or rubs; normal respiratory effort, no intercostal retractions; No pitting edema  HEART: Regular rate and rhythm; No murmurs, rubs, or gallops  ABDOMEN: Soft, TTP in B/L Lower quadrant, Neg for guarding or rebound tenderness. Pos BS   MUSCULOSKELETAL/EXTREMITIES:  2+ Peripheral Pulses, No clubbing, or digital cyanosis  SKIN: No rashes or lesions; normal texture and temperature  PSYCH: Appropriate affect, Alert & Oriented x 3

## 2025-03-07 NOTE — ED PROVIDER NOTE - DATE/TIME 1
Subjective   Chief Complaint   Patient presents with    Follow-up     Irregular periods, TVS done today.     Kely Fink is a 29 y.o. year old .  Patient's last menstrual period was 2023 (approximate).  She presents to be seen because of irregular mense- tried patch for one month-- did not like- moodiness/AUB.  Only had a little bit of spotting the last couple of months. [place] historically been regular    OTHER COMPLAINTS:  Nothing else    The following portions of the patient's history were reviewed and updated as appropriate:She  has a past medical history of Dichorionic diamniotic twin pregnancy in first trimester (2022), Endometriosis, Female infertility (), GERD (gastroesophageal reflux disease), Gestational hypertension, History of bronchitis, History of endometriosis (), Missed ab (), Ovarian cyst (), PONV (postoperative nausea and vomiting), Reflux esophagitis (), and Wears glasses ().  She does not have any pertinent problems on file.  She  has a past surgical history that includes [place] tooth extraction; Esophagogastroduodenoscopy; Ovarian Cyst Drainage/Excision (Left, 2018); Oophorectomy (Left); d & c with suction (N/A, 3/5/2019); Laparoscopy (N/A, 10/23/2020); and  section (N/A, 3/26/2023).  Her family history includes Diabetes in her father; Hypertension in her mother.  She  reports that she has never smoked. She has never used smokeless tobacco. She reports that she does not drink alcohol and does not use drugs.  Current Outpatient Medications   Medication Sig Dispense Refill    acetaminophen (TYLENOL) 500 MG tablet Take 2 tablets by mouth Every 8 (Eight) Hours. 60 tablet 0    ferrous sulfate 324 (65 Fe) MG tablet delayed-release EC tablet Take 1 tablet by mouth 2 (Two) Times a Day With Meals. (Patient not taking: Reported on 2023) 60 tablet 2    ibuprofen (ADVIL,MOTRIN) 800 MG tablet Take 1 tablet by mouth Every 8 (Eight) Hours. (Patient  not taking: Reported on 11/21/2023) 30 tablet 0    Loratadine-D 24HR  MG per 24 hr tablet  (Patient not taking: Reported on 11/21/2023)      norelgestromin-ethinyl estradiol (ORTHO EVRA) 150-35 MCG/24HR Place 1 patch on the skin as directed by provider 1 (One) Time Per Week. (Patient not taking: Reported on 11/21/2023) 9 patch 5    Prenatal Vit-Fe Fumarate-FA (PRENATAL VITAMINS PO) Take 1 capsule by mouth Daily. (Patient not taking: Reported on 11/21/2023)       No current facility-administered medications for this visit.     Current Outpatient Medications on File Prior to Visit   Medication Sig    acetaminophen (TYLENOL) 500 MG tablet Take 2 tablets by mouth Every 8 (Eight) Hours.    ferrous sulfate 324 (65 Fe) MG tablet delayed-release EC tablet Take 1 tablet by mouth 2 (Two) Times a Day With Meals. (Patient not taking: Reported on 11/21/2023)    ibuprofen (ADVIL,MOTRIN) 800 MG tablet Take 1 tablet by mouth Every 8 (Eight) Hours. (Patient not taking: Reported on 11/21/2023)    Loratadine-D 24HR  MG per 24 hr tablet  (Patient not taking: Reported on 11/21/2023)    norelgestromin-ethinyl estradiol (ORTHO EVRA) 150-35 MCG/24HR Place 1 patch on the skin as directed by provider 1 (One) Time Per Week. (Patient not taking: Reported on 11/21/2023)    Prenatal Vit-Fe Fumarate-FA (PRENATAL VITAMINS PO) Take 1 capsule by mouth Daily. (Patient not taking: Reported on 11/21/2023)     No current facility-administered medications on file prior to visit.     She has No Known Allergies.    Social History    Tobacco Use      Smoking status: Never      Smokeless tobacco: Never    Review of Systems  Consitutional POS: nothing reported    NEG: anorexia or night sweats   Gastointestinal POS: nothing reported    NEG: bloating, change in bowel habits, melena, or reflux symptoms   Genitourinary POS: nothing reported    NEG: dysuria or hematuria   Integument POS: nothing reported    NEG: moles that are changing in size, shape,  "color or rashes   Breast POS: nothing reported    NEG: persistent breast lump, skin dimpling, or nipple discharge         Respiratory: negative  Cardiovascular: negative  GYN:  negative          Objective   /62   Ht 160 cm (63\")   Wt 73.8 kg (162 lb 9.6 oz)   LMP 09/19/2023 (Approximate)   BMI 28.80 kg/m²     General:  well developed; well nourished  no acute distress   Skin:  No suspicious lesions seen   Thyroid: normal to inspection and palpation   Lungs:  breathing is unlabored  clear to auscultation bilaterally   Heart:  regular rate and rhythm, S1, S2 normal, no murmur, click, rub or gallop   Breasts:  Not performed.   Abdomen: soft, non-tender; no masses  no umbilical or inguinal hernias are present  no hepato-splenomegaly   Pelvis: Clinical staff was present for exam  External genitalia:  normal appearance of the external genitalia including Bartholin's and Stevens's glands.  :  urethral meatus normal;  Vaginal:  normal pink mucosa without prolapse or lesions.  Cervix:  normal appearance.  Uterus:  normal size, shape and consistency.  Adnexa:  normal bimanual exam of the adnexa.  Rectal:  digital rectal exam not performed; anus visually normal appearing.     Psychiatric: Alert and oriented ×3, mood and affect appropriate  HEENT: Atraumatic, normocephalic, normal scleral icterus  Extremities: 2+ pulses bilaterally, no edema      Lab Review   CBC    Imaging   Uterus is 7.5 x 4.8 x 3.5 cm anteverted.  Normal.  Endometrium 8.8 mm homogeneous.  Simple 3.7 cm cyst on the right.  No solid masses.  Left ovary surgically absent       Assessment   Irregular bleeding and intolerance to Ortho Evra patch.  Ultrasound reassuring.  All endometrium looks good     Plan   Options discussed.  Will check an hCG and a TSH today.  Will call with results and plan on inducing menses with Provera x 10 days.  If this becomes recurrent patient needs to let me know    No orders of the defined types were placed in this " encounter.         This note was electronically signed.      November 21, 2023       07-Mar-2025 19:35

## 2025-03-07 NOTE — ED PROVIDER NOTE - CARE PLAN
Principal Discharge DX:	Colitis  Secondary Diagnosis:	Hypokalemia  Secondary Diagnosis:	Hypocalcemia  Secondary Diagnosis:	Hypomagnesemia   1

## 2025-03-07 NOTE — PATIENT PROFILE ADULT - FALL HARM RISK - HARM RISK INTERVENTIONS
Assistance with ambulation/Assistance OOB with selected safe patient handling equipment/Communicate Risk of Fall with Harm to all staff/Discuss with provider need for PT consult/Monitor gait and stability/Provide patient with walking aids - walker, cane, crutches/Reinforce activity limits and safety measures with patient and family/Tailored Fall Risk Interventions/Use of alarms - bed, chair and/or voice tab/Visual Cue: Yellow wristband and red socks/Bed in lowest position, wheels locked, appropriate side rails in place/Call bell, personal items and telephone in reach/Instruct patient to call for assistance before getting out of bed or chair/Non-slip footwear when patient is out of bed/[place] to call system/Physically safe environment - no spills, clutter or unnecessary equipment/Purposeful Proactive Rounding/Room/bathroom lighting operational, light cord in reach

## 2025-03-07 NOTE — H&P ADULT - NSHPLABSRESULTS_GEN_ALL_CORE
< from: CT Abdomen and Pelvis w/ IV Cont (03.07.25 @ 18:04) >      IMPRESSION:  Under distention/mild wall thickening of the sigmoid colon with small   mild free fluid, likely colitis. No extraluminal air or abscess noted.    < end of copied text >

## 2025-03-07 NOTE — ED PROVIDER NOTE - PROGRESS NOTE DETAILS
Patient stable.  Resting comfortably.  Potassium 2.3.  Was given oral potassium and currently receiving IV potassium.  Also magnesium and calcium low.  CAT scan shows colitis.  Has been unable to give stool sample in emergency room as she has not eaten anything.  Will give small amount of food to obtain stool sample.  Suspect C. difficile colitis but will wait for results until starting antibiotics.  Spoke with attending hospitalist, Dr. Mcclain and accepts patient for admission

## 2025-03-08 LAB
ALBUMIN SERPL ELPH-MCNC: 2.3 G/DL — LOW (ref 3.3–5)
ALP SERPL-CCNC: 87 U/L — SIGNIFICANT CHANGE UP (ref 30–120)
ALT FLD-CCNC: 27 U/L — SIGNIFICANT CHANGE UP (ref 10–60)
ANION GAP SERPL CALC-SCNC: 8 MMOL/L — SIGNIFICANT CHANGE UP (ref 5–17)
ANION GAP SERPL CALC-SCNC: 9 MMOL/L — SIGNIFICANT CHANGE UP (ref 5–17)
AST SERPL-CCNC: 117 U/L — HIGH (ref 10–40)
BASOPHILS # BLD AUTO: 0.02 K/UL — SIGNIFICANT CHANGE UP (ref 0–0.2)
BASOPHILS NFR BLD AUTO: 0.4 % — SIGNIFICANT CHANGE UP (ref 0–2)
BILIRUB SERPL-MCNC: 1.2 MG/DL — SIGNIFICANT CHANGE UP (ref 0.2–1.2)
BUN SERPL-MCNC: 0 MG/DL — LOW (ref 7–23)
BUN SERPL-MCNC: 3 MG/DL — LOW (ref 7–23)
C DIFF GDH STL QL: POSITIVE — SIGNIFICANT CHANGE UP
C DIFF GDH STL QL: SIGNIFICANT CHANGE UP
CALCIUM SERPL-MCNC: 6.7 MG/DL — LOW (ref 8.4–10.5)
CALCIUM SERPL-MCNC: 6.7 MG/DL — LOW (ref 8.4–10.5)
CHLORIDE SERPL-SCNC: 101 MMOL/L — SIGNIFICANT CHANGE UP (ref 96–108)
CHLORIDE SERPL-SCNC: 103 MMOL/L — SIGNIFICANT CHANGE UP (ref 96–108)
CO2 SERPL-SCNC: 26 MMOL/L — SIGNIFICANT CHANGE UP (ref 22–31)
CO2 SERPL-SCNC: 29 MMOL/L — SIGNIFICANT CHANGE UP (ref 22–31)
CREAT SERPL-MCNC: 0.35 MG/DL — LOW (ref 0.5–1.3)
CREAT SERPL-MCNC: 0.38 MG/DL — LOW (ref 0.5–1.3)
EGFR: 125 ML/MIN/1.73M2 — SIGNIFICANT CHANGE UP
EGFR: 125 ML/MIN/1.73M2 — SIGNIFICANT CHANGE UP
EGFR: 128 ML/MIN/1.73M2 — SIGNIFICANT CHANGE UP
EGFR: 128 ML/MIN/1.73M2 — SIGNIFICANT CHANGE UP
EOSINOPHIL # BLD AUTO: 0 K/UL — SIGNIFICANT CHANGE UP (ref 0–0.5)
EOSINOPHIL NFR BLD AUTO: 0 % — SIGNIFICANT CHANGE UP (ref 0–6)
GLUCOSE SERPL-MCNC: 121 MG/DL — HIGH (ref 70–99)
GLUCOSE SERPL-MCNC: 94 MG/DL — SIGNIFICANT CHANGE UP (ref 70–99)
HCT VFR BLD CALC: 28.6 % — LOW (ref 34.5–45)
HGB BLD-MCNC: 8.9 G/DL — LOW (ref 11.5–15.5)
IMM GRANULOCYTES NFR BLD AUTO: 0.4 % — SIGNIFICANT CHANGE UP (ref 0–0.9)
LYMPHOCYTES # BLD AUTO: 0.61 K/UL — LOW (ref 1–3.3)
LYMPHOCYTES # BLD AUTO: 10.9 % — LOW (ref 13–44)
MAGNESIUM SERPL-MCNC: 1.7 MG/DL — SIGNIFICANT CHANGE UP (ref 1.6–2.6)
MAGNESIUM SERPL-MCNC: 1.8 MG/DL — SIGNIFICANT CHANGE UP (ref 1.6–2.6)
MCHC RBC-ENTMCNC: 26.4 PG — LOW (ref 27–34)
MCHC RBC-ENTMCNC: 31.1 G/DL — LOW (ref 32–36)
MCV RBC AUTO: 84.9 FL — SIGNIFICANT CHANGE UP (ref 80–100)
MONOCYTES # BLD AUTO: 0.3 K/UL — SIGNIFICANT CHANGE UP (ref 0–0.9)
MONOCYTES NFR BLD AUTO: 5.3 % — SIGNIFICANT CHANGE UP (ref 2–14)
NEUTROPHILS # BLD AUTO: 4.66 K/UL — SIGNIFICANT CHANGE UP (ref 1.8–7.4)
NEUTROPHILS NFR BLD AUTO: 83 % — HIGH (ref 43–77)
NRBC BLD AUTO-RTO: 0 /100 WBCS — SIGNIFICANT CHANGE UP (ref 0–0)
PHOSPHATE SERPL-MCNC: 1.9 MG/DL — LOW (ref 2.5–4.5)
PLATELET # BLD AUTO: 222 K/UL — SIGNIFICANT CHANGE UP (ref 150–400)
POTASSIUM SERPL-MCNC: 2.7 MMOL/L — CRITICAL LOW (ref 3.5–5.3)
POTASSIUM SERPL-MCNC: 4.1 MMOL/L — SIGNIFICANT CHANGE UP (ref 3.5–5.3)
POTASSIUM SERPL-SCNC: 2.7 MMOL/L — CRITICAL LOW (ref 3.5–5.3)
POTASSIUM SERPL-SCNC: 4.1 MMOL/L — SIGNIFICANT CHANGE UP (ref 3.5–5.3)
PROT SERPL-MCNC: 5.3 G/DL — LOW (ref 6–8.3)
RBC # BLD: 3.37 M/UL — LOW (ref 3.8–5.2)
RBC # FLD: 19.1 % — HIGH (ref 10.3–14.5)
SODIUM SERPL-SCNC: 137 MMOL/L — SIGNIFICANT CHANGE UP (ref 135–145)
SODIUM SERPL-SCNC: 139 MMOL/L — SIGNIFICANT CHANGE UP (ref 135–145)
WBC # BLD: 5.61 K/UL — SIGNIFICANT CHANGE UP (ref 3.8–10.5)
WBC # FLD AUTO: 5.61 K/UL — SIGNIFICANT CHANGE UP (ref 3.8–10.5)

## 2025-03-08 PROCEDURE — 99233 SBSQ HOSP IP/OBS HIGH 50: CPT

## 2025-03-08 RX ORDER — ENOXAPARIN SODIUM 100 MG/ML
40 INJECTION SUBCUTANEOUS EVERY 24 HOURS
Refills: 0 | Status: DISCONTINUED | OUTPATIENT
Start: 2025-03-08 | End: 2025-03-11

## 2025-03-08 RX ORDER — SOD PHOS DI, MONO/K PHOS MONO 250 MG
1 TABLET ORAL ONCE
Refills: 0 | Status: COMPLETED | OUTPATIENT
Start: 2025-03-08 | End: 2025-03-08

## 2025-03-08 RX ORDER — SERTRALINE 100 MG/1
100 TABLET, FILM COATED ORAL DAILY
Refills: 0 | Status: DISCONTINUED | OUTPATIENT
Start: 2025-03-08 | End: 2025-03-11

## 2025-03-08 RX ORDER — TRAZODONE HCL 100 MG
50 TABLET ORAL AT BEDTIME
Refills: 0 | Status: DISCONTINUED | OUTPATIENT
Start: 2025-03-08 | End: 2025-03-11

## 2025-03-08 RX ORDER — TRAZODONE HCL 100 MG
50 TABLET ORAL ONCE
Refills: 0 | Status: COMPLETED | OUTPATIENT
Start: 2025-03-08 | End: 2025-03-08

## 2025-03-08 RX ORDER — VANCOMYCIN HCL IN 5 % DEXTROSE 1.5G/250ML
125 PLASTIC BAG, INJECTION (ML) INTRAVENOUS EVERY 6 HOURS
Refills: 0 | Status: DISCONTINUED | OUTPATIENT
Start: 2025-03-08 | End: 2025-03-11

## 2025-03-08 RX ORDER — LACTOBACILLUS ACIDOPHILUS/PECT 75 MM-100
1 CAPSULE ORAL
Refills: 0 | Status: DISCONTINUED | OUTPATIENT
Start: 2025-03-08 | End: 2025-03-11

## 2025-03-08 RX ORDER — MAGNESIUM SULFATE 500 MG/ML
1 SYRINGE (ML) INJECTION ONCE
Refills: 0 | Status: COMPLETED | OUTPATIENT
Start: 2025-03-08 | End: 2025-03-08

## 2025-03-08 RX ADMIN — Medication 400 MILLIGRAM(S): at 07:59

## 2025-03-08 RX ADMIN — Medication 40 MILLIEQUIVALENT(S): at 07:59

## 2025-03-08 RX ADMIN — DIAZEPAM 2 MILLIGRAM(S): 2 TABLET ORAL at 14:53

## 2025-03-08 RX ADMIN — DIAZEPAM 2 MILLIGRAM(S): 2 TABLET ORAL at 00:21

## 2025-03-08 RX ADMIN — PREGABALIN 150 MILLIGRAM(S): 50 CAPSULE ORAL at 05:56

## 2025-03-08 RX ADMIN — Medication 650 MILLIGRAM(S): at 01:16

## 2025-03-08 RX ADMIN — Medication 100 GRAM(S): at 11:16

## 2025-03-08 RX ADMIN — Medication 1 TABLET(S): at 17:42

## 2025-03-08 RX ADMIN — NYSTATIN 1 APPLICATION(S): 100000 CREAM TOPICAL at 00:11

## 2025-03-08 RX ADMIN — NYSTATIN 1 APPLICATION(S): 100000 CREAM TOPICAL at 18:04

## 2025-03-08 RX ADMIN — Medication 50 MILLIGRAM(S): at 21:07

## 2025-03-08 RX ADMIN — Medication 4 MILLIGRAM(S): at 12:18

## 2025-03-08 RX ADMIN — Medication 125 MILLIGRAM(S): at 19:06

## 2025-03-08 RX ADMIN — Medication 1000 MILLILITER(S): at 16:59

## 2025-03-08 RX ADMIN — Medication 100 MILLIEQUIVALENT(S): at 12:06

## 2025-03-08 RX ADMIN — PREGABALIN 150 MILLIGRAM(S): 50 CAPSULE ORAL at 17:41

## 2025-03-08 RX ADMIN — Medication 500 MILLILITER(S): at 21:09

## 2025-03-08 RX ADMIN — Medication 1 PACKET(S): at 11:15

## 2025-03-08 RX ADMIN — POTASSIUM CHLORIDE, DEXTROSE MONOHYDRATE AND SODIUM CHLORIDE 100 MILLILITER(S): 150; 5; 900 INJECTION, SOLUTION INTRAVENOUS at 23:20

## 2025-03-08 RX ADMIN — POTASSIUM CHLORIDE, DEXTROSE MONOHYDRATE AND SODIUM CHLORIDE 100 MILLILITER(S): 150; 5; 900 INJECTION, SOLUTION INTRAVENOUS at 12:07

## 2025-03-08 RX ADMIN — POTASSIUM CHLORIDE, DEXTROSE MONOHYDRATE AND SODIUM CHLORIDE 100 MILLILITER(S): 150; 5; 900 INJECTION, SOLUTION INTRAVENOUS at 16:59

## 2025-03-08 RX ADMIN — Medication 50 MILLIGRAM(S): at 01:46

## 2025-03-08 RX ADMIN — Medication 125 MILLIGRAM(S): at 23:21

## 2025-03-08 RX ADMIN — Medication 25 MILLIGRAM(S): at 11:15

## 2025-03-08 RX ADMIN — Medication 650 MILLIGRAM(S): at 00:22

## 2025-03-08 RX ADMIN — Medication 30 MILLILITER(S): at 21:08

## 2025-03-08 RX ADMIN — NYSTATIN 1 APPLICATION(S): 100000 CREAM TOPICAL at 05:57

## 2025-03-08 RX ADMIN — Medication 400 MILLIGRAM(S): at 11:16

## 2025-03-08 RX ADMIN — Medication 100 MILLIEQUIVALENT(S): at 07:59

## 2025-03-08 RX ADMIN — Medication 100 MILLIEQUIVALENT(S): at 09:52

## 2025-03-08 RX ADMIN — Medication 400 MILLIGRAM(S): at 17:42

## 2025-03-08 RX ADMIN — ENOXAPARIN SODIUM 40 MILLIGRAM(S): 100 INJECTION SUBCUTANEOUS at 11:16

## 2025-03-08 RX ADMIN — FOLIC ACID 1 MILLIGRAM(S): 1 TABLET ORAL at 11:15

## 2025-03-08 NOTE — CARE COORDINATION ASSESSMENT. - NSCAREPROVIDERS_GEN_ALL_CORE_FT
CARE PROVIDERS:  Accepting Physician: Samuel Mcclain  Administration: Trenton José  Administration: Ernst Melton  Admitting: Samuel Mcclain  Attending: Safia Chang  Covering Team: Eduin Chairez  ED ACP: Erika Dennis  ED Attending: Aleks Lomeli  ED Nurse: Rodolfo Cadet  Nurse: Vielka Carpenter  Nurse: Martín Lerma  Ordered: Safia Chang  Outpatient Provider: Magdaleno Jeffers  Outpatient Provider: Chin Carey  Outpatient Provider: Kacie David  Override: Bonnie Jones  PCA/Nursing Assistant: Ebony Lea  Primary Team: Samuel Mcclain  Primary Team: Safia Chang  Registered Dietitian: Sharon Maloney  : Mita Solitario  Team: LIDYA MEDELLIN Hospitalists, Team  UR// Supp. Assoc.: Adrienne Amin

## 2025-03-08 NOTE — PROGRESS NOTE ADULT - ASSESSMENT
44-year-old female with history of peripheral neuropathy not able to ambulate due to pain, depression, uterine fibroid, anemia, asthma, hypothyroidism, atrial fibrillation not on AC given previous hx of severe bleed requiring massive transfusion  history of C. difficile in Nov  of last year who presented to ED sec to poor PO intake, nausea and diarrhea:    Colitis  -Less likely C diff   -Awaiting stool GI PCR  -Isolation, contact precautions   -History of recent C diff   -ID Consult  -ADAT  -Monitor for fever and WBC curve     Hypokalemia/hypomagnesemia/Hypophosphatemia   - From diarrhea/poor Oral intake   - Continue NS 20meq KCL @ 100 cc/hr  - Replete to keep K >4, Mg > 2 . phos > 3     Hypocalcemia   -Corrected 8.1  -Follow up ionized ca level   -S/p 1 gm calcium gluconate given qtc 510 ms and ongoing hydration   -Monitor on tele       Neuropathic pain  -C/w Lyrica 150 mg bid     Afib  -In NSR  -Not on AC given bleeding risk  -Monitor on tele     Depression   - On trazodone 50 mg   - Valium 2 mg prn   - Sertraline 100 mg     DVT prophylaxis   Lovenox     Full code  44-year-old female with history of peripheral neuropathy not able to ambulate due to pain, depression, uterine fibroid, anemia, asthma, hypothyroidism, atrial fibrillation not on AC given previous hx of severe bleed requiring massive transfusion  history of C. difficile in Nov  of last year who presented to ED sec to poor PO intake, nausea and diarrhea:    Colitis  -Less likely C diff   -Awaiting stool GI PCR  -Isolation, contact precautions   -History of recent C diff   -ID Consult  -ADAT  -Monitor for fever and WBC curve     Hypokalemia/hypomagnesemia/Hypophosphatemia   - From diarrhea/poor Oral intake   - Continue NS 20meq KCL @ 100 cc/hr  - Replete to keep K >4, Mg > 2 . phos > 3     Hypocalcemia   -Corrected 8.1  -Follow up ionized ca level   -S/p 1 gm calcium gluconate given qtc 510 ms and ongoing hydration   -Monitor on tele       Neuropathic pain  -C/w Lyrica 150 mg bid     Afib  -In NSR  -Not on AC given bleeding risk  -Monitor on tele     Depression   - On trazodone 50 mg   - Valium 2 mg prn   - Resume  Sertraline 100 mg, usually does not cause QTc prolongation     DVT prophylaxis   Lovenox     Full code  44-year-old female with history of peripheral neuropathy not able to ambulate due to pain, depression, uterine fibroid, anemia, asthma, hypothyroidism, atrial fibrillation not on AC given previous hx of severe bleed requiring massive transfusion  history of C. difficile in Nov  of last year who presented to ED sec to poor PO intake, nausea and diarrhea:    Colitis  - Likely C diff   -Awaiting stool GI PCR, c diff   -Isolation, contact precautions   -History of recent C diff   -ID Consult  -ADAT  -Monitor for fever and WBC curve     Hypokalemia/hypomagnesemia/Hypophosphatemia   - From diarrhea/poor Oral intake   - Continue NS 20meq KCL @ 100 cc/hr  - Replete to keep K >4, Mg > 2 . phos > 3     Hypocalcemia   -Corrected 8.1  -Follow up ionized ca level   -S/p 1 gm calcium gluconate given qtc 510 ms and ongoing hydration   -Monitor on tele       Neuropathic pain  -C/w Lyrica 150 mg bid     Afib  -In NSR  -Not on AC given bleeding risk  -Monitor on tele     Depression   - On trazodone 50 mg   - Valium 2 mg prn   - Resume  Sertraline 100 mg, usually does not cause QTc prolongation     DVT prophylaxis   Lovenox     Full code

## 2025-03-08 NOTE — CARE COORDINATION ASSESSMENT. - OTHER PERTINENT DISCHARGE PLANNING INFORMATION:
Pt admitted with CDIF from home. She lives with her mother and her 3 children ages 26, 23 and 18. She reported that she suffers from depression and has been seeing a psychiatrist from [facility] for 10 yrs, and is on disability. Her son drives her to [employer]. She had home care which pt recently completed. Pt owns a walker and a wheelchair.

## 2025-03-08 NOTE — PROVIDER CONTACT NOTE (CRITICAL VALUE NOTIFICATION) - RECOMMENDATIONS
K-Riders x3  40 mEq PO K-Riders x3  40 mEq PO K-Phos powder x2, 4 hours apart  Calcium gluconate 1 Gram IVPB

## 2025-03-08 NOTE — CONSULT NOTE ADULT - SUBJECTIVE AND OBJECTIVE BOX
HPI:  43YO F PMH peripheral neuropathy not able to ambulate due to pain, depression, uterine fibroid, anemia, asthma, hypothyroidism, atrial fibrillation not on AC given previous hx of severe bleed requiring massive transfusion  history of C. difficile in 2025 who presented to ED to be evaluated for nausea associated with profuse watery loose BM starting 8 days ago and abd pain. Denies fever chills.  CT AP showed sigmoid colitis. WBC wnl Afebrile. Cdiff +.    Infectious Disease consult was called to evaluate pt and for antibiotic management.    Past Medical & Surgical Hx:  PAST MEDICAL & SURGICAL HISTORY:  Atrial fibrillation  History of Hyperthyroidism  Asthma  History of anemia  Depression, unspecified depression type  Smoker  Peripheral neuropathy  S/P  Section  ,,  History of blood transfusion  Status post embolization of uterine artery    Social History--  EtOH: denies   Tobacco: denies   Drug Use: denies    FAMILY HISTORY:  Family history of diabetes mellitus (Sibling, Grandparent)    Family history of stomach cancer (Grandparent)      Allergies  Motrin (Hives)    Intolerances  NONE      Home Medications:  acetaminophen 325 mg oral tablet: 2 tab(s) orally every 6 hours As needed Temp greater or equal to 38C (100.4F), Mild Pain (1 - 3) (07 Mar 2025 19:40)  folic acid 1 mg oral tablet: 1 tab(s) orally once a day (07 Mar 2025 19:40)  melatonin 3 mg oral tablet: 1 tab(s) orally once a day (at bedtime) As needed Insomnia (07 Mar 2025 19:40)  sertraline 100 mg oral tablet: 1 tab(s) orally once a day (07 Mar 2025 19:40)      Current Inpatient Medications :    ANTIBIOTICS:       OTHER RELEVANT MEDICATIONS :  acetaminophen     Tablet .. 650 milliGRAM(s) Oral every 6 hours PRN  aluminum hydroxide/magnesium hydroxide/simethicone Suspension 30 milliLiter(s) Oral every 4 hours PRN  diazepam    Tablet 2 milliGRAM(s) Oral two times a day PRN  enoxaparin Injectable 40 milliGRAM(s) SubCutaneous every 24 hours  folic acid 1 milliGRAM(s) Oral daily  magnesium oxide 400 milliGRAM(s) Oral three times a day with meals  nystatin Ointment 1 Application(s) Topical two times a day  ondansetron Injectable 4 milliGRAM(s) IV Push every 8 hours PRN  oxyCODONE    IR 5 milliGRAM(s) Oral every 6 hours PRN  pregabalin 150 milliGRAM(s) Oral two times a day  pyridoxine 25 milliGRAM(s) Oral daily  sertraline 100 milliGRAM(s) Oral daily  sodium chloride 0.9% with potassium chloride 20 mEq/L 1000 milliLiter(s) IV Continuous <Continuous>  traZODone 50 milliGRAM(s) Oral at bedtime    ROS:  CONSTITUTIONAL:  Negative fever or chills  EYES:  Negative  blurry vision or double vision  CARDIOVASCULAR:  Negative for chest pain or palpitations  RESPIRATORY:  Negative for cough, wheezing, or SOB   GASTROINTESTINAL:  Negative for nausea, vomiting, constipation, or abdominal pain +diarrhea  GENITOURINARY:  Negative frequency, urgency , dysuria or hematuria   NEUROLOGIC:  No headache, confusion, dizziness, lightheadedness  All other systems were reviewed and are negative      Physical Exam:  Vital Signs Last 24 Hrs  T(C): 36.9 (08 Mar 2025 17:39), Max: 37.1 (07 Mar 2025 21:50)  T(F): 98.4 (08 Mar 2025 17:39), Max: 98.7 (07 Mar 2025 21:50)  HR: 89 (08 Mar 2025 17:39) (88 - 100)  BP: 99/65 (08 Mar 2025 17:39) (84/54 - 111/75)  BP(mean): 69 (08 Mar 2025 09:00) (69 - 69)  RR: 17 (08 Mar 2025 17:39) (17 - 20)  SpO2: 96% (08 Mar 2025 17:39) (94% - 99%)    Parameters below as of 08 Mar 2025 17:39  Patient On (Oxygen Delivery Method): room air    General:  no acute distress  Neck: supple, trachea midline  Lungs: clear, no wheeze/rhonchi  Cardiovascular: regular rate and rhythm, S1 S2  Abdomen: soft, mildly tender, ND, bowel sounds normal  Neurological:  alert and oriented x3  Skin: no rash  Extremities: no edema    Labs:                         8.9    5.61  )-----------( 222      ( 08 Mar 2025 06:00 )             28.6   03-08    139  |  101  |  3[L]  ----------------------------<  94  2.7[LL]   |  29  |  0.38[L]    Ca    6.7[L]      08 Mar 2025 06:00  Phos  1.9     03-08  Mg     1.8     -08    TPro  5.3[L]  /  Alb  2.3[L]  /  TBili  1.2  /  DBili  x   /  AST  117[H]  /  ALT  27  /  AlkPhos  87  -      RECENT CULTURES:        RADIOLOGY & ADDITIONAL STUDIES:    ACC: [id number] EXAM:  CT ABDOMEN AND PELVIS IC   ORDERED BY: LINDSEY ZARAGOZA     PROCEDURE DATE:  2025          INTERPRETATION:  CLINICAL INFORMATION: lower abd pain, diarrhea, hx of   c-diff    COMPARISON: None.    CONTRAST/COMPLICATIONS:  IV Contrast: Omnipaque 350  90 cc administered   10 cc discarded  Oral Contrast: NONE  .    PROCEDURE:  CT of the Abdomen and Pelvis was performed.  Sagittal and coronal reformats were performed.    FINDINGS:  LOWER CHEST: Within normal limits.    LIVER: Hepatomegaly. Calcified granuloma.  BILE DUCTS: Normal caliber.  GALLBLADDER: Within normal limits.  SPLEEN: Calcified granuloma.  PANCREAS: Fatty replacement of pancreas.  ADRENALS: Within normal limits.  KIDNEYS/URETERS: No renal stones or hydronephrosis.    BLADDER: Under distended.  REPRODUCTIVE ORGANS: Uterus and adnexa within normal limits.    BOWEL: No bowel obstruction. Appendix is normal. Under distention/mild   wall thickening of the sigmoid colon.  PERITONEUM/RETROPERITONEUM: Trace free fluid.  VESSELS: Within normal limits.  LYMPH NODES: Shotty retroperitoneal lymph nodes  ABDOMINAL WALL: Small fat-containing umbilical hernia.  BONES: Within normal limits.    IMPRESSION:  Under distention/mild wall thickening of the sigmoid colon with small   mild free fluid, likely colitis. No extraluminal air or abscess noted.      Assessment :   43YO F PMH peripheral neuropathy not able to ambulate due to pain, depression, uterine fibroid, anemia, asthma, hypothyroidism, atrial fibrillation not on AC given previous hx of severe bleed requiring massive transfusion  history of C. difficile in 2025 who presented to ED to be evaluated for nausea associated with profuse watery loose BM starting 8 days ago and abd pain. Denies fever chills.  CT AP showed sigmoid colitis. WBC wnl Afebrile. Cdiff +.    Plan :   Start Po Vanc 125mg po q6h  Probiotics  Trend temps and cbc  Serial abd exams    Advance Directives- Full code  Current Medications are documented.   Drug-drug interactions reviewed.    Continue with present regiment .  Approptiate use of antibiotics and adverse effects reviewed.      I have discussed the above plan of care with patient/family in detail. They expressed understanding of the treatment plan . Risks, benefits and alternatives discussed in detail. I have asked if they have any questions or concerns and appropriately addressed them to the best of my ability .      > 45 minutes spent in direct patient care reviewing  the notes, lab data/ imaging , discussion with multidisciplinary team. All questions were addressed and answered to the best of my capacity .    Thank you for allowing me to participate in the care of your patient .      Luis Alberto Farley MD  Infectious Disease  [phone]    Individualized infection control protocols for an individual patient based on their diagnosis and risks in order to reduce risk of disease transmission followed. Coordinating with  infection prevention and control team members to enable healthcare facility staff to safely care for patient.  Managing infection prevention and treatment protocols associated with transitions of care for complex patients.  In-depth patient chart review that entails going back farther in time and assessing the complete breadth of all health care interactions, with higher-level synthesis for complex diagnoses.  Communicating with the clinical microbiology lab and directly reviewing specimens.  Engaging in complex medical decision-making associated with antimicrobial prescribing including considerations such as antimicrobial resistance patterns, emergence of new variants/strains, recent antibiotic exposure, interactions/complications from comorbidities including concurrent infections, public health considerations to minimize development of antimicrobial resistance, and emerging and re-emerging infections.

## 2025-03-09 LAB
ANION GAP SERPL CALC-SCNC: 11 MMOL/L — SIGNIFICANT CHANGE UP (ref 5–17)
ANION GAP SERPL CALC-SCNC: 7 MMOL/L — SIGNIFICANT CHANGE UP (ref 5–17)
BUN SERPL-MCNC: 1 MG/DL — LOW (ref 7–23)
BUN SERPL-MCNC: 1 MG/DL — LOW (ref 7–23)
CALCIUM SERPL-MCNC: 6.4 MG/DL — CRITICAL LOW (ref 8.4–10.5)
CALCIUM SERPL-MCNC: 7 MG/DL — LOW (ref 8.4–10.5)
CALCIUM SERPL-MCNC: 7.1 MG/DL — LOW (ref 8.4–10.5)
CHLORIDE SERPL-SCNC: 103 MMOL/L — SIGNIFICANT CHANGE UP (ref 96–108)
CHLORIDE SERPL-SCNC: 107 MMOL/L — SIGNIFICANT CHANGE UP (ref 96–108)
CO2 SERPL-SCNC: 24 MMOL/L — SIGNIFICANT CHANGE UP (ref 22–31)
CO2 SERPL-SCNC: 28 MMOL/L — SIGNIFICANT CHANGE UP (ref 22–31)
CREAT SERPL-MCNC: 0.5 MG/DL — SIGNIFICANT CHANGE UP (ref 0.5–1.3)
CREAT SERPL-MCNC: 0.58 MG/DL — SIGNIFICANT CHANGE UP (ref 0.5–1.3)
EGFR: 113 ML/MIN/1.73M2 — SIGNIFICANT CHANGE UP
EGFR: 113 ML/MIN/1.73M2 — SIGNIFICANT CHANGE UP
EGFR: 117 ML/MIN/1.73M2 — SIGNIFICANT CHANGE UP
EGFR: 117 ML/MIN/1.73M2 — SIGNIFICANT CHANGE UP
GI PCR PANEL: SIGNIFICANT CHANGE UP
GLUCOSE SERPL-MCNC: 108 MG/DL — HIGH (ref 70–99)
GLUCOSE SERPL-MCNC: 114 MG/DL — HIGH (ref 70–99)
MAGNESIUM SERPL-MCNC: 1.5 MG/DL — LOW (ref 1.6–2.6)
MAGNESIUM SERPL-MCNC: 1.9 MG/DL — SIGNIFICANT CHANGE UP (ref 1.6–2.6)
PHOSPHATE SERPL-MCNC: 0.8 MG/DL — CRITICAL LOW (ref 2.5–4.5)
PHOSPHATE SERPL-MCNC: 0.9 MG/DL — CRITICAL LOW (ref 2.5–4.5)
POTASSIUM SERPL-MCNC: 3.7 MMOL/L — SIGNIFICANT CHANGE UP (ref 3.5–5.3)
POTASSIUM SERPL-MCNC: 4.1 MMOL/L — SIGNIFICANT CHANGE UP (ref 3.5–5.3)
POTASSIUM SERPL-SCNC: 3.7 MMOL/L — SIGNIFICANT CHANGE UP (ref 3.5–5.3)
POTASSIUM SERPL-SCNC: 4.1 MMOL/L — SIGNIFICANT CHANGE UP (ref 3.5–5.3)
PTH-INTACT FLD-MCNC: 75 PG/ML — HIGH (ref 15–65)
SODIUM SERPL-SCNC: 138 MMOL/L — SIGNIFICANT CHANGE UP (ref 135–145)
SODIUM SERPL-SCNC: 142 MMOL/L — SIGNIFICANT CHANGE UP (ref 135–145)

## 2025-03-09 PROCEDURE — 99232 SBSQ HOSP IP/OBS MODERATE 35: CPT

## 2025-03-09 RX ORDER — MAGNESIUM SULFATE 500 MG/ML
1 SYRINGE (ML) INJECTION
Refills: 0 | Status: COMPLETED | OUTPATIENT
Start: 2025-03-09 | End: 2025-03-09

## 2025-03-09 RX ORDER — CALCIUM GLUCONATE 20 MG/ML
1 INJECTION, SOLUTION INTRAVENOUS ONCE
Refills: 0 | Status: COMPLETED | OUTPATIENT
Start: 2025-03-09 | End: 2025-03-09

## 2025-03-09 RX ORDER — SOD PHOS DI, MONO/K PHOS MONO 250 MG
1 TABLET ORAL EVERY 4 HOURS
Refills: 0 | Status: COMPLETED | OUTPATIENT
Start: 2025-03-09 | End: 2025-03-09

## 2025-03-09 RX ORDER — NYSTATIN 100000 [USP'U]/G
1 CREAM TOPICAL
Refills: 0 | Status: DISCONTINUED | OUTPATIENT
Start: 2025-03-09 | End: 2025-03-11

## 2025-03-09 RX ORDER — POTASSIUM PHOSPHATE, MONOBASIC POTASSIUM PHOSPHATE, DIBASIC INJECTION, 236; 224 MG/ML; MG/ML
15 SOLUTION, CONCENTRATE INTRAVENOUS EVERY 6 HOURS
Refills: 0 | Status: COMPLETED | OUTPATIENT
Start: 2025-03-09 | End: 2025-03-09

## 2025-03-09 RX ADMIN — FOLIC ACID 1 MILLIGRAM(S): 1 TABLET ORAL at 13:11

## 2025-03-09 RX ADMIN — Medication 400 MILLIGRAM(S): at 07:57

## 2025-03-09 RX ADMIN — POTASSIUM PHOSPHATE, MONOBASIC POTASSIUM PHOSPHATE, DIBASIC INJECTION, 62.5 MILLIMOLE(S): 236; 224 SOLUTION, CONCENTRATE INTRAVENOUS at 19:10

## 2025-03-09 RX ADMIN — Medication 125 MILLIGRAM(S): at 18:13

## 2025-03-09 RX ADMIN — ENOXAPARIN SODIUM 40 MILLIGRAM(S): 100 INJECTION SUBCUTANEOUS at 13:12

## 2025-03-09 RX ADMIN — Medication 125 MILLIGRAM(S): at 05:31

## 2025-03-09 RX ADMIN — Medication 2 MILLIGRAM(S): at 13:37

## 2025-03-09 RX ADMIN — NYSTATIN 1 APPLICATION(S): 100000 CREAM TOPICAL at 05:32

## 2025-03-09 RX ADMIN — Medication 25 MILLIGRAM(S): at 13:11

## 2025-03-09 RX ADMIN — Medication 125 MILLIGRAM(S): at 23:15

## 2025-03-09 RX ADMIN — Medication 400 MILLIGRAM(S): at 13:34

## 2025-03-09 RX ADMIN — POTASSIUM PHOSPHATE, MONOBASIC POTASSIUM PHOSPHATE, DIBASIC INJECTION, 62.5 MILLIMOLE(S): 236; 224 SOLUTION, CONCENTRATE INTRAVENOUS at 23:15

## 2025-03-09 RX ADMIN — Medication 1 PACKET(S): at 13:12

## 2025-03-09 RX ADMIN — POTASSIUM CHLORIDE, DEXTROSE MONOHYDRATE AND SODIUM CHLORIDE 100 MILLILITER(S): 150; 5; 900 INJECTION, SOLUTION INTRAVENOUS at 08:46

## 2025-03-09 RX ADMIN — Medication 100 GRAM(S): at 11:49

## 2025-03-09 RX ADMIN — PREGABALIN 150 MILLIGRAM(S): 50 CAPSULE ORAL at 05:31

## 2025-03-09 RX ADMIN — Medication 1 PACKET(S): at 08:46

## 2025-03-09 RX ADMIN — Medication 1 TABLET(S): at 07:57

## 2025-03-09 RX ADMIN — Medication 125 MILLIGRAM(S): at 11:49

## 2025-03-09 RX ADMIN — CALCIUM GLUCONATE 100 GRAM(S): 20 INJECTION, SOLUTION INTRAVENOUS at 08:46

## 2025-03-09 RX ADMIN — OXYCODONE HYDROCHLORIDE 5 MILLIGRAM(S): 30 TABLET ORAL at 22:36

## 2025-03-09 RX ADMIN — POTASSIUM CHLORIDE, DEXTROSE MONOHYDRATE AND SODIUM CHLORIDE 100 MILLILITER(S): 150; 5; 900 INJECTION, SOLUTION INTRAVENOUS at 21:20

## 2025-03-09 RX ADMIN — PREGABALIN 150 MILLIGRAM(S): 50 CAPSULE ORAL at 18:13

## 2025-03-09 RX ADMIN — Medication 400 MILLIGRAM(S): at 18:14

## 2025-03-09 RX ADMIN — Medication 50 MILLIGRAM(S): at 21:20

## 2025-03-09 RX ADMIN — NYSTATIN 1 APPLICATION(S): 100000 CREAM TOPICAL at 17:50

## 2025-03-09 RX ADMIN — OXYCODONE HYDROCHLORIDE 5 MILLIGRAM(S): 30 TABLET ORAL at 23:06

## 2025-03-09 RX ADMIN — Medication 1 TABLET(S): at 18:14

## 2025-03-09 RX ADMIN — Medication 2 MILLIGRAM(S): at 13:27

## 2025-03-09 RX ADMIN — Medication 100 GRAM(S): at 13:12

## 2025-03-09 NOTE — PROGRESS NOTE ADULT - ASSESSMENT
44-year-old female with history of peripheral neuropathy not able to ambulate due to pain, depression, uterine fibroid, anemia, asthma, hypothyroidism, atrial fibrillation not on AC given previous hx of severe bleed requiring massive transfusion  history of C. difficile in Nov  of last year who presented to ED sec to poor PO intake, nausea and diarrhea:    C diff Colitis  + C diff   - Nofever or leukocytosis   - GI PCR negative   - Isolation, contact precautions   - ID Consult  - Continue with Oral vancomycin 125 mg every 6 hourly   - Continue with IV fluid   - ADAT  - Monitor for fever and WBC curve     Hypokalemia/hypomagnesemia/Hypophosphatemia   - From diarrhea/poor Oral intake   - Continue NS 20meq KCL @ 100 cc/hr  - Replete to keep K >4, Mg > 2 . phos > 3     Hypocalcemia   - Corrected ca 7.8   -Follow up ionized ca level   -S/p 2gm calcium gluconate   -Monitor on tele  - Follow up BMP , PTH, calcium      Neuropathic pain  -C/w Lyrica 150 mg bid     Afib  -In NSR  -Not on AC given bleeding risk  -Monitor on tele     Depression   - On trazodone 50 mg   - Valium 2 mg prn   - Resume  Sertraline 100 mg, usually does not cause QTc prolongation     DVT prophylaxis   Lovenox     Full code

## 2025-03-10 ENCOUNTER — TRANSCRIPTION ENCOUNTER (OUTPATIENT)
Age: 45
End: 2025-03-10

## 2025-03-10 LAB
24R-OH-CALCIDIOL SERPL-MCNC: <6 NG/ML — SIGNIFICANT CHANGE UP
ANION GAP SERPL CALC-SCNC: 7 MMOL/L — SIGNIFICANT CHANGE UP (ref 5–17)
BUN SERPL-MCNC: 2 MG/DL — LOW (ref 7–23)
CA-I BLD-SCNC: 4.2 MG/DL — LOW (ref 4.5–5.6)
CALCIUM SERPL-MCNC: 6.4 MG/DL — CRITICAL LOW (ref 8.4–10.5)
CHLORIDE SERPL-SCNC: 108 MMOL/L — SIGNIFICANT CHANGE UP (ref 96–108)
CO2 SERPL-SCNC: 28 MMOL/L — SIGNIFICANT CHANGE UP (ref 22–31)
CREAT SERPL-MCNC: 0.37 MG/DL — LOW (ref 0.5–1.3)
EGFR: 126 ML/MIN/1.73M2 — SIGNIFICANT CHANGE UP
EGFR: 126 ML/MIN/1.73M2 — SIGNIFICANT CHANGE UP
GLUCOSE SERPL-MCNC: 98 MG/DL — SIGNIFICANT CHANGE UP (ref 70–99)
MAGNESIUM SERPL-MCNC: 1.3 MG/DL — LOW (ref 1.6–2.6)
PHOSPHATE SERPL-MCNC: 1.7 MG/DL — LOW (ref 2.5–4.5)
POTASSIUM SERPL-MCNC: 3.9 MMOL/L — SIGNIFICANT CHANGE UP (ref 3.5–5.3)
POTASSIUM SERPL-SCNC: 3.9 MMOL/L — SIGNIFICANT CHANGE UP (ref 3.5–5.3)
SODIUM SERPL-SCNC: 143 MMOL/L — SIGNIFICANT CHANGE UP (ref 135–145)

## 2025-03-10 PROCEDURE — 99232 SBSQ HOSP IP/OBS MODERATE 35: CPT

## 2025-03-10 RX ORDER — MAGNESIUM SULFATE 500 MG/ML
1 SYRINGE (ML) INJECTION ONCE
Refills: 0 | Status: COMPLETED | OUTPATIENT
Start: 2025-03-10 | End: 2025-03-10

## 2025-03-10 RX ORDER — POTASSIUM PHOSPHATE, MONOBASIC POTASSIUM PHOSPHATE, DIBASIC INJECTION, 236; 224 MG/ML; MG/ML
15 SOLUTION, CONCENTRATE INTRAVENOUS ONCE
Refills: 0 | Status: COMPLETED | OUTPATIENT
Start: 2025-03-10 | End: 2025-03-10

## 2025-03-10 RX ORDER — CALCIUM GLUCONATE 20 MG/ML
1 INJECTION, SOLUTION INTRAVENOUS ONCE
Refills: 0 | Status: COMPLETED | OUTPATIENT
Start: 2025-03-10 | End: 2025-03-10

## 2025-03-10 RX ADMIN — Medication 100 GRAM(S): at 12:14

## 2025-03-10 RX ADMIN — Medication 125 MILLIGRAM(S): at 23:25

## 2025-03-10 RX ADMIN — Medication 25 MILLIGRAM(S): at 12:15

## 2025-03-10 RX ADMIN — POTASSIUM PHOSPHATE, MONOBASIC POTASSIUM PHOSPHATE, DIBASIC INJECTION, 62.5 MILLIMOLE(S): 236; 224 SOLUTION, CONCENTRATE INTRAVENOUS at 14:44

## 2025-03-10 RX ADMIN — NYSTATIN 1 APPLICATION(S): 100000 CREAM TOPICAL at 17:51

## 2025-03-10 RX ADMIN — POTASSIUM CHLORIDE, DEXTROSE MONOHYDRATE AND SODIUM CHLORIDE 100 MILLILITER(S): 150; 5; 900 INJECTION, SOLUTION INTRAVENOUS at 18:57

## 2025-03-10 RX ADMIN — Medication 125 MILLIGRAM(S): at 17:42

## 2025-03-10 RX ADMIN — Medication 400 MILLIGRAM(S): at 08:20

## 2025-03-10 RX ADMIN — CALCIUM GLUCONATE 100 GRAM(S): 20 INJECTION, SOLUTION INTRAVENOUS at 14:05

## 2025-03-10 RX ADMIN — ENOXAPARIN SODIUM 40 MILLIGRAM(S): 100 INJECTION SUBCUTANEOUS at 12:14

## 2025-03-10 RX ADMIN — NYSTATIN 1 APPLICATION(S): 100000 CREAM TOPICAL at 06:07

## 2025-03-10 RX ADMIN — Medication 1 TABLET(S): at 17:41

## 2025-03-10 RX ADMIN — PREGABALIN 150 MILLIGRAM(S): 50 CAPSULE ORAL at 06:03

## 2025-03-10 RX ADMIN — Medication 125 MILLIGRAM(S): at 12:14

## 2025-03-10 RX ADMIN — FOLIC ACID 1 MILLIGRAM(S): 1 TABLET ORAL at 12:14

## 2025-03-10 RX ADMIN — Medication 400 MILLIGRAM(S): at 12:14

## 2025-03-10 RX ADMIN — PREGABALIN 150 MILLIGRAM(S): 50 CAPSULE ORAL at 17:42

## 2025-03-10 RX ADMIN — OXYCODONE HYDROCHLORIDE 5 MILLIGRAM(S): 30 TABLET ORAL at 14:43

## 2025-03-10 RX ADMIN — Medication 125 MILLIGRAM(S): at 06:03

## 2025-03-10 RX ADMIN — Medication 50 MILLIGRAM(S): at 21:37

## 2025-03-10 RX ADMIN — SERTRALINE 100 MILLIGRAM(S): 100 TABLET, FILM COATED ORAL at 12:14

## 2025-03-10 RX ADMIN — Medication 1 TABLET(S): at 08:20

## 2025-03-10 RX ADMIN — Medication 400 MILLIGRAM(S): at 17:42

## 2025-03-10 NOTE — DISCHARGE NOTE NURSING/CASE MANAGEMENT/SOCIAL WORK - NSDCCRNAME_GEN_ALL_CORE_FT
prior to admission you have:  consumer directed personal assistance program (CDPAP) aide services 36.75 H/week through [facility]  F  being managed by [facility] [phone] fax

## 2025-03-10 NOTE — PROVIDER CONTACT NOTE (CRITICAL VALUE NOTIFICATION) - ASSESSMENT
Previously repleted with NaPHOS powder x2. Continues with loose stools, not able to tolerate full liquids. Did not eat dinner. No vomitting.
AxO x4. Received K-Riders x3 and 40 mEq PO potassium x1 3/7/25. No diarrhea overnight per report.
AxO x4. Denies chest pain, NSR on tele. 1 loose BM overnight. Electrolyte repletion given yesterday, 3/8/25 Tolerating clear liquids without vomitting.
Calcium 6.4, mg+ 1.3, Phosporus 1.7, Pt on tele monitor for any cardiac changes.

## 2025-03-10 NOTE — DISCHARGE NOTE NURSING/CASE MANAGEMENT/SOCIAL WORK - FINANCIAL ASSISTANCE
[facility] provides services at a reduced cost to those who are determined to be eligible through [facility]’s financial assistance program. Information regarding [facility]’s financial assistance program can be found by going to [email or web address] or by calling [phone].

## 2025-03-10 NOTE — PROVIDER CONTACT NOTE (CRITICAL VALUE NOTIFICATION) - BACKGROUND
Admitted for nausea/vomitting/diarrhea
Admitted for diarrhea, +CDIFF
Admitted for diarrhea, +CDIFF
Admitted for having lose stool for 5 days  and abdominal pain, not being able to eat or drink. Electrolytes imbalances. on contact precaution c. diff positive.

## 2025-03-10 NOTE — PROVIDER CONTACT NOTE (CRITICAL VALUE NOTIFICATION) - SITUATION
Repeat phos 0.9
Ca 6.4/Phos 0.8 with AM labs
K+ 2.7 this AM
Pt has been having episodes of diarrhea, denies numbness, cramps, no s/s of arrhythmia.

## 2025-03-10 NOTE — CASE MANAGEMENT PROGRESS NOTE - NSCMPROGRESSNOTE_GEN_ALL_CORE
RN/CM noted pt's case discussed during Interdisciplinary rounds, on full liquid diet, (+) C-diff, still acute as per MD. Pt's cell is [phone]. Pt resides with family (mother, Zuri and son, Carlos) in a private home with approx 2-3 steps to enter, no steps inside for pt to navigate. Pt has wheelchair, walker and commode @ home. Pt stated her community MD is DR Kecia Williamson @ [place] [phone]. Pt has  consumer directed personal assistance program (CDPAP) aide services 36.75 H/week thru  [place] care Agency phone number [phone]/FAX [phone] being managed by [facility] [phone]/ fax [phone]; for services to be reinstated, DC summary to be faxed to [phone], [facility]  is Chanda Andrews [phone] @ EXT [phone]. DC pending hospital course. CM remains available and continues to follow case.  RN/CM noted pt's case discussed during Interdisciplinary rounds, on full liquid diet, (+) C-diff, still acute as per MD. Pt's cell is [phone]. Pt resides with family (mother, Zuri and son, Carlos) in a private home with approx 2-3 steps to enter, no steps inside for pt to navigate. Pt has wheelchair, walker and commode @ home. Pt stated her community MD is DR Kecia Williamson @ [place] [phone]. Pt has  consumer directed personal assistance program (CDPAP) aide services 36.75 H/week thru  [place] care Agency phone number [phone]/FAX [phone] being managed by [facility] [phone]/ fax [phone]; for services to be reinstated, DC summary to be faxed to [phone], [facility] Care  is [facility] [phone] @ EXT [phone]. DC pending hospital course. CM remains available and continues to follow case.

## 2025-03-10 NOTE — PROVIDER CONTACT NOTE (CRITICAL VALUE NOTIFICATION) - NS PROVIDER READ BACK TO LAB
09/10/20        [facility]  [place] [id number]      Dear Chuy [place] records indicate that you have outstanding lab work and or testing that was ordered for you and has not yet been completed:  Orders Placed This Encounter
yes

## 2025-03-10 NOTE — DISCHARGE NOTE NURSING/CASE MANAGEMENT/SOCIAL WORK - PATIENT PORTAL LINK FT
You can access the FollowMyHealth Patient Portal offered by [facility] by registering at the following website: [email or web address]. By joining [facility]’s FollowMyHealth portal, you will also be able to view your health information using other applications (apps) compatible with our system.

## 2025-03-10 NOTE — PROGRESS NOTE ADULT - ASSESSMENT
44-year-old female with history of peripheral neuropathy not able to ambulate due to pain, depression, uterine fibroid, anemia, asthma, hypothyroidism, atrial fibrillation not on AC given previous hx of severe bleed requiring massive transfusion  history of C. difficile in Nov  of last year who presented to ED sec to poor PO intake, nausea and diarrhea:    C diff Colitis  + C diff   - Nofever or leukocytosis   - GI PCR negative   - Isolation, contact precautions   - ID Consulted   - Continue with Oral vancomycin 125 mg every 6 hourly   - Continue with IV fluid   - ADAT  - Monitor for fever and WBC curve     Hypokalemia/hypomagnesemia/Hypophosphatemia   - From diarrhea/poor Oral intake   - Continue NS 20meq KCL @ 100 cc/hr  - Replete to keep K >4, Mg > 2 . phos > 3     Hypocalcemia   - Corrected ca 7.8   - Follow up ionized ca level   - S/p 3 gm calcium gluconate   - Monitor on tele  - Vitamin D is <6, given 43327 unit once   - PTH 75     Neuropathic pain  -C/w Lyrica 150 mg bid     Afib  -In NSR  -Not on AC given bleeding risk  -Monitor on tele     Depression   - On trazodone 50 mg   - Valium 2 mg prn   - Continue  Sertraline 100 mg, usually does not cause QTc prolongation     DVT prophylaxis   Lovenox     Full code

## 2025-03-11 ENCOUNTER — TRANSCRIPTION ENCOUNTER (OUTPATIENT)
Age: 45
End: 2025-03-11

## 2025-03-11 VITALS
RESPIRATION RATE: 18 BRPM | SYSTOLIC BLOOD PRESSURE: 102 MMHG | DIASTOLIC BLOOD PRESSURE: 62 MMHG | OXYGEN SATURATION: 98 % | TEMPERATURE: 98 F | HEART RATE: 98 BPM

## 2025-03-11 LAB
ANION GAP SERPL CALC-SCNC: 7 MMOL/L — SIGNIFICANT CHANGE UP (ref 5–17)
BUN SERPL-MCNC: 3 MG/DL — LOW (ref 7–23)
CALCIUM SERPL-MCNC: 7.3 MG/DL — LOW (ref 8.4–10.5)
CHLORIDE SERPL-SCNC: 104 MMOL/L — SIGNIFICANT CHANGE UP (ref 96–108)
CO2 SERPL-SCNC: 27 MMOL/L — SIGNIFICANT CHANGE UP (ref 22–31)
CREAT SERPL-MCNC: 0.49 MG/DL — LOW (ref 0.5–1.3)
EGFR: 118 ML/MIN/1.73M2 — SIGNIFICANT CHANGE UP
EGFR: 118 ML/MIN/1.73M2 — SIGNIFICANT CHANGE UP
GLUCOSE SERPL-MCNC: 94 MG/DL — SIGNIFICANT CHANGE UP (ref 70–99)
MAGNESIUM SERPL-MCNC: 1.3 MG/DL — LOW (ref 1.6–2.6)
PHOSPHATE SERPL-MCNC: 1.5 MG/DL — LOW (ref 2.5–4.5)
POTASSIUM SERPL-MCNC: 4.8 MMOL/L — SIGNIFICANT CHANGE UP (ref 3.5–5.3)
POTASSIUM SERPL-SCNC: 4.8 MMOL/L — SIGNIFICANT CHANGE UP (ref 3.5–5.3)
SODIUM SERPL-SCNC: 138 MMOL/L — SIGNIFICANT CHANGE UP (ref 135–145)

## 2025-03-11 PROCEDURE — 80048 BASIC METABOLIC PNL TOTAL CA: CPT

## 2025-03-11 PROCEDURE — 36415 COLL VENOUS BLD VENIPUNCTURE: CPT

## 2025-03-11 PROCEDURE — 84702 CHORIONIC GONADOTROPIN TEST: CPT

## 2025-03-11 PROCEDURE — 87449 NOS EACH ORGANISM AG IA: CPT

## 2025-03-11 PROCEDURE — 93005 ELECTROCARDIOGRAM TRACING: CPT

## 2025-03-11 PROCEDURE — 83970 ASSAY OF PARATHORMONE: CPT

## 2025-03-11 PROCEDURE — 96374 THER/PROPH/DIAG INJ IV PUSH: CPT

## 2025-03-11 PROCEDURE — 96375 TX/PRO/DX INJ NEW DRUG ADDON: CPT

## 2025-03-11 PROCEDURE — 99239 HOSP IP/OBS DSCHRG MGMT >30: CPT

## 2025-03-11 PROCEDURE — 80053 COMPREHEN METABOLIC PANEL: CPT

## 2025-03-11 PROCEDURE — 84100 ASSAY OF PHOSPHORUS: CPT

## 2025-03-11 PROCEDURE — 83690 ASSAY OF LIPASE: CPT

## 2025-03-11 PROCEDURE — 87507 IADNA-DNA/RNA PROBE TQ 12-25: CPT

## 2025-03-11 PROCEDURE — 99285 EMERGENCY DEPT VISIT HI MDM: CPT

## 2025-03-11 PROCEDURE — 82306 VITAMIN D 25 HYDROXY: CPT

## 2025-03-11 PROCEDURE — 83735 ASSAY OF MAGNESIUM: CPT

## 2025-03-11 PROCEDURE — 82330 ASSAY OF CALCIUM: CPT

## 2025-03-11 PROCEDURE — 87324 CLOSTRIDIUM AG IA: CPT

## 2025-03-11 PROCEDURE — 74177 CT ABD & PELVIS W/CONTRAST: CPT | Mod: MC

## 2025-03-11 PROCEDURE — 82310 ASSAY OF CALCIUM: CPT

## 2025-03-11 PROCEDURE — 85025 COMPLETE CBC W/AUTO DIFF WBC: CPT

## 2025-03-11 RX ORDER — NYSTATIN 100000 [USP'U]/G
1 CREAM TOPICAL
Qty: 1 | Refills: 0
Start: 2025-03-11 | End: 2025-03-20

## 2025-03-11 RX ORDER — VANCOMYCIN HCL IN 5 % DEXTROSE 1.5G/250ML
5 PLASTIC BAG, INJECTION (ML) INTRAVENOUS
Qty: 2 | Refills: 0
Start: 2025-03-11 | End: 2025-03-29

## 2025-03-11 RX ORDER — SOD PHOS DI, MONO/K PHOS MONO 250 MG
1 TABLET ORAL
Qty: 16 | Refills: 0
Start: 2025-03-11 | End: 2025-03-14

## 2025-03-11 RX ORDER — LACTOBACILLUS ACIDOPHILUS/PECT 75 MM-100
1 CAPSULE ORAL
Qty: 60 | Refills: 0
Start: 2025-03-11 | End: 2025-04-09

## 2025-03-11 RX ORDER — MAGNESIUM OXIDE 400 MG
1 TABLET ORAL
Qty: 12 | Refills: 0
Start: 2025-03-11 | End: 2025-03-14

## 2025-03-11 RX ORDER — TRAZODONE HCL 100 MG
1 TABLET ORAL
Qty: 30 | Refills: 0
Start: 2025-03-11 | End: 2025-04-09

## 2025-03-11 RX ORDER — CALCIUM CARBONATE 750 MG/1
1 TABLET ORAL
Qty: 30 | Refills: 0
Start: 2025-03-11 | End: 2025-04-09

## 2025-03-11 RX ORDER — VANCOMYCIN HCL IN 5 % DEXTROSE 1.5G/250ML
5 PLASTIC BAG, INJECTION (ML) INTRAVENOUS
Qty: 1 | Refills: 0
Start: 2025-03-11 | End: 2025-03-29

## 2025-03-11 RX ADMIN — ENOXAPARIN SODIUM 40 MILLIGRAM(S): 100 INJECTION SUBCUTANEOUS at 11:38

## 2025-03-11 RX ADMIN — Medication 1 TABLET(S): at 07:50

## 2025-03-11 RX ADMIN — Medication 125 MILLIGRAM(S): at 11:38

## 2025-03-11 RX ADMIN — Medication 400 MILLIGRAM(S): at 07:50

## 2025-03-11 RX ADMIN — POTASSIUM CHLORIDE, DEXTROSE MONOHYDRATE AND SODIUM CHLORIDE 100 MILLILITER(S): 150; 5; 900 INJECTION, SOLUTION INTRAVENOUS at 04:19

## 2025-03-11 RX ADMIN — Medication 400 MILLIGRAM(S): at 11:38

## 2025-03-11 RX ADMIN — PREGABALIN 150 MILLIGRAM(S): 50 CAPSULE ORAL at 05:14

## 2025-03-11 RX ADMIN — FOLIC ACID 1 MILLIGRAM(S): 1 TABLET ORAL at 11:38

## 2025-03-11 RX ADMIN — NYSTATIN 1 APPLICATION(S): 100000 CREAM TOPICAL at 05:16

## 2025-03-11 RX ADMIN — Medication 25 MILLIGRAM(S): at 11:37

## 2025-03-11 RX ADMIN — Medication 125 MILLIGRAM(S): at 05:14

## 2025-03-11 NOTE — CASE MANAGEMENT PROGRESS NOTE - NSCMPROGRESSNOTE_GEN_ALL_CORE
RN/CM noted pt's case discussed during Interdisciplinary rounds, pt medically cleared for transition home today 03/11/2025, pt agreeable, DC notice provided. Pt is declining need for skilled services, opting only for resumption of  consumer directed personal assistance program (CDPAP) services. Pt's cell is [phone]. Pt resides with family (mother, Zuri and son, Carlos) in a private home with approx 2-3 steps to enter, no steps inside for pt to navigate. Pt has wheelchair, walker and commode @ home. Pt stated her community MD is DR Kecia Williamson @ [place] [phone]. Pt has  consumer directed personal assistance program (CDPAP) aide services 36.75 H/week thru  [place] care Agency phone number [phone]/FAX [phone] being managed by [facility] [phone]/ fax [phone]; for services to be reinstated, DC summary to be faxed to [phone], [facility]  is Chanda Andrews [phone] @ EXT [phone]. CM faxed over DC summary to above faxes as requested, spoke to [facility] CELIA Armas who confirmed that she will call [place] aide agency and provide auth for resumption of  consumer directed personal assistance program (CDPAP) aide services. Pt stated that her family will transport her home today. Staff on unit apprised re: all above. CM remains available and continues to follow case.

## 2025-03-11 NOTE — DISCHARGE NOTE PROVIDER - NSDCMRMEDTOKEN_GEN_ALL_CORE_FT
acetaminophen 325 mg oral tablet: 2 tab(s) orally every 6 hours As needed Temp greater or equal to 38C (100.4F), Mild Pain (1 - 3)  cholecalciferol oral tablet: 50,000 orally once a week 55686 unit(s) orally once  diazePAM 5 mg oral tablet: 0.5 tab(s) orally 2 times a day as needed for  anxiety MDD: 5mg  folic acid 1 mg oral tablet: 1 tab(s) orally once a day  hydrocortisone 2.5% topical cream: Apply topically to affected area 2 times a day as needed for rectal/anal pain  lactobacillus acidophilus oral capsule: 1 tab(s) orally 2 times a day  magnesium oxide 400 mg oral tablet: 1 tab(s) orally 3 times a day  melatonin 3 mg oral tablet: 1 tab(s) orally once a day (at bedtime) As needed Insomnia  Narcan 4 mg/0.1 mL nasal spray: 1 spray(s) intranasally once as needed for respiratory depression/ams  nystatin 100,000 units/g topical powder: Apply topically to affected area 2 times a day 1 Apply topically to affected area 2 times a day  oxyCODONE 5 mg oral tablet: 1 tab(s) orally every 6 hours as needed for  severe pain MDD: #4  potassium phosphate-sodium phosphate 305 mg-700 mg oral tablet: 1 tab(s) orally 4 times a day (with meals and at bedtime)  pregabalin 150 mg oral capsule: 1 cap(s) orally 2 times a day MDD: 300  pyridoxine 25 mg oral tablet: 1 tab(s) orally once a day  sertraline 100 mg oral tablet: 1 tab(s) orally once a day  traZODone 50 mg oral tablet: 1 tab(s) orally once a day (at bedtime)  vancomycin 25 mg/mL oral liquid: 5 milliliter(s) orally every 6 hours  zinc oxide topical cream: Apply topically to affected area 2 times a day

## 2025-03-11 NOTE — DISCHARGE NOTE PROVIDER - NSDCCPCAREPLAN_GEN_ALL_CORE_FT
PRINCIPAL DISCHARGE DIAGNOSIS  Diagnosis: C. difficile colitis  Assessment and Plan of Treatment:       SECONDARY DISCHARGE DIAGNOSES  Diagnosis: Hypokalemia  Assessment and Plan of Treatment:     Diagnosis: Hypocalcemia  Assessment and Plan of Treatment:     Diagnosis: Hypomagnesemia  Assessment and Plan of Treatment:

## 2025-03-11 NOTE — PROGRESS NOTE ADULT - SUBJECTIVE AND OBJECTIVE BOX
ROBBIE MCKNIGHT is a 44yFemale , patient examined and chart reviewed.    INTERVAL HPI/ OVERNIGHT EVENTS:   Diarrhea improved. Afebrile.    PAST MEDICAL & SURGICAL HISTORY:  Atrial fibrillation  History of Hyperthyroidism  Asthma  History of anemia  Depression, unspecified depression type  Smoker  Peripheral neuropathy  S/P  Section  ,,  History of blood transfusion  Status post embolization of uterine artery      For details regarding the patient's social history, family history, and other miscellaneous elements, please refer the initial infectious diseases consultation and/or the admitting history and physical examination for this admission.    ROS:  CONSTITUTIONAL:  Negative fever or chill  EYES:  Negative  blurry vision or double vision  CARDIOVASCULAR:  Negative for chest pain or palpitations  RESPIRATORY:  Negative for cough, wheezing, or SOB   GASTROINTESTINAL:  Negative for nausea, vomiting constipation, or abdominal pain + diarrhea  GENITOURINARY:  Negative frequency, urgency or dysuria  NEUROLOGIC:  No headache, confusion, dizziness, lightheadedness  All other systems were reviewed and are negative     ALLERGIES  Motrin (Hives)      Current inpatient medications :    ANTIBIOTICS/RELEVANT:  vancomycin    Solution 125 milliGRAM(s) Oral every 6 hours    MEDICATIONS  (STANDING):  cholecalciferol 26348 Unit(s) Oral once  enoxaparin Injectable 40 milliGRAM(s) SubCutaneous every 24 hours  folic acid 1 milliGRAM(s) Oral daily  lactobacillus acidophilus 1 Tablet(s) Oral two times a day with meals  magnesium oxide 400 milliGRAM(s) Oral three times a day with meals  nystatin Powder 1 Application(s) Topical two times a day  pregabalin 150 milliGRAM(s) Oral two times a day  pyridoxine 25 milliGRAM(s) Oral daily  sertraline 100 milliGRAM(s) Oral daily  sodium chloride 0.9% with potassium chloride 20 mEq/L 1000 milliLiter(s) (100 mL/Hr) IV Continuous <Continuous>  traZODone 50 milliGRAM(s) Oral at bedtime  vancomycin    Solution 125 milliGRAM(s) Oral every 6 hours    MEDICATIONS  (PRN):  acetaminophen     Tablet .. 650 milliGRAM(s) Oral every 6 hours PRN Temp greater or equal to 38C (100.4F), Mild Pain (1 - 3)  aluminum hydroxide/magnesium hydroxide/simethicone Suspension 30 milliLiter(s) Oral every 4 hours PRN Dyspepsia  diazepam    Tablet 2 milliGRAM(s) Oral two times a day PRN anxiety  ondansetron Injectable 4 milliGRAM(s) IV Push every 8 hours PRN Nausea and/or Vomiting  oxyCODONE    IR 5 milliGRAM(s) Oral every 6 hours PRN for severe pain      Objective:  Vital Signs Last 24 Hrs  T(C): 36.7 (11 Mar 2025 13:02), Max: 37.3 (11 Mar 2025 09:16)  T(F): 98.1 (11 Mar 2025 13:02), Max: 99.1 (11 Mar 2025 09:16)  HR: 98 (11 Mar 2025 13:02) (94 - 102)  BP: 102/62 (11 Mar 2025 13:02) (92/64 - 103/70)  RR: 18 (11 Mar 2025 13:02) (18 - 18)  SpO2: 98% (11 Mar 2025 13:02) (97% - 98%)    Parameters below as of 11 Mar 2025 13:02  Patient On (Oxygen Delivery Method): room air      Physical Exam:  General: no acute distress  Neck: supple, trachea midline  Lungs: clear, no wheeze/rhonchi  Cardiovascular: regular rate and rhythm, S1 S2  Abdomen: soft, nontender,  bowel sounds normal  Neurological: alert and oriented x3  Skin: no rash  Extremities: no edema      LABS:      MICROBIOLOGY:  C. difficile GDH &amp; toxins A/B by EIA (25 @ 16:25)    Clostridium difficile GDH Toxins A&amp;B, EIA:   Positive   Clostridium difficile GDH Interpretation: Positive for toxigenic C. Difficile.  This specimen is positive for C.  Difficile glutamate dehydrogenase (GDH) antigen and positive for C.  Difficile Toxins A & B, by EIA.  GDH is a highly sensitive marker for C.  Difficile that is produced in largeamounts by all C. Difficile strains,  both toxigenic and nontoxigenic.  This assay has not been validated as a  test of cure.  The results of this assay should always be interpreted in  conjunction with patient's clinical history.    RADIOLOGY & ADDITIONAL STUDIES:  ACC: [id number] EXAM:  CT ABDOMEN AND PELVIS IC   ORDERED BY: LINDSEY ZARAGOZA     PROCEDURE DATE:  2025          INTERPRETATION:  CLINICAL INFORMATION: lower abd pain, diarrhea, hx of   c-diff    COMPARISON: None.    CONTRAST/COMPLICATIONS:  IV Contrast: Omnipaque 350  90 cc administered   10 cc discarded  Oral Contrast: NONE  .    PROCEDURE:  CT of the Abdomen and Pelvis was performed.  Sagittal and coronal reformats were performed.    FINDINGS:  LOWER CHEST: Within normal limits.    LIVER: Hepatomegaly. Calcified granuloma.  BILE DUCTS: Normal caliber.  GALLBLADDER: Within normal limits.  SPLEEN: Calcified granuloma.  PANCREAS: Fatty replacement of pancreas.  ADRENALS: Within normal limits.  KIDNEYS/URETERS: No renal stones or hydronephrosis.    BLADDER: Under distended.  REPRODUCTIVE ORGANS: Uterus and adnexa within normal limits.    BOWEL: No bowel obstruction. Appendix is normal. Under distention/mild   wall thickening of the sigmoid colon.  PERITONEUM/RETROPERITONEUM: Trace free fluid.  VESSELS: Within normal limits.  LYMPH NODES: Shotty retroperitoneal lymph nodes  ABDOMINAL WALL: Small fat-containing umbilical hernia.  BONES: Within normal limits.    IMPRESSION:  Under distention/mild wall thickening of the sigmoid colon with small   mild free fluid, likely colitis. No extraluminal air or abscess noted.      Assessment :   45YO F PMH peripheral neuropathy not able to ambulate due to pain, depression, uterine fibroid, anemia, asthma, hypothyroidism, atrial fibrillation not on AC given previous hx of severe bleed requiring massive transfusion  history of C. difficile in 2025 admitted with recurrent Cdiff colitis. CT AP showed sigmoid colitis. WBC wnl Afebrile.   Diarrhea resolved  Clinically better     Plan :   Cont po Vanc 125mg po q6h x 21 days till 3/29/25  Probiotics  Trend temps and cbc  Serial abd exams  Stable from ID standpoint  Dc home per primary team    Advance Directives- Full code  Current Medications are documented.   Drug-drug interactions reviewed.    Continue with present regiment.  Appropriate use of antibiotics and adverse effects reviewed.      I have discussed the above plan of care with patient/ family in detail. They expressed understanding of the  treatment plan . Risks, benefits and alternatives discussed in detail. I have asked if they have any questions or concerns and appropriately addressed them to the best of my ability .    > 35 minutes were spent in direct patient care reviewing notes, medications ,labs data/ imaging , discussion with multidisciplinary team.    Thank you for allowing me to participate in care of your patient .    Luis Alberto Farley MD  Infectious Disease  [phone]    Individualized infection control protocols for an individual patient based on their diagnosis and risks in order to reduce risk of disease transmission followed. Coordinating with  infection prevention and control team members to enable healthcare facility staff to safely care for patient.  Managing infection prevention and treatment protocols associated with transitions of care for complex patients.  In-depth patient chart review that entails going back farther in time and assessing the complete breadth of all health care interactions, with higher-level synthesis for complex diagnoses.  Communicating with the clinical microbiology lab and directly reviewing specimens.  Engaging in complex medical decision-making associated with antimicrobial prescribing including considerations such as antimicrobial resistance patterns, emergence of new variants/strains, recent antibiotic exposure, interactions/complications from comorbidities including concurrent infections, public health considerations to minimize development of antimicrobial resistance, and emerging and re-emerging infections.       
     ROBBIE MCKNIGHT is a 44yFemale , patient examined and chart reviewed.    INTERVAL HPI/ OVERNIGHT EVENTS:   Still with diarrhea. Afebrile.    PAST MEDICAL & SURGICAL HISTORY:  Atrial fibrillation  History of Hyperthyroidism  Asthma  History of anemia  Depression, unspecified depression type  Smoker  Peripheral neuropathy  S/P  Section  ,,  History of blood transfusion  Status post embolization of uterine artery      For details regarding the patient's social history, family history, and other miscellaneous elements, please refer the initial infectious diseases consultation and/or the admitting history and physical examination for this admission.    ROS:  CONSTITUTIONAL:  Negative fever or chill  EYES:  Negative  blurry vision or double vision  CARDIOVASCULAR:  Negative for chest pain or palpitations  RESPIRATORY:  Negative for cough, wheezing, or SOB   GASTROINTESTINAL:  Negative for nausea, vomiting constipation, or abdominal pain + diarrhea  GENITOURINARY:  Negative frequency, urgency or dysuria  NEUROLOGIC:  No headache, confusion, dizziness, lightheadedness  All other systems were reviewed and are negative     ALLERGIES  Motrin (Hives)      Current inpatient medications :    ANTIBIOTICS/RELEVANT:  lactobacillus acidophilus 1 Tablet(s) Oral two times a day with meals  vancomycin    Solution 125 milliGRAM(s) Oral every 6 hours      acetaminophen     Tablet .. 650 milliGRAM(s) Oral every 6 hours PRN  aluminum hydroxide/magnesium hydroxide/simethicone Suspension 30 milliLiter(s) Oral every 4 hours PRN  diazepam    Tablet 2 milliGRAM(s) Oral two times a day PRN  enoxaparin Injectable 40 milliGRAM(s) SubCutaneous every 24 hours  folic acid 1 milliGRAM(s) Oral daily  magnesium oxide 400 milliGRAM(s) Oral three times a day with meals  nystatin Powder 1 Application(s) Topical two times a day  ondansetron Injectable 4 milliGRAM(s) IV Push every 8 hours PRN  oxyCODONE    IR 5 milliGRAM(s) Oral every 6 hours PRN  pregabalin 150 milliGRAM(s) Oral two times a day  pyridoxine 25 milliGRAM(s) Oral daily  sertraline 100 milliGRAM(s) Oral daily  sodium chloride 0.9% with potassium chloride 20 mEq/L 1000 milliLiter(s) IV Continuous <Continuous>  traZODone 50 milliGRAM(s) Oral at bedtime      Objective:    T(C): 36.8 (25 @ 21:07), Max: 37 (25 @ 13:25)  HR: 94 (25 @ 22:36) (82 - 94)  BP: 102/69 (25 @ 22:36) (96/63 - 106/72)  RR: 17 (25 @ 21:07) (16 - 18)  SpO2: 96% (25 @ 21:07) (95% - 98%)  Wt(kg): --      Physical Exam:  General: no acute distress  Neck: supple, trachea midline  Lungs: clear, no wheeze/rhonchi  Cardiovascular: regular rate and rhythm, S1 S2  Abdomen: soft, nontender,  bowel sounds normal  Neurological: alert and oriented x3  Skin: no rash  Extremities: no cyanosis/clubbing/edema      LABS:                        8.9    5.61  )-----------( 222      ( 08 Mar 2025 06:00 )             28.6       -09    142  |  107  |  1[L]  ----------------------------<  108[H]  3.7   |  28  |  0.58    Ca    7.0[L]      09 Mar 2025 16:00  Phos  0.9     -  Mg     1.9     -    TPro  5.3[L]  /  Alb  2.3[L]  /  TBili  1.2  /  DBili  x   /  AST  117[H]  /  ALT  27  /  AlkPhos  87  03-08      MICROBIOLOGY:  C. difficile GDH &amp; toxins A/B by EIA (25 @ 16:25)    Clostridium difficile GDH Toxins A&amp;B, EIA:   Positive   Clostridium difficile GDH Interpretation: Positive for toxigenic C. Difficile.  This specimen is positive for C.  Difficile glutamate dehydrogenase (GDH) antigen and positive for C.  Difficile Toxins A & B, by EIA.  GDH is a highly sensitive marker for C.  Difficile that is produced in largeamounts by all C. Difficile strains,  both toxigenic and nontoxigenic.  This assay has not been validated as a  test of cure.  The results of this assay should always be interpreted in  conjunction with patient's clinical history.    RADIOLOGY & ADDITIONAL STUDIES:  ACC: [id number] EXAM:  CT ABDOMEN AND PELVIS IC   ORDERED BY: LINDSEY ZARAGOZA     PROCEDURE DATE:  2025          INTERPRETATION:  CLINICAL INFORMATION: lower abd pain, diarrhea, hx of   c-diff    COMPARISON: None.    CONTRAST/COMPLICATIONS:  IV Contrast: Omnipaque 350  90 cc administered   10 cc discarded  Oral Contrast: NONE  .    PROCEDURE:  CT of the Abdomen and Pelvis was performed.  Sagittal and coronal reformats were performed.    FINDINGS:  LOWER CHEST: Within normal limits.    LIVER: Hepatomegaly. Calcified granuloma.  BILE DUCTS: Normal caliber.  GALLBLADDER: Within normal limits.  SPLEEN: Calcified granuloma.  PANCREAS: Fatty replacement of pancreas.  ADRENALS: Within normal limits.  KIDNEYS/URETERS: No renal stones or hydronephrosis.    BLADDER: Under distended.  REPRODUCTIVE ORGANS: Uterus and adnexa within normal limits.    BOWEL: No bowel obstruction. Appendix is normal. Under distention/mild   wall thickening of the sigmoid colon.  PERITONEUM/RETROPERITONEUM: Trace free fluid.  VESSELS: Within normal limits.  LYMPH NODES: Shotty retroperitoneal lymph nodes  ABDOMINAL WALL: Small fat-containing umbilical hernia.  BONES: Within normal limits.    IMPRESSION:  Under distention/mild wall thickening of the sigmoid colon with small   mild free fluid, likely colitis. No extraluminal air or abscess noted.      Assessment :   43YO F PMH peripheral neuropathy not able to ambulate due to pain, depression, uterine fibroid, anemia, asthma, hypothyroidism, atrial fibrillation not on AC given previous hx of severe bleed requiring massive transfusion  history of C. difficile in 2025 admitted with recurrent Cdiff colitis. CT AP showed sigmoid colitis. WBC wnl Afebrile.   Still with diarrhea    Plan :   Cont po Vanc 125mg po q6h  Probiotics  Trend temps and cbc  Serial abd exams    Advance Directives- Full code  Current Medications are documented.   Drug-drug interactions reviewed.    Continue with present regiment.  Appropriate use of antibiotics and adverse effects reviewed.      I have discussed the above plan of care with patient/ family in detail. They expressed understanding of the  treatment plan . Risks, benefits and alternatives discussed in detail. I have asked if they have any questions or concerns and appropriately addressed them to the best of my ability .    > 35 minutes were spent in direct patient care reviewing notes, medications ,labs data/ imaging , discussion with multidisciplinary team.    Thank you for allowing me to participate in care of your patient .    Luis Alberto Farley MD  Infectious Disease  [phone]    Individualized infection control protocols for an individual patient based on their diagnosis and risks in order to reduce risk of disease transmission followed. Coordinating with  infection prevention and control team members to enable healthcare facility staff to safely care for patient.  Managing infection prevention and treatment protocols associated with transitions of care for complex patients.  In-depth patient chart review that entails going back farther in time and assessing the complete breadth of all health care interactions, with higher-level synthesis for complex diagnoses.  Communicating with the clinical microbiology lab and directly reviewing specimens.  Engaging in complex medical decision-making associated with antimicrobial prescribing including considerations such as antimicrobial resistance patterns, emergence of new variants/strains, recent antibiotic exposure, interactions/complications from comorbidities including concurrent infections, public health considerations to minimize development of antimicrobial resistance, and emerging and re-emerging infections.       
Patient is a 44y old  Female who presents with a chief complaint of Adnominal pain, N/V/D (08 Mar 2025 18:23)      INTERVAL HPI/OVERNIGHT EVENTS:  Last 24 hours pt continued to have diarrhea. 3-4 times,   Pt was seen today at bedside, had 2 bowel movement since morning. Denies fever, chills, cough , sob, chest pain. But admits intermittent cramping abdominal pain.     MEDICATIONS  (STANDING):  enoxaparin Injectable 40 milliGRAM(s) SubCutaneous every 24 hours  folic acid 1 milliGRAM(s) Oral daily  lactobacillus acidophilus 1 Tablet(s) Oral two times a day with meals  magnesium oxide 400 milliGRAM(s) Oral three times a day with meals  magnesium sulfate  IVPB 1 Gram(s) IV Intermittent every 1 hour  nystatin Powder 1 Application(s) Topical two times a day  potassium phosphate / sodium phosphate Powder (PHOS-NaK) 1 Packet(s) Oral every 4 hours  pregabalin 150 milliGRAM(s) Oral two times a day  pyridoxine 25 milliGRAM(s) Oral daily  sertraline 100 milliGRAM(s) Oral daily  sodium chloride 0.9% with potassium chloride 20 mEq/L 1000 milliLiter(s) (100 mL/Hr) IV Continuous <Continuous>  traZODone 50 milliGRAM(s) Oral at bedtime  vancomycin    Solution 125 milliGRAM(s) Oral every 6 hours    MEDICATIONS  (PRN):  acetaminophen     Tablet .. 650 milliGRAM(s) Oral every 6 hours PRN Temp greater or equal to 38C (100.4F), Mild Pain (1 - 3)  aluminum hydroxide/magnesium hydroxide/simethicone Suspension 30 milliLiter(s) Oral every 4 hours PRN Dyspepsia  diazepam    Tablet 2 milliGRAM(s) Oral two times a day PRN anxiety  ondansetron Injectable 4 milliGRAM(s) IV Push every 8 hours PRN Nausea and/or Vomiting  oxyCODONE    IR 5 milliGRAM(s) Oral every 6 hours PRN for severe pain      Allergies    Motrin (Hives)    Intolerances        REVIEW OF SYSTEMS:  CONSTITUTIONAL: No fever, weight loss, or fatigue  EYES: No eye pain, visual disturbances, or discharge  ENMT:  No difficulty hearing, tinnitus, vertigo; No sinus or throat pain  NECK: No pain or stiffness  RESPIRATORY: No cough, wheezing, chills or hemoptysis; No shortness of breath  CARDIOVASCULAR: No chest pain, palpitations, lightheadedness, or leg swelling  GASTROINTESTINAL: Admits abdominal  pain, diarrhea. No nausea, vomiting, or hematemesis;. No melena or hematochezia.  GENITOURINARY: No dysuria, frequency, hematuria, or incontinence  NEUROLOGICAL: No headaches, memory loss, vertigo, loss of strength, numbness, or tremors  SKIN: No itching, burning, rashes, or lesions   MUSCULOSKELETAL: No joint pain or swelling; No muscle, back, or extremity pain  PSYCHIATRIC: No depression, anxiety, or mood swings        Vital Signs Last 24 Hrs  T(C): 36.6 (09 Mar 2025 08:56), Max: 36.9 (08 Mar 2025 13:02)  T(F): 97.9 (09 Mar 2025 08:56), Max: 98.4 (08 Mar 2025 13:02)  HR: 92 (09 Mar 2025 08:56) (89 - 100)  BP: 99/66 (09 Mar 2025 08:56) (84/54 - 99/66)  BP(mean): --  RR: 18 (09 Mar 2025 08:56) (16 - 18)  SpO2: 95% (09 Mar 2025 08:56) (94% - 97%)    Parameters below as of 09 Mar 2025 08:56  Patient On (Oxygen Delivery Method): room air        PHYSICAL EXAM:  GENERAL: NAD, well-groomed, well-developed  HEAD:  Atraumatic, Normocephalic  EYES: EOMI, PERRLA, conjunctiva and sclera clear  ENMT: Moist mucous membranes,   NECK: Supple, No JVD, Normal thyroid  NERVOUS SYSTEM:  Alert & Oriented X 3  CHEST/LUNG: Clear to auscultation bilaterally; No rales, rhonchi, wheezing, or rubs  HEART: Regular rate and rhythm; No murmurs, rubs, or gallops  ABDOMEN: Soft, mild tender lower abdomen, distended; Bowel sounds present  EXTREMITIES:  2+ Peripheral Pulses, No clubbing, cyanosis, or edema  SKIN: No rashes or lesions    LABS:    09 Mar 2025 06:00    138    |  103    |  1      ----------------------------<  114    4.1     |  24     |  0.50     Ca    6.4        09 Mar 2025 06:00  Phos  0.8       09 Mar 2025 06:00  Mg     1.5       09 Mar 2025 06:00        Urinalysis Basic - ( 09 Mar 2025 06:00 )    Color: x / Appearance: x / SG: x / pH: x  Gluc: 114 mg/dL / Ketone: x  / Bili: x / Urobili: x   Blood: x / Protein: x / Nitrite: x   Leuk Esterase: x / RBC: x / WBC x   Sq Epi: x / Non Sq Epi: x / Bacteria: x        
     ROBBIE MCKNIGHT is a 44yFemale , patient examined and chart reviewed.    INTERVAL HPI/ OVERNIGHT EVENTS:   Diarrhea much improved. More formed. Afebrile.    PAST MEDICAL & SURGICAL HISTORY:  Atrial fibrillation  History of Hyperthyroidism  Asthma  History of anemia  Depression, unspecified depression type  Smoker  Peripheral neuropathy  S/P  Section  ,,  History of blood transfusion  Status post embolization of uterine artery      For details regarding the patient's social history, family history, and other miscellaneous elements, please refer the initial infectious diseases consultation and/or the admitting history and physical examination for this admission.    ROS:  CONSTITUTIONAL:  Negative fever or chill  EYES:  Negative  blurry vision or double vision  CARDIOVASCULAR:  Negative for chest pain or palpitations  RESPIRATORY:  Negative for cough, wheezing, or SOB   GASTROINTESTINAL:  Negative for nausea, vomiting constipation, or abdominal pain + diarrhea  GENITOURINARY:  Negative frequency, urgency or dysuria  NEUROLOGIC:  No headache, confusion, dizziness, lightheadedness  All other systems were reviewed and are negative     ALLERGIES  Motrin (Hives)      Current inpatient medications :    ANTIBIOTICS/RELEVANT:  vancomycin    Solution 125 milliGRAM(s) Oral every 6 hours    MEDICATIONS  (STANDING):  calcium gluconate IVPB 1 Gram(s) IV Intermittent once  cholecalciferol 02392 Unit(s) Oral once  enoxaparin Injectable 40 milliGRAM(s) SubCutaneous every 24 hours  folic acid 1 milliGRAM(s) Oral daily  lactobacillus acidophilus 1 Tablet(s) Oral two times a day with meals  magnesium oxide 400 milliGRAM(s) Oral three times a day with meals  nystatin Powder 1 Application(s) Topical two times a day  potassium phosphate IVPB 15 milliMole(s) IV Intermittent once  pregabalin 150 milliGRAM(s) Oral two times a day  pyridoxine 25 milliGRAM(s) Oral daily  sertraline 100 milliGRAM(s) Oral daily  sodium chloride 0.9% with potassium chloride 20 mEq/L 1000 milliLiter(s) (100 mL/Hr) IV Continuous <Continuous>  traZODone 50 milliGRAM(s) Oral at bedtime      MEDICATIONS  (PRN):  acetaminophen     Tablet .. 650 milliGRAM(s) Oral every 6 hours PRN Temp greater or equal to 38C (100.4F), Mild Pain (1 - 3)  aluminum hydroxide/magnesium hydroxide/simethicone Suspension 30 milliLiter(s) Oral every 4 hours PRN Dyspepsia  diazepam    Tablet 2 milliGRAM(s) Oral two times a day PRN anxiety  ondansetron Injectable 4 milliGRAM(s) IV Push every 8 hours PRN Nausea and/or Vomiting  oxyCODONE    IR 5 milliGRAM(s) Oral every 6 hours PRN for severe pain        Objective:  Vital Signs Last 24 Hrs  T(C): 36.9 (10 Mar 2025 09:31), Max: 37 (09 Mar 2025 13:25)  T(F): 98.5 (10 Mar 2025 09:31), Max: 98.6 (09 Mar 2025 13:25)  HR: 83 (10 Mar 2025 09:31) (82 - 97)  BP: 96/68 (10 Mar 2025 09:31) (96/65 - 106/72)  RR: 18 (10 Mar 2025 09:31) (16 - 18)  SpO2: 98% (10 Mar 2025 09:31) (96% - 98%)    Parameters below as of 10 Mar 2025 04:53  Patient On (Oxygen Delivery Method): room air    Physical Exam:  General: no acute distress  Neck: supple, trachea midline  Lungs: clear, no wheeze/rhonchi  Cardiovascular: regular rate and rhythm, S1 S2  Abdomen: soft, nontender,  bowel sounds normal  Neurological: alert and oriented x3  Skin: no rash  Extremities: no cyanosis/clubbing/edema      LABS:  03-10    143  |  108  |  2[L]  ----------------------------<  98  3.9   |  28  |  0.37[L]    Ca    6.4[LL]      10 Mar 2025 07:52  Phos  1.7     03-10  Mg     1.3     03-10      MICROBIOLOGY:  C. difficile GDH &amp; toxins A/B by EIA (25 @ 16:25)    Clostridium difficile GDH Toxins A&amp;B, EIA:   Positive   Clostridium difficile GDH Interpretation: Positive for toxigenic C. Difficile.  This specimen is positive for C.  Difficile glutamate dehydrogenase (GDH) antigen and positive for C.  Difficile Toxins A & B, by EIA.  GDH is a highly sensitive marker for C.  Difficile that is produced in largeamounts by all C. Difficile strains,  both toxigenic and nontoxigenic.  This assay has not been validated as a  test of cure.  The results of this assay should always be interpreted in  conjunction with patient's clinical history.    RADIOLOGY & ADDITIONAL STUDIES:  ACC: [id number] EXAM:  CT ABDOMEN AND PELVIS IC   ORDERED BY: LINDSEY ZARAGOZA     PROCEDURE DATE:  2025          INTERPRETATION:  CLINICAL INFORMATION: lower abd pain, diarrhea, hx of   c-diff    COMPARISON: None.    CONTRAST/COMPLICATIONS:  IV Contrast: Omnipaque 350  90 cc administered   10 cc discarded  Oral Contrast: NONE  .    PROCEDURE:  CT of the Abdomen and Pelvis was performed.  Sagittal and coronal reformats were performed.    FINDINGS:  LOWER CHEST: Within normal limits.    LIVER: Hepatomegaly. Calcified granuloma.  BILE DUCTS: Normal caliber.  GALLBLADDER: Within normal limits.  SPLEEN: Calcified granuloma.  PANCREAS: Fatty replacement of pancreas.  ADRENALS: Within normal limits.  KIDNEYS/URETERS: No renal stones or hydronephrosis.    BLADDER: Under distended.  REPRODUCTIVE ORGANS: Uterus and adnexa within normal limits.    BOWEL: No bowel obstruction. Appendix is normal. Under distention/mild   wall thickening of the sigmoid colon.  PERITONEUM/RETROPERITONEUM: Trace free fluid.  VESSELS: Within normal limits.  LYMPH NODES: Shotty retroperitoneal lymph nodes  ABDOMINAL WALL: Small fat-containing umbilical hernia.  BONES: Within normal limits.    IMPRESSION:  Under distention/mild wall thickening of the sigmoid colon with small   mild free fluid, likely colitis. No extraluminal air or abscess noted.      Assessment :   45YO F PMH peripheral neuropathy not able to ambulate due to pain, depression, uterine fibroid, anemia, asthma, hypothyroidism, atrial fibrillation not on AC given previous hx of severe bleed requiring massive transfusion  history of C. difficile in 2025 admitted with recurrent Cdiff colitis. CT AP showed sigmoid colitis. WBC wnl Afebrile.   Diarrhea improced  Clinically better    Plan :   Cont po Vanc 125mg po q6h x 21 days till 3/29/25  Probiotics  Trend temps and cbc  Serial abd exams  Stable from ID standpoint  Dc planning per primary team    Advance Directives- Full code  Current Medications are documented.   Drug-drug interactions reviewed.    Continue with present regiment.  Appropriate use of antibiotics and adverse effects reviewed.      I have discussed the above plan of care with patient/ family in detail. They expressed understanding of the  treatment plan . Risks, benefits and alternatives discussed in detail. I have asked if they have any questions or concerns and appropriately addressed them to the best of my ability .    > 35 minutes were spent in direct patient care reviewing notes, medications ,labs data/ imaging , discussion with multidisciplinary team.    Thank you for allowing me to participate in care of your patient .    Luis Alberto Farley MD  Infectious Disease  [phone]    Individualized infection control protocols for an individual patient based on their diagnosis and risks in order to reduce risk of disease transmission followed. Coordinating with  infection prevention and control team members to enable healthcare facility staff to safely care for patient.  Managing infection prevention and treatment protocols associated with transitions of care for complex patients.  In-depth patient chart review that entails going back farther in time and assessing the complete breadth of all health care interactions, with higher-level synthesis for complex diagnoses.  Communicating with the clinical microbiology lab and directly reviewing specimens.  Engaging in complex medical decision-making associated with antimicrobial prescribing including considerations such as antimicrobial resistance patterns, emergence of new variants/strains, recent antibiotic exposure, interactions/complications from comorbidities including concurrent infections, public health considerations to minimize development of antimicrobial resistance, and emerging and re-emerging infections.       
Patient is a 44y old  Female who presents with a chief complaint of Adnominal pain, N/V/D (07 Mar 2025 20:12)      INTERVAL HPI/OVERNIGHT EVENTS:  Overnight nothing significant happened.   Pt was seen today at bedside, reports diarrhea, nausea and vomiting has improved, has mild abdominal pain. Denies fever, chills,, cough , sob, chest pain. Started o clear liquid diet, tolerating well.    MEDICATIONS  (STANDING):  enoxaparin Injectable 40 milliGRAM(s) SubCutaneous every 24 hours  folic acid 1 milliGRAM(s) Oral daily  magnesium oxide 400 milliGRAM(s) Oral three times a day with meals  magnesium sulfate  IVPB 1 Gram(s) IV Intermittent once  nystatin Ointment 1 Application(s) Topical two times a day  potassium chloride  10 mEq/100 mL IVPB 10 milliEquivalent(s) IV Intermittent every 1 hour  potassium phosphate / sodium phosphate Powder (PHOS-NaK) 1 Packet(s) Oral once  pregabalin 150 milliGRAM(s) Oral two times a day  pyridoxine 25 milliGRAM(s) Oral daily  sodium chloride 0.9% with potassium chloride 20 mEq/L 1000 milliLiter(s) (100 mL/Hr) IV Continuous <Continuous>  traZODone 50 milliGRAM(s) Oral at bedtime    MEDICATIONS  (PRN):  acetaminophen     Tablet .. 650 milliGRAM(s) Oral every 6 hours PRN Temp greater or equal to 38C (100.4F), Mild Pain (1 - 3)  aluminum hydroxide/magnesium hydroxide/simethicone Suspension 30 milliLiter(s) Oral every 4 hours PRN Dyspepsia  diazepam    Tablet 2 milliGRAM(s) Oral two times a day PRN anxiety  ondansetron Injectable 4 milliGRAM(s) IV Push every 8 hours PRN Nausea and/or Vomiting  oxyCODONE    IR 5 milliGRAM(s) Oral every 6 hours PRN for severe pain      Allergies    Motrin (Hives)    Intolerances        REVIEW OF SYSTEMS:  CONSTITUTIONAL: No fever, weight loss, or fatigue  EYES: No eye pain, visual disturbances, or discharge  ENMT:  No difficulty hearing, tinnitus, vertigo; No sinus or throat pain  NECK: No pain or stiffness  RESPIRATORY: No cough, wheezing, chills or hemoptysis; No shortness of breath  CARDIOVASCULAR: No chest pain, palpitations, lightheadedness, or leg swelling  GASTROINTESTINAL: Admits mild abdominal pain. No nausea, vomiting, or hematemesis; No diarrhea or constipation. No melena or hematochezia.  GENITOURINARY: No dysuria, frequency, hematuria, or incontinence  NEUROLOGICAL: No headaches, memory loss, vertigo, loss of strength, numbness, or tremors  SKIN: No itching, burning, rashes, or lesions   MUSCULOSKELETAL: No joint pain or swelling; No muscle, back, or extremity pain  PSYCHIATRIC: No depression, anxiety, or mood swings        Vital Signs Last 24 Hrs  T(C): 36.7 (08 Mar 2025 09:00), Max: 37.1 (07 Mar 2025 21:50)  T(F): 98 (08 Mar 2025 09:00), Max: 98.7 (07 Mar 2025 21:50)  HR: 92 (08 Mar 2025 09:00) (88 - 98)  BP: 92/58 (08 Mar 2025 09:00) (87/62 - 111/75)  BP(mean): 69 (08 Mar 2025 09:00) (69 - 69)  RR: 18 (08 Mar 2025 09:00) (18 - 20)  SpO2: 97% (08 Mar 2025 09:00) (94% - 99%)    Parameters below as of 08 Mar 2025 05:07  Patient On (Oxygen Delivery Method): room air        PHYSICAL EXAM:  GENERAL: NAD, well-groomed, well-developed  HEAD:  Atraumatic, Normocephalic  EYES: EOMI, PERRLA, conjunctiva and sclera clear  ENMT: Moist mucous membranes,   NECK: Supple, No JVD, Normal thyroid  NERVOUS SYSTEM:  Alert & Oriented X 3  CHEST/LUNG: Clear to auscultation bilaterally; No rales, rhonchi, wheezing, or rubs  HEART: Regular rate and rhythm; No murmurs, rubs, or gallops  ABDOMEN: Soft, mild tenderness present on palpation of lower abdomen, Nondistended; Bowel sounds present  EXTREMITIES:  2+ Peripheral Pulses, No clubbing, cyanosis, or edema  SKIN: No rashes or lesions    LABS:                        8.9    5.61  )-----------( 222      ( 08 Mar 2025 06:00 )             28.6     08 Mar 2025 06:00    139    |  101    |  3      ----------------------------<  94     2.7     |  29     |  0.38     Ca    6.7        08 Mar 2025 06:00  Phos  1.9       08 Mar 2025 06:00  Mg     1.8       08 Mar 2025 06:00    TPro  5.3    /  Alb  2.3    /  TBili  1.2    /  DBili  x      /  AST  117    /  ALT  27     /  AlkPhos  87     08 Mar 2025 06:00      Urinalysis Basic - ( 08 Mar 2025 06:00 )    Color: x / Appearance: x / SG: x / pH: x  Gluc: 94 mg/dL / Ketone: x  / Bili: x / Urobili: x   Blood: x / Protein: x / Nitrite: x   Leuk Esterase: x / RBC: x / WBC x   Sq Epi: x / Non Sq Epi: x / Bacteria: x      
Patient is a 44y old  Female who presents with a chief complaint of Adnominal pain, N/V/D (09 Mar 2025 18:45)      INTERVAL HPI/OVERNIGHT EVENTS:  Overnight pt had one bowel movement.   Pt was seen today at bedside, reports feeling better, stool is more formed now. Denies fever, chills, abdominal pain, cough , sob, chest pain.     MEDICATIONS  (STANDING):  calcium gluconate IVPB 1 Gram(s) IV Intermittent once  cholecalciferol 87535 Unit(s) Oral once  enoxaparin Injectable 40 milliGRAM(s) SubCutaneous every 24 hours  folic acid 1 milliGRAM(s) Oral daily  lactobacillus acidophilus 1 Tablet(s) Oral two times a day with meals  magnesium oxide 400 milliGRAM(s) Oral three times a day with meals  magnesium sulfate  IVPB 1 Gram(s) IV Intermittent once  nystatin Powder 1 Application(s) Topical two times a day  potassium phosphate IVPB 15 milliMole(s) IV Intermittent once  pregabalin 150 milliGRAM(s) Oral two times a day  pyridoxine 25 milliGRAM(s) Oral daily  sertraline 100 milliGRAM(s) Oral daily  sodium chloride 0.9% with potassium chloride 20 mEq/L 1000 milliLiter(s) (100 mL/Hr) IV Continuous <Continuous>  traZODone 50 milliGRAM(s) Oral at bedtime  vancomycin    Solution 125 milliGRAM(s) Oral every 6 hours    MEDICATIONS  (PRN):  acetaminophen     Tablet .. 650 milliGRAM(s) Oral every 6 hours PRN Temp greater or equal to 38C (100.4F), Mild Pain (1 - 3)  aluminum hydroxide/magnesium hydroxide/simethicone Suspension 30 milliLiter(s) Oral every 4 hours PRN Dyspepsia  diazepam    Tablet 2 milliGRAM(s) Oral two times a day PRN anxiety  ondansetron Injectable 4 milliGRAM(s) IV Push every 8 hours PRN Nausea and/or Vomiting  oxyCODONE    IR 5 milliGRAM(s) Oral every 6 hours PRN for severe pain      Allergies    Motrin (Hives)    Intolerances        REVIEW OF SYSTEMS:  CONSTITUTIONAL: No fever, weight loss, or fatigue  EYES: No eye pain, visual disturbances, or discharge  ENMT:  No difficulty hearing, tinnitus, vertigo; No sinus or throat pain  NECK: No pain or stiffness  RESPIRATORY: No cough, wheezing, chills or hemoptysis; No shortness of breath  CARDIOVASCULAR: No chest pain, palpitations, lightheadedness, or leg swelling  GASTROINTESTINAL: No abdominal or epigastric pain. No nausea, vomiting, or hematemesis; No diarrhea or constipation. No melena or hematochezia.  GENITOURINARY: No dysuria, frequency, hematuria, or incontinence  NEUROLOGICAL: No headaches, memory loss, vertigo, loss of strength, numbness, or tremors  SKIN: No itching, burning, rashes, or lesions   MUSCULOSKELETAL: No joint pain or swelling; No muscle, back, or extremity pain  PSYCHIATRIC: No depression, anxiety, or mood swings        Vital Signs Last 24 Hrs  T(C): 36.9 (10 Mar 2025 09:31), Max: 37 (09 Mar 2025 13:25)  T(F): 98.5 (10 Mar 2025 09:31), Max: 98.6 (09 Mar 2025 13:25)  HR: 83 (10 Mar 2025 09:31) (82 - 97)  BP: 96/68 (10 Mar 2025 09:31) (96/65 - 106/72)  BP(mean): --  RR: 18 (10 Mar 2025 09:31) (16 - 18)  SpO2: 98% (10 Mar 2025 09:31) (96% - 98%)    Parameters below as of 10 Mar 2025 04:53  Patient On (Oxygen Delivery Method): room air        PHYSICAL EXAM:  GENERAL: NAD, well-groomed, well-developed  HEAD:  Atraumatic, Normocephalic  EYES: EOMI, PERRLA, conjunctiva and sclera clear  ENMT: Moist mucous membranes,   NECK: Supple, No JVD, Normal thyroid  NERVOUS SYSTEM:  Alert & Oriented X 3  CHEST/LUNG: Clear to auscultation bilaterally; No rales, rhonchi, wheezing, or rubs  HEART: Regular rate and rhythm; No murmurs, rubs, or gallops  ABDOMEN: Soft, Nontender, Nondistended; Bowel sounds present  EXTREMITIES:  2+ Peripheral Pulses, No clubbing, cyanosis, or edema  SKIN: No rashes or lesions    LABS:    10 Mar 2025 07:52    143    |  108    |  2      ----------------------------<  98     3.9     |  28     |  0.37     Ca    6.4        10 Mar 2025 07:52  Phos  1.7       10 Mar 2025 07:52  Mg     1.3       10 Mar 2025 07:52        Urinalysis Basic - ( 10 Mar 2025 07:52 )    Color: x / Appearance: x / SG: x / pH: x  Gluc: 98 mg/dL / Ketone: x  / Bili: x / Urobili: x   Blood: x / Protein: x / Nitrite: x   Leuk Esterase: x / RBC: x / WBC x   Sq Epi: x / Non Sq Epi: x / Bacteria: x

## 2025-03-11 NOTE — PROGRESS NOTE ADULT - REASON FOR ADMISSION
Adnominal pain, N/V/D

## 2025-03-11 NOTE — PATIENT CHOICE NOTE. - NSPTCHOICENOTES_GEN_ALL_CORE
Pt provided with DC planning resource folder. DC pending hospital course. 
Pt declined any skilled home care needs, opting only for resumption of  consumer directed personal assistance program (CDPAP) aide services thru [facility] aide agency.

## 2025-03-11 NOTE — PATIENT CHOICE NOTE. - NSPTCHOICESTATE_GEN_ALL_CORE
I have met with the patient and/or caregiver to discuss discharge goals and treatment plan. Patient and/or caregiver also provided with instructions on accessing the CMS Compare websites for additional information related to Post Acute Provider quality and resource use measures to assist them in evaluation of the providers and in selecting their post-acute provider of choice. Patient and caregiver were informed of the facilities that are owned and/or operated by [facility]. I have discussed with the patient the availability of in-network facilities and providers. Patient and caregiver provided with a list of post-acute providers whose services are appropriate to the discharge plans and patient needs.     For patient requiring durable medical equipment, patient and/or caregiver were informed that they have the right to request who provides the required equipment.
I have met with the patient and/or caregiver to discuss discharge goals and treatment plan. Patient and/or caregiver also provided with instructions on accessing the CMS Compare websites for additional information related to Post Acute Provider quality and resource use measures to assist them in evaluation of the providers and in selecting their post-acute provider of choice. Patient and caregiver were informed of the facilities that are owned and/or operated by [facility]. I have discussed with the patient the availability of in-network facilities and providers. Patient and caregiver provided with a list of post-acute providers whose services are appropriate to the discharge plans and patient needs.     For patient requiring durable medical equipment, patient and/or caregiver were informed that they have the right to request who provides the required equipment.

## 2025-03-11 NOTE — DISCHARGE NOTE PROVIDER - HOSPITAL COURSE
44-year-old female with history of peripheral neuropathy not able to ambulate due to pain, depression, uterine fibroid, anemia, asthma, hypothyroidism, atrial fibrillation not on AC given previous hx of severe bleed requiring massive transfusion  history of C. difficile in Nov  of last year was admitted for C diff infection. ID was consulted and pt was treated with oral vancomycin. Her home meds were resumed and labs were monitored routinely. Electrolytes were replenished as needed. Her symptoms has resolved and she is hemodynamically stable for discharge.

## 2025-03-13 LAB — PROT SERPL-MCNC: SIGNIFICANT CHANGE UP G/DL (ref 6–8.3)

## 2025-03-22 NOTE — ED ADULT NURSE NOTE - CHPI ED NUR SEVERITY2
81 yr old male with hx of pAFib (on Eliquis PVC, HTN, HLD, s/p AAA repair, BPH was sent by Dr. Bass for sustained VT on MCOT. Patient states he had Holter monitor placed the beginning of the month by his PMD Dr. Olmedo and was told that he had A-fib and was started on Eliquis.  Patient saw Dr. Bass today was instructed to come to the ED for multiple episodes of A-fib, bigeminy and sustained VT. Patient also endorsed intermittent lightheadedness. He currently he does not have any complains. Denies any palpitation chest pain or sob.      # Sustained VT on MCOT   # pAfib   will start lopressor   cont ot monitor tele   will monitor electrolytes.     # HTN   - continue with losartan 25 qd and  lopressor 12.5 mg bid     # CKD  will cont to monitor   will avoid nephrotoxins     # s/p AAA repair   - hemodynamically stable, euvolemic on exam   - Cr 1.4 (at baseline); eGFR 50    DLD  - c/w home Atorvastatin and Flomax     plan for [facility] monday     will follow EP recs     patient seen examined and plan discussed with pt who understands and agrees on am rounds with DR PATEL .       PAIN SCALE 3 OF 10.

## 2025-03-28 NOTE — DISCHARGE NOTE PROVIDER - NSCORESITESY/N_GEN_A_CORE_RD
03/28/2025    According to staff and treatment team pt remains acutely ill, psychotic, religiously preoccupied. Plan is to continue to adjust medications and pt will remain in the hospital through the weekend. Continue per tx plan.    Kwabena Briseno LCSW     No

## 2025-04-10 NOTE — PATIENT PROFILE ADULT - NSPROHMSYMPCOND_GEN_A_NUR
[phone] no answer, no voicemail.  [phone] spoke with Dad, informed him that lead level was normal.    none

## 2025-05-05 NOTE — ED ADULT NURSE NOTE - NS ED NURSE DISCH DISPOSITION
MrRedd Hawkins is here for management of anticoagulation for AFib.   PMH also significant for CHF, HLD, HTN.   He presents today w/out complaint.  Pt verifies dosing regimen as listed above.   Pt denies s/s bleeding/bruising/swelling/SOB.  No BRBPR. No melena.  Address missed doses  Reviewed pt medication list  No changes in RX/OTCs/Herbal medications.  Reviewed dietary concerns  Address EToH and tobacco use.    No recent changes.    INR improved after dose decrease last visit for INR of 3.9 but remains slightly above range. Will lower dose again.    INR 3.5 is above therapeutic range of 2-3.  Recommend to take 2.5 mg tomorrow(already took today's dose), then reduce dose to 7.5 mg Fri and 5 mg all other days(6.2% decrease)  Patient has 5 mg tablets.  Will continue to monitor and check INR in 2 weeks.  Dosing reminder card given with phone number, appointment date and time.   Return to clinic: 25 @  2:45 PM     Bruno Siddiqi PharmD 2:24 PM EDT 25    For Pharmacy Admin Tracking Only    Intervention Detail: Adherence Monitorin and Dose Adjustment: 1, reason: Therapy De-escalation  Total # of Interventions Recommended: 2  Total # of Interventions Accepted: 2  Time Spent (min): 15     Admitted

## 2025-05-07 ENCOUNTER — INPATIENT (INPATIENT)
Facility: HOSPITAL | Age: 45
LOS: 4 days | Discharge: HOME HEALTH | DRG: 392 | End: 2025-05-12
Attending: STUDENT IN AN ORGANIZED HEALTH CARE EDUCATION/TRAINING PROGRAM | Admitting: EMERGENCY MEDICINE
Payer: MEDICAID

## 2025-05-07 VITALS
SYSTOLIC BLOOD PRESSURE: 124 MMHG | WEIGHT: 201.94 LBS | TEMPERATURE: 98 F | HEART RATE: 112 BPM | OXYGEN SATURATION: 99 % | DIASTOLIC BLOOD PRESSURE: 70 MMHG | RESPIRATION RATE: 20 BRPM | HEIGHT: 65 IN

## 2025-05-07 DIAGNOSIS — Z98.890 OTHER SPECIFIED POSTPROCEDURAL STATES: Chronic | ICD-10-CM

## 2025-05-07 DIAGNOSIS — Z92.89 PERSONAL HISTORY OF OTHER MEDICAL TREATMENT: Chronic | ICD-10-CM

## 2025-05-07 LAB
ALBUMIN SERPL ELPH-MCNC: 3.5 G/DL — SIGNIFICANT CHANGE UP (ref 3.3–5)
ALP SERPL-CCNC: 104 U/L — SIGNIFICANT CHANGE UP (ref 30–120)
ALT FLD-CCNC: 20 U/L — SIGNIFICANT CHANGE UP (ref 10–60)
ANION GAP SERPL CALC-SCNC: 15 MMOL/L — SIGNIFICANT CHANGE UP (ref 5–17)
APPEARANCE UR: CLEAR — SIGNIFICANT CHANGE UP
AST SERPL-CCNC: 50 U/L — HIGH (ref 10–40)
BASOPHILS # BLD AUTO: 0.04 K/UL — SIGNIFICANT CHANGE UP (ref 0–0.2)
BASOPHILS NFR BLD AUTO: 0.3 % — SIGNIFICANT CHANGE UP (ref 0–2)
BILIRUB SERPL-MCNC: 0.8 MG/DL — SIGNIFICANT CHANGE UP (ref 0.2–1.2)
BILIRUB UR-MCNC: NEGATIVE — SIGNIFICANT CHANGE UP
BUN SERPL-MCNC: 6 MG/DL — LOW (ref 7–23)
CALCIUM SERPL-MCNC: 8.8 MG/DL — SIGNIFICANT CHANGE UP (ref 8.4–10.5)
CHLORIDE SERPL-SCNC: 102 MMOL/L — SIGNIFICANT CHANGE UP (ref 96–108)
CO2 SERPL-SCNC: 27 MMOL/L — SIGNIFICANT CHANGE UP (ref 22–31)
COLOR SPEC: SIGNIFICANT CHANGE UP
CREAT SERPL-MCNC: 0.37 MG/DL — LOW (ref 0.5–1.3)
DIFF PNL FLD: NEGATIVE — SIGNIFICANT CHANGE UP
EGFR: 126 ML/MIN/1.73M2 — SIGNIFICANT CHANGE UP
EGFR: 126 ML/MIN/1.73M2 — SIGNIFICANT CHANGE UP
EOSINOPHIL # BLD AUTO: 0.03 K/UL — SIGNIFICANT CHANGE UP (ref 0–0.5)
EOSINOPHIL NFR BLD AUTO: 0.2 % — SIGNIFICANT CHANGE UP (ref 0–6)
GLUCOSE SERPL-MCNC: 90 MG/DL — SIGNIFICANT CHANGE UP (ref 70–99)
GLUCOSE UR QL: NEGATIVE MG/DL — SIGNIFICANT CHANGE UP
HCG SERPL-ACNC: 1 MIU/ML — SIGNIFICANT CHANGE UP
HCG UR QL: NEGATIVE — SIGNIFICANT CHANGE UP
HCT VFR BLD CALC: 42.4 % — SIGNIFICANT CHANGE UP (ref 34.5–45)
HGB BLD-MCNC: 13.6 G/DL — SIGNIFICANT CHANGE UP (ref 11.5–15.5)
HIV 1 & 2 AB SERPL IA.RAPID: SIGNIFICANT CHANGE UP
IMM GRANULOCYTES NFR BLD AUTO: 0.4 % — SIGNIFICANT CHANGE UP (ref 0–0.9)
KETONES UR-MCNC: ABNORMAL MG/DL
LACTATE SERPL-SCNC: 3.2 MMOL/L — HIGH (ref 0.7–2)
LEUKOCYTE ESTERASE UR-ACNC: NEGATIVE — SIGNIFICANT CHANGE UP
LIDOCAIN IGE QN: 12 U/L — LOW (ref 16–77)
LYMPHOCYTES # BLD AUTO: 0.87 K/UL — LOW (ref 1–3.3)
LYMPHOCYTES # BLD AUTO: 6.7 % — LOW (ref 13–44)
MAGNESIUM SERPL-MCNC: 1.2 MG/DL — LOW (ref 1.6–2.6)
MCHC RBC-ENTMCNC: 27.7 PG — SIGNIFICANT CHANGE UP (ref 27–34)
MCHC RBC-ENTMCNC: 32.1 G/DL — SIGNIFICANT CHANGE UP (ref 32–36)
MCV RBC AUTO: 86.4 FL — SIGNIFICANT CHANGE UP (ref 80–100)
MONOCYTES # BLD AUTO: 0.76 K/UL — SIGNIFICANT CHANGE UP (ref 0–0.9)
MONOCYTES NFR BLD AUTO: 5.8 % — SIGNIFICANT CHANGE UP (ref 2–14)
NEUTROPHILS # BLD AUTO: 11.3 K/UL — HIGH (ref 1.8–7.4)
NEUTROPHILS NFR BLD AUTO: 86.6 % — HIGH (ref 43–77)
NITRITE UR-MCNC: NEGATIVE — SIGNIFICANT CHANGE UP
NRBC BLD AUTO-RTO: 0 /100 WBCS — SIGNIFICANT CHANGE UP (ref 0–0)
PH UR: 6 — SIGNIFICANT CHANGE UP (ref 5–8)
PLATELET # BLD AUTO: 260 K/UL — SIGNIFICANT CHANGE UP (ref 150–400)
POTASSIUM SERPL-MCNC: 3.3 MMOL/L — LOW (ref 3.5–5.3)
POTASSIUM SERPL-SCNC: 3.3 MMOL/L — LOW (ref 3.5–5.3)
PROT SERPL-MCNC: 7.9 G/DL — SIGNIFICANT CHANGE UP (ref 6–8.3)
PROT UR-MCNC: SIGNIFICANT CHANGE UP MG/DL
RBC # BLD: 4.91 M/UL — SIGNIFICANT CHANGE UP (ref 3.8–5.2)
RBC # FLD: 18.7 % — HIGH (ref 10.3–14.5)
SODIUM SERPL-SCNC: 144 MMOL/L — SIGNIFICANT CHANGE UP (ref 135–145)
SP GR SPEC: 1.05 — HIGH (ref 1–1.03)
UROBILINOGEN FLD QL: 1 MG/DL — SIGNIFICANT CHANGE UP (ref 0.2–1)
WBC # BLD: 13.05 K/UL — HIGH (ref 3.8–10.5)
WBC # FLD AUTO: 13.05 K/UL — HIGH (ref 3.8–10.5)

## 2025-05-07 PROCEDURE — 74177 CT ABD & PELVIS W/CONTRAST: CPT | Mod: 26

## 2025-05-07 PROCEDURE — 99285 EMERGENCY DEPT VISIT HI MDM: CPT

## 2025-05-07 PROCEDURE — 99223 1ST HOSP IP/OBS HIGH 75: CPT

## 2025-05-07 RX ORDER — ACETAMINOPHEN 500 MG/5ML
650 LIQUID (ML) ORAL EVERY 6 HOURS
Refills: 0 | Status: DISCONTINUED | OUTPATIENT
Start: 2025-05-07 | End: 2025-05-12

## 2025-05-07 RX ORDER — CEFTRIAXONE 500 MG/1
1000 INJECTION, POWDER, FOR SOLUTION INTRAMUSCULAR; INTRAVENOUS EVERY 24 HOURS
Refills: 0 | Status: COMPLETED | OUTPATIENT
Start: 2025-05-07 | End: 2025-05-11

## 2025-05-07 RX ORDER — PIPERACILLIN-TAZO-DEXTROSE,ISO 2.25G/50ML
3.38 IV SOLUTION, PIGGYBACK PREMIX FROZEN(ML) INTRAVENOUS ONCE
Refills: 0 | Status: DISCONTINUED | OUTPATIENT
Start: 2025-05-07 | End: 2025-05-07

## 2025-05-07 RX ORDER — SODIUM CHLORIDE 9 G/1000ML
1000 INJECTION, SOLUTION INTRAVENOUS
Refills: 0 | Status: DISCONTINUED | OUTPATIENT
Start: 2025-05-07 | End: 2025-05-09

## 2025-05-07 RX ORDER — VANCOMYCIN HCL IN 5 % DEXTROSE 1.5G/250ML
1000 PLASTIC BAG, INJECTION (ML) INTRAVENOUS ONCE
Refills: 0 | Status: DISCONTINUED | OUTPATIENT
Start: 2025-05-07 | End: 2025-05-07

## 2025-05-07 RX ORDER — ONDANSETRON HCL/PF 4 MG/2 ML
4 VIAL (ML) INJECTION ONCE
Refills: 0 | Status: COMPLETED | OUTPATIENT
Start: 2025-05-07 | End: 2025-05-07

## 2025-05-07 RX ORDER — METRONIDAZOLE 250 MG
500 TABLET ORAL EVERY 8 HOURS
Refills: 0 | Status: COMPLETED | OUTPATIENT
Start: 2025-05-07 | End: 2025-05-08

## 2025-05-07 RX ORDER — MAGNESIUM SULFATE 500 MG/ML
2 SYRINGE (ML) INJECTION ONCE
Refills: 0 | Status: COMPLETED | OUTPATIENT
Start: 2025-05-07 | End: 2025-05-07

## 2025-05-07 RX ORDER — HYDROCORTISONE 10 MG/G
1 CREAM TOPICAL
Refills: 0 | Status: DISCONTINUED | OUTPATIENT
Start: 2025-05-07 | End: 2025-05-12

## 2025-05-07 RX ORDER — SODIUM CHLORIDE 9 G/1000ML
1000 INJECTION, SOLUTION INTRAVENOUS ONCE
Refills: 0 | Status: COMPLETED | OUTPATIENT
Start: 2025-05-07 | End: 2025-05-07

## 2025-05-07 RX ORDER — NALOXONE HYDROCHLORIDE 0.4 MG/ML
0.4 INJECTION, SOLUTION INTRAMUSCULAR; INTRAVENOUS; SUBCUTANEOUS ONCE
Refills: 0 | Status: DISCONTINUED | OUTPATIENT
Start: 2025-05-07 | End: 2025-05-12

## 2025-05-07 RX ORDER — ONDANSETRON HCL/PF 4 MG/2 ML
4 VIAL (ML) INJECTION EVERY 8 HOURS
Refills: 0 | Status: DISCONTINUED | OUTPATIENT
Start: 2025-05-07 | End: 2025-05-12

## 2025-05-07 RX ORDER — ACETAMINOPHEN 500 MG/5ML
1000 LIQUID (ML) ORAL ONCE
Refills: 0 | Status: COMPLETED | OUTPATIENT
Start: 2025-05-07 | End: 2025-05-07

## 2025-05-07 RX ORDER — ONDANSETRON HCL/PF 4 MG/2 ML
1 VIAL (ML) INJECTION
Qty: 12 | Refills: 0
Start: 2025-05-07 | End: 2025-05-09

## 2025-05-07 RX ORDER — NYSTATIN 100000 [USP'U]/G
1 CREAM TOPICAL
Refills: 0 | Status: DISCONTINUED | OUTPATIENT
Start: 2025-05-07 | End: 2025-05-12

## 2025-05-07 RX ADMIN — Medication 4 MILLIGRAM(S): at 13:28

## 2025-05-07 RX ADMIN — Medication 1000 MILLILITER(S): at 12:13

## 2025-05-07 RX ADMIN — Medication 25 GRAM(S): at 19:46

## 2025-05-07 RX ADMIN — CEFTRIAXONE 100 MILLIGRAM(S): 500 INJECTION, POWDER, FOR SOLUTION INTRAMUSCULAR; INTRAVENOUS at 19:07

## 2025-05-07 RX ADMIN — Medication 1000 MILLILITER(S): at 13:20

## 2025-05-07 RX ADMIN — Medication 2 MILLIGRAM(S): at 19:38

## 2025-05-07 RX ADMIN — Medication 100 MILLIEQUIVALENT(S): at 23:22

## 2025-05-07 RX ADMIN — Medication 1000 MILLIGRAM(S): at 14:35

## 2025-05-07 RX ADMIN — Medication 40 MILLIEQUIVALENT(S): at 13:28

## 2025-05-07 RX ADMIN — SODIUM CHLORIDE 1000 MILLILITER(S): 9 INJECTION, SOLUTION INTRAVENOUS at 22:22

## 2025-05-07 RX ADMIN — Medication 20 MILLIGRAM(S): at 14:29

## 2025-05-07 RX ADMIN — Medication 650 MILLIGRAM(S): at 19:38

## 2025-05-07 RX ADMIN — Medication 25 GRAM(S): at 13:28

## 2025-05-07 RX ADMIN — Medication 4 MILLIGRAM(S): at 12:51

## 2025-05-07 RX ADMIN — Medication 400 MILLIGRAM(S): at 14:29

## 2025-05-07 RX ADMIN — Medication 4 MILLIGRAM(S): at 12:13

## 2025-05-07 RX ADMIN — Medication 100 MILLIGRAM(S): at 21:08

## 2025-05-07 RX ADMIN — Medication 650 MILLIGRAM(S): at 20:08

## 2025-05-07 RX ADMIN — Medication 4 MILLIGRAM(S): at 13:56

## 2025-05-07 RX ADMIN — Medication 1000 MILLILITER(S): at 14:37

## 2025-05-07 RX ADMIN — SODIUM CHLORIDE 150 MILLILITER(S): 9 INJECTION, SOLUTION INTRAVENOUS at 19:29

## 2025-05-07 RX ADMIN — Medication 2 MILLIGRAM(S): at 17:53

## 2025-05-07 RX ADMIN — Medication 4 MILLIGRAM(S): at 12:12

## 2025-05-07 NOTE — ED ADULT NURSE NOTE - BOWEL SOUNDS RUQ
Airway  Urgency: elective    Airway not difficult    General Information and Staff    Patient location during procedure: OR  Anesthesiologist: Troy Ang MD  CRNA: Hilda Mojica CRNA    Indications and Patient Condition  Indications for airway management: airway protection    Preoxygenated: yes  Mask difficulty assessment: 1 - vent by mask    Final Airway Details  Final airway type: supraglottic airway      Successful airway: unique  Size 5    Number of attempts at approach: 1    Additional Comments  Preoxygenated with 100% FiO2. Smooth IV induction. Atraumatic insertion. No change to dentition or soft tissue.            
present

## 2025-05-07 NOTE — ED PROVIDER NOTE - PROGRESS NOTE DETAILS
Patient stable.  Overall much improved.  Magnesium low, has had this in the past.  IV magnesium given in emergency room.  Potassium replaced in emergency room.  Nausea resolved. taking po fluids.  Pain improved.  CAT scan shows suspected gastroenteritis.  Suspect viral in nature.  Supportive care discussed.  Recommend follow-up with GI.  Referral provided if needed.  Information for referral coordinator also provided as patient is requesting doctors who provide house calls since patient does not walk due to neuropathy Patient stable.  Slightly hypotensive and tachycardic.  Will give IV fluids and reassess Patient stable.  Overall much improved.  Magnesium low, has had this in the past.  IV magnesium given in emergency room.  Potassium replaced in emergency room.  Nausea improved. pain improved Blood pressure 96/60 after 2 liters of fluids. heart rate 102. will plan to admit. will order blood cultures and lactate. spoke with attending hospitalist, Dr. Serrato. does not recommend abx at this time, especially due to recent c-diff coliitis and suspicion symptoms are viral in nature. will order GI PCR

## 2025-05-07 NOTE — ED PROVIDER NOTE - OBJECTIVE STATEMENT
44-year-old female with history of anxiety, depression, anemia (history of transfusion), A-fib (not currently on anticoagulants), and neuropathy (not able to ambulate due to chronic pain due to neuropathy) brought in by ambulance for abdominal pain that started last night, worse today.  Vomited 2 times today.  Patient reports chronic loose stools, denies any increase of diarrhea or blood in stools.  History of C. difficile colitis in 2024 and again 2 months ago.  Was given oral vancomycin for 2 weeks with overall improvement of symptoms.  Patient states this abdominal pain feels different.  Previous abdominal surgeries include uterine ablation and  in the past.  States pain 10 of 10.  Denies any modifying factors.

## 2025-05-07 NOTE — ED PROVIDER NOTE - DIFFERENTIAL DIAGNOSIS
Differential Diagnosis Differentials include but not limited to diverticulitis, colitis, enteritis, appendicitis, obstruction

## 2025-05-07 NOTE — H&P ADULT - ASSESSMENT
44-year-old female with history of anxiety, depression, blood loss anemia (history of transfusion), A-fib (not currently on anticoagulants), and neuropathy (not able to ambulate due to chronic pain due to neuropathy) brought in by ambulance for severe abdominal pain that climaxed the prior night, (3 day crescendo) and   Vomited 2 times prior to ED visit.      Patient reports chronic loose stools, denies any increase of diarrhea or blood in stools.  History of C. difficile colitis completed oral vancomycin for 2 weeks with overall improvement of symptoms since April.  Patient states this abdominal pain 10/10.  Previous abdominal surgeries include uterine ablation and  (x3)     guarded abdomen, excessive periumbilical pain, distended abdomen  difficulty passing gas  subjective fevers, no sick contacts, no prodromal symptoms    subjective fevers, leukocytosis, neutrophilia, elevated lactate of 3.2,   prominent fluid-filled small bowel loops in the upper midabdomen.   Findings may reflect a nonspecific gastroenteritis. Colonic   diverticulosis without acute diverticulitis. Appendix no pericecal   inflammation to suggest appendicitis    -case discussed with ID, GI, Surgery  -periumbilical pain likely secondary to distention and ayaka-umbical hernia  -Gastroenteritis, moderate to sever, pain out of proportion complicated by hypotension  -high risk for c.diff, empiric abx rocephin, flagyl, am labs, LR  -morphine for abdominal pain  -monitor for morphine induced gastroparesis    NSR on EKF hx of a.fib    Abnormal electolyte, replace magnesium  -am labs    Depression, Anxiety hold home anxiolytics since NPo  -reassess in am  -tsh in am, history of thyroid dysfunction    #DVT PPX: SCD at moment, reassess over next 24 hours    High risk for c.diff no diarrhea currently

## 2025-05-07 NOTE — CONSULT NOTE ADULT - SUBJECTIVE AND OBJECTIVE BOX
SURGERY PA CONSULT NOTE:    CHIEF COMPLAINT:  Patient is a 44y old  Female who presents with a chief complaint of abdominal pain.     HPI FROM ED:  44-year-old female with history of anxiety, depression, anemia (history of transfusion), A-fib (not currently on anticoagulants), and neuropathy (not able to ambulate due to chronic pain due to neuropathy) brought in by ambulance for abdominal pain that started last night, worse today.  Vomited 2 times today.  Patient reports chronic loose stools, denies any increase of diarrhea or blood in stools.  History of C. difficile colitis in 2024 and again 2 months ago.  Was given oral vancomycin for 2 weeks with overall improvement of symptoms.  Patient states this abdominal pain feels different.  Previous abdominal surgeries include uterine ablation and  in the past.  States pain 10 of 10.  Denies any modifying factors.       Interval HPI: 45 yo female with hx of Hyperthyroidism, Asthma, Anxiety, Depression, Anemia, Afib not on AC, Neuropathy, recent C-diff colitis presents to ED c/o progressively worsening abdominal pain x 4 days. She was nauseous yesterday without emesis but vomited x 2 clear phlegm today.   Pain is more to mid abdomen, initially comes and goes but now more persistent, sharp, crampy pain. Passed flatus 2 min ago in ED prior to the Author's assessment and yesterday before then. Last BM was last night, slimy stool, without foul smell nor blood in stool. Pt states she does not ambulate due to neuropathy and majority of time in bed.  Last meal was sandwich for lunch yesterday and drank pepsi cola today without issues. Pt reports recent C-diff colitis and was treated with PO Vanco x 14 days in 3/2025.  Denies burping. Denies fever, chills, chest pain, SOB, dysuria, hematuria, recent travel, recent sick contact.  Never had C-scope nor EGD in the past.          PAST MEDICAL HISTORY:  PAST MEDICAL & SURGICAL HISTORY:  Atrial fibrillation      History of Hyperthyroidism      Asthma      History of anemia      Depression, unspecified depression type      Smoker      Peripheral neuropathy      S/P  Section  ,,      History of blood transfusion      Status post embolization of uterine artery          PAST SURGICAL HISTORY:    REVIEW OF SYSTEMS:  General/Constitutional: No acute distress, no fevers, or chills   HEENT: Denies auditory or visual changes/disturbances.   Neck: Denies neck pain/stiffness.   Respiratory: Denies cough/hemoptysis. no SOB.   Cardiac: Denies chest pain, palpitations  Abdomen: Endorses abdominal bloating/pain with nausea or vomiting.   Extremities: Denies swelling.   Genitourinary: Denies dysuria/hematuria  Neuro: Reports neuropathy.   Skin: Denies pruritus, rashes  Psych: Denies hallucinations, visual disturbances.     MEDICATIONS:  Home Medications:  acetaminophen 325 mg oral tablet: 2 tab(s) orally every 6 hours As needed Temp greater or equal to 38C (100.4F), Mild Pain (1 - 3) (07 Mar 2025 19:40)  folic acid 1 mg oral tablet: 1 tab(s) orally once a day (07 Mar 2025 19:40)  melatonin 3 mg oral tablet: 1 tab(s) orally once a day (at bedtime) As needed Insomnia (07 Mar 2025 19:40)  sertraline 100 mg oral tablet: 1 tab(s) orally once a day (07 Mar 2025 19:40)    MEDICATIONS  (STANDING):  cefTRIAXone   IVPB 1000 milliGRAM(s) IV Intermittent every 24 hours  lactated ringers. 1000 milliLiter(s) (150 mL/Hr) IV Continuous <Continuous>  metroNIDAZOLE  IVPB 500 milliGRAM(s) IV Intermittent every 8 hours  naloxone Injectable 0.4 milliGRAM(s) IV Push once    MEDICATIONS  (PRN):  acetaminophen     Tablet .. 650 milliGRAM(s) Oral every 6 hours PRN Temp greater or equal to 38C (100.4F), Mild Pain (1 - 3)  morphine  - Injectable 2 milliGRAM(s) IV Push every 4 hours PRN Moderate Pain (4 - 6)  morphine  - Injectable 4 milliGRAM(s) IV Push every 4 hours PRN Severe Pain (7 - 10)  ondansetron Injectable 4 milliGRAM(s) IV Push every 8 hours PRN Nausea and/or Vomiting      ALLERGIES:  Allergies    Motrin (Hives)    Intolerances        SOCIAL HISTORY:  Social History:    Smoking: Yes [ ]  No [ ]   ______pk yrs  ETOH  Yes [ ]  No [ ]  Social [ ]  DRUGS:  Yes [ ]  No [ ]  if so what______________    FAMILY HISTORY:  FAMILY HISTORY:  Family history of diabetes mellitus (Sibling, Grandparent)    Family history of stomach cancer (Grandparent)        VITAL SIGNS:  Vital Signs Last 24 Hrs  T(C): 36.7 (07 May 2025 15:32), Max: 37.3 (07 May 2025 14:23)  T(F): 98 (07 May 2025 15:32), Max: 99.1 (07 May 2025 14:23)  HR: 102 (07 May 2025 15:32) (102 - 112)  BP: 96/67 (07 May 2025 15:32) (96/67 - 124/70)  BP(mean): --  RR: 17 (07 May 2025 15:32) (17 - 20)  SpO2: 97% (07 May 2025 15:32) (95% - 99%)    Parameters below as of 07 May 2025 15:32  Patient On (Oxygen Delivery Method): room air        PHYSICAL EXAM:  General: No acute distress.   Head, Eyes, Ears, Nose, Throat: anicteric, conjunctiva-non injected and moist.   Neck: Supple.   Chest: Lungs are clear to P&A.   Heart: Heart rhythm regular.   Abdomen: Soft, ttp in bilateral mid-abdomen more to left lateral abdomen, moderately distended, good bowel sounds present in all four quadrants.  No guarding, rebound, and no peritoneal signs.    Extremity: No swelling.   Neuro: Alert and oriented x3.   Psychiatric: Awake , alert, oriented x3 with an appropriate affect.   Skin: clean, dry.       LABS:                        13.6   13.05 )-----------( 260      ( 07 May 2025 12:03 )             42.4     05    144  |  102  |  6[L]  ----------------------------<  90  3.3[L]   |  27  |  0.37[L]    Ca    8.8      07 May 2025 12:03  Mg     1.2     05-    TPro  7.9  /  Alb  3.5  /  TBili  0.8  /  DBili  x   /  AST  50[H]  /  ALT  20  /  AlkPhos  104  05-      Urinalysis Basic - ( 07 May 2025 13:47 )    Color: Dark Yellow / Appearance: Clear / S.052 / pH: x  Gluc: x / Ketone: Trace mg/dL  / Bili: Negative / Urobili: 1.0 mg/dL   Blood: x / Protein: Trace mg/dL / Nitrite: Negative   Leuk Esterase: Negative / RBC: x / WBC x   Sq Epi: x / Non Sq Epi: x / Bacteria: x      LIVER FUNCTIONS - ( 07 May 2025 12:03 )  Alb: 3.5 g/dL / Pro: 7.9 g/dL / ALK PHOS: 104 U/L / ALT: 20 U/L / AST: 50 U/L / GGT: x             Urinalysis with Rflx Culture (collected 07 May 2025 13:47)        RADIOLOGY & ADDITIONAL STUDIES:  < from: CT Abdomen and Pelvis w/ IV Cont (25 @ 13:16) >    FINDINGS:  LOWER CHEST: Within normal limits.    LIVER: Enlarged (21.8 cm). Steatosis. Calcified granulomas  BILE DUCTS: Normal caliber.  GALLBLADDER: Within normal limits.  SPLEEN: Calcified granuloma.  PANCREAS: Within normal limits.  ADRENALS: Within normal limits.  KIDNEYS/URETERS: Within normal limits.    BLADDER: Within normal limits.  REPRODUCTIVE ORGANS: Unremarkable    BOWEL: No bowel obstruction distended fluid-filled stomach. Mildly   prominent fluid-filled small bowel loops in the upper midabdomen.   Findings may reflect a nonspecific gastroenteritis. Colonic   diverticulosis without acute diverticulitis. Appendix no pericecal   inflammation to suggest appendicitis  PERITONEUM/RETROPERITONEUM: Small simple pelvic free fluid..  VESSELS: Nonaneurysmal  LYMPH NODES: No lymphadenopathy.  ABDOMINAL WALL: Small fat-containing umbilical hernia.  BONES: Within normal limits.    IMPRESSION:  Distended fluid-filled stomach and mildly prominent fluid-filled small   bowel loops. Correlate for symptoms of gastroenteritis.  No obstructive pathology    Additional nonacute findings as discussed    --- End of Report ---      < end of copied text >        ASSESSMENT:  45 yo female with hx of Hyperthyroidism, Asthma, Anxiety, Depression, Anemia, Afib not on AC, Neuropathy, recent C-diff colitis presents to ED c/o progressively worsening abdominal pain x 4 days.   Leukocytosis of 13 with neutrophilia.   Afebrile. Hemodynamically stable.    CT imaging suggestive of gastroenteritis without obstruction.   On Cipro, Flagyl.       PLAN:  - Admit to Medicine for further care  - No acute surgical intervention indicated at this time.   - Will benefit from GI eval  - NPO  - IVF hydration  - NG tube decompression if nausea, vomiting with worsening abdominal pain.   - GI PCR, C-diff  - ID eval   - Surgery following  - Case and plan discussed with Dr. Dr. Chandan Greene Dr. Karamanlakis    SURGERY PA CONSULT NOTE:    CHIEF COMPLAINT:  Patient is a 44y old  Female who presents with a chief complaint of abdominal pain.     HPI FROM ED:  44-year-old female with history of anxiety, depression, anemia (history of transfusion), A-fib (not currently on anticoagulants), and neuropathy (not able to ambulate due to chronic pain due to neuropathy) brought in by ambulance for abdominal pain that started last night, worse today.  Vomited 2 times today.  Patient reports chronic loose stools, denies any increase of diarrhea or blood in stools.  History of C. difficile colitis in 2024 and again 2 months ago.  Was given oral vancomycin for 2 weeks with overall improvement of symptoms.  Patient states this abdominal pain feels different.  Previous abdominal surgeries include uterine ablation and  in the past.  States pain 10 of 10.  Denies any modifying factors.       Interval HPI: 43 yo female with hx of Hyperthyroidism, Asthma, Anxiety, Depression, Anemia, Afib not on AC, Neuropathy, recent C-diff colitis presents to ED c/o progressively worsening abdominal pain x 4 days. She was nauseous yesterday without emesis but vomited x 2 clear phlegm today.   Pain is more to mid abdomen, initially comes and goes but now more persistent, sharp, crampy pain. Passed flatus 2 min ago in ED prior to the Author's assessment and yesterday before then. Last BM was last night, slimy stool, without foul smell nor blood in stool. Pt states she does not ambulate due to neuropathy and majority of time in bed.  Last meal was sandwich for lunch yesterday and drank pepsi cola today without issues. Pt reports recent C-diff colitis and was treated with PO Vanco x 14 days in 3/2025.  Denies burping. Denies fever, chills, chest pain, SOB, dysuria, hematuria, recent travel, recent sick contact.  Never had C-scope nor EGD in the past.          PAST MEDICAL HISTORY:  PAST MEDICAL & SURGICAL HISTORY:  Atrial fibrillation      History of Hyperthyroidism      Asthma      History of anemia      Depression, unspecified depression type      Smoker      Peripheral neuropathy      S/P  Section  ,,      History of blood transfusion      Status post embolization of uterine artery          PAST SURGICAL HISTORY:    REVIEW OF SYSTEMS:  General/Constitutional: No acute distress, no fevers, or chills   HEENT: Denies auditory or visual changes/disturbances.   Neck: Denies neck pain/stiffness.   Respiratory: Denies cough/hemoptysis. no SOB.   Cardiac: Denies chest pain, palpitations  Abdomen: Endorses abdominal bloating/pain with nausea or vomiting.   Extremities: Denies swelling.   Genitourinary: Denies dysuria/hematuria  Neuro: Reports neuropathy.   Skin: Denies pruritus, rashes  Psych: Denies hallucinations, visual disturbances.     MEDICATIONS:  Home Medications:  acetaminophen 325 mg oral tablet: 2 tab(s) orally every 6 hours As needed Temp greater or equal to 38C (100.4F), Mild Pain (1 - 3) (07 Mar 2025 19:40)  folic acid 1 mg oral tablet: 1 tab(s) orally once a day (07 Mar 2025 19:40)  melatonin 3 mg oral tablet: 1 tab(s) orally once a day (at bedtime) As needed Insomnia (07 Mar 2025 19:40)  sertraline 100 mg oral tablet: 1 tab(s) orally once a day (07 Mar 2025 19:40)    MEDICATIONS  (STANDING):  cefTRIAXone   IVPB 1000 milliGRAM(s) IV Intermittent every 24 hours  lactated ringers. 1000 milliLiter(s) (150 mL/Hr) IV Continuous <Continuous>  metroNIDAZOLE  IVPB 500 milliGRAM(s) IV Intermittent every 8 hours  naloxone Injectable 0.4 milliGRAM(s) IV Push once    MEDICATIONS  (PRN):  acetaminophen     Tablet .. 650 milliGRAM(s) Oral every 6 hours PRN Temp greater or equal to 38C (100.4F), Mild Pain (1 - 3)  morphine  - Injectable 2 milliGRAM(s) IV Push every 4 hours PRN Moderate Pain (4 - 6)  morphine  - Injectable 4 milliGRAM(s) IV Push every 4 hours PRN Severe Pain (7 - 10)  ondansetron Injectable 4 milliGRAM(s) IV Push every 8 hours PRN Nausea and/or Vomiting      ALLERGIES:  Allergies    Motrin (Hives)    Intolerances        SOCIAL HISTORY:  Social History:    Smoking: Yes [ ]  No [ ]   ______pk yrs  ETOH  Yes [ ]  No [ ]  Social [ ]  DRUGS:  Yes [ ]  No [ ]  if so what______________    FAMILY HISTORY:  FAMILY HISTORY:  Family history of diabetes mellitus (Sibling, Grandparent)    Family history of stomach cancer (Grandparent)        VITAL SIGNS:  Vital Signs Last 24 Hrs  T(C): 36.7 (07 May 2025 15:32), Max: 37.3 (07 May 2025 14:23)  T(F): 98 (07 May 2025 15:32), Max: 99.1 (07 May 2025 14:23)  HR: 102 (07 May 2025 15:32) (102 - 112)  BP: 96/67 (07 May 2025 15:32) (96/67 - 124/70)  BP(mean): --  RR: 17 (07 May 2025 15:32) (17 - 20)  SpO2: 97% (07 May 2025 15:32) (95% - 99%)    Parameters below as of 07 May 2025 15:32  Patient On (Oxygen Delivery Method): room air        PHYSICAL EXAM:  General: No acute distress.   Head, Eyes, Ears, Nose, Throat: anicteric, conjunctiva-non injected and moist.   Neck: Supple.   Chest: Lungs are clear to P&A.   Heart: Heart rhythm regular.   Abdomen: Soft, ttp in bilateral mid-abdomen more to left lateral abdomen, moderately distended, good bowel sounds present in all four quadrants.  No guarding, rebound, and no peritoneal signs.    Extremity: No swelling.   Neuro: Alert and oriented x3.   Psychiatric: Awake , alert, oriented x3 with an appropriate affect.   Skin: clean, dry.       LABS:                        13.6   13.05 )-----------( 260      ( 07 May 2025 12:03 )             42.4     05    144  |  102  |  6[L]  ----------------------------<  90  3.3[L]   |  27  |  0.37[L]    Ca    8.8      07 May 2025 12:03  Mg     1.2     05-    TPro  7.9  /  Alb  3.5  /  TBili  0.8  /  DBili  x   /  AST  50[H]  /  ALT  20  /  AlkPhos  104  05-      Urinalysis Basic - ( 07 May 2025 13:47 )    Color: Dark Yellow / Appearance: Clear / S.052 / pH: x  Gluc: x / Ketone: Trace mg/dL  / Bili: Negative / Urobili: 1.0 mg/dL   Blood: x / Protein: Trace mg/dL / Nitrite: Negative   Leuk Esterase: Negative / RBC: x / WBC x   Sq Epi: x / Non Sq Epi: x / Bacteria: x      LIVER FUNCTIONS - ( 07 May 2025 12:03 )  Alb: 3.5 g/dL / Pro: 7.9 g/dL / ALK PHOS: 104 U/L / ALT: 20 U/L / AST: 50 U/L / GGT: x             Urinalysis with Rflx Culture (collected 07 May 2025 13:47)        RADIOLOGY & ADDITIONAL STUDIES:  < from: CT Abdomen and Pelvis w/ IV Cont (25 @ 13:16) >    FINDINGS:  LOWER CHEST: Within normal limits.    LIVER: Enlarged (21.8 cm). Steatosis. Calcified granulomas  BILE DUCTS: Normal caliber.  GALLBLADDER: Within normal limits.  SPLEEN: Calcified granuloma.  PANCREAS: Within normal limits.  ADRENALS: Within normal limits.  KIDNEYS/URETERS: Within normal limits.    BLADDER: Within normal limits.  REPRODUCTIVE ORGANS: Unremarkable    BOWEL: No bowel obstruction distended fluid-filled stomach. Mildly   prominent fluid-filled small bowel loops in the upper midabdomen.   Findings may reflect a nonspecific gastroenteritis. Colonic   diverticulosis without acute diverticulitis. Appendix no pericecal   inflammation to suggest appendicitis  PERITONEUM/RETROPERITONEUM: Small simple pelvic free fluid..  VESSELS: Nonaneurysmal  LYMPH NODES: No lymphadenopathy.  ABDOMINAL WALL: Small fat-containing umbilical hernia.  BONES: Within normal limits.    IMPRESSION:  Distended fluid-filled stomach and mildly prominent fluid-filled small   bowel loops. Correlate for symptoms of gastroenteritis.  No obstructive pathology    Additional nonacute findings as discussed    --- End of Report ---      < end of copied text >        ASSESSMENT:  43 yo female with hx of Hyperthyroidism, Asthma, Anxiety, Depression, Anemia, Afib not on AC, Neuropathy, recent C-diff colitis presents to ED c/o progressively worsening abdominal pain x 4 days.   Leukocytosis of 13 with neutrophilia.   Afebrile. Hemodynamically stable.    CT imaging suggestive of gastroenteritis without obstruction.   Has bowel functions.    On Cipro, Flagyl.   Signs and symptoms likely due to ?GE or ?C-diff flare ?     PLAN:  - Admit to Medicine for further care  - No acute surgical intervention indicated at this time.  - Serial abdominal exam.   - Will benefit from GI eval  - NPO  - IVF hydration  - NG tube decompression if nausea, vomiting with worsening abdominal pain.   - GI PCR, C-diff  - ID eval   - Surgery following  - Case and plan discussed with Dr. Greene, Dr. Hawley, Dr. Serrato

## 2025-05-07 NOTE — ED ADULT NURSE NOTE - OBJECTIVE STATEMENT
pt is A&Ox3, ambulatory, able to make needs known and presents with C/O ab pain that is worse in the lower left quad and radiates to all areas of the stomach. PT reports pain with palpation and the left lower quad has an area of firmness. PT also reports nausea and vomiting which is intermittent in nature since about 3 days ago. IV to left A/C with labs sent.

## 2025-05-07 NOTE — H&P ADULT - HISTORY OF PRESENT ILLNESS
44-year-old female with history of anxiety, depression, blood loss anemia (history of transfusion), A-fib (not currently on anticoagulants), and neuropathy (not able to ambulate due to chronic pain due to neuropathy) brought in by ambulance for severe abdominal pain that climaxed the prior night, (3 day crescendo) and   Vomited 2 times prior to ED visit.      Patient reports chronic loose stools, denies any increase of diarrhea or blood in stools.  History of C. difficile colitis completed oral vancomycin for 2 weeks with overall improvement of symptoms since April.  Patient states this abdominal pain 10/10.  Previous abdominal surgeries include uterine ablation and  (x3)

## 2025-05-07 NOTE — ED PROVIDER NOTE - CARE PLAN
1 Principal Discharge DX:	Abdominal pain  Secondary Diagnosis:	Nausea and vomiting  Secondary Diagnosis:	Hypomagnesemia  Secondary Diagnosis:	Hypokalemia

## 2025-05-07 NOTE — ED ADULT NURSE NOTE - NSFALLRISKINTERV_ED_ALL_ED

## 2025-05-07 NOTE — ED PROVIDER NOTE - CLINICAL SUMMARY MEDICAL DECISION MAKING FREE TEXT BOX
Patient complaining of left lower quadrant abdominal pain since yesterday with nausea vomiting twice today.  Patient reports chronic loose stools unchanged at this time.  No fevers chills or urinary complaints.    Plan labs CT abdomen pelvis IV fluids morphine Zofran

## 2025-05-07 NOTE — H&P ADULT - NSHPREVIEWOFSYSTEMS_GEN_ALL_CORE
guarded abdomen, excessive periumbilical pain, distended abdomen  difficulty passing gas  subjective fevers, no sick contacts, no prodromal symptoms

## 2025-05-07 NOTE — ED ADULT TRIAGE NOTE - ARRIVAL FROM
[facility]IENT [facility]NCOUN[facility]  GENERAL SURGERY INPATIENT H&P    ADMISSION DATE: 2/26/2024  SURGICAL ATTENDING: Dr. Vo  HOSPITAL DAY: 0    SUBJECTIVE:  CHIEF COMPLAINT: Abdominal Pain    HISTORY OF PRESENT ILLNESS: April York is a 26 year old female who was seen in consultation for evaluation of abdominal pain. Reports that 3 days ago it started to develop epigastric pain early migrated down to her right lower quadrant.  The attributed this to constipation, she took MiraLax and laxatives with minimal relief.  Pain feels sharp in character.  She reports some associated nausea and constipation.  She denies any vomiting, fevers, chills.  Patient denies any previous episodes of similar character in the past.    Is tachycardic to 115, afebrile, hypertensive. Labs are remarkable for leukocytosis to [address], no LFT elevation.  A CT abdomen and pelvis was completed that showed evidence of acute appendicitis.    Patient has no previous abdominal surgical history.  She has never had a colonoscopy.  BMI 36.77.    PAST MEDICAL HISTORY:   Past Medical History:   Diagnosis Date    Acne     Anxiety     Chronic back pain     sees chiropractor    Gastroesophageal reflux disease     Sweating     Thyroid condition     Thyroid nodule 12/13/2017    Reported as benign     PAST SURGICAL HISTORY:   Past Surgical History:   Procedure Laterality Date    Breast surgery Bilateral 12/01/2020    breast augmentation    Thyroid lobectomy Left 12/20/2017    Tonsillectomy and adenoidectomy        HOME MEDICATIONS REVIEWED:   Immunosuppressants:  None  Blood thinners:  None    ALLERGIES:  ALLERGIES:  No Known Allergies  FAMILY HISTORY:  Family History       Relation Problem Comments    Brother Substance Abuse ETOH, marijuana        Father Patient is unaware of any medical problems        Maternal Grandfather Heart disease her mom was adopted       Maternal Grandmother Patient is unaware of any medical problems        Mother Depression     Hypothyroid        Paternal Grandfather Heart disease stents        Paternal Grandmother Cancer breast        Sister Bipolar disorder    Substance Abuse              SOCIAL HISTORY:  Social History     Socioeconomic History    Marital status: /Civil Union     Spouse name: Not on file    Number of children: Not on file    Years of education: Not on file    Highest education level: Not on file   Occupational History    Occupation: student     Comment: [employer] - nursing    Occupation: [employer]     Comment: CNA   Tobacco Use    Smoking status: Never    Smokeless tobacco: Never   Substance and Sexual Activity    Alcohol use: Yes     Alcohol/week: 0.0 standard drinks of alcohol     Comment: rarely, less than 1 every 6-8 months    Drug use: No    Sexual activity: Yes     Partners: Male     Birth control/protection: Pill   Other Topics Concern    Not on file   Social History Narrative    Live on her own with boyfriend in [place].    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    Travel nurse, currently in [place].      Social Determinants of Health     Financial Resource Strain: Not on file   Food Insecurity: Not on file   Transportation Needs: Not on file   Physical Activity: Not on file   Stress: Not on file   Social Connections: Not on file   Interpersonal Safety: Not At Risk (3/28/2021)    Interpersonal Safety     Social Determinants: Intimate Partner Violence Past Fear: Not on file     Social Determinants: Intimate Partner Violence Current Fear: Not on file     REVIEW OF SYSTEMS:  Constitutional: Denies fever, chills, night sweats, weakness.  Skin: Denies itching, rash, jaundice.  HEENT: Denies rhinorrhea, sore throat or recent illnesses.  Respiratory: Denies cough, wheezing or shortness of breath.    Cardiovascular: Denies chest pain, chest pressure, palpitations.  Gastrointestinal: See HPI.   Genitourinary: Denies dysuria, hematuria or flank pain.  Extremities: Denies pain, numbness or tingling in bilateral upper and  lower extremities. Denies joint swelling or joint pain.  Neuro: Denies change in sensory or motor function.  Negative for headache.  Endocrine: Denies heat or cold intolerance.  Hematological: Denies bleeding or bruising easily.  Psych: Denies change in affect or sleep disturbance.    OBJECTIVE:  Vitals 24 Hour Range Last Value    Temp Temp  Min: 98.5 °F (36.9 °C)  Max: 99.2 °F (37.3 °C) 98.5 °F (36.9 °C)   HR Pulse  Min: 115  Max: 116 (!) 115   RR Resp  Min: 16  Max: 16 16   BP BP  Min: 136/88  Max: 145/98 (!) 145/98   Pulse Ox SpO2  Min: 98 %  Max: 99 %     O2 No data recorded       Ht/Wt Today Admit   Weight 88.3 kg (194 lb 9.6 oz) (02/26/24 1341) Weight: 88.3 kg (194 lb 9.6 oz) (02/26/24 1341)   BMI Body mass index is 36.77 kg/m².  BMI (Calculated): 36.77 (02/26/24 1341)     PHYSICAL EXAM:  General:  Alert, cooperative, no distress, appears stated age.  Head:  Normocephalic, without obvious abnormality, atraumatic.  Eyes:  Sclerae are non-icteric.   Neck:  Supple, symmetrical, trachea midline.  Lungs:  Clear to auscultation bilaterally, respirations unlabored, no wheezes, rales, or rhonchi.  Heart:  Regular rate and rhythm, S1 and S2 normal, no murmur, rub or gallop.  Abdomen:  Soft, nondistended, tender to palpation in the right lower quadrant.  No previous surgical scars.  Extremities:  upper and lower extremities are bilaterally normal, atraumatic, no cyanosis or edema.   Pulses:  2+ radial and pedal pulses symmetric bilaterally.  Skin:  Skin color, texture, turgor normal, no rashes or lesions.  Neurologic:  Cranial nerves II-XII grossly intact.  Normal strength, sensation throughout.  Psychiatric:  Good mood and appropriate affect.    LABS:  Recent Labs   Lab 02/26/24  1107   WBC 12.9*   HCT 39.4   HGB 13.3      POTASSIUM 3.8   CREATININE 0.80     Recent Labs   Lab 02/26/24  1107   AST 26   GPT 13   ALKPT 79   BILIRUBIN 0.6   LIPA 26      No results found  No results for input(s): \"HGBA1C\" in the last  8765 hours.     IMAGING:   CT ABDOMEN PELVIS W CONTRAST    Result Date: 2/26/2024  EXAM: CT ABDOMEN PELVIS W CONTRAST DATE OF EXAM: 2/26/2024 11:30 AM. COMPARISON: None. CLINICAL INFORMATION: progressive RLQ pain, eval for appendicitis, Right lower quadrant pain. CONTRAST: 100 mL Omnipaque  300 injected intravenously. FINDINGS: HEART/LUNGS: The heart is normal in size. Lung bases appear unremarkable. LIVER: Unremarkable. GALLBLADDER: Unremarkable. SPLEEN: Unremarkable. PANCREAS: Unremarkable. ADRENAL GLANDS: No adrenal lesions evident. KIDNEYS: The kidneys demonstrate no mass or hydronephrosis. No calculi are apparent. No ureteral calculus is apparent. ABDOMINAL AORTA: The abdominal aorta is normal in caliber. No paraaortic or pericaval lymphadenopathy is evident. PELVIS: Images of the pelvis demonstrate the urinary bladder outline to be smooth. No bowel obstructive pattern is evident. APPENDIX: The appendix is diffusely thickened approximating 2 cm transaxial dimension image 95 series 2. Periappendiceal fat stranding is evident. Small amount of fluid evident within the right paracolic gutter. No abscess evident. Localized adenopathy evident pericecal region specific example image 86 series 2 measuring 8 mm..     IMPRESSION: Findings consistent with appendicitis. Results today were called to Dr. Aruna Bland at 1222 hours 2/26/2024. Electronically Signed by: Oscar Nicolas MD Signed on: 2/26/2024 12:23 PM Created on Workstation ID: [id number] Signed on Workstation ID: [id number]    ASSESSMENT/PLAN:  April York is a 26 year old female with 3 day history of right lower quadrant abdominal pain with associated nausea, leukocytosis, CT evidence of acute appendicitis.  Patient is afebrile, tachycardic, hypertensive.  We will plan for laparoscopic appendectomy afternoon with Dr. Vo.  NPO.  IV fluids.  IV antibiotics.  Admit for observation following procedure.  Postop restrictions and instructions were  reviewed.    Rebekah Khanna PA-C   General Surgery  2/26/2024 , 2:23 PM    Patient seen and examined.  Periumbilical and epigastric pain localizing now to the right lower quadrant.  Patient has guarding in the right lower quadrant with palpation.  CT scan consistent with a appendicitis.  The appendix looks more retrocecal rather than free floating.    Recommend laparoscopic appy, possible open appendectomy as preferred treatment over antibiotics alone.     Home

## 2025-05-07 NOTE — ED PROVIDER NOTE - PATIENT PORTAL LINK FT
You can access the FollowMyHealth Patient Portal offered by [facility] by registering at the following website: [email or web address]. By joining [facility]’s FollowMyHealth portal, you will also be able to view your health information using other applications (apps) compatible with our system.

## 2025-05-07 NOTE — H&P ADULT - NSHPPHYSICALEXAM_GEN_ALL_CORE
GENERAL: resting acute distress, uncomfortably in bed  HEENT: Atraumatic wearing head garment, wearing glasses, non-icteric, no JVD  NEURO:  A&Ox3, no focal deficits, moving all extremities spontaneously, no dysarthria, CN II-XII grossly intact  PSYCH: Normal affect, calm, appropriate insight and judgment, fluent speech  NECK: supple  LUNGS: CTAB, no wrr, non-labored breathing  HEART: RRR, no murmur appreciated  ABD: Guarded, distended, Soft, tender, distended, unclear organomegaly, no appreciable masses, +bs 3 quadrants and low but present in rlq  EXTREMITIES: Nontender, no clubbing, cyanosis, or edema  SKIN: No rashes or lesions

## 2025-05-07 NOTE — PATIENT PROFILE ADULT - NSTOBACCOWITHDRW_GEN_A_CORE_SD
Chief Complaint   Patient presents with     RECHECK     dm2     Lupillo Hansen, CMA    
difficulty concentrating

## 2025-05-07 NOTE — PATIENT PROFILE ADULT - FUNCTIONAL ASSESSMENT - DAILY ACTIVITY 4.
Render Risk Assessment In Note?: no
Detail Level: Simple
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
4 = No assist / stand by assistance

## 2025-05-07 NOTE — ED PROVIDER NOTE - CARE PROVIDER_API CALL
Magdaleno Jeffers  Gastroenterology  [address] [place] [id number]  Phone: [phone]  Fax: [phone]  Follow Up Time: 1-3 Days

## 2025-05-07 NOTE — ED PROVIDER NOTE - NSFOLLOWUPINSTRUCTIONS_ED_ALL_ED_FT
Clear liquid diet 8 to 12 hours, advance to brat diet as tolerated  Zofran every 6-8 hours as needed for nausea  Bentyl every 6-8 hours as needed for abdominal cramping  Follow-up with GI.  Referral provided if needed.  Call to make appointment      Viral Gastroenteritis, Adult  Body outline showing the digestive tract, including the stomach, small intestine, and large intestine.  Viral gastroenteritis is also known as the stomach flu. This condition may affect your stomach, small intestine, and large intestine. It can cause sudden watery diarrhea, fever, and vomiting. This condition is caused by many different viruses. These viruses can be passed from person to person very easily (are contagious).    Diarrhea and vomiting can make you feel weak and cause you to become dehydrated. You may not be able to keep fluids down. Dehydration can make you tired and thirsty, cause you to have a dry mouth, and decrease how often you urinate. It is important to replace the fluids that you lose from diarrhea and vomiting.    What are the causes?  Gastroenteritis is caused by many viruses, including rotavirus and norovirus. Norovirus is the most common cause in adults. You can get sick after being exposed to the viruses from other people. You can also get sick by:  Eating food, drinking water, or touching a surface contaminated with one of these viruses.  Sharing utensils or other personal items with an infected person.  What increases the risk?  You are more likely to develop this condition if you:  Have a weak body defense system (immune system).  Live with one or more children who are younger than 2 years.  Live in a nursing home.  Travel on cruise ships.  What are the signs or symptoms?  Symptoms of this condition start suddenly 1–3 days after exposure to a virus. Symptoms may last for a few days or for as long as a week. Common symptoms include watery diarrhea and vomiting. Other symptoms include:  Fever.  Headache.  Fatigue.  Pain in the abdomen.  Chills.  Weakness.  Nausea.  Muscle aches.  Loss of appetite.  How is this diagnosed?  This condition is diagnosed with a medical history and physical exam. You may also have a stool test to check for viruses or other infections.    How is this treated?  This condition typically goes away on its own. The focus of treatment is to prevent dehydration and restore lost fluids (rehydration). This condition may be treated with:  An oral rehydration solution (ORS) to replace important salts and minerals (electrolytes) in your body. Take this if told by your health care provider. This is a drink that is sold at pharmacies and retail stores.  Medicines to help with your symptoms.  Probiotic supplements to reduce symptoms of diarrhea.  Fluids given through an IV, if dehydration is severe.  Older adults and people with other diseases or a weak immune system are at higher risk for dehydration.    Follow these instructions at home:  Eating and drinking    A comparison of three sample cups showing dark yellow, yellow, and pale yellow urine.  Take an ORS as told by your health care provider.  Drink clear fluids in small amounts as you are able. Clear fluids include:  Water.  Ice chips.  Diluted fruit juice.  Low-calorie sports drinks.  Drink enough fluid to keep your urine pale yellow.  Eat small amounts of healthy foods every 3–4 hours as you are able. This may include whole grains, fruits, vegetables, lean meats, and yogurt.  Avoid fluids that contain a lot of sugar or caffeine, such as energy drinks, sports drinks, and soda.  Avoid spicy or fatty foods.  Avoid alcohol.  General instructions    Washing hands with soap and water.  Wash your hands often, especially after having diarrhea or vomiting. If soap and water are not available, use hand .  Make sure that all people in your household wash their hands well and often.  Take over-the-counter and prescription medicines only as told by your health care provider.  Rest at home while you recover.  Watch your condition for any changes.  Take a warm bath to relieve any burning or pain from frequent diarrhea episodes.  Keep all follow-up visits. This is important.  Contact a health care provider if you:  Cannot keep fluids down.  Have symptoms that get worse.  Have new symptoms.  Feel light-headed or dizzy.  Have muscle cramps.  Get help right away if you:  Have chest pain.  Have trouble breathing or you are breathing very quickly.  Have a fast heartbeat.  Feel extremely weak or you faint.  Have a severe headache, a stiff neck, or both.  Have a rash.  Have severe pain, cramping, or bloating in your abdomen.  Have skin that feels cold and clammy.  Feel confused.  Have pain when you urinate.  Have signs of dehydration, such as:  Dark urine, very little urine, or no urine.  Cracked lips.  Dry mouth.  Sunken eyes.  Sleepiness.  Weakness.  Have signs of bleeding, such as:  Seeing blood in your vomit.  Having vomit that looks like coffee grounds.  Having bloody or black stools or stools that look like tar.  These symptoms may be an emergency. Get help right away. Call 911.  Do not wait to see if the symptoms will go away.  Do not drive yourself to the hospital.  Summary  Viral gastroenteritis is also known as the stomach flu. It can cause sudden watery diarrhea, fever, and vomiting.  This condition can be passed from person to person very easily (is contagious).  Take an oral rehydration solution (ORS) if told by your health care provider. This is a drink that is sold at pharmacies and retail stores.  Wash your hands often, especially after having diarrhea or vomiting. If soap and water are not available, use hand .  This information is not intended to replace advice given to you by your health care provider. Make sure you discuss any questions you have with your health care provider.

## 2025-05-08 LAB
ANION GAP SERPL CALC-SCNC: 12 MMOL/L — SIGNIFICANT CHANGE UP (ref 5–17)
BASOPHILS # BLD AUTO: 0.03 K/UL — SIGNIFICANT CHANGE UP (ref 0–0.2)
BASOPHILS NFR BLD AUTO: 0.5 % — SIGNIFICANT CHANGE UP (ref 0–2)
BUN SERPL-MCNC: 4 MG/DL — LOW (ref 7–23)
CALCIUM SERPL-MCNC: 7.6 MG/DL — LOW (ref 8.4–10.5)
CHLORIDE SERPL-SCNC: 102 MMOL/L — SIGNIFICANT CHANGE UP (ref 96–108)
CO2 SERPL-SCNC: 26 MMOL/L — SIGNIFICANT CHANGE UP (ref 22–31)
CREAT SERPL-MCNC: 0.49 MG/DL — LOW (ref 0.5–1.3)
EGFR: 118 ML/MIN/1.73M2 — SIGNIFICANT CHANGE UP
EGFR: 118 ML/MIN/1.73M2 — SIGNIFICANT CHANGE UP
EOSINOPHIL # BLD AUTO: 0.21 K/UL — SIGNIFICANT CHANGE UP (ref 0–0.5)
EOSINOPHIL NFR BLD AUTO: 3.2 % — SIGNIFICANT CHANGE UP (ref 0–6)
GI PCR PANEL: SIGNIFICANT CHANGE UP
GLUCOSE SERPL-MCNC: 71 MG/DL — SIGNIFICANT CHANGE UP (ref 70–99)
HCT VFR BLD CALC: 34.8 % — SIGNIFICANT CHANGE UP (ref 34.5–45)
HGB BLD-MCNC: 10.9 G/DL — LOW (ref 11.5–15.5)
IMM GRANULOCYTES NFR BLD AUTO: 0.3 % — SIGNIFICANT CHANGE UP (ref 0–0.9)
LACTATE SERPL-SCNC: 3.2 MMOL/L — HIGH (ref 0.7–2)
LYMPHOCYTES # BLD AUTO: 0.77 K/UL — LOW (ref 1–3.3)
LYMPHOCYTES # BLD AUTO: 11.8 % — LOW (ref 13–44)
MAGNESIUM SERPL-MCNC: 1.6 MG/DL — SIGNIFICANT CHANGE UP (ref 1.6–2.6)
MCHC RBC-ENTMCNC: 27.5 PG — SIGNIFICANT CHANGE UP (ref 27–34)
MCHC RBC-ENTMCNC: 31.3 G/DL — LOW (ref 32–36)
MCV RBC AUTO: 87.7 FL — SIGNIFICANT CHANGE UP (ref 80–100)
MONOCYTES # BLD AUTO: 0.49 K/UL — SIGNIFICANT CHANGE UP (ref 0–0.9)
MONOCYTES NFR BLD AUTO: 7.5 % — SIGNIFICANT CHANGE UP (ref 2–14)
NEUTROPHILS # BLD AUTO: 5.01 K/UL — SIGNIFICANT CHANGE UP (ref 1.8–7.4)
NEUTROPHILS NFR BLD AUTO: 76.7 % — SIGNIFICANT CHANGE UP (ref 43–77)
NRBC BLD AUTO-RTO: 0 /100 WBCS — SIGNIFICANT CHANGE UP (ref 0–0)
PLATELET # BLD AUTO: 202 K/UL — SIGNIFICANT CHANGE UP (ref 150–400)
POTASSIUM SERPL-MCNC: 3.3 MMOL/L — LOW (ref 3.5–5.3)
POTASSIUM SERPL-SCNC: 3.3 MMOL/L — LOW (ref 3.5–5.3)
RBC # BLD: 3.97 M/UL — SIGNIFICANT CHANGE UP (ref 3.8–5.2)
RBC # BLD: 3.97 M/UL — SIGNIFICANT CHANGE UP (ref 3.8–5.2)
RBC # FLD: 18.6 % — HIGH (ref 10.3–14.5)
RETICS #: 73 K/UL — SIGNIFICANT CHANGE UP (ref 25–125)
RETICS/RBC NFR: 1.8 % — SIGNIFICANT CHANGE UP (ref 0.5–2.5)
SODIUM SERPL-SCNC: 140 MMOL/L — SIGNIFICANT CHANGE UP (ref 135–145)
TSH SERPL-MCNC: 3.85 UIU/ML — SIGNIFICANT CHANGE UP (ref 0.27–4.2)
TSH SERPL-MCNC: 3.91 UIU/ML — SIGNIFICANT CHANGE UP (ref 0.27–4.2)
WBC # BLD: 6.53 K/UL — SIGNIFICANT CHANGE UP (ref 3.8–10.5)
WBC # FLD AUTO: 6.53 K/UL — SIGNIFICANT CHANGE UP (ref 3.8–10.5)

## 2025-05-08 PROCEDURE — 99233 SBSQ HOSP IP/OBS HIGH 50: CPT

## 2025-05-08 RX ORDER — TRAZODONE HCL 100 MG
50 TABLET ORAL AT BEDTIME
Refills: 0 | Status: DISCONTINUED | OUTPATIENT
Start: 2025-05-08 | End: 2025-05-12

## 2025-05-08 RX ORDER — BUTYROSPERMUM PARKII(SHEA BUTTER), SIMMONDSIA CHINENSIS (JOJOBA) SEED OIL, ALOE BARBADENSIS LEAF EXTRACT .01; 1; 3.5 G/100G; G/100G; G/100G
250 LIQUID TOPICAL
Refills: 0 | Status: DISCONTINUED | OUTPATIENT
Start: 2025-05-08 | End: 2025-05-12

## 2025-05-08 RX ORDER — VANCOMYCIN HCL IN 5 % DEXTROSE 1.5G/250ML
125 PLASTIC BAG, INJECTION (ML) INTRAVENOUS EVERY 6 HOURS
Refills: 0 | Status: DISCONTINUED | OUTPATIENT
Start: 2025-05-08 | End: 2025-05-12

## 2025-05-08 RX ORDER — DIAZEPAM 2 MG/1
2 TABLET ORAL
Refills: 0 | Status: DISCONTINUED | OUTPATIENT
Start: 2025-05-08 | End: 2025-05-12

## 2025-05-08 RX ORDER — HEPARIN SODIUM 1000 [USP'U]/ML
5000 INJECTION INTRAVENOUS; SUBCUTANEOUS EVERY 8 HOURS
Refills: 0 | Status: DISCONTINUED | OUTPATIENT
Start: 2025-05-08 | End: 2025-05-12

## 2025-05-08 RX ADMIN — SODIUM CHLORIDE 150 MILLILITER(S): 9 INJECTION, SOLUTION INTRAVENOUS at 07:42

## 2025-05-08 RX ADMIN — Medication 100 MILLIEQUIVALENT(S): at 04:23

## 2025-05-08 RX ADMIN — Medication 4 MILLIGRAM(S): at 06:08

## 2025-05-08 RX ADMIN — HEPARIN SODIUM 5000 UNIT(S): 1000 INJECTION INTRAVENOUS; SUBCUTANEOUS at 22:26

## 2025-05-08 RX ADMIN — Medication 125 MILLIGRAM(S): at 12:57

## 2025-05-08 RX ADMIN — Medication 50 MILLIGRAM(S): at 22:38

## 2025-05-08 RX ADMIN — Medication 4 MILLIGRAM(S): at 00:38

## 2025-05-08 RX ADMIN — NYSTATIN 1 APPLICATION(S): 100000 CREAM TOPICAL at 06:11

## 2025-05-08 RX ADMIN — Medication 100 MILLIGRAM(S): at 22:26

## 2025-05-08 RX ADMIN — Medication 4 MILLIGRAM(S): at 12:15

## 2025-05-08 RX ADMIN — Medication 100 MILLIEQUIVALENT(S): at 05:59

## 2025-05-08 RX ADMIN — Medication 125 MILLIGRAM(S): at 18:41

## 2025-05-08 RX ADMIN — Medication 125 MILLIGRAM(S): at 11:25

## 2025-05-08 RX ADMIN — HEPARIN SODIUM 5000 UNIT(S): 1000 INJECTION INTRAVENOUS; SUBCUTANEOUS at 16:03

## 2025-05-08 RX ADMIN — Medication 4 MILLIGRAM(S): at 16:33

## 2025-05-08 RX ADMIN — BUTYROSPERMUM PARKII(SHEA BUTTER), SIMMONDSIA CHINENSIS (JOJOBA) SEED OIL, ALOE BARBADENSIS LEAF EXTRACT 250 MILLIGRAM(S): .01; 1; 3.5 LIQUID TOPICAL at 18:41

## 2025-05-08 RX ADMIN — CEFTRIAXONE 100 MILLIGRAM(S): 500 INJECTION, POWDER, FOR SOLUTION INTRAMUSCULAR; INTRAVENOUS at 20:25

## 2025-05-08 RX ADMIN — Medication 100 MILLIGRAM(S): at 16:03

## 2025-05-08 RX ADMIN — Medication 4 MILLIGRAM(S): at 11:42

## 2025-05-08 RX ADMIN — Medication 100 MILLIGRAM(S): at 07:42

## 2025-05-08 RX ADMIN — Medication 4 MILLIGRAM(S): at 00:23

## 2025-05-08 RX ADMIN — DIAZEPAM 2 MILLIGRAM(S): 2 TABLET ORAL at 20:26

## 2025-05-08 RX ADMIN — Medication 4 MILLIGRAM(S): at 16:03

## 2025-05-08 RX ADMIN — SODIUM CHLORIDE 150 MILLILITER(S): 9 INJECTION, SOLUTION INTRAVENOUS at 20:25

## 2025-05-08 NOTE — CONSULT NOTE ADULT - SUBJECTIVE AND OBJECTIVE BOX
HPI:  45YO F PMH peripheral neuropathy not able to ambulate due to pain, depression, uterine fibroid, anemia, asthma, hypothyroidism, atrial fibrillation not on AC given previous hx of severe bleed requiring massive transfusion  history of  recurrent Cdiff colitis who presented to the hospital with c/o severe abdominal pain n/v/d. Denies fever chills. In ED afebrile wbc 13K CT AP showed Distended fluid-filled stomach and mildly prominent fluid-filled small bowel loops. Correlate for symptoms of gastroenteritis.    Infectious Disease consult was called to evaluate pt.    Past Medical & Surgical Hx:  PAST MEDICAL & SURGICAL HISTORY:  Atrial fibrillation  History of Hyperthyroidism  Asthma  History of anemia  Depression, unspecified depression type  Smoker  Peripheral neuropathy  S/P  Section  ,,  History of blood transfusion  Status post embolization of uterine artery      Social History--  EtOH: denies   Tobacco: denies   Drug Use: denies    FAMILY HISTORY:  Family history of diabetes mellitus (Sibling, Grandparent)    Family history of stomach cancer (Grandparent)    Allergies  Motrin (Hives)    Intolerances  NONE      Home Medications:  acetaminophen 325 mg oral tablet: 2 tab(s) orally every 6 hours As needed Temp greater or equal to 38C (100.4F), Mild Pain (1 - 3) (07 Mar 2025 19:40)  folic acid 1 mg oral tablet: 1 tab(s) orally once a day (07 Mar 2025 19:40)  melatonin 3 mg oral tablet: 1 tab(s) orally once a day (at bedtime) As needed Insomnia (07 Mar 2025 19:40)  sertraline 100 mg oral tablet: 1 tab(s) orally once a day (07 Mar 2025 19:40)      Current Inpatient Medications :    ANTIBIOTICS:   cefTRIAXone   IVPB 1000 milliGRAM(s) IV Intermittent every 24 hours  metroNIDAZOLE  IVPB 500 milliGRAM(s) IV Intermittent every 8 hours  vancomycin    Solution 125 milliGRAM(s) Oral every 6 hours      OTHER RELEVANT MEDICATIONS :  acetaminophen     Tablet .. 650 milliGRAM(s) Oral every 6 hours PRN  diazepam    Tablet 2 milliGRAM(s) Oral two times a day PRN  heparin   Injectable 5000 Unit(s) SubCutaneous every 8 hours  hydrocortisone 2.5% Rectal Cream 1 Application(s) Rectal two times a day PRN  lactated ringers. 1000 milliLiter(s) IV Continuous <Continuous>  morphine  - Injectable 2 milliGRAM(s) IV Push every 4 hours PRN  morphine  - Injectable 4 milliGRAM(s) IV Push every 4 hours PRN  nystatin Powder 1 Application(s) Topical two times a day PRN  ondansetron Injectable 4 milliGRAM(s) IV Push every 8 hours PRN    ROS:  CONSTITUTIONAL:  Negative fever or chills  EYES:  Negative  blurry vision or double vision  CARDIOVASCULAR:  Negative for chest pain or palpitations  RESPIRATORY:  Negative for cough, wheezing, or SOB   GASTROINTESTINAL: + nausea, vomiting, diarrhea, abdominal pain  GENITOURINARY:  Negative frequency, urgency , dysuria or hematuria   NEUROLOGIC:  No headache, confusion, dizziness, lightheadedness  All other systems were reviewed and are negative        I&O's Detail    07 May 2025 07:01  -  08 May 2025 07:00  --------------------------------------------------------  IN:    Lactated Ringers: 1800 mL  Total IN: 1800 mL    OUT:    Voided (mL): 850 mL  Total OUT: 850 mL    Total NET: 950 mL    Physical Exam:  Vital Signs Last 24 Hrs  T(C): 36.7 (08 May 2025 14:22), Max: 36.8 (07 May 2025 18:27)  T(F): 98 (08 May 2025 14:22), Max: 98.3 (07 May 2025 18:27)  HR: 88 (08 May 2025 14:22) (85 - 100)  BP: 94/60 (08 May 2025 14:22) (90/59 - 100/83)  BP(mean): 75 (08 May 2025 06:03) (74 - 75)  RR: 17 (08 May 2025 14:22) (16 - 18)  SpO2: 94% (08 May 2025 14:22) (93% - 98%)    Parameters below as of 08 May 2025 14:22  Patient On (Oxygen Delivery Method): room air    General: no acute distress  Neck: supple, trachea midline  Lungs: clear, no wheeze/rhonchi  Cardiovascular: regular rate and rhythm, S1 S2  Abdomen: soft, mildly tender, ND, bowel sounds normal  Neurological:  alert and oriented x3  Skin: no rash  Extremities: no edema    Labs:                         10.9   6.53  )-----------( 202      ( 08 May 2025 08:26 )             34.8       140  |  102  |  4[L]  ----------------------------<  71  3.3[L]   |  26  |  0.49[L]    Ca    7.6[L]      08 May 2025 08:26  Mg     1.6         TPro  7.9  /  Alb  3.5  /  TBili  0.8  /  DBili  x   /  AST  50[H]  /  ALT  20  /  AlkPhos  104        RECENT CULTURES:          RADIOLOGY & ADDITIONAL STUDIES:    ACC: [id number] EXAM:  CT ABDOMEN AND PELVIS IC   ORDERED BY: LINDSEY ZARAGOZA     PROCEDURE DATE:  2025          INTERPRETATION:  CLINICAL INFORMATION: Vomiting abdominal pain    COMPARISON: CT abdomen and pelvis 3/7/2025    CONTRAST/COMPLICATIONS:  IV Contrast: Omnipaque 350  90 cc administered   10 cc discarded  Oral Contrast: NONE  .    PROCEDURE:  CT of the Abdomen and Pelvis was performed.  Sagittal and coronal reformats were performed.    FINDINGS:  LOWER CHEST: Within normal limits.    LIVER: Enlarged (21.8 cm). Steatosis. Calcified granulomas  BILE DUCTS: Normal caliber.  GALLBLADDER: Within normal limits.  SPLEEN: Calcified granuloma.  PANCREAS: Within normal limits.  ADRENALS: Within normal limits.  KIDNEYS/URETERS: Within normal limits.    BLADDER: Within normal limits.  REPRODUCTIVE ORGANS: Unremarkable    BOWEL: No bowel obstruction distended fluid-filled stomach. Mildly   prominent fluid-filled small bowel loops in the upper midabdomen.   Findings may reflect a nonspecific gastroenteritis. Colonic   diverticulosis without acute diverticulitis. Appendix no pericecal   inflammation to suggest appendicitis  PERITONEUM/RETROPERITONEUM: Small simple pelvic free fluid..  VESSELS: Nonaneurysmal  LYMPH NODES: No lymphadenopathy.  ABDOMINAL WALL: Small fat-containing umbilical hernia.  BONES: Within normal limits.    IMPRESSION:  Distended fluid-filled stomach and mildly prominent fluid-filled small   bowel loops. Correlate for symptoms of gastroenteritis.  No obstructive pathology    Assessment :   45YO F PMH peripheral neuropathy not able to ambulate due to pain, depression, uterine fibroid, anemia, asthma, hypothyroidism, atrial fibrillation not on AC given previous hx of severe bleed requiring massive transfusion  history of  recurrent Cdiff colitis who presented to the hospital with c/o severe abdominal pain n/v/d. Denies fever chills. In ED afebrile wbc 13K CT AP showed Distended fluid-filled stomach and mildly prominent fluid-filled small bowel loops. Correlate for symptoms of gastroenteritis.  Rule out infectious gastroenteritis  Rule out Cdiff though low suspicion    Plan :   ConT Rocephin  Cont Po Vanc  Dc Flagyl  Fu cultures  Fu Cdiff Gi pcr  Trend temps and cbc  Serial abd exams    D/w Dr May    Advance Directives- Full code  Current Medications are documented.   Drug-drug interactions reviewed.    Continue with present regiment .  Approptiate use of antibiotics and adverse effects reviewed.      I have discussed the above plan of care with patient in detail. She expressed understanding of the treatment plan . Risks, benefits and alternatives discussed in detail. I have asked if she has any questions or concerns and appropriately addressed them to the best of my ability .      > 45 minutes spent in direct patient care reviewing  the notes, lab data/ imaging , discussion with multidisciplinary team. All questions were addressed and answered to the best of my capacity .    Thank you for allowing me to participate in the care of your patient .      Luis Alberto Farley MD  Infectious Disease  [phone]    Individualized infection control protocols for an individual patient based on their diagnosis and risks in order to reduce risk of disease transmission followed. Coordinating with  infection prevention and control team members to enable healthcare facility staff to safely care for patient.  Managing infection prevention and treatment protocols associated with transitions of care for complex patients.  In-depth patient chart review that entails going back farther in time and assessing the complete breadth of all health care interactions, with higher-level synthesis for complex diagnoses.  Engaging in complex medical decision-making associated with antimicrobial prescribing including considerations such as antimicrobial resistance patterns, emergence of new variants/strains, recent antibiotic exposure, interactions/complications from comorbidities including concurrent infections, public health considerations to minimize development of antimicrobial resistance, and emerging and re-emerging infections.

## 2025-05-08 NOTE — CARE COORDINATION ASSESSMENT. - NSCAREPROVIDERS_GEN_ALL_CORE_FT
Problem: Pain  Goal: Acceptable pain level achieved/maintained at rest using appropriate pain scale for the patient  Outcome: Monitoring/Evaluating progress  Note: Pain level maintained with assist of PRN pain medication and positioning CARE PROVIDERS:  Accepting Physician: Unruly Serrato  Administration: Elvis Melgoza  Admitting: Austen Greene  Attending: Austen Greene  Consultant: Ruben Hawley  Consultant: Emmy Vallejo  Consultant: Elvis Alberto  Covering Team: JASON Evans  ED ACP: Erika Dennis  ED Attending: Austen Greene  ED Nurse: Mata Escalona  Infection Control: Juliana Oliveira  Nurse: Vielka Carpenter  Nurse: Sadie Lees  Nurse: Anusha Escalona  Outpatient Provider: Kacei David  Outpatient Provider: Magdaleno Jeffers  Outpatient Provider: Chin Carey  Override: Martín Lerma  PCA/Nursing Assistant: Seema Riley  Primary Team: Beverly May  Primary Team: Meredith Zimmer  Primary Team: Susan Xavier  Registered Dietitian: Bety Loaiza  Respiratory Therapy: Chalino Mckinney  Respiratory Therapy: Pauline Garrison  : Sandi Mccormick  Team: LIDYA  Hospitalists, Team  UR// Supp. Assoc.: Ellis Stephen   CARE PROVIDERS:  Accepting Physician: Unruly Serrato  Administration: Elvis Melgoza  Admitting: Austen Greene  Attending: Austen Greene  Consultant: Ruben Hawley  Consultant: Emmy Vallejo  Consultant: Elvis Alberto  Covering Team: JASON Evans  ED ACP: Erika Dennis  ED Attending: Austen Greene  ED Nurse: Mata Escalona  Infection Control: Juliana Oliveira  Nurse: Vielka Carpenter  Nurse: Sadie Lees  Nurse: Anusha Escalona  Outpatient Provider: Kacie David  Outpatient Provider: Magdaleno Jeffers  Outpatient Provider: Chin Carey  Override: Martín Lerma  PCA/Nursing Assistant: Alma Rosa Dobson  PCA/Nursing Assistant: Domenico Solis  Primary Team: Susan Xavier  Primary Team: Meredith Zimmer  Primary Team: Beverly May  Registered Dietitian: Bety Loaiza  Respiratory Therapy: Chalino Mckinney  Respiratory Therapy: Pauline Garrison  : Sandi Mccormick  Team: LIDYA  Hospitalists, Team  UR// Supp. Assoc.: Ellis Stephen

## 2025-05-08 NOTE — CARE COORDINATION ASSESSMENT. - OTHER PERTINENT DISCHARGE PLANNING INFORMATION:
Met patient at bedside.  Explained role of CM, verbalized understanding. Pt was made aware a CM will remain available through hospitalization.  Contact information given in discharge/ transitions resource folder. Patient reports she lives w/her adult children in a home, has no steps to negotiate. Son is CDPAP 38hrs/wk w/[facility]. Patient is not known to any home care agency, requesting merry lift and will need an ambulette for transport home.   Met patient at bedside.  Explained role of CM, verbalized understanding. Pt was made aware a CM will remain available through hospitalization.  Contact information given in discharge/ transitions resource folder. Patient reports she lives w/her adult children in a home, has no steps to negotiate. Son is CDPAP 38hrs/wk w/[facility], CELIA Chanda [id number] [phone]. Patient is not known to any home care agency, requesting merry lift and will need an ambulette for transport home.   Weight Units: pounds

## 2025-05-08 NOTE — CONSULT NOTE ADULT - SUBJECTIVE AND OBJECTIVE BOX
[facility]    Magdaleno Aury Matos NP    [address] [place] [address] [phone]      Chief Complaint:  Patient is a 44y old  Female who presents with a chief complaint of Abdominal Pain (08 May 2025 07:53)      HPI:    Allergies:  Motrin (Hives)      Medications:  acetaminophen     Tablet .. 650 milliGRAM(s) Oral every 6 hours PRN  cefTRIAXone   IVPB 1000 milliGRAM(s) IV Intermittent every 24 hours  hydrocortisone 2.5% Rectal Cream 1 Application(s) Rectal two times a day PRN  lactated ringers. 1000 milliLiter(s) IV Continuous <Continuous>  metroNIDAZOLE  IVPB 500 milliGRAM(s) IV Intermittent every 8 hours  morphine  - Injectable 2 milliGRAM(s) IV Push every 4 hours PRN  morphine  - Injectable 4 milliGRAM(s) IV Push every 4 hours PRN  naloxone Injectable 0.4 milliGRAM(s) IV Push once  nystatin Powder 1 Application(s) Topical two times a day PRN  ondansetron Injectable 4 milliGRAM(s) IV Push every 8 hours PRN      PMHX/PSHX:  Atrial fibrillation    History of Hyperthyroidism    Asthma    History of anemia    Depression, unspecified depression type    H/O blood transfusion reaction    Smoker    Peripheral neuropathy    S/P  Section    History of blood transfusion    Status post embolization of uterine artery        Family history:  No pertinent family history in first degree relatives    Family history of diabetes mellitus (Sibling, Grandparent)    Family history of stomach cancer (Grandparent)        Social History:     ROS:     General:  No wt loss, fevers, chills, night sweats, fatigue,   Eyes:  Good vision, no reported pain  ENT:  No sore throat, pain, runny nose, dysphagia  CV:  No pain, palpitations, hypo/hypertension  Resp:  No dyspnea, cough, tachypnea, wheezing  GI:  No pain, No nausea, No vomiting, No diarrhea, No constipation, No weight loss, No fever, No pruritis, No rectal bleeding, No tarry stools, No dysphagia,  :  No pain, bleeding, incontinence, nocturia  Muscle:  No pain, weakness  Neuro:  No weakness, tingling, memory problems  Psych:  No fatigue, insomnia, mood problems, depression  Endocrine:  No polyuria, polydipsia, cold/heat intolerance  Heme:  No petechiae, ecchymosis, easy bruisability  Skin:  No rash, tattoos, scars, edema      PHYSICAL EXAM:   Vital Signs:  Vital Signs Last 24 Hrs  T(C): 36.7 (08 May 2025 04:56), Max: 37.3 (07 May 2025 14:23)  T(F): 98 (08 May 2025 04:56), Max: 99.1 (07 May 2025 14:23)  HR: 87 (08 May 2025 06:03) (85 - 112)  BP: 97/66 (08 May 2025 06:03) (90/59 - 124/70)  BP(mean): 75 (08 May 2025 06:03) (74 - 75)  RR: 16 (08 May 2025 06:03) (16 - 20)  SpO2: 97% (08 May 2025 06:03) (93% - 99%)    Parameters below as of 08 May 2025 06:03  Patient On (Oxygen Delivery Method): room air      Daily Height in cm: 165.1 (07 May 2025 11:27)    Daily Weight in k.2 (07 May 2025 18:58)    GENERAL:  Appears stated age, well-groomed, well-nourished, no distress  HEENT:  NC/AT,  conjunctivae clear and pink, no thyromegaly, nodules, adenopathy, no JVD, sclera -anicteric  CHEST:  Full & symmetric excursion, no increased effort, breath sounds clear  HEART:  Regular rhythm, S1, S2, no murmur/rub/S3/S4, no abdominal bruit, no edema  ABDOMEN:  Soft, non-tender, non-distended, normoactive bowel sounds,  no masses ,no hepato-splenomegaly, no signs of chronic liver disease  EXTEREMITIES:  no cyanosis,clubbing or edema  SKIN:  No rash/erythema/ecchymoses/petechiae/wounds/abscess/warm/dry  NEURO:  Alert, oriented, no asterixis, no tremor, no encephalopathy    LABS:                        10.9   6.53  )-----------( 202      ( 08 May 2025 08:26 )             34.8     05-08    140  |  102  |  4[L]  ----------------------------<  71  3.3[L]   |  26  |  0.49[L]    Ca    7.6[L]      08 May 2025 08:26  Mg     1.6         TPro  7.9  /  Alb  3.5  /  TBili  0.8  /  DBili  x   /  AST  50[H]  /  ALT  20  /  AlkPhos  104      LIVER FUNCTIONS - ( 07 May 2025 12:03 )  Alb: 3.5 g/dL / Pro: 7.9 g/dL / ALK PHOS: 104 U/L / ALT: 20 U/L / AST: 50 U/L / GGT: x             Urinalysis Basic - ( 08 May 2025 08:26 )    Color: x / Appearance: x / SG: x / pH: x  Gluc: 71 mg/dL / Ketone: x  / Bili: x / Urobili: x   Blood: x / Protein: x / Nitrite: x   Leuk Esterase: x / RBC: x / WBC x   Sq Epi: x / Non Sq Epi: x / Bacteria: x      Amylase Serum--      Lipase serum12       Ammonia--      Imaging:  < from: CT Abdomen and Pelvis w/ IV Cont (25 @ 13:16) >  IMPRESSION:  Distended fluid-filled stomach and mildly prominent fluid-filled small   bowel loops. Correlate for symptoms of gastroenteritis.  No obstructive pathology    Additional nonacute findings as discussed    --- End of Report ---            ZONIA EDWARDS MD; Attending Radiologist    < end of copied text >  < from: CT Abdomen and Pelvis w/ IV Cont (25 @ 13:16) >  IMPRESSION:  Distended fluid-filled stomach and mildly prominent fluid-filled small   bowel loops. Correlate for symptoms of gastroenteritis.  No obstructive pathology    Additional nonacute findings as discussed    --- End of Report ---            ZONIA EDWARDS MD; Attending Radiologist    < end of copied text >

## 2025-05-08 NOTE — CONSULT NOTE ADULT - ASSESSMENT
Gastroenteritis   Abdominal pain    Recommendations:  - IVF  - Cdiff GDH  - Zofran prn nausea  - Advance diet as tolerated  - ID evaluation  - Serial abdominal exams  - Pain control  - No major indication for endoscopic evaluation at this time  - Will follow with you    Elvis Alberto M.D.  [facility]  [phone]    Advanced care planning was discussed with patient and family.  Advanced care planning forms were reviewed and discussed.  Risks, benefits and alternatives of gastroenterologic procedures were discussed in detail and all questions were answered  60 minutes spent on total encounter of which more than fifty percent of the encounter was spent counseling and/or coordinating care by the attending physician.  
Surg. Att.  Pt was discussed with Medical Attending and surgical PA.  Labs and imaging studies were reviewed. At present it does appear pt may have enteritis, but with recent h/o C. diff infection this dx can not be completely ruled out.  We believe that pt may benefit from GI evaluation.  Surgery will follow.
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
## 2025-05-08 NOTE — PROGRESS NOTE ADULT - ASSESSMENT
44F Atrial Fibrillation, HLD, Anxiety, Depression and recent history of C. Diff Infection admitted for Acute Diarrhea    Acute Diarrhea  Recurrent C. Diff vs. Acute Infectious Gastroenteritis  Given history of C. Diff recently with treatment will restart PO Vancomycin   PO Vancomycin + IV Ceftriaxone + IV Falgyl   Follow up on C. Diff and GI PCR   IV Fluids and monitor BMP and Electrolytes     Right Eye Cyst  Suspect Chalezion   Advised warm compresses for now  No need for systemic Antibiotics  Will Get Optho Consultation for evaluation of further treatment modalities     Anxiety / Depression  Sertraline  Valium PRN   Trazadone for bedtime    Diet  Regular    DVT Prophylaxis  Heparin SC TID    Disposition   Discharge planning pending hospital course  Total Time Spent revieweing chart, labs, imaging and discussing case with patient, Consultants family, social work, and case management 55 minutes.

## 2025-05-08 NOTE — PROGRESS NOTE ADULT - ASSESSMENT
Surg. Att.  Pt. was discussed this AM with a Surg PA.  Plan is unchanged. GI consult. stool cultures. Surg. Att.  Pt. was discussed this AM with a Surg PA.  Plan is unchanged. GI consult. stool cultures.    As in above full notation, unless countered below.  Patient personally seen/ examined during daily rounds.  Reports abdominal discomfort/ pain improved, but persists.  No further nausea or emesis.  Stools improving in frequency and consistency.  Vitals non-suggestive.  Abdomen soft with mild diffuse tenderness without guarding/ rebound/ referred pain.  Labs reassuring with resolution of leukocytosis, shift and no acidosis or end organ failure by chemistry though serum lactate remains elevated.  GI PCR negative.  Clinically, slowly improving overall.  Surgically, stable at present.  To continue current supportive care with follow-up serum lactate in am.

## 2025-05-09 LAB
-  BLOOD PCR PANEL: SIGNIFICANT CHANGE UP
ANION GAP SERPL CALC-SCNC: 10 MMOL/L — SIGNIFICANT CHANGE UP (ref 5–17)
BUN SERPL-MCNC: 2 MG/DL — LOW (ref 7–23)
CALCIUM SERPL-MCNC: 8.3 MG/DL — LOW (ref 8.4–10.5)
CHLORIDE SERPL-SCNC: 106 MMOL/L — SIGNIFICANT CHANGE UP (ref 96–108)
CO2 SERPL-SCNC: 27 MMOL/L — SIGNIFICANT CHANGE UP (ref 22–31)
CREAT SERPL-MCNC: 0.55 MG/DL — SIGNIFICANT CHANGE UP (ref 0.5–1.3)
CULTURE RESULTS: ABNORMAL
EGFR: 114 ML/MIN/1.73M2 — SIGNIFICANT CHANGE UP
EGFR: 114 ML/MIN/1.73M2 — SIGNIFICANT CHANGE UP
GLUCOSE SERPL-MCNC: 77 MG/DL — SIGNIFICANT CHANGE UP (ref 70–99)
GRAM STN FLD: ABNORMAL
HCT VFR BLD CALC: 32 % — LOW (ref 34.5–45)
HGB BLD-MCNC: 10 G/DL — LOW (ref 11.5–15.5)
LACTATE SERPL-SCNC: 2.5 MMOL/L — HIGH (ref 0.7–2)
LACTATE SERPL-SCNC: 4 MMOL/L — CRITICAL HIGH (ref 0.7–2)
MCHC RBC-ENTMCNC: 27.5 PG — SIGNIFICANT CHANGE UP (ref 27–34)
MCHC RBC-ENTMCNC: 31.3 G/DL — LOW (ref 32–36)
MCV RBC AUTO: 87.9 FL — SIGNIFICANT CHANGE UP (ref 80–100)
METHOD TYPE: SIGNIFICANT CHANGE UP
NRBC BLD AUTO-RTO: 0 /100 WBCS — SIGNIFICANT CHANGE UP (ref 0–0)
ORGANISM # SPEC MICROSCOPIC CNT: ABNORMAL
ORGANISM # SPEC MICROSCOPIC CNT: ABNORMAL
PLATELET # BLD AUTO: 203 K/UL — SIGNIFICANT CHANGE UP (ref 150–400)
POTASSIUM SERPL-MCNC: 3.5 MMOL/L — SIGNIFICANT CHANGE UP (ref 3.5–5.3)
POTASSIUM SERPL-SCNC: 3.5 MMOL/L — SIGNIFICANT CHANGE UP (ref 3.5–5.3)
RBC # BLD: 3.64 M/UL — LOW (ref 3.8–5.2)
RBC # FLD: 18.4 % — HIGH (ref 10.3–14.5)
SODIUM SERPL-SCNC: 143 MMOL/L — SIGNIFICANT CHANGE UP (ref 135–145)
SPECIMEN SOURCE: SIGNIFICANT CHANGE UP
WBC # BLD: 4.35 K/UL — SIGNIFICANT CHANGE UP (ref 3.8–10.5)
WBC # FLD AUTO: 4.35 K/UL — SIGNIFICANT CHANGE UP (ref 3.8–10.5)

## 2025-05-09 PROCEDURE — 74177 CT ABD & PELVIS W/CONTRAST: CPT | Mod: 26

## 2025-05-09 PROCEDURE — 99233 SBSQ HOSP IP/OBS HIGH 50: CPT

## 2025-05-09 RX ORDER — SERTRALINE 100 MG/1
100 TABLET, FILM COATED ORAL DAILY
Refills: 0 | Status: DISCONTINUED | OUTPATIENT
Start: 2025-05-09 | End: 2025-05-12

## 2025-05-09 RX ORDER — OXYCODONE HYDROCHLORIDE 30 MG/1
10 TABLET ORAL EVERY 6 HOURS
Refills: 0 | Status: DISCONTINUED | OUTPATIENT
Start: 2025-05-09 | End: 2025-05-12

## 2025-05-09 RX ORDER — SODIUM CHLORIDE 9 G/1000ML
1000 INJECTION, SOLUTION INTRAVENOUS
Refills: 0 | Status: DISCONTINUED | OUTPATIENT
Start: 2025-05-09 | End: 2025-05-12

## 2025-05-09 RX ORDER — OXYCODONE HYDROCHLORIDE 30 MG/1
5 TABLET ORAL EVERY 6 HOURS
Refills: 0 | Status: DISCONTINUED | OUTPATIENT
Start: 2025-05-09 | End: 2025-05-12

## 2025-05-09 RX ORDER — IOHEXOL 350 MG/ML
30 INJECTION, SOLUTION INTRAVENOUS ONCE
Refills: 0 | Status: COMPLETED | OUTPATIENT
Start: 2025-05-09 | End: 2025-05-09

## 2025-05-09 RX ORDER — PREGABALIN 75 MG/1
150 CAPSULE ORAL
Refills: 0 | Status: DISCONTINUED | OUTPATIENT
Start: 2025-05-09 | End: 2025-05-12

## 2025-05-09 RX ADMIN — Medication 125 MILLIGRAM(S): at 17:26

## 2025-05-09 RX ADMIN — Medication 4 MILLIGRAM(S): at 05:15

## 2025-05-09 RX ADMIN — BUTYROSPERMUM PARKII(SHEA BUTTER), SIMMONDSIA CHINENSIS (JOJOBA) SEED OIL, ALOE BARBADENSIS LEAF EXTRACT 250 MILLIGRAM(S): .01; 1; 3.5 LIQUID TOPICAL at 06:05

## 2025-05-09 RX ADMIN — HEPARIN SODIUM 5000 UNIT(S): 1000 INJECTION INTRAVENOUS; SUBCUTANEOUS at 14:40

## 2025-05-09 RX ADMIN — OXYCODONE HYDROCHLORIDE 10 MILLIGRAM(S): 30 TABLET ORAL at 22:04

## 2025-05-09 RX ADMIN — Medication 125 MILLIGRAM(S): at 01:00

## 2025-05-09 RX ADMIN — SODIUM CHLORIDE 150 MILLILITER(S): 9 INJECTION, SOLUTION INTRAVENOUS at 20:53

## 2025-05-09 RX ADMIN — OXYCODONE HYDROCHLORIDE 10 MILLIGRAM(S): 30 TABLET ORAL at 21:34

## 2025-05-09 RX ADMIN — Medication 1000 MILLILITER(S): at 10:03

## 2025-05-09 RX ADMIN — Medication 50 MILLIGRAM(S): at 21:27

## 2025-05-09 RX ADMIN — OXYCODONE HYDROCHLORIDE 10 MILLIGRAM(S): 30 TABLET ORAL at 14:40

## 2025-05-09 RX ADMIN — CEFTRIAXONE 100 MILLIGRAM(S): 500 INJECTION, POWDER, FOR SOLUTION INTRAMUSCULAR; INTRAVENOUS at 20:53

## 2025-05-09 RX ADMIN — IOHEXOL 30 MILLILITER(S): 350 INJECTION, SOLUTION INTRAVENOUS at 11:34

## 2025-05-09 RX ADMIN — DIAZEPAM 2 MILLIGRAM(S): 2 TABLET ORAL at 08:18

## 2025-05-09 RX ADMIN — HEPARIN SODIUM 5000 UNIT(S): 1000 INJECTION INTRAVENOUS; SUBCUTANEOUS at 06:05

## 2025-05-09 RX ADMIN — OXYCODONE HYDROCHLORIDE 10 MILLIGRAM(S): 30 TABLET ORAL at 15:30

## 2025-05-09 RX ADMIN — BUTYROSPERMUM PARKII(SHEA BUTTER), SIMMONDSIA CHINENSIS (JOJOBA) SEED OIL, ALOE BARBADENSIS LEAF EXTRACT 250 MILLIGRAM(S): .01; 1; 3.5 LIQUID TOPICAL at 17:25

## 2025-05-09 RX ADMIN — Medication 125 MILLIGRAM(S): at 12:51

## 2025-05-09 RX ADMIN — Medication 4 MILLIGRAM(S): at 04:19

## 2025-05-09 RX ADMIN — HEPARIN SODIUM 5000 UNIT(S): 1000 INJECTION INTRAVENOUS; SUBCUTANEOUS at 21:27

## 2025-05-09 RX ADMIN — Medication 125 MILLIGRAM(S): at 06:05

## 2025-05-09 NOTE — PROVIDER CONTACT NOTE (CRITICAL VALUE NOTIFICATION) - ACTION/TREATMENT ORDERED:
As per Dr. Xavier wait for morning lab results and he will put in order for continuous fluids. As per Dr. Xavier wait for morning lab results and start LR 150ml/hr continuous.

## 2025-05-09 NOTE — PROGRESS NOTE ADULT - TIME BILLING
Reviewed with patient in detail, Hospitalist, Surgical PA as well as RN team.
Reviewed with patient in detail, Hospitalist, ID attending, Surgical PA's as well as RN team.

## 2025-05-09 NOTE — CASE MANAGEMENT PROGRESS NOTE - NSCMPROGRESSNOTE_GEN_ALL_CORE
Update Communication Note: AS per morning rounds,44F Atrial Fibrillation, HLD, Anxiety, Depression and recent history of C. Diff Infection admitted for Acute Diarrhea. DX: Abdominal Pain: Pending C.D results.  Lactate is going up 4.0 Today. Infection & Disease following. Pending CT scan of Abdomen and Pelvis. Needs Ayala Lift patient stated and stated her son will help her with it. CM made DR. Jain aware. Will continue to follow patient.     Patient requesting  when medically cleared for Discharge to go home by Ambulette.   Patient requesting for Home PT, CM will setup referral.   Patient requesting a visiting Doctor to come to house.

## 2025-05-09 NOTE — PROGRESS NOTE ADULT - ASSESSMENT
Gastroenteritis   Abdominal pain    Recommendations:  - IVF  - Trend lactate; low threshold to repeat abdominal imaging  - Cdiff GDH  - ID evaluation  - Zofran prn nausea  - Advance diet as tolerated  - Serial abdominal exams  - Pain control  - No major indication for endoscopic evaluation at this time  - Will follow with you    Elvis Alberto M.D.  [facility]  [phone]    Advanced care planning was discussed with patient and family.  Advanced care planning forms were reviewed and discussed.  Risks, benefits and alternatives of gastroenterologic procedures were discussed in detail and all questions were answered  60 minutes spent on total encounter of which more than fifty percent of the encounter was spent counseling and/or coordinating care by the attending physician.

## 2025-05-09 NOTE — PROGRESS NOTE ADULT - ASSESSMENT
As in above full notation, unless countered below.  Patient personally seen/ examined during daily rounds.  Reports abdominal discomfort/ pain improving further, but not yet totally resolved.    No further nausea or emesis despite increasing oral intake of regular diet.    Stools improving in frequency and consistency, though still loose.  Vitals non-suggestive.    Abdomen soft with minimal diffuse tenderness without guarding/ rebound/ referred pain.  Labs reassuring with no leukocytosis, no acidosis or end organ failure by chemistry though serum lactate still elevated.    GI PCR negative.  CT done secondary to elevated lactate and + blood cultures= +proctocolitis without megacolon or additional pathology.  Clinically, slowly improving overall.    Surgically, stable at present.    In agreement with current supportive care.

## 2025-05-09 NOTE — PROGRESS NOTE ADULT - ASSESSMENT
44F Atrial Fibrillation, HLD, Anxiety, Depression and recent history of C. Diff Infection admitted for Acute Diarrhea    Acute Diarrhea  Possible Recurrent C. Diff; GI PCR negative and C. Diff studies are pending  Given history of C. Diff recently with treatment restarted PO Vancomycin   PO Vancomycin + IV Ceftriaxone  IV Fluids and monitor BMP and Electrolytes   Monitor and Manage pain with Oxycodone 5mg or 10mg for Moderate and Severe respectively  IV Morphine 2mg for Breakthrough Pain  Monitor for adverse effects and toxicity with administration of IV controlled substances    Lactic Acidosis  BP soft and 1 out of 2 blood cultures positive for Gram + Rods  Will give 2L NS bolus right now  Repeat Lactate in AM    Right Eye Cyst  Suspect Chalezion   Advised warm compresses for now  No need for systemic Antibiotics    Anxiety / Depression  Sertraline  Valium PRN   Trazadone for bedtime    Diet  Regular    DVT Prophylaxis  Heparin SC TID    Disposition   Discharge planning pending hospital course       44F Atrial Fibrillation, HLD, Anxiety, Depression and recent history of C. Diff Infection admitted for Acute Diarrhea    Acute Diarrhea  Possible Recurrent C. Diff; GI PCR negative and C. Diff studies are pending  Given history of C. Diff recently with treatment restarted PO Vancomycin   PO Vancomycin + IV Ceftriaxone  IV Fluids and monitor BMP and Electrolytes   Monitor and Manage pain with Oxycodone 5mg or 10mg for Moderate and Severe respectively  IV Morphine 2mg for Breakthrough Pain  Monitor for adverse effects and toxicity with administration of IV controlled substances    Lactic Acidosis  BP soft and 1 out of 2 blood cultures positive for Gram + Rods  Will give 2L NS bolus right now  Spoke to Surgery and will order a repeat CT Abdomen now to re-assess for any developing pathology  Repeat Lactate in evening and AM    Right Eye Cyst  Suspect Chalezion   Advised warm compresses for now  No need for systemic Antibiotics    Anxiety / Depression  Sertraline  Valium PRN   Trazadone for bedtime    Diet  Regular    DVT Prophylaxis  Heparin SC TID    Disposition   Discharge planning pending hospital course

## 2025-05-10 LAB
ALBUMIN SERPL ELPH-MCNC: 2.4 G/DL — LOW (ref 3.3–5)
ALP SERPL-CCNC: 92 U/L — SIGNIFICANT CHANGE UP (ref 30–120)
ALT FLD-CCNC: 14 U/L — SIGNIFICANT CHANGE UP (ref 10–60)
ANION GAP SERPL CALC-SCNC: 9 MMOL/L — SIGNIFICANT CHANGE UP (ref 5–17)
AST SERPL-CCNC: 32 U/L — SIGNIFICANT CHANGE UP (ref 10–40)
BASOPHILS # BLD AUTO: 0.01 K/UL — SIGNIFICANT CHANGE UP (ref 0–0.2)
BASOPHILS NFR BLD AUTO: 0.3 % — SIGNIFICANT CHANGE UP (ref 0–2)
BILIRUB SERPL-MCNC: 0.3 MG/DL — SIGNIFICANT CHANGE UP (ref 0.2–1.2)
BUN SERPL-MCNC: 4 MG/DL — LOW (ref 7–23)
CALCIUM SERPL-MCNC: 7.8 MG/DL — LOW (ref 8.4–10.5)
CHLORIDE SERPL-SCNC: 106 MMOL/L — SIGNIFICANT CHANGE UP (ref 96–108)
CO2 SERPL-SCNC: 27 MMOL/L — SIGNIFICANT CHANGE UP (ref 22–31)
CREAT SERPL-MCNC: 0.49 MG/DL — LOW (ref 0.5–1.3)
EGFR: 118 ML/MIN/1.73M2 — SIGNIFICANT CHANGE UP
EGFR: 118 ML/MIN/1.73M2 — SIGNIFICANT CHANGE UP
EOSINOPHIL # BLD AUTO: 0 K/UL — SIGNIFICANT CHANGE UP (ref 0–0.5)
EOSINOPHIL NFR BLD AUTO: 0 % — SIGNIFICANT CHANGE UP (ref 0–6)
GLUCOSE SERPL-MCNC: 92 MG/DL — SIGNIFICANT CHANGE UP (ref 70–99)
HCT VFR BLD CALC: 32.7 % — LOW (ref 34.5–45)
HGB BLD-MCNC: 9.9 G/DL — LOW (ref 11.5–15.5)
IMM GRANULOCYTES NFR BLD AUTO: 0.3 % — SIGNIFICANT CHANGE UP (ref 0–0.9)
LACTATE SERPL-SCNC: 4.2 MMOL/L — CRITICAL HIGH (ref 0.7–2)
LYMPHOCYTES # BLD AUTO: 0.69 K/UL — LOW (ref 1–3.3)
LYMPHOCYTES # BLD AUTO: 19.5 % — SIGNIFICANT CHANGE UP (ref 13–44)
MCHC RBC-ENTMCNC: 27.1 PG — SIGNIFICANT CHANGE UP (ref 27–34)
MCHC RBC-ENTMCNC: 30.3 G/DL — LOW (ref 32–36)
MCV RBC AUTO: 89.6 FL — SIGNIFICANT CHANGE UP (ref 80–100)
MONOCYTES # BLD AUTO: 0.26 K/UL — SIGNIFICANT CHANGE UP (ref 0–0.9)
MONOCYTES NFR BLD AUTO: 7.4 % — SIGNIFICANT CHANGE UP (ref 2–14)
NEUTROPHILS # BLD AUTO: 2.56 K/UL — SIGNIFICANT CHANGE UP (ref 1.8–7.4)
NEUTROPHILS NFR BLD AUTO: 72.5 % — SIGNIFICANT CHANGE UP (ref 43–77)
NRBC BLD AUTO-RTO: 0 /100 WBCS — SIGNIFICANT CHANGE UP (ref 0–0)
PLATELET # BLD AUTO: 220 K/UL — SIGNIFICANT CHANGE UP (ref 150–400)
POTASSIUM SERPL-MCNC: 3.3 MMOL/L — LOW (ref 3.5–5.3)
POTASSIUM SERPL-SCNC: 3.3 MMOL/L — LOW (ref 3.5–5.3)
PROT SERPL-MCNC: 5.5 G/DL — LOW (ref 6–8.3)
RBC # BLD: 3.65 M/UL — LOW (ref 3.8–5.2)
RBC # FLD: 18.7 % — HIGH (ref 10.3–14.5)
SODIUM SERPL-SCNC: 142 MMOL/L — SIGNIFICANT CHANGE UP (ref 135–145)
WBC # BLD: 3.53 K/UL — LOW (ref 3.8–10.5)
WBC # FLD AUTO: 3.53 K/UL — LOW (ref 3.8–10.5)

## 2025-05-10 PROCEDURE — 99233 SBSQ HOSP IP/OBS HIGH 50: CPT

## 2025-05-10 RX ADMIN — CEFTRIAXONE 100 MILLIGRAM(S): 500 INJECTION, POWDER, FOR SOLUTION INTRAMUSCULAR; INTRAVENOUS at 17:27

## 2025-05-10 RX ADMIN — OXYCODONE HYDROCHLORIDE 10 MILLIGRAM(S): 30 TABLET ORAL at 13:04

## 2025-05-10 RX ADMIN — Medication 125 MILLIGRAM(S): at 17:24

## 2025-05-10 RX ADMIN — PREGABALIN 150 MILLIGRAM(S): 75 CAPSULE ORAL at 08:46

## 2025-05-10 RX ADMIN — Medication 125 MILLIGRAM(S): at 00:24

## 2025-05-10 RX ADMIN — Medication 125 MILLIGRAM(S): at 05:13

## 2025-05-10 RX ADMIN — BUTYROSPERMUM PARKII(SHEA BUTTER), SIMMONDSIA CHINENSIS (JOJOBA) SEED OIL, ALOE BARBADENSIS LEAF EXTRACT 250 MILLIGRAM(S): .01; 1; 3.5 LIQUID TOPICAL at 17:24

## 2025-05-10 RX ADMIN — Medication 40 MILLIEQUIVALENT(S): at 08:46

## 2025-05-10 RX ADMIN — PREGABALIN 150 MILLIGRAM(S): 75 CAPSULE ORAL at 17:24

## 2025-05-10 RX ADMIN — DIAZEPAM 2 MILLIGRAM(S): 2 TABLET ORAL at 19:53

## 2025-05-10 RX ADMIN — Medication 1000 MILLILITER(S): at 07:24

## 2025-05-10 RX ADMIN — Medication 50 MILLIGRAM(S): at 21:51

## 2025-05-10 RX ADMIN — SERTRALINE 100 MILLIGRAM(S): 100 TABLET, FILM COATED ORAL at 13:01

## 2025-05-10 RX ADMIN — DIAZEPAM 2 MILLIGRAM(S): 2 TABLET ORAL at 05:25

## 2025-05-10 RX ADMIN — HEPARIN SODIUM 5000 UNIT(S): 1000 INJECTION INTRAVENOUS; SUBCUTANEOUS at 05:13

## 2025-05-10 RX ADMIN — OXYCODONE HYDROCHLORIDE 10 MILLIGRAM(S): 30 TABLET ORAL at 20:07

## 2025-05-10 RX ADMIN — OXYCODONE HYDROCHLORIDE 10 MILLIGRAM(S): 30 TABLET ORAL at 13:45

## 2025-05-10 RX ADMIN — OXYCODONE HYDROCHLORIDE 10 MILLIGRAM(S): 30 TABLET ORAL at 20:37

## 2025-05-10 RX ADMIN — Medication 125 MILLIGRAM(S): at 13:01

## 2025-05-10 RX ADMIN — BUTYROSPERMUM PARKII(SHEA BUTTER), SIMMONDSIA CHINENSIS (JOJOBA) SEED OIL, ALOE BARBADENSIS LEAF EXTRACT 250 MILLIGRAM(S): .01; 1; 3.5 LIQUID TOPICAL at 05:13

## 2025-05-10 RX ADMIN — HEPARIN SODIUM 5000 UNIT(S): 1000 INJECTION INTRAVENOUS; SUBCUTANEOUS at 21:52

## 2025-05-10 RX ADMIN — OXYCODONE HYDROCHLORIDE 10 MILLIGRAM(S): 30 TABLET ORAL at 05:19

## 2025-05-10 RX ADMIN — Medication 40 MILLIEQUIVALENT(S): at 15:01

## 2025-05-10 RX ADMIN — OXYCODONE HYDROCHLORIDE 10 MILLIGRAM(S): 30 TABLET ORAL at 05:49

## 2025-05-10 RX ADMIN — HEPARIN SODIUM 5000 UNIT(S): 1000 INJECTION INTRAVENOUS; SUBCUTANEOUS at 14:45

## 2025-05-10 NOTE — PROVIDER CONTACT NOTE (CRITICAL VALUE NOTIFICATION) - PERSON GIVING RESULT:
Kristian Sotelo
Kristian from the lab
[facility]
Chio Raza, CHRISTIAN
[facility]

## 2025-05-10 NOTE — PROVIDER CONTACT NOTE (CRITICAL VALUE NOTIFICATION) - TEST AND RESULT REPORTED:
Lactate 4.0
Blood Culture final: "growth in aerobic bottle, gram positive rods"
Lactate 2.5
Lactate 4.2
lactate of 3.2
Lactate 3.2

## 2025-05-10 NOTE — PROGRESS NOTE ADULT - ASSESSMENT
Gastroenteritis   Abdominal pain    Recommendations:  - IVF  - Cdiff GDH  - ID evaluation  - Zofran prn nausea  - Advance diet as tolerated  - Serial abdominal exams  - Pain control  - No major indication for endoscopic evaluation at this time  - Will follow with you    Elvis Alberto M.D.  [facility]  [phone]    Advanced care planning was discussed with patient and family.  Advanced care planning forms were reviewed and discussed.  Risks, benefits and alternatives of gastroenterologic procedures were discussed in detail and all questions were answered  60 minutes spent on total encounter of which more than fifty percent of the encounter was spent counseling and/or coordinating care by the attending physician.

## 2025-05-10 NOTE — PROVIDER CONTACT NOTE (CRITICAL VALUE NOTIFICATION) - BACKGROUND
admitted gastroenteritis, elevate lactate
Admitted with Hx Cdiff, gastroenteritis, chronic loose stools
Pt presents with abd pain, vomited 2x prior to ED visit. Hx of C.diff. Lactate levels elevated.
Admitted for abdominal pain, loose stools, hx C diff currently on oral Vanco

## 2025-05-10 NOTE — PROGRESS NOTE ADULT - ASSESSMENT
44F Atrial Fibrillation, HLD, Anxiety, Depression and recent history of C. Diff Infection admitted for Acute Diarrhea    Acute Diarrhea  Possible Recurrent C. Diff; GI PCR negative and C. Diff studies are pending  Given history of C. Diff recently with treatment restarted PO Vancomycin   PO Vancomycin + IV Ceftriaxone  IV Fluids and monitor BMP and Electrolytes   Monitor and Manage pain with Oxycodone 5mg or 10mg for Moderate and Severe respectively  IV Morphine 2mg for Breakthrough Pain  Monitor for adverse effects and toxicity with administration of IV controlled substances    Lactic Acidosis  BP soft and 1 out of 2 blood cultures positive for Gram + Rods  Responded to 2L of NS Bolus but lactic acid increased again; Will give another 2L today  Repeat CT Scan discussed and showing colitis   Repeat Lactate in AM    Right Eye Cyst  Suspect Chalezion   Advised warm compresses for now  No need for systemic Antibiotics  Optho outpatient    Anxiety / Depression  Sertraline  Valium PRN   Trazadone for bedtime    Diet  Regular    DVT Prophylaxis  Heparin SC TID    Disposition   Discharge planning pending hospital course

## 2025-05-10 NOTE — PROGRESS NOTE ADULT - ASSESSMENT
Pt. is improved. Feels well. Has no abdominal pain. Has GI function and tolerates food.  Im aware of elevated lactic, most likely due to residual sepsis.  Pt was re assured.

## 2025-05-10 NOTE — PROVIDER CONTACT NOTE (CRITICAL VALUE NOTIFICATION) - SITUATION
Lactate 4.2
Lactate 2.5
Patient admitted with abdominal discomfort. Rule out c-diff. Blood culture sent to lab on 5/7.
Lactate 3.2
Lactate 4.0

## 2025-05-11 LAB
ANION GAP SERPL CALC-SCNC: 8 MMOL/L — SIGNIFICANT CHANGE UP (ref 5–17)
BUN SERPL-MCNC: 2 MG/DL — LOW (ref 7–23)
CALCIUM SERPL-MCNC: 8.2 MG/DL — LOW (ref 8.4–10.5)
CHLORIDE SERPL-SCNC: 108 MMOL/L — SIGNIFICANT CHANGE UP (ref 96–108)
CO2 SERPL-SCNC: 26 MMOL/L — SIGNIFICANT CHANGE UP (ref 22–31)
CREAT SERPL-MCNC: 0.42 MG/DL — LOW (ref 0.5–1.3)
EGFR: 122 ML/MIN/1.73M2 — SIGNIFICANT CHANGE UP
EGFR: 122 ML/MIN/1.73M2 — SIGNIFICANT CHANGE UP
GLUCOSE SERPL-MCNC: 88 MG/DL — SIGNIFICANT CHANGE UP (ref 70–99)
HCT VFR BLD CALC: 32.7 % — LOW (ref 34.5–45)
HGB BLD-MCNC: 9.9 G/DL — LOW (ref 11.5–15.5)
LACTATE SERPL-SCNC: 2.1 MMOL/L — HIGH (ref 0.7–2)
MCHC RBC-ENTMCNC: 27 PG — SIGNIFICANT CHANGE UP (ref 27–34)
MCHC RBC-ENTMCNC: 30.3 G/DL — LOW (ref 32–36)
MCV RBC AUTO: 89.3 FL — SIGNIFICANT CHANGE UP (ref 80–100)
NRBC BLD AUTO-RTO: 0 /100 WBCS — SIGNIFICANT CHANGE UP (ref 0–0)
PLATELET # BLD AUTO: 223 K/UL — SIGNIFICANT CHANGE UP (ref 150–400)
POTASSIUM SERPL-MCNC: 4 MMOL/L — SIGNIFICANT CHANGE UP (ref 3.5–5.3)
POTASSIUM SERPL-SCNC: 4 MMOL/L — SIGNIFICANT CHANGE UP (ref 3.5–5.3)
RBC # BLD: 3.66 M/UL — LOW (ref 3.8–5.2)
RBC # FLD: 18.7 % — HIGH (ref 10.3–14.5)
SODIUM SERPL-SCNC: 142 MMOL/L — SIGNIFICANT CHANGE UP (ref 135–145)
WBC # BLD: 3.38 K/UL — LOW (ref 3.8–10.5)
WBC # FLD AUTO: 3.38 K/UL — LOW (ref 3.8–10.5)

## 2025-05-11 PROCEDURE — 99233 SBSQ HOSP IP/OBS HIGH 50: CPT

## 2025-05-11 RX ADMIN — Medication 125 MILLIGRAM(S): at 17:55

## 2025-05-11 RX ADMIN — OXYCODONE HYDROCHLORIDE 10 MILLIGRAM(S): 30 TABLET ORAL at 08:57

## 2025-05-11 RX ADMIN — PREGABALIN 150 MILLIGRAM(S): 75 CAPSULE ORAL at 17:56

## 2025-05-11 RX ADMIN — HEPARIN SODIUM 5000 UNIT(S): 1000 INJECTION INTRAVENOUS; SUBCUTANEOUS at 13:03

## 2025-05-11 RX ADMIN — Medication 125 MILLIGRAM(S): at 12:56

## 2025-05-11 RX ADMIN — OXYCODONE HYDROCHLORIDE 10 MILLIGRAM(S): 30 TABLET ORAL at 21:57

## 2025-05-11 RX ADMIN — DIAZEPAM 2 MILLIGRAM(S): 2 TABLET ORAL at 21:27

## 2025-05-11 RX ADMIN — BUTYROSPERMUM PARKII(SHEA BUTTER), SIMMONDSIA CHINENSIS (JOJOBA) SEED OIL, ALOE BARBADENSIS LEAF EXTRACT 250 MILLIGRAM(S): .01; 1; 3.5 LIQUID TOPICAL at 05:42

## 2025-05-11 RX ADMIN — OXYCODONE HYDROCHLORIDE 10 MILLIGRAM(S): 30 TABLET ORAL at 21:27

## 2025-05-11 RX ADMIN — HEPARIN SODIUM 5000 UNIT(S): 1000 INJECTION INTRAVENOUS; SUBCUTANEOUS at 05:42

## 2025-05-11 RX ADMIN — CEFTRIAXONE 100 MILLIGRAM(S): 500 INJECTION, POWDER, FOR SOLUTION INTRAMUSCULAR; INTRAVENOUS at 18:06

## 2025-05-11 RX ADMIN — DIAZEPAM 2 MILLIGRAM(S): 2 TABLET ORAL at 09:59

## 2025-05-11 RX ADMIN — HEPARIN SODIUM 5000 UNIT(S): 1000 INJECTION INTRAVENOUS; SUBCUTANEOUS at 21:24

## 2025-05-11 RX ADMIN — PREGABALIN 150 MILLIGRAM(S): 75 CAPSULE ORAL at 05:42

## 2025-05-11 RX ADMIN — SODIUM CHLORIDE 150 MILLILITER(S): 9 INJECTION, SOLUTION INTRAVENOUS at 05:45

## 2025-05-11 RX ADMIN — OXYCODONE HYDROCHLORIDE 10 MILLIGRAM(S): 30 TABLET ORAL at 03:55

## 2025-05-11 RX ADMIN — SERTRALINE 100 MILLIGRAM(S): 100 TABLET, FILM COATED ORAL at 12:56

## 2025-05-11 RX ADMIN — Medication 1000 MILLILITER(S): at 08:57

## 2025-05-11 RX ADMIN — BUTYROSPERMUM PARKII(SHEA BUTTER), SIMMONDSIA CHINENSIS (JOJOBA) SEED OIL, ALOE BARBADENSIS LEAF EXTRACT 250 MILLIGRAM(S): .01; 1; 3.5 LIQUID TOPICAL at 17:55

## 2025-05-11 RX ADMIN — Medication 125 MILLIGRAM(S): at 03:29

## 2025-05-11 RX ADMIN — OXYCODONE HYDROCHLORIDE 10 MILLIGRAM(S): 30 TABLET ORAL at 03:25

## 2025-05-11 RX ADMIN — Medication 125 MILLIGRAM(S): at 08:57

## 2025-05-11 RX ADMIN — OXYCODONE HYDROCHLORIDE 10 MILLIGRAM(S): 30 TABLET ORAL at 09:40

## 2025-05-12 ENCOUNTER — TRANSCRIPTION ENCOUNTER (OUTPATIENT)
Age: 45
End: 2025-05-12

## 2025-05-12 VITALS
OXYGEN SATURATION: 99 % | DIASTOLIC BLOOD PRESSURE: 77 MMHG | HEART RATE: 71 BPM | RESPIRATION RATE: 18 BRPM | TEMPERATURE: 98 F | SYSTOLIC BLOOD PRESSURE: 115 MMHG

## 2025-05-12 LAB
ANION GAP SERPL CALC-SCNC: 11 MMOL/L — SIGNIFICANT CHANGE UP (ref 5–17)
BUN SERPL-MCNC: 4 MG/DL — LOW (ref 7–23)
CALCIUM SERPL-MCNC: 8.7 MG/DL — SIGNIFICANT CHANGE UP (ref 8.4–10.5)
CHLORIDE SERPL-SCNC: 106 MMOL/L — SIGNIFICANT CHANGE UP (ref 96–108)
CO2 SERPL-SCNC: 24 MMOL/L — SIGNIFICANT CHANGE UP (ref 22–31)
CREAT SERPL-MCNC: 0.46 MG/DL — LOW (ref 0.5–1.3)
EGFR: 119 ML/MIN/1.73M2 — SIGNIFICANT CHANGE UP
EGFR: 119 ML/MIN/1.73M2 — SIGNIFICANT CHANGE UP
GLUCOSE SERPL-MCNC: 105 MG/DL — HIGH (ref 70–99)
HCT VFR BLD CALC: 32.2 % — LOW (ref 34.5–45)
HGB BLD-MCNC: 9.8 G/DL — LOW (ref 11.5–15.5)
MCHC RBC-ENTMCNC: 27.6 PG — SIGNIFICANT CHANGE UP (ref 27–34)
MCHC RBC-ENTMCNC: 30.4 G/DL — LOW (ref 32–36)
MCV RBC AUTO: 90.7 FL — SIGNIFICANT CHANGE UP (ref 80–100)
NRBC BLD AUTO-RTO: 0 /100 WBCS — SIGNIFICANT CHANGE UP (ref 0–0)
PLATELET # BLD AUTO: 257 K/UL — SIGNIFICANT CHANGE UP (ref 150–400)
POTASSIUM SERPL-MCNC: 4.1 MMOL/L — SIGNIFICANT CHANGE UP (ref 3.5–5.3)
POTASSIUM SERPL-SCNC: 4.1 MMOL/L — SIGNIFICANT CHANGE UP (ref 3.5–5.3)
RBC # BLD: 3.55 M/UL — LOW (ref 3.8–5.2)
RBC # FLD: 18.9 % — HIGH (ref 10.3–14.5)
SODIUM SERPL-SCNC: 141 MMOL/L — SIGNIFICANT CHANGE UP (ref 135–145)
WBC # BLD: 4.23 K/UL — SIGNIFICANT CHANGE UP (ref 3.8–10.5)
WBC # FLD AUTO: 4.23 K/UL — SIGNIFICANT CHANGE UP (ref 3.8–10.5)

## 2025-05-12 PROCEDURE — 85027 COMPLETE CBC AUTOMATED: CPT

## 2025-05-12 PROCEDURE — 87150 DNA/RNA AMPLIFIED PROBE: CPT

## 2025-05-12 PROCEDURE — 80053 COMPREHEN METABOLIC PANEL: CPT

## 2025-05-12 PROCEDURE — 83735 ASSAY OF MAGNESIUM: CPT

## 2025-05-12 PROCEDURE — 74177 CT ABD & PELVIS W/CONTRAST: CPT

## 2025-05-12 PROCEDURE — 85045 AUTOMATED RETICULOCYTE COUNT: CPT

## 2025-05-12 PROCEDURE — 99285 EMERGENCY DEPT VISIT HI MDM: CPT | Mod: 25

## 2025-05-12 PROCEDURE — 87040 BLOOD CULTURE FOR BACTERIA: CPT

## 2025-05-12 PROCEDURE — 84443 ASSAY THYROID STIM HORMONE: CPT

## 2025-05-12 PROCEDURE — 96376 TX/PRO/DX INJ SAME DRUG ADON: CPT

## 2025-05-12 PROCEDURE — 99239 HOSP IP/OBS DSCHRG MGMT >30: CPT

## 2025-05-12 PROCEDURE — 96375 TX/PRO/DX INJ NEW DRUG ADDON: CPT

## 2025-05-12 PROCEDURE — 86703 HIV-1/HIV-2 1 RESULT ANTBDY: CPT

## 2025-05-12 PROCEDURE — 96374 THER/PROPH/DIAG INJ IV PUSH: CPT

## 2025-05-12 PROCEDURE — 83690 ASSAY OF LIPASE: CPT

## 2025-05-12 PROCEDURE — 85025 COMPLETE CBC W/AUTO DIFF WBC: CPT

## 2025-05-12 PROCEDURE — 87507 IADNA-DNA/RNA PROBE TQ 12-25: CPT

## 2025-05-12 PROCEDURE — 36415 COLL VENOUS BLD VENIPUNCTURE: CPT

## 2025-05-12 PROCEDURE — 81025 URINE PREGNANCY TEST: CPT

## 2025-05-12 PROCEDURE — 97161 PT EVAL LOW COMPLEX 20 MIN: CPT

## 2025-05-12 PROCEDURE — 87077 CULTURE AEROBIC IDENTIFY: CPT

## 2025-05-12 PROCEDURE — 81003 URINALYSIS AUTO W/O SCOPE: CPT

## 2025-05-12 PROCEDURE — 83605 ASSAY OF LACTIC ACID: CPT

## 2025-05-12 PROCEDURE — 84702 CHORIONIC GONADOTROPIN TEST: CPT

## 2025-05-12 PROCEDURE — 80048 BASIC METABOLIC PNL TOTAL CA: CPT

## 2025-05-12 RX ORDER — VANCOMYCIN HCL IN 5 % DEXTROSE 1.5G/250ML
5 PLASTIC BAG, INJECTION (ML) INTRAVENOUS
Qty: 1 | Refills: 0
Start: 2025-05-12 | End: 2025-05-21

## 2025-05-12 RX ORDER — OXYCODONE HYDROCHLORIDE 30 MG/1
1 TABLET ORAL
Qty: 20 | Refills: 0
Start: 2025-05-12 | End: 2025-05-16

## 2025-05-12 RX ORDER — BUTYROSPERMUM PARKII(SHEA BUTTER), SIMMONDSIA CHINENSIS (JOJOBA) SEED OIL, ALOE BARBADENSIS LEAF EXTRACT .01; 1; 3.5 G/100G; G/100G; G/100G
1 LIQUID TOPICAL
Qty: 60 | Refills: 0
Start: 2025-05-12 | End: 2025-06-10

## 2025-05-12 RX ADMIN — Medication 125 MILLIGRAM(S): at 06:08

## 2025-05-12 RX ADMIN — Medication 50 MILLIGRAM(S): at 00:04

## 2025-05-12 RX ADMIN — Medication 125 MILLIGRAM(S): at 00:04

## 2025-05-12 RX ADMIN — Medication 125 MILLIGRAM(S): at 11:48

## 2025-05-12 RX ADMIN — BUTYROSPERMUM PARKII(SHEA BUTTER), SIMMONDSIA CHINENSIS (JOJOBA) SEED OIL, ALOE BARBADENSIS LEAF EXTRACT 250 MILLIGRAM(S): .01; 1; 3.5 LIQUID TOPICAL at 05:59

## 2025-05-12 RX ADMIN — DIAZEPAM 2 MILLIGRAM(S): 2 TABLET ORAL at 06:00

## 2025-05-12 RX ADMIN — PREGABALIN 150 MILLIGRAM(S): 75 CAPSULE ORAL at 05:58

## 2025-05-12 RX ADMIN — SERTRALINE 100 MILLIGRAM(S): 100 TABLET, FILM COATED ORAL at 11:48

## 2025-05-12 RX ADMIN — HEPARIN SODIUM 5000 UNIT(S): 1000 INJECTION INTRAVENOUS; SUBCUTANEOUS at 05:58

## 2025-05-12 RX ADMIN — OXYCODONE HYDROCHLORIDE 10 MILLIGRAM(S): 30 TABLET ORAL at 11:48

## 2025-05-12 NOTE — DISCHARGE NOTE PROVIDER - ATTENDING DISCHARGE PHYSICAL EXAMINATION:
PHYSICAL EXAM:  Vital Signs Last 24 Hrs  T(C): 36.4 (12 May 2025 11:39), Max: 36.8 (11 May 2025 14:01)  T(F): 97.5 (12 May 2025 11:39), Max: 98.3 (11 May 2025 14:01)  HR: 71 (12 May 2025 11:39) (71 - 89)  BP: 108/73 (12 May 2025 11:39) (96/67 - 108/73)  BP(mean): --  RR: 18 (12 May 2025 11:39) (17 - 19)  SpO2: 99% (12 May 2025 11:39) (96% - 100%)    Parameters below as of 12 May 2025 04:30  Patient On (Oxygen Delivery Method): room air        GENERAL: NAD  HEAD:  Atraumatic, Normocephalic  ENMT: Tiago Mucous Membranes  NECK: Supple, No JVD, Normal thyroid  HEART: Regular rate and rhythm; No murmurs, rubs, or gallops  CHEST/LUNG: Clear to auscultation bilaterally; No rales, rhonchi, wheezing, or rubs  ABDOMEN: Soft, Nontender, Nondistended; Bowel sounds present  EXTREMITIES:  2+ Peripheral Pulses, No clubbing, cyanosis, or edema  SKIN: No rashes or lesions

## 2025-05-12 NOTE — DISCHARGE NOTE PROVIDER - HOSPITAL COURSE
44F Atrial Fibrillation, HLD, Anxiety, Depression and recent history of C. Diff Infection admitted for Acute Diarrhea    Acute Diarrhea  Possible Recurrent C. Diff; GI PCR negative and C. Diff studies are pending and was discharged before the results came back  History of C. Diff recently with treatment  She improved considerably on oral vancomycin   Will prescribe PO Vancomycin for additional 10 days for total of 14 days as per ID Recommendations (Dr. Farley)    Lactic Acidosis  Improved with IV Fluids    Right Eye Cyst  Suspect Chalezion   Advised warm compresses for now  No need for systemic Antibiotics  Optho outpatient    Anxiety / Depression  Sertraline  Valium PRN   Trazadone for bedtime    Disposition   Discharge home with home care  [facility] to be arranged delivery

## 2025-05-12 NOTE — DISCHARGE NOTE NURSING/CASE MANAGEMENT/SOCIAL WORK - PATIENT PORTAL LINK FT
You can access the FollowMyHealth Patient Portal offered by [facility] by registering at the following website: [email or web address]. By joining [employer]’s FollowMyHealth portal, you will also be able to view your health information using other applications (apps) compatible with our system.

## 2025-05-12 NOTE — PHYSICAL THERAPY INITIAL EVALUATION ADULT - GAIT DEVIATIONS NOTED, PT EVAL
decreased aurelio/increased time in double stance/decreased step length/decreased stride length/decreased weight-shifting ability

## 2025-05-12 NOTE — PROGRESS NOTE ADULT - PROVIDER SPECIALTY LIST ADULT
Hospitalist
Infectious Disease
Gastroenterology
Hospitalist
Hospitalist
Infectious Disease
Infectious Disease
Surgery
Gastroenterology
Infectious Disease
Surgery
Surgery
Gastroenterology
Hospitalist

## 2025-05-12 NOTE — PROGRESS NOTE ADULT - SUBJECTIVE AND OBJECTIVE BOX
ROBBIE MCKNIGHT is a 44yFemale , patient examined and chart reviewed.     INTERVAL HPI/ OVERNIGHT EVENTS:   Feeling better. No events.  Abd pain much improved.  Afebrile.    PAST MEDICAL & SURGICAL HISTORY:  Atrial fibrillation  History of Hyperthyroidism  Asthma  History of anemia  Depression, unspecified depression type  Smoker  Peripheral neuropathy  S/P  Section  ,,  History of blood transfusion  Status post embolization of uterine artery    For details regarding the patient's social history, family history, and other miscellaneous elements, please refer the initial infectious diseases consultation and/or the admitting history and physical examination for this admission.    ROS:  CONSTITUTIONAL:  Negative fever or chills  EYES:  Negative  blurry vision or double vision  CARDIOVASCULAR:  Negative for chest pain or palpitations  RESPIRATORY:  Negative for cough, wheezing, or SOB   GASTROINTESTINAL:  Negative for nausea, vomiting, diarrhea, constipation, + abdominal pain  GENITOURINARY:  Negative frequency, urgency or dysuria  NEUROLOGIC:  No headache, confusion, dizziness, lightheadedness  All other systems were reviewed and are negative     ALLERGIES  Motrin (Hives)      Current inpatient medications :    ANTIBIOTICS/RELEVANT:  cefTRIAXone   IVPB 1000 milliGRAM(s) IV Intermittent every 24 hours  vancomycin    Solution 125 milliGRAM(s) Oral every 6 hours    MEDICATIONS  (STANDING):  heparin   Injectable 5000 Unit(s) SubCutaneous every 8 hours  lactated ringers. 1000 milliLiter(s) (150 mL/Hr) IV Continuous <Continuous>  naloxone Injectable 0.4 milliGRAM(s) IV Push once  potassium chloride    Tablet ER 40 milliEquivalent(s) Oral every 4 hours  pregabalin 150 milliGRAM(s) Oral two times a day  saccharomyces boulardii 250 milliGRAM(s) Oral two times a day  sertraline 100 milliGRAM(s) Oral daily  traZODone 50 milliGRAM(s) Oral at bedtime    MEDICATIONS  (PRN):  acetaminophen     Tablet .. 650 milliGRAM(s) Oral every 6 hours PRN Temp greater or equal to 38C (100.4F), Mild Pain (1 - 3)  diazepam    Tablet 2 milliGRAM(s) Oral two times a day PRN Anxiety  hydrocortisone 2.5% Rectal Cream 1 Application(s) Rectal two times a day PRN rectal discomfort  nystatin Powder 1 Application(s) Topical two times a day PRN skin discomfort/rash  ondansetron Injectable 4 milliGRAM(s) IV Push every 8 hours PRN Nausea and/or Vomiting  oxyCODONE    IR 5 milliGRAM(s) Oral every 6 hours PRN Moderate Pain (4 - 6)  oxyCODONE    IR 10 milliGRAM(s) Oral every 6 hours PRN Severe Pain (7 - 10)      Objective:  Vital Signs Last 24 Hrs  T(C): 36.4 (10 May 2025 14:04), Max: 36.6 (09 May 2025 20:06)  T(F): 97.6 (10 May 2025 14:04), Max: 97.8 (09 May 2025 20:06)  HR: 70 (10 May 2025 14:) (70 - 90)  BP: 93/66 (10 May 2025 14:) (92/60 - 102/67)  RR: 18 (10 May 2025 14:) (17 - 18)  SpO2: 93% (10 May 2025 14:) (93% - 97%)    Parameters below as of 10 May 2025 14:04  Patient On (Oxygen Delivery Method): room air      Physical Exam:  General: no acute distress  Neck: supple, trachea midline  Lungs: clear, no wheeze/rhonchi  Cardiovascular: regular rate and rhythm, S1 S2  Abdomen: soft, nontender,  bowel sounds normal  Neurological: alert and oriented x3  Skin: no rash  Extremities: no edema      LABS:                        9.9    3.53  )-----------( 220      ( 10 May 2025 06:00 )             32.7   05-10    142  |  106  |  4[L]  ----------------------------<  92  3.3[L]   |  27  |  0.49[L]    Ca    7.8[L]      10 May 2025 06:00    TPro  5.5[L]  /  Alb  2.4[L]  /  TBili  0.3  /  DBili  x   /  AST  32  /  ALT  14  /  AlkPhos  92  05-10      MICROBIOLOGY:  Culture - Blood (collected 07 May 2025 16:06)  Source: Blood Blood  Preliminary Report (09 May 2025 01:02):    No growth at 24 hours    Culture - Blood (25 @ 16:06)    Gram Stain:   Growth in aerobic bottle: Gram Positive Rods   -  Blood PCR Panel: NEG   Specimen Source: Blood Blood   Organism: Blood Culture PCR   Culture Results:   Growth in aerobic bottle: Corynebacterium aurimucosum group  "Susceptibilities not performed"  Direct identification is available within approximately 3-5  hours either by Blood Panel Multiplexed PCR or Direct  MALDI-TOF. Details: [email or web address]   Organism Identification: Blood Culture PCR   Method Type: PCR        RADIOLOGY & ADDITIONAL STUDIES:    ACC: [id number] EXAM:  CT ABDOMEN AND PELVIS OC IC   ORDERED BY: SIERRA WHYTE     PROCEDURE DATE:  2025          INTERPRETATION:  CLINICAL INFORMATION: Abdominal pain    COMPARISON: CT abdomen and pelvis 2025.    CONTRAST/COMPLICATIONS:  IV Contrast: Omnipaque 350  95 cc administered   5 cc discarded  Oral Contrast: Omnipaque 300   Fruit 2o  .    PROCEDURE:  CT of the Abdomen and Pelvis was performed.  Sagittal and coronal reformats were performed.    FINDINGS:  LOWER CHEST: Within normal limits.    LIVER: Enlarged. Steatotic. Calcified granulomas  BILE DUCTS: Normal caliber.  GALLBLADDER: Within normal limits.  SPLEEN: Calcified granuloma.  PANCREAS: Within normal limits.  ADRENALS: Within normal limits.  KIDNEYS/URETERS: Within normal limits.    BLADDER: Within normal limits.  REPRODUCTIVE ORGANS: Unremarkable    BOWEL: No bowel obstruction. Evaluation bowel limited by underdistention.   Mild diffusely thickened rectal and colonic wall  may represent   underdistention versus proctocolitis. Colonic diverticula without acute   diverticulitis Appendix no pericecal inflammation to suggest appendicitis  PERITONEUM/RETROPERITONEUM: Within normal limits.  VESSELS: Nonaneurysmal.  LYMPH NODES: No lymphadenopathy.  ABDOMINAL WALL:Diffuse subcutaneous edema.  BONES: No acute changes.    IMPRESSION:  Rectal and pancolonic mural thickening may reflect underdistention versus   proctocolitis. Correlate with symptomatology      Assessment :   43YO F PMH peripheral neuropathy not able to ambulate due to pain, depression, uterine fibroid, anemia, asthma, hypothyroidism, atrial fibrillation not on AC given previous hx of severe bleed requiring massive transfusion  history of  recurrent Cdiff colitis who presented to the hospital with c/o severe abdominal pain n/v/d. Denies fever chills. In ED afebrile wbc 13K CT AP showed Distended fluid-filled stomach and mildly prominent fluid-filled small bowel loops. Correlate for symptoms of gastroenteritis.  Infectious gastroenteritis GI pcr neg  Rule out Cdiff though low suspicion  Blood cultures with Corynebacterium likely contaminant  Rpt CT AP noted - seen by surgery  Abd pain better still with lactemia ?ischemia    Plan :   Cont Rocephin x 3 days till 5/10  Cont Po Vanc  Fu Rpt blood cultures  Fu Cdiff pcr- results still pending  Trend temps and cbc  Serial abd exams  Stable from ID standpoint    Advance Directives- Full code  Current Medications are documented.   Drug-drug interactions reviewed.    Continue with present regiment.  Appropriate use of antibiotics and adverse effects reviewed.      I have discussed the above plan of care with patient in detail. She expressed understanding of the  treatment plan . Risks, benefits and alternatives discussed in detail. I have asked if she has any questions or concerns and appropriately addressed them to the best of my ability .    > 35 minutes were spent in direct patient care reviewing notes, medications ,labs data/ imaging , discussion with multidisciplinary team.    Thank you for allowing me to participate in care of your patient .    Luis Alberto Farley MD  Infectious Disease  [phone]    Individualized infection control protocols for an individual patient based on their diagnosis and risks in order to reduce risk of disease transmission followed. Coordinating with  infection prevention and control team members to enable healthcare facility staff to safely care for patient.  Managing infection prevention and treatment protocols associated with transitions of care for complex patients.  In-depth patient chart review that entails going back farther in time and assessing the complete breadth of all health care interactions, with higher-level synthesis for complex diagnoses.  Engaging in complex medical decision-making associated with antimicrobial prescribing including considerations such as antimicrobial resistance patterns, emergence of new variants/strains, recent antibiotic exposure, interactions/complications from comorbidities including concurrent infections, public health considerations to minimize development of antimicrobial resistance, and emerging and re-emerging infections.       
     ROBBIE MCKNIGHT is a 44yFemale , patient examined and chart reviewed.     INTERVAL HPI/ OVERNIGHT EVENTS:   Feels well. No events.  Afebrile.     PAST MEDICAL & SURGICAL HISTORY:  Atrial fibrillation  History of Hyperthyroidism  Asthma  History of anemia  Depression, unspecified depression type  Smoker  Peripheral neuropathy  S/P  Section  ,,  History of blood transfusion  Status post embolization of uterine artery    For details regarding the patient's social history, family history, and other miscellaneous elements, please refer the initial infectious diseases consultation and/or the admitting history and physical examination for this admission.    ROS:  CONSTITUTIONAL:  Negative fever or chills  EYES:  Negative  blurry vision or double vision  CARDIOVASCULAR:  Negative for chest pain or palpitations  RESPIRATORY:  Negative for cough, wheezing, or SOB   GASTROINTESTINAL:  Negative for nausea, vomiting, diarrhea, constipation, abdominal pain  GENITOURINARY:  Negative frequency, urgency or dysuria  NEUROLOGIC:  No headache, confusion, dizziness, lightheadedness  All other systems were reviewed and are negative     ALLERGIES  Motrin (Hives)      Current inpatient medications :    ANTIBIOTICS/RELEVANT:  vancomycin    Solution 125 milliGRAM(s) Oral every 6 hours    MEDICATIONS  (STANDING):  heparin   Injectable 5000 Unit(s) SubCutaneous every 8 hours  lactated ringers. 1000 milliLiter(s) (150 mL/Hr) IV Continuous <Continuous>  naloxone Injectable 0.4 milliGRAM(s) IV Push once  pregabalin 150 milliGRAM(s) Oral two times a day  saccharomyces boulardii 250 milliGRAM(s) Oral two times a day  sertraline 100 milliGRAM(s) Oral daily  traZODone 50 milliGRAM(s) Oral at bedtime    MEDICATIONS  (PRN):  acetaminophen     Tablet .. 650 milliGRAM(s) Oral every 6 hours PRN Temp greater or equal to 38C (100.4F), Mild Pain (1 - 3)  diazepam    Tablet 2 milliGRAM(s) Oral two times a day PRN Anxiety  hydrocortisone 2.5% Rectal Cream 1 Application(s) Rectal two times a day PRN rectal discomfort  nystatin Powder 1 Application(s) Topical two times a day PRN skin discomfort/rash  ondansetron Injectable 4 milliGRAM(s) IV Push every 8 hours PRN Nausea and/or Vomiting  oxyCODONE    IR 5 milliGRAM(s) Oral every 6 hours PRN Moderate Pain (4 - 6)  oxyCODONE    IR 10 milliGRAM(s) Oral every 6 hours PRN Severe Pain (7 - 10)    Objective:  Vital Signs Last 24 Hrs  T(C): 36.4 (12 May 2025 11:39), Max: 36.7 (12 May 2025 04:30)  T(F): 97.5 (12 May 2025 11:39), Max: 98.1 (12 May 2025 04:30)  HR: 71 (12 May 2025 11:39) (71 - 81)  BP: 108/73 (12 May 2025 11:39) (106/74 - 108/73)  RR: 18 (12 May 2025 11:39) (18 - 19)  SpO2: 99% (12 May 2025 11:39) (99% - 100%)    Parameters below as of 12 May 2025 04:30  Patient On (Oxygen Delivery Method): room air      Physical Exam:  General: no acute distress  Neck: supple, trachea midline  Lungs: clear, no wheeze/rhonchi  Cardiovascular: regular rate and rhythm, S1 S2  Abdomen: soft, nontender,  bowel sounds normal  Neurological: alert and oriented x3  Skin: no rash  Extremities: no edema      LABS:                        9.8    4.23  )-----------( 257      ( 12 May 2025 07:57 )             32.2   05-12    141  |  106  |  4[L]  ----------------------------<  105[H]  4.1   |  24  |  0.46[L]    Ca    8.7      12 May 2025 07:57      MICROBIOLOGY:  Culture - Blood (collected 09 May 2025 16:36)  Source: Blood Blood-Peripheral  Preliminary Report (11 May 2025 22:02):    No growth at 48 Hours    Culture - Blood (25 @ 16:06)    Gram Stain:   Growth in aerobic bottle: Gram Positive Rods   -  Blood PCR Panel: NEG   Specimen Source: Blood Blood   Organism: Blood Culture PCR   Culture Results:   Growth in aerobic bottle: Corynebacterium aurimucosum group  "Susceptibilities not performed"  Direct identification is available within approximately 3-5  hours either by Blood Panel Multiplexed PCR or Direct  MALDI-TOF. Details: [email or web address]   Organism Identification: Blood Culture PCR   Method Type: PCR        RADIOLOGY & ADDITIONAL STUDIES:    ACC: [id number] EXAM:  CT ABDOMEN AND PELVIS OC IC   ORDERED BY: SIERRA WHYTE     PROCEDURE DATE:  2025          INTERPRETATION:  CLINICAL INFORMATION: Abdominal pain    COMPARISON: CT abdomen and pelvis 2025.    CONTRAST/COMPLICATIONS:  IV Contrast: Omnipaque 350  95 cc administered   5 cc discarded  Oral Contrast: Omnipaque 300   Fruit 2o  .    PROCEDURE:  CT of the Abdomen and Pelvis was performed.  Sagittal and coronal reformats were performed.    FINDINGS:  LOWER CHEST: Within normal limits.    LIVER: Enlarged. Steatotic. Calcified granulomas  BILE DUCTS: Normal caliber.  GALLBLADDER: Within normal limits.  SPLEEN: Calcified granuloma.  PANCREAS: Within normal limits.  ADRENALS: Within normal limits.  KIDNEYS/URETERS: Within normal limits.    BLADDER: Within normal limits.  REPRODUCTIVE ORGANS: Unremarkable    BOWEL: No bowel obstruction. Evaluation bowel limited by underdistention.   Mild diffusely thickened rectal and colonic wall  may represent   underdistention versus proctocolitis. Colonic diverticula without acute   diverticulitis Appendix no pericecal inflammation to suggest appendicitis  PERITONEUM/RETROPERITONEUM: Within normal limits.  VESSELS: Nonaneurysmal.  LYMPH NODES: No lymphadenopathy.  ABDOMINAL WALL:Diffuse subcutaneous edema.  BONES: No acute changes.    IMPRESSION:  Rectal and pancolonic mural thickening may reflect underdistention versus   proctocolitis. Correlate with symptomatology      Assessment :   45YO F PMH peripheral neuropathy not able to ambulate due to pain, depression, uterine fibroid, anemia, asthma, hypothyroidism, atrial fibrillation not on AC given previous hx of severe bleed requiring massive transfusion  history of  recurrent Cdiff colitis who presented to the hospital with c/o severe abdominal pain n/v/d. Denies fever chills. In ED afebrile wbc 13K CT AP showed Distended fluid-filled stomach and mildly prominent fluid-filled small bowel loops. Correlate for symptoms of gastroenteritis.  Infectious gastroenteritis GI pcr neg  Rule out Cdiff though low suspicion  Blood cultures with Corynebacterium likely contaminant RPt blood cultures NGTD  Rpt CT AP noted - seen by surgery  Abd pain resolved  Cdiff sent results still pending  Clinically better    Plan :   Monitor off antibiotics  Cont Po Vanc x 14 days  Rpt blood cultures NGTD  Fu Cdiff pcr- results still pending  Trend temps and cbc  Serial abd exams  Stable from ID standpoint  Dc home today    D/w Dr May    Advance Directives- Full code  Current Medications are documented.   Drug-drug interactions reviewed.    Continue with present regiment.  Appropriate use of antibiotics and adverse effects reviewed.      I have discussed the above plan of care with patient in detail. She expressed understanding of the  treatment plan . Risks, benefits and alternatives discussed in detail. I have asked if she has any questions or concerns and appropriately addressed them to the best of my ability .    > 35 minutes were spent in direct patient care reviewing notes, medications ,labs data/ imaging , discussion with multidisciplinary team.    Thank you for allowing me to participate in care of your patient .    Luis Alberto Farley MD  Infectious Disease  [phone]    Individualized infection control protocols for an individual patient based on their diagnosis and risks in order to reduce risk of disease transmission followed. Coordinating with  infection prevention and control team members to enable healthcare facility staff to safely care for patient.  Managing infection prevention and treatment protocols associated with transitions of care for complex patients.  In-depth patient chart review that entails going back farther in time and assessing the complete breadth of all health care interactions, with higher-level synthesis for complex diagnoses.  Engaging in complex medical decision-making associated with antimicrobial prescribing including considerations such as antimicrobial resistance patterns, emergence of new variants/strains, recent antibiotic exposure, interactions/complications from comorbidities including concurrent infections, public health considerations to minimize development of antimicrobial resistance, and emerging and re-emerging infections.       
[facility]  Chin Zarate PA-C  [address] [place] [address]  [phone]      INTERVAL HPI/OVERNIGHT EVENTS: BCX Gram positive rods. Lactate rising    MEDICATIONS  (STANDING):  cefTRIAXone   IVPB 1000 milliGRAM(s) IV Intermittent every 24 hours  heparin   Injectable 5000 Unit(s) SubCutaneous every 8 hours  lactated ringers. 1000 milliLiter(s) (150 mL/Hr) IV Continuous <Continuous>  naloxone Injectable 0.4 milliGRAM(s) IV Push once  saccharomyces boulardii 250 milliGRAM(s) Oral two times a day  sodium chloride 0.9% Bolus 1000 milliLiter(s) IV Bolus once  traZODone 50 milliGRAM(s) Oral at bedtime  vancomycin    Solution 125 milliGRAM(s) Oral every 6 hours    MEDICATIONS  (PRN):  acetaminophen     Tablet .. 650 milliGRAM(s) Oral every 6 hours PRN Temp greater or equal to 38C (100.4F), Mild Pain (1 - 3)  diazepam    Tablet 2 milliGRAM(s) Oral two times a day PRN Anxiety  hydrocortisone 2.5% Rectal Cream 1 Application(s) Rectal two times a day PRN rectal discomfort  nystatin Powder 1 Application(s) Topical two times a day PRN skin discomfort/rash  ondansetron Injectable 4 milliGRAM(s) IV Push every 8 hours PRN Nausea and/or Vomiting  oxyCODONE    IR 5 milliGRAM(s) Oral every 6 hours PRN Moderate Pain (4 - 6)  oxyCODONE    IR 10 milliGRAM(s) Oral every 6 hours PRN Severe Pain (7 - 10)      Allergies    Motrin (Hives)    Intolerances        ROS:   General:  No  fevers, chills, night sweats, fatigue,   Eyes:  Good vision, no reported pain  ENT:  No sore throat, pain, runny nose, dysphagia  CV:  No pain, palpitations, hypo/hypertension  Resp:  No dyspnea, cough, tachypnea, wheezing  GI:  No pain, No nausea, No vomiting, No diarrhea, No constipation, No weight loss, No fever, No pruritis, No rectal bleeding, No tarry stools, No dysphagia,  :  No pain, bleeding, incontinence, nocturia  Muscle:  No pain, weakness  Neuro:  No weakness, tingling, memory problems  Psych:  No fatigue, insomnia, mood problems, depression  Endocrine:  No polyuria, polydipsia, cold/heat intolerance  Heme:  No petechiae, ecchymosis, easy bruisability  Skin:  No rash, tattoos, scars, edema      PHYSICAL EXAM:   Vital Signs:  Vital Signs Last 24 Hrs  T(C): 36.9 (09 May 2025 04:58), Max: 36.9 (09 May 2025 04:58)  T(F): 98.4 (09 May 2025 04:58), Max: 98.4 (09 May 2025 04:58)  HR: 79 (09 May 2025 04:58) (78 - 89)  BP: 95/59 (09 May 2025 04:58) (94/60 - 97/64)  BP(mean): --  RR: 18 (09 May 2025 04:58) (17 - 18)  SpO2: 100% (09 May 2025 04:58) (94% - 100%)    Parameters below as of 09 May 2025 04:58  Patient On (Oxygen Delivery Method): room air      Daily     Daily     GENERAL:  Appears stated age,   HEENT:  NC/AT,    CHEST:  Full & symmetric excursion,   HEART:  Regular rhythm,  ABDOMEN:  Soft, non-tender, non-distended,  EXTEREMITIES:  no cyanosis  SKIN:  No rash  NEURO:  Alert,       LABS:                        10.0   4.35  )-----------( 203      ( 09 May 2025 08:02 )             32.0     05-09    143  |  106  |  2[L]  ----------------------------<  77  3.5   |  27  |  0.55    Ca    8.3[L]      09 May 2025 08:02  Mg     1.6     05-08    TPro  7.9  /  Alb  3.5  /  TBili  0.8  /  DBili  x   /  AST  50[H]  /  ALT  20  /  AlkPhos  104  05-07      Urinalysis Basic - ( 09 May 2025 08:02 )    Color: x / Appearance: x / SG: x / pH: x  Gluc: 77 mg/dL / Ketone: x  / Bili: x / Urobili: x   Blood: x / Protein: x / Nitrite: x   Leuk Esterase: x / RBC: x / WBC x   Sq Epi: x / Non Sq Epi: x / Bacteria: x        RADIOLOGY & ADDITIONAL TESTS:  
SURGERY PA NOTE ON BEHALF OF DR. Chamberlain / General SURGERY:    S: Patient seen and examined at bedside.  No acute overnight event.   Pain improving and she feels her abdomen feeling loose and less tight than when she came in.    Tolerating regular diet without n/v.   Had diarrhea x 2 and passing flatus.   Not ambulating 2/2 neuropathy.   Voiding.   Denies fever, chills, n/v, chest pain, palpitations, SOB, dyspnea, melena, hematuria, calf pain.     MEDICATIONS:  acetaminophen     Tablet .. 650 milliGRAM(s) Oral every 6 hours PRN  cefTRIAXone   IVPB 1000 milliGRAM(s) IV Intermittent every 24 hours  diazepam    Tablet 2 milliGRAM(s) Oral two times a day PRN  heparin   Injectable 5000 Unit(s) SubCutaneous every 8 hours  hydrocortisone 2.5% Rectal Cream 1 Application(s) Rectal two times a day PRN  lactated ringers. 1000 milliLiter(s) IV Continuous <Continuous>  naloxone Injectable 0.4 milliGRAM(s) IV Push once  nystatin Powder 1 Application(s) Topical two times a day PRN  ondansetron Injectable 4 milliGRAM(s) IV Push every 8 hours PRN  oxyCODONE    IR 5 milliGRAM(s) Oral every 6 hours PRN  oxyCODONE    IR 10 milliGRAM(s) Oral every 6 hours PRN  saccharomyces boulardii 250 milliGRAM(s) Oral two times a day  sodium chloride 0.9% Bolus 1000 milliLiter(s) IV Bolus once  traZODone 50 milliGRAM(s) Oral at bedtime  vancomycin    Solution 125 milliGRAM(s) Oral every 6 hours      O:  Vital Signs Last 24 Hrs  T(C): 36.9 (09 May 2025 04:58), Max: 36.9 (09 May 2025 04:58)  T(F): 98.4 (09 May 2025 04:58), Max: 98.4 (09 May 2025 04:58)  HR: 79 (09 May 2025 04:58) (78 - 89)  BP: 95/59 (09 May 2025 04:58) (94/60 - 97/64)  BP(mean): --  RR: 18 (09 May 2025 04:58) (17 - 18)  SpO2: 100% (09 May 2025 04:58) (94% - 100%)    Parameters below as of 09 May 2025 04:58  Patient On (Oxygen Delivery Method): room air        I&O SUMMARY:      PHYSICAL EXAM:  GEN: NAD  Lungs: CTA bilat.    Card: S1S2  Abd: Soft, mild TTP around periumbilical area, ND.  +BS x 4.  No rebound/guarding.  No peritoneal signs.    Neuro: A&Ox3.   Ext: Calves soft, NT, without edema bilat    LABS:                        10.0   4.35  )-----------( 203      ( 09 May 2025 08:02 )             32.0     05-09    143  |  106  |  2[L]  ----------------------------<  77  3.5   |  27  |  0.55    Ca    8.3[L]      09 May 2025 08:02  Mg     1.6     05-08    TPro  7.9  /  Alb  3.5  /  TBili  0.8  /  DBili  x   /  AST  50[H]  /  ALT  20  /  AlkPhos  104  05-07          ASSESSMENT:  45 yo female with hx of Hyperthyroidism, Asthma, Anxiety, Depression, Anemia, Afib not on AC, Neuropathy, recent C-diff colitis presents to ED c/o progressively worsening abdominal pain x 4 days.   Leukocytosis of 13 with neutrophilia upon admission, normalized to 4.35 on abx.   Lactic acidemia of 3.2, on IVF, repeat Lactate 3.2 > 4.0   Afebrile, remaining soft BP, asymptomatic,   CT imaging on admission, suggestive of gastroenteritis without obstruction.   Has bowel functions.    On Rocephin, PO Vanco  Signs and symptoms likely due to ?GE or ?C-diff flare?   GI PCR negative.        PLAN:  - Recommends repeat CT abdomen pelvis with IV/PO contrast to reassess abdomen considering uptrending lactate, improving but persistent abdominal pain.    - Trend AM labs, replete lytes prn.   - No acute surgical intervention indicated at this time.  - Serial abdominal exam.   - GI eval noted.    - NPO for CT scan and advance diet s/p imaging study.    - IVF hydration  - ID eval noted.    - Surgery following  - Case and plan discussed with Dr. Hawley, Dr. Chamberlain, Dr. Aguirre            
Subjective: Patient seen and examined. Continues to have loose stool and watery with fecal incontinence. Also complaining of pain in her left eyelid. She has been using antibiotic eye drops.     MEDICATIONS  (STANDING):  cefTRIAXone   IVPB 1000 milliGRAM(s) IV Intermittent every 24 hours  lactated ringers. 1000 milliLiter(s) (150 mL/Hr) IV Continuous <Continuous>  metroNIDAZOLE  IVPB 500 milliGRAM(s) IV Intermittent every 8 hours  naloxone Injectable 0.4 milliGRAM(s) IV Push once  vancomycin    Solution 125 milliGRAM(s) Oral every 6 hours    MEDICATIONS  (PRN):  acetaminophen     Tablet .. 650 milliGRAM(s) Oral every 6 hours PRN Temp greater or equal to 38C (100.4F), Mild Pain (1 - 3)  hydrocortisone 2.5% Rectal Cream 1 Application(s) Rectal two times a day PRN rectal discomfort  morphine  - Injectable 2 milliGRAM(s) IV Push every 4 hours PRN Moderate Pain (4 - 6)  morphine  - Injectable 4 milliGRAM(s) IV Push every 4 hours PRN Severe Pain (7 - 10)  nystatin Powder 1 Application(s) Topical two times a day PRN skin discomfort/rash  ondansetron Injectable 4 milliGRAM(s) IV Push every 8 hours PRN Nausea and/or Vomiting      Vital Signs Last 24 Hrs  T(C): 36.7 (08 May 2025 11:00), Max: 37.3 (07 May 2025 14:23)  T(F): 98.1 (08 May 2025 11:00), Max: 99.1 (07 May 2025 14:23)  HR: 85 (08 May 2025 11:00) (85 - 110)  BP: 96/65 (08 May 2025 11:00) (90/59 - 100/83)  BP(mean): 75 (08 May 2025 06:03) (74 - 75)  RR: 17 (08 May 2025 11:00) (16 - 18)  SpO2: 98% (08 May 2025 11:00) (93% - 98%)    Parameters below as of 08 May 2025 06:03  Patient On (Oxygen Delivery Method): room air        PHYSICAL EXAM:  GENERAL: NAD  HEAD:  Atraumatic, Normocephalic\  EYE: Right eye lid with with pinpoint swelling like pimple appearance   ENMT: Moist mucous membranes  NECK: Supple, No JVD, Normal thyroid  CHEST/LUNG: Clear to auscultation bilaterally  HEART: Regular rate and rhythm  ABDOMEN: Soft, Nontender, Nondistended  EXTREMITIES:  2+ Peripheral Pulses      LABS:                        10.9   6.53  )-----------( 202      ( 08 May 2025 08:26 )             34.8     08 May 2025 08:26    140    |  102    |  4      ----------------------------<  71     3.3     |  26     |  0.49     Ca    7.6        08 May 2025 08:26  Mg     1.6       08 May 2025 08:26        Urinalysis Basic - ( 08 May 2025 08:26 )    Color: x / Appearance: x / SG: x / pH: x  Gluc: 71 mg/dL / Ketone: x  / Bili: x / Urobili: x   Blood: x / Protein: x / Nitrite: x   Leuk Esterase: x / RBC: x / WBC x   Sq Epi: x / Non Sq Epi: x / Bacteria: x      CAPILLARY BLOOD GLUCOSE            
[facility]  Chin Zarate PA-C  [address] [id number] [phone]      INTERVAL HPI/OVERNIGHT EVENTS: CT negative, lactate remains elevated. Pain improved    MEDICATIONS  (STANDING):  cefTRIAXone   IVPB 1000 milliGRAM(s) IV Intermittent every 24 hours  heparin   Injectable 5000 Unit(s) SubCutaneous every 8 hours  lactated ringers. 1000 milliLiter(s) (150 mL/Hr) IV Continuous <Continuous>  naloxone Injectable 0.4 milliGRAM(s) IV Push once  saccharomyces boulardii 250 milliGRAM(s) Oral two times a day  sodium chloride 0.9% Bolus 1000 milliLiter(s) IV Bolus once  traZODone 50 milliGRAM(s) Oral at bedtime  vancomycin    Solution 125 milliGRAM(s) Oral every 6 hours    MEDICATIONS  (PRN):  acetaminophen     Tablet .. 650 milliGRAM(s) Oral every 6 hours PRN Temp greater or equal to 38C (100.4F), Mild Pain (1 - 3)  diazepam    Tablet 2 milliGRAM(s) Oral two times a day PRN Anxiety  hydrocortisone 2.5% Rectal Cream 1 Application(s) Rectal two times a day PRN rectal discomfort  nystatin Powder 1 Application(s) Topical two times a day PRN skin discomfort/rash  ondansetron Injectable 4 milliGRAM(s) IV Push every 8 hours PRN Nausea and/or Vomiting  oxyCODONE    IR 5 milliGRAM(s) Oral every 6 hours PRN Moderate Pain (4 - 6)  oxyCODONE    IR 10 milliGRAM(s) Oral every 6 hours PRN Severe Pain (7 - 10)      Allergies    Motrin (Hives)    Intolerances        ROS:   General:  No  fevers, chills, night sweats, fatigue,   Eyes:  Good vision, no reported pain  ENT:  No sore throat, pain, runny nose, dysphagia  CV:  No pain, palpitations, hypo/hypertension  Resp:  No dyspnea, cough, tachypnea, wheezing  GI:  No pain, No nausea, No vomiting, No diarrhea, No constipation, No weight loss, No fever, No pruritis, No rectal bleeding, No tarry stools, No dysphagia,  :  No pain, bleeding, incontinence, nocturia  Muscle:  No pain, weakness  Neuro:  No weakness, tingling, memory problems  Psych:  No fatigue, insomnia, mood problems, depression  Endocrine:  No polyuria, polydipsia, cold/heat intolerance  Heme:  No petechiae, ecchymosis, easy bruisability  Skin:  No rash, tattoos, scars, edema      PHYSICAL EXAM:   Vital Signs:  Vital Signs Last 24 Hrs  T(C): 36.9 (09 May 2025 04:58), Max: 36.9 (09 May 2025 04:58)  T(F): 98.4 (09 May 2025 04:58), Max: 98.4 (09 May 2025 04:58)  HR: 79 (09 May 2025 04:58) (78 - 89)  BP: 95/59 (09 May 2025 04:58) (94/60 - 97/64)  BP(mean): --  RR: 18 (09 May 2025 04:58) (17 - 18)  SpO2: 100% (09 May 2025 04:58) (94% - 100%)    Parameters below as of 09 May 2025 04:58  Patient On (Oxygen Delivery Method): room air      Daily     Daily     GENERAL:  Appears stated age,   HEENT:  NC/AT,    CHEST:  Full & symmetric excursion,   HEART:  Regular rhythm,  ABDOMEN:  Soft, non-tender, non-distended,  EXTEREMITIES:  no cyanosis  SKIN:  No rash  NEURO:  Alert,       LABS:                        10.0   4.35  )-----------( 203      ( 09 May 2025 08:02 )             32.0     05-09    143  |  106  |  2[L]  ----------------------------<  77  3.5   |  27  |  0.55    Ca    8.3[L]      09 May 2025 08:02  Mg     1.6     05-08    TPro  7.9  /  Alb  3.5  /  TBili  0.8  /  DBili  x   /  AST  50[H]  /  ALT  20  /  AlkPhos  104  05-07      Urinalysis Basic - ( 09 May 2025 08:02 )    Color: x / Appearance: x / SG: x / pH: x  Gluc: 77 mg/dL / Ketone: x  / Bili: x / Urobili: x   Blood: x / Protein: x / Nitrite: x   Leuk Esterase: x / RBC: x / WBC x   Sq Epi: x / Non Sq Epi: x / Bacteria: x        RADIOLOGY & ADDITIONAL TESTS:  
     ROBBIE MCKNIGHT is a 44yFemale , patient examined and chart reviewed.     INTERVAL HPI/ OVERNIGHT EVENTS:   C/o Abd pain. Diarrhea n/v resolved.  Afebrile.    PAST MEDICAL & SURGICAL HISTORY:  Atrial fibrillation  History of Hyperthyroidism  Asthma  History of anemia  Depression, unspecified depression type  Smoker  Peripheral neuropathy  S/P  Section  ,,  History of blood transfusion  Status post embolization of uterine artery    For details regarding the patient's social history, family history, and other miscellaneous elements, please refer the initial infectious diseases consultation and/or the admitting history and physical examination for this admission.    ROS:  CONSTITUTIONAL:  Negative fever or chills  EYES:  Negative  blurry vision or double vision  CARDIOVASCULAR:  Negative for chest pain or palpitations  RESPIRATORY:  Negative for cough, wheezing, or SOB   GASTROINTESTINAL:  Negative for nausea, vomiting, diarrhea, constipation, + abdominal pain  GENITOURINARY:  Negative frequency, urgency or dysuria  NEUROLOGIC:  No headache, confusion, dizziness, lightheadedness  All other systems were reviewed and are negative     ALLERGIES  Motrin (Hives)      Current inpatient medications :    ANTIBIOTICS/RELEVANT:  cefTRIAXone   IVPB 1000 milliGRAM(s) IV Intermittent every 24 hours  iohexol 300 mG (iodine)/mL Oral Solution 30 milliLiter(s) Oral once  naloxone Injectable 0.4 milliGRAM(s) IV Push once  saccharomyces boulardii 250 milliGRAM(s) Oral two times a day  vancomycin    Solution 125 milliGRAM(s) Oral every 6 hours      acetaminophen     Tablet .. 650 milliGRAM(s) Oral every 6 hours PRN  diazepam    Tablet 2 milliGRAM(s) Oral two times a day PRN  heparin   Injectable 5000 Unit(s) SubCutaneous every 8 hours  hydrocortisone 2.5% Rectal Cream 1 Application(s) Rectal two times a day PRN  lactated ringers. 1000 milliLiter(s) IV Continuous <Continuous>  nystatin Powder 1 Application(s) Topical two times a day PRN  ondansetron Injectable 4 milliGRAM(s) IV Push every 8 hours PRN  oxyCODONE    IR 5 milliGRAM(s) Oral every 6 hours PRN  oxyCODONE    IR 10 milliGRAM(s) Oral every 6 hours PRN  traZODone 50 milliGRAM(s) Oral at bedtime      Objective:    T(C): 36.9 (25 @ 04:58), Max: 36.9 (25 @ 04:58)  HR: 79 (25 @ 04:58) (78 - 89)  BP: 95/59 (25 @ 04:58) (94/60 - 97/64)  RR: 18 (25 @ 04:58) (17 - 18)  SpO2: 100% (25 @ 04:58) (94% - 100%)      Physical Exam:  General: no acute distress  Neck: supple, trachea midline  Lungs: clear, no wheeze/rhonchi  Cardiovascular: regular rate and rhythm, S1 S2  Abdomen: soft, nontender,  bowel sounds normal  Neurological: alert and oriented x3  Skin: no rash  Extremities: no edema      LABS:                        10.0   4.35  )-----------( 203      ( 09 May 2025 08:02 )             32.0           143  |  106  |  2[L]  ----------------------------<  77  3.5   |  27  |  0.55    Ca    8.3[L]      09 May 2025 08:02  Mg     1.6     05-08    MICROBIOLOGY:    Culture - Blood (collected 07 May 2025 16:06)  Source: Blood Blood  Preliminary Report (09 May 2025 01:02):    No growth at 24 hours    Culture - Blood (collected 07 May 2025 16:06)  Source: Blood Blood  Gram Stain (09 May 2025 04:49):    Growth in aerobic bottle: Gram Positive Rods  Preliminary Report (09 May 2025 04:49):    Growth in aerobic bottle: Gram Positive Rods    Direct identification is available within approximately 3-5    hours either by Blood Panel Multiplexed PCR or Direct    MALDI-TOF. Details: [email or web address]  Organism: Blood Culture PCR (09 May 2025 08:34)  Organism: Blood Culture PCR (09 May 2025 08:34)      Method Type: PCR      -  Blood PCR Panel: NEG    RADIOLOGY & ADDITIONAL STUDIES:          Assessment :  ACC: [id number] EXAM:  CT ABDOMEN AND PELVIS IC   ORDERED BY: LINDSEY ZARAGOZA     PROCEDURE DATE:  2025          INTERPRETATION:  CLINICAL INFORMATION: Vomiting abdominal pain    COMPARISON: CT abdomen and pelvis 3/7/2025    CONTRAST/COMPLICATIONS:  IV Contrast: Omnipaque 350  90 cc administered   10 cc discarded  Oral Contrast: NONE  .    PROCEDURE:  CT of the Abdomen and Pelvis was performed.  Sagittal and coronal reformats were performed.    FINDINGS:  LOWER CHEST: Within normal limits.    LIVER: Enlarged (21.8 cm). Steatosis. Calcified granulomas  BILE DUCTS: Normal caliber.  GALLBLADDER: Within normal limits.  SPLEEN: Calcified granuloma.  PANCREAS: Within normal limits.  ADRENALS: Within normal limits.  KIDNEYS/URETERS: Within normal limits.    BLADDER: Within normal limits.  REPRODUCTIVE ORGANS: Unremarkable    BOWEL: No bowel obstruction distended fluid-filled stomach. Mildly   prominent fluid-filled small bowel loops in the upper midabdomen.   Findings may reflect a nonspecific gastroenteritis. Colonic   diverticulosis without acute diverticulitis. Appendix no pericecal   inflammation to suggest appendicitis  PERITONEUM/RETROPERITONEUM: Small simple pelvic free fluid..  VESSELS: Nonaneurysmal  LYMPH NODES: No lymphadenopathy.  ABDOMINAL WALL: Small fat-containing umbilical hernia.  BONES: Within normal limits.    IMPRESSION:  Distended fluid-filled stomach and mildly prominent fluid-filled small   bowel loops. Correlate for symptoms of gastroenteritis.  No obstructive pathology    Assessment :   45YO F PMH peripheral neuropathy not able to ambulate due to pain, depression, uterine fibroid, anemia, asthma, hypothyroidism, atrial fibrillation not on AC given previous hx of severe bleed requiring massive transfusion  history of  recurrent Cdiff colitis who presented to the hospital with c/o severe abdominal pain n/v/d. Denies fever chills. In ED afebrile wbc 13K CT AP showed Distended fluid-filled stomach and mildly prominent fluid-filled small bowel loops. Correlate for symptoms of gastroenteritis.  Infectious gastroenteritis GI pcr neg  Rule out Cdiff though low suspicion  Blood cultures with GPR pathogen vs contaminant  Still with abd pain and persistent lactemia- seen by surgery    Plan :   For Rpt CT AP   Cont Rocephin  Cont Po Vanc  Fu cultures  Rpt blood cultures  Fu Cdiff pcr  Trend temps and cbc  Serial abd exams    Advance Directives- Full code  Current Medications are documented.   Drug-drug interactions reviewed.    Continue with present regiment.  Appropriate use of antibiotics and adverse effects reviewed.      I have discussed the above plan of care with patient in detail. She expressed understanding of the  treatment plan . Risks, benefits and alternatives discussed in detail. I have asked if she has any questions or concerns and appropriately addressed them to the best of my ability .    > 35 minutes were spent in direct patient care reviewing notes, medications ,labs data/ imaging , discussion with multidisciplinary team.    Thank you for allowing me to participate in care of your patient .    Luis Alberto Farley MD  Infectious Disease  [phone]    Individualized infection control protocols for an individual patient based on their diagnosis and risks in order to reduce risk of disease transmission followed. Coordinating with  infection prevention and control team members to enable healthcare facility staff to safely care for patient.  Managing infection prevention and treatment protocols associated with transitions of care for complex patients.  In-depth patient chart review that entails going back farther in time and assessing the complete breadth of all health care interactions, with higher-level synthesis for complex diagnoses.  Engaging in complex medical decision-making associated with antimicrobial prescribing including considerations such as antimicrobial resistance patterns, emergence of new variants/strains, recent antibiotic exposure, interactions/complications from comorbidities including concurrent infections, public health considerations to minimize development of antimicrobial resistance, and emerging and re-emerging infections.       
[facility]  Chin Zarate PA-C  [address] [place] [address]  [phone]      INTERVAL HPI/OVERNIGHT EVENTS: CT negative, lactate improving      MEDICATIONS  (STANDING):  cefTRIAXone   IVPB 1000 milliGRAM(s) IV Intermittent every 24 hours  heparin   Injectable 5000 Unit(s) SubCutaneous every 8 hours  lactated ringers. 1000 milliLiter(s) (150 mL/Hr) IV Continuous <Continuous>  naloxone Injectable 0.4 milliGRAM(s) IV Push once  saccharomyces boulardii 250 milliGRAM(s) Oral two times a day  sodium chloride 0.9% Bolus 1000 milliLiter(s) IV Bolus once  traZODone 50 milliGRAM(s) Oral at bedtime  vancomycin    Solution 125 milliGRAM(s) Oral every 6 hours    MEDICATIONS  (PRN):  acetaminophen     Tablet .. 650 milliGRAM(s) Oral every 6 hours PRN Temp greater or equal to 38C (100.4F), Mild Pain (1 - 3)  diazepam    Tablet 2 milliGRAM(s) Oral two times a day PRN Anxiety  hydrocortisone 2.5% Rectal Cream 1 Application(s) Rectal two times a day PRN rectal discomfort  nystatin Powder 1 Application(s) Topical two times a day PRN skin discomfort/rash  ondansetron Injectable 4 milliGRAM(s) IV Push every 8 hours PRN Nausea and/or Vomiting  oxyCODONE    IR 5 milliGRAM(s) Oral every 6 hours PRN Moderate Pain (4 - 6)  oxyCODONE    IR 10 milliGRAM(s) Oral every 6 hours PRN Severe Pain (7 - 10)      Allergies    Motrin (Hives)    Intolerances        ROS:   General:  No  fevers, chills, night sweats, fatigue,   Eyes:  Good vision, no reported pain  ENT:  No sore throat, pain, runny nose, dysphagia  CV:  No pain, palpitations, hypo/hypertension  Resp:  No dyspnea, cough, tachypnea, wheezing  GI:  No pain, No nausea, No vomiting, No diarrhea, No constipation, No weight loss, No fever, No pruritis, No rectal bleeding, No tarry stools, No dysphagia,  :  No pain, bleeding, incontinence, nocturia  Muscle:  No pain, weakness  Neuro:  No weakness, tingling, memory problems  Psych:  No fatigue, insomnia, mood problems, depression  Endocrine:  No polyuria, polydipsia, cold/heat intolerance  Heme:  No petechiae, ecchymosis, easy bruisability  Skin:  No rash, tattoos, scars, edema      PHYSICAL EXAM:   Vital Signs:  Vital Signs Last 24 Hrs  T(C): 36.9 (09 May 2025 04:58), Max: 36.9 (09 May 2025 04:58)  T(F): 98.4 (09 May 2025 04:58), Max: 98.4 (09 May 2025 04:58)  HR: 79 (09 May 2025 04:58) (78 - 89)  BP: 95/59 (09 May 2025 04:58) (94/60 - 97/64)  BP(mean): --  RR: 18 (09 May 2025 04:58) (17 - 18)  SpO2: 100% (09 May 2025 04:58) (94% - 100%)    Parameters below as of 09 May 2025 04:58  Patient On (Oxygen Delivery Method): room air      Daily     Daily     GENERAL:  Appears stated age,   HEENT:  NC/AT,    CHEST:  Full & symmetric excursion,   HEART:  Regular rhythm,  ABDOMEN:  Soft, non-tender, non-distended,  EXTEREMITIES:  no cyanosis  SKIN:  No rash  NEURO:  Alert,       LABS:                        10.0   4.35  )-----------( 203      ( 09 May 2025 08:02 )             32.0     05-09    143  |  106  |  2[L]  ----------------------------<  77  3.5   |  27  |  0.55    Ca    8.3[L]      09 May 2025 08:02  Mg     1.6     05-08    TPro  7.9  /  Alb  3.5  /  TBili  0.8  /  DBili  x   /  AST  50[H]  /  ALT  20  /  AlkPhos  104  05-07      Urinalysis Basic - ( 09 May 2025 08:02 )    Color: x / Appearance: x / SG: x / pH: x  Gluc: 77 mg/dL / Ketone: x  / Bili: x / Urobili: x   Blood: x / Protein: x / Nitrite: x   Leuk Esterase: x / RBC: x / WBC x   Sq Epi: x / Non Sq Epi: x / Bacteria: x        RADIOLOGY & ADDITIONAL TESTS:  
SURGERY PA NOTE ON BEHALF OF DR. Hawley / General SURGERY:    S: Patient seen and examined at bedside.     Abdominal pain improving after pain med early AM.   Tolerating NPO without n/v overnight. Denies belching or burping.   Passing flatus and had one BM early this AM- foul smell, mucousy, dark brown, slimy waterly stool and she thinks "it is similar to prior C-diff".  She was not able to control the BM. She felt "pressure-release like sensation after BM".   Not ambulating due to her chronic neuropathy.   Voiding.   Denies fever, chills, n/v, chest pain, SOB, dysuria, hematuria, melena, hematochezia, calf pain.     MEDICATIONS:  acetaminophen     Tablet .. 650 milliGRAM(s) Oral every 6 hours PRN  cefTRIAXone   IVPB 1000 milliGRAM(s) IV Intermittent every 24 hours  hydrocortisone 2.5% Rectal Cream 1 Application(s) Rectal two times a day PRN  lactated ringers. 1000 milliLiter(s) IV Continuous <Continuous>  metroNIDAZOLE  IVPB 500 milliGRAM(s) IV Intermittent every 8 hours  morphine  - Injectable 2 milliGRAM(s) IV Push every 4 hours PRN  morphine  - Injectable 4 milliGRAM(s) IV Push every 4 hours PRN  naloxone Injectable 0.4 milliGRAM(s) IV Push once  nystatin Powder 1 Application(s) Topical two times a day PRN  ondansetron Injectable 4 milliGRAM(s) IV Push every 8 hours PRN      O:  Vital Signs Last 24 Hrs  T(C): 36.7 (08 May 2025 04:56), Max: 37.3 (07 May 2025 14:23)  T(F): 98 (08 May 2025 04:56), Max: 99.1 (07 May 2025 14:23)  HR: 87 (08 May 2025 06:03) (85 - 112)  BP: 97/66 (08 May 2025 06:03) (90/59 - 124/70)  BP(mean): 75 (08 May 2025 06:03) (74 - 75)  RR: 16 (08 May 2025 06:03) (16 - 20)  SpO2: 97% (08 May 2025 06:03) (93% - 99%)    Parameters below as of 08 May 2025 06:03  Patient On (Oxygen Delivery Method): room air        I&O SUMMARY:    05-07-25 @ 07:01  -  05-08-25 @ 07:00  --------------------------------------------------------  IN: 1800 mL / OUT: 850 mL / NET: 950 mL        PHYSICAL EXAM:  GEN: NAD  Lungs: CTA bilat.   Card: S1S2  Abd: Soft, ttp in bilateral mid-abdomen and periumbilical region, moderately distended.  +BS x 4.  No rebound/guarding.  No peritoneal signs.   Neuro: A&Ox3.    Ext: Calves soft.     LABS:                        13.6   13.05 )-----------( 260      ( 07 May 2025 12:03 )             42.4     05-07    144  |  102  |  6[L]  ----------------------------<  90  3.3[L]   |  27  |  0.37[L]    Ca    8.8      07 May 2025 12:03  Mg     1.2     05-07    TPro  7.9  /  Alb  3.5  /  TBili  0.8  /  DBili  x   /  AST  50[H]  /  ALT  20  /  AlkPhos  104  05-07          ASSESSMENT:  43 yo female with hx of Hyperthyroidism, Asthma, Anxiety, Depression, Anemia, Afib not on AC, Neuropathy, recent C-diff colitis presents to ED c/o progressively worsening abdominal pain x 4 days.   Leukocytosis of 13 with neutrophilia upon admission, pending AM labs.  Lactic acidemia of 3.2, on IVF.    Afebrile.  Received 1L bolus for soft BP.    CT imaging suggestive of gastroenteritis without obstruction.   Has bowel functions.    On Cipro, Flagyl.   Signs and symptoms likely due to ?GE or ?C-diff flare?   Stool sample sent to lab for GI PCR this am.  Pt felt better after BM this AM.     PLAN:  - Trend AM labs, replete lytes prn.   - Following GI PCR  - No acute surgical intervention indicated at this time.  - Serial abdominal exam.   - Will benefit from GI eval  - NPO  - IVF hydration  - NG tube decompression if nausea, vomiting with worsening abdominal pain.   - ID eval   - Surgery following  - Case and plan discussed with Dr. Hawley, Dr. Chamberlain.     
Subjective: Still having loose BM with abdominal cramping reported for which she is using IV Morphine. BP is soft.     MEDICATIONS  (STANDING):  cefTRIAXone   IVPB 1000 milliGRAM(s) IV Intermittent every 24 hours  heparin   Injectable 5000 Unit(s) SubCutaneous every 8 hours  lactated ringers. 1000 milliLiter(s) (150 mL/Hr) IV Continuous <Continuous>  naloxone Injectable 0.4 milliGRAM(s) IV Push once  saccharomyces boulardii 250 milliGRAM(s) Oral two times a day  traZODone 50 milliGRAM(s) Oral at bedtime  vancomycin    Solution 125 milliGRAM(s) Oral every 6 hours    MEDICATIONS  (PRN):  acetaminophen     Tablet .. 650 milliGRAM(s) Oral every 6 hours PRN Temp greater or equal to 38C (100.4F), Mild Pain (1 - 3)  diazepam    Tablet 2 milliGRAM(s) Oral two times a day PRN Anxiety  hydrocortisone 2.5% Rectal Cream 1 Application(s) Rectal two times a day PRN rectal discomfort  nystatin Powder 1 Application(s) Topical two times a day PRN skin discomfort/rash  ondansetron Injectable 4 milliGRAM(s) IV Push every 8 hours PRN Nausea and/or Vomiting  oxyCODONE    IR 5 milliGRAM(s) Oral every 6 hours PRN Moderate Pain (4 - 6)  oxyCODONE    IR 10 milliGRAM(s) Oral every 6 hours PRN Severe Pain (7 - 10)      Vital Signs Last 24 Hrs  T(C): 36.9 (09 May 2025 04:58), Max: 36.9 (09 May 2025 04:58)  T(F): 98.4 (09 May 2025 04:58), Max: 98.4 (09 May 2025 04:58)  HR: 79 (09 May 2025 04:58) (78 - 89)  BP: 95/59 (09 May 2025 04:58) (94/60 - 97/64)  BP(mean): --  RR: 18 (09 May 2025 04:58) (17 - 18)  SpO2: 100% (09 May 2025 04:58) (94% - 100%)    Parameters below as of 09 May 2025 04:58  Patient On (Oxygen Delivery Method): room air        PHYSICAL EXAM:  GENERAL: NAD  HEAD:  Atraumatic, Normocephalic  ENMT: Moist mucous membranes  NECK: Supple, No JVD, Normal thyroid  CHEST/LUNG: Clear to auscultation bilaterally  HEART: Regular rate and rhythm  ABDOMEN: Soft, Nontender, Nondistended  EXTREMITIES:  2+ Peripheral Pulses      LABS:                        10.0   4.35  )-----------( 203      ( 09 May 2025 08:02 )             32.0     09 May 2025 08:02    143    |  106    |  2      ----------------------------<  77     3.5     |  27     |  0.55     Ca    8.3        09 May 2025 08:02        Urinalysis Basic - ( 09 May 2025 08:02 )    Color: x / Appearance: x / SG: x / pH: x  Gluc: 77 mg/dL / Ketone: x  / Bili: x / Urobili: x   Blood: x / Protein: x / Nitrite: x   Leuk Esterase: x / RBC: x / WBC x   Sq Epi: x / Non Sq Epi: x / Bacteria: x      CAPILLARY BLOOD GLUCOSE            
     ROBBIE MCKNIGHT is a 44yFemale , patient examined and chart reviewed.     INTERVAL HPI/ OVERNIGHT EVENTS:   Feels well. No events.  Afebrile.    PAST MEDICAL & SURGICAL HISTORY:  Atrial fibrillation  History of Hyperthyroidism  Asthma  History of anemia  Depression, unspecified depression type  Smoker  Peripheral neuropathy  S/P  Section  ,,  History of blood transfusion  Status post embolization of uterine artery    For details regarding the patient's social history, family history, and other miscellaneous elements, please refer the initial infectious diseases consultation and/or the admitting history and physical examination for this admission.    ROS:  CONSTITUTIONAL:  Negative fever or chills  EYES:  Negative  blurry vision or double vision  CARDIOVASCULAR:  Negative for chest pain or palpitations  RESPIRATORY:  Negative for cough, wheezing, or SOB   GASTROINTESTINAL:  Negative for nausea, vomiting, diarrhea, constipation, abdominal pain  GENITOURINARY:  Negative frequency, urgency or dysuria  NEUROLOGIC:  No headache, confusion, dizziness, lightheadedness  All other systems were reviewed and are negative     ALLERGIES  Motrin (Hives)      Current inpatient medications :    ANTIBIOTICS/RELEVANT:  cefTRIAXone   IVPB 1000 milliGRAM(s) IV Intermittent every 24 hours  vancomycin    Solution 125 milliGRAM(s) Oral every 6 hours    MEDICATIONS  (STANDING):  heparin   Injectable 5000 Unit(s) SubCutaneous every 8 hours  lactated ringers. 1000 milliLiter(s) (150 mL/Hr) IV Continuous <Continuous>  naloxone Injectable 0.4 milliGRAM(s) IV Push once  pregabalin 150 milliGRAM(s) Oral two times a day  saccharomyces boulardii 250 milliGRAM(s) Oral two times a day  sertraline 100 milliGRAM(s) Oral daily  traZODone 50 milliGRAM(s) Oral at bedtime    MEDICATIONS  (PRN):  acetaminophen     Tablet .. 650 milliGRAM(s) Oral every 6 hours PRN Temp greater or equal to 38C (100.4F), Mild Pain (1 - 3)  diazepam    Tablet 2 milliGRAM(s) Oral two times a day PRN Anxiety  hydrocortisone 2.5% Rectal Cream 1 Application(s) Rectal two times a day PRN rectal discomfort  nystatin Powder 1 Application(s) Topical two times a day PRN skin discomfort/rash  ondansetron Injectable 4 milliGRAM(s) IV Push every 8 hours PRN Nausea and/or Vomiting  oxyCODONE    IR 5 milliGRAM(s) Oral every 6 hours PRN Moderate Pain (4 - 6)  oxyCODONE    IR 10 milliGRAM(s) Oral every 6 hours PRN Severe Pain (7 - 10)    Objective:  Vital Signs Last 24 Hrs  T(C): 36.5 (11 May 2025 20:21), Max: 36.8 (11 May 2025 14:01)  T(F): 97.7 (11 May 2025 20:21), Max: 98.3 (11 May 2025 14:01)  HR: 89 (11 May 2025 20:21) (81 - 89)  BP: 105/73 (11 May 2025 20:21) (90/53 - 105/73)  RR: 17 (11 May 2025 20:21) (17 - 18)  SpO2: 96% (11 May 2025 20:21) (94% - 96%)    Parameters below as of 11 May 2025 04:46  Patient On (Oxygen Delivery Method): room air        Physical Exam:  General: no acute distress  Neck: supple, trachea midline  Lungs: clear, no wheeze/rhonchi  Cardiovascular: regular rate and rhythm, S1 S2  Abdomen: soft, nontender,  bowel sounds normal  Neurological: alert and oriented x3  Skin: no rash  Extremities: no edema      LABS:                        9.9    3.38  )-----------( 223      ( 11 May 2025 06:00 )             32.7   05-11    142  |  108  |  2[L]  ----------------------------<  88  4.0   |  26  |  0.42[L]    Ca    8.2[L]      11 May 2025 06:00    TPro  5.5[L]  /  Alb  2.4[L]  /  TBili  0.3  /  DBili  x   /  AST  32  /  ALT  14  /  AlkPhos  92  05-10      MICROBIOLOGY:    Culture - Blood (collected 09 May 2025 16:36)  Source: Blood Blood-Peripheral  Preliminary Report (11 May 2025 22:02):    No growth at 48 Hours    Culture - Blood (25 @ 16:06)    Gram Stain:   Growth in aerobic bottle: Gram Positive Rods   -  Blood PCR Panel: NEG   Specimen Source: Blood Blood   Organism: Blood Culture PCR   Culture Results:   Growth in aerobic bottle: Corynebacterium aurimucosum group  "Susceptibilities not performed"  Direct identification is available within approximately 3-5  hours either by Blood Panel Multiplexed PCR or Direct  MALDI-TOF. Details: [email or web address]   Organism Identification: Blood Culture PCR   Method Type: PCR        RADIOLOGY & ADDITIONAL STUDIES:    ACC: [id number] EXAM:  CT ABDOMEN AND PELVIS OC IC   ORDERED BY: SIERRA WHYTE     PROCEDURE DATE:  2025          INTERPRETATION:  CLINICAL INFORMATION: Abdominal pain    COMPARISON: CT abdomen and pelvis 2025.    CONTRAST/COMPLICATIONS:  IV Contrast: Omnipaque 350  95 cc administered   5 cc discarded  Oral Contrast: Omnipaque 300   Fruit 2o  .    PROCEDURE:  CT of the Abdomen and Pelvis was performed.  Sagittal and coronal reformats were performed.    FINDINGS:  LOWER CHEST: Within normal limits.    LIVER: Enlarged. Steatotic. Calcified granulomas  BILE DUCTS: Normal caliber.  GALLBLADDER: Within normal limits.  SPLEEN: Calcified granuloma.  PANCREAS: Within normal limits.  ADRENALS: Within normal limits.  KIDNEYS/URETERS: Within normal limits.    BLADDER: Within normal limits.  REPRODUCTIVE ORGANS: Unremarkable    BOWEL: No bowel obstruction. Evaluation bowel limited by underdistention.   Mild diffusely thickened rectal and colonic wall  may represent   underdistention versus proctocolitis. Colonic diverticula without acute   diverticulitis Appendix no pericecal inflammation to suggest appendicitis  PERITONEUM/RETROPERITONEUM: Within normal limits.  VESSELS: Nonaneurysmal.  LYMPH NODES: No lymphadenopathy.  ABDOMINAL WALL:Diffuse subcutaneous edema.  BONES: No acute changes.    IMPRESSION:  Rectal and pancolonic mural thickening may reflect underdistention versus   proctocolitis. Correlate with symptomatology      Assessment :   43YO F PMH peripheral neuropathy not able to ambulate due to pain, depression, uterine fibroid, anemia, asthma, hypothyroidism, atrial fibrillation not on AC given previous hx of severe bleed requiring massive transfusion  history of  recurrent Cdiff colitis who presented to the hospital with c/o severe abdominal pain n/v/d. Denies fever chills. In ED afebrile wbc 13K CT AP showed Distended fluid-filled stomach and mildly prominent fluid-filled small bowel loops. Correlate for symptoms of gastroenteritis.  Infectious gastroenteritis GI pcr neg  Rule out Cdiff though low suspicion  Blood cultures with Corynebacterium likely contaminant RPt blood cultures NGTD  Rpt CT AP noted - seen by surgery  Abd pain resolved  Clinically better    Plan :   Dc Rocephin   Cont Po Vanc x 14 days  Rpt blood cultures NGTD  Fu Cdiff pcr- results still pending  Trend temps and cbc  Serial abd exams  Stable from ID standpoint  Dc home tmrw    D/w Dr May    Advance Directives- Full code  Current Medications are documented.   Drug-drug interactions reviewed.    Continue with present regiment.  Appropriate use of antibiotics and adverse effects reviewed.      I have discussed the above plan of care with patient in detail. She expressed understanding of the  treatment plan . Risks, benefits and alternatives discussed in detail. I have asked if she has any questions or concerns and appropriately addressed them to the best of my ability .    > 35 minutes were spent in direct patient care reviewing notes, medications ,labs data/ imaging , discussion with multidisciplinary team.    Thank you for allowing me to participate in care of your patient .    Luis Alberto Farley MD  Infectious Disease  [phone]    Individualized infection control protocols for an individual patient based on their diagnosis and risks in order to reduce risk of disease transmission followed. Coordinating with  infection prevention and control team members to enable healthcare facility staff to safely care for patient.  Managing infection prevention and treatment protocols associated with transitions of care for complex patients.  In-depth patient chart review that entails going back farther in time and assessing the complete breadth of all health care interactions, with higher-level synthesis for complex diagnoses.  Engaging in complex medical decision-making associated with antimicrobial prescribing including considerations such as antimicrobial resistance patterns, emergence of new variants/strains, recent antibiotic exposure, interactions/complications from comorbidities including concurrent infections, public health considerations to minimize development of antimicrobial resistance, and emerging and re-emerging infections.       
Subjective: Patient doing well.  Good Spirits. Afebrile. Patient BM is still loose but lot less frequent and doing better. Spoke about her lactic acid and discussed her CT results from yesterday.     MEDICATIONS  (STANDING):  cefTRIAXone   IVPB 1000 milliGRAM(s) IV Intermittent every 24 hours  heparin   Injectable 5000 Unit(s) SubCutaneous every 8 hours  lactated ringers. 1000 milliLiter(s) (150 mL/Hr) IV Continuous <Continuous>  naloxone Injectable 0.4 milliGRAM(s) IV Push once  potassium chloride    Tablet ER 40 milliEquivalent(s) Oral every 4 hours  pregabalin 150 milliGRAM(s) Oral two times a day  saccharomyces boulardii 250 milliGRAM(s) Oral two times a day  sertraline 100 milliGRAM(s) Oral daily  traZODone 50 milliGRAM(s) Oral at bedtime  vancomycin    Solution 125 milliGRAM(s) Oral every 6 hours    MEDICATIONS  (PRN):  acetaminophen     Tablet .. 650 milliGRAM(s) Oral every 6 hours PRN Temp greater or equal to 38C (100.4F), Mild Pain (1 - 3)  diazepam    Tablet 2 milliGRAM(s) Oral two times a day PRN Anxiety  hydrocortisone 2.5% Rectal Cream 1 Application(s) Rectal two times a day PRN rectal discomfort  nystatin Powder 1 Application(s) Topical two times a day PRN skin discomfort/rash  ondansetron Injectable 4 milliGRAM(s) IV Push every 8 hours PRN Nausea and/or Vomiting  oxyCODONE    IR 5 milliGRAM(s) Oral every 6 hours PRN Moderate Pain (4 - 6)  oxyCODONE    IR 10 milliGRAM(s) Oral every 6 hours PRN Severe Pain (7 - 10)      Vital Signs Last 24 Hrs  T(C): 36.5 (10 May 2025 05:00), Max: 36.7 (09 May 2025 14:22)  T(F): 97.7 (10 May 2025 05:00), Max: 98 (09 May 2025 14:22)  HR: 88 (10 May 2025 05:00) (84 - 90)  BP: 92/60 (10 May 2025 05:00) (91/60 - 102/67)  BP(mean): --  RR: 18 (10 May 2025 05:00) (17 - 18)  SpO2: 96% (10 May 2025 05:00) (96% - 98%)    Parameters below as of 10 May 2025 05:00  Patient On (Oxygen Delivery Method): room air        PHYSICAL EXAM:  GENERAL: NAD  HEAD:  Atraumatic, Normocephalic  ENMT: Moist mucous membranes  NECK: Supple, No JVD, Normal thyroid  CHEST/LUNG: Clear to auscultation bilaterally  HEART: Regular rate and rhythm  ABDOMEN: Soft, Nontender, Nondistended  EXTREMITIES:  2+ Peripheral Pulses      LABS:                        9.9    3.53  )-----------( 220      ( 10 May 2025 06:00 )             32.7     10 May 2025 06:00    142    |  106    |  4      ----------------------------<  92     3.3     |  27     |  0.49     Ca    7.8        10 May 2025 06:00    TPro  5.5    /  Alb  2.4    /  TBili  0.3    /  DBili  x      /  AST  32     /  ALT  14     /  AlkPhos  92     10 May 2025 06:00      Urinalysis Basic - ( 10 May 2025 06:00 )    Color: x / Appearance: x / SG: x / pH: x  Gluc: 92 mg/dL / Ketone: x  / Bili: x / Urobili: x   Blood: x / Protein: x / Nitrite: x   Leuk Esterase: x / RBC: x / WBC x   Sq Epi: x / Non Sq Epi: x / Bacteria: x      CAPILLARY BLOOD GLUCOSE            
Subjective: Patient seen and examined. No overnight events. Overall improved. C. Diff studies are not back yet to determine course. Still on PO Vancomycin and tolerating. Diarrhea also improved.     MEDICATIONS  (STANDING):  cefTRIAXone   IVPB 1000 milliGRAM(s) IV Intermittent every 24 hours  heparin   Injectable 5000 Unit(s) SubCutaneous every 8 hours  lactated ringers. 1000 milliLiter(s) (150 mL/Hr) IV Continuous <Continuous>  naloxone Injectable 0.4 milliGRAM(s) IV Push once  pregabalin 150 milliGRAM(s) Oral two times a day  saccharomyces boulardii 250 milliGRAM(s) Oral two times a day  sertraline 100 milliGRAM(s) Oral daily  traZODone 50 milliGRAM(s) Oral at bedtime  vancomycin    Solution 125 milliGRAM(s) Oral every 6 hours    MEDICATIONS  (PRN):  acetaminophen     Tablet .. 650 milliGRAM(s) Oral every 6 hours PRN Temp greater or equal to 38C (100.4F), Mild Pain (1 - 3)  diazepam    Tablet 2 milliGRAM(s) Oral two times a day PRN Anxiety  hydrocortisone 2.5% Rectal Cream 1 Application(s) Rectal two times a day PRN rectal discomfort  nystatin Powder 1 Application(s) Topical two times a day PRN skin discomfort/rash  ondansetron Injectable 4 milliGRAM(s) IV Push every 8 hours PRN Nausea and/or Vomiting  oxyCODONE    IR 5 milliGRAM(s) Oral every 6 hours PRN Moderate Pain (4 - 6)  oxyCODONE    IR 10 milliGRAM(s) Oral every 6 hours PRN Severe Pain (7 - 10)      Vital Signs Last 24 Hrs  T(C): 36.4 (11 May 2025 04:46), Max: 36.4 (10 May 2025 14:04)  T(F): 97.5 (11 May 2025 04:46), Max: 97.6 (10 May 2025 14:04)  HR: 81 (11 May 2025 04:46) (70 - 81)  BP: 90/53 (11 May 2025 04:46) (90/53 - 93/66)  BP(mean): --  RR: 18 (11 May 2025 04:46) (18 - 18)  SpO2: 94% (11 May 2025 04:46) (93% - 94%)    Parameters below as of 11 May 2025 04:46  Patient On (Oxygen Delivery Method): room air        PHYSICAL EXAM:  GENERAL: NAD  HEAD:  Atraumatic, Normocephalic  ENMT: Moist mucous membranes  NECK: Supple, No JVD, Normal thyroid  CHEST/LUNG: Clear to auscultation bilaterally  HEART: Regular rate and rhythm  ABDOMEN: Soft, Nontender, Nondistended  EXTREMITIES:  2+ Peripheral Pulses      LABS:                        9.9    3.38  )-----------( 223      ( 11 May 2025 06:00 )             32.7     11 May 2025 06:00    142    |  108    |  2      ----------------------------<  88     4.0     |  26     |  0.42     Ca    8.2        11 May 2025 06:00        Urinalysis Basic - ( 11 May 2025 06:00 )    Color: x / Appearance: x / SG: x / pH: x  Gluc: 88 mg/dL / Ketone: x  / Bili: x / Urobili: x   Blood: x / Protein: x / Nitrite: x   Leuk Esterase: x / RBC: x / WBC x   Sq Epi: x / Non Sq Epi: x / Bacteria: x      CAPILLARY BLOOD GLUCOSE

## 2025-05-12 NOTE — DISCHARGE NOTE NURSING/CASE MANAGEMENT/SOCIAL WORK - NSSCTYPOFSERV_GEN_ALL_CORE
Visiting Nurse/Physical Therapy- you should receive a call within 24-48 hours to schedule the first visit. If you have not received a call please contact the agency at the number listed above.

## 2025-05-12 NOTE — DISCHARGE NOTE PROVIDER - NSDCCPCAREPLAN_GEN_ALL_CORE_FT
PRINCIPAL DISCHARGE DIAGNOSIS  Diagnosis: C. difficile colitis  Assessment and Plan of Treatment: You were admitted for abdominal pain and found to most likely have recurrent C. Diff Infection.  Test results were not back at time of discharge but you made considerable improvement once evaluated by our Infectious Disease expert and started on oral vancomycin.  Decision to treat you for an additional 14 days was made of which 4 days was completed while you were admitted. Additional 10 days to be completed outpatient which has been prescribed. Please also take your probiotics as well.      SECONDARY DISCHARGE DIAGNOSES  Diagnosis: Chalazion  Assessment and Plan of Treatment: Suspect that your symptoms on your left eye lid is due to blocked ducts. Recommend warm compresses and outpatient visit to your ophthalmologist for further care and recommendation.

## 2025-05-12 NOTE — PROGRESS NOTE ADULT - ASSESSMENT
Gastroenteritis   Abdominal pain    Recommendations:  - IVF  - ID evaluation appreciated; on PO Vancomycin  - Zofran prn nausea  - Advance diet as tolerated  - Serial abdominal exams  - Pain control  - No major indication for endoscopic evaluation at this time  - Will follow with you    Elvis Alberto M.D.  [facility]  [phone]    Advanced care planning was discussed with patient and family.  Advanced care planning forms were reviewed and discussed.  Risks, benefits and alternatives of gastroenterologic procedures were discussed in detail and all questions were answered  60 minutes spent on total encounter of which more than fifty percent of the encounter was spent counseling and/or coordinating care by the attending physician.

## 2025-05-12 NOTE — PHYSICAL THERAPY INITIAL EVALUATION ADULT - ADDITIONAL COMMENTS
pvt home with 2 PRABHJOT w/HR but cannot use. She states she uses ambulance/stretcher for transport. Pt's son assists with all ADL's, and transfers, minimal ambulation mainly for transfers only with son assist. Pt has RW, commode, w/c(broken). Pt requesting Ayala as son is having difficulty with assisting transfers alone.

## 2025-05-12 NOTE — DISCHARGE NOTE PROVIDER - NSDCMRMEDTOKEN_GEN_ALL_CORE_FT
acetaminophen 325 mg oral tablet: 2 tab(s) orally every 6 hours As needed Temp greater or equal to 38C (100.4F), Mild Pain (1 - 3)  calcium (as carbonate) 500 mg oral tablet: 1 tab(s) orally once a day  cholecalciferol oral tablet: 50,000 orally once a week 90833 unit(s) orally once  diazePAM 5 mg oral tablet: 0.5 tab(s) orally 2 times a day as needed for  anxiety MDD: 5mg  dicyclomine 20 mg oral tablet: 1 tab(s) orally every 6 hours  folic acid 1 mg oral tablet: 1 tab(s) orally once a day  hydrocortisone 2.5% topical cream: Apply topically to affected area 2 times a day as needed for rectal/anal pain  magnesium oxide 400 mg oral tablet: 1 tab(s) orally 3 times a day (with meals)  melatonin 3 mg oral tablet: 1 tab(s) orally once a day (at bedtime) As needed Insomnia  Narcan 4 mg/0.1 mL nasal spray: 1 spray(s) intranasally once as needed for respiratory depression/ams  nystatin 100,000 units/g topical powder: Apply topically to affected area 2 times a day 1 Apply topically to affected area 2 times a day  ondansetron 4 mg oral tablet, disintegratin tab(s) orally every 6 hours  oxyCODONE 5 mg oral tablet: 1 tab(s) orally every 6 hours as needed for  severe pain MDD: #4  potassium chloride 20 mEq oral tablet, extended release: 1 tab(s) orally once a day  pregabalin 150 mg oral capsule: 1 cap(s) orally 2 times a day MDD: 300  pyridoxine 25 mg oral tablet: 1 tab(s) orally once a day  saccharomyces boulardii lyo 250 mg oral capsule: 1 cap(s) orally 2 times a day  sertraline 100 mg oral tablet: 1 tab(s) orally once a day  traZODone 50 mg oral tablet: 1 tab(s) orally once a day (at bedtime)  vancomycin 25 mg/mL oral liquid: 5 milliliter(s) orally every 6 hours  zinc oxide topical cream: Apply topically to affected area 2 times a day

## 2025-05-12 NOTE — CASE MANAGEMENT PROGRESS NOTE - NSCMPROGRESSNOTE_GEN_ALL_CORE
Per MD, patient is medically cleared for discharge home today.  CM met with patient to discuss discharge home and home care acceptance to [facility] Home. Discharge notice signed and copy given to patient.  Ayala lift will be deliver to home pending authorization by [facility] representative. Patient requested an ambulette for transport home. Ambulette scheduled. Patient verbalized understanding of the transition plan and is in agreement.  CM remains available throughout hospital stay.   Per MD, patient is medically cleared for discharge home today.  CM met with patient to discuss discharge home and home care acceptance to [facility]. Discharge notice signed and copy given to patient.  Ayala lift will be deliver to home pending authorization by [facility] representative. Patient requested an ambulette for transport home. Ambulette scheduled with [facility] [phone] confirmation [phone] for today 1pm . Patient verbalized understanding of the transition plan and is in agreement.  CM remains available throughout hospital stay.

## 2025-05-12 NOTE — DISCHARGE NOTE NURSING/CASE MANAGEMENT/SOCIAL WORK - FINANCIAL ASSISTANCE
[facility] provides services at a reduced cost to those who are determined to be eligible through [facility]’s financial assistance program. Information regarding [facility]’s financial assistance program can be found by going to [email or web address] or by calling [phone].

## 2025-05-12 NOTE — DISCHARGE NOTE NURSING/CASE MANAGEMENT/SOCIAL WORK - NSDCPEFALRISK_GEN_ALL_CORE
For information on Fall & Injury Prevention, visit: [email or web address] OR  [email or web address] OR  https://www.cdc.gov/steadi/patient.html

## 2025-05-13 ENCOUNTER — NON-APPOINTMENT (OUTPATIENT)
Age: 45
End: 2025-05-13

## 2025-05-13 ENCOUNTER — APPOINTMENT (OUTPATIENT)
Dept: CARE COORDINATION | Facility: HOME HEALTH | Age: 45
End: 2025-05-13

## 2025-05-13 DIAGNOSIS — H02.823 CYSTS OF RIGHT EYE, UNSPECIFIED EYELID: ICD-10-CM

## 2025-05-13 DIAGNOSIS — I48.91 UNSPECIFIED ATRIAL FIBRILLATION: ICD-10-CM

## 2025-05-13 DIAGNOSIS — A04.72 ENTEROCOLITIS DUE TO CLOSTRIDIUM DIFFICILE, NOT SPECIFIED AS RECURRENT: ICD-10-CM

## 2025-05-13 DIAGNOSIS — F32.A DEPRESSION, UNSPECIFIED: ICD-10-CM

## 2025-05-13 LAB
CULTURE RESULTS: SIGNIFICANT CHANGE UP
SPECIMEN SOURCE: SIGNIFICANT CHANGE UP

## 2025-05-13 PROCEDURE — 99349 HOME/RES VST EST MOD MDM 40: CPT | Mod: 95

## 2025-05-14 LAB
CULTURE RESULTS: SIGNIFICANT CHANGE UP
SPECIMEN SOURCE: SIGNIFICANT CHANGE UP

## 2025-05-15 PROBLEM — H02.823: Status: ACTIVE | Noted: 2025-05-15

## 2025-05-15 RX ORDER — VANCOMYCIN HYDROCHLORIDE 25 MG/ML
25 KIT ORAL
Qty: 300 | Refills: 0 | Status: ACTIVE | COMMUNITY
Start: 2025-03-11

## 2025-05-15 RX ORDER — SERTRALINE HYDROCHLORIDE 50 MG/1
50 TABLET, FILM COATED ORAL
Qty: 30 | Refills: 0 | Status: ACTIVE | COMMUNITY
Start: 2024-12-09

## 2025-05-15 RX ORDER — GABAPENTIN 100 MG/1
100 CAPSULE ORAL
Qty: 90 | Refills: 0 | Status: ACTIVE | COMMUNITY
Start: 2024-12-09

## 2025-05-15 RX ORDER — POTASSIUM PHOSPHATE, MONOBASIC AND SODIUM PHOSPHATE, MONOBASIC, ANHYDROUS 305; 700 MG/1; MG/1
305-700 TABLET, COATED ORAL
Qty: 16 | Refills: 0 | Status: ACTIVE | COMMUNITY
Start: 2025-03-11

## 2025-05-15 RX ORDER — MAGNESIUM OXIDE 241.3 MG/1000MG
400 TABLET ORAL
Qty: 12 | Refills: 0 | Status: ACTIVE | COMMUNITY
Start: 2025-03-11

## 2025-05-15 RX ORDER — NYSTATIN TOPICAL POWDER 100000 U/G
100000 POWDER TOPICAL
Qty: 15 | Refills: 0 | Status: ACTIVE | COMMUNITY
Start: 2025-03-11

## 2025-05-15 RX ORDER — HYDROXYZINE HYDROCHLORIDE 50 MG/1
50 TABLET ORAL
Qty: 60 | Refills: 0 | Status: ACTIVE | COMMUNITY
Start: 2024-12-04

## 2025-05-16 PROBLEM — A04.72 C. DIFFICILE COLITIS: Status: ACTIVE | Noted: 2025-05-16

## 2025-05-16 PROBLEM — F32.A DEPRESSION, UNSPECIFIED DEPRESSION TYPE: Status: ACTIVE | Noted: 2018-07-30

## 2025-05-23 RX ORDER — VANCOMYCIN HYDROCHLORIDE 25 MG/ML
25 KIT ORAL
Qty: 1 | Refills: 0 | Status: ACTIVE | COMMUNITY
Start: 2025-05-23 | End: 1900-01-01

## 2025-05-28 ENCOUNTER — NON-APPOINTMENT (OUTPATIENT)
Age: 45
End: 2025-05-28

## 2025-05-28 DIAGNOSIS — Z74.01 BED CONFINEMENT STATUS: ICD-10-CM

## 2025-05-28 PROBLEM — R39.81 FUNCTIONAL INCONTINENCE: Status: ACTIVE | Noted: 2025-05-28

## 2025-05-28 PROBLEM — R53.2 FUNCTIONAL QUADRIPLEGIA: Status: ACTIVE | Noted: 2025-05-28

## 2025-05-29 PROBLEM — Z74.01 CONFINED TO BED: Status: ACTIVE | Noted: 2025-05-29

## 2025-06-23 NOTE — H&P ADULT - NSICDXFAMILYHX_GEN_ALL_CORE_FT
"Chief Complaint   Patient presents with    Head Injury    GLF     Pt says at 0430 today she fell while letting her dogs out and hit the back of her head. +LOC -Blood Thinner. Pt says she went to work and while leaning over had red fluid drain from right nare, pt concerned it is CSF fluid. Pt c/o mild headache and a knot on back of scalp.    Blood Pressure: (!) 144/84, Pulse: 64, Respiration: 18, Temperature: 36.6 °C (97.8 °F), Height: 175.3 cm (5' 9\"), Weight: 73 kg (160 lb 15 oz), BMI (Calculated): 23.77, BSA (Calculated): 1.9, Pulse Oximetry: 97 %    " FAMILY HISTORY:  Sibling  Still living? Unknown  Family history of diabetes mellitus, Age at diagnosis: Age Unknown    Grandparent  Still living? Unknown  Family history of diabetes mellitus, Age at diagnosis: Age Unknown  Family history of stomach cancer, Age at diagnosis: Age Unknown

## 2025-07-11 NOTE — ED ADULT TRIAGE NOTE - RESPIRATORY RATE (BREATHS/MIN)
Patient attended session 6 of Cardiac Rehab Phase 2. See scanned in exercise session report in the Media tab.    
18

## 2025-07-30 NOTE — ED ADULT TRIAGE NOTE - CHIEF COMPLAINT QUOTE
"I fainted this morning twice & I have diarrhea & SOB & feel weak" Patient Name: Dani Ziegler  : 1966    MRN: [id number]                              Today's Date: 2025       Admit Date: 2025    Visit Dx:     ICD-10-CM ICD-9-CM   1. Respiratory distress  R06.03 786.09   2. Acute respiratory failure with hypoxia  J96.01 518.81   3. Pneumonia of right middle lobe due to infectious organism  J18.9 486   4. Sepsis without acute organ dysfunction, due to unspecified organism  A41.9 038.9     995.91   5. COPD with acute exacerbation  J44.1 491.21   6. Leukocytosis, unspecified type  D72.829 288.60   7. Sinus tachycardia  R00.0 427.89   8. Oropharyngeal dysphagia  R13.12 787.22     Patient Active Problem List   Diagnosis    Uncontrolled type 2 diabetes mellitus with hyperglycemia, without long-term current use of insulin    Vitamin D deficiency    Peripheral neuropathy    Adiposity    Essential hypertension    Chronic pain    Mucopurulent chronic bronchitis    Anxiety and depression    Arthritis involving multiple sites    Mixed hyperlipidemia    Special screening for malignant neoplasms, colon    Cellulitis of left lower extremity    Acute on chronic renal insufficiency    Severe sepsis    Cellulitis of left leg    Lumbar disc disease    Diabetic peripheral neuropathy    MRSA pneumonia of left midlung present on admission    Polypharmacy    Morbid obesity with BMI of 45.0-49.9. ?  LOUANN/OHS    Acute on chronic respiratory failure with hypoxia and hypercapnia    H/O recurrent DVT on Eliquis     Chronic narcotic/BZD use    Illicit drug use (UDS + methamphetamines)     Smoker    SBO s/p exploratory laparotomy and incisional hernia repair with mesh 2022    Incisional hernia with obstruction but no gangrene    Acute on chronic respiratory failure with hypoxia    Acute on chronic hypoxic respiratory failure     Past Medical History:   Diagnosis Date    Abnormal weight gain     Acute UTI     Anxiety     Arthritis involving multiple sites     Chronic obstructive pulmonary  disease     Chronic pain     Current every day smoker     Depression     Diabetes mellitus     Diarrhea     Dysuria     Edema, lower extremity     Fever     Head injury     Hypertension     Intervertebral disc disorder     Degeneration of intervertebral disc, site unspecified (722.6)    Left bundle branch block     Leukocytosis     Long term current use of methadone for pain control     Mass of right breast     Nausea     Obesity     Overactive bladder     Peripheral neuropathy     Superficial phlebitis     right medial calf    Upper respiratory infection     Urinary incontinence     Vitamin D deficiency     Vomiting      Past Surgical History:   Procedure Laterality Date    BLADDER SURGERY      pubovaginal sling    CHOLECYSTECTOMY      EXPLORATORY LAPAROTOMY N/A 06/12/2022    Procedure: LAPAROTOMY EXPLORATORY UMBILICAL HERNIA REPAIR WITH MESH;  Surgeon: Reji Brown MD;  [facility];  Service: General;  Laterality: N/A;    HERNIA REPAIR      HIP ARTHROSCOPY Right 10/29/2020    JOINT REPLACEMENT      TRACHEOSTOMY        General Information       Row Name 07/30/25 0941          OT Time and Intention    Document Type therapy note (daily note)  -     Mode of Treatment occupational therapy  -AC       Row Name 07/30/25 0941          General Information    Patient Profile Reviewed yes  -AC     Existing Precautions/Restrictions fall;oxygen therapy device and L/min  -AC       Row Name 07/30/25 0941          Cognition    Orientation Status (Cognition) oriented x 3  -AC       Row Name 07/30/25 0941          Safety Issues/Impairments Affecting Functional Mobility    Safety Issues Affecting Function (Mobility) safety precaution awareness  -AC     Impairments Affecting Function (Mobility) endurance/activity tolerance;shortness of breath;strength  -AC               User Key  (r) = Recorded By, (t) = Taken By, (c) = Cosigned By      Initials Name Provider Type    AC Carmen Carballo, OT Occupational Therapist                      Mobility/ADL's       Row Name 07/30/25 0942          Bed Mobility    Bed Mobility supine-sit  -AC     Supine-Sit [place] (Bed Mobility) standby assist  -     Assistive Device (Bed Mobility) bed rails;head of bed elevated  -       Row Name 07/30/25 0942          Transfers    Transfers sit-stand transfer;toilet transfer  -       Row Name 07/30/25 0942          Sit-Stand Transfer    Sit-Stand [place] (Transfers) contact guard;1 person assist;verbal cues  -     Assistive Device (Sit-Stand Transfers) walker, front-wheeled  -AC       Row Name 07/30/25 0942          Toilet Transfer    [place] Level (Toilet Transfer) contact guard  -     Assistive Device (Toilet Transfer) commode, bedside without drop arms;walker, front-wheeled  -AC       Row Name 07/30/25 0942          Functional Mobility    Functional Mobility- Ind. Level verbal cues required;contact guard assist  -     Functional Mobility- Device walker, front-wheeled  -AC     Functional Mobility-Distance (Feet) 200  -AC     Functional Mobility- Safety Issues step length decreased  -       Row Name 07/30/25 0942          Activities of Daily Living    BADL Assessment/Intervention lower body dressing;grooming;toileting  -       Row Name 07/30/25 0942          Lower Body Dressing Assessment/Training    Comment, (Lower Body Dressing) pt reports she does not need further review of AE  -AC       Row Name 07/30/25 0942          Grooming Assessment/Training    [place] Level (Grooming) wash face, hands;set up  -AC     Position (Grooming) supported sitting  -AC       Row Name 07/30/25 0942          Toileting Assessment/Training    [place] Level (Toileting) adjust/manage clothing;perform perineal hygiene;minimum assist (75% patient effort)  -     Assistive Devices (Toileting) commode, bedside without drop arms  -AC     Position (Toileting) supported standing  -AC               User Key  (r) = Recorded By, (t) = Taken By, (c) =  Cosigned By      Initials Name Provider Type     Carmen Carballo, OT Occupational Therapist                   Obj/Interventions       Row Name 07/30/25 0944          Balance    Balance Assessment sitting static balance;sitting dynamic balance;standing static balance;standing dynamic balance  -AC     Static Sitting Balance standby assist  -AC     Dynamic Sitting Balance standby assist  -AC     Position, Sitting Balance unsupported;sitting edge of bed  -AC     Static Standing Balance standby assist  -AC     Dynamic Standing Balance contact guard  -AC     Position/Device Used, Standing Balance walker, front-wheeled  -AC               User Key  (r) = Recorded By, (t) = Taken By, (c) = Cosigned By      Initials Name Provider Type     Carmen Carballo, OT Occupational Therapist                   Goals/Plan    No documentation.                  Clinical Impression       Row Name 07/30/25 0944          Pain Assessment    Pain Location back  -AC     Pain Side/Orientation generalized  -     Pain Management Interventions exercise or physical activity utilized;positioning techniques utilized  -     Response to Pain Interventions activity participation with tolerable pain  -AC       Row Name 07/30/25 0944          Plan of Care Review    Plan of Care Reviewed With patient  -     Progress improving  -     Outcome Evaluation Pt  CGA BSC transfer, progressed from mod A to min A with perineal hygiene,  CGA to ambulate with FWW.  Pt is progressing, but continues below baseline d/t weakness and decr activity tolerance.  Recommend home with assist and HHOT upon d/c.  -       Row Name 07/30/25 0944          Vital Signs    Pre Systolic BP Rehab 112  -AC     Pre Treatment Diastolic BP 75  -AC     Pre SpO2 (%) 94  -AC     O2 Delivery Pre Treatment supplemental O2  -AC     O2 Delivery Intra Treatment supplemental O2  -AC     Post SpO2 (%) 98  -AC     O2 Delivery Post Treatment supplemental O2  -AC     Pre Patient Position Supine   -     Post Patient Position Sitting  -AC       Row Name 07/30/25 0944          Positioning and Restraints    Pre-Treatment Position in bed  -AC     Post Treatment Position chair  -AC     In Chair notified nsg;reclined;call light within reach;encouraged to call for assist;exit alarm on;waffle cushion;with nsg  -               User Key  (r) = Recorded By, (t) = Taken By, (c) = Cosigned By      Initials Name Provider Type    Carmen Villarreal, OT Occupational Therapist                   Outcome Measures       Row Name 07/30/25 0948          How much help from another is currently needed...    Putting on and taking off regular lower body clothing? 3  -AC     Bathing (including washing, rinsing, and drying) 3  -AC     Toileting (which includes using toilet bed pan or urinal) 3  -AC     Putting on and taking off regular upper body clothing 3  -AC     Taking care of personal grooming (such as brushing teeth) 3  -AC     Eating meals 4  -AC     AM-PAC 6 Clicks Score (OT) 19  -AC       Row Name 07/30/25 0948          Functional Assessment    Outcome Measure Options AM-PAC 6 Clicks Daily Activity (OT)  -               User Key  (r) = Recorded By, (t) = Taken By, (c) = Cosigned By      Initials Name Provider Type    Carmen Villarreal, OT Occupational Therapist                    Occupational Therapy Education       Title: PT OT SLP Therapies (In Progress)       Topic: Occupational Therapy (In Progress)       Point: ADL training (In Progress)       Learning Progress Summary            Patient Acceptance, E, NR by  at 7/30/2025 0949    Acceptance, E, NR by  at 7/28/2025 1036                                      User Key       Initials Effective Dates Name Provider Type [facility] 02/03/23 -  Carmen Carballo, OT Occupational Therapist OT                  OT Recommendation and Plan  Planned Therapy Interventions (OT): activity tolerance training, adaptive equipment training, BADL retraining, functional balance  retraining, occupation/activity based interventions, patient/caregiver education/training, strengthening exercise, transfer/mobility retraining  Therapy Frequency (OT): daily  Plan of Care Review  Plan of Care Reviewed With: patient  Progress: improving  Outcome Evaluation: Pt  CGA BSC transfer, progressed from mod A to min A with perineal hygiene,  CGA to ambulate with FWW.  Pt is progressing, but continues below baseline d/t weakness and decr activity tolerance.  Recommend home with assist and HHOT upon d/c.     Time Calculation:   Evaluation Complexity (OT)  Review Occupational Profile/Medical/Therapy History Complexity: brief/low complexity  Assessment, Occupational Performance/Identification of Deficit Complexity: 1-3 performance deficits  Clinical Decision Making Complexity (OT): problem focused assessment/low complexity  Overall Complexity of Evaluation (OT): low complexity     Time Calculation- OT       Row Name 07/30/25 0845             Time Calculation- OT    OT Start Time 0845  -AC      OT Received On 07/30/25  -AC      OT Goal Re-Cert Due Date 08/04/25  -AC         Timed Charges    66362 - OT Therapeutic Activity Minutes 12  -AC      47242 - OT Self Care/Mgmt Minutes 15  -AC         Total Minutes    Timed Charges Total Minutes 27  -AC       Total Minutes 27  -AC                User Key  (r) = Recorded By, (t) = Taken By, (c) = Cosigned By      Initials Name Provider Type    AC Carmen Carballo, OT Occupational Therapist                  Therapy Charges for Today       Code Description Service Date Service Provider Modifiers Qty    20835112983  OT THERAPEUTIC ACT EA 15 MIN 7/30/2025 Carmen Carballo, OT GO 1    81380608443 HC OT SELF CARE/MGMT/TRAIN EA 15 MIN 7/30/2025 Carmen Carballo OT GO 1                 Carmen Carballo OT  7/30/2025